# Patient Record
Sex: MALE | Race: ASIAN | NOT HISPANIC OR LATINO
[De-identification: names, ages, dates, MRNs, and addresses within clinical notes are randomized per-mention and may not be internally consistent; named-entity substitution may affect disease eponyms.]

---

## 2017-01-11 ENCOUNTER — NON-APPOINTMENT (OUTPATIENT)
Age: 57
End: 2017-01-11

## 2017-01-11 ENCOUNTER — APPOINTMENT (OUTPATIENT)
Dept: CARDIOLOGY | Facility: CLINIC | Age: 57
End: 2017-01-11

## 2017-01-11 VITALS
HEIGHT: 65 IN | OXYGEN SATURATION: 95 % | BODY MASS INDEX: 30.99 KG/M2 | WEIGHT: 186 LBS | SYSTOLIC BLOOD PRESSURE: 116 MMHG | DIASTOLIC BLOOD PRESSURE: 74 MMHG | HEART RATE: 83 BPM

## 2017-01-22 ENCOUNTER — RESULT REVIEW (OUTPATIENT)
Age: 57
End: 2017-01-22

## 2017-01-23 ENCOUNTER — OUTPATIENT (OUTPATIENT)
Dept: OUTPATIENT SERVICES | Facility: HOSPITAL | Age: 57
LOS: 1 days | Discharge: HOME | End: 2017-01-23
Payer: MEDICAID

## 2017-01-23 DIAGNOSIS — K92.1 MELENA: ICD-10-CM

## 2017-01-23 DIAGNOSIS — I77.0 ARTERIOVENOUS FISTULA, ACQUIRED: Chronic | ICD-10-CM

## 2017-01-23 LAB
GAS PNL BLDV: 140 MMOL/L — SIGNIFICANT CHANGE UP (ref 136–146)
GLUCOSE BLDV-MCNC: 89 — SIGNIFICANT CHANGE UP (ref 70–99)
HCT VFR BLDV CALC: 43.3 % — SIGNIFICANT CHANGE UP (ref 39–51)
POTASSIUM BLDV-SCNC: 5.7 MMOL/L — HIGH (ref 3.4–4.5)

## 2017-01-23 PROCEDURE — 88305 TISSUE EXAM BY PATHOLOGIST: CPT | Mod: 26

## 2017-01-24 LAB — SURGICAL PATHOLOGY STUDY: SIGNIFICANT CHANGE UP

## 2017-01-30 ENCOUNTER — OUTPATIENT (OUTPATIENT)
Dept: OUTPATIENT SERVICES | Facility: HOSPITAL | Age: 57
LOS: 1 days | End: 2017-01-30
Payer: MEDICAID

## 2017-01-30 VITALS
SYSTOLIC BLOOD PRESSURE: 134 MMHG | OXYGEN SATURATION: 97 % | TEMPERATURE: 98 F | DIASTOLIC BLOOD PRESSURE: 68 MMHG | HEART RATE: 74 BPM | RESPIRATION RATE: 16 BRPM | WEIGHT: 192.02 LBS | HEIGHT: 62 IN

## 2017-01-30 DIAGNOSIS — G47.33 OBSTRUCTIVE SLEEP APNEA (ADULT) (PEDIATRIC): ICD-10-CM

## 2017-01-30 DIAGNOSIS — N18.6 END STAGE RENAL DISEASE: ICD-10-CM

## 2017-01-30 DIAGNOSIS — K64.9 UNSPECIFIED HEMORRHOIDS: ICD-10-CM

## 2017-01-30 DIAGNOSIS — I10 ESSENTIAL (PRIMARY) HYPERTENSION: ICD-10-CM

## 2017-01-30 DIAGNOSIS — K64.8 OTHER HEMORRHOIDS: ICD-10-CM

## 2017-01-30 DIAGNOSIS — I25.10 ATHEROSCLEROTIC HEART DISEASE OF NATIVE CORONARY ARTERY WITHOUT ANGINA PECTORIS: ICD-10-CM

## 2017-01-30 DIAGNOSIS — I77.0 ARTERIOVENOUS FISTULA, ACQUIRED: Chronic | ICD-10-CM

## 2017-01-30 LAB
BUN SERPL-MCNC: 42 MG/DL — HIGH (ref 7–23)
CALCIUM SERPL-MCNC: 9.7 MG/DL — SIGNIFICANT CHANGE UP (ref 8.4–10.5)
CHLORIDE SERPL-SCNC: 101 MMOL/L — SIGNIFICANT CHANGE UP (ref 98–107)
CO2 SERPL-SCNC: 20 MMOL/L — LOW (ref 22–31)
CREAT SERPL-MCNC: 12.31 MG/DL — HIGH (ref 0.5–1.3)
GLUCOSE SERPL-MCNC: 112 MG/DL — HIGH (ref 70–99)
HCT VFR BLD CALC: 42.7 % — SIGNIFICANT CHANGE UP (ref 39–50)
HGB BLD-MCNC: 13.2 G/DL — SIGNIFICANT CHANGE UP (ref 13–17)
MCHC RBC-ENTMCNC: 30.3 PG — SIGNIFICANT CHANGE UP (ref 27–34)
MCHC RBC-ENTMCNC: 30.9 % — LOW (ref 32–36)
MCV RBC AUTO: 98.2 FL — SIGNIFICANT CHANGE UP (ref 80–100)
PLATELET # BLD AUTO: 141 K/UL — LOW (ref 150–400)
PMV BLD: 10.6 FL — SIGNIFICANT CHANGE UP (ref 7–13)
POTASSIUM SERPL-MCNC: 5.3 MMOL/L — SIGNIFICANT CHANGE UP (ref 3.5–5.3)
POTASSIUM SERPL-SCNC: 5.3 MMOL/L — SIGNIFICANT CHANGE UP (ref 3.5–5.3)
RBC # BLD: 4.35 M/UL — SIGNIFICANT CHANGE UP (ref 4.2–5.8)
RBC # FLD: 15.2 % — HIGH (ref 10.3–14.5)
SODIUM SERPL-SCNC: 142 MMOL/L — SIGNIFICANT CHANGE UP (ref 135–145)
WBC # BLD: 6.14 K/UL — SIGNIFICANT CHANGE UP (ref 3.8–10.5)
WBC # FLD AUTO: 6.14 K/UL — SIGNIFICANT CHANGE UP (ref 3.8–10.5)

## 2017-01-30 PROCEDURE — 93010 ELECTROCARDIOGRAM REPORT: CPT

## 2017-01-30 RX ORDER — SODIUM CHLORIDE 9 MG/ML
1000 INJECTION INTRAMUSCULAR; INTRAVENOUS; SUBCUTANEOUS
Qty: 0 | Refills: 0 | Status: DISCONTINUED | OUTPATIENT
Start: 2017-02-03 | End: 2017-02-18

## 2017-01-30 NOTE — H&P PST ADULT - NEGATIVE ALLERGY TYPES
no outdoor environmental allergies/no reactions to animals/no reactions to insect bites/no reactions to food/no indoor environmental allergies

## 2017-01-30 NOTE — H&P PST ADULT - PSH
AV fistula  right lower extremity 2 yrs  Left upper extrmity with graft 7 years ago  CABG (Coronary Artery Bypass Graft)    S/P CABG  x3 5 yrs ago  Stented coronary artery  x2   1990's   2016

## 2017-01-30 NOTE — H&P PST ADULT - FAMILY HISTORY
Sibling  Still living? Yes, Estimated age: Age Unknown  Family history of adult polycystic kidney disease, Age at diagnosis: Age Unknown

## 2017-01-30 NOTE — H&P PST ADULT - NEUROLOGICAL DETAILS
normal strength/alert and oriented x 3/cranial nerves intact responds to verbal commands/normal strength/cranial nerves intact/alert and oriented x 3/sensation intact/responds to pain

## 2017-01-30 NOTE — H&P PST ADULT - NSANTHOSAYNRD_GEN_A_CORE
No. ELIJAH screening performed.  STOP BANG Legend: 0-2 = LOW Risk; 3-4 = INTERMEDIATE Risk; 5-8 = HIGH Risk

## 2017-01-30 NOTE — H&P PST ADULT - GASTROINTESTINAL DETAILS
no distention/no masses palpable/soft/nontender/hyperactive bowel sounds no masses palpable/nontender/bowel sounds normal/no distention/soft

## 2017-01-30 NOTE — H&P PST ADULT - HISTORY OF PRESENT ILLNESS
56 year old man with hx of CAD s/p CABG/PCI (6/16), ESRD on HD (T TH S) presents with 2 weeks of loose BM. 2 weeks ago patient patient started having cough, myalgias and diarrhea. He presented to [facility] and was ruled out for PNA, found to have parainfluenza although he did recieve 2 days of broad spectrum abx. Since d/c his diarrhea has worsened up to 10 loose watery nonbloody BM per day whenever he ingests food. No specific food type. They are voluminous, occuring even at night. Endorses measured fevers to "106". Associated w crampy abdominal pain worst in LLQ. Denies n/v but endorses poor appetite and weakness. weight has been stable. No recent travel or eating of unusual food. Also having R sided neck pain, L shoulder and wrist pain. His cough however has resolved. He had a colonoscopy 2 years ago which was "normal". No new medications. No sick contacts. Denies trauma to shoulder.    ED VS Tmax 100.7 HR 84 /64    Recieved 1 dose of cefepime in the ED 56 year old man with hx of CAD s/p CABG/PCI (6/16), ESRD on HD (T TH S) presents with preop dx of hemorrhoids presents to have PST eval for Examination under anesthesia of rectal polypectomy and hemorrhoidectomy scheduled on 2/3/2017.

## 2017-01-30 NOTE — H&P PST ADULT - PMH
Asthma    CAD (Coronary Artery Disease)    ESRD on Dialysis    Gastroesophageal reflux disease without esophagitis    High cholesterol    HTN - Hypertension    PCK (Polycystic Kidney Disease) Asthma    CAD (Coronary Artery Disease)    ESRD on Dialysis  Tue, Thur & Sat  Gastroesophageal reflux disease without esophagitis    High cholesterol    HTN - Hypertension    PCK (Polycystic Kidney Disease)

## 2017-01-30 NOTE — H&P PST ADULT - MUSCULOSKELETAL
details… detailed exam no joint swelling/no joint warmth/no calf tenderness/ROM intact/no joint erythema

## 2017-01-30 NOTE — H&P PST ADULT - PROBLEM SELECTOR PLAN 2
Instructed patient to hold Plavix starting today and to continue Aspirin. Dr Souza's office notified.  Cardiac clearance requested. Also requested last echo and stress test.

## 2017-01-30 NOTE — H&P PST ADULT - MUSCULOSKELETAL COMMENTS
L shoulder severe TTP and pain w ROM over deltoid bursa. Also w TTP over thenar eminance. Mild pain w wrist ROM

## 2017-01-30 NOTE — H&P PST ADULT - RS GEN PE MLT RESP DETAILS PC
respirations non-labored no rales/breath sounds equal/good air movement/airway patent/respirations non-labored/no wheezes/no rhonchi/clear to auscultation bilaterally

## 2017-02-02 ENCOUNTER — RESULT REVIEW (OUTPATIENT)
Age: 57
End: 2017-02-02

## 2017-02-02 RX ORDER — SODIUM CHLORIDE 9 MG/ML
3 INJECTION INTRAMUSCULAR; INTRAVENOUS; SUBCUTANEOUS ONCE
Qty: 0 | Refills: 0 | Status: DISCONTINUED | OUTPATIENT
Start: 2017-02-03 | End: 2017-02-18

## 2017-02-02 RX ORDER — SODIUM CHLORIDE 9 MG/ML
1000 INJECTION INTRAMUSCULAR; INTRAVENOUS; SUBCUTANEOUS
Qty: 0 | Refills: 0 | Status: DISCONTINUED | OUTPATIENT
Start: 2017-02-03 | End: 2017-02-18

## 2017-02-02 NOTE — ASU PATIENT PROFILE, ADULT - PMH
Asthma    CAD (Coronary Artery Disease)    ESRD on Dialysis  Tue, Thur & Sat  Gastroesophageal reflux disease without esophagitis    High cholesterol    HTN - Hypertension    PCK (Polycystic Kidney Disease)

## 2017-02-03 ENCOUNTER — OUTPATIENT (OUTPATIENT)
Dept: OUTPATIENT SERVICES | Facility: HOSPITAL | Age: 57
LOS: 1 days | Discharge: HOME | End: 2017-02-03
Payer: MEDICAID

## 2017-02-03 ENCOUNTER — TRANSCRIPTION ENCOUNTER (OUTPATIENT)
Age: 57
End: 2017-02-03

## 2017-02-03 VITALS
SYSTOLIC BLOOD PRESSURE: 110 MMHG | TEMPERATURE: 98 F | HEART RATE: 64 BPM | OXYGEN SATURATION: 100 % | DIASTOLIC BLOOD PRESSURE: 65 MMHG | RESPIRATION RATE: 16 BRPM

## 2017-02-03 VITALS
DIASTOLIC BLOOD PRESSURE: 49 MMHG | RESPIRATION RATE: 18 BRPM | TEMPERATURE: 98 F | HEART RATE: 66 BPM | HEIGHT: 62 IN | WEIGHT: 192.02 LBS | OXYGEN SATURATION: 96 % | SYSTOLIC BLOOD PRESSURE: 130 MMHG

## 2017-02-03 DIAGNOSIS — K64.8 OTHER HEMORRHOIDS: ICD-10-CM

## 2017-02-03 DIAGNOSIS — I77.0 ARTERIOVENOUS FISTULA, ACQUIRED: Chronic | ICD-10-CM

## 2017-02-03 LAB
BUN SERPL-MCNC: 34 MG/DL — HIGH (ref 7–23)
CALCIUM SERPL-MCNC: 9.4 MG/DL — SIGNIFICANT CHANGE UP (ref 8.4–10.5)
CHLORIDE SERPL-SCNC: 97 MMOL/L — LOW (ref 98–107)
CK SERPL-CCNC: 52 U/L — SIGNIFICANT CHANGE UP (ref 30–200)
CO2 SERPL-SCNC: 21 MMOL/L — LOW (ref 22–31)
CREAT SERPL-MCNC: 10.53 MG/DL — HIGH (ref 0.5–1.3)
GLUCOSE SERPL-MCNC: 104 MG/DL — HIGH (ref 70–99)
HCT VFR BLD CALC: 42.7 % — SIGNIFICANT CHANGE UP (ref 39–50)
HGB BLD-MCNC: 13.4 G/DL — SIGNIFICANT CHANGE UP (ref 13–17)
MCHC RBC-ENTMCNC: 30.3 PG — SIGNIFICANT CHANGE UP (ref 27–34)
MCHC RBC-ENTMCNC: 31.4 % — LOW (ref 32–36)
MCV RBC AUTO: 96.6 FL — SIGNIFICANT CHANGE UP (ref 80–100)
PLATELET # BLD AUTO: 156 K/UL — SIGNIFICANT CHANGE UP (ref 150–400)
PMV BLD: 10.6 FL — SIGNIFICANT CHANGE UP (ref 7–13)
POTASSIUM SERPL-MCNC: 4.8 MMOL/L — SIGNIFICANT CHANGE UP (ref 3.5–5.3)
POTASSIUM SERPL-SCNC: 4.8 MMOL/L — SIGNIFICANT CHANGE UP (ref 3.5–5.3)
RBC # BLD: 4.42 M/UL — SIGNIFICANT CHANGE UP (ref 4.2–5.8)
RBC # FLD: 15 % — HIGH (ref 10.3–14.5)
SODIUM SERPL-SCNC: 137 MMOL/L — SIGNIFICANT CHANGE UP (ref 135–145)
TROPONIN T SERPL-MCNC: < 0.06 NG/ML — SIGNIFICANT CHANGE UP (ref 0–0.06)
WBC # BLD: 8.92 K/UL — SIGNIFICANT CHANGE UP (ref 3.8–10.5)
WBC # FLD AUTO: 8.92 K/UL — SIGNIFICANT CHANGE UP (ref 3.8–10.5)

## 2017-02-03 PROCEDURE — 88304 TISSUE EXAM BY PATHOLOGIST: CPT | Mod: 26

## 2017-02-03 PROCEDURE — 88305 TISSUE EXAM BY PATHOLOGIST: CPT | Mod: 26

## 2017-02-03 NOTE — ASU DISCHARGE PLAN (ADULT/PEDIATRIC). - NOTIFY
Pain not relieved by Medications/Unable to Urinate/Bleeding that does not stop/Persistent Nausea and Vomiting

## 2017-02-03 NOTE — ASU DISCHARGE PLAN (ADULT/PEDIATRIC). - COMMENTS
Surgical Unit will call you on the next business day to follow up. [facility] is open Monday - Friday.

## 2017-02-03 NOTE — PACU DISCHARGE NOTE - COMMENTS
seen by both cardiology and surgery.All diagnostic tests came back to baseline. Stable for discharge

## 2017-02-03 NOTE — ASU DISCHARGE PLAN (ADULT/PEDIATRIC). - MEDICATION SUMMARY - MEDICATIONS TO TAKE
I will START or STAY ON the medications listed below when I get home from the hospital:    aspirin 325 mg oral delayed release tablet  -- 1 tab(s) by mouth once a day  -- Indication: For Home Med    Percocet 5/325 oral tablet  -- 1 tab(s) by mouth every 4 hours, As Needed -for moderate pain MDD:8  -- Caution federal law prohibits the transfer of this drug to any person other  than the person for whom it was prescribed.  May cause drowsiness.  Alcohol may intensify this effect.  Use care when operating dangerous machinery.  This prescription cannot be refilled.  This product contains acetaminophen.  Do not use  with any other product containing acetaminophen to prevent possible liver damage.  Using more of this medication than prescribed may cause serious breathing problems.    -- Indication: For As needed for pain    Lipitor 10 mg oral tablet  -- 1 tab(s) by mouth once a day  -- Indication: For Home Med    Plavix 75 mg oral tablet  -- 1 tab(s) by mouth once a day  -- Indication: For Home Med    metoprolol tartrate 25 mg oral tablet  -- 1 tab(s) by mouth once a day  -- Indication: For Home Med    Renvela carbonate 800 mg oral tablet  -- 1 tab(s) by mouth 3 times a day  -- Indication: For Home Med

## 2017-02-03 NOTE — ASU DISCHARGE PLAN (ADULT/PEDIATRIC). - SPECIAL INSTRUCTIONS
Please follow up with Dr. Souza within 1 week after discharge from the hospital. You may call [phone] to schedule an appointment.     Do not lift anything over ten pounds. Do not strain. Do not drive as your movements will be restricted by pain. You will be discharged with pain medications, please do not drive while taking these. Do not drink alcohol while taking narcotic pain medications.     Please remove the gauze and rectal packing in 24 hours or before your next bowel movement.

## 2017-02-06 LAB — SURGICAL PATHOLOGY STUDY: SIGNIFICANT CHANGE UP

## 2017-02-08 ENCOUNTER — APPOINTMENT (OUTPATIENT)
Dept: CV DIAGNOSITCS | Facility: HOSPITAL | Age: 57
End: 2017-02-08

## 2017-02-08 ENCOUNTER — OUTPATIENT (OUTPATIENT)
Dept: OUTPATIENT SERVICES | Facility: HOSPITAL | Age: 57
LOS: 1 days | End: 2017-02-08

## 2017-02-08 DIAGNOSIS — I25.10 ATHEROSCLEROTIC HEART DISEASE OF NATIVE CORONARY ARTERY WITHOUT ANGINA PECTORIS: ICD-10-CM

## 2017-02-08 DIAGNOSIS — I25.5 ISCHEMIC CARDIOMYOPATHY: ICD-10-CM

## 2017-02-08 DIAGNOSIS — I77.0 ARTERIOVENOUS FISTULA, ACQUIRED: Chronic | ICD-10-CM

## 2017-05-17 ENCOUNTER — OUTPATIENT (OUTPATIENT)
Dept: OUTPATIENT SERVICES | Facility: HOSPITAL | Age: 57
LOS: 1 days | End: 2017-05-17
Payer: MEDICAID

## 2017-05-17 VITALS
DIASTOLIC BLOOD PRESSURE: 80 MMHG | RESPIRATION RATE: 16 BRPM | SYSTOLIC BLOOD PRESSURE: 130 MMHG | HEART RATE: 68 BPM | TEMPERATURE: 98 F | OXYGEN SATURATION: 96 % | WEIGHT: 192.9 LBS | HEIGHT: 65 IN

## 2017-05-17 DIAGNOSIS — Z01.818 ENCOUNTER FOR OTHER PREPROCEDURAL EXAMINATION: ICD-10-CM

## 2017-05-17 DIAGNOSIS — I77.0 ARTERIOVENOUS FISTULA, ACQUIRED: Chronic | ICD-10-CM

## 2017-05-17 DIAGNOSIS — N18.6 END STAGE RENAL DISEASE: ICD-10-CM

## 2017-05-17 DIAGNOSIS — Z98.890 OTHER SPECIFIED POSTPROCEDURAL STATES: Chronic | ICD-10-CM

## 2017-05-17 PROCEDURE — G0463: CPT

## 2017-05-17 NOTE — H&P PST ADULT - NEGATIVE GENERAL GENITOURINARY SYMPTOMS
no renal colic/no hematuria/no flank pain L/pt still producing urine, ESRD/no dysuria/no nocturia/no flank pain R

## 2017-05-17 NOTE — H&P PST ADULT - HISTORY OF PRESENT ILLNESS
57 year old man with hx of CAD s/p CABG/PCI (6/16),  on HD (T TH S) presents with preop dx of ESRD. Patient is scheduled for revision of right forearm fistula with vein patch on 5/23/17.

## 2017-05-17 NOTE — H&P PST ADULT - RS GEN PE MLT RESP DETAILS PC
breath sounds equal/clear to auscultation bilaterally/no rales/good air movement/no wheezes/no rhonchi/airway patent/respirations non-labored

## 2017-05-17 NOTE — H&P PST ADULT - NEGATIVE ALLERGY TYPES
no outdoor environmental allergies/no reactions to animals/no indoor environmental allergies/no reactions to food/no reactions to insect bites

## 2017-05-17 NOTE — H&P PST ADULT - NEGATIVE OPHTHALMOLOGIC SYMPTOMS
no blurred vision L/no pain R/no blurred vision R/no lacrimation R/no pain L/no diplopia/no photophobia/no lacrimation L

## 2017-05-17 NOTE — H&P PST ADULT - NSANTHOSAYNRD_GEN_A_CORE
patient was sleep test done in 2016, no ELIJAH diagnosed/No. ELIJAH screening performed.  STOP BANG Legend: 0-2 = LOW Risk; 3-4 = INTERMEDIATE Risk; 5-8 = HIGH Risk

## 2017-05-17 NOTE — H&P PST ADULT - EXTREMITIES COMMENTS
right lower arm fistula + thrill, Left upper arm fistula + thrill ( pt uses left arm AV fistula for HD) right lower arm fistula + thrill, Left upper arm fistula + thrill ( pt uses left arm AV fistula for HD)- NO USE OF UPPER ARMS FOR IV, BP CHECKS OR BLOOD DRAW- check BP on legs

## 2017-05-17 NOTE — H&P PST ADULT - PSH
AV fistula  right lower extremity 2 yrs  Left upper extrmity with graft 7 years ago  CABG (Coronary Artery Bypass Graft)  2007  S/P hemorrhoidectomy  and rectal polypectomy -2/3/2017.  Stented coronary artery  x2 cardiac stents in 2016, one cardiac stent in 2015

## 2017-05-17 NOTE — H&P PST ADULT - NEUROLOGICAL DETAILS
responds to verbal commands/normal strength/cranial nerves intact/alert and oriented x 3/sensation intact/responds to pain

## 2017-05-17 NOTE — H&P PST ADULT - MUSCULOSKELETAL
details… detailed exam no joint erythema/no joint swelling/no calf tenderness/no joint warmth/ROM intact

## 2017-05-17 NOTE — H&P PST ADULT - PMH
Asthma  last attack 2007, never hospitalized or intubated  CAD (Coronary Artery Disease)    ESRD on Dialysis  Tue, Thur & Sat  Gastroesophageal reflux disease without esophagitis    Hemorrhoids    High cholesterol    HTN - Hypertension    Obesity    PCK (Polycystic Kidney Disease)

## 2017-05-17 NOTE — H&P PST ADULT - PROBLEM SELECTOR PLAN 1
Patient is scheduled for revision of right forearm fistula with vein patch on 5/23/17.Patient is at moderate risk for this surgery. Pt to stop Plavix one day before sx as per Cardiology.

## 2017-05-23 ENCOUNTER — OUTPATIENT (OUTPATIENT)
Dept: OUTPATIENT SERVICES | Facility: HOSPITAL | Age: 57
LOS: 1 days | Discharge: HOME | End: 2017-05-23
Payer: MEDICAID

## 2017-05-23 ENCOUNTER — TRANSCRIPTION ENCOUNTER (OUTPATIENT)
Age: 57
End: 2017-05-23

## 2017-05-23 VITALS
OXYGEN SATURATION: 98 % | TEMPERATURE: 97 F | DIASTOLIC BLOOD PRESSURE: 60 MMHG | RESPIRATION RATE: 14 BRPM | SYSTOLIC BLOOD PRESSURE: 110 MMHG | HEART RATE: 68 BPM

## 2017-05-23 VITALS
DIASTOLIC BLOOD PRESSURE: 39 MMHG | WEIGHT: 192.9 LBS | HEIGHT: 65 IN | TEMPERATURE: 98 F | SYSTOLIC BLOOD PRESSURE: 103 MMHG | OXYGEN SATURATION: 100 % | HEART RATE: 75 BPM | RESPIRATION RATE: 18 BRPM

## 2017-05-23 DIAGNOSIS — N18.6 END STAGE RENAL DISEASE: ICD-10-CM

## 2017-05-23 DIAGNOSIS — I77.0 ARTERIOVENOUS FISTULA, ACQUIRED: Chronic | ICD-10-CM

## 2017-05-23 DIAGNOSIS — Z98.890 OTHER SPECIFIED POSTPROCEDURAL STATES: Chronic | ICD-10-CM

## 2017-05-23 LAB
ANION GAP SERPL CALC-SCNC: 13 MMOL/L — SIGNIFICANT CHANGE UP (ref 5–17)
BUN SERPL-MCNC: 48 MG/DL — HIGH (ref 7–18)
CALCIUM SERPL-MCNC: 9.1 MG/DL — SIGNIFICANT CHANGE UP (ref 8.4–10.5)
CHLORIDE SERPL-SCNC: 97 MMOL/L — SIGNIFICANT CHANGE UP (ref 96–108)
CO2 SERPL-SCNC: 24 MMOL/L — SIGNIFICANT CHANGE UP (ref 22–31)
CREAT SERPL-MCNC: 11 MG/DL — HIGH (ref 0.5–1.3)
GLUCOSE SERPL-MCNC: 99 MG/DL — SIGNIFICANT CHANGE UP (ref 70–99)
POTASSIUM SERPL-MCNC: 4.7 MMOL/L — SIGNIFICANT CHANGE UP (ref 3.5–5.3)
POTASSIUM SERPL-SCNC: 4.7 MMOL/L — SIGNIFICANT CHANGE UP (ref 3.5–5.3)
SODIUM SERPL-SCNC: 134 MMOL/L — LOW (ref 135–145)

## 2017-05-23 PROCEDURE — 37607 LIG/BANDING ANGIOACS AV FSTL: CPT | Mod: RT

## 2017-05-23 PROCEDURE — 36832 AV FISTULA REVISION OPEN: CPT | Mod: RT

## 2017-05-23 PROCEDURE — 80048 BASIC METABOLIC PNL TOTAL CA: CPT

## 2017-05-23 RX ORDER — FENTANYL CITRATE 50 UG/ML
25 INJECTION INTRAVENOUS
Qty: 0 | Refills: 0 | Status: DISCONTINUED | OUTPATIENT
Start: 2017-05-23 | End: 2017-05-23

## 2017-05-23 RX ORDER — SODIUM CHLORIDE 9 MG/ML
3 INJECTION INTRAMUSCULAR; INTRAVENOUS; SUBCUTANEOUS EVERY 8 HOURS
Qty: 0 | Refills: 0 | Status: DISCONTINUED | OUTPATIENT
Start: 2017-05-23 | End: 2017-05-23

## 2017-05-23 RX ORDER — SODIUM CHLORIDE 9 MG/ML
1000 INJECTION INTRAMUSCULAR; INTRAVENOUS; SUBCUTANEOUS
Qty: 0 | Refills: 0 | Status: DISCONTINUED | OUTPATIENT
Start: 2017-05-23 | End: 2017-05-23

## 2017-05-23 NOTE — ASU DISCHARGE PLAN (ADULT/PEDIATRIC). - MEDICATION SUMMARY - MEDICATIONS TO TAKE
I will START or STAY ON the medications listed below when I get home from the hospital:    aspirin 325 mg oral delayed release tablet  -- 1 tab(s) by mouth once a day  -- Indication: For .    Lipitor 10 mg oral tablet  -- 1 tab(s) by mouth once a day  -- Indication: For .    Plavix 75 mg oral tablet  -- 1 tab(s) by mouth once a day  -- Indication: For .    metoprolol tartrate 25 mg oral tablet  -- 1 tab(s) by mouth once a day  -- Indication: For .    Renvela carbonate 800 mg oral tablet  -- 1 tab(s) by mouth 3 times a day  -- Indication: For .

## 2017-05-30 DIAGNOSIS — N18.6 END STAGE RENAL DISEASE: ICD-10-CM

## 2017-05-30 DIAGNOSIS — Z79.02 LONG TERM (CURRENT) USE OF ANTITHROMBOTICS/ANTIPLATELETS: ICD-10-CM

## 2017-05-30 DIAGNOSIS — Z95.5 PRESENCE OF CORONARY ANGIOPLASTY IMPLANT AND GRAFT: ICD-10-CM

## 2017-05-30 DIAGNOSIS — I25.810 ATHEROSCLEROSIS OF CORONARY ARTERY BYPASS GRAFT(S) WITHOUT ANGINA PECTORIS: ICD-10-CM

## 2017-05-30 DIAGNOSIS — N28.1 CYST OF KIDNEY, ACQUIRED: ICD-10-CM
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
## 2017-05-30 DIAGNOSIS — E66.9 OBESITY, UNSPECIFIED: ICD-10-CM

## 2017-05-30 DIAGNOSIS — K21.9 GASTRO-ESOPHAGEAL REFLUX DISEASE WITHOUT ESOPHAGITIS: ICD-10-CM

## 2017-05-30 DIAGNOSIS — E78.00 PURE HYPERCHOLESTEROLEMIA, UNSPECIFIED: ICD-10-CM

## 2017-05-30 DIAGNOSIS — T82.898A OTHER SPECIFIED COMPLICATION OF VASCULAR PROSTHETIC DEVICES, IMPLANTS AND GRAFTS, INITIAL ENCOUNTER: ICD-10-CM

## 2017-05-30 DIAGNOSIS — I12.0 HYPERTENSIVE CHRONIC KIDNEY DISEASE WITH STAGE 5 CHRONIC KIDNEY DISEASE OR END STAGE RENAL DISEASE: ICD-10-CM

## 2017-05-30 DIAGNOSIS — J45.909 UNSPECIFIED ASTHMA, UNCOMPLICATED: ICD-10-CM

## 2017-06-18 NOTE — ASU PREOP CHECKLIST - TAMPON REMOVED
Problem: Cardiac Output, Decreased (Adult)  Goal: Adequate Cardiac Output/Effective Tissue Perfusion  Outcome: Ongoing (interventions implemented as appropriate)    06/17/17 3808   Cardiac Output, Decreased (Adult)   Adequate Cardiac Output/Effective Tissue Perfusion making progress toward outcome           
Problem: Patient Care Overview (Adult)  Goal: Plan of Care Review  Outcome: Ongoing (interventions implemented as appropriate)    06/17/17 0219   Coping/Psychosocial Response Interventions   Plan Of Care Reviewed With patient   Patient Care Overview   Progress progress towards functional goals is fair           
Problem: Patient Care Overview (Adult)  Goal: Plan of Care Review  Outcome: Ongoing (interventions implemented as appropriate)  Goal: Adult Individualization and Mutuality  Outcome: Ongoing (interventions implemented as appropriate)  Goal: Discharge Needs Assessment  Outcome: Ongoing (interventions implemented as appropriate)    Problem: Cardiac Output, Decreased (Adult)  Goal: Identify Related Risk Factors and Signs and Symptoms  Outcome: Ongoing (interventions implemented as appropriate)  Goal: Adequate Cardiac Output/Effective Tissue Perfusion  Outcome: Ongoing (interventions implemented as appropriate)      
n/a

## 2017-07-10 ENCOUNTER — APPOINTMENT (OUTPATIENT)
Dept: NEPHROLOGY | Facility: CLINIC | Age: 57
End: 2017-07-10

## 2017-07-10 ENCOUNTER — APPOINTMENT (OUTPATIENT)
Dept: TRANSPLANT | Facility: CLINIC | Age: 57
End: 2017-07-10

## 2017-07-11 ENCOUNTER — APPOINTMENT (OUTPATIENT)
Dept: TRANSPLANT | Facility: CLINIC | Age: 57
End: 2017-07-11

## 2017-07-12 ENCOUNTER — NON-APPOINTMENT (OUTPATIENT)
Age: 57
End: 2017-07-12

## 2017-07-12 ENCOUNTER — APPOINTMENT (OUTPATIENT)
Dept: CARDIOLOGY | Facility: CLINIC | Age: 57
End: 2017-07-12

## 2017-07-12 VITALS
HEIGHT: 65 IN | DIASTOLIC BLOOD PRESSURE: 72 MMHG | SYSTOLIC BLOOD PRESSURE: 117 MMHG | HEART RATE: 66 BPM | OXYGEN SATURATION: 96 % | WEIGHT: 188 LBS | BODY MASS INDEX: 31.32 KG/M2

## 2017-07-24 ENCOUNTER — OUTPATIENT (OUTPATIENT)
Dept: OUTPATIENT SERVICES | Facility: HOSPITAL | Age: 57
LOS: 1 days | End: 2017-07-24
Payer: MEDICAID

## 2017-07-24 ENCOUNTER — APPOINTMENT (OUTPATIENT)
Dept: CV DIAGNOSTICS | Facility: HOSPITAL | Age: 57
End: 2017-07-24

## 2017-07-24 DIAGNOSIS — I25.10 ATHEROSCLEROTIC HEART DISEASE OF NATIVE CORONARY ARTERY WITHOUT ANGINA PECTORIS: ICD-10-CM

## 2017-07-24 DIAGNOSIS — N18.6 END STAGE RENAL DISEASE: ICD-10-CM

## 2017-07-24 DIAGNOSIS — Z98.890 OTHER SPECIFIED POSTPROCEDURAL STATES: Chronic | ICD-10-CM

## 2017-07-24 DIAGNOSIS — I77.0 ARTERIOVENOUS FISTULA, ACQUIRED: Chronic | ICD-10-CM

## 2017-07-24 PROCEDURE — 93018 CV STRESS TEST I&R ONLY: CPT | Mod: GC

## 2017-07-24 PROCEDURE — 93016 CV STRESS TEST SUPVJ ONLY: CPT | Mod: GC

## 2017-07-24 PROCEDURE — 78452 HT MUSCLE IMAGE SPECT MULT: CPT | Mod: 26

## 2017-07-26 ENCOUNTER — APPOINTMENT (OUTPATIENT)
Dept: TRANSPLANT | Facility: CLINIC | Age: 57
End: 2017-07-26

## 2017-07-26 ENCOUNTER — APPOINTMENT (OUTPATIENT)
Dept: NEPHROLOGY | Facility: CLINIC | Age: 57
End: 2017-07-26

## 2017-07-26 VITALS
WEIGHT: 189 LBS | HEIGHT: 65 IN | DIASTOLIC BLOOD PRESSURE: 74 MMHG | SYSTOLIC BLOOD PRESSURE: 126 MMHG | BODY MASS INDEX: 31.49 KG/M2 | RESPIRATION RATE: 17 BRPM | TEMPERATURE: 98 F | HEART RATE: 72 BPM

## 2017-07-26 VITALS
DIASTOLIC BLOOD PRESSURE: 74 MMHG | HEART RATE: 72 BPM | TEMPERATURE: 98 F | OXYGEN SATURATION: 96 % | RESPIRATION RATE: 17 BRPM | WEIGHT: 189 LBS | BODY MASS INDEX: 31.45 KG/M2 | SYSTOLIC BLOOD PRESSURE: 126 MMHG

## 2017-07-28 ENCOUNTER — OTHER (OUTPATIENT)
Age: 57
End: 2017-07-28

## 2017-08-01 ENCOUNTER — OUTPATIENT (OUTPATIENT)
Dept: OUTPATIENT SERVICES | Facility: HOSPITAL | Age: 57
LOS: 1 days | End: 2017-08-01
Payer: MEDICAID

## 2017-08-01 DIAGNOSIS — I77.0 ARTERIOVENOUS FISTULA, ACQUIRED: Chronic | ICD-10-CM

## 2017-08-01 DIAGNOSIS — Z98.890 OTHER SPECIFIED POSTPROCEDURAL STATES: Chronic | ICD-10-CM

## 2017-08-13 ENCOUNTER — FORM ENCOUNTER (OUTPATIENT)
Age: 57
End: 2017-08-13

## 2017-08-14 ENCOUNTER — OUTPATIENT (OUTPATIENT)
Dept: OUTPATIENT SERVICES | Facility: HOSPITAL | Age: 57
LOS: 1 days | End: 2017-08-14
Payer: COMMERCIAL

## 2017-08-14 ENCOUNTER — APPOINTMENT (OUTPATIENT)
Dept: RADIOLOGY | Facility: HOSPITAL | Age: 57
End: 2017-08-14

## 2017-08-14 DIAGNOSIS — Z98.890 OTHER SPECIFIED POSTPROCEDURAL STATES: Chronic | ICD-10-CM

## 2017-08-14 DIAGNOSIS — Z76.82 AWAITING ORGAN TRANSPLANT STATUS: ICD-10-CM

## 2017-08-14 DIAGNOSIS — I77.0 ARTERIOVENOUS FISTULA, ACQUIRED: Chronic | ICD-10-CM

## 2017-08-14 PROCEDURE — 71010: CPT | Mod: 26

## 2017-08-21 ENCOUNTER — OUTPATIENT (OUTPATIENT)
Dept: OUTPATIENT SERVICES | Facility: HOSPITAL | Age: 57
LOS: 1 days | End: 2017-08-21

## 2017-08-21 ENCOUNTER — APPOINTMENT (OUTPATIENT)
Dept: CV DIAGNOSITCS | Facility: HOSPITAL | Age: 57
End: 2017-08-21

## 2017-08-21 DIAGNOSIS — I77.0 ARTERIOVENOUS FISTULA, ACQUIRED: Chronic | ICD-10-CM

## 2017-08-21 DIAGNOSIS — R07.9 CHEST PAIN, UNSPECIFIED: ICD-10-CM

## 2017-08-21 DIAGNOSIS — Z98.890 OTHER SPECIFIED POSTPROCEDURAL STATES: Chronic | ICD-10-CM

## 2017-08-23 ENCOUNTER — APPOINTMENT (OUTPATIENT)
Dept: RADIOLOGY | Facility: IMAGING CENTER | Age: 57
End: 2017-08-23

## 2017-08-23 ENCOUNTER — APPOINTMENT (OUTPATIENT)
Dept: ULTRASOUND IMAGING | Facility: IMAGING CENTER | Age: 57
End: 2017-08-23

## 2017-08-28 ENCOUNTER — INPATIENT (INPATIENT)
Facility: HOSPITAL | Age: 57
LOS: 2 days | Discharge: HOME | End: 2017-08-31
Attending: INTERNAL MEDICINE | Admitting: INTERNAL MEDICINE
Payer: MEDICAID

## 2017-08-28 VITALS
SYSTOLIC BLOOD PRESSURE: 154 MMHG | OXYGEN SATURATION: 100 % | TEMPERATURE: 98 F | DIASTOLIC BLOOD PRESSURE: 84 MMHG | RESPIRATION RATE: 24 BRPM | HEART RATE: 71 BPM

## 2017-08-28 DIAGNOSIS — I77.0 ARTERIOVENOUS FISTULA, ACQUIRED: Chronic | ICD-10-CM

## 2017-08-28 DIAGNOSIS — Z98.890 OTHER SPECIFIED POSTPROCEDURAL STATES: Chronic | ICD-10-CM

## 2017-08-28 LAB
ALBUMIN SERPL ELPH-MCNC: 4.1 G/DL — SIGNIFICANT CHANGE UP (ref 3.3–5)
ALBUMIN SERPL ELPH-MCNC: 4.2 G/DL — SIGNIFICANT CHANGE UP (ref 3.3–5)
ALP SERPL-CCNC: 18 U/L — LOW (ref 40–120)
ALP SERPL-CCNC: 19 U/L — LOW (ref 40–120)
ALT FLD-CCNC: 14 U/L — SIGNIFICANT CHANGE UP (ref 4–41)
ALT FLD-CCNC: 17 U/L — SIGNIFICANT CHANGE UP (ref 4–41)
AST SERPL-CCNC: 17 U/L — SIGNIFICANT CHANGE UP (ref 4–40)
AST SERPL-CCNC: 19 U/L — SIGNIFICANT CHANGE UP (ref 4–40)
BASE EXCESS BLDV CALC-SCNC: -4.6 MMOL/L — SIGNIFICANT CHANGE UP
BASE EXCESS BLDV CALC-SCNC: -5 MMOL/L — SIGNIFICANT CHANGE UP
BASOPHILS # BLD AUTO: 0.02 K/UL — SIGNIFICANT CHANGE UP (ref 0–0.2)
BASOPHILS NFR BLD AUTO: 0.4 % — SIGNIFICANT CHANGE UP (ref 0–2)
BILIRUB SERPL-MCNC: 0.3 MG/DL — SIGNIFICANT CHANGE UP (ref 0.2–1.2)
BILIRUB SERPL-MCNC: 0.3 MG/DL — SIGNIFICANT CHANGE UP (ref 0.2–1.2)
BLOOD GAS VENOUS - CREATININE: 13.5 MG/DL — HIGH (ref 0.5–1.3)
BLOOD GAS VENOUS - CREATININE: 13.7 MG/DL — HIGH (ref 0.5–1.3)
BUN SERPL-MCNC: 62 MG/DL — HIGH (ref 7–23)
BUN SERPL-MCNC: 63 MG/DL — HIGH (ref 7–23)
CALCIUM SERPL-MCNC: 9.4 MG/DL — SIGNIFICANT CHANGE UP (ref 8.4–10.5)
CALCIUM SERPL-MCNC: 9.5 MG/DL — SIGNIFICANT CHANGE UP (ref 8.4–10.5)
CHLORIDE BLDV-SCNC: 107 MMOL/L — SIGNIFICANT CHANGE UP (ref 96–108)
CHLORIDE BLDV-SCNC: 107 MMOL/L — SIGNIFICANT CHANGE UP (ref 96–108)
CHLORIDE SERPL-SCNC: 101 MMOL/L — SIGNIFICANT CHANGE UP (ref 98–107)
CHLORIDE SERPL-SCNC: 101 MMOL/L — SIGNIFICANT CHANGE UP (ref 98–107)
CK MB BLD-MCNC: 2 — SIGNIFICANT CHANGE UP (ref 0–2.5)
CK MB BLD-MCNC: 3.1 NG/ML — SIGNIFICANT CHANGE UP (ref 1–6.6)
CK SERPL-CCNC: 154 U/L — SIGNIFICANT CHANGE UP (ref 30–200)
CO2 SERPL-SCNC: 16 MMOL/L — LOW (ref 22–31)
CO2 SERPL-SCNC: 16 MMOL/L — LOW (ref 22–31)
CREAT SERPL-MCNC: 12.33 MG/DL — HIGH (ref 0.5–1.3)
CREAT SERPL-MCNC: 12.35 MG/DL — HIGH (ref 0.5–1.3)
EOSINOPHIL # BLD AUTO: 0.2 K/UL — SIGNIFICANT CHANGE UP (ref 0–0.5)
EOSINOPHIL NFR BLD AUTO: 3.8 % — SIGNIFICANT CHANGE UP (ref 0–6)
GAS PNL BLDV: 132 MMOL/L — LOW (ref 136–146)
GAS PNL BLDV: 132 MMOL/L — LOW (ref 136–146)
GLUCOSE BLDV-MCNC: 113 — HIGH (ref 70–99)
GLUCOSE BLDV-MCNC: 125 — HIGH (ref 70–99)
GLUCOSE SERPL-MCNC: 108 MG/DL — HIGH (ref 70–99)
GLUCOSE SERPL-MCNC: 119 MG/DL — HIGH (ref 70–99)
HCO3 BLDV-SCNC: 20 MMOL/L — SIGNIFICANT CHANGE UP (ref 20–27)
HCO3 BLDV-SCNC: 21 MMOL/L — SIGNIFICANT CHANGE UP (ref 20–27)
HCT VFR BLD CALC: 37.7 % — LOW (ref 39–50)
HCT VFR BLDV CALC: 34.9 % — LOW (ref 39–51)
HCT VFR BLDV CALC: 36.6 % — LOW (ref 39–51)
HGB BLD-MCNC: 11.5 G/DL — LOW (ref 13–17)
HGB BLDV-MCNC: 11.3 G/DL — LOW (ref 13–17)
HGB BLDV-MCNC: 11.9 G/DL — LOW (ref 13–17)
IMM GRANULOCYTES # BLD AUTO: 0 # — SIGNIFICANT CHANGE UP
IMM GRANULOCYTES NFR BLD AUTO: 0 % — SIGNIFICANT CHANGE UP (ref 0–1.5)
LACTATE BLDV-MCNC: 0.8 MMOL/L — SIGNIFICANT CHANGE UP (ref 0.5–2)
LACTATE BLDV-MCNC: 1.1 MMOL/L — SIGNIFICANT CHANGE UP (ref 0.5–2)
LIDOCAIN IGE QN: 119.9 U/L — HIGH (ref 7–60)
LYMPHOCYTES # BLD AUTO: 0.95 K/UL — LOW (ref 1–3.3)
LYMPHOCYTES # BLD AUTO: 17.8 % — SIGNIFICANT CHANGE UP (ref 13–44)
MAGNESIUM SERPL-MCNC: 1.9 MG/DL — SIGNIFICANT CHANGE UP (ref 1.6–2.6)
MCHC RBC-ENTMCNC: 29.1 PG — SIGNIFICANT CHANGE UP (ref 27–34)
MCHC RBC-ENTMCNC: 30.5 % — LOW (ref 32–36)
MCV RBC AUTO: 95.4 FL — SIGNIFICANT CHANGE UP (ref 80–100)
MONOCYTES # BLD AUTO: 0.41 K/UL — SIGNIFICANT CHANGE UP (ref 0–0.9)
MONOCYTES NFR BLD AUTO: 7.7 % — SIGNIFICANT CHANGE UP (ref 2–14)
NEUTROPHILS # BLD AUTO: 3.75 K/UL — SIGNIFICANT CHANGE UP (ref 1.8–7.4)
NEUTROPHILS NFR BLD AUTO: 70.3 % — SIGNIFICANT CHANGE UP (ref 43–77)
NRBC # FLD: 0 — SIGNIFICANT CHANGE UP
NT-PROBNP SERPL-SCNC: SIGNIFICANT CHANGE UP PG/ML
PCO2 BLDV: 38 MMHG — LOW (ref 41–51)
PCO2 BLDV: 40 MMHG — LOW (ref 41–51)
PH BLDV: 7.32 PH — SIGNIFICANT CHANGE UP (ref 7.32–7.43)
PH BLDV: 7.34 PH — SIGNIFICANT CHANGE UP (ref 7.32–7.43)
PHOSPHATE SERPL-MCNC: 3.4 MG/DL — SIGNIFICANT CHANGE UP (ref 2.5–4.5)
PLATELET # BLD AUTO: 113 K/UL — LOW (ref 150–400)
PMV BLD: 11.1 FL — SIGNIFICANT CHANGE UP (ref 7–13)
PO2 BLDV: 56 MMHG — HIGH (ref 35–40)
PO2 BLDV: 64 MMHG — HIGH (ref 35–40)
POTASSIUM BLDV-SCNC: 6.1 MMOL/L — HIGH (ref 3.4–4.5)
POTASSIUM BLDV-SCNC: 6.3 MMOL/L — CRITICAL HIGH (ref 3.4–4.5)
POTASSIUM SERPL-MCNC: 6.1 MMOL/L — HIGH (ref 3.5–5.3)
POTASSIUM SERPL-MCNC: 6.2 MMOL/L — CRITICAL HIGH (ref 3.5–5.3)
POTASSIUM SERPL-SCNC: 6.1 MMOL/L — HIGH (ref 3.5–5.3)
POTASSIUM SERPL-SCNC: 6.2 MMOL/L — CRITICAL HIGH (ref 3.5–5.3)
PROT SERPL-MCNC: 7.1 G/DL — SIGNIFICANT CHANGE UP (ref 6–8.3)
PROT SERPL-MCNC: 7.4 G/DL — SIGNIFICANT CHANGE UP (ref 6–8.3)
RBC # BLD: 3.95 M/UL — LOW (ref 4.2–5.8)
RBC # FLD: 14.2 % — SIGNIFICANT CHANGE UP (ref 10.3–14.5)
SAO2 % BLDV: 83 % — SIGNIFICANT CHANGE UP (ref 60–85)
SAO2 % BLDV: 90.1 % — HIGH (ref 60–85)
SODIUM SERPL-SCNC: 137 MMOL/L — SIGNIFICANT CHANGE UP (ref 135–145)
SODIUM SERPL-SCNC: 138 MMOL/L — SIGNIFICANT CHANGE UP (ref 135–145)
TROPONIN T SERPL-MCNC: < 0.06 NG/ML — SIGNIFICANT CHANGE UP (ref 0–0.06)
WBC # BLD: 5.33 K/UL — SIGNIFICANT CHANGE UP (ref 3.8–10.5)
WBC # FLD AUTO: 5.33 K/UL — SIGNIFICANT CHANGE UP (ref 3.8–10.5)

## 2017-08-28 PROCEDURE — 74176 CT ABD & PELVIS W/O CONTRAST: CPT | Mod: 26

## 2017-08-28 PROCEDURE — 71010: CPT | Mod: 26

## 2017-08-28 PROCEDURE — 70450 CT HEAD/BRAIN W/O DYE: CPT | Mod: 26

## 2017-08-28 RX ORDER — MORPHINE SULFATE 50 MG/1
4 CAPSULE, EXTENDED RELEASE ORAL ONCE
Qty: 0 | Refills: 0 | Status: DISCONTINUED | OUTPATIENT
Start: 2017-08-28 | End: 2017-08-28

## 2017-08-28 RX ORDER — ACETAMINOPHEN 500 MG
1000 TABLET ORAL ONCE
Qty: 0 | Refills: 0 | Status: COMPLETED | OUTPATIENT
Start: 2017-08-28 | End: 2017-08-29

## 2017-08-28 RX ORDER — INSULIN HUMAN 100 [IU]/ML
5 INJECTION, SOLUTION SUBCUTANEOUS ONCE
Qty: 0 | Refills: 0 | Status: COMPLETED | OUTPATIENT
Start: 2017-08-28 | End: 2017-08-28

## 2017-08-28 RX ORDER — DEXTROSE 50 % IN WATER 50 %
50 SYRINGE (ML) INTRAVENOUS ONCE
Qty: 0 | Refills: 0 | Status: COMPLETED | OUTPATIENT
Start: 2017-08-28 | End: 2017-08-28

## 2017-08-28 RX ORDER — ALBUTEROL 90 UG/1
2.5 AEROSOL, METERED ORAL ONCE
Qty: 0 | Refills: 0 | Status: COMPLETED | OUTPATIENT
Start: 2017-08-28 | End: 2017-08-28

## 2017-08-28 RX ORDER — SODIUM BICARBONATE 1 MEQ/ML
50 SYRINGE (ML) INTRAVENOUS ONCE
Qty: 0 | Refills: 0 | Status: COMPLETED | OUTPATIENT
Start: 2017-08-28 | End: 2017-08-28

## 2017-08-28 RX ORDER — ALBUTEROL 90 UG/1
2.5 AEROSOL, METERED ORAL
Qty: 0 | Refills: 0 | Status: COMPLETED | OUTPATIENT
Start: 2017-08-28 | End: 2017-08-28

## 2017-08-28 RX ORDER — CALCIUM GLUCONATE 100 MG/ML
1 VIAL (ML) INTRAVENOUS ONCE
Qty: 1 | Refills: 0 | Status: COMPLETED | OUTPATIENT
Start: 2017-08-28 | End: 2017-08-28

## 2017-08-28 RX ADMIN — ALBUTEROL 2.5 MILLIGRAM(S): 90 AEROSOL, METERED ORAL at 23:30

## 2017-08-28 RX ADMIN — ALBUTEROL 2.5 MILLIGRAM(S): 90 AEROSOL, METERED ORAL at 23:20

## 2017-08-28 RX ADMIN — Medication 200 GRAM(S): at 23:38

## 2017-08-28 RX ADMIN — ALBUTEROL 2.5 MILLIGRAM(S): 90 AEROSOL, METERED ORAL at 23:50

## 2017-08-28 RX ADMIN — ALBUTEROL 2.5 MILLIGRAM(S): 90 AEROSOL, METERED ORAL at 23:40

## 2017-08-28 RX ADMIN — Medication 50 MILLIEQUIVALENT(S): at 23:39

## 2017-08-28 RX ADMIN — Medication 50 MILLILITER(S): at 23:39

## 2017-08-28 RX ADMIN — INSULIN HUMAN 5 UNIT(S): 100 INJECTION, SOLUTION SUBCUTANEOUS at 23:38

## 2017-08-28 NOTE — ED ADULT NURSE NOTE - OBJECTIVE STATEMENT
pt brought to rm 16, A&Ox3, skin w/d/i, amb @ baseline, AV Fistula b/l arms, able to use lower L arm as per pt, PMHx CAD s/p CABG and stents, ESRD (T/Th/Sat), presents c/o chest pain, SOB,  abdominal pain, headache x2 weeks, gradually worsening, uses 3 pillows to sleep at night, CP described as intermittent, severe, SOB, diarrhea x3 days, up to 15  episodes per day. Recent abx 2-3 wks ago. appears in NAD @ this time, VS as noted, Sl placed, labs sent, awaiting MD fr[place], awaiting results and further orders, will continue to monitor.

## 2017-08-28 NOTE — ED PROVIDER NOTE - ATTENDING CONTRIBUTION TO CARE
57 male history of CAD s/p CABG and stents, ESRD on dialysis T, Th, Sa 2/2 PCKD, presents to the ED with complaints of left sided intermitten chest pain for last one day and diffuse abdominal pain.  Pt also has been having episodes of diarrhea but has not diarrhea right now.  Pain in chest and abdomen is severe 10/10.  Pt is uncomfortable and is writhing in pain.  Has last dialysis Saturday.  PE lungs cta b/l abd + diffuse tenderness Ext c/c/e neuro nonfocal  Plan Ct abd/pelvis( which was negative).  Admit for dialysis and evaluation of chest pain.

## 2017-08-28 NOTE — ED PROVIDER NOTE - OBJECTIVE STATEMENT
57 male history of CAD s/p CABG and stents, ESRD on dialysis T, Th, Sa 2/2 PCKD, here for chest pain, shortness of breath,  abdominal pain, headache. Patient has had these complaints x2 weeks, states the shortness of breath is gradually worsening. Requires 3 pillows to sleep at night, has PND, orthopnea. chest pain is intermittent, points at left flank, severe, associated with shortness of breath. Also having diarrhea x3 days, up to 15  episodes per day. Recent abx 2-3 wks ago.   Primary: JAUN Arguello  Nephrology: Dr. Caleb Mckeon

## 2017-08-28 NOTE — ED ADULT TRIAGE NOTE - CHIEF COMPLAINT QUOTE
arrives from home for eval of flu-like symptoms. c/o chills and cough and body aches x 2 weeks.    h/o CRF on HD (tues-thurs-sat), htn, cabg '09

## 2017-08-28 NOTE — ED PROVIDER NOTE - MEDICAL DECISION MAKING DETAILS
Patient with multiple complaints, including chest pain and headache, shortness of breath, AP, diarrhea. will do cardiac workup, CT head, CT AP, Cdiff, contact renal, admit.

## 2017-08-28 NOTE — ED PROVIDER NOTE - CARE PLAN
Principal Discharge DX:	Chest pain, unspecified type  Secondary Diagnosis:	Shortness of breath  Secondary Diagnosis:	Hyperkalemia

## 2017-08-29 ENCOUNTER — TRANSCRIPTION ENCOUNTER (OUTPATIENT)
Age: 57
End: 2017-08-29

## 2017-08-29 DIAGNOSIS — I10 ESSENTIAL (PRIMARY) HYPERTENSION: ICD-10-CM

## 2017-08-29 DIAGNOSIS — E78.00 PURE HYPERCHOLESTEROLEMIA, UNSPECIFIED: ICD-10-CM

## 2017-08-29 DIAGNOSIS — E87.5 HYPERKALEMIA: ICD-10-CM

## 2017-08-29 DIAGNOSIS — N18.6 END STAGE RENAL DISEASE: ICD-10-CM

## 2017-08-29 DIAGNOSIS — R19.7 DIARRHEA, UNSPECIFIED: ICD-10-CM

## 2017-08-29 DIAGNOSIS — Z29.9 ENCOUNTER FOR PROPHYLACTIC MEASURES, UNSPECIFIED: ICD-10-CM

## 2017-08-29 DIAGNOSIS — R07.9 CHEST PAIN, UNSPECIFIED: ICD-10-CM

## 2017-08-29 DIAGNOSIS — R10.84 GENERALIZED ABDOMINAL PAIN: ICD-10-CM

## 2017-08-29 LAB
AMYLASE P1 CFR SERPL: 93 U/L — SIGNIFICANT CHANGE UP (ref 25–125)
BUN SERPL-MCNC: 64 MG/DL — HIGH (ref 7–23)
BUN SERPL-MCNC: 66 MG/DL — HIGH (ref 7–23)
C DIFF TOX GENS STL QL NAA+PROBE: SIGNIFICANT CHANGE UP
CALCIUM SERPL-MCNC: 9.3 MG/DL — SIGNIFICANT CHANGE UP (ref 8.4–10.5)
CALCIUM SERPL-MCNC: 9.6 MG/DL — SIGNIFICANT CHANGE UP (ref 8.4–10.5)
CHLORIDE SERPL-SCNC: 98 MMOL/L — SIGNIFICANT CHANGE UP (ref 98–107)
CHLORIDE SERPL-SCNC: 99 MMOL/L — SIGNIFICANT CHANGE UP (ref 98–107)
CK MB BLD-MCNC: 3.01 NG/ML — SIGNIFICANT CHANGE UP (ref 1–6.6)
CK MB BLD-MCNC: SIGNIFICANT CHANGE UP (ref 0–2.5)
CK SERPL-CCNC: 135 U/L — SIGNIFICANT CHANGE UP (ref 30–200)
CO2 SERPL-SCNC: 20 MMOL/L — LOW (ref 22–31)
CO2 SERPL-SCNC: 20 MMOL/L — LOW (ref 22–31)
CREAT SERPL-MCNC: 12.67 MG/DL — HIGH (ref 0.5–1.3)
CREAT SERPL-MCNC: 13.07 MG/DL — HIGH (ref 0.5–1.3)
GLUCOSE SERPL-MCNC: 79 MG/DL — SIGNIFICANT CHANGE UP (ref 70–99)
GLUCOSE SERPL-MCNC: 86 MG/DL — SIGNIFICANT CHANGE UP (ref 70–99)
HCT VFR BLD CALC: 35.8 % — LOW (ref 39–50)
HGB BLD-MCNC: 10.8 G/DL — LOW (ref 13–17)
LIDOCAIN IGE QN: 100.5 U/L — HIGH (ref 7–60)
MAGNESIUM SERPL-MCNC: 2 MG/DL — SIGNIFICANT CHANGE UP (ref 1.6–2.6)
MCHC RBC-ENTMCNC: 29.1 PG — SIGNIFICANT CHANGE UP (ref 27–34)
MCHC RBC-ENTMCNC: 30.2 % — LOW (ref 32–36)
MCV RBC AUTO: 96.5 FL — SIGNIFICANT CHANGE UP (ref 80–100)
NRBC # FLD: 0 — SIGNIFICANT CHANGE UP
PLATELET # BLD AUTO: 111 K/UL — LOW (ref 150–400)
PMV BLD: 11.1 FL — SIGNIFICANT CHANGE UP (ref 7–13)
POTASSIUM SERPL-MCNC: 4.8 MMOL/L — SIGNIFICANT CHANGE UP (ref 3.5–5.3)
POTASSIUM SERPL-MCNC: 5.2 MMOL/L — SIGNIFICANT CHANGE UP (ref 3.5–5.3)
POTASSIUM SERPL-SCNC: 4.8 MMOL/L — SIGNIFICANT CHANGE UP (ref 3.5–5.3)
POTASSIUM SERPL-SCNC: 5.2 MMOL/L — SIGNIFICANT CHANGE UP (ref 3.5–5.3)
RBC # BLD: 3.71 M/UL — LOW (ref 4.2–5.8)
RBC # FLD: 14.3 % — SIGNIFICANT CHANGE UP (ref 10.3–14.5)
SODIUM SERPL-SCNC: 138 MMOL/L — SIGNIFICANT CHANGE UP (ref 135–145)
SODIUM SERPL-SCNC: 139 MMOL/L — SIGNIFICANT CHANGE UP (ref 135–145)
TROPONIN T SERPL-MCNC: < 0.06 NG/ML — SIGNIFICANT CHANGE UP (ref 0–0.06)
WBC # BLD: 4.28 K/UL — SIGNIFICANT CHANGE UP (ref 3.8–10.5)
WBC # FLD AUTO: 4.28 K/UL — SIGNIFICANT CHANGE UP (ref 3.8–10.5)

## 2017-08-29 PROCEDURE — 76937 US GUIDE VASCULAR ACCESS: CPT | Mod: 26

## 2017-08-29 RX ORDER — CLOPIDOGREL BISULFATE 75 MG/1
75 TABLET, FILM COATED ORAL DAILY
Qty: 0 | Refills: 0 | Status: DISCONTINUED | OUTPATIENT
Start: 2017-08-29 | End: 2017-08-31

## 2017-08-29 RX ORDER — LACTOBACILLUS ACIDOPHILUS 100MM CELL
1 CAPSULE ORAL
Qty: 0 | Refills: 0 | Status: DISCONTINUED | OUTPATIENT
Start: 2017-08-29 | End: 2017-08-31

## 2017-08-29 RX ORDER — ATORVASTATIN CALCIUM 80 MG/1
10 TABLET, FILM COATED ORAL AT BEDTIME
Qty: 0 | Refills: 0 | Status: DISCONTINUED | OUTPATIENT
Start: 2017-08-29 | End: 2017-08-31

## 2017-08-29 RX ORDER — SEVELAMER CARBONATE 2400 MG/1
800 POWDER, FOR SUSPENSION ORAL
Qty: 0 | Refills: 0 | Status: DISCONTINUED | OUTPATIENT
Start: 2017-08-29 | End: 2017-08-31

## 2017-08-29 RX ORDER — METOPROLOL TARTRATE 50 MG
25 TABLET ORAL DAILY
Qty: 0 | Refills: 0 | Status: DISCONTINUED | OUTPATIENT
Start: 2017-08-29 | End: 2017-08-31

## 2017-08-29 RX ORDER — ASPIRIN/CALCIUM CARB/MAGNESIUM 324 MG
162 TABLET ORAL DAILY
Qty: 0 | Refills: 0 | Status: DISCONTINUED | OUTPATIENT
Start: 2017-08-29 | End: 2017-08-29

## 2017-08-29 RX ORDER — LACTOBACILLUS ACIDOPHILUS 100MM CELL
1 CAPSULE ORAL
Qty: 0 | Refills: 0 | Status: DISCONTINUED | OUTPATIENT
Start: 2017-08-29 | End: 2017-08-29

## 2017-08-29 RX ORDER — PANTOPRAZOLE SODIUM 20 MG/1
40 TABLET, DELAYED RELEASE ORAL
Qty: 0 | Refills: 0 | Status: DISCONTINUED | OUTPATIENT
Start: 2017-08-29 | End: 2017-08-31

## 2017-08-29 RX ORDER — ASPIRIN/CALCIUM CARB/MAGNESIUM 324 MG
325 TABLET ORAL DAILY
Qty: 0 | Refills: 0 | Status: DISCONTINUED | OUTPATIENT
Start: 2017-08-29 | End: 2017-08-31

## 2017-08-29 RX ORDER — PANTOPRAZOLE SODIUM 20 MG/1
40 TABLET, DELAYED RELEASE ORAL
Qty: 0 | Refills: 0 | Status: DISCONTINUED | OUTPATIENT
Start: 2017-08-29 | End: 2017-08-29

## 2017-08-29 RX ORDER — IPRATROPIUM/ALBUTEROL SULFATE 18-103MCG
3 AEROSOL WITH ADAPTER (GRAM) INHALATION EVERY 6 HOURS
Qty: 0 | Refills: 0 | Status: DISCONTINUED | OUTPATIENT
Start: 2017-08-29 | End: 2017-08-31

## 2017-08-29 RX ORDER — SUCRALFATE 1 G
1 TABLET ORAL THREE TIMES A DAY
Qty: 0 | Refills: 0 | Status: DISCONTINUED | OUTPATIENT
Start: 2017-08-29 | End: 2017-08-31

## 2017-08-29 RX ADMIN — Medication 1 GRAM(S): at 18:17

## 2017-08-29 RX ADMIN — Medication 1 TABLET(S): at 18:17

## 2017-08-29 RX ADMIN — Medication 325 MILLIGRAM(S): at 11:52

## 2017-08-29 RX ADMIN — Medication 1000 MILLIGRAM(S): at 00:34

## 2017-08-29 RX ADMIN — Medication 25 MILLIGRAM(S): at 06:43

## 2017-08-29 RX ADMIN — MORPHINE SULFATE 4 MILLIGRAM(S): 50 CAPSULE, EXTENDED RELEASE ORAL at 00:07

## 2017-08-29 RX ADMIN — SEVELAMER CARBONATE 800 MILLIGRAM(S): 2400 POWDER, FOR SUSPENSION ORAL at 09:57

## 2017-08-29 RX ADMIN — Medication 1 GRAM(S): at 21:09

## 2017-08-29 RX ADMIN — MORPHINE SULFATE 4 MILLIGRAM(S): 50 CAPSULE, EXTENDED RELEASE ORAL at 00:34

## 2017-08-29 RX ADMIN — Medication 20 MILLIGRAM(S): at 18:17

## 2017-08-29 RX ADMIN — CLOPIDOGREL BISULFATE 75 MILLIGRAM(S): 75 TABLET, FILM COATED ORAL at 21:09

## 2017-08-29 RX ADMIN — PANTOPRAZOLE SODIUM 40 MILLIGRAM(S): 20 TABLET, DELAYED RELEASE ORAL at 10:02

## 2017-08-29 RX ADMIN — SEVELAMER CARBONATE 800 MILLIGRAM(S): 2400 POWDER, FOR SUSPENSION ORAL at 18:17

## 2017-08-29 RX ADMIN — SEVELAMER CARBONATE 800 MILLIGRAM(S): 2400 POWDER, FOR SUSPENSION ORAL at 12:52

## 2017-08-29 RX ADMIN — ATORVASTATIN CALCIUM 10 MILLIGRAM(S): 80 TABLET, FILM COATED ORAL at 21:09

## 2017-08-29 RX ADMIN — Medication 400 MILLIGRAM(S): at 00:07

## 2017-08-29 RX ADMIN — Medication 3 MILLILITER(S): at 19:20

## 2017-08-29 RX ADMIN — Medication 162 MILLIGRAM(S): at 00:56

## 2017-08-29 RX ADMIN — ALBUTEROL 2.5 MILLIGRAM(S): 90 AEROSOL, METERED ORAL at 00:00

## 2017-08-29 NOTE — H&P ADULT - RS GEN PE MLT RESP DETAILS PC
no rhonchi/rales/no intercostal retractions/respirations non-labored/normal/airway patent/no chest wall tenderness/no wheezes

## 2017-08-29 NOTE — CONSULT NOTE ADULT - PROBLEM SELECTOR RECOMMENDATION 2
- CT Abd with no radiographic findings for the etiology of the abdominal pain  - improving since admission  - ?related to kidney disease and low flow state  - bentyl tid  - protonix qd   - maalox prn   - pain control prn - CT Abd with no radiographic findings for the etiology of the abdominal pain  - improving since admission  - ?related to kidney disease and low flow state with volume overload   - bentyl tid  - protonix qd   - maalox prn   - pain control prn

## 2017-08-29 NOTE — DISCHARGE NOTE ADULT - CARE PLAN
Principal Discharge DX:	Chest pain  Goal:	Resolved  Instructions for follow-up, activity and diet:	Follow up with your cardiologist in 1-2 weeks ( with Dr. Chauhan)  Secondary Diagnosis:	Abdominal pain  Goal:	Resolved  Instructions for follow-up, activity and diet:	Dr. Leiva on 9/11/2017 @10:30amlactose free, low fiber renal diet          Dr. Leiva on 9/11/2017 @10:30amlactose free, low fiber renal diet   outpt cardiology f/u with Dr. Chauhan  Secondary Diagnosis:	Asthma  Instructions for follow-up, activity and diet:	Follow up with  Your PCP in 1 week  Secondary Diagnosis:	CAD (coronary artery disease)  Secondary Diagnosis:	Diarrhea  Goal:	Resolved  Instructions for follow-up, activity and diet:	Dr. Leiva on 9/11/2017 @10:30amlactose free, low fiber renal diet   outpt cardiology f/u with Dr. Chauhan  Secondary Diagnosis:	ESRD on Dialysis  Secondary Diagnosis:	HTN (hypertension)

## 2017-08-29 NOTE — DISCHARGE NOTE ADULT - CARE PROVIDERS DIRECT ADDRESSES
[email or web address]

## 2017-08-29 NOTE — H&P ADULT - PROBLEM SELECTOR PLAN 2
K on admission noted to be 6/1(slightly hemolyzed) and patient received IV Insulin with D50, Albuterol, calcium gluconate and IV Sodium bicarbonate with repeat K of 4.8  Renal diet ordered   Will need to call Dr. Mckeon's group in am for starting up dialysis while inpatient(patient weekly schedule and patient received PO Contrast)

## 2017-08-29 NOTE — H&P ADULT - NEGATIVE OPHTHALMOLOGIC SYMPTOMS
no lacrimation L/no diplopia/no blurred vision L/no discharge L/no photophobia/no discharge R/no lacrimation R/no blurred vision R

## 2017-08-29 NOTE — CONSULT NOTE ADULT - ASSESSMENT
58 y/o M with h/o CAD(s/p 2 stents on Plavix, and 5V CABG), Polycystic kidney disease(on transplant list and currently getting HD on Tues/Thurs/Sat), HLD, GERD, Colonic Polypectomy 1/2017 on colonoscopy, hemorrhoidectomy 2017, Gastritis 6/2011 on EGD and Asthma presented with the complaint of left sided chest tightness, diffuse abdominal pain and diarrhea. CT Abd did not reveal the etiology of patients abdominal discomfort or diarrhea

## 2017-08-29 NOTE — ED ADULT NURSE REASSESSMENT NOTE - NS ED NURSE REASSESS COMMENT FT1
IV attempt by Anneliese Echavarria at bedside unsuccessful, okay for patient to go to HD without IV access per Anneliese Echavarria and off monitor per orders. VS documented per flow. Pt for transport to HD via stretcher escorted by transport services at 1202, all belongings with patient. Safety maintained.

## 2017-08-29 NOTE — DISCHARGE NOTE ADULT - HOSPITAL COURSE
56 y/o M with PMH of CAD (s/p 2 stents, and 5V CABG), Polycycstic kidney disease (on transplant list and currently getting HD on Tues/Thurs/Sat), HLD, GERD, and Asthma presented with the complaint of left sided chest tightness and diarrhea. Patient was admitted to telemetry to r/o ACS and treat hyperkalemia with a presenting K of 6.1. Hyperkalemia was treated in the ED with IV Insulin with D50, Albuterol, Calcium gluconate, and IV sodium bicarbonate resulting in a K of 4.8. EKG on admission revealed a NSR @ 66bpm with RBBB and TWI in lead I, aVL, and V5-V6 with Qtc of 446. CT abdomen and pelvis was ordered in the ED with impression of "Etiology of patient's abdominal pain is not elucidated on the current   exam. Polycystic kidneys." Serial cardiac enzymes were negative  Evaluation done by nephrology recommended that the patient undergo hemodialysis with a 2K dialysate bath today using the LUE AVF and follow a renal diet. Nephrology cleared the patient for upcoming dental procedure, recommending that he be dialyzed the day prior to the procedure. Cardiology consult reviewed results of previous nuclear stress tests and found them to be unchanged from 5/18/16 to 7/24/17. They recommend that he continue ASA 325mg, Plavix 75mg, Atorvastatin 10mg, and Metoprolol 25mg. GI was consulted following elevated lipase level and recommended to keep the patient lactose free with a low fiber renal diet and follow-up of stool culture, ova, and parasite results once sample is obtained. 56 y/o M with PMH of CAD (s/p 2 stents, and 5V CABG), Polycycstic kidney disease (on transplant list and currently getting HD on Tues/Thurs/Sat), HLD, GERD, and Asthma presented with the complaint of left sided chest tightness and diarrhea. Patient was admitted to telemetry to r/o ACS and treat hyperkalemia with a presenting K of 6.1. Hyperkalemia was treated in the ED with IV Insulin with D50, Albuterol, Calcium gluconate, and IV sodium bicarbonate resulting in a K of 4.8. EKG on admission revealed a NSR @ 66bpm with RBBB and TWI in lead I, aVL, and V5-V6 with Qtc of 446. CT abdomen and pelvis was ordered in the ED with impression of "Etiology of patient's abdominal pain is not elucidated on the current   exam. Polycystic kidneys." Serial cardiac enzymes were negative  Evaluation done by nephrology recommended that the patient undergo hemodialysis with a 2K dialysate bath today using the LUE AVF and follow a renal diet. Nephrology cleared the patient for upcoming dental procedure, recommending that he be dialyzed the day prior to the procedure. Cardiology consult reviewed results of previous nuclear stress tests and found them to be unchanged from 5/18/16 to 7/24/17. They recommend that he continue ASA 325mg, Plavix 75mg, Atorvastatin 10mg, and Metoprolol 25mg. GI was consulted following elevated lipase level and recommended to keep the patient lactose free with a low fiber renal diet and follow-up of stool culture, ova, and parasite results once sample is obtained.  Pt had an ultrasound of LUE due to swelling which revealed no DVT  Case was discussed with attending, Pt is stable for Discharge Home

## 2017-08-29 NOTE — H&P ADULT - PROBLEM SELECTOR PLAN 3
Patient with recent antibiotic use and was febrile at home(as per patient)   Will check Ova and parasite and C-diff   If diarrhea does not resolve consider GI consult in am

## 2017-08-29 NOTE — DISCHARGE NOTE ADULT - CARE PROVIDER_API CALL
Wily Chauhan), Cardiovascular Disease; Internal Medicine; Interventional Cardiology  [address] [address] [place] [address]  [place] [address]  Phone: [phone]  Fax: [phone]    Isaías Leiva), Gastroenterology  [address] [place] [address]  Phone: [phone]  Fax: [phone]

## 2017-08-29 NOTE — H&P ADULT - PROBLEM SELECTOR PLAN 4
Patient gets dialysis on Tues/Thurs/Sat and last dialysis was on Saturday(completed full session)  Will need to call Dr. Mckeon's group in am for starting up dialysis while inpatient(patient weekly schedule and patient received PO Contrast)  Avoid nephrotoxic medications   Renal diet ordered

## 2017-08-29 NOTE — H&P ADULT - NEGATIVE NEUROLOGICAL SYMPTOMS
no loss of sensation/no focal seizures/no transient paralysis/no vertigo/no paresthesias/no loss of consciousness/no hemiparesis/no difficulty walking/no weakness/no confusion/no tremors/no syncope/no generalized seizures

## 2017-08-29 NOTE — CONSULT NOTE ADULT - PROBLEM SELECTOR RECOMMENDATION 9
- recent colonoscopy on 1/2017 with polypectomy and hemorrhoidectomy 2/2017  - no CT radiographic findings for the etiology of the diarrhea  - check C.diff given patient recently on antibiotic therapy  - check stool cultures  - check stool ova and parasites  - bacid tid  - monitor diarrhea  - lactose free; low fiber renal diet

## 2017-08-29 NOTE — DISCHARGE NOTE ADULT - PLAN OF CARE
Resolved Follow up with your cardiologist in 1-2 weeks ( with Dr. Chauhan) Dr. Leiva on 9/11/2017 @10:30amlactose free, low fiber renal diet          Dr. Leiva on 9/11/2017 @10:30amlactose free, low fiber renal diet   outpt cardiology f/u with Dr. Chauhan Dr. Leiva on 9/11/2017 @10:30amlactose free, low fiber renal diet   outpt cardiology f/u with Dr. Chauhan Follow up with  Your PCP in 1 week

## 2017-08-29 NOTE — H&P ADULT - NEGATIVE MUSCULOSKELETAL SYMPTOMS
no arthritis/no joint swelling/no stiffness/no muscle weakness/no neck pain/no muscle cramps/no arthralgia/no myalgia

## 2017-08-29 NOTE — DISCHARGE NOTE ADULT - COMMUNITY RESOURCES
[facility] [phone] [facility] [phone], [address] [employer] [phone]

## 2017-08-29 NOTE — PATIENT PROFILE ADULT. - TEACHING/LEARNING LEARNING PREFERENCES
skill demonstration/individual instruction/written material/audio/video/computer/internet/verbal instruction/pictorial/group instruction

## 2017-08-29 NOTE — H&P ADULT - HISTORY OF PRESENT ILLNESS
58 y/o M with PMH of CAD(s/p 2 stents, and 5V CABG), Polycystic kidney disease(on transplant list and currently getting HD on Tues/Thurs/Sat), HLD, GERD, Asthma presented with the complaint of left sided chest tightness and diarrhea. As per the patient he had a stress test done in July and since then has been getting intermittent diffuse abdominal pain with NB diarrhea. Patient stated that he had 15 episodes of diarrhea few days ago with associated abdominal cramps. Patient also endorsed left sided chest pressure which came on yesterday, intermittent, lasting few minutes in duration, without any radiation, with a severity of 8/10. Patient also endorsed episodes of nausea but denied any vomiting. Patient stated that he was given azithromycin when he went to his PMD for the diarrhea but the diarrhea still persisted. Patient also endorsed SOB, orthopnea and requires about 3 pillow to sleep at night. Patient also endorsed fever of 99 degree F and chills. Patient also stated that anything he puts in his mouth(both solid and liquid) comes out a diarrhea. Patient denied any dysuria, melena, hematochezia, recent travel, sick contact, pleuritic or positional chest pain.     On ED admission EKG revealed NSR at a rate of 66 with RBBB with TWI in lead I, AVL, V, V5-V6 with Qtc of 446, CE x 1: Negative, H&H: 11.5/37.7, Plt: 113, K: 6.1->slightly hemolyzed->4.8, BNP: 76421, BUN/Cr: 64/12.67, Lipase: 119. CT head: No change from prior study on 6/19/2012. No mass effect, hemorrhage or evidence of acute intracranial pathology. CT abd/pelvis: Etiology of patient's abdominal pain is not elucidated on the current exam. Polycystic kidneys. Prelim CXR: no emergent findings. When examined patient is resting in the stretcher and endorsed mild chest pressure, denied any current abdominal pain, but did state that he had 3 episodes of NB loose bowel movement.

## 2017-08-29 NOTE — H&P ADULT - NEGATIVE ENMT SYMPTOMS
no hearing difficulty/no nasal discharge/no tinnitus/no vertigo/no sinus symptoms/no ear pain/no nasal congestion

## 2017-08-29 NOTE — H&P ADULT - ASSESSMENT
56 y/o M with PMH of CAD(s/p 2 stents, and 5V CABG), Polycystic kidney disease(on transplant list and currently getting HD on Tues/Thurs/Sat), HLD, GERD, Asthma presented with the complaint of left sided chest tightness and diarrhea.     +Chest tightness-R/o ACS  +Hyperkalemia to 6.1-s/p tx in the ED with repeat K of 4.8

## 2017-08-29 NOTE — H&P ADULT - PROBLEM SELECTOR PLAN 1
Will continue to monitor on telemetry, serial EKG and CE prn for any more episodes of chest pain  HgbA1C, TSH, lipid profile, CBC, CMP in am   Will need to call house cardiology consult in am(Dr. Chauhan's patient)  Continue with Aspirin 325mg and Plavix 75mg daily

## 2017-08-29 NOTE — CONSULT NOTE ADULT - SUBJECTIVE AND OBJECTIVE BOX
Chief Complaint:  Patient is a 57y old  Male who presents with a chief complaint of Left sided chest pressure, abdominal pain and diarrhea (29 Aug 2017 03:36)    Obesity  Hemorrhoids  Gastroesophageal reflux disease without esophagitis  High cholesterol  ESRD on Dialysis  CAD (Coronary Artery Disease)  HTN (Hypertension)  CAD (Coronary Atherosclerotic Disease)  PCK (Polycystic Kidney Disease)  HTN - Hypertension  Asthma  S/P hemorrhoidectomy  AV fistula  Stented coronary artery  CABG (Coronary Artery Bypass Graft)  S/P CABG     HPI:  58 y/o M with PMH of CAD(s/p 2 stents, and 5V CABG), Polycystic kidney disease(on transplant list and currently getting HD on Tues/Thurs/Sat), HLD, GERD, Asthma presented with the complaint of left sided chest tightness and diarrhea. As per the patient he had a stress test done in July and since then has been getting intermittent diffuse abdominal pain with NB diarrhea. Patient stated that he had 15 episodes of diarrhea few days ago with associated abdominal cramps that are relieved after having bowel movements. Additionally he states that anything he eats, solids or liquids, will come out as diarrhea. His last colonoscopy was in January 2017 with polypectomy (tubular adenoma and in February had a hemorrhoidectomy). Denying any episodes of melena, hemotchezia, hematemesis, weight loss, dysphagia, odynophagia. Admits to some abdominal 'pressure' prior to coming to the ER with some nausea but no vomiting. At present abdominal pain has resolved. While in the ER the patient reports he had 3 episodes of loose brown stools last night     On ER admission. CT abd/pelvis: Etiology of patient's abdominal pain is not elucidated on the current exam. Polycystic kidneys.      No Known Allergies      aspirin enteric coated 325 milliGRAM(s) Oral daily  atorvastatin 10 milliGRAM(s) Oral at bedtime  clopidogrel Tablet 75 milliGRAM(s) Oral daily  sevelamer hydrochloride 800 milliGRAM(s) Oral three times a day with meals  metoprolol 25 milliGRAM(s) Oral daily  sucralfate 1 Gram(s) Oral three times a day  lactobacillus acidophilus 1 Tablet(s) Oral three times a day with meals  dicyclomine 20 milliGRAM(s) Oral three times a day before meals  pantoprazole    Tablet 40 milliGRAM(s) Oral before breakfast        FAMILY HISTORY:  Family history of adult polycystic kidney disease (Sibling)        Review of Systems:    General:  No wt loss, fevers, chills, night sweats, fatigue  Eyes:  Good vision, no reported pain  ENT:  No sore throat, pain, runny nose, dysphagia  CV:  No pain, palpitations, no lightheadedness  Resp:  No dyspnea, cough, tachypnea, wheezing  GI: nonbloody diarrhea, intermittent abd pain and nausea  :  No pain, bleeding, incontinence, nocturia  Muscle:  No pain, weakness  Neuro:  No weakness, tingling, memory problems  Psych:  No fatigue, insomnia, mood problems, depression  Endocrine:  No polyuria, polydypsia, cold/heat intolerance  Heme:  No petechiae, ecchymosis, easy bruisability  Skin:  No rash, tattoos, scars, edema    Relevant Family History:   n/c    Relevant Social History: n/c      Physical Exam:    Vital Signs:  Vital Signs Last 24 Hrs  T(C): 36.4 (29 Aug 2017 08:54), Max: 36.7 (28 Aug 2017 19:30)  T(F): 97.6 (29 Aug 2017 08:54), Max: 98.1 (28 Aug 2017 19:30)  HR: 60 (29 Aug 2017 08:54) (60 - 72)  BP: 158/69 (29 Aug 2017 08:54) (124/78 - 158/69)  BP(mean): 96 (29 Aug 2017 08:54) (96 - 96)  RR: 16 (29 Aug 2017 08:54) (16 - 24)  SpO2: 100% (29 Aug 2017 08:54) (99% - 100%)  Daily     Daily     General:  Appears stated age, well-groomed, nad  HEENT:  NC/AT,  conjunctivae clear and pink, no thyromegaly, nodules, adenopathy, no JVD  Chest:  Full & symmetric excursion, no increased effort, breath sounds clear  Cardiovascular:  Regular rhythm, S1, S2, no murmur/rub/S3/S4, no abdominal bruit, no edema  Abdomen:  Soft, non-tender, non-distended, normoactive bowel sounds,  no masses ,no hepatosplenomeagaly, no signs of chronic liver disease  Extremities:  no cyanosis,clubbing or edema  Skin:  No rash/erythema/ecchymoses/petechiae/wounds/abscess/warm/dry  Neuro/Psych:  A&Ox3  , no asterixis, no tremor, no encephalopathy    Laboratory:                            10.8   4.28  )-----------( 111      ( 29 Aug 2017 06:25 )             35.8     08-29    139  |  99  |  66<H>  ----------------------------<  86  5.2   |  20<L>  |  13.07<H>    Ca    9.3      29 Aug 2017 06:25  Phos  3.4     08-28  Mg     2.0     08-29    TPro  7.1  /  Alb  4.1  /  TBili  0.3  /  DBili  x   /  AST  17  /  ALT  14  /  AlkPhos  19<L>  08-28    LIVER FUNCTIONS - ( 28 Aug 2017 21:20 )  Alb: 4.1 g/dL / Pro: 7.1 g/dL / ALK PHOS: 19 u/L / ALT: 14 u/L / AST: 17 u/L / GGT: x               Amylase Serum93      Lipase [id number]       Ammonia--  Amylase Serum--      Lipase [id number]       Ammonia--    Imaging:    < from: CT Abdomen and Pelvis w/ Oral Cont (08.28.17 @ 23:38) >    EXAM:  CT ABDOMEN AND PELVIS OC        PROCEDURE DATE:  Aug 28 2017         INTERPRETATION:  CLINICAL INFORMATION: Abdominal pain and diarrhea    COMPARISON: CT abdomen pelvis on 7/31/2016    PROCEDURE:   CT of the Abdomen and Pelvis was performed without intravenous contrast.   Intravenous contrast: None.  Oral contrast: positive contrast was administered.  Sagittal and coronal reformats were performed.    FINDINGS:    LOWER CHEST: Small right pleural effusion with adjacent atelectasis.    LIVER: Within normal limits.  BILE DUCTS: Normal caliber.  GALLBLADDER: Contains biliary sludge.  SPLEEN: Within normal limits.  PANCREAS: Within normal limits.  ADRENALS: Within normal limits.  KIDNEYS/URETERS: Innumerable bilateral renal cysts. There is a 3.4 cm   right midpole hyperdense renal lesion with peripheral calcifications that   is stable and indeterminate.    BLADDER: Collapsed precluding evaluation.  REPRODUCTIVE ORGANS: The prostate is within normal limits.    BOWEL: No small bowel obstruction. Colonic diverticulosis without   diverticulitis. Appendix is normal.  PERITONEUM: No ascites.  VESSELS:  Atheromatous disease of the aorta and its branches.  RETROPERITONEUM: No lymphadenopathy.    ABDOMINAL WALL: Small fat-containing local hernia.  BONES: Within normal limits.    IMPRESSION:  Etiology of patient's abdominal pain is not elucidated on the current   exam. Polycystic kidneys.      _______________________        < from: Colonoscopy (01.23.17 @ 10:12) >  Findings:       The perianal exam findings include non-thrombosed external hemorrhoids        and non-thrombosed internal hemorrhoids.       A few small-mouthed diverticula were found in the ascending colon and in        the cecum.       A 7 mm polyp was found in the proximal ascending colon. The polyp was        semi-pedunculated. The polyp was removed with a hot snare. Resection and        retrieval were complete. To prevent bleeding post-intervention, one        hemostatic clip was successfully placed. There was no bleeding at the        end of the procedure.       A 5 mm polyp was found in the rectum. The polyp was sessile. Polypectomy        was not attempted due to proximity to anal canal and being on        anti-platelet agents.       The mucosa vascular pattern inthe proximal rectum was locally increased.                                                                                   Impression:          - Non-thrombosed external hemorrhoids and thrombosed                        internal hemorrhoids found on perianal exam.                       - Diverticulosis in the ascending colon and in the cecum.                       - One 7 mm polyp in the proximal ascending colon,                        removed with a hot snare. Resected and retrieved. Clip                     was placed.                       - One 5 mm polyp in the rectum. Resection not attempted.                       - Increased mucosa vascular pattern and area of possible                        hemorrahge over 10 cms area in the proximal rectum and                        diatal sigmoid. Unclear if this was realted to scope                        trauma or abnormal vascular pattern. No active bleeding                        was noted.  _______________________________________    < from: Upper Endoscopy (06.10.11 @ 08:38) >  Findings:       No gross lesions were noted in the esophagus. Estimated blood loss: none. Scattered moderate        inflammation characterized by congestion (edema) was found in the gastric body. Biopsies were        taken with a cold forceps for Helicobacter pylori testing. The examined duodenum was normal.        Biopsies were taken with a cold forceps for evaluation of celiac disease.                                                                                                        Impression:          - No gross lesions in esophagus.                       - Acute gastritis. Thiswas biopsied.                       - Normal examined duodenum. Biopsy was performed. Chief Complaint:  Patient is a 57y old  Male who presents with a chief complaint of Left sided chest pressure, abdominal pain and diarrhea (29 Aug 2017 03:36)    Obesity  Hemorrhoids  Gastroesophageal reflux disease without esophagitis  High cholesterol  ESRD on Dialysis  CAD (Coronary Artery Disease)  HTN (Hypertension)  CAD (Coronary Atherosclerotic Disease)  PCK (Polycystic Kidney Disease)  HTN - Hypertension  Asthma  S/P hemorrhoidectomy  AV fistula  Stented coronary artery  CABG (Coronary Artery Bypass Graft)  S/P CABG     HPI:  58 y/o M with PMH of CAD(s/p 2 stents, and 5V CABG), Polycystic kidney disease(on transplant list and currently getting HD on Tues/Thurs/Sat), HLD, GERD, Asthma presented with the complaint of left sided chest tightness and diarrhea. As per the patient he had a stress test done in July and since then has been getting intermittent diffuse abdominal pain with NB diarrhea. Patient stated that he had 15 episodes of diarrhea few days ago with associated abdominal cramps that are relieved after having bowel movements. Additionally he states that anything he eats, solids or liquids, will come out as diarrhea. His last colonoscopy was in January 2017 with polypectomy (tubular adenoma and in February had a hemorrhoidectomy). Denying any episodes of melena, hemotchezia, hematemesis, weight loss, dysphagia, odynophagia. Admits to some abdominal 'pressure' prior to coming to the ER with some nausea but no vomiting. At present abdominal pain has resolved. While in the ER the patient reports he had 3 episodes of loose brown stools last night     On ER admission. CT abd/pelvis: Etiology of patient's abdominal pain is not elucidated on the current exam. Polycystic kidneys.      No Known Allergies      aspirin enteric coated 325 milliGRAM(s) Oral daily  atorvastatin 10 milliGRAM(s) Oral at bedtime  clopidogrel Tablet 75 milliGRAM(s) Oral daily  sevelamer hydrochloride 800 milliGRAM(s) Oral three times a day with meals  metoprolol 25 milliGRAM(s) Oral daily  sucralfate 1 Gram(s) Oral three times a day  lactobacillus acidophilus 1 Tablet(s) Oral three times a day with meals  dicyclomine 20 milliGRAM(s) Oral three times a day before meals  pantoprazole    Tablet 40 milliGRAM(s) Oral before breakfast        FAMILY HISTORY:  Family history of adult polycystic kidney disease (Sibling)        Review of Systems:    General:  No wt loss, fevers, chills, night sweats, fatigue  Eyes:  Good vision, no reported pain  ENT:  No sore throat, pain, runny nose, dysphagia  CV:  No pain, palpitations, no lightheadedness  Resp:  No dyspnea, cough, tachypnea, wheezing  GI: nonbloody diarrhea, intermittent abd pain and nausea  :  No pain, bleeding, incontinence, nocturia  Muscle:  No pain, weakness  Neuro:  No weakness, tingling, memory problems  Psych:  No fatigue, insomnia, mood problems, depression  Endocrine:  No polyuria, polydypsia, cold/heat intolerance  Heme:  No petechiae, ecchymosis, easy bruisability  Skin:  No rash, tattoos, scars, edema    Relevant Family History:   n/c    Relevant Social History: n/c      Physical Exam:    Vital Signs:  Vital Signs Last 24 Hrs  T(C): 36.4 (29 Aug 2017 08:54), Max: 36.7 (28 Aug 2017 19:30)  T(F): 97.6 (29 Aug 2017 08:54), Max: 98.1 (28 Aug 2017 19:30)  HR: 60 (29 Aug 2017 08:54) (60 - 72)  BP: 158/69 (29 Aug 2017 08:54) (124/78 - 158/69)  BP(mean): 96 (29 Aug 2017 08:54) (96 - 96)  RR: 16 (29 Aug 2017 08:54) (16 - 24)  SpO2: 100% (29 Aug 2017 08:54) (99% - 100%)  Daily     Daily     General:  Appears stated age, well-groomed, nad  HEENT:  NC/AT,  conjunctivae clear and pink, no thyromegaly, nodules, adenopathy, no JVD  Chest:  Full & symmetric excursion, no increased effort, breath sounds clear  Cardiovascular:  Regular rhythm, S1, S2, no murmur/rub/S3/S4, no abdominal bruit, no edema  Abdomen:  Soft, non-tender, +distended, normoactive bowel sounds,  no masses ,no hepatosplenomeagaly, no signs of chronic liver disease  Extremities:  no cyanosis,clubbing or edema  Skin:  No rash/erythema/ecchymoses/petechiae/wounds/abscess/warm/dry  Neuro/Psych:  A&Ox3  , no asterixis, no tremor, no encephalopathy    Laboratory:                            10.8   4.28  )-----------( 111      ( 29 Aug 2017 06:25 )             35.8     08-29    139  |  99  |  66<H>  ----------------------------<  86  5.2   |  20<L>  |  13.07<H>    Ca    9.3      29 Aug 2017 06:25  Phos  3.4     08-28  Mg     2.0     08-29    TPro  7.1  /  Alb  4.1  /  TBili  0.3  /  DBili  x   /  AST  17  /  ALT  14  /  AlkPhos  19<L>  08-28    LIVER FUNCTIONS - ( 28 Aug 2017 21:20 )  Alb: 4.1 g/dL / Pro: 7.1 g/dL / ALK PHOS: 19 u/L / ALT: 14 u/L / AST: 17 u/L / GGT: x               Amylase Serum93      Lipase [id number]       Ammonia--  Amylase Serum--      Lipase [id number]       Ammonia--    Imaging:    < from: CT Abdomen and Pelvis w/ Oral Cont (08.28.17 @ 23:38) >    EXAM:  CT ABDOMEN AND PELVIS OC        PROCEDURE DATE:  Aug 28 2017         INTERPRETATION:  CLINICAL INFORMATION: Abdominal pain and diarrhea    COMPARISON: CT abdomen pelvis on 7/31/2016    PROCEDURE:   CT of the Abdomen and Pelvis was performed without intravenous contrast.   Intravenous contrast: None.  Oral contrast: positive contrast was administered.  Sagittal and coronal reformats were performed.    FINDINGS:    LOWER CHEST: Small right pleural effusion with adjacent atelectasis.    LIVER: Within normal limits.  BILE DUCTS: Normal caliber.  GALLBLADDER: Contains biliary sludge.  SPLEEN: Within normal limits.  PANCREAS: Within normal limits.  ADRENALS: Within normal limits.  KIDNEYS/URETERS: Innumerable bilateral renal cysts. There is a 3.4 cm   right midpole hyperdense renal lesion with peripheral calcifications that   is stable and indeterminate.    BLADDER: Collapsed precluding evaluation.  REPRODUCTIVE ORGANS: The prostate is within normal limits.    BOWEL: No small bowel obstruction. Colonic diverticulosis without   diverticulitis. Appendix is normal.  PERITONEUM: No ascites.  VESSELS:  Atheromatous disease of the aorta and its branches.  RETROPERITONEUM: No lymphadenopathy.    ABDOMINAL WALL: Small fat-containing local hernia.  BONES: Within normal limits.    IMPRESSION:  Etiology of patient's abdominal pain is not elucidated on the current   exam. Polycystic kidneys.      _______________________        < from: Colonoscopy (01.23.17 @ 10:12) >  Findings:       The perianal exam findings include non-thrombosed external hemorrhoids        and non-thrombosed internal hemorrhoids.       A few small-mouthed diverticula were found in the ascending colon and in        the cecum.       A 7 mm polyp was found in the proximal ascending colon. The polyp was        semi-pedunculated. The polyp was removed with a hot snare. Resection and        retrieval were complete. To prevent bleeding post-intervention, one        hemostatic clip was successfully placed. There was no bleeding at the        end of the procedure.       A 5 mm polyp was found in the rectum. The polyp was sessile. Polypectomy        was not attempted due to proximity to anal canal and being on        anti-platelet agents.       The mucosa vascular pattern inthe proximal rectum was locally increased.                                                                                   Impression:          - Non-thrombosed external hemorrhoids and thrombosed                        internal hemorrhoids found on perianal exam.                       - Diverticulosis in the ascending colon and in the cecum.                       - One 7 mm polyp in the proximal ascending colon,                        removed with a hot snare. Resected and retrieved. Clip                     was placed.                       - One 5 mm polyp in the rectum. Resection not attempted.                       - Increased mucosa vascular pattern and area of possible                        hemorrahge over 10 cms area in the proximal rectum and                        diatal sigmoid. Unclear if this was realted to scope                        trauma or abnormal vascular pattern. No active bleeding                        was noted.  _______________________________________    < from: Upper Endoscopy (06.10.11 @ 08:38) >  Findings:       No gross lesions were noted in the esophagus. Estimated blood loss: none. Scattered moderate        inflammation characterized by congestion (edema) was found in the gastric body. Biopsies were        taken with a cold forceps for Helicobacter pylori testing. The examined duodenum was normal.        Biopsies were taken with a cold forceps for evaluation of celiac disease.                                                                                                        Impression:          - No gross lesions in esophagus.                       - Acute gastritis. Thiswas biopsied.                       - Normal examined duodenum. Biopsy was performed.

## 2017-08-29 NOTE — DISCHARGE NOTE ADULT - PATIENT PORTAL LINK FT
“You can access the [employer] Patient Portal, offered by [facility], by registering with the following website: [email or web address]”

## 2017-08-29 NOTE — CONSULT NOTE ADULT - ATTENDING COMMENTS
Chest pain is concerning, though not definitively anginal. Will observe while other medical issues are improving, and if CP does not resolve, consider cath.

## 2017-08-29 NOTE — CONSULT NOTE ADULT - SUBJECTIVE AND OBJECTIVE BOX
HPI:  Mr. Smith is a 57 year-old man with history of multiple medical problems including asthma, coronary artery disease s/p CABG, and polycystic kidney disease, on hemodialysis TTS. He presented to the [facility] ER last night with left-sided chest tightness for 1 day, as well as diffuse abdominal pain and diarrhea for the past few days. The chest pain was 8/10 in intensity, and associated with nausea and shortness of breath.    PAST MEDICAL & SURGICAL HISTORY:  Obesity  Hemorrhoids  Gastroesophageal reflux disease without esophagitis  High cholesterol  ESRD on Dialysis: [facility] &amp; Sat  CAD (Coronary Artery Disease)  PCK (Polycystic Kidney Disease)  HTN - Hypertension  Asthma: last attack 2007, never hospitalized or intubated  S/P hemorrhoidectomy: and rectal polypectomy -2/3/2017.  AV fistula: right lower extremity 2 yrs  Left upper extrmity with graft 7 years ago  Stented coronary artery: x2 cardiac stents in 2016, one cardiac stent in 2015  CABG (Coronary Artery Bypass Graft): 2007      MEDICATIONS  (STANDING):  aspirin enteric coated 325 milliGRAM(s) Oral daily  atorvastatin 10 milliGRAM(s) Oral at bedtime  clopidogrel Tablet 75 milliGRAM(s) Oral daily  sevelamer hydrochloride 800 milliGRAM(s) Oral three times a day with meals  metoprolol 25 milliGRAM(s) Oral daily  lactobacillus acidophilus 1 Tablet(s) Oral two times a day with meals    Allergies  No Known Allergies    SOCIAL HISTORY:  Denies ETOh,Smoking,     FAMILY HISTORY:  Family history of adult polycystic kidney disease (Sibling)    REVIEW OF SYSTEMS:  CONSTITUTIONAL: No weakness, (+)fevers  EYES/ENT: No visual changes;  No vertigo or throat pain   NECK: No pain or stiffness  RESPIRATORY: No cough, wheezing, hemoptysis; (+)shortness of breath  CARDIOVASCULAR:(+)chest pain; no palpitations  GASTROINTESTINAL: (+) abdominal pain. (+)nausea, no vomiting, or hematemesis; (+)diarrhea   GENITOURINARY: No dysuria, frequency or hematuria  NEUROLOGICAL: No numbness or weakness  SKIN: No itching, burning, rashes, or lesions   All other review of systems is negative unless indicated above.    VITAL:  T(C): , Max: 36.7 (08-28-17 @ 19:30)  T(F): , Max: 98.1 (08-28-17 @ 19:30)  HR: 62 (08-29-17 @ 06:37)  BP: 154/68 (08-29-17 @ 06:37)  BP(mean): --  RR: 18 (08-29-17 @ 06:37)  SpO2: 100% (08-29-17 @ 06:37)    PHYSICAL EXAM:  Constitutional: NAD, Alert  HEENT: NCAT, MMM  Neck: Supple, No JVD  Respiratory: CTA-b/l  Cardiovascular: RRR s1s2, no m/r/g  Gastrointestinal: BS+, soft, NT/ND  Extremities: No peripheral edema b/l  Neurological: no focal deficits; strength grossly intact  Psychiatric: Normal mood, normal affect  Back: no CVAT b/l  Skin: No rashes, no nevi    LABS:                        10.8   4.28  )-----------( 111      ( 29 Aug 2017 06:25 )             35.8     Na(139)/K(5.2)/Cl(99)/HCO3(20)/BUN(66)/Cr(13.07)Glu(86)/Ca(9.3)/Mg(2.0)/PO4(--)    08-29 @ 06:25  Na(138)/K(4.8)/Cl(98)/HCO3(20)/BUN(64)/Cr(12.67)Glu(79)/Ca(9.6)/Mg(--)/PO4(--)    08-29 @ 01:00  Na(137)/K(6.1)/Cl(101)/HCO3(16)/BUN(63)/Cr(12.33)Glu(108)/Ca(9.5)/Mg(--)/PO4(--)    08-28 @ 21:20  Na(138)/K(6.2)/Cl(101)/HCO3(16)/BUN(62)/Cr(12.35)Glu(119)/Ca(9.4)/Mg(1.9)/PO4(3.4)    08-28 @ 20:10        IMPRESSION:  (1)Renal - ESRD- HD TTS - due for HD today    (2)Hyperkalemia - will improve with HD    (3)Chest pain - Don negative x 2.    (4)SOB - should improve with aggressive UF    RECOMMEND:  (1)HD today - 2k bath; aggressive UF  (2)Meds for GFR <10/HD  (3)Renal diet      Thank you for involving [facility] in this patient's care.    With warm regards,    Irvin Morrow MD  [facility]  [phone] HPI:  Mr. Smith is a 57 year-old man with history of multiple medical problems including asthma, coronary artery disease s/p CABG, and polycystic kidney disease, on hemodialysis TTS. He presented to the [facility] ER last night with left-sided chest tightness for 1 day, as well as diffuse abdominal pain and diarrhea for the past few days. The chest pain was 8/10 in intensity, and associated with nausea and shortness of breath.    I noted today that patient has functioning AVFs of both arms - he states that his LUE AVF has been used for 7-8 years and the skin has hardened such that they are running out of acceptable spots for cannulation...that is why the new AVF was created.    Also of note, he informs me that he soon plans to go for tooth extraction. He asks for medical/renal clearance.    PAST MEDICAL & SURGICAL HISTORY:  Obesity  Hemorrhoids  Gastroesophageal reflux disease without esophagitis  High cholesterol  ESRD on Dialysis: Tue, Thur &amp; Sat  CAD (Coronary Artery Disease)  PCK (Polycystic Kidney Disease)  HTN - Hypertension  Asthma: last attack 2007, never hospitalized or intubated  S/P hemorrhoidectomy: and rectal polypectomy -2/3/2017.  AV fistula: right lower extremity 2 yrs  Left upper extrmity with graft 7 years ago  Stented coronary artery: x2 cardiac stents in 2016, one cardiac stent in 2015  CABG (Coronary Artery Bypass Graft): 2007      MEDICATIONS  (STANDING):  aspirin enteric coated 325 milliGRAM(s) Oral daily  atorvastatin 10 milliGRAM(s) Oral at bedtime  clopidogrel Tablet 75 milliGRAM(s) Oral daily  sevelamer hydrochloride 800 milliGRAM(s) Oral three times a day with meals  metoprolol 25 milliGRAM(s) Oral daily  lactobacillus acidophilus 1 Tablet(s) Oral two times a day with meals    Allergies  No Known Allergies    SOCIAL HISTORY:  Denies ETOh,Smoking,     FAMILY HISTORY:  Family history of adult polycystic kidney disease (Sibling)    REVIEW OF SYSTEMS:  CONSTITUTIONAL: No weakness, (+)fevers  EYES/ENT: No visual changes;  No vertigo or throat pain   NECK: No pain or stiffness  RESPIRATORY: No cough, wheezing, hemoptysis; (+)shortness of breath  CARDIOVASCULAR:(+)chest pain; no palpitations  GASTROINTESTINAL: (+) abdominal pain. (+)nausea, no vomiting, or hematemesis; (+)diarrhea   GENITOURINARY: No dysuria, frequency or hematuria  NEUROLOGICAL: No numbness or weakness  SKIN: No itching, burning, rashes, or lesions   All other review of systems is negative unless indicated above.    VITAL:  T(C): , Max: 36.7 (08-28-17 @ 19:30)  T(F): , Max: 98.1 (08-28-17 @ 19:30)  HR: 62 (08-29-17 @ 06:37)  BP: 154/68 (08-29-17 @ 06:37)  BP(mean): --  RR: 18 (08-29-17 @ 06:37)  SpO2: 100% (08-29-17 @ 06:37)    PHYSICAL EXAM:  Constitutional: NAD, Alert, overweight  HEENT: NCAT, DMM  Neck: Supple, No JVD  Respiratory: CTA-b/l  Cardiovascular: RRR s1s2, no m/r/g  Gastrointestinal: BS+, soft, NT/ND  Extremities: 1+ b/l LE edema; (+)bilateral UE AVFs with thrills  Neurological: no focal deficits; strength grossly intact  Psychiatric: Normal mood, normal affect  Back: no CVAT b/l  Skin: No rashes, no nevi    LABS:                        10.8   4.28  )-----------( 111      ( 29 Aug 2017 06:25 )             35.8     Na(139)/K(5.2)/Cl(99)/HCO3(20)/BUN(66)/Cr(13.07)Glu(86)/Ca(9.3)/Mg(2.0)/PO4(--)    08-29 @ 06:25  Na(138)/K(4.8)/Cl(98)/HCO3(20)/BUN(64)/Cr(12.67)Glu(79)/Ca(9.6)/Mg(--)/PO4(--)    08-29 @ 01:00  Na(137)/K(6.1)/Cl(101)/HCO3(16)/BUN(63)/Cr(12.33)Glu(108)/Ca(9.5)/Mg(--)/PO4(--)    08-28 @ 21:20  Na(138)/K(6.2)/Cl(101)/HCO3(16)/BUN(62)/Cr(12.35)Glu(119)/Ca(9.4)/Mg(1.9)/PO4(3.4)    08-28 @ 20:10        IMPRESSION:  (1)Renal - ESRD- HD TTS - due for HD today    (2)Hyperkalemia - will improve with HD    (3)Chest pain - Don negative x 2.    (4)SOB - should improve with aggressive UF    (5)Vasc - LUE AVF being used for now; RUE AVF maturing and to be used in near future.     RECOMMEND:  (1)HD today - 2k bath; aggressive UF  (2)Meds for GFR <10/HD  (3)Renal diet  (4)No needlesticks nor BP checks from EITHER ARM (use legs instead)  (5)No objection to proceeding with eventual dental procedure from medical/renal standpoint - would look to have him dialyzed on the day prior to the procedure.    Thank you for involving [facility] in this patient's care.    With warm regards,    Irvin Morrow MD  [facility]  [phone] HPI:  Mr. Smith is a 57 year-old man with history of multiple medical problems including asthma, coronary artery disease s/p CABG, and polycystic kidney disease, on hemodialysis TTS. He presented to the [facility] ER last night with left-sided chest tightness for 1 day, as well as diffuse abdominal pain and diarrhea for the past few days. The chest pain was 8/10 in intensity, and associated with nausea and shortness of breath.    I noted today that patient has functioning AVFs of both arms - he states that his LUE AVF has been used for 7-8 years and the skin has hardened such that they are running out of acceptable spots for cannulation...that is why the new AVF was created.    Also of note, he informs me that he soon plans to go for tooth extraction. He asks for medical/renal clearance.    PAST MEDICAL & SURGICAL HISTORY:  Obesity  Hemorrhoids  Gastroesophageal reflux disease without esophagitis  High cholesterol  ESRD on Dialysis: Tue, Thur &amp; Sat  CAD (Coronary Artery Disease)  PCK (Polycystic Kidney Disease)  HTN - Hypertension  Asthma: last attack 2007, never hospitalized or intubated  S/P hemorrhoidectomy: and rectal polypectomy -2/3/2017.  AV fistula: right lower extremity 2 yrs  Left upper extrmity with graft 7 years ago  Stented coronary artery: x2 cardiac stents in 2016, one cardiac stent in 2015  CABG (Coronary Artery Bypass Graft): 2007      MEDICATIONS  (STANDING):  aspirin enteric coated 325 milliGRAM(s) Oral daily  atorvastatin 10 milliGRAM(s) Oral at bedtime  clopidogrel Tablet 75 milliGRAM(s) Oral daily  sevelamer hydrochloride 800 milliGRAM(s) Oral three times a day with meals  metoprolol 25 milliGRAM(s) Oral daily  lactobacillus acidophilus 1 Tablet(s) Oral two times a day with meals    Allergies  No Known Allergies    SOCIAL HISTORY:  Denies ETOh,Smoking,     FAMILY HISTORY:  Family history of adult polycystic kidney disease (Sibling)    REVIEW OF SYSTEMS:  CONSTITUTIONAL: No weakness, (+)fevers  EYES/ENT: No visual changes;  No vertigo or throat pain   NECK: No pain or stiffness  RESPIRATORY: No cough, wheezing, hemoptysis; (+)shortness of breath  CARDIOVASCULAR:(+)chest pain; no palpitations  GASTROINTESTINAL: (+) abdominal pain. (+)nausea, no vomiting, or hematemesis; (+)diarrhea   GENITOURINARY: No dysuria, frequency or hematuria  NEUROLOGICAL: No numbness or weakness  SKIN: No itching, burning, rashes, or lesions   All other review of systems is negative unless indicated above.    VITAL:  T(C): , Max: 36.7 (08-28-17 @ 19:30)  T(F): , Max: 98.1 (08-28-17 @ 19:30)  HR: 62 (08-29-17 @ 06:37)  BP: 154/68 (08-29-17 @ 06:37)  BP(mean): --  RR: 18 (08-29-17 @ 06:37)  SpO2: 100% (08-29-17 @ 06:37)    PHYSICAL EXAM:  Constitutional: NAD, Alert, overweight  HEENT: NCAT, DMM  Neck: Supple, No JVD  Respiratory: CTA-b/l  Cardiovascular: RRR s1s2, no m/r/g  Gastrointestinal: BS+, soft, NT/ND  Extremities: 1+ b/l LE edema; (+)bilateral UE AVFs with thrills  Neurological: no focal deficits; strength grossly intact  Psychiatric: Normal mood, normal affect  Back: no CVAT b/l  Skin: No rashes, no nevi    LABS:                        10.8   4.28  )-----------( 111      ( 29 Aug 2017 06:25 )             35.8     Na(139)/K(5.2)/Cl(99)/HCO3(20)/BUN(66)/Cr(13.07)Glu(86)/Ca(9.3)/Mg(2.0)/PO4(--)    08-29 @ 06:25  Na(138)/K(4.8)/Cl(98)/HCO3(20)/BUN(64)/Cr(12.67)Glu(79)/Ca(9.6)/Mg(--)/PO4(--)    08-29 @ 01:00  Na(137)/K(6.1)/Cl(101)/HCO3(16)/BUN(63)/Cr(12.33)Glu(108)/Ca(9.5)/Mg(--)/PO4(--)    08-28 @ 21:20  Na(138)/K(6.2)/Cl(101)/HCO3(16)/BUN(62)/Cr(12.35)Glu(119)/Ca(9.4)/Mg(1.9)/PO4(3.4)    08-28 @ 20:10        IMPRESSION:  (1)Renal - ESRD- HD TTS - due for HD today    (2)Hyperkalemia - will improve with HD    (3)Chest pain - Don negative x 2.    (4)SOB - should improve with aggressive UF    (5)Vasc - LUE AVF being used for now; RUE AVF maturing and to be used in near future.     RECOMMEND:  (1)HD today - 2k bath; aggressive UF - will use LUE AVF as RUE is not yet mature.  (2)Meds for GFR <10/HD  (3)Renal diet  (4)No needlesticks nor BP checks from EITHER ARM (use legs instead)  (5)No objection to proceeding with eventual dental procedure from medical/renal standpoint - would look to have him dialyzed on the day prior to the procedure.    Thank you for involving [facility] in this patient's care.    With warm regards,    Irvin Morrow MD  [facility]  [phone]

## 2017-08-29 NOTE — DISCHARGE NOTE ADULT - MEDICATION SUMMARY - MEDICATIONS TO TAKE
I will START or STAY ON the medications listed below when I get home from the hospital:    aspirin 325 mg oral delayed release tablet  -- 1 tab(s) by mouth once a day  -- Indication: For CAD (coronary artery disease)    Lipitor 10 mg oral tablet  -- 1 tab(s) by mouth once a day  -- Indication: For Cholol    Plavix 75 mg oral tablet  -- 1 tab(s) by mouth once a day  -- Indication: For CAD (coronary artery disease)    metoprolol tartrate 25 mg oral tablet  -- 1 tab(s) by mouth once a day  -- Indication: For CAD (coronary artery disease)    Proventil HFA 90 mcg/inh inhalation aerosol  -- 2 puff(s) inhaled 4 times a day, As Needed for shortness of breath  -- Indication: For Asthma    dicyclomine 20 mg oral tablet  -- 1 tab(s) by mouth 3 times a day (before meals)  -- Indication: For Diarhea    sucralfate 1 g oral tablet  -- 1 tab(s) by mouth 3 times a day  -- Indication: For GI     Renvela carbonate 800 mg oral tablet  -- 1 tab(s) by mouth 3 times a day  -- Indication: For supplement    lactobacillus acidophilus oral capsule  -- 1  by mouth 3 times a day  -- Indication: For GI    pantoprazole 40 mg oral delayed release tablet  -- 1 tab(s) by mouth once a day (before a meal)  -- Indication: For GERD

## 2017-08-29 NOTE — CONSULT NOTE ADULT - SUBJECTIVE AND OBJECTIVE BOX
Patient is a 57y old  Male who presents with a chief complaint of Left sided chest pressure, abdominal pain and diarrhea (29 Aug 2017 03:36)      HPI:  56 y/o M with PMH of CAD(s/p 2 stents, and 5V CABG), ischemic cardiomyopathy, polycystic kidney disease(on transplant list and currently getting HD on Tues/Thurs/Sat), HTN, HLD, GERD, asthma, and anemia who presented with the complaint of left sided chest tightness and diarrhea. Patient most recently saw cardiologist, Dr. Wily Chauhan, on July 12, 2017. At that time, patient was deemed stable from a cardiovascular standpoint. He had a stress test done July 24, 2017 showing no significant changes compared to 5/18/16 and notes that since then he has been getting intermittent epigastric and L sided lower abdominal pain with NB diarrhea. Patient stated that he had 15 episodes of diarrhea few days ago with associated abdominal cramps. Patient also endorsed associated left sided chest pressure intermittent, lasting few minutes in duration, with radiation to L and R shoulders and neck, with a severity as high as 8/10 and presently 6/10. Patient also endorsed episodes of nausea but denied any vomiting. Patient stated that he was given azithromycin when he called his PMD's office for the diarrhea but the diarrhea still persisted. Patient also endorsed ALEXANDRA w/ SOB coming on after walking 4 blocks, orthopnea requiring 2 pillows to sleep at night, and PND. In additiona, patient reported low-grade fever of 99 degree F and chills. Patient noted that anything he puts in his mouth(both solid and liquid) comes out a diarrhea. Patient denied any dysuria, melena, hematochezia, recent travel, sick contact, pleuritic or positional chest pain.     On ED admission EKG revealed NSR at a rate of 66 with RBBB with TWI in lead I, AVL, V, V5-V6 with Qtc of 446, CE x 2: Negative, H&H: 11.5/37.7, Plt: 113, K: 6.1->slightly hemolyzed->4.8, BNP: 58768, BUN/Cr: 64/12.67, Lipase: 119. CT head: No change from prior study on 6/19/2012. No mass effect, hemorrhage or evidence of acute intracranial pathology. CT abd/pelvis: Etiology of patient's abdominal pain is not elucidated on the current exam. Polycystic kidneys. CXR: clear lungs. When examined patient was resting in the stretcher and endorsed mild chest pressure, denied any current abdominal pain, but did state that he had 3 episodes of NB loose bowel movement. (29 Aug 2017 03:36)    INTERVAL HISTORY: Patient currently denies f/c, HA, SOB, abdominal pain, NVD, bloody BMs, dysuria, hematuria, numbness, tingling, and muscle weakness. Endorses wheezing and cough w/ white and yellow sputum production. No hemoptysis.    PAST MEDICAL & SURGICAL HISTORY:  Obesity  Hemorrhoids  Gastroesophageal reflux disease without esophagitis  High cholesterol  ESRD on Dialysis: [facility], Thur &amp; Sat  CAD (Coronary Artery Disease)  Ischemic cardiomyopathy  PCK (Polycystic Kidney Disease)  HTN - Hypertension  Asthma: last attack 2007, never hospitalized or intubated  S/P hemorrhoidectomy: and rectal polypectomy -2/3/2017.  Anemia  AV fistula: right lower extremity 2 yrs  Left upper extrmity with graft 7 years ago  Stented coronary artery: x2 cardiac stents in 2016, one cardiac stent in 2015  CABG (Coronary Artery Bypass Graft): 2007            ECHO  FINDINGS:   2/8/17: Mitral annular calcification, normal LA, grossly preserved overall LV systolic function  5/18/16: Mitral annual calcification, mild mitral regurgitation, grossly low normal LV systolic function, LVEF 54%    PHARMACOLOGIC STRESS TEST:  < from: Nuclear Stress Test-Pharmacologic (07.24.17 @ 12:49) >  IMPRESSIONS:Abnormal Study  * Consistent with infarction with moderate mckay-infarct  ischemia.  * Review of raw data shows: The study is of good technical  quality.  * The left ventricle was enlarged. There are large,  moderate to severe defects in inferior, inferolateral and  lateral walls that are fixed with moderatereversible  mckay-infarct ischemia, consistent with infarction with  moderate mckay-infarct ischemia.  * Post-stress gated wall motion analysis was performed  (LVEF = 46 %;LVEDV = 152 ml.)  *** Compared with Nuclear/Stress test of 5/18/2016, no  significant changes noted. Prior LVEF was 30% with   mL    < end of copied text >        HOME MEDICATIONS:  · 	Renvela carbonate 800 mg oral tablet: 1 tab(s) orally 3 times a day  · 	aspirin 325 mg oral delayed release tablet: 1 tab(s) orally once a day  · 	Plavix 75 mg oral tablet: 1 tab(s) orally once a day  · 	Lipitor 10 mg oral tablet: 1 tab(s) orally once a day  · 	metoprolol tartrate 25 mg oral tablet: 1 tab(s) orally once a day    MEDICATIONS  (STANDING):  aspirin enteric coated 325 milliGRAM(s) Oral daily  atorvastatin 10 milliGRAM(s) Oral at bedtime  clopidogrel Tablet 75 milliGRAM(s) Oral daily  sevelamer hydrochloride 800 milliGRAM(s) Oral three times a day with meals  metoprolol 25 milliGRAM(s) Oral daily  sucralfate 1 Gram(s) Oral three times a day  lactobacillus acidophilus 1 Tablet(s) Oral three times a day with meals  dicyclomine 20 milliGRAM(s) Oral three times a day before meals  pantoprazole    Tablet 40 milliGRAM(s) Oral before breakfast    MEDICATIONS  (PRN):      FAMILY HISTORY:  Family history of adult polycystic kidney disease (Sister, father)          REVIEW OF SYSTEMS      General:	Denies fever, chills, headache, and dizziness    Skin: No rash  	  Ophthalmologic: No changes in vision  	  ENMT:	No sore throat or nasal congestion    Respiratory and Thorax: Endorses wheezing and cough w/ white and yellow sputum production, denies SOB   	  Cardiovascular:	Endorses 6/10 L sided chest tightness, no palpitations    Gastrointestinal:	 Denies abdominal pain, NVD, and bloody BMs    Genitourinary:	Denies dysuria and hematuria    Musculoskeletal:	Denies joint swelling and muscle weakness    Neurological:	Denies numbness, tingling, and muscle weakness    Endocrine:	Denies diabetes mellitus    Allergic/Immunologic:	Denies medication allergies    SOCIAL HISTORY: , lives with wife and kids, works as . Denies cigarettes, alcohol, and other illicit substance use.    CIGARETTES: Denies    ALCOHOL: Denies     Vital Signs Last 24 Hrs  T(C): 36.4 (29 Aug 2017 08:54), Max: 36.7 (28 Aug 2017 19:30)  T(F): 97.6 (29 Aug 2017 08:54), Max: 98.1 (28 Aug 2017 19:30)  HR: 60 (29 Aug 2017 08:54) (60 - 72)  BP: 158/69 (29 Aug 2017 08:54) (124/78 - 158/69)  BP(mean): 96 (29 Aug 2017 08:54) (96 - 96)  RR: 16 (29 Aug 2017 08:54) (16 - 24)  SpO2: 100% (29 Aug 2017 08:54) (99% - 100%)    PHYSICAL EXAM:      Constitutional: NAD, obese    Eyes: PERRLA, EOMI, no conjunctival pallor or scleral icterus    ENMT: MMM, oropharynx clear and nonerythematous    Neck: Supple, no JVD    Respiratory: Breath sounds CTA bilaterally, no wheeze, rales, or rhonchi    Cardiovascular: RRR, S1 and S2 clear to ausculation, no MRG, 1+ pitting edema in LEs bilaterally    Gastrointestinal: Abd soft, nontender, nondistended, bowel sounds present    Extremities: No clubbing or cyanosis; AVFs in UEs bilaterally w/ palpable thrills    Vascular: Radial pulses 2+ bilaterally, PT and DP pulses 1+ bilaterally    Neurological: CNs grossly intact, no focal deficits    Skin: Warm and dry, no rashes    Lymph Nodes: No appreciable cervical LAD    Musculoskeletal: No joint swelling, moving extremities spontaneously    Psychiatric: Normal mood, normal affect          ECG: NSR at a rate of 66 with RBBB with TWI in lead I, AVL, V, V5-V6 with Qtc of 446    I&O's Detail      LABS:                        10.8   4.28  )-----------( 111      ( 29 Aug 2017 06:25 )             35.8     08-29    139  |  99  |  66<H>  ----------------------------<  86  5.2   |  20<L>  |  13.07<H>    Ca    9.3      29 Aug 2017 06:25  Phos  3.4     08-28  Mg     2.0     08-29    TPro  7.1  /  Alb  4.1  /  TBili  0.3  /  DBili  x   /  AST  17  /  ALT  14  /  AlkPhos  19<L>  08-28    CARDIAC MARKERS ( 29 Aug 2017 01:00 )  x     / < 0.06 ng/mL / 135 u/L / 3.01 ng/mL / x      CARDIAC MARKERS ( 28 Aug 2017 20:10 )  x     / < 0.06 ng/mL / 154 u/L / 3.10 ng/mL / x        I&O's Summary    BNPSerum Pro-Brain Natriuretic Peptide: [id number] pg/mL (08-28 @ 20:10)    RADIOLOGY & ADDITIONAL STUDIES:    CXR 8/28/17: Clear lungs    CTAP 8/28/17: Etiology of patient's abdominal pain is not elucidated on the current   exam.    Polycystic kidneys.    CTH 8/28/17: No change from prior study on 6/19/2012. No mass effect, hemorrhage or   evidence of acute intracranial pathology. Patient is a 57y old  Male who presents with a chief complaint of Left sided chest pressure, abdominal pain and diarrhea (29 Aug 2017 03:36)      HPI:  56 y/o M with PMH of CAD(s/p 2 stents, and 5V CABG), ischemic cardiomyopathy, polycystic kidney disease(on transplant list and currently getting HD on Tues/Thurs/Sat), HTN, HLD, GERD, asthma, and anemia who presented with the complaint of left sided chest tightness and diarrhea. Patient most recently saw cardiologist, Dr. Wily Chauhan, on July 12, 2017. At that time, patient was deemed stable from a cardiovascular standpoint. He had a stress test done July 24, 2017 showing no significant changes compared to 5/18/16 and notes that since then he has been getting intermittent epigastric and L sided lower abdominal pain with NB diarrhea. Patient stated that he had 15 episodes of diarrhea few days ago with associated abdominal cramps. Patient also endorsed associated left sided chest pressure intermittent, lasting few minutes in duration, with radiation to L and R shoulders and neck, with a severity as high as 8/10 and presently 6/10. Patient also endorsed episodes of nausea but denied any vomiting. Patient stated that he was given azithromycin when he called his PMD's office for the diarrhea but the diarrhea still persisted. Patient also endorsed ALEXANDRA w/ SOB coming on after walking 4 blocks, orthopnea requiring 2 pillows to sleep at night, and PND. In additiona, patient reported low-grade fever of 99 degree F and chills. Patient noted that anything he puts in his mouth(both solid and liquid) comes out a diarrhea. Patient denied any dysuria, melena, hematochezia, recent travel, sick contact, pleuritic or positional chest pain.     On ED admission EKG revealed NSR at a rate of 66 with RBBB with TWI in lead I, AVL, V, V5-V6 with Qtc of 446, CE x 2: Negative, H&H: 11.5/37.7, Plt: 113, K: 6.1->slightly hemolyzed->4.8, BNP: 33545, BUN/Cr: 64/12.67, Lipase: 119. CT head: No change from prior study on 6/19/2012. No mass effect, hemorrhage or evidence of acute intracranial pathology. CT abd/pelvis: Etiology of patient's abdominal pain is not elucidated on the current exam. Polycystic kidneys. CXR: clear lungs. When examined patient was resting in the stretcher and endorsed mild chest pressure, denied any current abdominal pain, but did state that he had 3 episodes of NB loose bowel movement. (29 Aug 2017 03:36)    INTERVAL HISTORY: Patient currently denies f/c, HA, SOB, abdominal pain, NVD, bloody BMs, dysuria, hematuria, numbness, tingling, and muscle weakness. Endorses wheezing and cough w/ white and yellow sputum production. No hemoptysis.    PAST MEDICAL & SURGICAL HISTORY:  Obesity  Hemorrhoids  Gastroesophageal reflux disease without esophagitis  High cholesterol  ESRD on Dialysis: [facility], Thur &amp; Sat  CAD (Coronary Artery Disease)  Ischemic cardiomyopathy  PCK (Polycystic Kidney Disease)  HTN - Hypertension  Asthma: last attack 2007, never hospitalized or intubated  S/P hemorrhoidectomy: and rectal polypectomy -2/3/2017.  Anemia  AV fistula: right lower extremity 2 yrs  Left upper extrmity with graft 7 years ago  Stented coronary artery: x2 cardiac stents in 2016, one cardiac stent in 2015  CABG (Coronary Artery Bypass Graft): 2007            ECHO  FINDINGS:   2/8/17: Mitral annular calcification, normal LA, grossly preserved overall LV systolic function  5/18/16: Mitral annual calcification, mild mitral regurgitation, grossly low normal LV systolic function, LVEF 54%    PHARMACOLOGIC STRESS TEST:  < from: Nuclear Stress Test-Pharmacologic (07.24.17 @ 12:49) >  IMPRESSIONS:Abnormal Study  * Consistent with infarction with moderate mckay-infarct  ischemia.  * Review of raw data shows: The study is of good technical  quality.  * The left ventricle was enlarged. There are large,  moderate to severe defects in inferior, inferolateral and  lateral walls that are fixed with moderatereversible  mckay-infarct ischemia, consistent with infarction with  moderate mckay-infarct ischemia.  * Post-stress gated wall motion analysis was performed  (LVEF = 46 %;LVEDV = 152 ml.)  *** Compared with Nuclear/Stress test of 5/18/2016, no  significant changes noted. Prior LVEF was 30% with   mL    < end of copied text >        HOME MEDICATIONS:  · 	Renvela carbonate 800 mg oral tablet: 1 tab(s) orally 3 times a day  · 	aspirin 325 mg oral delayed release tablet: 1 tab(s) orally once a day  · 	Plavix 75 mg oral tablet: 1 tab(s) orally once a day  · 	Lipitor 10 mg oral tablet: 1 tab(s) orally once a day  · 	metoprolol tartrate 25 mg oral tablet: 1 tab(s) orally once a day    MEDICATIONS  (STANDING):  aspirin enteric coated 325 milliGRAM(s) Oral daily  atorvastatin 10 milliGRAM(s) Oral at bedtime  clopidogrel Tablet 75 milliGRAM(s) Oral daily  sevelamer hydrochloride 800 milliGRAM(s) Oral three times a day with meals  metoprolol 25 milliGRAM(s) Oral daily  sucralfate 1 Gram(s) Oral three times a day  lactobacillus acidophilus 1 Tablet(s) Oral three times a day with meals  dicyclomine 20 milliGRAM(s) Oral three times a day before meals  pantoprazole    Tablet 40 milliGRAM(s) Oral before breakfast    MEDICATIONS  (PRN):      FAMILY HISTORY:  Family history of adult polycystic kidney disease (Sister, father)          REVIEW OF SYSTEMS      General:	Denies fever, chills, headache, and dizziness    Skin: No rash  	  Ophthalmologic: No changes in vision  	  ENMT:	No sore throat or nasal congestion    Respiratory and Thorax: Endorses wheezing and cough w/ white and yellow sputum production, denies SOB   	  Cardiovascular:	Endorses 6/10 L sided chest tightness, no palpitations    Gastrointestinal:	 Denies abdominal pain, NVD, and bloody BMs    Genitourinary:	Denies dysuria and hematuria    Musculoskeletal:	Denies joint swelling and muscle weakness    Neurological:	Denies numbness, tingling, and muscle weakness    Endocrine:	Denies diabetes mellitus    Allergic/Immunologic:	Denies medication allergies    SOCIAL HISTORY: , lives with wife and kids, works as . Denies cigarettes, alcohol, and other illicit substance use.    CIGARETTES: Denies    ALCOHOL: Denies     Vital Signs Last 24 Hrs  T(C): 36.4 (29 Aug 2017 08:54), Max: 36.7 (28 Aug 2017 19:30)  T(F): 97.6 (29 Aug 2017 08:54), Max: 98.1 (28 Aug 2017 19:30)  HR: 60 (29 Aug 2017 08:54) (60 - 72)  BP: 158/69 (29 Aug 2017 08:54) (124/78 - 158/69)  BP(mean): 96 (29 Aug 2017 08:54) (96 - 96)  RR: 16 (29 Aug 2017 08:54) (16 - 24)  SpO2: 100% (29 Aug 2017 08:54) (99% - 100%)    PHYSICAL EXAM:      Constitutional: NAD, obese    Eyes: PERRLA, EOMI, no conjunctival pallor or scleral icterus    ENMT: MMM, oropharynx clear and nonerythematous    Neck: Supple, no JVD    Respiratory: Breath sounds CTA bilaterally, no wheeze, rales, or rhonchi    Cardiovascular: RRR, S1 and S2 clear to ausculation, no MRG, 1+ pitting edema in LEs bilaterally    Gastrointestinal: Abd soft, nontender, nondistended, bowel sounds present    Extremities: No clubbing or cyanosis; AVFs in UEs bilaterally w/ palpable thrills    Vascular: Radial pulses 2+ bilaterally, PT and DP pulses 1+ bilaterally    Neurological: CNs grossly intact, no focal deficits    Skin: Warm and dry, no rashes    Lymph Nodes: No appreciable cervical LAD    Musculoskeletal: No joint swelling, moving extremities spontaneously    Psychiatric: Normal mood, normal affect          ECG: NSR at a rate of 66 with RBBB with TWI in lead I, AVL, V, V5-V6 with Qtc of 446    I&O's Detail      LABS:                        10.8   4.28  )-----------( 111      ( 29 Aug 2017 06:25 )             35.8     08-29    139  |  99  |  66<H>  ----------------------------<  86  5.2   |  20<L>  |  13.07<H>    Ca    9.3      29 Aug 2017 06:25  Phos  3.4     08-28  Mg     2.0     08-29    TPro  7.1  /  Alb  4.1  /  TBili  0.3  /  DBili  x   /  AST  17  /  ALT  14  /  AlkPhos  19<L>  08-28    CARDIAC MARKERS ( 29 Aug 2017 01:00 )  x     / < 0.06 ng/mL / 135 u/L / 3.01 ng/mL / x      CARDIAC MARKERS ( 28 Aug 2017 20:10 )  x     / < 0.06 ng/mL / 154 u/L / 3.10 ng/mL / x        I&O's Summary    BNPSerum Pro-Brain Natriuretic Peptide: 20616 pg/mL (08-28 @ 20:10)    RADIOLOGY & ADDITIONAL STUDIES:    CXR 8/28/17: Clear lungs    CTAP 8/28/17: Etiology of patient's abdominal pain is not elucidated on the current   exam.    Polycystic kidneys.    CTH 8/28/17: No change from prior study on 6/19/2012. No mass effect, hemorrhage or   evidence of acute intracranial pathology.     ASSESSMENT AND PLAN:    56 y/o M with PMH of CAD(s/p 2 stents, and 5V CABG), Polycystic kidney disease(on transplant list and currently getting HD on Tues/Thurs/Sat), HLD, GERD, Asthma presented with the complaint of left sided chest tightness and diarrhea.    Chest Pain:  Continue to monitor on tele  Serial EKG and CE for further episodes of CP  C/w aspirin 325 and plavix 75    HLD:  C/w atorvastatin 10  F/u lipid profile  Low cholesterol diet    HTN:  C/w metoprolol 25  Low Na diet Patient is a 57y old  Male who presents with a chief complaint of Left sided chest pressure, abdominal pain and diarrhea (29 Aug 2017 03:36)      HPI:  56 y/o M with PMH of CAD(s/p 2 stents, and 5V CABG), ischemic cardiomyopathy, polycystic kidney disease(on transplant list and currently getting HD on Tues/Thurs/Sat), HTN, HLD, GERD, asthma, and anemia who presented with the complaint of left sided chest tightness and diarrhea. Patient most recently saw cardiologist, Dr. Wily Chauhan, on July 12, 2017. At that time, patient was deemed stable from a cardiovascular standpoint. He had a stress test done July 24, 2017 showing no significant changes compared to 5/18/16 and notes that since then he has been getting intermittent epigastric and L sided lower abdominal pain with NB diarrhea. Patient stated that he had 15 episodes of diarrhea few days ago with associated abdominal cramps. Patient also endorsed associated left sided chest pressure intermittent, lasting few minutes in duration, with radiation to L and R shoulders and neck, with a severity as high as 8/10 and presently 6/10. Patient also endorsed episodes of nausea but denied any vomiting. Patient stated that he was given azithromycin when he called his PMD's office for the diarrhea but the diarrhea still persisted. Patient also endorsed ALEXANDRA w/ SOB coming on after walking 4 blocks, orthopnea requiring 2 pillows to sleep at night, and PND. In additiona, patient reported low-grade fever of 99 degree F and chills. Patient noted that anything he puts in his mouth(both solid and liquid) comes out a diarrhea. Patient denied any dysuria, melena, hematochezia, recent travel, sick contact, pleuritic or positional chest pain.     On ED admission EKG revealed NSR at a rate of 66 with RBBB with TWI in lead I, AVL, V, V5-V6 with Qtc of 446, CE x 2: Negative, H&H: 11.5/37.7, Plt: 113, K: 6.1->slightly hemolyzed->4.8, BNP: 16697, BUN/Cr: 64/12.67, Lipase: 119. CT head: No change from prior study on 6/19/2012. No mass effect, hemorrhage or evidence of acute intracranial pathology. CT abd/pelvis: Etiology of patient's abdominal pain is not elucidated on the current exam. Polycystic kidneys. CXR: clear lungs. When examined patient was resting in the stretcher and endorsed mild chest pressure, denied any current abdominal pain, but did state that he had 3 episodes of NB loose bowel movement. (29 Aug 2017 03:36)    INTERVAL HISTORY: Patient currently denies f/c, HA, SOB, abdominal pain, NVD, bloody BMs, dysuria, hematuria, numbness, tingling, and muscle weakness. Endorses wheezing and cough w/ white and yellow sputum production. No hemoptysis.    PAST MEDICAL & SURGICAL HISTORY:  Obesity  Hemorrhoids  Gastroesophageal reflux disease without esophagitis  High cholesterol  ESRD on Dialysis: [facility], Thur &amp; Sat  CAD (Coronary Artery Disease)  Ischemic cardiomyopathy  PCK (Polycystic Kidney Disease)  HTN - Hypertension  Asthma: last attack 2007, never hospitalized or intubated  S/P hemorrhoidectomy: and rectal polypectomy -2/3/2017.  Anemia  AV fistula: right lower extremity 2 yrs  Left upper extrmity with graft 7 years ago  Stented coronary artery: x2 cardiac stents in 2016, one cardiac stent in 2015  CABG (Coronary Artery Bypass Graft): 2007            ECHO  FINDINGS:   2/8/17: Mitral annular calcification, normal LA, grossly preserved overall LV systolic function  5/18/16: Mitral annual calcification, mild mitral regurgitation, grossly low normal LV systolic function, LVEF 54%    PHARMACOLOGIC STRESS TEST:  < from: Nuclear Stress Test-Pharmacologic (07.24.17 @ 12:49) >  IMPRESSIONS:Abnormal Study  * Consistent with infarction with moderate mckay-infarct  ischemia.  * Review of raw data shows: The study is of good technical  quality.  * The left ventricle was enlarged. There are large,  moderate to severe defects in inferior, inferolateral and  lateral walls that are fixed with moderatereversible  mckay-infarct ischemia, consistent with infarction with  moderate mckay-infarct ischemia.  * Post-stress gated wall motion analysis was performed  (LVEF = 46 %;LVEDV = 152 ml.)  *** Compared with Nuclear/Stress test of 5/18/2016, no  significant changes noted. Prior LVEF was 30% with   mL    < end of copied text >        HOME MEDICATIONS:  · 	Renvela carbonate 800 mg oral tablet: 1 tab(s) orally 3 times a day  · 	aspirin 325 mg oral delayed release tablet: 1 tab(s) orally once a day  · 	Plavix 75 mg oral tablet: 1 tab(s) orally once a day  · 	Lipitor 10 mg oral tablet: 1 tab(s) orally once a day  · 	metoprolol tartrate 25 mg oral tablet: 1 tab(s) orally once a day    MEDICATIONS  (STANDING):  aspirin enteric coated 325 milliGRAM(s) Oral daily  atorvastatin 10 milliGRAM(s) Oral at bedtime  clopidogrel Tablet 75 milliGRAM(s) Oral daily  sevelamer hydrochloride 800 milliGRAM(s) Oral three times a day with meals  metoprolol 25 milliGRAM(s) Oral daily  sucralfate 1 Gram(s) Oral three times a day  lactobacillus acidophilus 1 Tablet(s) Oral three times a day with meals  dicyclomine 20 milliGRAM(s) Oral three times a day before meals  pantoprazole    Tablet 40 milliGRAM(s) Oral before breakfast    MEDICATIONS  (PRN):      FAMILY HISTORY:  Family history of adult polycystic kidney disease (Sister, father)          REVIEW OF SYSTEMS      General:	Denies fever, chills, headache, and dizziness    Skin: No rash  	  Ophthalmologic: No changes in vision  	  ENMT:	No sore throat or nasal congestion    Respiratory and Thorax: Endorses wheezing and cough w/ white and yellow sputum production, denies SOB   	  Cardiovascular:	Endorses 6/10 L sided chest tightness, no palpitations    Gastrointestinal:	 Denies abdominal pain, NVD, and bloody BMs    Genitourinary:	Denies dysuria and hematuria    Musculoskeletal:	Denies joint swelling and muscle weakness    Neurological:	Denies numbness, tingling, and muscle weakness    Endocrine:	Denies diabetes mellitus    Allergic/Immunologic:	Denies medication allergies    SOCIAL HISTORY: , lives with wife and kids, works as . Denies cigarettes, alcohol, and other illicit substance use.    CIGARETTES: Denies    ALCOHOL: Denies     Vital Signs Last 24 Hrs  T(C): 36.4 (29 Aug 2017 08:54), Max: 36.7 (28 Aug 2017 19:30)  T(F): 97.6 (29 Aug 2017 08:54), Max: 98.1 (28 Aug 2017 19:30)  HR: 60 (29 Aug 2017 08:54) (60 - 72)  BP: 158/69 (29 Aug 2017 08:54) (124/78 - 158/69)  BP(mean): 96 (29 Aug 2017 08:54) (96 - 96)  RR: 16 (29 Aug 2017 08:54) (16 - 24)  SpO2: 100% (29 Aug 2017 08:54) (99% - 100%)    PHYSICAL EXAM:    Constitutional: NAD, obese    Eyes: PERRLA, EOMI, no conjunctival pallor or scleral icterus    ENMT: MMM, oropharynx clear and nonerythematous    Neck: Supple, no JVD    Respiratory: Breath sounds CTA bilaterally, no wheeze, rales, or rhonchi    Cardiovascular: RRR, S1 and S2 clear to ausculation, no MRG, 1+ pitting edema in LEs bilaterally    Gastrointestinal: Abd soft, nontender, nondistended, bowel sounds present    Extremities: No clubbing or cyanosis; AVFs in UEs bilaterally w/ palpable thrills    Vascular: Radial pulses 2+ bilaterally, PT and DP pulses 1+ bilaterally    Neurological: CNs grossly intact, no focal deficits    Skin: Warm and dry, no rashes    Lymph Nodes: No appreciable cervical LAD    Musculoskeletal: No joint swelling, moving extremities spontaneously    Psychiatric: Normal mood, normal affect          ECG: NSR at a rate of 66 with RBBB with TWI in lead I, AVL, V, V5-V6 with Qtc of 446    I&O's Detail      LABS:                        10.8   4.28  )-----------( 111      ( 29 Aug 2017 06:25 )             35.8     08-29    139  |  99  |  66<H>  ----------------------------<  86  5.2   |  20<L>  |  13.07<H>    Ca    9.3      29 Aug 2017 06:25  Phos  3.4     08-28  Mg     2.0     08-29    TPro  7.1  /  Alb  4.1  /  TBili  0.3  /  DBili  x   /  AST  17  /  ALT  14  /  AlkPhos  19<L>  08-28    CARDIAC MARKERS ( 29 Aug 2017 01:00 )  x     / < 0.06 ng/mL / 135 u/L / 3.01 ng/mL / x      CARDIAC MARKERS ( 28 Aug 2017 20:10 )  x     / < 0.06 ng/mL / 154 u/L / 3.10 ng/mL / x        Lipase, Serum (08.29.17 @ 06:25)    Lipase, Serum: 100.5 U/L    Amylase, Serum Total (08.29.17 @ 06:25)    Amylase, Serum Total: 93 u/L    I&O's Summary    BNPSerum Pro-Brain Natriuretic Peptide: 54583 pg/mL (08-28 @ 20:10)    RADIOLOGY & ADDITIONAL STUDIES:    CXR 8/28/17: Clear lungs    CTAP 8/28/17: Etiology of patient's abdominal pain is not elucidated on the current   exam.    Polycystic kidneys.    CTH 8/28/17: No change from prior study on 6/19/2012. No mass effect, hemorrhage or   evidence of acute intracranial pathology.     ASSESSMENT AND PLAN:    56 y/o M with PMH of CAD(s/p 2 stents, and 5V CABG), ischemic cardiomyopathy, polycystic kidney disease(on transplant list and currently getting HD on Tues/Thurs/Sat), HTN, HLD, GERD, asthma, and anemia here with the complaint of left sided chest tightness and diarrhea.    Chest Pain:  Pt w/ hx of CAD s/p 5V CABG and stents x 2  EKG notable for RBBB with TWI in lead I, AVL, V, V5-V6 and Qtc of 446; Don negative x 2  Most recent nuclear stress test (7/24/17) unchanged compared to prior test (5/18/16)  Continue to monitor on tele  Serial EKGs and Don as needed for further episodes of CP  C/w aspirin 325 and plavix 75    HLD:  C/w atorvastatin 10  Check lipid profile  Low cholesterol diet    HTN:  C/w metoprolol 25  Monitor VS  Low Na diet Patient is a 57y old  Male who presents with a chief complaint of Left sided chest pressure, abdominal pain and diarrhea (29 Aug 2017 03:36)      HPI:  56 y/o M with PMH of CAD(s/p 2 stents, and 5V CABG), ischemic cardiomyopathy, polycystic kidney disease(on transplant list and currently getting HD on Tues/Thurs/Sat), HTN, HLD, GERD, asthma, and anemia who presented with the complaint of left sided chest tightness and diarrhea. Patient most recently saw cardiologist, Dr. Wily Chauhan, on July 12, 2017. At that time, patient was deemed stable from a cardiovascular standpoint. He had a stress test done July 24, 2017 showing no significant changes compared to 5/18/16 and notes that since then he has been getting intermittent epigastric and L sided lower abdominal pain with NB diarrhea. Patient stated that he had 15 episodes of diarrhea few days ago with associated abdominal cramps. Patient also endorsed associated left sided chest pressure intermittent, lasting few minutes in duration, with radiation to L and R shoulders and neck, with a severity as high as 8/10 and presently 6/10. Patient also endorsed episodes of nausea but denied any vomiting. Patient stated that he was given azithromycin when he called his PMD's office for the diarrhea but the diarrhea still persisted. Patient also endorsed ALEXANDRA w/ SOB coming on after walking 4 blocks, orthopnea requiring 2 pillows to sleep at night, and PND. In additiona, patient reported low-grade fever of 99 degree F and chills. Patient noted that anything he puts in his mouth(both solid and liquid) comes out a diarrhea. Patient denied any dysuria, melena, hematochezia, recent travel, sick contact, pleuritic or positional chest pain.     On ED admission EKG revealed NSR at a rate of 66 with RBBB with TWI in lead I, AVL, V, V5-V6 with Qtc of 446, CE x 2: Negative, H&H: 11.5/37.7, Plt: 113, K: 6.1->slightly hemolyzed->4.8, BNP: 24991, BUN/Cr: 64/12.67, Lipase: 119. CT head: No change from prior study on 6/19/2012. No mass effect, hemorrhage or evidence of acute intracranial pathology. CT abd/pelvis: Etiology of patient's abdominal pain is not elucidated on the current exam. Polycystic kidneys. CXR: clear lungs. When examined patient was resting in the stretcher and endorsed mild chest pressure, denied any current abdominal pain, but did state that he had 3 episodes of NB loose bowel movement. (29 Aug 2017 03:36)    INTERVAL HISTORY: Got aspirin 162 x 1 and morphine 4 x 1 in ED. Patient currently denies f/c, HA, SOB, abdominal pain, NVD, bloody BMs, dysuria, hematuria, numbness, tingling, and muscle weakness. Endorses wheezing and cough w/ white and yellow sputum production. No hemoptysis.    PAST MEDICAL & SURGICAL HISTORY:  Obesity  Hemorrhoids  Gastroesophageal reflux disease without esophagitis  High cholesterol  ESRD on Dialysis: [facility] &amp; Sat  CAD (Coronary Artery Disease)  Ischemic cardiomyopathy  PCK (Polycystic Kidney Disease)  HTN - Hypertension  Asthma: last attack 2007, never hospitalized or intubated  S/P hemorrhoidectomy: and rectal polypectomy -2/3/2017.  Anemia  AV fistula: right lower extremity 2 yrs  Left upper extrmity with graft 7 years ago  Stented coronary artery: x2 cardiac stents in 2016, one cardiac stent in 2015  CABG (Coronary Artery Bypass Graft): 2007            ECHO  FINDINGS:   2/8/17: Mitral annular calcification, normal LA, grossly preserved overall LV systolic function  5/18/16: Mitral annual calcification, mild mitral regurgitation, grossly low normal LV systolic function, LVEF 54%    PHARMACOLOGIC STRESS TEST:  < from: Nuclear Stress Test-Pharmacologic (07.24.17 @ 12:49) >  IMPRESSIONS:Abnormal Study  * Consistent with infarction with moderate mckay-infarct  ischemia.  * Review of raw data shows: The study is of good technical  quality.  * The left ventricle was enlarged. There are large,  moderate to severe defects in inferior, inferolateral and  lateral walls that are fixed with moderatereversible  mckay-infarct ischemia, consistent with infarction with  moderate mckay-infarct ischemia.  * Post-stress gated wall motion analysis was performed  (LVEF = 46 %;LVEDV = 152 ml.)  *** Compared with Nuclear/Stress test of 5/18/2016, no  significant changes noted. Prior LVEF was 30% with   mL    < end of copied text >        HOME MEDICATIONS:  · 	Renvela carbonate 800 mg oral tablet: 1 tab(s) orally 3 times a day  · 	aspirin 325 mg oral delayed release tablet: 1 tab(s) orally once a day  · 	Plavix 75 mg oral tablet: 1 tab(s) orally once a day  · 	Lipitor 10 mg oral tablet: 1 tab(s) orally once a day  · 	metoprolol tartrate 25 mg oral tablet: 1 tab(s) orally once a day    MEDICATIONS  (STANDING):  aspirin enteric coated 325 milliGRAM(s) Oral daily  atorvastatin 10 milliGRAM(s) Oral at bedtime  clopidogrel Tablet 75 milliGRAM(s) Oral daily  sevelamer hydrochloride 800 milliGRAM(s) Oral three times a day with meals  metoprolol 25 milliGRAM(s) Oral daily  sucralfate 1 Gram(s) Oral three times a day  lactobacillus acidophilus 1 Tablet(s) Oral three times a day with meals  dicyclomine 20 milliGRAM(s) Oral three times a day before meals  pantoprazole    Tablet 40 milliGRAM(s) Oral before breakfast    MEDICATIONS  (PRN):      FAMILY HISTORY:  Family history of adult polycystic kidney disease (Sister, father)          REVIEW OF SYSTEMS      General:	Denies fever, chills, headache, and dizziness    Skin: No rash  	  Ophthalmologic: No changes in vision  	  ENMT:	No sore throat or nasal congestion    Respiratory and Thorax: Endorses wheezing and cough w/ white and yellow sputum production, denies SOB   	  Cardiovascular:	Endorses 6/10 L sided chest tightness, no palpitations    Gastrointestinal:	 Denies abdominal pain, NVD, and bloody BMs    Genitourinary:	Denies dysuria and hematuria    Musculoskeletal:	Denies joint swelling and muscle weakness    Neurological:	Denies numbness, tingling, and muscle weakness    Endocrine:	Denies diabetes mellitus    Allergic/Immunologic:	Denies medication allergies    SOCIAL HISTORY: , lives with wife and kids, works as . Denies cigarettes, alcohol, and other illicit substance use.    CIGARETTES: Denies    ALCOHOL: Denies     Vital Signs Last 24 Hrs  T(C): 36.4 (29 Aug 2017 08:54), Max: 36.7 (28 Aug 2017 19:30)  T(F): 97.6 (29 Aug 2017 08:54), Max: 98.1 (28 Aug 2017 19:30)  HR: 60 (29 Aug 2017 08:54) (60 - 72)  BP: 158/69 (29 Aug 2017 08:54) (124/78 - 158/69)  BP(mean): 96 (29 Aug 2017 08:54) (96 - 96)  RR: 16 (29 Aug 2017 08:54) (16 - 24)  SpO2: 100% (29 Aug 2017 08:54) (99% - 100%)    PHYSICAL EXAM:    Constitutional: NAD, obese    Eyes: PERRLA, EOMI, no conjunctival pallor or scleral icterus    ENMT: MMM, oropharynx clear and nonerythematous    Neck: Supple, no JVD    Respiratory: Breath sounds CTA bilaterally, no wheeze, rales, or rhonchi    Cardiovascular: RRR, S1 and S2 clear to ausculation, no MRG, 1+ pitting edema in LEs bilaterally    Gastrointestinal: Abd soft, nontender, nondistended, bowel sounds present    Extremities: No clubbing or cyanosis; AVFs in UEs bilaterally w/ palpable thrills    Vascular: Radial pulses 2+ bilaterally, PT and DP pulses 1+ bilaterally    Neurological: CNs grossly intact, no focal deficits    Skin: Warm and dry, no rashes    Lymph Nodes: No appreciable cervical LAD    Musculoskeletal: No joint swelling, moving extremities spontaneously    Psychiatric: Normal mood, normal affect          ECG: NSR at a rate of 66 with RBBB with TWI in lead I, AVL, V, V5-V6 with Qtc of 446    I&O's Detail      LABS:                        10.8   4.28  )-----------( 111      ( 29 Aug 2017 06:25 )             35.8     08-29    139  |  99  |  66<H>  ----------------------------<  86  5.2   |  20<L>  |  13.07<H>    Ca    9.3      29 Aug 2017 06:25  Phos  3.4     08-28  Mg     2.0     08-29    TPro  7.1  /  Alb  4.1  /  TBili  0.3  /  DBili  x   /  AST  17  /  ALT  14  /  AlkPhos  19<L>  08-28    CARDIAC MARKERS ( 29 Aug 2017 01:00 )  x     / < 0.06 ng/mL / 135 u/L / 3.01 ng/mL / x      CARDIAC MARKERS ( 28 Aug 2017 20:10 )  x     / < 0.06 ng/mL / 154 u/L / 3.10 ng/mL / x        Lipase, Serum (08.29.17 @ 06:25)    Lipase, Serum: 100.5 U/L    Amylase, Serum Total (08.29.17 @ 06:25)    Amylase, Serum Total: 93 u/L    I&O's Summary    BNPSerum Pro-Brain Natriuretic Peptide: 80930 pg/mL (08-28 @ 20:10)    RADIOLOGY & ADDITIONAL STUDIES:    CXR 8/28/17: Clear lungs    CTAP 8/28/17: Etiology of patient's abdominal pain is not elucidated on the current   exam.    Polycystic kidneys.    CTH 8/28/17: No change from prior study on 6/19/2012. No mass effect, hemorrhage or   evidence of acute intracranial pathology.     ASSESSMENT AND PLAN:    56 y/o M with PMH of CAD(s/p 2 stents, and 5V CABG), ischemic cardiomyopathy, polycystic kidney disease(on transplant list and currently getting HD on Tues/Thurs/Sat), HTN, HLD, GERD, asthma, and anemia here with the complaint of left sided chest tightness and diarrhea.    Chest Pain:  Pt w/ hx of CAD s/p 5V CABG and stents x 2  EKG notable for RBBB with TWI in lead I, AVL, V, V5-V6 and Qtc of 446; Don negative x 2  Most recent nuclear stress test (7/24/17) unchanged compared to prior test (5/18/16)  Continue to monitor on tele  Serial EKGs and Don as needed for further episodes of CP  C/w aspirin 325 and plavix 75    HLD:  C/w atorvastatin 10  Check lipid profile  Low cholesterol diet    HTN:  C/w metoprolol 25  Monitor VS  Low Na diet Patient is a 57y old  Male who presents with a chief complaint of Left sided chest pressure, abdominal pain and diarrhea (29 Aug 2017 03:36)      HPI:  58 y/o M with PMH of CAD(s/p 2 stents, and 5V CABG), ischemic cardiomyopathy, polycystic kidney disease(on transplant list and currently getting HD on Tues/Thurs/Sat), HTN, HLD, GERD, asthma, and anemia who presented with the complaint of left sided chest tightness and diarrhea. Patient most recently saw cardiologist, Dr. Wily Chauhan, on July 12, 2017. At that time, patient was deemed stable from a cardiovascular standpoint. He had a stress test done July 24, 2017 showing no significant changes compared to 5/18/16 and notes that since then he has been getting intermittent epigastric and L sided lower abdominal pain with NB diarrhea. Patient stated that he had 15 episodes of diarrhea few days ago with associated abdominal cramps. Patient also endorsed associated left sided chest pressure intermittent, lasting few minutes in duration, with radiation to L and R shoulders and neck, with a severity as high as 8/10 and presently 6/10. Patient also endorsed episodes of nausea but denied any vomiting. Patient stated that he was given azithromycin when he called his PMD's office for the diarrhea but the diarrhea still persisted. Patient also endorsed ALEXANDRA w/ SOB coming on after walking 4 blocks, orthopnea requiring 2 pillows to sleep at night, and PND. In additiona, patient reported low-grade fever of 99 degree F and chills. Patient noted that anything he puts in his mouth(both solid and liquid) comes out a diarrhea. Patient denied any dysuria, melena, hematochezia, recent travel, sick contact, pleuritic or positional chest pain.     On ED admission EKG revealed NSR at a rate of 66 with RBBB with TWI in lead I, AVL, V, V5-V6 with Qtc of 446, CE x 2: Negative, H&H: 11.5/37.7, Plt: 113, K: 6.1->slightly hemolyzed->4.8, BNP: 64179, BUN/Cr: 64/12.67, Lipase: 119. CT head: No change from prior study on 6/19/2012. No mass effect, hemorrhage or evidence of acute intracranial pathology. CT abd/pelvis: Etiology of patient's abdominal pain is not elucidated on the current exam. Polycystic kidneys. CXR: clear lungs. When examined patient was resting in the stretcher and endorsed mild chest pressure, denied any current abdominal pain, but did state that he had 3 episodes of NB loose bowel movement. (29 Aug 2017 03:36)    INTERVAL HISTORY: Got aspirin 162 x 1 and morphine 4 x 1 in ED. Patient currently denies f/c, HA, SOB, abdominal pain, NVD, bloody BMs, dysuria, hematuria, numbness, tingling, and muscle weakness. Endorses wheezing and cough w/ white and yellow sputum production. No hemoptysis.    PAST MEDICAL & SURGICAL HISTORY:  Obesity  Hemorrhoids  Gastroesophageal reflux disease without esophagitis  High cholesterol  ESRD on Dialysis: [facility]; [facility]  CAD (Coronary Artery Disease)  Ischemic cardiomyopathy  PCK (Polycystic Kidney Disease)  HTN - Hypertension  Asthma: last attack 2007, never hospitalized or intubated  S/P hemorrhoidectomy: and rectal polypectomy -2/3/2017.  Anemia  AV fistula: right lower extremity 2 yrs  Left upper extrmity with graft 7 years ago  Stented coronary artery: x2 cardiac stents in 2016, one cardiac stent in 2015  CABG (Coronary Artery Bypass Graft): 2007            ECHO  FINDINGS:   2/8/17: Mitral annular calcification, normal LA, grossly preserved overall LV systolic function  5/18/16: Mitral annual calcification, mild mitral regurgitation, grossly low normal LV systolic function, LVEF 54%    PHARMACOLOGIC STRESS TEST:  < from: Nuclear Stress Test-Pharmacologic (07.24.17 @ 12:49) >  IMPRESSIONS:Abnormal Study  * Consistent with infarction with moderate mckay-infarct  ischemia.  * Review of raw data shows: The study is of good technical  quality.  * The left ventricle was enlarged. There are large,  moderate to severe defects in inferior, inferolateral and  lateral walls that are fixed with moderatereversible  mckay-infarct ischemia, consistent with infarction with  moderate mckay-infarct ischemia.  * Post-stress gated wall motion analysis was performed  (LVEF = 46 %;LVEDV = 152 ml.)  *** Compared with Nuclear/Stress test of 5/18/2016, no  significant changes noted. Prior LVEF was 30% with   mL    < end of copied text >        HOME MEDICATIONS:  · 	Renvela carbonate 800 mg oral tablet: 1 tab(s) orally 3 times a day  · 	aspirin 325 mg oral delayed release tablet: 1 tab(s) orally once a day  · 	Plavix 75 mg oral tablet: 1 tab(s) orally once a day  · 	Lipitor 10 mg oral tablet: 1 tab(s) orally once a day  · 	metoprolol tartrate 25 mg oral tablet: 1 tab(s) orally once a day    MEDICATIONS  (STANDING):  aspirin enteric coated 325 milliGRAM(s) Oral daily  atorvastatin 10 milliGRAM(s) Oral at bedtime  clopidogrel Tablet 75 milliGRAM(s) Oral daily  sevelamer hydrochloride 800 milliGRAM(s) Oral three times a day with meals  metoprolol 25 milliGRAM(s) Oral daily  sucralfate 1 Gram(s) Oral three times a day  lactobacillus acidophilus 1 Tablet(s) Oral three times a day with meals  dicyclomine 20 milliGRAM(s) Oral three times a day before meals  pantoprazole    Tablet 40 milliGRAM(s) Oral before breakfast    MEDICATIONS  (PRN):      FAMILY HISTORY:  Family history of adult polycystic kidney disease (Sister, father)          REVIEW OF SYSTEMS      General:	Denies fever, chills, headache, and dizziness    Skin: No rash  	  Ophthalmologic: No changes in vision  	  ENMT:	No sore throat or nasal congestion    Respiratory and Thorax: Endorses wheezing and cough w/ white and yellow sputum production, denies SOB   	  Cardiovascular:	Endorses 6/10 L sided chest tightness, no palpitations    Gastrointestinal:	 Denies abdominal pain, NVD, and bloody BMs    Genitourinary:	Denies dysuria and hematuria    Musculoskeletal:	Denies joint swelling and muscle weakness    Neurological:	Denies numbness, tingling, and muscle weakness    Endocrine:	Denies diabetes mellitus    Allergic/Immunologic:	Denies medication allergies    SOCIAL HISTORY: , lives with wife and kids, works as . Denies cigarettes, alcohol, and other illicit substance use.    CIGARETTES: Denies    ALCOHOL: Denies     Vital Signs Last 24 Hrs  T(C): 36.4 (29 Aug 2017 08:54), Max: 36.7 (28 Aug 2017 19:30)  T(F): 97.6 (29 Aug 2017 08:54), Max: 98.1 (28 Aug 2017 19:30)  HR: 60 (29 Aug 2017 08:54) (60 - 72)  BP: 158/69 (29 Aug 2017 08:54) (124/78 - 158/69)  BP(mean): 96 (29 Aug 2017 08:54) (96 - 96)  RR: 16 (29 Aug 2017 08:54) (16 - 24)  SpO2: 100% (29 Aug 2017 08:54) (99% - 100%)    PHYSICAL EXAM:    Constitutional: NAD, obese    Eyes: PERRLA, EOMI, no conjunctival pallor or scleral icterus    ENMT: MMM, oropharynx clear and nonerythematous    Neck: Supple, no JVD    Respiratory: Breath sounds CTA bilaterally, no wheeze, rales, or rhonchi    Cardiovascular: RRR, S1 and S2 clear to ausculation, no MRG, 1+ pitting edema in LEs bilaterally    Gastrointestinal: Abd soft, nontender, nondistended, bowel sounds present    Extremities: No clubbing or cyanosis; AVFs in UEs bilaterally w/ palpable thrills    Vascular: Radial pulses 2+ bilaterally, PT and DP pulses 1+ bilaterally    Neurological: CNs grossly intact, no focal deficits    Skin: Warm and dry, no rashes    Lymph Nodes: No appreciable cervical LAD    Musculoskeletal: No joint swelling, moving extremities spontaneously    Psychiatric: Normal mood, normal affect          ECG: NSR at a rate of 66 with RBBB with TWI in lead I, AVL, V, V5-V6 with Qtc of 446    I&O's Detail      LABS:                        10.8   4.28  )-----------( 111      ( 29 Aug 2017 06:25 )             35.8     08-29    139  |  99  |  66<H>  ----------------------------<  86  5.2   |  20<L>  |  13.07<H>    Ca    9.3      29 Aug 2017 06:25  Phos  3.4     08-28  Mg     2.0     08-29    TPro  7.1  /  Alb  4.1  /  TBili  0.3  /  DBili  x   /  AST  17  /  ALT  14  /  AlkPhos  19<L>  08-28    CARDIAC MARKERS ( 29 Aug 2017 01:00 )  x     / < 0.06 ng/mL / 135 u/L / 3.01 ng/mL / x      CARDIAC MARKERS ( 28 Aug 2017 20:10 )  x     / < 0.06 ng/mL / 154 u/L / 3.10 ng/mL / x        Lipase, Serum (08.29.17 @ 06:25)    Lipase, Serum: 100.5 U/L    Amylase, Serum Total (08.29.17 @ 06:25)    Amylase, Serum Total: 93 u/L    I&O's Summary    BNPSerum Pro-Brain Natriuretic Peptide: 13549 pg/mL (08-28 @ 20:10)    RADIOLOGY & ADDITIONAL STUDIES:    CXR 8/28/17: Clear lungs    CTAP 8/28/17: Etiology of patient's abdominal pain is not elucidated on the current   exam.    Polycystic kidneys.    CTH 8/28/17: No change from prior study on 6/19/2012. No mass effect, hemorrhage or   evidence of acute intracranial pathology.     ASSESSMENT AND PLAN:    58 y/o M with PMH of CAD(s/p 2 stents, and 5V CABG), ischemic cardiomyopathy, polycystic kidney disease(on transplant list and currently getting HD on Tues/Thurs/Sat), HTN, HLD, GERD, asthma, and anemia here with the complaint of left sided chest tightness and diarrhea.    Chest Pain:  Pt w/ hx of CAD s/p 5V CABG and stents x 2  EKG notable for RBBB with TWI in lead I, AVL, V, V5-V6 and Qtc of 446; Don negative x 2  Most recent nuclear stress test (7/24/17) unchanged compared to prior test (5/18/16)  Continue to monitor on tele  Serial EKGs and Don as needed for further episodes of CP  C/w aspirin 325 and plavix 75  Will watch and wait until diarrhea resolves and then reassess for necessity of cardiac intervention    HLD:  C/w atorvastatin 10  Check lipid profile  Low cholesterol diet    HTN:  C/w metoprolol 25  Monitor VS  Low Na diet

## 2017-08-30 LAB
BASOPHILS # BLD AUTO: 0.02 K/UL — SIGNIFICANT CHANGE UP (ref 0–0.2)
BASOPHILS NFR BLD AUTO: 0.5 % — SIGNIFICANT CHANGE UP (ref 0–2)
BUN SERPL-MCNC: 40 MG/DL — HIGH (ref 7–23)
CA-I BLD-SCNC: 1.07 MMOL/L — SIGNIFICANT CHANGE UP (ref 1.03–1.23)
CALCIUM SERPL-MCNC: 8.7 MG/DL — SIGNIFICANT CHANGE UP (ref 8.4–10.5)
CHLORIDE SERPL-SCNC: 97 MMOL/L — LOW (ref 98–107)
CO2 SERPL-SCNC: 24 MMOL/L — SIGNIFICANT CHANGE UP (ref 22–31)
CREAT SERPL-MCNC: 8.67 MG/DL — HIGH (ref 0.5–1.3)
EOSINOPHIL # BLD AUTO: 0.15 K/UL — SIGNIFICANT CHANGE UP (ref 0–0.5)
EOSINOPHIL NFR BLD AUTO: 3.8 % — SIGNIFICANT CHANGE UP (ref 0–6)
GLUCOSE SERPL-MCNC: 87 MG/DL — SIGNIFICANT CHANGE UP (ref 70–99)
HCT VFR BLD CALC: 33.9 % — LOW (ref 39–50)
HGB BLD-MCNC: 10.5 G/DL — LOW (ref 13–17)
IMM GRANULOCYTES # BLD AUTO: 0.02 # — SIGNIFICANT CHANGE UP
IMM GRANULOCYTES NFR BLD AUTO: 0.5 % — SIGNIFICANT CHANGE UP (ref 0–1.5)
LYMPHOCYTES # BLD AUTO: 0.66 K/UL — LOW (ref 1–3.3)
LYMPHOCYTES # BLD AUTO: 16.6 % — SIGNIFICANT CHANGE UP (ref 13–44)
MAGNESIUM SERPL-MCNC: 1.8 MG/DL — SIGNIFICANT CHANGE UP (ref 1.6–2.6)
MCHC RBC-ENTMCNC: 29.2 PG — SIGNIFICANT CHANGE UP (ref 27–34)
MCHC RBC-ENTMCNC: 31 % — LOW (ref 32–36)
MCV RBC AUTO: 94.2 FL — SIGNIFICANT CHANGE UP (ref 80–100)
MONOCYTES # BLD AUTO: 0.45 K/UL — SIGNIFICANT CHANGE UP (ref 0–0.9)
MONOCYTES NFR BLD AUTO: 11.3 % — SIGNIFICANT CHANGE UP (ref 2–14)
NEUTROPHILS # BLD AUTO: 2.68 K/UL — SIGNIFICANT CHANGE UP (ref 1.8–7.4)
NEUTROPHILS NFR BLD AUTO: 67.3 % — SIGNIFICANT CHANGE UP (ref 43–77)
NRBC # FLD: 0 — SIGNIFICANT CHANGE UP
PHOSPHATE SERPL-MCNC: 3 MG/DL — SIGNIFICANT CHANGE UP (ref 2.5–4.5)
PLATELET # BLD AUTO: 113 K/UL — LOW (ref 150–400)
PMV BLD: 11.4 FL — SIGNIFICANT CHANGE UP (ref 7–13)
POTASSIUM SERPL-MCNC: 4.8 MMOL/L — SIGNIFICANT CHANGE UP (ref 3.5–5.3)
POTASSIUM SERPL-SCNC: 4.8 MMOL/L — SIGNIFICANT CHANGE UP (ref 3.5–5.3)
RBC # BLD: 3.6 M/UL — LOW (ref 4.2–5.8)
RBC # FLD: 14.3 % — SIGNIFICANT CHANGE UP (ref 10.3–14.5)
SODIUM SERPL-SCNC: 137 MMOL/L — SIGNIFICANT CHANGE UP (ref 135–145)
SPECIMEN SOURCE: SIGNIFICANT CHANGE UP
WBC # BLD: 3.98 K/UL — SIGNIFICANT CHANGE UP (ref 3.8–10.5)
WBC # FLD AUTO: 3.98 K/UL — SIGNIFICANT CHANGE UP (ref 3.8–10.5)

## 2017-08-30 PROCEDURE — 93971 EXTREMITY STUDY: CPT | Mod: 26

## 2017-08-30 RX ADMIN — Medication 20 MILLIGRAM(S): at 11:35

## 2017-08-30 RX ADMIN — Medication 20 MILLIGRAM(S): at 17:17

## 2017-08-30 RX ADMIN — SEVELAMER CARBONATE 800 MILLIGRAM(S): 2400 POWDER, FOR SUSPENSION ORAL at 17:17

## 2017-08-30 RX ADMIN — Medication 1 GRAM(S): at 13:17

## 2017-08-30 RX ADMIN — CLOPIDOGREL BISULFATE 75 MILLIGRAM(S): 75 TABLET, FILM COATED ORAL at 11:35

## 2017-08-30 RX ADMIN — Medication 325 MILLIGRAM(S): at 11:35

## 2017-08-30 RX ADMIN — SEVELAMER CARBONATE 800 MILLIGRAM(S): 2400 POWDER, FOR SUSPENSION ORAL at 11:35

## 2017-08-30 RX ADMIN — Medication 1 GRAM(S): at 21:46

## 2017-08-30 RX ADMIN — Medication 1 TABLET(S): at 17:17

## 2017-08-30 RX ADMIN — Medication 1 TABLET(S): at 11:35

## 2017-08-30 RX ADMIN — ATORVASTATIN CALCIUM 10 MILLIGRAM(S): 80 TABLET, FILM COATED ORAL at 21:46

## 2017-08-30 NOTE — CHART NOTE - NSCHARTNOTEFT_GEN_A_CORE
Contacted by PA supervisor for difficult PIV access in patient with B/L AV fisutlas in UEs. IV access required  at this time for medical management and possible cath procedure in AM . Under US guidance identified Rt LE saphenous vein and successfully placed 20g x 1.88 inch angiocath into vessel. Placement confirmed s/p with ultrasound and catheter determined to be in patent lumen of vein. Pt tolerated well w/o complication.

## 2017-08-30 NOTE — PROGRESS NOTE ADULT - SUBJECTIVE AND OBJECTIVE BOX
INTERVAL HPI/OVERNIGHT EVENTS:    pt reports resolution in abdominal pain and nausea  pt reports no episodes of diarrhea overnight or this morning  "im very hungry" "im ready to go home"    MEDICATIONS  (STANDING):  aspirin enteric coated 325 milliGRAM(s) Oral daily  atorvastatin 10 milliGRAM(s) Oral at bedtime  clopidogrel Tablet 75 milliGRAM(s) Oral daily  sevelamer hydrochloride 800 milliGRAM(s) Oral three times a day with meals  metoprolol 25 milliGRAM(s) Oral daily  sucralfate 1 Gram(s) Oral three times a day  lactobacillus acidophilus 1 Tablet(s) Oral three times a day with meals  dicyclomine 20 milliGRAM(s) Oral three times a day before meals  pantoprazole    Tablet 40 milliGRAM(s) Oral before breakfast    MEDICATIONS  (PRN):  ALBUTerol/ipratropium for Nebulization 3 milliLiter(s) Nebulizer every 6 hours PRN Shortness of Breath and/or Wheezing      Allergies    No Known Allergies    Intolerances        Review of Systems:    General:  No wt loss, fevers, chills, night sweats, fatigue   Eyes:  Good vision, no reported pain  ENT:  No sore throat, pain, runny nose, dysphagia  CV:  No pain, palpitations, hypo/hypertension  Resp:  No dyspnea, cough, tachypnea, wheezing  GI:  No pain, No nausea, No vomiting, No diarrhea, No constipation, No weight loss, No fever, No pruritis, No rectal bleeding, No melena, No dysphagia  :  No pain, bleeding, incontinence, nocturia  Muscle:  No pain, weakness  Neuro:  No weakness, tingling, memory problems  Psych:  No fatigue, insomnia, mood problems, depression  Endocrine:  No polyuria, polydypsia, cold/heat intolerance  Heme:  No petechiae, ecchymosis, easy bruisability  Skin:  No rash, tattoos, scars, edema      Vital Signs Last 24 Hrs  T(C): 36.8 (30 Aug 2017 04:59), Max: 37.2 (29 Aug 2017 20:08)  T(F): 98.2 (30 Aug 2017 04:59), Max: 99 (29 Aug 2017 20:08)  HR: 82 (30 Aug 2017 04:59) (67 - 82)  BP: 139/66 (30 Aug 2017 04:59) (117/49 - 151/74)  BP(mean): --  RR: 16 (30 Aug 2017 04:59) (16 - 22)  SpO2: 96% (30 Aug 2017 04:59) (96% - 100%)    PHYSICAL EXAM:    Constitutional: NAD  HEENT: EOMI, throat clear  Neck: No LAD, supple  Respiratory: CTA and P  Cardiovascular: S1 and S2, RRR, no M  Gastrointestinal: BS+, soft, NT/ND, neg HSM,  Extremities: No peripheral edema, neg clubbing, cyanosis  Vascular: 2+ peripheral pulses  Neurological: A/O x 3, no focal deficits  Psychiatric: Normal mood, normal affect  Skin: No rashes      LABS:                        10.5   3.98  )-----------( 113      ( 30 Aug 2017 00:30 )             33.9     08-30    137  |  97<L>  |  40<H>  ----------------------------<  87  4.8   |  24  |  8.67<H>    Ca    8.7      30 Aug 2017 00:30  Phos  3.0     08-30  Mg     1.8     08-30    TPro  7.1  /  Alb  4.1  /  TBili  0.3  /  DBili  x   /  AST  17  /  ALT  14  /  AlkPhos  19<L>  08-28          RADIOLOGY & ADDITIONAL TESTS:

## 2017-08-30 NOTE — PROGRESS NOTE ADULT - ASSESSMENT
·  Problem: Chest pain, unspecified type.  Plan: Will continue to monitor on telemetry, serial EKG and CE prn for any more episodes of chest pain  house cards fu  Continue with Aspirin 325mg and Plavix 75mg daily.       ·  Problem: Hyperkalemia.  Plan: K on admission noted to be 6/1(slightly hemolyzed) and patient received IV Insulin with D50, Albuterol, calcium gluconate and IV Sodium bicarbonate with repeat K of 4.8    renal fu      ·  Problem: Diarrhea.  Plan: Patient with recent antibiotic use and was febrile at home(as per patient)   Will check Ova and parasite and C-diff   If diarrhea does not resolve consider GI consult in am.       ·  Problem: ESRD on Dialysis.  Plan: Patient gets dialysis on Tues/Thurs/Sat and last dialysis was on Saturday(completed full session)  Will need to call Dr. Mckeon's group in am for starting up dialysis while inpatient(patient weekly schedule and patient received PO Contrast)  Avoid nephrotoxic medications   Renal diet ordered.    ·  Problem: High cholesterol.  Plan: Continue with Lipitor daily   Lipid profile in am, low cholesterol diet recommended.     :  Problem: HTN (hypertension). Plan: Continue with antihypertensive medications   DASH diet recommended.

## 2017-08-30 NOTE — PROGRESS NOTE ADULT - SUBJECTIVE AND OBJECTIVE BOX
No pain, no shortness of breath      VITAL:  T(C): , Max: 37.2 (08-29-17 @ 20:08)  T(F): , Max: 99 (08-29-17 @ 20:08)  HR: 82 (08-30-17 @ 04:59)  BP: 139/66 (08-30-17 @ 04:59)  BP(mean): 96 (08-29-17 @ 08:54)  RR: 16 (08-30-17 @ 04:59)  SpO2: 96% (08-30-17 @ 04:59)      PHYSICAL EXAM:  Constitutional: NAD, Alert, overweight  HEENT: NCAT, DMM  Neck: Supple, No JVD  Respiratory: CTA-b/l  Cardiovascular: RRR s1s2, no m/r/g  Gastrointestinal: BS+, soft, NT/ND  Extremities: 1+ b/l LE edema; (+)bilateral UE AVFs with thrills      LABS:                        10.5   3.98  )-----------( 113      ( 30 Aug 2017 00:30 )             33.9     Na(137)/K(4.8)/Cl(97)/HCO3(24)/BUN(40)/Cr(8.67)Glu(87)/Ca(8.7)/Mg(1.8)/PO4(3.0)    08-30 @ 00:30  Na(139)/K(5.2)/Cl(99)/HCO3(20)/BUN(66)/Cr(13.07)Glu(86)/Ca(9.3)/Mg(2.0)/PO4(--)    08-29 @ 06:25  Na(138)/K(4.8)/Cl(98)/HCO3(20)/BUN(64)/Cr(12.67)Glu(79)/Ca(9.6)/Mg(--)/PO4(--)    08-29 @ 01:00  Na(137)/K(6.1)/Cl(101)/HCO3(16)/BUN(63)/Cr(12.33)Glu(108)/Ca(9.5)/Mg(--)/PO4(--)    08-28 @ 21:20  Na(138)/K(6.2)/Cl(101)/HCO3(16)/BUN(62)/Cr(12.35)Glu(119)/Ca(9.4)/Mg(1.9)/PO4(3.4)    08-28 @ 20:10      IMPRESSION: 57M w/ asthma, CAD-CABG, and QTFY-[id number]-VB TTS, 8/28/17 a/w chest pain and diarrhea    (1)Renal - ESRD- HD TTS - last dialyzed yesterday, and due for next HD tomorrow    (2)Hyperkalemia - improved s/p HD yesterday    (3)Chest pain - Don negative x 2.    (4)Vasc - LUE AVF being used for now; RUE AVF maturing and to be used in near future.     RECOMMEND:  (1)Next HD tomorrow  (2)No needlesticks nor BP checks from EITHER ARM (use legs instead)              Irvin Morrow MD  [facility]  [phone] Chest pain improved. SOB improved. Diarrhea improved.      VITAL:  T(C): , Max: 37.2 (08-29-17 @ 20:08)  T(F): , Max: 99 (08-29-17 @ 20:08)  HR: 82 (08-30-17 @ 04:59)  BP: 139/66 (08-30-17 @ 04:59)  BP(mean): 96 (08-29-17 @ 08:54)  RR: 16 (08-30-17 @ 04:59)  SpO2: 96% (08-30-17 @ 04:59)      PHYSICAL EXAM:  Constitutional: NAD, Alert, overweight  HEENT: NCAT, DMM  Neck: Supple, No JVD  Respiratory: CTA-b/l  Cardiovascular: RRR s1s2, no m/r/g  Gastrointestinal: BS+, soft, NT/ND  Extremities: 1+ b/l LE edema; (+)bilateral UE AVFs with thrills      LABS:                        10.5   3.98  )-----------( 113      ( 30 Aug 2017 00:30 )             33.9     Na(137)/K(4.8)/Cl(97)/HCO3(24)/BUN(40)/Cr(8.67)Glu(87)/Ca(8.7)/Mg(1.8)/PO4(3.0)    08-30 @ 00:30  Na(139)/K(5.2)/Cl(99)/HCO3(20)/BUN(66)/Cr(13.07)Glu(86)/Ca(9.3)/Mg(2.0)/PO4(--)    08-29 @ 06:25  Na(138)/K(4.8)/Cl(98)/HCO3(20)/BUN(64)/Cr(12.67)Glu(79)/Ca(9.6)/Mg(--)/PO4(--)    08-29 @ 01:00  Na(137)/K(6.1)/Cl(101)/HCO3(16)/BUN(63)/Cr(12.33)Glu(108)/Ca(9.5)/Mg(--)/PO4(--)    08-28 @ 21:20  Na(138)/K(6.2)/Cl(101)/HCO3(16)/BUN(62)/Cr(12.35)Glu(119)/Ca(9.4)/Mg(1.9)/PO4(3.4)    08-28 @ 20:10      IMPRESSION: 57M w/ asthma, CAD-CABG, and [id number] TTS, 8/28/17 a/w chest pain and diarrhea    (1)Renal - ESRD- HD TTS - last dialyzed yesterday, and due for next HD tomorrow    (2)Hyperkalemia - improved s/p HD yesterday    (3)Chest pain - unclear etiology. Improved.    (4)Vasc - LUE AVF being used for now; RUE AVF maturing and to be used in near future.     RECOMMEND:  (1)Next HD tomorrow - inpatient versus outpatient  (2)No needlesticks nor BP checks from EITHER ARM (use legs instead)              Irvin Morrow MD  [facility]  [phone]

## 2017-08-30 NOTE — PROGRESS NOTE ADULT - SUBJECTIVE AND OBJECTIVE BOX
Patient is a 57y old  Male who presents with a chief complaint of Left sided chest pressure, abdominal pain, and diarrhea (29 Aug 2017 13:50)      INTERVAL HPI/OVERNIGHT EVENTS:  T(C): 36.8 (08-30-17 @ 04:59), Max: 37.2 (08-29-17 @ 20:08)  HR: 82 (08-30-17 @ 04:59) (67 - 82)  BP: 139/66 (08-30-17 @ 04:59) (117/49 - 151/74)  RR: 16 (08-30-17 @ 04:59) (16 - 22)  SpO2: 96% (08-30-17 @ 04:59) (96% - 100%)  Wt(kg): --  I&O's Summary    29 Aug 2017 07:01  -  30 Aug 2017 07:00  --------------------------------------------------------  IN: 500 mL / OUT: 2500 mL / NET: -2000 mL        LABS:                        10.5   3.98  )-----------( 113      ( 30 Aug 2017 00:30 )             33.9     08-30    137  |  97<L>  |  40<H>  ----------------------------<  87  4.8   |  24  |  8.67<H>    Ca    8.7      30 Aug 2017 00:30  Phos  3.0     08-30  Mg     1.8     08-30    TPro  7.1  /  Alb  4.1  /  TBili  0.3  /  DBili  x   /  AST  17  /  ALT  14  /  AlkPhos  19<L>  08-28        CAPILLARY BLOOD GLUCOSE                MEDICATIONS  (STANDING):  aspirin enteric coated 325 milliGRAM(s) Oral daily  atorvastatin 10 milliGRAM(s) Oral at bedtime  clopidogrel Tablet 75 milliGRAM(s) Oral daily  sevelamer hydrochloride 800 milliGRAM(s) Oral three times a day with meals  metoprolol 25 milliGRAM(s) Oral daily  sucralfate 1 Gram(s) Oral three times a day  lactobacillus acidophilus 1 Tablet(s) Oral three times a day with meals  dicyclomine 20 milliGRAM(s) Oral three times a day before meals  pantoprazole    Tablet 40 milliGRAM(s) Oral before breakfast    MEDICATIONS  (PRN):  ALBUTerol/ipratropium for Nebulization 3 milliLiter(s) Nebulizer every 6 hours PRN Shortness of Breath and/or Wheezing          PHYSICAL EXAM:  GENERAL: NAD, well-groomed, well-developed  HEAD:  Atraumatic, Normocephalic  CHEST/LUNG: Clear to percussion bilaterally; No rales, rhonchi, wheezing, or rubs  HEART: Regular rate and rhythm; No murmurs, rubs, or gallops  ABDOMEN: Soft, Nontender, Nondistended; Bowel sounds present  EXTREMITIES:  2+ Peripheral Pulses, No clubbing, cyanosis, or edema  LYMPH: No lymphadenopathy noted  SKIN: No rashes or lesions    Care Discussed with Consultants/Other Providers [ ] YES  [ ] NO

## 2017-08-30 NOTE — PROGRESS NOTE ADULT - PROBLEM SELECTOR PLAN 2
- resolved since admission  - ?related to kidney disease and low flow state with volume overload   - bentyl tid  - protonix qd   - maalox prn  - no gi objection to d/c planning - resolved since admission  - ?related to kidney disease and low flow state with volume overload   - bentyl tid  - protonix qd   - maalox prn  - no gi objection to d/c planning  - outpt fu in our office with Dr. Leiva on 9/11/2017 @10:30am

## 2017-08-30 NOTE — PROGRESS NOTE ADULT - SUBJECTIVE AND OBJECTIVE BOX
Date of Admission: 8/29/17     24H hour events: Reports significant improvement. Denies any CP, SOB, palpitations, nausea/vomiting, abdominal pain. No recurrent diarrhea. This am he reports dental pain. On ambulation around the unit, he denied any exertional symptoms of CP/ALEXANDRA.     MEDICATIONS:  aspirin enteric coated 325 milliGRAM(s) Oral daily  clopidogrel Tablet 75 milliGRAM(s) Oral daily  metoprolol 25 milliGRAM(s) Oral daily      ALBUTerol/ipratropium for Nebulization 3 milliLiter(s) Nebulizer every 6 hours PRN      sucralfate 1 Gram(s) Oral three times a day  dicyclomine 20 milliGRAM(s) Oral three times a day before meals  pantoprazole    Tablet 40 milliGRAM(s) Oral before breakfast    atorvastatin 10 milliGRAM(s) Oral at bedtime        REVIEW OF SYSTEMS:  Complete 10point ROS negative.    PHYSICAL EXAM:  T(C): 36.8 (08-30-17 @ 04:59), Max: 37.2 (08-29-17 @ 20:08)  HR: 82 (08-30-17 @ 04:59) (67 - 82)  BP: 139/66 (08-30-17 @ 04:59) (117/49 - 151/74)  RR: 16 (08-30-17 @ 04:59) (16 - 22)  SpO2: 96% (08-30-17 @ 04:59) (96% - 100%)  Wt(kg): --  I&O's Summary    29 Aug 2017 07:01  -  30 Aug 2017 07:00  --------------------------------------------------------  IN: 500 mL / OUT: 2500 mL / NET: -2000 mL        Appearance: Well appearing, seen ambulating without any difficulty  HEENT:   Normal oral mucosa, PERRL, EOMI	  Lymphatic: No lymphadenopathy  Cardiovascular: Normal S1 S2, No JVD  Respiratory: Lungs clear to auscultation	  Psychiatry: A & O x 3, Mood & affect appropriate  Gastrointestinal:  Soft, Non-tender, + BS	  Skin: No rashes, No ecchymoses, No cyanosis	  Neurologic: Non-focal  Extremities: AVFs in UEs bilaterally w/ palpable thrills  Vascular: Peripheral pulses palpable 2+ bilaterally        LABS:	 	    CBC Full  -  ( 30 Aug 2017 00:30 )  WBC Count : 3.98 K/uL  Hemoglobin : 10.5 g/dL  Hematocrit : 33.9 %  Platelet Count - Automated : 113 K/uL  Mean Cell Volume : 94.2 fL  Mean Cell Hemoglobin : 29.2 pg  Mean Cell Hemoglobin Concentration : 31.0 %  Auto Neutrophil # : 2.68 K/uL  Auto Lymphocyte # : 0.66 K/uL  Auto Monocyte # : 0.45 K/uL  Auto Eosinophil # : 0.15 K/uL  Auto Basophil # : 0.02 K/uL  Auto Neutrophil % : 67.3 %  Auto Lymphocyte % : 16.6 %  Auto Monocyte % : 11.3 %  Auto Eosinophil % : 3.8 %  Auto Basophil % : 0.5 %    08-30    137  |  97<L>  |  40<H>  ----------------------------<  87  4.8   |  24  |  8.67<H>  08-29    139  |  99  |  66<H>  ----------------------------<  86  5.2   |  20<L>  |  13.07<H>    Ca    8.7      30 Aug 2017 00:30  Ca    9.3      29 Aug 2017 06:25  Phos  3.0     08-30  Phos  3.4     08-28  Mg     1.8     08-30  Mg     2.0     08-29    TPro  7.1  /  Alb  4.1  /  TBili  0.3  /  DBili  x   /  AST  17  /  ALT  14  /  AlkPhos  19<L>  08-28  TPro  7.4  /  Alb  4.2  /  TBili  0.3  /  DBili  x   /  AST  19  /  ALT  17  /  AlkPhos  18<L>  08-28      proBNP: Serum Pro-Brain Natriuretic Peptide: 40426 pg/mL (08-28 @ 20:10)    TELEMETRY: 	  SR 70s     PREVIOUS DIAGNOSTIC TESTING:    [ ] Echocardiogram: < from: Transthoracic Echocardiogram (02.08.17 @ 10:01) >  Dimensions:     Normal Values:  LA:     4.1 cm   2.0 - 4.0 cm  Ao:     2.7 cm    2.0 - 3.8 cm  SEPTUM: 0.8 cm    0.6 - 1.2 cm  PWT:    0.9 cm    0.6 - 1.1 cm  LVIDd:  5.2 cm    3.0 - 5.6 cm  LVIDs:  3.4 cm    1.8 - 4.0 cm  Derived Variables:  LVMI: 81 g/m2  RWT: 0.34    CONCLUSIONS:  Technically difficult study.  No IV access for contrast  administration.  1. Mitral annular calcification, otherwise normal mitral  valve. Minimal mitral regurgitation.  2. Normal left ventricular internal dimensions and wall  thicknesses.  3. Limited views from the parasternal window suggest  grossly preserved overall LV systolic function.  Unable to  exclude segmental wall motion abnormalities.  4. The right ventricle is not well visualized; grossly  normal right ventricular systolic function.    [ ]  Catheterization; 06.01.16  SUMMARY:  1ST LESION INTERVENTIONS: A successful balloon angioplasty with stent was  performed on the 80 % lesion in the 2nd obtuse marginal. Following  intervention there was a 1 % residual stenosis.  2ND LESION INTERVENTIONS: A successful stent was performed on the 70 %  lesion in the proximal circumflex. Following intervention there was a 1 %  residual stenosis.  DIAGNOSTIC RECOMMENDATIONS: PCI of proximal Cx and mid OM2 for treatment of  CAD.  INTERVENTIONAL RECOMMENDATIONS: Continue clopidogrel (Plavix), 75 mg, PO,  daily. Continue aspirin, 325 mg, PO, daily. Patient management should  include aggressive medical therapy.    VENTRICLES: Analysis of regional contractile function demonstrated  diaphragmatic akinesis. Global left ventricular function was mildly  depressed. EF estimated was 45 %.  CORONARY VESSELS: The coronary circulation is right dominant.  LM:   --  Distal left main: There was a discrete 20 % stenosis.  LAD:   --  Proximal LAD: There was a 100 % stenosis. There was DESTINEE grade 0  flow through the vessel (no flow).  CX:   --  Proximal circumflex: There was a discrete 70 % stenosis. There  was DESTINEE grade 3 flow through the vessel (brisk flow).  --  OM1: There was a 100 % stenosis at the ostium of the vessel segment.  There was DESTINEE grade 0 flow through the vessel (no flow).  --  OM2: There was a 0 % stenosis at the site of a prior stent, in the  proximal third of the vessel segment. In a second lesion, there was a  discrete 80 % stenosis in the middle third of the vessel segment. There  was DESTINEE grade 3 flow through the vessel (brisk flow).  RCA:   --  Proximal RCA: The distal vessel was supplied by collaterals from  the circumflex. There was a 100 % stenosis. There was DESTINEE grade 0 flow  through the vessel (no flow).  GRAFTS:   --  Graft to the mid LAD: The graft was a LIMA. It was normal.  --  Graft to the 1st obtuse marginal: The graft was a saphenous vein graft  from the aorta. It was normal.    	  ASSESSMENT/PLAN: 	    57M w/CAD (PCI x2/ 5V CABG), ischemic cardiomyopathy, polycystic kidney disease(on transplant list and currently getting HD on Tues/Thurs/Sat), HTN, HLD, GERD, asthma, and anemia p/w left sided chest tightness and diarrhea, both resolved at this time.     #L. sided chest pain concerning for angina w/hx of CAD (PCI and CABG) Trops negative x2; Recent nuclear stress test (7/24/17) unchanged compared to prior test (5/18/16)  - C/w aspirin 325 and plavix 75  - c/w statin   - CP resolved with improvement in GI symptoms, will d/w Outpatient Cards Dr. Chauhan and f/u with primary team     [id number]

## 2017-08-30 NOTE — PROGRESS NOTE ADULT - PROBLEM SELECTOR PLAN 1
-improved during admission  -c.diff negative  -stool cultures, ova/parasites testing  -bacid tid  -imodium prn  -lactose free, low fiber renal diet  - no gi objection to d/c planning - improved during admission  - c.diff negative  - stool cultures, ova/parasites testing  - bacid tid  - imodium prn  - lactose free, low fiber renal diet  - no gi objection to d/c planning  - outpt fu in our office with Dr. Leiva on 9/11/2017 @10:30am

## 2017-08-31 VITALS
RESPIRATION RATE: 18 BRPM | SYSTOLIC BLOOD PRESSURE: 145 MMHG | TEMPERATURE: 98 F | HEART RATE: 75 BPM | DIASTOLIC BLOOD PRESSURE: 73 MMHG | WEIGHT: 186.29 LBS

## 2017-08-31 DIAGNOSIS — R69 ILLNESS, UNSPECIFIED: ICD-10-CM

## 2017-08-31 LAB
BACTERIA STL CULT: SIGNIFICANT CHANGE UP
BASOPHILS # BLD AUTO: 0.03 K/UL — SIGNIFICANT CHANGE UP (ref 0–0.2)
BASOPHILS NFR BLD AUTO: 0.7 % — SIGNIFICANT CHANGE UP (ref 0–2)
BUN SERPL-MCNC: 70 MG/DL — HIGH (ref 7–23)
CALCIUM SERPL-MCNC: 7.9 MG/DL — LOW (ref 8.4–10.5)
CHLORIDE SERPL-SCNC: 98 MMOL/L — SIGNIFICANT CHANGE UP (ref 98–107)
CO2 SERPL-SCNC: 18 MMOL/L — LOW (ref 22–31)
CREAT SERPL-MCNC: 11.56 MG/DL — HIGH (ref 0.5–1.3)
EOSINOPHIL # BLD AUTO: 0.27 K/UL — SIGNIFICANT CHANGE UP (ref 0–0.5)
EOSINOPHIL NFR BLD AUTO: 6.6 % — HIGH (ref 0–6)
GLUCOSE SERPL-MCNC: 114 MG/DL — HIGH (ref 70–99)
HBV SURFACE AB SER-ACNC: 18 MLU/ML — SIGNIFICANT CHANGE UP
HBV SURFACE AG SER-ACNC: NEGATIVE — SIGNIFICANT CHANGE UP
HCT VFR BLD CALC: 33.9 % — LOW (ref 39–50)
HGB BLD-MCNC: 10.6 G/DL — LOW (ref 13–17)
IMM GRANULOCYTES # BLD AUTO: 0.02 # — SIGNIFICANT CHANGE UP
IMM GRANULOCYTES NFR BLD AUTO: 0.5 % — SIGNIFICANT CHANGE UP (ref 0–1.5)
LYMPHOCYTES # BLD AUTO: 0.99 K/UL — LOW (ref 1–3.3)
LYMPHOCYTES # BLD AUTO: 24.3 % — SIGNIFICANT CHANGE UP (ref 13–44)
MAGNESIUM SERPL-MCNC: 1.9 MG/DL — SIGNIFICANT CHANGE UP (ref 1.6–2.6)
MCHC RBC-ENTMCNC: 29.7 PG — SIGNIFICANT CHANGE UP (ref 27–34)
MCHC RBC-ENTMCNC: 31.3 % — LOW (ref 32–36)
MCV RBC AUTO: 95 FL — SIGNIFICANT CHANGE UP (ref 80–100)
MONOCYTES # BLD AUTO: 0.45 K/UL — SIGNIFICANT CHANGE UP (ref 0–0.9)
MONOCYTES NFR BLD AUTO: 11 % — SIGNIFICANT CHANGE UP (ref 2–14)
NEUTROPHILS # BLD AUTO: 2.32 K/UL — SIGNIFICANT CHANGE UP (ref 1.8–7.4)
NEUTROPHILS NFR BLD AUTO: 56.9 % — SIGNIFICANT CHANGE UP (ref 43–77)
NRBC # FLD: 0 — SIGNIFICANT CHANGE UP
O+P SPEC CONC: SIGNIFICANT CHANGE UP
PLATELET # BLD AUTO: 115 K/UL — LOW (ref 150–400)
PMV BLD: 11.3 FL — SIGNIFICANT CHANGE UP (ref 7–13)
POTASSIUM SERPL-MCNC: 5.2 MMOL/L — SIGNIFICANT CHANGE UP (ref 3.5–5.3)
POTASSIUM SERPL-SCNC: 5.2 MMOL/L — SIGNIFICANT CHANGE UP (ref 3.5–5.3)
RBC # BLD: 3.57 M/UL — LOW (ref 4.2–5.8)
RBC # FLD: 14.3 % — SIGNIFICANT CHANGE UP (ref 10.3–14.5)
SODIUM SERPL-SCNC: 138 MMOL/L — SIGNIFICANT CHANGE UP (ref 135–145)
SPECIMEN SOURCE: SIGNIFICANT CHANGE UP
TRI STN SPEC: SIGNIFICANT CHANGE UP
WBC # BLD: 4.08 K/UL — SIGNIFICANT CHANGE UP (ref 3.8–10.5)
WBC # FLD AUTO: 4.08 K/UL — SIGNIFICANT CHANGE UP (ref 3.8–10.5)

## 2017-08-31 RX ORDER — ACETAMINOPHEN 500 MG
650 TABLET ORAL EVERY 6 HOURS
Qty: 0 | Refills: 0 | Status: DISCONTINUED | OUTPATIENT
Start: 2017-08-31 | End: 2017-08-31

## 2017-08-31 RX ORDER — PANTOPRAZOLE SODIUM 20 MG/1
1 TABLET, DELAYED RELEASE ORAL
Qty: 30 | Refills: 0 | OUTPATIENT
Start: 2017-08-31 | End: 2017-09-30

## 2017-08-31 RX ORDER — SUCRALFATE 1 G
1 TABLET ORAL
Qty: 21 | Refills: 0 | OUTPATIENT
Start: 2017-08-31 | End: 2017-09-07

## 2017-08-31 RX ORDER — ALBUTEROL 90 UG/1
2 AEROSOL, METERED ORAL
Qty: 1 | Refills: 0 | OUTPATIENT
Start: 2017-08-31 | End: 2017-09-30

## 2017-08-31 RX ORDER — ALBUTEROL 90 UG/1
2 AEROSOL, METERED ORAL
Qty: 0 | Refills: 0 | COMMUNITY

## 2017-08-31 RX ORDER — LACTOBACILLUS ACIDOPHILUS 100MM CELL
1 CAPSULE ORAL
Qty: 0 | Refills: 0 | COMMUNITY
Start: 2017-08-31

## 2017-08-31 RX ADMIN — SEVELAMER CARBONATE 800 MILLIGRAM(S): 2400 POWDER, FOR SUSPENSION ORAL at 11:33

## 2017-08-31 RX ADMIN — Medication 325 MILLIGRAM(S): at 11:32

## 2017-08-31 RX ADMIN — Medication 25 MILLIGRAM(S): at 11:33

## 2017-08-31 RX ADMIN — CLOPIDOGREL BISULFATE 75 MILLIGRAM(S): 75 TABLET, FILM COATED ORAL at 11:32

## 2017-08-31 RX ADMIN — PANTOPRAZOLE SODIUM 40 MILLIGRAM(S): 20 TABLET, DELAYED RELEASE ORAL at 05:23

## 2017-08-31 RX ADMIN — Medication 650 MILLIGRAM(S): at 05:23

## 2017-08-31 RX ADMIN — Medication 20 MILLIGRAM(S): at 11:32

## 2017-08-31 RX ADMIN — Medication 1 GRAM(S): at 13:19

## 2017-08-31 RX ADMIN — Medication 650 MILLIGRAM(S): at 06:00

## 2017-08-31 RX ADMIN — Medication 1 TABLET(S): at 11:33

## 2017-08-31 RX ADMIN — Medication 1 GRAM(S): at 05:23

## 2017-08-31 RX ADMIN — Medication 20 MILLIGRAM(S): at 05:23

## 2017-08-31 NOTE — PROGRESS NOTE ADULT - PROBLEM SELECTOR PLAN 1
- improved during admission  - c.diff negative  - stool cultures negative  - bacid tid  - imodium prn  - lactose free, low fiber renal diet  - no gi objection to d/c planning  - outpt fu in our office with Dr. Leiva on 9/11/2017 @10:30am

## 2017-08-31 NOTE — PROGRESS NOTE ADULT - PROBLEM SELECTOR PLAN 2
- resolved since admission  - ?related to kidney disease and low flow state with volume overload   - bentyl tid  - protonix qd   - maalox prn  - no gi objection to d/c planning  - outpt fu in our office with Dr. Leiva on 9/11/2017 @10:30am

## 2017-08-31 NOTE — PROGRESS NOTE ADULT - ASSESSMENT
56 y/o M with h/o CAD(s/p 2 stents on Plavix, and 5V CABG), Polycystic kidney disease(on transplant list and currently getting HD on Tues/Thurs/Sat), HLD, GERD, Colonic Polypectomy 1/2017 on colonoscopy, hemorrhoidectomy 2017, Gastritis 6/2011 on EGD and Asthma presented with the complaint of left sided chest tightness, diffuse abdominal pain and diarrhea. CT Abd did not reveal the etiology of patients abdominal discomfort or diarrhea

## 2017-08-31 NOTE — PROGRESS NOTE ADULT - SUBJECTIVE AND OBJECTIVE BOX
INTERVAL HPI/OVERNIGHT EVENTS:    "i feel much better"  no diarrhea and no abdominal pain   tolerating po intake    MEDICATIONS  (STANDING):  aspirin enteric coated 325 milliGRAM(s) Oral daily  atorvastatin 10 milliGRAM(s) Oral at bedtime  clopidogrel Tablet 75 milliGRAM(s) Oral daily  sevelamer hydrochloride 800 milliGRAM(s) Oral three times a day with meals  metoprolol 25 milliGRAM(s) Oral daily  sucralfate 1 Gram(s) Oral three times a day  lactobacillus acidophilus 1 Tablet(s) Oral three times a day with meals  dicyclomine 20 milliGRAM(s) Oral three times a day before meals  pantoprazole    Tablet 40 milliGRAM(s) Oral before breakfast    MEDICATIONS  (PRN):  ALBUTerol/ipratropium for Nebulization 3 milliLiter(s) Nebulizer every 6 hours PRN Shortness of Breath and/or Wheezing  acetaminophen   Tablet. 650 milliGRAM(s) Oral every 6 hours PRN Mild Pain (1 - 3)      Allergies    No Known Allergies    Intolerances        Review of Systems:    General:  No wt loss, fevers, chills, night sweats, fatigue   Eyes:  Good vision, no reported pain  ENT:  No sore throat, pain, runny nose, dysphagia  CV:  No pain, palpitations, hypo/hypertension  Resp:  No dyspnea, cough, tachypnea, wheezing  GI:  No pain, No nausea, No vomiting, No diarrhea, No constipation, No weight loss, No fever, No pruritis, No rectal bleeding, No melena, No dysphagia  :  No pain, bleeding, incontinence, nocturia  Muscle:  No pain, weakness  Neuro:  No weakness, tingling, memory problems  Psych:  No fatigue, insomnia, mood problems, depression  Endocrine:  No polyuria, polydypsia, cold/heat intolerance  Heme:  No petechiae, ecchymosis, easy bruisability  Skin:  No rash, tattoos, scars, edema      Vital Signs Last 24 Hrs  T(C): 36.4 (31 Aug 2017 11:03), Max: 36.7 (31 Aug 2017 05:22)  T(F): 97.5 (31 Aug 2017 11:03), Max: 98 (31 Aug 2017 05:22)  HR: 75 (31 Aug 2017 11:03) (71 - 75)  BP: 145/73 (31 Aug 2017 11:03) (130/64 - 153/79)  BP(mean): --  RR: 18 (31 Aug 2017 11:03) (17 - 18)  SpO2: 98% (31 Aug 2017 05:22) (96% - 98%)    PHYSICAL EXAM:    Constitutional: NAD  HEENT: EOMI, throat clear  Neck: No LAD, supple  Respiratory: CTA and P  Cardiovascular: S1 and S2, RRR, no M  Gastrointestinal: BS+, soft, NT/ND, neg HSM,  Extremities: No peripheral edema, neg clubbing, cyanosis  Vascular: 2+ peripheral pulses  Neurological: A/O x 3, no focal deficits  Psychiatric: Normal mood, normal affect  Skin: No rashes      LABS:                        10.6   4.08  )-----------( 115      ( 31 Aug 2017 07:00 )             33.9     08-31    138  |  98  |  70<H>  ----------------------------<  114<H>  5.2   |  18<L>  |  11.56<H>    Ca    7.9<L>      31 Aug 2017 06:30  Phos  3.0     08-30  Mg     1.9     08-31            RADIOLOGY & ADDITIONAL TESTS:

## 2017-08-31 NOTE — PROGRESS NOTE ADULT - SUBJECTIVE AND OBJECTIVE BOX
No pain, no shortness of breath      VITAL:  T(C): , Max: 36.7 (08-31-17 @ 05:22)  T(F): , Max: 98 (08-31-17 @ 05:22)  HR: 73 (08-31-17 @ 05:22)  BP: 153/79 (08-31-17 @ 05:22)  BP(mean): --  RR: 18 (08-31-17 @ 05:22)  SpO2: 98% (08-31-17 @ 05:22)      PHYSICAL EXAM:  Constitutional: NAD, Alert, overweight  HEENT: NCAT, DMM  Neck: Supple, No JVD  Respiratory: CTA-b/l  Cardiovascular: RRR s1s2, no m/r/g  Gastrointestinal: BS+, soft, NT/ND  Extremities: 1+ b/l LE edema; (+)bilateral UE AVFs with thrills      LABS:                        10.6   4.08  )-----------( 115      ( 31 Aug 2017 07:00 )             33.9     Na(137)/K(4.8)/Cl(97)/HCO3(24)/BUN(40)/Cr(8.67)Glu(87)/Ca(8.7)/Mg(1.8)/PO4(3.0)    08-30 @ 00:30  Na(139)/K(5.2)/Cl(99)/HCO3(20)/BUN(66)/Cr(13.07)Glu(86)/Ca(9.3)/Mg(2.0)/PO4(--)    08-29 @ 06:25      IMPRESSION: 57M w/ asthma, CAD-CABG, and DRBJ-TALY-IN TTS, 8/28/17 a/w chest pain and diarrhea    (1)Renal - ESRD- HD TTS - on HD now    (2)Chest pain - unclear etiology. Improved.    (3)Vasc - LUE AVF being used for now; RUE AVF maturing and to be used in near future.     RECOMMEND:  (1)HD now as ordered  (2)No needlesticks nor BP checks from EITHER ARM (use legs instead)  (3)Discharge planning per primary team            Irvin Morrow MD  [facility]  [phone] No pain, no shortness of breath, no diarrhea      VITAL:  T(C): , Max: 36.7 (08-31-17 @ 05:22)  T(F): , Max: 98 (08-31-17 @ 05:22)  HR: 73 (08-31-17 @ 05:22)  BP: 153/79 (08-31-17 @ 05:22)  BP(mean): --  RR: 18 (08-31-17 @ 05:22)  SpO2: 98% (08-31-17 @ 05:22)      PHYSICAL EXAM:  Constitutional: NAD, Alert, overweight  HEENT: NCAT, DMM  Neck: Supple, No JVD  Respiratory: CTA-b/l  Cardiovascular: RRR s1s2, no m/r/g  Gastrointestinal: BS+, soft, NT/ND  Extremities: 1+ b/l LE edema; (+)bilateral UE AVFs with thrills      LABS:                        10.6   4.08  )-----------( 115      ( 31 Aug 2017 07:00 )             33.9     Na(137)/K(4.8)/Cl(97)/HCO3(24)/BUN(40)/Cr(8.67)Glu(87)/Ca(8.7)/Mg(1.8)/PO4(3.0)    08-30 @ 00:30  Na(139)/K(5.2)/Cl(99)/HCO3(20)/BUN(66)/Cr(13.07)Glu(86)/Ca(9.3)/Mg(2.0)/PO4(--)    08-29 @ 06:25      IMPRESSION: 57M w/ asthma, CAD-CABG, and WDJE-MVNL-OH TTS, 8/28/17 a/w chest pain and diarrhea    (1)Renal - ESRD- HD TTS - on HD now    (2)Chest pain - unclear etiology. Improved.    (3)Vasc - LUE AVF being used for now; RUE AVF maturing and to be used in near future.     RECOMMEND:  (1)HD now as ordered  (2)No needlesticks nor BP checks from EITHER ARM (use legs instead)  (3)Discharge planning per primary team            Irvin Morrow MD  [facility]  [phone]

## 2017-09-01 PROCEDURE — G9001: CPT

## 2017-10-10 NOTE — ED PROVIDER NOTE - CROS ED CARDIOVAS ALL NEG
Patient: Darcie Owusu MRN: [id number]  SSN: [id number]    YOB: 1962  Age: 62 y.o. Sex: female      Other Providers:  Madi Vitale MD    CHIEF COMPLAINT: Back pain    DIAGNOSIS: Metastatic colon cancer with painful bone metastases    SITE TREATED AND DOSE DELIVERED: L4 - S1 and involved left ilium have received 30 Gy of 30 Gy in10/10 fractions. TREATMENT DATES: 02/23/17 through 03/08/17     HISTORY OF PRESENT ILLNESS:  Roxy Brewer a 62year old female initially seen by Dr. Nickolas West at the request of Dr. Estefani Ng regarding the use of radiation therapy treatment of her newly diagnosed brain metastasis.  She had been under the care of Dr. Anastacia Coppola for some time. Francy Benoit has received 8 cycles of FOLFOX/Avastin chemotherapy.  At an outpatient visit with Dr. Anastacia Coppola on 02/11/17 she complained of headaches.  On 02/17/17 she began to have generalized weakness and a fever of 100.7.  She was found not to be neutropenic. Francy Benoit was placed on Levaquin.  She then presented to the ED on 02/21/17 complaining of persistent fever, headaches and low back pain.  She complained of increasing weakness, particularly in the bilateral lower extremities.  She stated that she was in the shower that morning and collapsed secondary to leg weakness.  MRI of the brain on 02/21/17 showed no evidence of brain metastases. MRI of the lumbar spine on 02/21/17 showed abnormal marrow signal within the L5 vertebral body.  Abnormal signal was also seen within the left ilium.  These were suspicious for osseous metastatic disease.       Ms. Tosin Torres received external beam radiation delivering 30 Gy in 10 fractions to the area including L4 - S1 and the involved left ilium. Treatment was completed on 03/08/17. She had excellent pain relief following treatment. She then continued FOLFOX/Avastin under the direction of Dr. Anastacia Coppola. However, her CEA began to rise and she was changed to a regimen of FOLFIRI/bevacizumab.  In 06/17 she began to have recurrent pain in the area of her prior radiation. However, her CEA was falling with the new chemo regimen. She was interested in a potential immunotherapy trial at [facility].     On 07/16/17 she was admitted to [facility]. The week prior she had been seen by her primary care physician and diagnosed with a UTI and started on Bactrim. However, she developed worsening abdominal pain and went to the ER for evaluation. 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 On 07/31/17 she underwent cystoscopy under the direction of Dr. Karyna Ibrahim. 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 A large fistula was seen at the left dome of the bladder. She was seen by Dr. Zaida Choi for consideration of surgical repair. On 08/23/17 she underwent sigmoid colectomy with colostomy, abdominal wall resection, partial cystectomy and fistula resection with primary repair, and partial hysterectomy and left salpingectomy.      From a postsurgical standpoint, she progressed well. She was tolerating a soft diet and her ostomy was functioning well. Her Moran catheter was removed. However, her family noted that she was unable to walk and became very weak. An MRI of the brain was scheduled, but the patient decided to be discharged home prior to obtaining the scan. MRI of the brain was scheduled as an outpatient. Prior to obtaining the scan, she re-presented at the ED on 09/01/17 with worsening cognitive function and headaches. CT scan of the head  Revealed right parieto-occipital lobe vasogenic edema suggestive of underlying mass/tumor. MRI of the brain on 09/02/17 showed a 2.6 cm lobulated enhancing mass in the posterior medial right occipital lobe adjacent to the interhemispheric fissure. Significant surrounding vasogenic edema was noted. She was placed on dexamethasone with significant improvement.  I have been asked to see the patient  For discussion of the potential role of radiation therapy in treatment of this newly diagnosed brain metastasis.     INTERVAL HISTORY: Ms. Annabel White returns today 3 weeks following SRS for brain metastasis. She continues to have significant low back pain with radiation to the left leg that she rates 8/10. She has had significant improvement in cognition. Steroid taper leaves her at 2mg daily at this time. She continues to have weakness with lower extremities, left > right. She denies headaches, nausea or seizures. She remains on keppra twice daily. She is eating small, frequent meals. Overall she reports she is about the same, with no new complaints. She was given a referral for Hospice. According to Mrs Ary Sellers, her  is not dealing well with her disease/prognosis and is asking her to not participate with Hospice. Her [id number] and other family members are supporting her decision if she decides to proceed with Hospice.  At this time, she is not with Hospice.      PAST MEDICAL HISTORY:     Medical History         Past Medical History:   Diagnosis Date    Anemia      Brain cancer (CMS/HCC)      Colon cancer (CMS/HCC)      Endometriosis      Panic attack                 PAST SURGICAL HISTORY:    Surgical History          Past Surgical History:   Procedure Laterality Date    ENDOMETRIAL CRYOABLATION        HYSTERECTOMY        OOPHORECTOMY        TONSILLECTOMY                MEDICATIONS:      Current Outpatient Prescriptions:     HYDROmorphone (use for DILAUDID) 4 mg tablet, Take 4-8 mg by mouth., Disp: , Rfl:     ALPRAZolam (use for XANAX) 2 mg tablet, Take 2 mg by mouth., Disp: , Rfl:     cyclobenzaprine (use for FLEXERIL) 10 mg tablet, Take 10 mg by mouth., Disp: , Rfl:     dexamethasone (use for DECADRON) 2 mg tablet, Take 4 mg by mouth., Disp: , Rfl:     docusate sodium (use for COLACE) 100 mg capsule, Take 100 mg by mouth., Disp: , Rfl:     levETIRAcetam (use for KEPPRA) 1,000 mg tablet, Take 1,000 mg by mouth., Disp: , Rfl:     lidocaine-prilocaine (use for EMLA) cream, Apply topically. , Disp: , Rfl:     LORazepam (use for ATIVAN) 1 mg tablet, Take 1 mg by mouth., Disp: , Rfl:     magnesium hydroxide (use for MILK of MAGNESIA) 30 ml = 2400 mg suspension, Take 30 mL by mouth., Disp: , Rfl:     metFORMIN (use for GLUCOPHAGE) 500 mg tablet, Take 500 mg by mouth., Disp: , Rfl:     metoprolol tartrate (use for LOPRESSOR) 50 mg tablet, Take 50 mg by mouth., Disp: , Rfl:     ondansetron (use for ZOFRAN) 8 mg tablet, Take 8 mg by mouth., Disp: , Rfl:     oxyCODONE (use for oxyCONTIN) 20 mg 12 hr tablet, Take 20 mg by mouth., Disp: , Rfl:     oxyCODONE-acetaminophen (use for PERCOCET  mg)  mg per tablet, Take 1 tablet by mouth., Disp: , Rfl:     pantoprazole (use for PROTONIX) 40 mg EC tablet, Take 40 mg by mouth., Disp: , Rfl:     PARoxetine (use for PAXIL) 40 mg tablet, Take 40 mg by mouth., Disp: , Rfl:     potassium chloride (use for KLOR-CON) 20 mEq CR tablet, Take 40 mEq by mouth., Disp: , Rfl:     prochlorperazine (use for COMPAZINE) 10 mg tablet, Take 10 mg by mouth., Disp: , Rfl:      ALLERGIES:         Allergies   Allergen Reactions    Penicillin G         Other reaction(s): Unknown (comments)  Daughter states patient has Pcn allergy     Tolerates Evelyne GarciaD         SOCIAL HISTORY:    Social History    Social History      Social History    Marital status:        Spouse name: N/A    Number of children: N/A    Years of education: N/A          Occupational History    Not on file.            Social History Main Topics    Smoking status: Former Smoker       Packs/day: 0.50       Years: 10.00       Types: Cigarettes    Smokeless tobacco: Never Used    Alcohol use No    Drug use: No    Sexual activity: Defer           Other Topics Concern    Not on file          Social History Narrative    No narrative on file            FAMILY HISTORY:         Family History   Problem Relation Age of Onset    Hypertension Mother      Cancer Father           REVIEW OF SYSTEMS: Please see the completed review of systems sheet in the chart that I have reviewed today.       PHYSICAL EXAMINATION:   ECOG Performance status 2  VITAL SIGNS:       Vitals:     09/11/17 0851   BP: (!) 133/82   Pulse: 106   Temp: 98.1 °F (36.7 °C)         GENERAL: The patient is well-developed, ambulatory, alert and in no acute distress. HEENT: Head is normocephalic, atraumatic. Pupils are equal, round and reactive to light and accommodation. Extraocular movement intact. Hearing is intact bilaterally to finger rub. NECK: Neck is supple with no masses. CARDIOVASCULAR: Heart has regular rate and rhythm. RESPIRATORY: Lungs are clear to auscultation. There is normal respiratory effort. MUSCULOSKELETAL: Extremities reveal no cyanosis, clubbing or edema.  is 5+/5. NEURO:  Cranial nerves II-XII grossly intact. Muscular strength of lower extremities is weak, L>R - no change.         PATHOLOGY:    09/19/16: \"LEFT COLON MASS\": POORLY DIFFERENTIATED MUCINOUS ADENOCARCINOMA.     LABORATORY: No results found for: NA, K, CL, CO2, GLU, BUN, CREATININE, CALCIUM, MG, PHOS, ALBUMIN, BILITOT, GLOB, AST, ALT  No results found for: WBC, HGB, HCT, PLT     RADIOLOGY:  I personally reviewed the imaging and agree with the reports summarized below.       09/02/17: MRI of the brain      INDICATION:  Brain mass, history of metastatic colon cancer     Standard MRI sequences were obtained through the brain in multiple planes.   Images were obtained before and after intravenous infusion of  13 mL MultiHance.     FINDINGS:  As noted on the recent head CT, there is extensive vasogenic edema in the  posterior right parietal lobe and occipital lobe.  Postcontrast images show a  lobulated 2.6 cm enhancing mass in the posterior medial right occipital lobe  adjacent to the interhemispheric fissure.  No other intracranial masses are  seen. Polly Nowata is no evidence of associated hemorrhage.  The edema is producing  mass effect on the right occipital horn.  There is no significant midline  shift. .       IMPRESSION: Enhancing right occipital mass with significant surrounding  vasogenic edema.  Most likely a metastatic lesion given history.     09/01/17: Noncontrast CT of the brain.      COMPARISON: none     INDICATION: Left-sided weakness. History of recent colon resection. History of  sigmoid cancer.     FINDINGS:     There is moderate vasogenic edema in the right parieto-occipital lobe. There is  slight mass effect upon the right lateral ventricle. No substantial midline  shift. No hydrocephalus. No extra-axial fluid collection. Cerebellum and  brainstem are grossly unremarkable.     There is no evidence of acute intracranial hemorrhage.     The visualized orbits and the globes are intact. Visualized paranasal sinuses  and the mastoid air cells are aerated. Surrounding bones are intact.     IMPRESSION:     1. Moderate vasogenic edema in the right parieto-occipital lobe, suggestive of  underlying mass/tumor. Contrast-enhanced brain MRI is recommended to better  evaluate/characterize.     2. No acute intracranial hemorrhage or substantial midline shift.         IMPRESSION/PLAN:  Mike Sung is a 62 y.o. female with Metastatic colon cancer with new brain metastasis S/P SRS, 09/19/17. She remains on decadron 2mg daily and  Keppra twice daily. She was given a referral for Hospice. According to Mrs Madeline Rodriguez, her  is not dealing well with her disease/prognosis and is asking her to not participate with Hospice. Her [id number] and other family members are supporting her decision if she decides to proceed with Hospice. At this time, she is not with Hospice. She is closely followed by medical oncology, Dr. Hollie Somers. Mrs Milagros Nolasco is declining chemotherapy at this time.  We will go ahead and see her again in 3 months with brain MRI prior to her visit with the understanding that she can cancel at any time.    I spent 30 minutes in the care of Mrs Cm Schulte today, over 50% of which was in direct counseling and ongoing management of her metastatic colon cancer. All questions were answered to the best of my ability.     I appreciate the opportunity to participate in Ms. Maurer's care.       Saundra Gonzalez NP               Portions of this note were copied from prior encounters and reviewed for accuracy, currency, and represent documentation and tasks completed during this encounter. I verify and attest these portions to be unchanged from prior visits. - - -

## 2017-11-15 ENCOUNTER — NON-APPOINTMENT (OUTPATIENT)
Age: 57
End: 2017-11-15

## 2017-11-15 ENCOUNTER — APPOINTMENT (OUTPATIENT)
Dept: CARDIOLOGY | Facility: CLINIC | Age: 57
End: 2017-11-15
Payer: MEDICAID

## 2017-11-15 VITALS
HEIGHT: 65 IN | WEIGHT: 183 LBS | BODY MASS INDEX: 30.49 KG/M2 | HEART RATE: 59 BPM | OXYGEN SATURATION: 94 % | RESPIRATION RATE: 16 BRPM

## 2017-11-15 PROCEDURE — 93000 ELECTROCARDIOGRAM COMPLETE: CPT

## 2017-11-15 PROCEDURE — 99215 OFFICE O/P EST HI 40 MIN: CPT

## 2017-11-15 RX ORDER — PANTOPRAZOLE 40 MG/1
40 TABLET, DELAYED RELEASE ORAL
Refills: 0 | Status: DISCONTINUED | COMMUNITY
Start: 2017-07-26 | End: 2017-11-15

## 2017-11-21 ENCOUNTER — INPATIENT (INPATIENT)
Facility: HOSPITAL | Age: 57
LOS: 5 days | Discharge: HOME | End: 2017-11-27
Attending: INTERNAL MEDICINE | Admitting: INTERNAL MEDICINE
Payer: MEDICAID

## 2017-11-21 VITALS
OXYGEN SATURATION: 100 % | RESPIRATION RATE: 18 BRPM | SYSTOLIC BLOOD PRESSURE: 186 MMHG | HEART RATE: 66 BPM | DIASTOLIC BLOOD PRESSURE: 86 MMHG | TEMPERATURE: 98 F

## 2017-11-21 DIAGNOSIS — I77.0 ARTERIOVENOUS FISTULA, ACQUIRED: Chronic | ICD-10-CM

## 2017-11-21 DIAGNOSIS — I20.9 ANGINA PECTORIS, UNSPECIFIED: ICD-10-CM

## 2017-11-21 DIAGNOSIS — Z98.890 OTHER SPECIFIED POSTPROCEDURAL STATES: Chronic | ICD-10-CM

## 2017-11-21 LAB
ALBUMIN SERPL ELPH-MCNC: 4.3 G/DL — SIGNIFICANT CHANGE UP (ref 3.3–5)
ALP SERPL-CCNC: 21 U/L — LOW (ref 40–120)
ALT FLD-CCNC: 13 U/L — SIGNIFICANT CHANGE UP (ref 4–41)
AST SERPL-CCNC: 17 U/L — SIGNIFICANT CHANGE UP (ref 4–40)
BASOPHILS # BLD AUTO: 0.03 K/UL — SIGNIFICANT CHANGE UP (ref 0–0.2)
BASOPHILS NFR BLD AUTO: 0.4 % — SIGNIFICANT CHANGE UP (ref 0–2)
BILIRUB SERPL-MCNC: 0.3 MG/DL — SIGNIFICANT CHANGE UP (ref 0.2–1.2)
BUN SERPL-MCNC: 49 MG/DL — HIGH (ref 7–23)
CALCIUM SERPL-MCNC: 9.1 MG/DL — SIGNIFICANT CHANGE UP (ref 8.4–10.5)
CHLORIDE SERPL-SCNC: 97 MMOL/L — LOW (ref 98–107)
CO2 SERPL-SCNC: 22 MMOL/L — SIGNIFICANT CHANGE UP (ref 22–31)
CREAT SERPL-MCNC: 8.87 MG/DL — HIGH (ref 0.5–1.3)
EOSINOPHIL # BLD AUTO: 0.22 K/UL — SIGNIFICANT CHANGE UP (ref 0–0.5)
EOSINOPHIL NFR BLD AUTO: 3.3 % — SIGNIFICANT CHANGE UP (ref 0–6)
GLUCOSE SERPL-MCNC: 98 MG/DL — SIGNIFICANT CHANGE UP (ref 70–99)
HCT VFR BLD CALC: 35.7 % — LOW (ref 39–50)
HGB BLD-MCNC: 11.4 G/DL — LOW (ref 13–17)
IMM GRANULOCYTES # BLD AUTO: 0.02 # — SIGNIFICANT CHANGE UP
IMM GRANULOCYTES NFR BLD AUTO: 0.3 % — SIGNIFICANT CHANGE UP (ref 0–1.5)
INR BLD: 1.18 — HIGH (ref 0.88–1.17)
LYMPHOCYTES # BLD AUTO: 1.45 K/UL — SIGNIFICANT CHANGE UP (ref 1–3.3)
LYMPHOCYTES # BLD AUTO: 21.7 % — SIGNIFICANT CHANGE UP (ref 13–44)
MCHC RBC-ENTMCNC: 30.2 PG — SIGNIFICANT CHANGE UP (ref 27–34)
MCHC RBC-ENTMCNC: 31.9 % — LOW (ref 32–36)
MCV RBC AUTO: 94.7 FL — SIGNIFICANT CHANGE UP (ref 80–100)
MONOCYTES # BLD AUTO: 0.59 K/UL — SIGNIFICANT CHANGE UP (ref 0–0.9)
MONOCYTES NFR BLD AUTO: 8.8 % — SIGNIFICANT CHANGE UP (ref 2–14)
NEUTROPHILS # BLD AUTO: 4.38 K/UL — SIGNIFICANT CHANGE UP (ref 1.8–7.4)
NEUTROPHILS NFR BLD AUTO: 65.5 % — SIGNIFICANT CHANGE UP (ref 43–77)
NRBC # FLD: 0 — SIGNIFICANT CHANGE UP
PLATELET # BLD AUTO: 119 K/UL — LOW (ref 150–400)
PMV BLD: 11.4 FL — SIGNIFICANT CHANGE UP (ref 7–13)
POTASSIUM SERPL-MCNC: 4.5 MMOL/L — SIGNIFICANT CHANGE UP (ref 3.5–5.3)
POTASSIUM SERPL-SCNC: 4.5 MMOL/L — SIGNIFICANT CHANGE UP (ref 3.5–5.3)
PROT SERPL-MCNC: 7.5 G/DL — SIGNIFICANT CHANGE UP (ref 6–8.3)
PROTHROM AB SERPL-ACNC: 13.3 SEC — HIGH (ref 9.8–13.1)
RBC # BLD: 3.77 M/UL — LOW (ref 4.2–5.8)
RBC # FLD: 13.8 % — SIGNIFICANT CHANGE UP (ref 10.3–14.5)
SODIUM SERPL-SCNC: 135 MMOL/L — SIGNIFICANT CHANGE UP (ref 135–145)
TROPONIN T SERPL-MCNC: < 0.06 NG/ML — SIGNIFICANT CHANGE UP (ref 0–0.06)
WBC # BLD: 6.69 K/UL — SIGNIFICANT CHANGE UP (ref 3.8–10.5)
WBC # FLD AUTO: 6.69 K/UL — SIGNIFICANT CHANGE UP (ref 3.8–10.5)

## 2017-11-21 PROCEDURE — 71020: CPT | Mod: 26

## 2017-11-21 RX ORDER — NITROGLYCERIN 6.5 MG
0.4 CAPSULE, EXTENDED RELEASE ORAL ONCE
Qty: 0 | Refills: 0 | Status: COMPLETED | OUTPATIENT
Start: 2017-11-21 | End: 2017-11-21

## 2017-11-21 RX ADMIN — Medication 0.4 MILLIGRAM(S): at 21:09

## 2017-11-21 NOTE — ED PROVIDER NOTE - MUSCULOSKELETAL, MLM
Spine appears normal, range of motion is not limited, no muscle or joint tenderness Spine appears normal, range of motion is not limited, no muscle or joint tenderness. B/l AVF in upper extremities, both with palpable thrill.

## 2017-11-21 NOTE — CONSULT NOTE ADULT - SUBJECTIVE AND OBJECTIVE BOX
Dr. Zeferino Arguello-PCP  Dr. Wily Chauhan-Cardiologist   Mike Vegas-Nephrologist    HISTORY OF PRESENT ILLNESS:  Patient is a 57y old  Male who presents with a chief complaint of   HPI: 56 y/o M with PMH of CAD(s/p 2 stents, and 5V CABG), Polycystic kidney disease(on transplant list and currently getting HD on Tues/Thurs/Sat), HLD, GERD, Asthma presented with the complaint of left sided chest tightness  s per the patient he had a stress test done in July and since then has been getting intermittent      Allergies    No Known Allergies    Intolerances    	    MEDICATIONS      aspirin 325 mg oral delayed release tablet: 1 tab(s) orally once a day  · 	Plavix 75 mg oral tablet: 1 tab(s) orally once a day  · 	Lipitor 10 mg oral tablet: 1 tab(s) orally once a day  · 	metoprolol tartrate 25 mg oral tablet: 1 tab(s) orally once a day                    PAST MEDICAL & SURGICAL HISTORY:  HTN (hypertension)  Coronary artery disease involving coronary bypass graft  Asthma  last attack 2007, never hospitalized or intubated  ESRD on Dialysis  Tue, Thur & Sat  Gastroesophageal reflux disease without esophagitis    Hemorrhoids    High cholesterol    HTN - Hypertension    Obesity    PCK (Polycystic Kidney Disease).    AV fistula  right lower extremity 2 yrs  Left upper extrmity with graft 7 years ago  CABG (Coronary Artery Bypass Graft)  2007  S/P hemorrhoidectomy  and rectal polypectomy -2/3/2017.  Stented coronary artery  x2 cardiac stents in 2016, one cardiac stent in 2015.    FAMILY HISTORY:  Sibling  Still living? Yes, Estimated age: Age Unknown  Family history of adult polycystic kidney disease, Age at diagnosis: Age Unknown.    SOCIAL HISTORY    Marital Status:   Occupation: ,   Lives with: wife and children    SUBSTANCE USE  Tobacco Usage:  ( x) never smoked   ( ) former smoker  ( ) current smoker; Packs per day:   Alcohol Usage: ( x) none  ( ) occasional ( ) 2-3 times a week ( ) daily; Last drink:   Recreational drugs ( x) None    REVIEW OF SYSTEMS:    CONSTITUTIONAL: No fevers, No chills, No fatigue, No weight gain  EYES: No vision changes   ENT: No congestion, No ear pain, No sore throat.  NECK: No pain, No stiffness  RESPIRATORY: No shortness of breath, No cough, No wheezing, No hemoptysis  CARDIOVASCULAR: + chest pain. No palpitations, No ALEXANDRA, No orthopnea, No paroxysmal nocturnal dyspnea, No pleuritic pain  GASTROINTESTINAL: No abdominal pain, No nausea, No vomiting, No hematemesis, No diarrhea No constipation. No melena  GENITOURINARY: No dysuria, No frequency, No incontinence, No hematuria  NEUROLOGICAL: No dizziness, No lightheadedness, No syncope, No LOC, No headache, No numbness or weakness  MUSCULOSKELETAL: No joint pain, No joint swelling.  PSYCHIATRIC: No anxiety, No depression  SKIN: No diaphoresis. No itching, No rashes, No pressure ulcers    All other review of systems is negative unless indicated above.    VITAL SIGNS  T(C): 36.8 (11-21-17 @ 23:10), Max: 36.8 (11-21-17 @ 23:10)  HR: 64 (11-21-17 @ 23:10) (64 - 66)  BP: 153/69 (11-21-17 @ 23:10) (153/69 - 186/86)  RR: 18 (11-21-17 @ 23:10) (18 - 18)  SpO2: 100% (11-21-17 @ 23:10) (100% - 100%)  Wt(kg): --    PHYSICAL EXAM:    Appearance: NAD, no distress  HEENT:   Normal oral mucosa, PERRL, EOMI  Cardiovascular: Regular rate and rhythm, Normal S1 S2, No JVD, No murmurs, No edema  Respiratory: Lungs clear to auscultation. No rales, No rhonchi, No wheezing. No tenderness to palpation  Gastrointestinal:  Soft, Non-tender, + BS  Neurologic: Non-focal, A&Ox3  Skin: Warm and dry, No rashes, No ecchymoses, No cyanosis  Extremities: No clubbing, cyanosis or edema  Vascular: Peripheral pulses palpable 2+ bilaterally  Psychiatry: Mood & affect appropriate    LABORATORY VALUES	 	                        11.4   6.69  )-----------( 119      ( 21 Nov 2017 21:06 )             35.7       11-21    135  |  97<L>  |  49<H>  ----------------------------<  98  4.5   |  22  |  8.87<H>    Ca    9.1      21 Nov 2017 21:06    TPro  7.5  /  Alb  4.3  /  TBili  0.3  /  DBili  x   /  AST  17  /  ALT  13  /  AlkPhos  21<L>  11-21    LIVER FUNCTIONS - ( 21 Nov 2017 21:06 )  Alb: 4.3 g/dL / Pro: 7.5 g/dL / ALK PHOS: 21 u/L / ALT: 13 u/L / AST: 17 u/L / GGT: x           INR: 1.18 (11-21 @ 21:06)    CARDIAC MARKERS:    Troponin T, Serum: < 0.06 ng/mL (11-21 @ 21:06)                      CAPILLARY BLOOD GLUCOSE              TELEMETRY: 	    ECG:  	  RADIOLOGY:  OTHER: 	    PREVIOUS DIAGNOSTIC TESTING:    [ ] Echocardiogram: < from: Transthoracic Echocardiogram (02.08.17 @ 10:01) >  1. Mitral annular calcification, otherwise normal mitral  valve. Minimal mitral regurgitation.  2. Normal left ventricular internal dimensions and wall  thicknesses.  3. Limited views from the parasternal window suggest  grossly preserved overall LV systolic function.  Unable to  exclude segmental wall motion abnormalities.  4. The right ventricle is not well visualized; grossly  normal right ventricular systolic function.    < end of copied text >  < from: Transthoracic Echocardiogram (02.08.17 @ 10:01) >  Mitral Valve: Mitral annular calcification, otherwise  normal mitral valve. Minimal mitral regurgitation.  Aortic Root: Normal aortic root.  Aortic Valve: Calcified trileaflet aortic valve with normal  opening.  Left Atrium: Normal left atrium.  LA volume index = 19  cc/m2.  Left Ventricle: Limited views from the parasternal window  suggest grossly preserved overall LV systolic function.  Unable to exclude segmental wall motion abnormalities.  Normal left ventricular internal dimensions and wall  thicknesses.  Right Heart: Normal right atrium. The right ventricle is  not well visualized; grossly normal right ventricular  systolic function. Normal tricuspid valve. Normal pulmonic  valve.  Pericardium/PleuraNormal pericardium with no pericardial  effusion.    < end of copied text >      [ ] Catheterization: < from: Cardiac Cath Lab - Adult (06.01.16 @ 09:07) >  VENTRICLES: Analysis of regional contractile function demonstrated  diaphragmatic akinesis. Global left ventricular function was mildly  depressed. EF estimated was 45 %.  CORONARY VESSELS: The coronary circulation is right dominant.  LM:   --  Distal left main: There was a discrete 20 % stenosis.  LAD:   --  Proximal LAD: There was a 100 % stenosis. There was DESTINEE grade 0  flow through the vessel (no flow).  CX:   --  Proximal circumflex: There was a discrete 70 % stenosis. There  was DESTINEE grade 3 flow through the vessel (brisk flow).  --  OM1: There was a 100 % stenosis at the ostium of the vessel segment.  There was DESTINEE grade 0 flow through the vessel (no flow).  --  OM2: There was a 0 % stenosis at the site of a prior stent, in the  proximal third of the vessel segment. In a second lesion, there was a  discrete 80 % stenosis in the middle third of the vessel segment. There  was DESTINEE grade 3 flow through the vessel (brisk flow).  RCA:   --  Proximal RCA: The distal vessel was supplied by collaterals from  the circumflex. There was a 100 % stenosis. There was DESTINEE grade 0 flow  through the vessel (no flow).  GRAFTS:   --  Graft to the mid LAD: The graft was a LIMA. It was normal.  --  Graft to the 1st obtuse marginal: The graft was a saphenous vein graft  from the aorta. It was normal.  COMPLICATIONS: There were no complications.  SUMMARY:  1ST LESION INTERVENTIONS: A successful balloon angioplasty with stent was  performed on the 80 % lesion in the 2nd obtuse marginal. Following  intervention there was a 1 % residual stenosis.  2ND LESION INTERVENTIONS: A successful stent was performed on the 70 %  lesion in the proximal circumflex. Following intervention there was a 1 %  residual stenosis.  DIAGNOSTIC RECOMMENDATIONS: PCI of proximal Cx and mid OM2 for treatment of  CAD.  INTERVENTIONAL RECOMMENDATIONS: Continue clopidogrel (Plavix), 75 mg, PO,  daily. Continue aspirin, 325 mg, PO, daily. Patient management should  include aggressive medical therapy.    < end of copied text >    [ ] Stress Test:  < from: Nuclear Stress Test-Pharmacologic (07.24.17 @ 12:49) >  Consistent with infarction with moderate mckay-infarct  ischemia.  * Review of raw data shows: The study is of good technical  quality.  * The left ventricle was enlarged. There are large,  moderate to severe defects in inferior, inferolateral and  lateral walls that are fixed with moderatereversible  mckay-infarct ischemia, consistent with infarction with  moderate mckay-infarct ischemia.  * Post-stress gated wall motion analysis was performed  (LVEF = 46 %;LVEDV = 152 ml.)  *** Compared with Nuclear/Stress test of 5/18/2016, no  significant changes noted. Prior LVEF was 30% with   mL    < end of copied text >    ASSESSMENT AND PLAN      PLAN  	      # [id number] Dr. Zeferino Arguello-PCP  Dr. Wily Chauhan-Cardiologist   Mike Vegas-Nephrologist    HISTORY OF PRESENT ILLNESS:  Patient is a 57y old  Male who presents with a chief complaint of chest pain  HPI: 56 y/o M with PMH of CAD(s/p 2 stents, and 5V CABG), Polycystic kidney disease(on transplant list and currently getting HD on Tues/Thurs/Sat), HLD, GERD, Asthma presented with the complaint of left sided chest tightness  As per the patient he had a stress test done in July and since then has been getting intermittent      Allergies  No Known Allergies    MEDICATIONS  Renvela carbonate 800 mg tid  aspirin 325 mg oral delayed release tablet: 1 tab(s) orally once a day  Plavix 75 mg oral tablet: 1 tab(s) orally once a day  Lipitor 10 mg oral tablet: 1 tab(s) orally once a day  metoprolol tartrate 25 mg oral tablet: 1 tab(s) orally once a day  Symbicort prn  Fenofibrate 20mg daily  Omeprazole 20 mg daily    PAST MEDICAL & SURGICAL HISTORY:  HTN (hypertension)  Coronary artery disease involving coronary bypass graft  Asthma  last attack 2007, never hospitalized or intubated  ESRD on Dialysis  Tue, Thur & Sat  Gastroesophageal reflux disease without esophagitis    Hemorrhoids    High cholesterol    HTN - Hypertension    Obesity    PCK (Polycystic Kidney Disease).  AV fistula  right lower extremity 2 yrs  Left upper extrmity with graft 7 years ago  CABG (Coronary Artery Bypass Graft)  2007  S/P hemorrhoidectomy  and rectal polypectomy -2/3/2017.  Stented coronary artery  x2 cardiac stents in 2016, one cardiac stent in 2015.    FAMILY HISTORY:  Sibling  Still living? Yes, Estimated age: Age Unknown  Family history of adult polycystic kidney disease, Age at diagnosis: Age Unknown.    SOCIAL HISTORY  Marital Status:   Occupation: ,   Lives with: wife and children    SUBSTANCE USE  Tobacco Usage:  ( x) never smoked   ( ) former smoker  ( ) current smoker; Packs per day:   Alcohol Usage: ( x) none  ( ) occasional ( ) 2-3 times a week ( ) daily; Last drink:   Recreational drugs ( x) None    REVIEW OF SYSTEMS:  CONSTITUTIONAL: No fevers, No chills, No fatigue, No weight gain  EYES: No vision changes   ENT: No congestion, No ear pain, No sore throat.  NECK: No pain, No stiffness  RESPIRATORY: No shortness of breath, No cough, No wheezing, No hemoptysis  CARDIOVASCULAR: + chest pain. No palpitations, + ALEXANDRA, No orthopnea, No paroxysmal nocturnal dyspnea, No pleuritic pain  GASTROINTESTINAL: No abdominal pain, No nausea, No vomiting, No hematemesis, No diarrhea No constipation. No melena  GENITOURINARY: No dysuria, No frequency, No incontinence, No hematuria  NEUROLOGICAL: No dizziness, No lightheadedness, No syncope, No LOC, No headache, No numbness or weakness  MUSCULOSKELETAL: No joint pain, No joint swelling.  PSYCHIATRIC: No anxiety, No depression  SKIN: No diaphoresis. No itching, No rashes, No pressure ulcers    All other review of systems is negative unless indicated above.    VITAL SIGNS  T(C): 36.8 (11-21-17 @ 23:10), Max: 36.8 (11-21-17 @ 23:10)  HR: 64 (11-21-17 @ 23:10) (64 - 66)  BP: 153/69 (11-21-17 @ 23:10) (153/69 - 186/86)  RR: 18 (11-21-17 @ 23:10) (18 - 18)  SpO2: 100% (11-21-17 @ 23:10) (100% - 100%)  Wt(kg): --    PHYSICAL EXAM:  Appearance: NAD, no distress  HEENT:   Normal oral mucosa, PERRL, EOMI  Cardiovascular: Regular rate and rhythm, Normal S1 S2, No JVD, No murmurs, No edema  Respiratory: Lungs clear to auscultation. No rales, No rhonchi, No wheezing. No tenderness to palpation  Gastrointestinal:  Soft, Non-tender, + BS  Neurologic: Non-focal, A&Ox3  Skin: Warm and dry, No rashes, No ecchymoses, No cyanosis  Extremities: No clubbing, cyanosis or edema  Vascular: Peripheral pulses palpable 2+ bilaterally  Psychiatry: Mood & affect appropriate    LABORATORY VALUES	 	                        11.4   6.69  )-----------( 119      ( 21 Nov 2017 21:06 )             35.7     11-21    135  |  97<L>  |  49<H>  ----------------------------<  98  4.5   |  22  |  8.87<H>    Ca    9.1      21 Nov 2017 21:06    TPro  7.5  /  Alb  4.3  /  TBili  0.3  /  DBili  x   /  AST  17  /  ALT  13  /  AlkPhos  21<L>  11-21    LIVER FUNCTIONS - ( 21 Nov 2017 21:06 )  Alb: 4.3 g/dL / Pro: 7.5 g/dL / ALK PHOS: 21 u/L / ALT: 13 u/L / AST: 17 u/L / GGT: x         INR: 1.18 (11-21 @ 21:06    CARDIAC MARKERS:  Troponin T, Serum: < 0.06 ng/mL (11-21 @ 21:06)    TELEMETRY: NSR 60s    ECG: NSR. No acute ischemic changes 	  RADIOLOGY: CXR clear  OTHER: 	    PREVIOUS DIAGNOSTIC TESTING:    [ ] Echocardiogram: < from: Transthoracic Echocardiogram (02.08.17 @ 10:01) > Mitral annular calcification, otherwise normal mitral valve. Minimal mitral regurgitation. Normal left ventricular internal dimensions and wall thicknesses. Limited views from the parasternal window suggest grossly preserved overall LV systolic function.  Unable to exclude segmental wall motion abnormalities. The right ventricle is not well visualized; grossly normal right ventricular systolic function.    [ ] Catheterization: < from: Cardiac Cath Lab - Adult (06.01.16 @ 09:07) > VENTRICLES: Analysis of regional contractile function demonstrated diaphragmatic akinesis. Global left ventricular function was mildly depressed. EF estimated was 45 %. CORONARY VESSELS: The coronary circulation is right dominant. LM:   --  Distal left main: There was a discrete 20 % stenosis. LAD:   --  Proximal LAD: There was a 100 % stenosis. There was DESTINEE grade 0 flow through the vessel (no flow). CX:   --  Proximal circumflex: There was a discrete 70 % stenosis. There was DESTINEE grade 3 flow through the vessel (brisk flow). --  OM1: There was a 100 % stenosis at the ostium of the vessel segment. There was DESTINEE grade 0 flow through the vessel (no flow). --  OM2: There was a 0 % stenosis at the site of a prior stent, in the proximal third of the vessel segment. In a second lesion, there was a discrete 80 % stenosis in the middle third of the vessel segment. There was DESTINEE grade 3 flow through the vessel (brisk flow). RCA:   --  Proximal RCA: The distal vessel was supplied by collaterals from the circumflex. There was a 100 % stenosis. There was DESTINEE grade 0 flow through the vessel (no flow). GRAFTS:   --  Graft to the mid LAD: The graft was a LIMA. It was normal. -  Graft to the 1st obtuse marginal: The graft was a saphenous vein graft from the aorta. It was normal. COMPLICATIONS: There were no complications. SUMMARY: 1ST LESION INTERVENTIONS: A successful balloon angioplasty with stent was performed on the 80 % lesion in the 2nd obtuse marginal. Following intervention there was a 1 % residual stenosis. 2ND LESION INTERVENTIONS: A successful stent was performed on the 70 % lesion in the proximal circumflex. Following intervention there was a 1 % residual stenosis. DIAGNOSTIC RECOMMENDATIONS: PCI of proximal Cx and mid OM2 for treatment of CAD. INTERVENTIONAL RECOMMENDATIONS: Continue clopidogrel (Plavix), 75 mg, PO, daily. Continue aspirin, 325 mg, PO, daily. Patient management should include aggressive medical therapy.    [ ] Stress Test:  < from: Nuclear Stress Test-Pharmacologic (07.24.17 @ 12:49) > Consistent with infarction with moderate mckay-infarct  ischemia. * Review of raw data shows: The study is of good technical quality. * The left ventricle was enlarged. There are large, moderate to severe defects in inferior, inferolateral and lateral walls that are fixed with moderate reversible mckay-infarct ischemia, consistent with infarction with moderate mckay-infarct ischemia. * Post-stress gated wall motion analysis was performed (LVEF = 46 %;LVEDV = 152 ml.) *** Compared with Nuclear/Stress test of 5/18/2016, no significant changes noted. Prior LVEF was 30% with  mL    ASSESSMENT AND PLAN    56 y/o M with PMH of CAD(s/p 2 stents, and 5V CABG), Polycystic kidney disease(on transplant list and currently getting HD on Tues/Thurs/Sat), HLD, GERD, Asthma presented with the complaint of left sided chest tightness. Admit to medicine telemetry for unstable angina.     PLAN  Patient with chest pain, resolved at present. CE negative. Will initiate medical therapy for angina.   Admit to Medicine service on telemetry to r/o ACS. House cardiology will follow.   Continuous tele monitoring.   Patient on asa 324 and Plavix 75 mg already. Continue home medications   May increase metoprolol to 50 mg PO BID if BP tolerates  Start Isordil 10 mg PO TID  Nephrology consult for HD  Assess for resolution of chest pain and recurrence in chest pain.   EKG PRN chest pain to assess for ST changes  Labs: Trend CE q 6-8hrs  Echo to evaluate LV function and wall motion abnormality	  Will discuss with Dr Chauhan in am regarding coronary angiogram.         # [id number] Patient has 2 MR number: [id number] for history     Dr. Zeferino Arguello-PCP  Dr. Wily Chauhan-Cardiologist   Mike Vegas-Nephrologist    HISTORY OF PRESENT ILLNESS:  Patient is a 57y old  Male who presents with a chief complaint of chest pain  HPI: 58 y/o M with PMH of CAD(s/p 2 stents, and 5V CABG), Polycystic kidney disease(on transplant list and currently getting HD on Tues/Thurs/Sat), HLD, GERD, Asthma presented with the complaint of left sided chest tightness  As per the patient he had a stress test done in July and since then has been getting intermittent      Allergies  No Known Allergies    MEDICATIONS  Renvela carbonate 800 mg tid  aspirin 325 mg oral delayed release tablet: 1 tab(s) orally once a day  Plavix 75 mg oral tablet: 1 tab(s) orally once a day  Lipitor 10 mg oral tablet: 1 tab(s) orally once a day  metoprolol tartrate 25 mg oral tablet: 1 tab(s) orally once a day  Symbicort prn  Fenofibrate 20mg daily  Omeprazole 20 mg daily    PAST MEDICAL & SURGICAL HISTORY:  HTN (hypertension)  Coronary artery disease involving coronary bypass graft  Asthma  last attack 2007, never hospitalized or intubated  ESRD on Dialysis  Tue, Thur & Sat  Gastroesophageal reflux disease without esophagitis    Hemorrhoids    High cholesterol    HTN - Hypertension    Obesity    PCK (Polycystic Kidney Disease).  AV fistula  right lower extremity 2 yrs  Left upper extrmity with graft 7 years ago  CABG (Coronary Artery Bypass Graft)  2007  S/P hemorrhoidectomy  and rectal polypectomy -2/3/2017.  Stented coronary artery  x2 cardiac stents in 2016, one cardiac stent in 2015.    FAMILY HISTORY:  Sibling  Still living? Yes, Estimated age: Age Unknown  Family history of adult polycystic kidney disease, Age at diagnosis: Age Unknown.    SOCIAL HISTORY  Marital Status:   Occupation: , [place]  Lives with: wife and children    SUBSTANCE USE  Tobacco Usage:  ( x) never smoked   ( ) former smoker  ( ) current smoker; Packs per day:   Alcohol Usage: ( x) none  ( ) occasional ( ) 2-3 times a week ( ) daily; Last drink:   Recreational drugs ( x) None    REVIEW OF SYSTEMS:  CONSTITUTIONAL: No fevers, No chills, No fatigue, No weight gain  EYES: No vision changes   ENT: No congestion, No ear pain, No sore throat.  NECK: No pain, No stiffness  RESPIRATORY: No shortness of breath, No cough, No wheezing, No hemoptysis  CARDIOVASCULAR: + chest pain. No palpitations, + ALEXANDRA, No orthopnea, No paroxysmal nocturnal dyspnea, No pleuritic pain  GASTROINTESTINAL: No abdominal pain, No nausea, No vomiting, No hematemesis, No diarrhea No constipation. No melena  GENITOURINARY: No dysuria, No frequency, No incontinence, No hematuria  NEUROLOGICAL: No dizziness, No lightheadedness, No syncope, No LOC, No headache, No numbness or weakness  MUSCULOSKELETAL: No joint pain, No joint swelling.  PSYCHIATRIC: No anxiety, No depression  SKIN: No diaphoresis. No itching, No rashes, No pressure ulcers    All other review of systems is negative unless indicated above.    VITAL SIGNS  T(C): 36.8 (11-21-17 @ 23:10), Max: 36.8 (11-21-17 @ 23:10)  HR: 64 (11-21-17 @ 23:10) (64 - 66)  BP: 153/69 (11-21-17 @ 23:10) (153/69 - 186/86)  RR: 18 (11-21-17 @ 23:10) (18 - 18)  SpO2: 100% (11-21-17 @ 23:10) (100% - 100%)  Wt(kg): --    PHYSICAL EXAM:  Appearance: NAD, no distress  HEENT:   Normal oral mucosa, PERRL, EOMI  Cardiovascular: Regular rate and rhythm, Normal S1 S2, No JVD, No murmurs, No edema  Respiratory: Lungs clear to auscultation. No rales, No rhonchi, No wheezing. No tenderness to palpation  Gastrointestinal:  Soft, Non-tender, + BS  Neurologic: Non-focal, A&Ox3  Skin: Warm and dry, No rashes, No ecchymoses, No cyanosis  Extremities: No clubbing, cyanosis or edema  Vascular: Peripheral pulses palpable 2+ bilaterally  Psychiatry: Mood & affect appropriate    LABORATORY VALUES	 	                        11.4   6.69  )-----------( 119      ( 21 Nov 2017 21:06 )             35.7     11-21    135  |  97<L>  |  49<H>  ----------------------------<  98  4.5   |  22  |  8.87<H>    Ca    9.1      21 Nov 2017 21:06    TPro  7.5  /  Alb  4.3  /  TBili  0.3  /  DBili  x   /  AST  17  /  ALT  13  /  AlkPhos  21<L>  11-21    LIVER FUNCTIONS - ( 21 Nov 2017 21:06 )  Alb: 4.3 g/dL / Pro: 7.5 g/dL / ALK PHOS: 21 u/L / ALT: 13 u/L / AST: 17 u/L / GGT: x         INR: 1.18 (11-21 @ 21:06    CARDIAC MARKERS:  Troponin T, Serum: < 0.06 ng/mL (11-21 @ 21:06)    TELEMETRY: NSR 60s    ECG: NSR. No acute ischemic changes 	  RADIOLOGY: CXR clear  OTHER: 	    PREVIOUS DIAGNOSTIC TESTING:    [ ] Echocardiogram: < from: Transthoracic Echocardiogram (02.08.17 @ 10:01) > Mitral annular calcification, otherwise normal mitral valve. Minimal mitral regurgitation. Normal left ventricular internal dimensions and wall thicknesses. Limited views from the parasternal window suggest grossly preserved overall LV systolic function.  Unable to exclude segmental wall motion abnormalities. The right ventricle is not well visualized; grossly normal right ventricular systolic function.    [ ] Catheterization: < from: Cardiac Cath Lab - Adult (06.01.16 @ 09:07) > VENTRICLES: Analysis of regional contractile function demonstrated diaphragmatic akinesis. Global left ventricular function was mildly depressed. EF estimated was 45 %. CORONARY VESSELS: The coronary circulation is right dominant. LM:   --  Distal left main: There was a discrete 20 % stenosis. LAD:   --  Proximal LAD: There was a 100 % stenosis. There was DESTINEE grade 0 flow through the vessel (no flow). CX:   --  Proximal circumflex: There was a discrete 70 % stenosis. There was DESTINEE grade 3 flow through the vessel (brisk flow). --  OM1: There was a 100 % stenosis at the ostium of the vessel segment. There was DESTINEE grade 0 flow through the vessel (no flow). --  OM2: There was a 0 % stenosis at the site of a prior stent, in the proximal third of the vessel segment. In a second lesion, there was a discrete 80 % stenosis in the middle third of the vessel segment. There was DESTINEE grade 3 flow through the vessel (brisk flow). RCA:   --  Proximal RCA: The distal vessel was supplied by collaterals from the circumflex. There was a 100 % stenosis. There was DESTINEE grade 0 flow through the vessel (no flow). GRAFTS:   --  Graft to the mid LAD: The graft was a LIMA. It was normal. -  Graft to the 1st obtuse marginal: The graft was a saphenous vein graft from the aorta. It was normal. COMPLICATIONS: There were no complications. SUMMARY: 1ST LESION INTERVENTIONS: A successful balloon angioplasty with stent was performed on the 80 % lesion in the 2nd obtuse marginal. Following intervention there was a 1 % residual stenosis. 2ND LESION INTERVENTIONS: A successful stent was performed on the 70 % lesion in the proximal circumflex. Following intervention there was a 1 % residual stenosis. DIAGNOSTIC RECOMMENDATIONS: PCI of proximal Cx and mid OM2 for treatment of CAD. INTERVENTIONAL RECOMMENDATIONS: Continue clopidogrel (Plavix), 75 mg, PO, daily. Continue aspirin, 325 mg, PO, daily. Patient management should include aggressive medical therapy.    [ ] Stress Test:  < from: Nuclear Stress Test-Pharmacologic (07.24.17 @ 12:49) > Consistent with infarction with moderate mckay-infarct  ischemia. * Review of raw data shows: The study is of good technical quality. * The left ventricle was enlarged. There are large, moderate to severe defects in inferior, inferolateral and lateral walls that are fixed with moderate reversible mckay-infarct ischemia, consistent with infarction with moderate mckay-infarct ischemia. * Post-stress gated wall motion analysis was performed (LVEF = 46 %;LVEDV = 152 ml.) *** Compared with Nuclear/Stress test of 5/18/2016, no significant changes noted. Prior LVEF was 30% with  mL    ASSESSMENT AND PLAN    58 y/o M with PMH of CAD(s/p 2 stents, and 5V CABG), Polycystic kidney disease(on transplant list and currently getting HD on Tues/Thurs/Sat), HLD, GERD, Asthma presented with the complaint of left sided chest tightness. Admit to medicine telemetry for unstable angina.     PLAN  Patient with chest pain, resolved at present. CE negative. Will initiate medical therapy for angina.   Admit to Medicine service on telemetry to r/o ACS. House cardiology will follow.   Continuous tele monitoring.   Patient on asa 324 and Plavix 75 mg already. Continue home medications   May increase metoprolol to 50 mg PO BID if BP tolerates  Start Isordil 10 mg PO TID  Nephrology consult for HD  Assess for resolution of chest pain and recurrence in chest pain.   EKG PRN chest pain to assess for ST changes  Labs: Trend CE q 6-8hrs  Echo to evaluate LV function and wall motion abnormality	  Will discuss with Dr Chauhan in am regarding coronary angiogram.         # [id number] Patient has 2 MR number: [id number] for history     Dr. Zeferino Arguello-PCP  Dr. Wily Chauhan-Cardiologist   Mike Vegas-Nephrologist    HISTORY OF PRESENT ILLNESS:  Patient is a 57y old  Male who presents with a chief complaint of chest pain  HPI: 56 y/o M with PMH of CAD(s/p 3 stents, and 5V CABG), Polycystic kidney disease(on transplant list and currently getting HD on Tues/Thurs/Sat) via Rt AV fistula, HLD, GERD, Asthma presented with the complaint of left sided chest tightness. Patient stated discomfort has been going on for 2 weeks with worsening in past 3-4 days. Patient describes pain to be squeezing in nature on Lt chest that is worse with exertion such as walking, climbing stairs and bending. Patient also experience ALEXANDRA. Pain is almost constant. Walked 1.5 miles today and pain worse after walking. Patient on HD for last 7 years and last HD yesterday.  As per the patient he had a stress test done in July and since then has been getting intermittent chest pressure. Patient received nitro SL in ER with mild effect.  Cardiology consulted for chest pain.     Denies fever, chills, SOB, palpitation, orthopnea, PND, dizziness, lightheadedness, weakness, nausea, vomiting, diarrhea, constipation, abdominal pain, bladder and bowel problems, leg swelling, sick contact, recent travel.    Allergies  No Known Allergies    MEDICATIONS  Renvela carbonate 800 mg tid  aspirin 325 mg oral delayed release tablet: 1 tab(s) orally once a day  Plavix 75 mg oral tablet: 1 tab(s) orally once a day  Lipitor 10 mg oral tablet: 1 tab(s) orally once a day  metoprolol tartrate 25 mg oral tablet: 1 tab(s) orally once a day  Symbicort prn  Fenofibrate 54 mg daily  Omeprazole 20 mg daily    PAST MEDICAL & SURGICAL HISTORY:  HTN (hypertension)  Coronary artery disease involving coronary bypass graft  Asthma  last attack 2007, never hospitalized or intubated  ESRD on Dialysis  Tue, Thur & Sat  Gastroesophageal reflux disease without esophagitis    Hemorrhoids    High cholesterol    HTN - Hypertension    Obesity    PCK (Polycystic Kidney Disease).  AV fistula  right lower extremity 2 yrs  Left upper extrmity with graft 7 years ago  CABG (Coronary Artery Bypass Graft)  2007 in [place]  S/P hemorrhoidectomy  and rectal polypectomy -2/3/2017.  Stented coronary artery  x2 cardiac stents in 2016, one cardiac stent in 2015.    FAMILY HISTORY:  Sibling  Still living? Yes, Estimated age: Age Unknown  Family history of adult polycystic kidney disease, Age at diagnosis: Age Unknown.    SOCIAL HISTORY  Marital Status:   Occupation: , [employer]  Lives with: wife and children    SUBSTANCE USE  Tobacco Usage:  ( x) never smoked   ( ) former smoker  ( ) current smoker; Packs per day:   Alcohol Usage: ( x) none  ( ) occasional ( ) 2-3 times a week ( ) daily; Last drink:   Recreational drugs ( x) None    REVIEW OF SYSTEMS:  CONSTITUTIONAL: No fevers, No chills, No fatigue, No weight gain  EYES: No vision changes   ENT: No congestion, No ear pain, No sore throat.  NECK: No pain, No stiffness  RESPIRATORY: No shortness of breath, No cough, No wheezing, No hemoptysis  CARDIOVASCULAR: + chest pain. No palpitations, + ALEXANDRA, No orthopnea, No paroxysmal nocturnal dyspnea, No pleuritic pain  GASTROINTESTINAL: No abdominal pain, No nausea, No vomiting, No hematemesis, No diarrhea No constipation. No melena  GENITOURINARY: No dysuria, No frequency, No incontinence, No hematuria  NEUROLOGICAL: No dizziness, No lightheadedness, No syncope, No LOC, No headache, No numbness or weakness  MUSCULOSKELETAL: No joint pain, No joint swelling.  PSYCHIATRIC: No anxiety, No depression  SKIN: No diaphoresis. No itching, No rashes, No pressure ulcers    All other review of systems is negative unless indicated above.    VITAL SIGNS  T(C): 36.8 (11-21-17 @ 23:10), Max: 36.8 (11-21-17 @ 23:10)  HR: 64 (11-21-17 @ 23:10) (64 - 66)  BP: 153/69 (11-21-17 @ 23:10) (153/69 - 186/86)  RR: 18 (11-21-17 @ 23:10) (18 - 18)  SpO2: 100% (11-21-17 @ 23:10) (100% - 100%)  Wt(kg): --    PHYSICAL EXAM:  Appearance: NAD, no distress  HEENT:   Normal oral mucosa, PERRL, EOMI  Cardiovascular: Regular rate and rhythm, Normal S1 S2, No JVD, No murmurs, No edema  Respiratory: Lungs clear to auscultation. No rales, No rhonchi, No wheezing. No tenderness to palpation  Gastrointestinal:  Soft, Non-tender, + BS  Neurologic: Non-focal, A&Ox3  Skin: Warm and dry, No rashes, No ecchymoses, No cyanosis  Extremities: No clubbing, cyanosis or edema  Vascular: Peripheral pulses palpable 2+ bilaterally  Psychiatry: Mood & affect appropriate    LABORATORY VALUES	 	                        11.4   6.69  )-----------( 119      ( 21 Nov 2017 21:06 )             35.7     11-21    135  |  97<L>  |  49<H>  ----------------------------<  98  4.5   |  22  |  8.87<H>    Ca    9.1      21 Nov 2017 21:06    TPro  7.5  /  Alb  4.3  /  TBili  0.3  /  DBili  x   /  AST  17  /  ALT  13  /  AlkPhos  21<L>  11-21    LIVER FUNCTIONS - ( 21 Nov 2017 21:06 )  Alb: 4.3 g/dL / Pro: 7.5 g/dL / ALK PHOS: 21 u/L / ALT: 13 u/L / AST: 17 u/L / GGT: x         INR: 1.18 (11-21 @ 21:06    CARDIAC MARKERS:  Troponin T, Serum: < 0.06 ng/mL (11-21 @ 21:06)    TELEMETRY: NSR 60s    ECG: NSR. RBBB No acute ischemic changes 	  RADIOLOGY: CXR clear  OTHER: 	    PREVIOUS DIAGNOSTIC TESTING:    [ ] Echocardiogram: < from: Transthoracic Echocardiogram (02.08.17 @ 10:01) > Mitral annular calcification, otherwise normal mitral valve. Minimal mitral regurgitation. Normal left ventricular internal dimensions and wall thicknesses. Limited views from the parasternal window suggest grossly preserved overall LV systolic function.  Unable to exclude segmental wall motion abnormalities. The right ventricle is not well visualized; grossly normal right ventricular systolic function.    [ ] Catheterization: < from: Cardiac Cath Lab - Adult (06.01.16 @ 09:07) > VENTRICLES: Analysis of regional contractile function demonstrated diaphragmatic akinesis. Global left ventricular function was mildly depressed. EF estimated was 45 %. CORONARY VESSELS: The coronary circulation is right dominant. LM:   --  Distal left main: There was a discrete 20 % stenosis. LAD:   --  Proximal LAD: There was a 100 % stenosis. There was DESTINEE grade 0 flow through the vessel (no flow). CX:   --  Proximal circumflex: There was a discrete 70 % stenosis. There was DESTINEE grade 3 flow through the vessel (brisk flow). --  OM1: There was a 100 % stenosis at the ostium of the vessel segment. There was DESTINEE grade 0 flow through the vessel (no flow). --  OM2: There was a 0 % stenosis at the site of a prior stent, in the proximal third of the vessel segment. In a second lesion, there was a discrete 80 % stenosis in the middle third of the vessel segment. There was DESTINEE grade 3 flow through the vessel (brisk flow). RCA:   --  Proximal RCA: The distal vessel was supplied by collaterals from the circumflex. There was a 100 % stenosis. There was DESTINEE grade 0 flow through the vessel (no flow). GRAFTS:   --  Graft to the mid LAD: The graft was a LIMA. It was normal. -  Graft to the 1st obtuse marginal: The graft was a saphenous vein graft from the aorta. It was normal. COMPLICATIONS: There were no complications. SUMMARY: 1ST LESION INTERVENTIONS: A successful balloon angioplasty with stent was performed on the 80 % lesion in the 2nd obtuse marginal. Following intervention there was a 1 % residual stenosis. 2ND LESION INTERVENTIONS: A successful stent was performed on the 70 % lesion in the proximal circumflex. Following intervention there was a 1 % residual stenosis. DIAGNOSTIC RECOMMENDATIONS: PCI of proximal Cx and mid OM2 for treatment of CAD. INTERVENTIONAL RECOMMENDATIONS: Continue clopidogrel (Plavix), 75 mg, PO, daily. Continue aspirin, 325 mg, PO, daily. Patient management should include aggressive medical therapy.    [ ] Stress Test:  < from: Nuclear Stress Test-Pharmacologic (07.24.17 @ 12:49) > Consistent with infarction with moderate mckay-infarct  ischemia. * Review of raw data shows: The study is of good technical quality. * The left ventricle was enlarged. There are large, moderate to severe defects in inferior, inferolateral and lateral walls that are fixed with moderate reversible mckay-infarct ischemia, consistent with infarction with moderate mckay-infarct ischemia. * Post-stress gated wall motion analysis was performed (LVEF = 46 %;LVEDV = 152 ml.) *** Compared with Nuclear/Stress test of 5/18/2016, no significant changes noted. Prior LVEF was 30% with  mL    ASSESSMENT AND PLAN    56 y/o M with PMH of CAD(s/p 2 stents, and 5V CABG), Polycystic kidney disease(on transplant list and currently getting HD on Tues/Thurs/Sat), HLD, GERD, Asthma presented with the complaint of left sided chest tightness. Admit to medicine telemetry for unstable angina.     PLAN  Patient with chest pain, resolved at present. CE negative. Will initiate medical therapy for angina.   Admit to Medicine service on telemetry to r/o ACS. House cardiology will follow.   Continuous tele monitoring.   Patient on asa 324 and Plavix 75 mg already. Continue home medications   May increase metoprolol to 50 mg PO BID if BP tolerates  Start Isordil 10 mg PO TID  Nephrology consult for HD  Assess for resolution of chest pain and recurrence in chest pain.   EKG PRN chest pain to assess for ST changes  Labs: Trend CE q 6-8hrs  Echo to evaluate LV function and wall motion abnormality	  Will discuss with Dr Chauhan in am regarding coronary angiogram.         # [id number]

## 2017-11-21 NOTE — ED PROVIDER NOTE - ATTENDING CONTRIBUTION TO CARE
I performed a face-to-face evaluation of the patient and performed a history and physical examination. I agree with the history and physical examination.    Henry: Angina, known CAD. Took  mg today. No e/o cardiogenic shock or CHF. Check EKG, trop. Admit.

## 2017-11-21 NOTE — ED ADULT TRIAGE NOTE - CHIEF COMPLAINT QUOTE
Pt c/o L sides  chest pain x 2 weeks and sob on exertion after nuclear stress test.  Denies dizziness, cough.  Pt took asp 162mg at home

## 2017-11-21 NOTE — ED ADULT NURSE NOTE - OBJECTIVE STATEMENT
Received pt in spot 3. AA0X3. C/o chest pain x 2 weeks worsening today. +ALEXANDRA, denies dizziness. NSR on cardiac monitor. Pt is on dialysis (T,Th,Sat), but states this week he is going M,W,Fri 2/2 Holiday. Pt has B/L fistulas in Arms. Vs as noted. Unable to obtain IV access 2/2 fistulas, Dr. Figueroa made aware. Rpt given to MELANY Philip. Will monitor.

## 2017-11-21 NOTE — ED PROVIDER NOTE - MEDICAL DECISION MAKING DETAILS
Henry: Angina. Admit to Cards. Henry: Angina. Admit to Cards.  Gollogly: Presentation not consistent with PE, PNA, or dissection. Plan: ekg, cxr, labs, nitroglycerin, admit. Pt already took ASA at home.

## 2017-11-21 NOTE — ED PROVIDER NOTE - OBJECTIVE STATEMENT
Henry: Stress test by Cards Jeffrey Chauhan, told has a blockage. Can't climb stairs b/c squeezing in chest. Henry: Stress test by Cards Jeffrey Chauhan, told has a blockage. Can't climb stairs b/c squeezing in chest.  Pt currently with retrosternal nonradiating CP that occurs with exertion, associated with ALEXANDRA. Currently mild chest pressure. No SOB at rest, no N/V or diaphoresis. No leg pain/swelling or hx DvT/PE. No fever/cough.

## 2017-11-21 NOTE — ED PROVIDER NOTE - PMH
I have reviewed discharge instructions with the patient. The patient verbalized understanding. Discharge medications reviewed with patient and appropriate educational materials and side effects teaching were provided. Pt ambulated to waiting room w/o incident.
Coronary artery disease involving coronary bypass graft    ESRD (end stage renal disease) on dialysis    HTN (hypertension)

## 2017-11-22 ENCOUNTER — TRANSCRIPTION ENCOUNTER (OUTPATIENT)
Age: 57
End: 2017-11-22

## 2017-11-22 DIAGNOSIS — Z29.9 ENCOUNTER FOR PROPHYLACTIC MEASURES, UNSPECIFIED: ICD-10-CM

## 2017-11-22 DIAGNOSIS — I25.810 ATHEROSCLEROSIS OF CORONARY ARTERY BYPASS GRAFT(S) WITHOUT ANGINA PECTORIS: ICD-10-CM

## 2017-11-22 DIAGNOSIS — I20.0 UNSTABLE ANGINA: ICD-10-CM

## 2017-11-22 DIAGNOSIS — Z95.1 PRESENCE OF AORTOCORONARY BYPASS GRAFT: Chronic | ICD-10-CM

## 2017-11-22 DIAGNOSIS — I10 ESSENTIAL (PRIMARY) HYPERTENSION: ICD-10-CM

## 2017-11-22 DIAGNOSIS — Z95.5 PRESENCE OF CORONARY ANGIOPLASTY IMPLANT AND GRAFT: Chronic | ICD-10-CM

## 2017-11-22 DIAGNOSIS — N18.6 END STAGE RENAL DISEASE: ICD-10-CM

## 2017-11-22 DIAGNOSIS — I77.0 ARTERIOVENOUS FISTULA, ACQUIRED: Chronic | ICD-10-CM

## 2017-11-22 LAB
BASOPHILS # BLD AUTO: 0.04 K/UL — SIGNIFICANT CHANGE UP (ref 0–0.2)
BASOPHILS NFR BLD AUTO: 0.7 % — SIGNIFICANT CHANGE UP (ref 0–2)
BUN SERPL-MCNC: 54 MG/DL — HIGH (ref 7–23)
CALCIUM SERPL-MCNC: 8.5 MG/DL — SIGNIFICANT CHANGE UP (ref 8.4–10.5)
CHLORIDE SERPL-SCNC: 97 MMOL/L — LOW (ref 98–107)
CHOLEST SERPL-MCNC: 85 MG/DL — LOW (ref 120–199)
CK MB BLD-MCNC: 3.37 NG/ML — SIGNIFICANT CHANGE UP (ref 1–6.6)
CK MB BLD-MCNC: 3.76 NG/ML — SIGNIFICANT CHANGE UP (ref 1–6.6)
CK MB BLD-MCNC: SIGNIFICANT CHANGE UP (ref 0–2.5)
CK MB BLD-MCNC: SIGNIFICANT CHANGE UP (ref 0–2.5)
CK SERPL-CCNC: 77 U/L — SIGNIFICANT CHANGE UP (ref 30–200)
CK SERPL-CCNC: 88 U/L — SIGNIFICANT CHANGE UP (ref 30–200)
CO2 SERPL-SCNC: 21 MMOL/L — LOW (ref 22–31)
CREAT SERPL-MCNC: 9.84 MG/DL — HIGH (ref 0.5–1.3)
EOSINOPHIL # BLD AUTO: 0.21 K/UL — SIGNIFICANT CHANGE UP (ref 0–0.5)
EOSINOPHIL NFR BLD AUTO: 3.5 % — SIGNIFICANT CHANGE UP (ref 0–6)
GLUCOSE SERPL-MCNC: 104 MG/DL — HIGH (ref 70–99)
HBA1C BLD-MCNC: 5.6 % — SIGNIFICANT CHANGE UP (ref 4–5.6)
HBV SURFACE AG SER-ACNC: NEGATIVE — SIGNIFICANT CHANGE UP
HCT VFR BLD CALC: 32.5 % — LOW (ref 39–50)
HDLC SERPL-MCNC: 28 MG/DL — LOW (ref 35–55)
HGB BLD-MCNC: 10.5 G/DL — LOW (ref 13–17)
IMM GRANULOCYTES # BLD AUTO: 0.02 # — SIGNIFICANT CHANGE UP
IMM GRANULOCYTES NFR BLD AUTO: 0.3 % — SIGNIFICANT CHANGE UP (ref 0–1.5)
LIPID PNL WITH DIRECT LDL SERPL: 38 MG/DL — SIGNIFICANT CHANGE UP
LYMPHOCYTES # BLD AUTO: 1.51 K/UL — SIGNIFICANT CHANGE UP (ref 1–3.3)
LYMPHOCYTES # BLD AUTO: 25.2 % — SIGNIFICANT CHANGE UP (ref 13–44)
MAGNESIUM SERPL-MCNC: 2 MG/DL — SIGNIFICANT CHANGE UP (ref 1.6–2.6)
MCHC RBC-ENTMCNC: 30.3 PG — SIGNIFICANT CHANGE UP (ref 27–34)
MCHC RBC-ENTMCNC: 32.3 % — SIGNIFICANT CHANGE UP (ref 32–36)
MCV RBC AUTO: 93.9 FL — SIGNIFICANT CHANGE UP (ref 80–100)
MONOCYTES # BLD AUTO: 0.48 K/UL — SIGNIFICANT CHANGE UP (ref 0–0.9)
MONOCYTES NFR BLD AUTO: 8 % — SIGNIFICANT CHANGE UP (ref 2–14)
NEUTROPHILS # BLD AUTO: 3.73 K/UL — SIGNIFICANT CHANGE UP (ref 1.8–7.4)
NEUTROPHILS NFR BLD AUTO: 62.3 % — SIGNIFICANT CHANGE UP (ref 43–77)
NRBC # FLD: 0 — SIGNIFICANT CHANGE UP
PHOSPHATE SERPL-MCNC: 5.2 MG/DL — HIGH (ref 2.5–4.5)
PLATELET # BLD AUTO: 110 K/UL — LOW (ref 150–400)
PMV BLD: 11.4 FL — SIGNIFICANT CHANGE UP (ref 7–13)
POTASSIUM SERPL-MCNC: 4.4 MMOL/L — SIGNIFICANT CHANGE UP (ref 3.5–5.3)
POTASSIUM SERPL-SCNC: 4.4 MMOL/L — SIGNIFICANT CHANGE UP (ref 3.5–5.3)
RBC # BLD: 3.46 M/UL — LOW (ref 4.2–5.8)
RBC # FLD: 13.5 % — SIGNIFICANT CHANGE UP (ref 10.3–14.5)
SODIUM SERPL-SCNC: 135 MMOL/L — SIGNIFICANT CHANGE UP (ref 135–145)
TRIGL SERPL-MCNC: 126 MG/DL — SIGNIFICANT CHANGE UP (ref 10–149)
TROPONIN T SERPL-MCNC: < 0.06 NG/ML — SIGNIFICANT CHANGE UP (ref 0–0.06)
TSH SERPL-MCNC: 1.35 UIU/ML — SIGNIFICANT CHANGE UP (ref 0.27–4.2)
WBC # BLD: 5.99 K/UL — SIGNIFICANT CHANGE UP (ref 3.8–10.5)
WBC # FLD AUTO: 5.99 K/UL — SIGNIFICANT CHANGE UP (ref 3.8–10.5)

## 2017-11-22 PROCEDURE — 99222 1ST HOSP IP/OBS MODERATE 55: CPT

## 2017-11-22 PROCEDURE — 99223 1ST HOSP IP/OBS HIGH 75: CPT | Mod: GC

## 2017-11-22 PROCEDURE — 93459 L HRT ART/GRFT ANGIO: CPT | Mod: 26,GC,59

## 2017-11-22 PROCEDURE — 93010 ELECTROCARDIOGRAM REPORT: CPT | Mod: 59

## 2017-11-22 RX ORDER — SEVELAMER CARBONATE 2400 MG/1
800 POWDER, FOR SUSPENSION ORAL
Qty: 0 | Refills: 0 | Status: DISCONTINUED | OUTPATIENT
Start: 2017-11-22 | End: 2017-11-27

## 2017-11-22 RX ORDER — ALBUTEROL 90 UG/1
1 AEROSOL, METERED ORAL
Qty: 0 | Refills: 0 | Status: DISCONTINUED | OUTPATIENT
Start: 2017-11-22 | End: 2017-11-27

## 2017-11-22 RX ORDER — ISOSORBIDE DINITRATE 5 MG/1
10 TABLET ORAL THREE TIMES A DAY
Qty: 0 | Refills: 0 | Status: DISCONTINUED | OUTPATIENT
Start: 2017-11-22 | End: 2017-11-27

## 2017-11-22 RX ORDER — FENOFIBRATE,MICRONIZED 130 MG
48 CAPSULE ORAL DAILY
Qty: 0 | Refills: 0 | Status: DISCONTINUED | OUTPATIENT
Start: 2017-11-22 | End: 2017-11-27

## 2017-11-22 RX ORDER — CLOPIDOGREL BISULFATE 75 MG/1
75 TABLET, FILM COATED ORAL DAILY
Qty: 0 | Refills: 0 | Status: DISCONTINUED | OUTPATIENT
Start: 2017-11-22 | End: 2017-11-27

## 2017-11-22 RX ORDER — SODIUM CHLORIDE 9 MG/ML
3 INJECTION INTRAMUSCULAR; INTRAVENOUS; SUBCUTANEOUS EVERY 8 HOURS
Qty: 0 | Refills: 0 | Status: DISCONTINUED | OUTPATIENT
Start: 2017-11-22 | End: 2017-11-27

## 2017-11-22 RX ORDER — HEPARIN SODIUM 5000 [USP'U]/ML
5000 INJECTION INTRAVENOUS; SUBCUTANEOUS EVERY 8 HOURS
Qty: 0 | Refills: 0 | Status: DISCONTINUED | OUTPATIENT
Start: 2017-11-22 | End: 2017-11-27

## 2017-11-22 RX ORDER — ATORVASTATIN CALCIUM 80 MG/1
10 TABLET, FILM COATED ORAL AT BEDTIME
Qty: 0 | Refills: 0 | Status: DISCONTINUED | OUTPATIENT
Start: 2017-11-22 | End: 2017-11-23

## 2017-11-22 RX ORDER — ASPIRIN/CALCIUM CARB/MAGNESIUM 324 MG
325 TABLET ORAL DAILY
Qty: 0 | Refills: 0 | Status: DISCONTINUED | OUTPATIENT
Start: 2017-11-22 | End: 2017-11-27

## 2017-11-22 RX ORDER — PANTOPRAZOLE SODIUM 20 MG/1
40 TABLET, DELAYED RELEASE ORAL
Qty: 0 | Refills: 0 | Status: DISCONTINUED | OUTPATIENT
Start: 2017-11-22 | End: 2017-11-27

## 2017-11-22 RX ORDER — METOPROLOL TARTRATE 50 MG
50 TABLET ORAL
Qty: 0 | Refills: 0 | Status: DISCONTINUED | OUTPATIENT
Start: 2017-11-22 | End: 2017-11-25

## 2017-11-22 RX ADMIN — SEVELAMER CARBONATE 800 MILLIGRAM(S): 2400 POWDER, FOR SUSPENSION ORAL at 18:04

## 2017-11-22 RX ADMIN — PANTOPRAZOLE SODIUM 40 MILLIGRAM(S): 20 TABLET, DELAYED RELEASE ORAL at 06:11

## 2017-11-22 RX ADMIN — Medication 50 MILLIGRAM(S): at 06:11

## 2017-11-22 RX ADMIN — HEPARIN SODIUM 5000 UNIT(S): 5000 INJECTION INTRAVENOUS; SUBCUTANEOUS at 06:11

## 2017-11-22 RX ADMIN — SODIUM CHLORIDE 3 MILLILITER(S): 9 INJECTION INTRAMUSCULAR; INTRAVENOUS; SUBCUTANEOUS at 05:43

## 2017-11-22 RX ADMIN — Medication 50 MILLIGRAM(S): at 18:05

## 2017-11-22 RX ADMIN — ISOSORBIDE DINITRATE 10 MILLIGRAM(S): 5 TABLET ORAL at 03:10

## 2017-11-22 RX ADMIN — Medication 325 MILLIGRAM(S): at 11:34

## 2017-11-22 RX ADMIN — CLOPIDOGREL BISULFATE 75 MILLIGRAM(S): 75 TABLET, FILM COATED ORAL at 11:39

## 2017-11-22 RX ADMIN — ISOSORBIDE DINITRATE 10 MILLIGRAM(S): 5 TABLET ORAL at 15:32

## 2017-11-22 NOTE — H&P ADULT - PMH
Coronary artery disease involving coronary bypass graft    ESRD (end stage renal disease) on dialysis    HTN (hypertension)

## 2017-11-22 NOTE — H&P ADULT - HISTORY OF PRESENT ILLNESS
58 y/o M with PMH of CAD(s/p 3 stents, and 5V CABG), Polycystic kidney disease(on transplant list and currently getting HD on Tues/Thurs/Sat) via Rt AV fistula, HLD, GERD, Asthma presented with the complaint of left sided chest tightness x 1 week. He has been having intermittent chest tightness for few months, which became more worse 58 y/o M with PMH of CAD(s/p 3 stents, and 5V CABG), Polycystic kidney disease(on transplant list and currently getting HD on Tues/Thurs/Sat) via Rt AV fistula, HLD, GERD, Asthma presented with the complaint of left sided chest tightness x 1 week. He has been having intermittent chest tightness for few months, which became more worse over the last 1 week. Chest pain is described as squeezing, and constant. He cannot recall what makes the pain better. Exertion such as walking and climbing stairs makes it worse. Also had some  underlying ALEXANDRA. Denies fever, chills, cough, falls, LOC, abdominal pain, melena, hematochezia, LE edema, calf tenderness, dysuria, diarrhea, or constipation.     +On admission: patient complains of mild chest discomfort. He states this is the same discomfort he came to the hospital with and chest discomfort still has not resolved. Nitro SL resolves it slightly. Patient refuses repeat EKG. Will send repeat cardiac enzymes and isosordil given.

## 2017-11-22 NOTE — H&P ADULT - NSHPLABSRESULTS_GEN_ALL_CORE
EKG: NSR 69 BPM, RBB, TWI in V6  CARDIAC MARKERS ( 21 Nov 2017 21:06 )  x     / < 0.06 ng/mL / 88 u/L / 3.76 ng/mL / x                              11.4   6.69  )-----------( 119      ( 21 Nov 2017 21:06 )             35.7   11-21    135  |  97<L>  |  49<H>  ----------------------------<  98  4.5   |  22  |  8.87<H>    Ca    9.1      21 Nov 2017 21:06    TPro  7.5  /  Alb  4.3  /  TBili  0.3  /  DBili  x   /  AST  17  /  ALT  13  /  AlkPhos  21<L>  11-21 EKG: NSR 69 BPM, RBB, TWI in V6  CARDIAC MARKERS ( 21 Nov 2017 21:06 )  x     / < 0.06 ng/mL / 88 u/L / 3.76 ng/mL / x                              11.4   6.69  )-----------( 119      ( 21 Nov 2017 21:06 )             35.7   11-21    135  |  97<L>  |  49<H>  ----------------------------<  98  4.5   |  22  |  8.87<H>    Ca    9.1      21 Nov 2017 21:06    TPro  7.5  /  Alb  4.3  /  TBili  0.3  /  DBili  x   /  AST  17  /  ALT  13  /  AlkPhos  21<L>  11-21    [ ] Catheterization: < from: Cardiac Cath Lab - Adult (06.01.16 @ 09:07) > VENTRICLES: Analysis of regional contractile function demonstrated diaphragmatic akinesis. Global left ventricular function was mildly depressed. EF estimated was 45 %. CORONARY VESSELS: The coronary circulation is right dominant. LM:   --  Distal left main: There was a discrete 20 % stenosis. LAD:   --  Proximal LAD: There was a 100 % stenosis. There was DESTINEE grade 0 flow through the vessel (no flow). CX:   --  Proximal circumflex: There was a discrete 70 % stenosis. There was DESTINEE grade 3 flow through the vessel (brisk flow). --  OM1: There was a 100 % stenosis at the ostium of the vessel segment. There was DESTINEE grade 0 flow through the vessel (no flow). --  OM2: There was a 0 % stenosis at the site of a prior stent, in the proximal third of the vessel segment. In a second lesion, there was a discrete 80 % stenosis in the middle third of the vessel segment. There was DESTINEE grade 3 flow through the vessel (brisk flow). RCA:   --  Proximal RCA: The distal vessel was supplied by collaterals from the circumflex. There was a 100 % stenosis. There was DESTINEE grade 0 flow through the vessel (no flow). GRAFTS:   --  Graft to the mid LAD: The graft was a LIMA. It was normal. -  Graft to the 1st obtuse marginal: The graft was a saphenous vein graft from the aorta. It was normal. COMPLICATIONS: There were no complications. SUMMARY: 1ST LESION INTERVENTIONS: A successful balloon angioplasty with stent was performed on the 80 % lesion in the 2nd obtuse marginal. Following intervention there was a 1 % residual stenosis. 2ND LESION INTERVENTIONS: A successful stent was performed on the 70 % lesion in the proximal circumflex. Following intervention there was a 1 % residual stenosis. DIAGNOSTIC RECOMMENDATIONS: PCI of proximal Cx and mid OM2 for treatment of CAD. INTERVENTIONAL RECOMMENDATIONS: Continue clopidogrel (Plavix), 75 mg, PO, daily. Continue aspirin, 325 mg, PO, daily. Patient management should include aggressive medical therapy.    [ ] Stress Test:  < from: Nuclear Stress Test-Pharmacologic (07.24.17 @ 12:49) > Consistent with infarction with moderate mckay-infarct  ischemia. * Review of raw data shows: The study is of good technical quality. * The left ventricle was enlarged. There are large, moderate to severe defects in inferior, inferolateral and lateral walls that are fixed with moderate reversible mckay-infarct ischemia, consistent with infarction with moderate mckay-infarct ischemia. * Post-stress gated wall motion analysis was performed (LVEF = 46 %;LVEDV = 152 ml.) *** Compared with Nuclear/Stress test of 5/18/2016, no significant changes noted. Prior LVEF was 30% with  mL

## 2017-11-22 NOTE — CHART NOTE - NSCHARTNOTEFT_GEN_A_CORE
Patient admitted with chest pain, post cardiac cath today with no significant CAD;   Patient ESRD on HD;  Will dialyze today then continue with his schedule on Friday - discussed with patient and dialysis staff  Full consult to follow

## 2017-11-22 NOTE — PATIENT PROFILE ADULT. - NS PRO OT REFERRAL QUES 1 YN
FYI:  Ricky called stating that he is going to be unable to come to his appointment on 8/1/17 due to insurance issues; but he is able to start next week and is wondering if the appointment for 8/7/17 is supposed to be on Monday 8/8/17.  Please call Ricky back at [phone] to discuss.   no

## 2017-11-22 NOTE — CHART NOTE - NSCHARTNOTEFT_GEN_A_CORE
S/p cardiac Cath, left femoral site stable, dressing clean dry, intact with small amount blood marked by nurse. No swelling or hematoma palpated. PT and DP pulses 2+. Pt denies pain at site and numbness, tingling in LE. Will continue to monitor.

## 2017-11-22 NOTE — CONSULT NOTE ADULT - SUBJECTIVE AND OBJECTIVE BOX
REASON FOR ADMISSION: Patient is a 57y old  Male who presents with a chief complaint of Chest pain (22 Nov 2017 02:55)      HISTORY OF PRESENT ILLNESS: HPI:  56 y/o M with PMH of CAD(s/p 3 stents, and 5V CABG), ESRD due to Polycystic kidney disease(on transplant list) now on HD oon a TTS schedule R AVF, HLD, GERD, Asthma who presented with the complaint of left sided chest tightness x 1 week post cardiac cath done on 11./22/2017with no complications. Nephrology to follow on ESRD management,  Patient seen this afternoon, after cardiac cath. Looks comfortable and not in distress. Denies chest pain or sob, nausea or other symptoms.     REVIEW OF SYSTEMS: 12 review of systems negative except what is stated in HPI    PAST MEDICAL & SURGICAL HISTORY:  HTN (hypertension)  Coronary artery disease involving coronary bypass graft  ESRD (end stage renal disease) on dialysis  AV fistula: b/l MILI GREEN  S/P coronary artery stent placement  S/P CABG x 5      SOCIAL HISTORY: no smoking, drinking or drugs    FAMILY HISTORY: FAMILY HISTORY:  Family history of polycystic kidney (Sibling)      ALLERGIES: Allergies  No Known Allergies  Intolerances        HOME MEDICATIONA: reviewed and as in records    aspirin 325 mg oral tablet: 1 tab(s) orally once a day (22 Nov 2017 02:44)  fenofibrate 54 mg oral tablet: 1 tab(s) orally once a day (22 Nov 2017 02:44)  Lipitor 10 mg oral tablet: 1 tab(s) orally once a day (22 Nov 2017 02:44)  metoprolol tartrate 25 mg oral tablet: 1 tab(s) orally once a day (22 Nov 2017 02:44)  omeprazole 20 mg oral delayed release capsule: 1 cap(s) orally once a day (22 Nov 2017 02:44)  Plavix 75 mg oral tablet: 1 tab(s) orally once a day (22 Nov 2017 02:44)  Proventil HFA 90 mcg/inh inhalation aerosol: 2 puff(s) inhaled 4 times a day, As Needed (22 Nov 2017 02:44)  Renvela 800 mg oral tablet: 1 tab(s) orally 3 times a day (with meals) (22 Nov 2017 02:44)      MEDICATIONS  (STANDING):  aspirin enteric coated 325 milliGRAM(s) Oral daily  atorvastatin 10 milliGRAM(s) Oral at bedtime  clopidogrel Tablet 75 milliGRAM(s) Oral daily  fenofibrate Tablet 48 milliGRAM(s) Oral daily  heparin  Injectable 5000 Unit(s) SubCutaneous every 8 hours  isosorbide   dinitrate Tablet (ISORDIL) 10 milliGRAM(s) Oral three times a day  metoprolol     tartrate 50 milliGRAM(s) Oral two times a day  pantoprazole    Tablet 40 milliGRAM(s) Oral before breakfast  sevelamer hydrochloride 800 milliGRAM(s) Oral three times a day with meals  sodium chloride 0.9% lock flush 3 milliLiter(s) IV Push every 8 hours    MEDICATIONS  (PRN):  ALBUTerol    90 MICROgram(s) HFA Inhaler 1 Puff(s) Inhalation four times a day PRN Shortness of Breath and/or Wheezing      PHYSICAL EXAMINATION  VITAL SIGNS:  Vital Signs Last 24 Hrs  T(C): 36.4 (22 Nov 2017 15:28), Max: 36.8 (21 Nov 2017 23:10)  T(F): 97.6 (22 Nov 2017 15:28), Max: 98.3 (21 Nov 2017 23:10)  HR: 63 (22 Nov 2017 15:28) (63 - 66)  BP: 155/79 (22 Nov 2017 15:28) (122/65 - 186/86)  BP(mean): --  RR: 20 (22 Nov 2017 15:28) (18 - 20)  SpO2: 98% (22 Nov 2017 15:28) (97% - 100%)  I&O's Summary      GA: awake and alert, no distress  EYES: EOMI, clear conj, anicteric sclera  ENT: MMM, clear OP, neck supple  LUNGS: CTABL, no wrc, normal effort  HEART; RRR, no mrg, no peripheral edema  ABD; Soft, NT/ND/BS+, no peritoneal signs  EXT; well perfused, no edema or cyanosis  ; no medina  NEURO: AAO x 3, sensation and motor intact  SKIN: warm and dry, no rash on visible skin    LABORATORY DATA:                        10.5   5.99  )-----------( 110      ( 22 Nov 2017 03:20 )             32.5     11-22    135  |  97<L>  |  54<H>  ----------------------------<  104<H>  4.4   |  21<L>  |  9.84<H>    Ca    8.5      22 Nov 2017 03:20  Phos  5.2     11-22  Mg     2.0     11-22    TPro  7.5  /  Alb  4.3  /  TBili  0.3  /  DBili  x   /  AST  17  /  ALT  13  /  AlkPhos  21<L>  11-21    LIVER FUNCTIONS - ( 21 Nov 2017 21:06 )  Alb: 4.3 g/dL / Pro: 7.5 g/dL / ALK PHOS: 21 u/L / ALT: 13 u/L / AST: 17 u/L / GGT: x                 IMAGING: Reviewed and as per records: pertinent positives:     ASSESSMENT: This is a  56 y/o M with PMH of CAD(s/p 3 stents, and 5V CABG), ESRD due to Polycystic kidney disease(on transplant list) now on HD oon a TTS schedule R AVF, HLD, GERD, Asthma who presented with the complaint of left sided chest tightness x 1 week post cardiac cath done on 11./22/2017with no complications. Nephrology to follow on ESRD management,    1. ESRD on HD TTS   2. Mild metabolic acidosis; bicarbs of 21  3. Anemia likely due to ESRD; H&H acceptable  4. Chest pain post cardiac cath; cardiology on board  5. Hyperphosphatemia    RECOMMENDATIONS:  - will dialyze today then continue with holiday schedule next Tx on Friday  - continue phos binders  - HD should help with acidosis  - No need for epo;   - dose meds per GFR less than 15 ml/min  Rest per primary team    Thank you for allowing me to participate on this patient's care. Please do not hesitate to call with questions.    I will follow with you.    Pierre Fisher MD   [facility]  [phone]

## 2017-11-22 NOTE — H&P ADULT - PROBLEM SELECTOR PLAN 1
Admit to tele  check cbc, bmp, tsh, a1c, trend CE  Cardiology consult appreciated   Cont asa, plavix  Start isosordil 30 TID  NPO p MN for possible cath in AM  f/u MD note Admit to tele  check cbc, bmp, tsh, a1c, trend CE  Cardiology consult appreciated   Cont asa, plavix  Start isosordil 30 TID  NPO p MN for possible cath in AM  f/u MD note  Discussed with Dr. Calixto Admit to tele  check cbc, bmp, tsh, a1c, trend CE  Cardiology consult appreciated   Cont asa, plavix  Start isosordil 30 TID  NPO p MN for possible cath in AM

## 2017-11-22 NOTE — H&P ADULT - ASSESSMENT
56 y/o M with PMH of CAD(s/p 3 stents, and 5V CABG), Polycystic kidney disease(on transplant list and currently getting HD on Tues/Thurs/Sat) via Rt AV fistula, HLD, GERD, Asthma presented with the complaint of left sided chest tightness x 1 week. admitted for unstable angina

## 2017-11-23 LAB
BASOPHILS # BLD AUTO: 0.04 K/UL — SIGNIFICANT CHANGE UP (ref 0–0.2)
BASOPHILS NFR BLD AUTO: 0.7 % — SIGNIFICANT CHANGE UP (ref 0–2)
BUN SERPL-MCNC: 43 MG/DL — HIGH (ref 7–23)
CALCIUM SERPL-MCNC: 8.3 MG/DL — LOW (ref 8.4–10.5)
CHLORIDE SERPL-SCNC: 99 MMOL/L — SIGNIFICANT CHANGE UP (ref 98–107)
CO2 SERPL-SCNC: 22 MMOL/L — SIGNIFICANT CHANGE UP (ref 22–31)
CREAT SERPL-MCNC: 7.51 MG/DL — HIGH (ref 0.5–1.3)
EOSINOPHIL # BLD AUTO: 0.14 K/UL — SIGNIFICANT CHANGE UP (ref 0–0.5)
EOSINOPHIL NFR BLD AUTO: 2.4 % — SIGNIFICANT CHANGE UP (ref 0–6)
GLUCOSE SERPL-MCNC: 78 MG/DL — SIGNIFICANT CHANGE UP (ref 70–99)
HBV CORE AB SER-ACNC: NONREACTIVE — SIGNIFICANT CHANGE UP
HBV SURFACE AB SER-ACNC: 19.4 MLU/ML — SIGNIFICANT CHANGE UP
HCT VFR BLD CALC: 34.7 % — LOW (ref 39–50)
HCV AB S/CO SERPL IA: 0.12 S/CO — SIGNIFICANT CHANGE UP
HCV AB SERPL-IMP: SIGNIFICANT CHANGE UP
HGB BLD-MCNC: 11.1 G/DL — LOW (ref 13–17)
IMM GRANULOCYTES # BLD AUTO: 0.02 # — SIGNIFICANT CHANGE UP
IMM GRANULOCYTES NFR BLD AUTO: 0.3 % — SIGNIFICANT CHANGE UP (ref 0–1.5)
LYMPHOCYTES # BLD AUTO: 1.21 K/UL — SIGNIFICANT CHANGE UP (ref 1–3.3)
LYMPHOCYTES # BLD AUTO: 20.9 % — SIGNIFICANT CHANGE UP (ref 13–44)
MAGNESIUM SERPL-MCNC: 2.1 MG/DL — SIGNIFICANT CHANGE UP (ref 1.6–2.6)
MCHC RBC-ENTMCNC: 30.4 PG — SIGNIFICANT CHANGE UP (ref 27–34)
MCHC RBC-ENTMCNC: 32 % — SIGNIFICANT CHANGE UP (ref 32–36)
MCV RBC AUTO: 95.1 FL — SIGNIFICANT CHANGE UP (ref 80–100)
MONOCYTES # BLD AUTO: 0.4 K/UL — SIGNIFICANT CHANGE UP (ref 0–0.9)
MONOCYTES NFR BLD AUTO: 6.9 % — SIGNIFICANT CHANGE UP (ref 2–14)
NEUTROPHILS # BLD AUTO: 3.99 K/UL — SIGNIFICANT CHANGE UP (ref 1.8–7.4)
NEUTROPHILS NFR BLD AUTO: 68.8 % — SIGNIFICANT CHANGE UP (ref 43–77)
NRBC # FLD: 0 — SIGNIFICANT CHANGE UP
PLATELET # BLD AUTO: 120 K/UL — LOW (ref 150–400)
PMV BLD: 11.6 FL — SIGNIFICANT CHANGE UP (ref 7–13)
POTASSIUM SERPL-MCNC: 5.1 MMOL/L — SIGNIFICANT CHANGE UP (ref 3.5–5.3)
POTASSIUM SERPL-SCNC: 5.1 MMOL/L — SIGNIFICANT CHANGE UP (ref 3.5–5.3)
RBC # BLD: 3.65 M/UL — LOW (ref 4.2–5.8)
RBC # FLD: 14 % — SIGNIFICANT CHANGE UP (ref 10.3–14.5)
SODIUM SERPL-SCNC: 138 MMOL/L — SIGNIFICANT CHANGE UP (ref 135–145)
WBC # BLD: 5.8 K/UL — SIGNIFICANT CHANGE UP (ref 3.8–10.5)
WBC # FLD AUTO: 5.8 K/UL — SIGNIFICANT CHANGE UP (ref 3.8–10.5)

## 2017-11-23 PROCEDURE — 99232 SBSQ HOSP IP/OBS MODERATE 35: CPT

## 2017-11-23 RX ORDER — ATORVASTATIN CALCIUM 80 MG/1
40 TABLET, FILM COATED ORAL AT BEDTIME
Qty: 0 | Refills: 0 | Status: DISCONTINUED | OUTPATIENT
Start: 2017-11-23 | End: 2017-11-27

## 2017-11-23 RX ADMIN — Medication 48 MILLIGRAM(S): at 12:55

## 2017-11-23 RX ADMIN — SODIUM CHLORIDE 3 MILLILITER(S): 9 INJECTION INTRAMUSCULAR; INTRAVENOUS; SUBCUTANEOUS at 05:55

## 2017-11-23 RX ADMIN — ISOSORBIDE DINITRATE 10 MILLIGRAM(S): 5 TABLET ORAL at 05:57

## 2017-11-23 RX ADMIN — HEPARIN SODIUM 5000 UNIT(S): 5000 INJECTION INTRAVENOUS; SUBCUTANEOUS at 22:15

## 2017-11-23 RX ADMIN — ISOSORBIDE DINITRATE 10 MILLIGRAM(S): 5 TABLET ORAL at 22:15

## 2017-11-23 RX ADMIN — ALBUTEROL 1 PUFF(S): 90 AEROSOL, METERED ORAL at 23:22

## 2017-11-23 RX ADMIN — HEPARIN SODIUM 5000 UNIT(S): 5000 INJECTION INTRAVENOUS; SUBCUTANEOUS at 00:04

## 2017-11-23 RX ADMIN — SEVELAMER CARBONATE 800 MILLIGRAM(S): 2400 POWDER, FOR SUSPENSION ORAL at 17:42

## 2017-11-23 RX ADMIN — PANTOPRAZOLE SODIUM 40 MILLIGRAM(S): 20 TABLET, DELAYED RELEASE ORAL at 05:57

## 2017-11-23 RX ADMIN — Medication 50 MILLIGRAM(S): at 17:42

## 2017-11-23 RX ADMIN — HEPARIN SODIUM 5000 UNIT(S): 5000 INJECTION INTRAVENOUS; SUBCUTANEOUS at 05:56

## 2017-11-23 RX ADMIN — ISOSORBIDE DINITRATE 10 MILLIGRAM(S): 5 TABLET ORAL at 14:00

## 2017-11-23 RX ADMIN — ATORVASTATIN CALCIUM 10 MILLIGRAM(S): 80 TABLET, FILM COATED ORAL at 00:04

## 2017-11-23 RX ADMIN — SODIUM CHLORIDE 3 MILLILITER(S): 9 INJECTION INTRAMUSCULAR; INTRAVENOUS; SUBCUTANEOUS at 14:15

## 2017-11-23 RX ADMIN — Medication 325 MILLIGRAM(S): at 12:55

## 2017-11-23 RX ADMIN — HEPARIN SODIUM 5000 UNIT(S): 5000 INJECTION INTRAVENOUS; SUBCUTANEOUS at 14:00

## 2017-11-23 RX ADMIN — SODIUM CHLORIDE 3 MILLILITER(S): 9 INJECTION INTRAMUSCULAR; INTRAVENOUS; SUBCUTANEOUS at 00:06

## 2017-11-23 RX ADMIN — SEVELAMER CARBONATE 800 MILLIGRAM(S): 2400 POWDER, FOR SUSPENSION ORAL at 09:15

## 2017-11-23 RX ADMIN — CLOPIDOGREL BISULFATE 75 MILLIGRAM(S): 75 TABLET, FILM COATED ORAL at 12:55

## 2017-11-23 RX ADMIN — ATORVASTATIN CALCIUM 40 MILLIGRAM(S): 80 TABLET, FILM COATED ORAL at 22:15

## 2017-11-23 RX ADMIN — SEVELAMER CARBONATE 800 MILLIGRAM(S): 2400 POWDER, FOR SUSPENSION ORAL at 12:55

## 2017-11-23 RX ADMIN — SODIUM CHLORIDE 3 MILLILITER(S): 9 INJECTION INTRAMUSCULAR; INTRAVENOUS; SUBCUTANEOUS at 22:18

## 2017-11-23 NOTE — PROGRESS NOTE ADULT - SUBJECTIVE AND OBJECTIVE BOX
Patient is a 57y old  Male who presents with a chief complaint of Chest pain (23 Nov 2017 03:24)      INTERVAL HPI/OVERNIGHT EVENTS:  T(C): 36.5 (11-23-17 @ 05:53), Max: 36.7 (11-22-17 @ 19:41)  HR: 57 (11-23-17 @ 05:53) (57 - 72)  BP: 124/64 (11-23-17 @ 05:53) (124/64 - 160/69)  RR: 18 (11-23-17 @ 05:53) (18 - 20)  SpO2: 97% (11-23-17 @ 05:53) (97% - 100%)  Wt(kg): --  I&O's Summary    22 Nov 2017 07:01  -  23 Nov 2017 07:00  --------------------------------------------------------  IN: 400 mL / OUT: 3000 mL / NET: -2600 mL        LABS:                        11.1   5.80  )-----------( 120      ( 23 Nov 2017 06:30 )             34.7     11-23    138  |  99  |  43<H>  ----------------------------<  78  5.1   |  22  |  7.51<H>    Ca    8.3<L>      23 Nov 2017 06:30  Phos  5.2     11-22  Mg     2.1     11-23    TPro  7.5  /  Alb  4.3  /  TBili  0.3  /  DBili  x   /  AST  17  /  ALT  13  /  AlkPhos  21<L>  11-21    PT/INR - ( 21 Nov 2017 21:06 )   PT: 13.3 SEC;   INR: 1.18              CAPILLARY BLOOD GLUCOSE                MEDICATIONS  (STANDING):  aspirin enteric coated 325 milliGRAM(s) Oral daily  atorvastatin 10 milliGRAM(s) Oral at bedtime  clopidogrel Tablet 75 milliGRAM(s) Oral daily  fenofibrate Tablet 48 milliGRAM(s) Oral daily  heparin  Injectable 5000 Unit(s) SubCutaneous every 8 hours  isosorbide   dinitrate Tablet (ISORDIL) 10 milliGRAM(s) Oral three times a day  metoprolol     tartrate 50 milliGRAM(s) Oral two times a day  pantoprazole    Tablet 40 milliGRAM(s) Oral before breakfast  sevelamer hydrochloride 800 milliGRAM(s) Oral three times a day with meals  sodium chloride 0.9% lock flush 3 milliLiter(s) IV Push every 8 hours    MEDICATIONS  (PRN):  ALBUTerol    90 MICROgram(s) HFA Inhaler 1 Puff(s) Inhalation four times a day PRN Shortness of Breath and/or Wheezing          PHYSICAL EXAM:  GENERAL: NAD, well-groomed, well-developed  HEAD:  Atraumatic, Normocephalic  CHEST/LUNG: Clear to percussion bilaterally; No rales, rhonchi, wheezing, or rubs  HEART: Regular rate and rhythm; No murmurs, rubs, or gallops  ABDOMEN: Soft, Nontender, Nondistended; Bowel sounds present  EXTREMITIES:  2+ Peripheral Pulses, No clubbing, cyanosis, or edema  LYMPH: No lymphadenopathy noted  SKIN: No rashes or lesions    Care Discussed with Consultants/Other Providers [+ ] YES  [ ] NO

## 2017-11-23 NOTE — PROGRESS NOTE ADULT - SUBJECTIVE AND OBJECTIVE BOX
24H hour events: No acute events.    MEDICATIONS:  aspirin enteric coated 325 milliGRAM(s) Oral daily  clopidogrel Tablet 75 milliGRAM(s) Oral daily  heparin  Injectable 5000 Unit(s) SubCutaneous every 8 hours  isosorbide   dinitrate Tablet (ISORDIL) 10 milliGRAM(s) Oral three times a day  metoprolol     tartrate 50 milliGRAM(s) Oral two times a day    ALBUTerol    90 MICROgram(s) HFA Inhaler 1 Puff(s) Inhalation four times a day PRN    pantoprazole    Tablet 40 milliGRAM(s) Oral before breakfast    atorvastatin 10 milliGRAM(s) Oral at bedtime  fenofibrate Tablet 48 milliGRAM(s) Oral daily        REVIEW OF SYSTEMS:  Denies CP, SOB, palpitations, orthopnea, pnd, abd pain, n/v, extremity pain, lh/loc.   Complete 10point ROS negative.    PHYSICAL EXAM:  T(C): 36.5 (11-23-17 @ 05:53), Max: 36.7 (11-22-17 @ 19:41)  HR: 57 (11-23-17 @ 05:53) (57 - 72)  BP: 124/64 (11-23-17 @ 05:53) (124/64 - 160/69)  RR: 18 (11-23-17 @ 05:53) (18 - 20)  SpO2: 97% (11-23-17 @ 05:53) (97% - 100%)  Wt(kg): --  I&O's Summary    22 Nov 2017 07:01  -  23 Nov 2017 07:00  --------------------------------------------------------  IN: 400 mL / OUT: 3000 mL / NET: -2600 mL        Appearance: Normal	  HEENT:   Normal oral mucosa, PERRL, EOMI	  Lymphatic: No lymphadenopathy  Cardiovascular: Normal S1 S2, No JVD, No murmurs, No edema  RFA puncture site c/d/i, no obvious hematoma, above site skin excoriation with slow ooze, distal pulse palpable  Respiratory: Lungs clear to auscultation	  Psychiatry: A & O x 3, Mood & affect appropriate  Gastrointestinal:  Soft, Non-tender, + BS	  Skin: No rashes, No ecchymoses, No cyanosis	  Neurologic: Non-focal  Extremities: Normal range of motion, No clubbing, cyanosis or edema  Vascular: Peripheral pulses palpable 2+ bilaterally    LABS:	 	    CBC Full  -  ( 23 Nov 2017 06:30 )  WBC Count : 5.80 K/uL  Hemoglobin : 11.1 g/dL  Hematocrit : 34.7 %  Platelet Count - Automated : 120 K/uL  Mean Cell Volume : 95.1 fL  Mean Cell Hemoglobin : 30.4 pg  Mean Cell Hemoglobin Concentration : 32.0 %  Auto Neutrophil # : 3.99 K/uL  Auto Lymphocyte # : 1.21 K/uL  Auto Monocyte # : 0.40 K/uL  Auto Eosinophil # : 0.14 K/uL  Auto Basophil # : 0.04 K/uL  Auto Neutrophil % : 68.8 %  Auto Lymphocyte % : 20.9 %  Auto Monocyte % : 6.9 %  Auto Eosinophil % : 2.4 %  Auto Basophil % : 0.7 %    11-22    135  |  97<L>  |  54<H>  ----------------------------<  104<H>  4.4   |  21<L>  |  9.84<H>  11-21    135  |  97<L>  |  49<H>  ----------------------------<  98  4.5   |  22  |  8.87<H>    Ca    8.5      22 Nov 2017 03:20  Ca    9.1      21 Nov 2017 21:06  Phos  5.2     11-22  Mg     2.0     11-22    TPro  7.5  /  Alb  4.3  /  TBili  0.3  /  DBili  x   /  AST  17  /  ALT  13  /  AlkPhos  21<L>  11-21    TELEMETRY: sinus 60-70s    < from: Cardiac Cath Lab - Adult (11.22.17 @ 12:24) >  CORONARY VESSELS: The coronary circulation is right dominant.  LM:   --  Distal left main: There was a discrete 30 % stenosis.  LAD:   --  Proximal LAD: There was a 100 % stenosis. There was DESTINEE grade 0  flow through the vessel (no flow).  --  Distal LAD: Angiography showed minor luminal irregularities with no  flow limiting lesions.  CX:   --  Proximal circumflex: There was a tubular 10 % stenosis at the  site of a prior stent.  --  Mid circumflex: There was a tubular 20 % stenosis at the site of a  prior stent.  RCA:   --  Proximal RCA: The distal vessel was supplied by collaterals from  septal branches of LAD and the first obtuse marginal. There was a 100 %  stenosis. There was DESTINEE grade 0 flow through the vessel (no flow).  GRAFTS:   --  Graft to the mid LAD: The graft was a LIMA. It was normal.  --  Graft to the 1st obtuse marginal: The graft was a saphenous vein graft  from the aorta. Graft angiography showed minor luminal irregularities.  COMPLICATIONS: There were no complications.  DIAGNOSTIC RECOMMENDATIONS: Medical management is recommended. Elevated  LVEDP. Patient with RCA .    < end of copied text >      	  ASSESSMENT/PLAN: 	    57 year old man with PMH of CAD(s/p 2 stents, and 5V CABG), Polycystic kidney disease(on transplant list and currently getting HD on Tues/Thurs/Sat), HLD, GERD, Asthma presented with the complaint of left sided chest tightness.     UA  -s/p LHC  -Patient on asa 324 and Plavix 75 mg already. Continue home medications   -May increase metoprolol to 50 mg PO BID if BP tolerates  -Isordil 10 mg PO TID  -add ACEi  -increase atorvastatin to 80mg  -Echo to evaluate LV function and wall motion abnormality	    ESRD - HD today    # [id number] 24H hour events: No acute events.    MEDICATIONS:  aspirin enteric coated 325 milliGRAM(s) Oral daily  clopidogrel Tablet 75 milliGRAM(s) Oral daily  heparin  Injectable 5000 Unit(s) SubCutaneous every 8 hours  isosorbide   dinitrate Tablet (ISORDIL) 10 milliGRAM(s) Oral three times a day  metoprolol     tartrate 50 milliGRAM(s) Oral two times a day    ALBUTerol    90 MICROgram(s) HFA Inhaler 1 Puff(s) Inhalation four times a day PRN    pantoprazole    Tablet 40 milliGRAM(s) Oral before breakfast    atorvastatin 10 milliGRAM(s) Oral at bedtime  fenofibrate Tablet 48 milliGRAM(s) Oral daily        REVIEW OF SYSTEMS:  Denies CP, SOB, palpitations, orthopnea, pnd, abd pain, n/v, extremity pain, lh/loc.   Complete 10point ROS negative.    PHYSICAL EXAM:  T(C): 36.5 (11-23-17 @ 05:53), Max: 36.7 (11-22-17 @ 19:41)  HR: 57 (11-23-17 @ 05:53) (57 - 72)  BP: 124/64 (11-23-17 @ 05:53) (124/64 - 160/69)  RR: 18 (11-23-17 @ 05:53) (18 - 20)  SpO2: 97% (11-23-17 @ 05:53) (97% - 100%)  Wt(kg): --  I&O's Summary    22 Nov 2017 07:01  -  23 Nov 2017 07:00  --------------------------------------------------------  IN: 400 mL / OUT: 3000 mL / NET: -2600 mL        Appearance: Normal	  HEENT:   Normal oral mucosa, PERRL, EOMI	  Lymphatic: No lymphadenopathy  Cardiovascular: Normal S1 S2, No JVD, No murmurs, No edema  RFA puncture site c/d/i, no obvious hematoma, above site skin excoriation with slow ooze, distal pulse palpable  Respiratory: Lungs clear to auscultation	  Psychiatry: A & O x 3, Mood & affect appropriate  Gastrointestinal:  Soft, Non-tender, + BS	  Skin: No rashes, No ecchymoses, No cyanosis	  Neurologic: Non-focal  Extremities: Normal range of motion, No clubbing, cyanosis or edema  Vascular: Peripheral pulses palpable 2+ bilaterally    LABS:	 	    CBC Full  -  ( 23 Nov 2017 06:30 )  WBC Count : 5.80 K/uL  Hemoglobin : 11.1 g/dL  Hematocrit : 34.7 %  Platelet Count - Automated : 120 K/uL  Mean Cell Volume : 95.1 fL  Mean Cell Hemoglobin : 30.4 pg  Mean Cell Hemoglobin Concentration : 32.0 %  Auto Neutrophil # : 3.99 K/uL  Auto Lymphocyte # : 1.21 K/uL  Auto Monocyte # : 0.40 K/uL  Auto Eosinophil # : 0.14 K/uL  Auto Basophil # : 0.04 K/uL  Auto Neutrophil % : 68.8 %  Auto Lymphocyte % : 20.9 %  Auto Monocyte % : 6.9 %  Auto Eosinophil % : 2.4 %  Auto Basophil % : 0.7 %    11-22    135  |  97<L>  |  54<H>  ----------------------------<  104<H>  4.4   |  21<L>  |  9.84<H>  11-21    135  |  97<L>  |  49<H>  ----------------------------<  98  4.5   |  22  |  8.87<H>    Ca    8.5      22 Nov 2017 03:20  Ca    9.1      21 Nov 2017 21:06  Phos  5.2     11-22  Mg     2.0     11-22    TPro  7.5  /  Alb  4.3  /  TBili  0.3  /  DBili  x   /  AST  17  /  ALT  13  /  AlkPhos  21<L>  11-21    TELEMETRY: sinus 60-70s    < from: Cardiac Cath Lab - Adult (11.22.17 @ 12:24) >  CORONARY VESSELS: The coronary circulation is right dominant.  LM:   --  Distal left main: There was a discrete 30 % stenosis.  LAD:   --  Proximal LAD: There was a 100 % stenosis. There was DESTINEE grade 0  flow through the vessel (no flow).  --  Distal LAD: Angiography showed minor luminal irregularities with no  flow limiting lesions.  CX:   --  Proximal circumflex: There was a tubular 10 % stenosis at the  site of a prior stent.  --  Mid circumflex: There was a tubular 20 % stenosis at the site of a  prior stent.  RCA:   --  Proximal RCA: The distal vessel was supplied by collaterals from  septal branches of LAD and the first obtuse marginal. There was a 100 %  stenosis. There was DESTINEE grade 0 flow through the vessel (no flow).  GRAFTS:   --  Graft to the mid LAD: The graft was a LIMA. It was normal.  --  Graft to the 1st obtuse marginal: The graft was a saphenous vein graft  from the aorta. Graft angiography showed minor luminal irregularities.  COMPLICATIONS: There were no complications.  DIAGNOSTIC RECOMMENDATIONS: Medical management is recommended. Elevated  LVEDP. Patient with RCA .    < end of copied text >      	  ASSESSMENT/PLAN: 	    57 year old man with PMH of CAD(s/p 2 stents, and 5V CABG), Polycystic kidney disease(on transplant list and currently getting HD on Tues/Thurs/Sat), HLD, GERD, Asthma presented with the complaint of left sided chest tightness.     UA  -s/p LHC  -Patient on asa 324 and Plavix 75 mg already. Continue home medications   -May increase metoprolol to 50 mg PO BID if BP tolerates  -Isordil 10 mg PO TID  -add ACEi if ok w/ nephro  -outpatient atorvastatin was 40mg  -Echo to evaluate LV function and wall motion abnormality	    ESRD - HD today    # [id number] 24H hour events: No acute events.    MEDICATIONS:  aspirin enteric coated 325 milliGRAM(s) Oral daily  clopidogrel Tablet 75 milliGRAM(s) Oral daily  heparin  Injectable 5000 Unit(s) SubCutaneous every 8 hours  isosorbide   dinitrate Tablet (ISORDIL) 10 milliGRAM(s) Oral three times a day  metoprolol     tartrate 50 milliGRAM(s) Oral two times a day    ALBUTerol    90 MICROgram(s) HFA Inhaler 1 Puff(s) Inhalation four times a day PRN    pantoprazole    Tablet 40 milliGRAM(s) Oral before breakfast    atorvastatin 10 milliGRAM(s) Oral at bedtime  fenofibrate Tablet 48 milliGRAM(s) Oral daily        REVIEW OF SYSTEMS:  Denies CP, SOB, palpitations, orthopnea, pnd, abd pain, n/v, extremity pain, lh/loc.   Complete 10point ROS negative.    PHYSICAL EXAM:  T(C): 36.5 (11-23-17 @ 05:53), Max: 36.7 (11-22-17 @ 19:41)  HR: 57 (11-23-17 @ 05:53) (57 - 72)  BP: 124/64 (11-23-17 @ 05:53) (124/64 - 160/69)  RR: 18 (11-23-17 @ 05:53) (18 - 20)  SpO2: 97% (11-23-17 @ 05:53) (97% - 100%)  Wt(kg): --  I&O's Summary    22 Nov 2017 07:01  -  23 Nov 2017 07:00  --------------------------------------------------------  IN: 400 mL / OUT: 3000 mL / NET: -2600 mL        Appearance: Normal	  HEENT:   Normal oral mucosa, PERRL, EOMI	  Lymphatic: No lymphadenopathy  Cardiovascular: Normal S1 S2, No JVD, No murmurs, No edema  RFA puncture site c/d/i, no obvious hematoma, above site skin excoriation with slow ooze, distal pulse palpable  Respiratory: Lungs clear to auscultation	  Psychiatry: A & O x 3, Mood & affect appropriate  Gastrointestinal:  Soft, Non-tender, + BS	  Skin: No rashes, No ecchymoses, No cyanosis	  Neurologic: Non-focal  Extremities: Normal range of motion, No clubbing, cyanosis or edema  Vascular: Peripheral pulses palpable 2+ bilaterally    LABS:	 	    CBC Full  -  ( 23 Nov 2017 06:30 )  WBC Count : 5.80 K/uL  Hemoglobin : 11.1 g/dL  Hematocrit : 34.7 %  Platelet Count - Automated : 120 K/uL  Mean Cell Volume : 95.1 fL  Mean Cell Hemoglobin : 30.4 pg  Mean Cell Hemoglobin Concentration : 32.0 %  Auto Neutrophil # : 3.99 K/uL  Auto Lymphocyte # : 1.21 K/uL  Auto Monocyte # : 0.40 K/uL  Auto Eosinophil # : 0.14 K/uL  Auto Basophil # : 0.04 K/uL  Auto Neutrophil % : 68.8 %  Auto Lymphocyte % : 20.9 %  Auto Monocyte % : 6.9 %  Auto Eosinophil % : 2.4 %  Auto Basophil % : 0.7 %    11-22    135  |  97<L>  |  54<H>  ----------------------------<  104<H>  4.4   |  21<L>  |  9.84<H>  11-21    135  |  97<L>  |  49<H>  ----------------------------<  98  4.5   |  22  |  8.87<H>    Ca    8.5      22 Nov 2017 03:20  Ca    9.1      21 Nov 2017 21:06  Phos  5.2     11-22  Mg     2.0     11-22    TPro  7.5  /  Alb  4.3  /  TBili  0.3  /  DBili  x   /  AST  17  /  ALT  13  /  AlkPhos  21<L>  11-21    TELEMETRY: sinus 60-70s    < from: Cardiac Cath Lab - Adult (11.22.17 @ 12:24) >  CORONARY VESSELS: The coronary circulation is right dominant.  LM:   --  Distal left main: There was a discrete 30 % stenosis.  LAD:   --  Proximal LAD: There was a 100 % stenosis. There was DESTINEE grade 0  flow through the vessel (no flow).  --  Distal LAD: Angiography showed minor luminal irregularities with no  flow limiting lesions.  CX:   --  Proximal circumflex: There was a tubular 10 % stenosis at the  site of a prior stent.  --  Mid circumflex: There was a tubular 20 % stenosis at the site of a  prior stent.  RCA:   --  Proximal RCA: The distal vessel was supplied by collaterals from  septal branches of LAD and the first obtuse marginal. There was a 100 %  stenosis. There was DESTINEE grade 0 flow through the vessel (no flow).  GRAFTS:   --  Graft to the mid LAD: The graft was a LIMA. It was normal.  --  Graft to the 1st obtuse marginal: The graft was a saphenous vein graft  from the aorta. Graft angiography showed minor luminal irregularities.  COMPLICATIONS: There were no complications.  DIAGNOSTIC RECOMMENDATIONS: Medical management is recommended. Elevated  LVEDP. Patient with RCA .    < end of copied text >      	  ASSESSMENT/PLAN: 	    57 year old man with PMH of CAD(s/p 2 stents, and 5V CABG), Polycystic kidney disease(on transplant list and currently getting HD on Tues/Thurs/Sat), HLD, GERD, Asthma presented with the complaint of left sided chest tightness.     UA  -s/p LHC  -Patient on asa 324 and Plavix 75 mg already. Continue home medications   -May increase metoprolol to 50 mg PO BID if BP tolerates  -Isordil 10 mg PO TID  -add ACEi if ok w/ nephro - LVEDP 41; if not then add hydralazine  -outpatient atorvastatin was 40mg  -Echo to evaluate LV function and wall motion abnormality	    ESRD - HD today    # [id number]

## 2017-11-23 NOTE — DISCHARGE NOTE ADULT - OTHER SIGNIFICANT FINDINGS
Admit Diagnosis) Angina pectoris  (PMH) ESRD (end stage renal disease) on dialysis  (PMH) HTN (hypertension)  (PMH) Coronary artery disease involving coronary bypass graft  (Principal DC/DX) Angina pectoris  (Problem/DX) Need for prophylactic measure  (Problem/DX) Coronary artery disease involving coronary bypass graft  (Problem/DX) HTN (hypertension)  (Problem/DX) ESRD (end stage renal disease) on dialysis  (Problem/DX) Unstable angina  (PSH) S/P CABG x 5  (PSH) AV fistula  (PSH) S/P coronary artery stent placement  (Secondary DC/DX) Hypertension, unspecified type  (Secondary DC/DX) ESRD (end stage renal disease) on dialysis

## 2017-11-23 NOTE — DISCHARGE NOTE ADULT - CARE PLAN
Principal Discharge DX:	Angina pectoris  Goal:	Prevent worsening of disease  Instructions for follow-up, activity and diet:	Continue aspirin and Plavix, do not stop unless instructed by physician. Follow up with cardiologist in 1 week. Continue low cholesterol diet. Do not smoke, or drink alcohol.  Secondary Diagnosis:	ESRD (end stage renal disease) on dialysis  Instructions for follow-up, activity and diet:	Please follow up with your nephrologist to monitor your kidney function, continue with low protein diet, and continue routine hemodialysis.  Secondary Diagnosis:	Hypertension, unspecified type  Instructions for follow-up, activity and diet:	Continue medications as prescribed. Monitor Blood pressure. Eat a low salt diet. Exercise to maintain a healthy weight. Limit alcohol and do not smoke. Follow up with primary care physician in 1 week. If any worsening symptoms notify provider. Principal Discharge DX:	Angina pectoris  Goal:	Prevent worsening of disease  Instructions for follow-up, activity and diet:	Continue aspirin and Plavix, do not stop unless instructed by physician. Follow up with cardiologist in 1 week. Continue low cholesterol diet. Do not smoke, or drink alcohol.  Secondary Diagnosis:	ESRD (end stage renal disease) on dialysis  Goal:	Continue Hemodialysis as per schedule, avoid fluid overload state, medication compliance  Instructions for follow-up, activity and diet:	Please follow up with your nephrologist to monitor your kidney function, continue with low protein diet, and continue routine hemodialysis.  Secondary Diagnosis:	Hypertension, unspecified type  Goal:	Monitor BP, medication compliance, prevent end organ damage  Instructions for follow-up, activity and diet:	Continue medications as prescribed. Monitor Blood pressure. Eat a low salt diet. Exercise to maintain a healthy weight. Limit alcohol and do not smoke. Follow up with primary care physician in 1 week. If any worsening symptoms notify provider.

## 2017-11-23 NOTE — DISCHARGE NOTE ADULT - HOSPITAL COURSE
56 y/o M with PMH of CAD(s/p 3 stents, and 5V CABG), Polycystic kidney disease(on transplant list and currently getting HD on Tues/Thurs/Sat) via Rt AV fistula, HLD, GERD, Asthma presented with the complaint of left sided chest tightness. Cardiac enzymes negative x 2. EKG showed NSR 69 BPM, RBB, TWI in V6. Chest xray showed clear lungs.LHC performed on 11/22/17 showed LM normal, pLAD 100%, LCx patent stents w/ 20% ISR, oOM 100%, pRCA 100%, SVG to OM patent, LIMA to LAD patent, LVEDP 41, RFA accessed, site stable. Patient stable and cleared for discharge with outpatient cardiology follow up. 56 y/o M with PMH of CAD(s/p 3 stents, and 5V CABG), Polycystic kidney disease(on transplant list and currently getting HD on Tues/Thurs/Sat) via Rt AV fistula, HLD, GERD, Asthma presented with the complaint of left sided chest tightness. Cardiac enzymes negative x 2. EKG showed NSR 69 BPM, RBB, TWI in V6. Chest xray showed clear lungs.LHC performed on 11/22/17 showed LM normal, pLAD 100%, LCx patent stents w/ 20% ISR, oOM 100%, pRCA 100%, SVG to OM patent, LIMA to LAD patent, LVEDP 41, RFA accessed, site stable. Patient stable and cleared for discharge with outpatient cardiology follow up.   11/24 TTE - 1. Normal mitral valve. Mild mitral regurgitation.  2. Normal trileaflet aortic valve.  3. Mildly dilated left atrium.  LA volume index = 35 cc/m2.  4. Endocardium not well visualized; unable to evaluate left ventricular systolic function. Consider repeat limited  study with echocontrast (Definity) for further LV systolic  function evaluation.  5. The right ventricle is not well visualized.  6. Normal tricuspid valve.  Minimal tricuspid regurgitation. 58 y/o M with PMH of CAD(s/p 3 stents, and 5V CABG), Polycystic kidney disease(on transplant list and currently getting HD on Tues/Thurs/Sat) via Rt AV fistula, HLD, GERD, Asthma presented with the complaint of left sided chest tightness. Cardiac enzymes negative x 2. EKG showed NSR 69 BPM, RBB, TWI in V6. Chest xray showed clear lungs.LHC performed on 11/22/17 showed LM normal, pLAD 100%, LCx patent stents w/ 20% ISR, oOM 100%, pRCA 100%, SVG to OM patent, LIMA to LAD patent, LVEDP 41, RFA accessed, site stable. Patient stable and cleared for discharge with outpatient cardiology follow up.   11/24 TTE - 1. Normal mitral valve. Mild mitral regurgitation.  2. Normal trileaflet aortic valve.  3. Mildly dilated left atrium.  LA volume index = 35 cc/m2.  4. Endocardium not well visualized; unable to evaluate left ventricular systolic function. Consider repeat limited  study with echocontrast (Definity) for further LV systolic  function evaluation.  5. The right ventricle is not well visualized.  6. Normal tricuspid valve.  Minimal tricuspid regurgitation.    Unable to eval LVF on repeat echo. Stable for discharge home with HD tomorrow as outpt

## 2017-11-23 NOTE — DISCHARGE NOTE ADULT - PLAN OF CARE
Prevent worsening of disease Continue aspirin and Plavix, do not stop unless instructed by physician. Follow up with cardiologist in 1 week. Continue low cholesterol diet. Do not smoke, or drink alcohol. Please follow up with your nephrologist to monitor your kidney function, continue with low protein diet, and continue routine hemodialysis. Continue medications as prescribed. Monitor Blood pressure. Eat a low salt diet. Exercise to maintain a healthy weight. Limit alcohol and do not smoke. Follow up with primary care physician in 1 week. If any worsening symptoms notify provider. Continue Hemodialysis as per schedule, avoid fluid overload state, medication compliance Monitor BP, medication compliance, prevent end organ damage

## 2017-11-23 NOTE — DISCHARGE NOTE ADULT - PATIENT PORTAL LINK FT
“You can access the Follow[employer] Patient Portal, offered by [facility], by registering with the following website: [email or web address]”

## 2017-11-23 NOTE — DISCHARGE NOTE ADULT - MEDICATION SUMMARY - MEDICATIONS TO TAKE
I will START or STAY ON the medications listed below when I get home from the hospital:    aspirin 325 mg oral delayed release tablet  -- 1 tab(s) by mouth once a day  -- Indication: For Coronary artery disease involving coronary bypass graft    losartan 25 mg oral tablet  -- 1 tab(s) by mouth once a day  -- Indication: For HTN (hypertension)    isosorbide dinitrate 10 mg oral tablet  -- 1 tab(s) by mouth 3 times a day  -- Indication: For Coronary artery disease involving coronary bypass graft    atorvastatin 40 mg oral tablet  -- 1 tab(s) by mouth once a day (at bedtime)  -- Indication: For Hyperlipidemia    fenofibrate 48 mg oral tablet  -- 1 tab(s) by mouth once a day  -- Indication: For Hypoerlipidemia    clopidogrel 75 mg oral tablet  -- 1 tab(s) by mouth once a day  -- Indication: For Coronary artery disease involving coronary bypass graft    metoprolol tartrate 75 mg oral tablet  -- 1 tab(s) by mouth 2 times a day  -- Indication: For Coronary artery disease involving coronary bypass graft    albuterol 90 mcg/inh inhalation aerosol  -- 1 puff(s) inhaled 4 times a day, As needed, Shortness of Breath and/or Wheezing  -- Indication: For Shortness of breath     sevelamer hydrochloride 800 mg oral tablet  -- 1 tab(s) by mouth 3 times a day (with meals)  -- Indication: For ESRD (end stage renal disease) on dialysis    omeprazole 20 mg oral delayed release capsule  -- 1 cap(s) by mouth once a day  -- Indication: For Stomach

## 2017-11-23 NOTE — DISCHARGE NOTE ADULT - CARE PROVIDERS DIRECT ADDRESSES
[email or web address]_Unknown [email or web address],DirectAddress_Unknown,DirectAddress_Unknown

## 2017-11-23 NOTE — DISCHARGE NOTE ADULT - CARE PROVIDER_API CALL
Nash Sandoval), Cardiovascular Disease; Nuclear Cardiology  [address] [place] [address]  Phone: [phone]  Fax: [phone]    Foreign Spence), [facility] Medicine; Internal Medicine  [address] [place] [address]  Phone: [phone]  Fax: [phone] Nash Sandoval), Cardiovascular Disease; Nuclear Cardiology  [address] [place] [address]  Phone: [phone]  Fax: [phone]    Zeferino Arguello), Internal Medicine  [address] [place] [address]  Phone: [phone]  Fax: [phone] Nash Sandoval), Cardiovascular Disease; Nuclear Cardiology  [address] [place] [address]  Phone: [phone]  Fax: [phone]    Zeferino Arguello), Internal Medicine  [address] [place] [address]  Phone: [phone]  Fax: [phone]    Sung Mckeon), Internal Medicine; Nephrology  [address] [address]  Phone: [phone]  Fax: [phone]

## 2017-11-24 LAB
BASOPHILS # BLD AUTO: 0.03 K/UL — SIGNIFICANT CHANGE UP (ref 0–0.2)
BASOPHILS NFR BLD AUTO: 0.5 % — SIGNIFICANT CHANGE UP (ref 0–2)
BUN SERPL-MCNC: 69 MG/DL — HIGH (ref 7–23)
CALCIUM SERPL-MCNC: 8.2 MG/DL — LOW (ref 8.4–10.5)
CHLORIDE SERPL-SCNC: 101 MMOL/L — SIGNIFICANT CHANGE UP (ref 98–107)
CO2 SERPL-SCNC: 21 MMOL/L — LOW (ref 22–31)
CREAT SERPL-MCNC: 10.77 MG/DL — HIGH (ref 0.5–1.3)
EOSINOPHIL # BLD AUTO: 0.22 K/UL — SIGNIFICANT CHANGE UP (ref 0–0.5)
EOSINOPHIL NFR BLD AUTO: 3.6 % — SIGNIFICANT CHANGE UP (ref 0–6)
GLUCOSE SERPL-MCNC: 91 MG/DL — SIGNIFICANT CHANGE UP (ref 70–99)
HCT VFR BLD CALC: 35 % — LOW (ref 39–50)
HGB BLD-MCNC: 11 G/DL — LOW (ref 13–17)
IMM GRANULOCYTES # BLD AUTO: 0.02 # — SIGNIFICANT CHANGE UP
IMM GRANULOCYTES NFR BLD AUTO: 0.3 % — SIGNIFICANT CHANGE UP (ref 0–1.5)
LYMPHOCYTES # BLD AUTO: 1.61 K/UL — SIGNIFICANT CHANGE UP (ref 1–3.3)
LYMPHOCYTES # BLD AUTO: 26.5 % — SIGNIFICANT CHANGE UP (ref 13–44)
MAGNESIUM SERPL-MCNC: 2.1 MG/DL — SIGNIFICANT CHANGE UP (ref 1.6–2.6)
MCHC RBC-ENTMCNC: 29.5 PG — SIGNIFICANT CHANGE UP (ref 27–34)
MCHC RBC-ENTMCNC: 31.4 % — LOW (ref 32–36)
MCV RBC AUTO: 93.8 FL — SIGNIFICANT CHANGE UP (ref 80–100)
MONOCYTES # BLD AUTO: 0.51 K/UL — SIGNIFICANT CHANGE UP (ref 0–0.9)
MONOCYTES NFR BLD AUTO: 8.4 % — SIGNIFICANT CHANGE UP (ref 2–14)
NEUTROPHILS # BLD AUTO: 3.69 K/UL — SIGNIFICANT CHANGE UP (ref 1.8–7.4)
NEUTROPHILS NFR BLD AUTO: 60.7 % — SIGNIFICANT CHANGE UP (ref 43–77)
NRBC # FLD: 0 — SIGNIFICANT CHANGE UP
PLATELET # BLD AUTO: 115 K/UL — LOW (ref 150–400)
PMV BLD: 11.6 FL — SIGNIFICANT CHANGE UP (ref 7–13)
POTASSIUM SERPL-MCNC: 5.3 MMOL/L — SIGNIFICANT CHANGE UP (ref 3.5–5.3)
POTASSIUM SERPL-SCNC: 5.3 MMOL/L — SIGNIFICANT CHANGE UP (ref 3.5–5.3)
RBC # BLD: 3.73 M/UL — LOW (ref 4.2–5.8)
RBC # FLD: 14.2 % — SIGNIFICANT CHANGE UP (ref 10.3–14.5)
SODIUM SERPL-SCNC: 140 MMOL/L — SIGNIFICANT CHANGE UP (ref 135–145)
WBC # BLD: 6.08 K/UL — SIGNIFICANT CHANGE UP (ref 3.8–10.5)
WBC # FLD AUTO: 6.08 K/UL — SIGNIFICANT CHANGE UP (ref 3.8–10.5)

## 2017-11-24 PROCEDURE — 99232 SBSQ HOSP IP/OBS MODERATE 35: CPT | Mod: GC

## 2017-11-24 PROCEDURE — 93306 TTE W/DOPPLER COMPLETE: CPT | Mod: 26

## 2017-11-24 RX ADMIN — SODIUM CHLORIDE 3 MILLILITER(S): 9 INJECTION INTRAMUSCULAR; INTRAVENOUS; SUBCUTANEOUS at 21:11

## 2017-11-24 RX ADMIN — SEVELAMER CARBONATE 800 MILLIGRAM(S): 2400 POWDER, FOR SUSPENSION ORAL at 07:32

## 2017-11-24 RX ADMIN — ATORVASTATIN CALCIUM 40 MILLIGRAM(S): 80 TABLET, FILM COATED ORAL at 21:42

## 2017-11-24 RX ADMIN — HEPARIN SODIUM 5000 UNIT(S): 5000 INJECTION INTRAVENOUS; SUBCUTANEOUS at 05:33

## 2017-11-24 RX ADMIN — ISOSORBIDE DINITRATE 10 MILLIGRAM(S): 5 TABLET ORAL at 13:02

## 2017-11-24 RX ADMIN — SODIUM CHLORIDE 3 MILLILITER(S): 9 INJECTION INTRAMUSCULAR; INTRAVENOUS; SUBCUTANEOUS at 13:03

## 2017-11-24 RX ADMIN — HEPARIN SODIUM 5000 UNIT(S): 5000 INJECTION INTRAVENOUS; SUBCUTANEOUS at 21:42

## 2017-11-24 RX ADMIN — SEVELAMER CARBONATE 800 MILLIGRAM(S): 2400 POWDER, FOR SUSPENSION ORAL at 17:21

## 2017-11-24 RX ADMIN — Medication 325 MILLIGRAM(S): at 13:02

## 2017-11-24 RX ADMIN — HEPARIN SODIUM 5000 UNIT(S): 5000 INJECTION INTRAVENOUS; SUBCUTANEOUS at 13:02

## 2017-11-24 RX ADMIN — PANTOPRAZOLE SODIUM 40 MILLIGRAM(S): 20 TABLET, DELAYED RELEASE ORAL at 05:33

## 2017-11-24 RX ADMIN — SEVELAMER CARBONATE 800 MILLIGRAM(S): 2400 POWDER, FOR SUSPENSION ORAL at 13:02

## 2017-11-24 RX ADMIN — ALBUTEROL 1 PUFF(S): 90 AEROSOL, METERED ORAL at 21:42

## 2017-11-24 RX ADMIN — CLOPIDOGREL BISULFATE 75 MILLIGRAM(S): 75 TABLET, FILM COATED ORAL at 13:02

## 2017-11-24 RX ADMIN — Medication 48 MILLIGRAM(S): at 13:02

## 2017-11-24 RX ADMIN — SODIUM CHLORIDE 3 MILLILITER(S): 9 INJECTION INTRAMUSCULAR; INTRAVENOUS; SUBCUTANEOUS at 05:29

## 2017-11-24 RX ADMIN — Medication 50 MILLIGRAM(S): at 17:21

## 2017-11-24 RX ADMIN — ISOSORBIDE DINITRATE 10 MILLIGRAM(S): 5 TABLET ORAL at 21:42

## 2017-11-24 NOTE — PROGRESS NOTE ADULT - SUBJECTIVE AND OBJECTIVE BOX
Patient is a 57y old  Male who presents with a chief complaint of Chest pain (23 Nov 2017 03:24)      INTERVAL HPI/OVERNIGHT EVENTS:  T(C): 36.1 (11-24-17 @ 11:30), Max: 36.8 (11-24-17 @ 07:14)  HR: 65 (11-24-17 @ 11:30) (55 - 70)  BP: 149/72 (11-24-17 @ 11:30) (137/77 - 166/71)  RR: 18 (11-24-17 @ 11:30) (18 - 18)  SpO2: 100% (11-24-17 @ 05:27) (99% - 100%)  Wt(kg): --  I&O's Summary    23 Nov 2017 07:01  -  24 Nov 2017 07:00  --------------------------------------------------------  IN: 700 mL / OUT: 0 mL / NET: 700 mL    24 Nov 2017 07:01  -  24 Nov 2017 12:35  --------------------------------------------------------  IN: 800 mL / OUT: 3200 mL / NET: -2400 mL        LABS:                        11.0   6.08  )-----------( 115      ( 24 Nov 2017 07:15 )             35.0     11-24    140  |  101  |  69<H>  ----------------------------<  91  5.3   |  21<L>  |  10.77<H>    Ca    8.2<L>      24 Nov 2017 07:15  Mg     2.1     11-24          CAPILLARY BLOOD GLUCOSE                MEDICATIONS  (STANDING):  aspirin enteric coated 325 milliGRAM(s) Oral daily  atorvastatin 40 milliGRAM(s) Oral at bedtime  clopidogrel Tablet 75 milliGRAM(s) Oral daily  fenofibrate Tablet 48 milliGRAM(s) Oral daily  heparin  Injectable 5000 Unit(s) SubCutaneous every 8 hours  isosorbide   dinitrate Tablet (ISORDIL) 10 milliGRAM(s) Oral three times a day  metoprolol     tartrate 50 milliGRAM(s) Oral two times a day  pantoprazole    Tablet 40 milliGRAM(s) Oral before breakfast  sevelamer hydrochloride 800 milliGRAM(s) Oral three times a day with meals  sodium chloride 0.9% lock flush 3 milliLiter(s) IV Push every 8 hours    MEDICATIONS  (PRN):  ALBUTerol    90 MICROgram(s) HFA Inhaler 1 Puff(s) Inhalation four times a day PRN Shortness of Breath and/or Wheezing          PHYSICAL EXAM:  GENERAL: NAD, well-groomed, well-developed  HEAD:  Atraumatic, Normocephalic  CHEST/LUNG: Clear to percussion bilaterally; No rales, rhonchi, wheezing, or rubs  HEART: Regular rate and rhythm; No murmurs, rubs, or gallops  ABDOMEN: Soft, Nontender, Nondistended; Bowel sounds present  EXTREMITIES:  2+ Peripheral Pulses, No clubbing, cyanosis, or edema  LYMPH: No lymphadenopathy noted  SKIN: No rashes or lesions    Care Discussed with Consultants/Other Providers [ ] YES  [ ] NO

## 2017-11-24 NOTE — PROGRESS NOTE ADULT - SUBJECTIVE AND OBJECTIVE BOX
Patient seen and examined at bedside.    Overnight Events: No CP overnight. No other complaints.    Review Of Systems: No chest pain, shortness of breath, or palpitations            Medications:  ALBUTerol    90 MICROgram(s) HFA Inhaler 1 Puff(s) Inhalation four times a day PRN  aspirin enteric coated 325 milliGRAM(s) Oral daily  atorvastatin 40 milliGRAM(s) Oral at bedtime  clopidogrel Tablet 75 milliGRAM(s) Oral daily  fenofibrate Tablet 48 milliGRAM(s) Oral daily  heparin  Injectable 5000 Unit(s) SubCutaneous every 8 hours  isosorbide   dinitrate Tablet (ISORDIL) 10 milliGRAM(s) Oral three times a day  metoprolol     tartrate 50 milliGRAM(s) Oral two times a day  pantoprazole    Tablet 40 milliGRAM(s) Oral before breakfast  sevelamer hydrochloride 800 milliGRAM(s) Oral three times a day with meals  sodium chloride 0.9% lock flush 3 milliLiter(s) IV Push every 8 hours      PAST MEDICAL & SURGICAL HISTORY:  HTN (hypertension)  Coronary artery disease involving coronary bypass graft  ESRD (end stage renal disease) on dialysis  AV fistula: b/l MILI GREEN  S/P coronary artery stent placement  S/P CABG x 5      Vitals:  T(F): 97 (11-24), Max: 98.2 (11-24)  HR: 65 (11-24) (55 - 70)  BP: 149/72 (11-24) (137/77 - 166/71)  RR: 18 (11-24)  SpO2: 100% (11-24)  I&O's Summary    23 Nov 2017 07:01 - 24 Nov 2017 07:00  --------------------------------------------------------  IN: 700 mL / OUT: 0 mL / NET: 700 mL    24 Nov 2017 07:01  -  24 Nov 2017 11:59  --------------------------------------------------------  IN: 800 mL / OUT: 3200 mL / NET: -2400 mL        Physical Exam:  Appearance: No acute distress; well appearing  Eyes: PERRL, EOMI, pink conjunctiva  HENT: Normal oral muscosa  Cardiovascular: RRR, S1, S2, no murmurs, rubs, or gallops; no edema; no JVD  Respiratory: Clear to auscultation bilaterally  Gastrointestinal: soft, non-tender, non-distended with normal bowel sounds  Musculoskeletal: No clubbing; no joint deformity   Neurologic: Non-focal  Lymphatic: No lymphadenopathy  Psychiatry: AAOx3, mood & affect appropriate  Skin: No rashes, ecchymoses, or cyanosis                          11.0   6.08  )-----------( 115      ( 24 Nov 2017 07:15 )             35.0     11-24    140  |  101  |  69<H>  ----------------------------<  91  5.3   |  21<L>  |  10.77<H>    Ca    8.2<L>      24 Nov 2017 07:15  Mg     2.1     11-24      A/P:  57 year old man with PMH of CAD(s/p 2 stents, and 5V CABG), Polycystic kidney disease(on transplant list and currently getting HD on Tues/Thurs/Sat), HLD, GERD, Asthma presented with the complaint of left sided chest tightness. S/p LHC that showed  of RCA, medical mgmt.    UA. S/p LHC that showed  of RCA, medical mgmt.  -Patient on asa 324 and Plavix 75 mg already. Continue home medications   -cont metoprolol and Isordil 10 mg PO TID  -add ACEi if ok w/ nephro - LVEDP 41; if not then add hydralazine  -outpatient atorvastatin was 40mg  -Echo to evaluate LV function and wall motion abnormality	    Ashvin Flores  Cardiology fellow  [id number]

## 2017-11-24 NOTE — PROGRESS NOTE ADULT - SUBJECTIVE AND OBJECTIVE BOX
Nephrology Progress Note    No acute events overnight  Patient seen on dialysis  Looks comfortable and not in distress  Had a bout of diarrhea, x 9 episodes after lunch yesterday  Denies chest pain, sob or other symptoms at present      PHYSICAL EXAM    GA: awake and alert, no distress  EYES: EOMI, clear conj, anicteric sclera  ENT: MMM, clear OP, neck supple  LUNGS: CTABL, no wrc, normal effort  HEART; RRR, no mrg, no peripheral edema  ABD; Soft, NT/ND/BS+, no peritoneal signs  EXT; well perfused, no edema or cyanosis  NEURO: AAO x 3, sensation and motor intact  SKIN: warm and dry, no rash on visible skin  ACCESS: Left AVF      IMAGING: Reviewed and as per records: pertinent positives:     ASSESSMENT: This is a  58 y/o M with PMH of CAD(s/p 3 stents, and 5V CABG), ESRD due to Polycystic kidney disease(on transplant list) now on HD on a TTS schedule R AVF, HLD, GERD, Asthma who presented with the complaint of left sided chest tightness x 1 week post cardiac cath done on 11./22/2017 with no complications. Nephrology to follow on ESRD management,    1. ESRD on HD TTS   2. Mild metabolic acidosis; bicarbs of 21  3. Anemia likely due to ESRD; H&H acceptable  4. Chest pain post cardiac cath; cardiology on board  5. Hyperphosphatemia    RECOMMENDATIONS:  - presently dialyzing; will switch to his TTS schedule from tomorrow  - continue phos binders  - HD should help with acidosis  - No need for epo;   - dose meds per GFR less than 15 ml/min    No objection to discharge from renal perspective;   Patient will be dialyzed at [facility] tomorrow if discharged  Will dialyze here if still inpatient    Pierre Fisher MD  [facility]  [phone] Nephrology Progress Note    No acute events overnight  Patient seen on dialysis  Looks comfortable and not in distress  Had a bout of diarrhea, x 9 episodes after lunch yesterday  Denies chest pain, sob or other symptoms at present      PHYSICAL EXAM    GA: awake and alert, no distress  EYES: EOMI, clear conj, anicteric sclera  ENT: MMM, clear OP, neck supple  LUNGS: CTABL, no wrc, normal effort  HEART; RRR, no mrg, no peripheral edema  ABD; Soft, NT/ND/BS+, no peritoneal signs  EXT; well perfused, no edema or cyanosis  NEURO: AAO x 3, sensation and motor intact  SKIN: warm and dry, no rash on visible skin  ACCESS: Left AVF      IMAGING: Reviewed and as per records: pertinent positives:     ASSESSMENT: This is a  58 y/o M with PMH of CAD(s/p 3 stents, and 5V CABG), ESRD due to Polycystic kidney disease(on transplant list) now on HD on a TTS schedule R AVF, HLD, GERD, Asthma who presented with the complaint of left sided chest tightness x 1 week post cardiac cath done on 11./22/2017 with no complications. Nephrology to follow on ESRD management,    1. ESRD on HD TTS   2. Mild metabolic acidosis; bicarbs of 21  3. Anemia likely due to ESRD; H&H acceptable  4. Chest pain post cardiac cath; cardiology on board  5. Hyperphosphatemia    RECOMMENDATIONS:  - presently dialyzing; will switch to his TTS schedule from tomorrow  - continue phos binders  - HD should help with acidosis  - No need for epo;   - dose meds per GFR less than 15 ml/min    No objection to discharge from renal perspective;   Patient will be dialyzed at [facility] tomorrow if discharged  Will dialyze here if still inpatient    Pierre Fisher MD  [facility]  [phone]        ADDENDUM    Would start losartan 25 mg daily for now and adjust as tolerated

## 2017-11-25 PROCEDURE — 99233 SBSQ HOSP IP/OBS HIGH 50: CPT

## 2017-11-25 RX ORDER — METOPROLOL TARTRATE 50 MG
75 TABLET ORAL
Qty: 0 | Refills: 0 | Status: DISCONTINUED | OUTPATIENT
Start: 2017-11-25 | End: 2017-11-27

## 2017-11-25 RX ADMIN — SEVELAMER CARBONATE 800 MILLIGRAM(S): 2400 POWDER, FOR SUSPENSION ORAL at 08:29

## 2017-11-25 RX ADMIN — SODIUM CHLORIDE 3 MILLILITER(S): 9 INJECTION INTRAMUSCULAR; INTRAVENOUS; SUBCUTANEOUS at 21:47

## 2017-11-25 RX ADMIN — Medication 50 MILLIGRAM(S): at 17:00

## 2017-11-25 RX ADMIN — SODIUM CHLORIDE 3 MILLILITER(S): 9 INJECTION INTRAMUSCULAR; INTRAVENOUS; SUBCUTANEOUS at 05:17

## 2017-11-25 RX ADMIN — ISOSORBIDE DINITRATE 10 MILLIGRAM(S): 5 TABLET ORAL at 05:19

## 2017-11-25 RX ADMIN — ISOSORBIDE DINITRATE 10 MILLIGRAM(S): 5 TABLET ORAL at 14:32

## 2017-11-25 RX ADMIN — HEPARIN SODIUM 5000 UNIT(S): 5000 INJECTION INTRAVENOUS; SUBCUTANEOUS at 05:19

## 2017-11-25 RX ADMIN — Medication 48 MILLIGRAM(S): at 14:31

## 2017-11-25 RX ADMIN — SEVELAMER CARBONATE 800 MILLIGRAM(S): 2400 POWDER, FOR SUSPENSION ORAL at 14:31

## 2017-11-25 RX ADMIN — CLOPIDOGREL BISULFATE 75 MILLIGRAM(S): 75 TABLET, FILM COATED ORAL at 14:31

## 2017-11-25 RX ADMIN — ALBUTEROL 1 PUFF(S): 90 AEROSOL, METERED ORAL at 21:51

## 2017-11-25 RX ADMIN — PANTOPRAZOLE SODIUM 40 MILLIGRAM(S): 20 TABLET, DELAYED RELEASE ORAL at 05:19

## 2017-11-25 RX ADMIN — Medication 325 MILLIGRAM(S): at 14:31

## 2017-11-25 RX ADMIN — HEPARIN SODIUM 5000 UNIT(S): 5000 INJECTION INTRAVENOUS; SUBCUTANEOUS at 21:46

## 2017-11-25 RX ADMIN — ISOSORBIDE DINITRATE 10 MILLIGRAM(S): 5 TABLET ORAL at 21:46

## 2017-11-25 RX ADMIN — HEPARIN SODIUM 5000 UNIT(S): 5000 INJECTION INTRAVENOUS; SUBCUTANEOUS at 14:32

## 2017-11-25 RX ADMIN — ATORVASTATIN CALCIUM 40 MILLIGRAM(S): 80 TABLET, FILM COATED ORAL at 21:46

## 2017-11-25 RX ADMIN — SODIUM CHLORIDE 3 MILLILITER(S): 9 INJECTION INTRAMUSCULAR; INTRAVENOUS; SUBCUTANEOUS at 14:33

## 2017-11-25 RX ADMIN — SEVELAMER CARBONATE 800 MILLIGRAM(S): 2400 POWDER, FOR SUSPENSION ORAL at 17:00

## 2017-11-25 NOTE — PROGRESS NOTE ADULT - SUBJECTIVE AND OBJECTIVE BOX
HPI:  58 y/o M with PMH of CAD(s/p 3 stents, and 5V CABG), Polycystic kidney disease(on transplant list and currently getting HD on Tues/Thurs/Sat) via Rt AV fistula, HLD, GERD, Asthma presented with the complaint of left sided chest tightness x 1 week. He has been having intermittent chest tightness for few months, which became more worse over the last 1 week. Chest pain is described as squeezing, and constant. He cannot recall what makes the pain better. Exertion such as walking and climbing stairs makes it worse. Also had some  underlying ALEXANDRA. Denies fever, chills, cough, falls, LOC, abdominal pain, melena, hematochezia, LE edema, calf tenderness, dysuria, diarrhea, or constipation.     +On admission: patient complains of mild chest discomfort. He states this is the same discomfort he came to the hospital with and chest discomfort still has not resolved. Nitro SL resolves it slightly. Patient refuses repeat EKG. Will send repeat cardiac enzymes and isosordil given. (2017 02:55)      Feeling well overnight, no complaints of chest pain, SOB or palpitations    Review Of Systems:  Constitutional: [ ] Fever [ ] Chills [ ] Fatigue [ ] Weight change   HEENT: [ ] Blurred vision [ ] Eye Pain [ ] Headache [ ] Runny nose [ ] Sore Throat   Respiratory: [ ] Cough [ ] Wheezing [ ] Shortness of breath  Cardiovascular: [ ] Chest Pain [ ] Palpitations [ ] ALEXANDRA [ ] PND [ ] Orthopnea  Gastrointestinal: [ ] Abdominal Pain [ ] Diarrhea [ ] Constipation [ ] Hemorrhoids [ ] Nausea [ ] Vomiting  Genitourinary: [ ] Nocturia [ ] Dysuria [ ] Incontinence  Extremities: [ ] Swelling [ ] Joint Pain  Neurologic: [ ] Focal deficit [ ] Paresthesias [ ] Syncope  Lymphatic: [ ] Swelling [ ] Lymphadenopathy   Skin: [ ] Rash [ ] Ecchymoses [ ] Wounds [ ] Lesions  Psychiatry: [ ] Depression [ ] Suicidal/Homicidal Ideation [ ] Anxiety [ ] Sleep Disturbances  [x ] 10 point review of systems is otherwise negative except as mentioned above            [ ]Unable to obtain    Medications:  ALBUTerol    90 MICROgram(s) HFA Inhaler 1 Puff(s) Inhalation four times a day PRN  aspirin enteric coated 325 milliGRAM(s) Oral daily  atorvastatin 40 milliGRAM(s) Oral at bedtime  clopidogrel Tablet 75 milliGRAM(s) Oral daily  fenofibrate Tablet 48 milliGRAM(s) Oral daily  heparin  Injectable 5000 Unit(s) SubCutaneous every 8 hours  isosorbide   dinitrate Tablet (ISORDIL) 10 milliGRAM(s) Oral three times a day  metoprolol     tartrate 50 milliGRAM(s) Oral two times a day  pantoprazole    Tablet 40 milliGRAM(s) Oral before breakfast  sevelamer hydrochloride 800 milliGRAM(s) Oral three times a day with meals  sodium chloride 0.9% lock flush 3 milliLiter(s) IV Push every 8 hours    PMH/PSH/FH/SH: [x ] Unchanged    Vitals:  T(C): 36.6 (17 @ 05:18), Max: 36.8 (17 @ 21:40)  HR: 59 (17 @ 05:18) (59 - 70)  BP: 123/62 (17 @ 05:18) (123/62 - 154/75)  RR: 18 (17 @ 05:18) (18 - 18)  SpO2: 100% (17 @ 05:18) (99% - 100%)  Daily Weight in k (2017 11:30)  I&O's Summary    2017 07:01  -  2017 07:00  --------------------------------------------------------  IN: 800 mL / OUT: 3200 mL / NET: -2400 mL    Physical Exam:  Appearance:  Normal, NAD  Eyes: PERRL, EOMI  HENT: Normal oral muscosa NC/AT  Cardiovascular: S1, S2, RRR, No m/r/g appreciated, No edema, no elevation in JVP  Procedural Access Site: No hematoma, Non-tender to palpation, 2+ pulse , No bruit, No Ecchymosis  Respiratory: Clear to auscultation bilaterally  Gastrointestinal: Soft, Non-tender, Non-distended, BS+  Psychiatry: AAOx3, Mood & affect appropriate  Skin: No rashes, No ecchymoses, No cyanosis    Labs:                        11.0   6.08  )-----------( 115      ( 2017 07:15 )             35.0     -    140  |  101  |  69<H>  ----------------------------<  91  5.3   |  21<L>  |  10.77<H>    Ca    8.2<L>      2017 07:15  Mg     2.1         Interpretation of Telemetry:  SR  10-12 beats WCT

## 2017-11-25 NOTE — PROGRESS NOTE ADULT - ATTENDING COMMENTS
Agree with above assessment and plan. 10 beats NSVT overnight on metoprolol  -would increase metoprolol to 75 mg BID as outlined above, continue with other meds  -ARB per renal  -Recheck TTE to eval EF
Patient seen and examined. Agree with above assessment and plan. Patient sp LHC yesterday that showed  of RCA. Medical management for anginal symptoms.   Will continue with present meds  Agree with addition of isordil  Followup TTE

## 2017-11-25 NOTE — PROGRESS NOTE ADULT - SUBJECTIVE AND OBJECTIVE BOX
Patient is a 57y old  Male who presents with a chief complaint of Chest pain (23 Nov 2017 03:24)      INTERVAL HPI/OVERNIGHT EVENTS:  T(C): 36.7 (11-25-17 @ 11:00), Max: 36.8 (11-24-17 @ 21:40)  HR: 64 (11-25-17 @ 11:00) (59 - 70)  BP: 146/80 (11-25-17 @ 11:00) (123/62 - 154/75)  RR: 16 (11-25-17 @ 11:00) (16 - 18)  SpO2: 100% (11-25-17 @ 05:18) (99% - 100%)  Wt(kg): --  I&O's Summary    24 Nov 2017 07:01  -  25 Nov 2017 07:00  --------------------------------------------------------  IN: 800 mL / OUT: 3200 mL / NET: -2400 mL        LABS:                        11.0   6.08  )-----------( 115      ( 24 Nov 2017 07:15 )             35.0     11-24    140  |  101  |  69<H>  ----------------------------<  91  5.3   |  21<L>  |  10.77<H>    Ca    8.2<L>      24 Nov 2017 07:15  Mg     2.1     11-24          CAPILLARY BLOOD GLUCOSE                MEDICATIONS  (STANDING):  aspirin enteric coated 325 milliGRAM(s) Oral daily  atorvastatin 40 milliGRAM(s) Oral at bedtime  clopidogrel Tablet 75 milliGRAM(s) Oral daily  fenofibrate Tablet 48 milliGRAM(s) Oral daily  heparin  Injectable 5000 Unit(s) SubCutaneous every 8 hours  isosorbide   dinitrate Tablet (ISORDIL) 10 milliGRAM(s) Oral three times a day  metoprolol     tartrate 50 milliGRAM(s) Oral two times a day  pantoprazole    Tablet 40 milliGRAM(s) Oral before breakfast  sevelamer hydrochloride 800 milliGRAM(s) Oral three times a day with meals  sodium chloride 0.9% lock flush 3 milliLiter(s) IV Push every 8 hours    MEDICATIONS  (PRN):  ALBUTerol    90 MICROgram(s) HFA Inhaler 1 Puff(s) Inhalation four times a day PRN Shortness of Breath and/or Wheezing          PHYSICAL EXAM:  GENERAL: NAD, well-groomed, well-developed  HEAD:  Atraumatic, Normocephalic  CHEST/LUNG: Clear to percussion bilaterally; No rales, rhonchi, wheezing, or rubs  HEART: Regular rate and rhythm; No murmurs, rubs, or gallops  ABDOMEN: Soft, Nontender, Nondistended; Bowel sounds present  EXTREMITIES:  2+ Peripheral Pulses, No clubbing, cyanosis, or edema  LYMPH: No lymphadenopathy noted  SKIN: No rashes or lesions    Care Discussed with Consultants/Other Providers [ ] YES  [ ] NO

## 2017-11-25 NOTE — PROGRESS NOTE ADULT - SUBJECTIVE AND OBJECTIVE BOX
Nephrology Progress Note    No acute events overnight  Patient doing better, has no complaints      PHYSICAL EXAM    Vital Signs Last 24 Hrs  T(C): 36.7 (25 Nov 2017 14:30), Max: 36.8 (24 Nov 2017 21:40)  T(F): 98 (25 Nov 2017 14:30), Max: 98.2 (24 Nov 2017 21:40)  HR: 73 (25 Nov 2017 16:58) (59 - 73)  BP: 161/87 (25 Nov 2017 16:58) (123/62 - 161/87)  BP(mean): --  RR: 17 (25 Nov 2017 14:30) (16 - 18)  SpO2: 99% (25 Nov 2017 14:30) (99% - 100%)  I&O's Summary    24 Nov 2017 07:01  -  25 Nov 2017 07:00  --------------------------------------------------------  IN: 800 mL / OUT: 3200 mL / NET: -2400 mL    25 Nov 2017 07:01  -  25 Nov 2017 18:54  --------------------------------------------------------  IN: 400 mL / OUT: 1700 mL / NET: -1300 mL      GA: awake and alert, no distress  EYES: EOMI, clear conj, anicteric sclera  ENT: MMM, clear OP, neck supple  LUNGS: CTABL, no wrc, normal effort  HEART; RRR, no mrg, no peripheral edema  ABD; Soft, NT/ND/BS+, no peritoneal signs  EXT; well perfused, no edema or cyanosis  NEURO: AAO x 3, sensation and motor intact  SKIN: warm and dry, no rash on visible skin  ACCESS: Left AVF                          11.0   6.08  )-----------( 115      ( 24 Nov 2017 07:15 )             35.0     11-24    140  |  101  |  69<H>  ----------------------------<  91  5.3   |  21<L>  |  10.77<H>    Ca    8.2<L>      24 Nov 2017 07:15  Mg     2.1     11-24          IMAGING: Reviewed and as per records: pertinent positives:     ASSESSMENT: This is a  56 y/o M with PMH of CAD(s/p 3 stents, and 5V CABG), ESRD due to Polycystic kidney disease(on transplant list) now on HD on a TTS schedule R AVF, HLD, GERD, Asthma who presented with the complaint of left sided chest tightness x 1 week post cardiac cath done on 11./22/2017 with no complications. Nephrology to follow on ESRD management,    1. ESRD on HD TTS   2. Mild metabolic acidosis; bicarbs of 21  3. Anemia likely due to ESRD; H&H acceptable  4. Chest pain post cardiac cath; cardiology on board  5. Hyperphosphatemia    RECOMMENDATIONS:  - dialyze today for 3 hrs on 2 K bath; remove 3 liter of fluid if able  - continue phos binders  - HD should help with acidosis  - No need for epo;   - dose meds per GFR less than 15 ml/min    No objection to discharge from renal perspective;       Pierre Fisher MD  [facility]  [phone]        ADDENDUM    Would start losartan 25 mg daily for now and adjust as tolerated

## 2017-11-26 RX ADMIN — SEVELAMER CARBONATE 800 MILLIGRAM(S): 2400 POWDER, FOR SUSPENSION ORAL at 08:52

## 2017-11-26 RX ADMIN — HEPARIN SODIUM 5000 UNIT(S): 5000 INJECTION INTRAVENOUS; SUBCUTANEOUS at 05:13

## 2017-11-26 RX ADMIN — ALBUTEROL 1 PUFF(S): 90 AEROSOL, METERED ORAL at 14:13

## 2017-11-26 RX ADMIN — ISOSORBIDE DINITRATE 10 MILLIGRAM(S): 5 TABLET ORAL at 21:17

## 2017-11-26 RX ADMIN — HEPARIN SODIUM 5000 UNIT(S): 5000 INJECTION INTRAVENOUS; SUBCUTANEOUS at 21:18

## 2017-11-26 RX ADMIN — SEVELAMER CARBONATE 800 MILLIGRAM(S): 2400 POWDER, FOR SUSPENSION ORAL at 17:14

## 2017-11-26 RX ADMIN — ISOSORBIDE DINITRATE 10 MILLIGRAM(S): 5 TABLET ORAL at 05:13

## 2017-11-26 RX ADMIN — PANTOPRAZOLE SODIUM 40 MILLIGRAM(S): 20 TABLET, DELAYED RELEASE ORAL at 05:13

## 2017-11-26 RX ADMIN — Medication 48 MILLIGRAM(S): at 14:10

## 2017-11-26 RX ADMIN — Medication 75 MILLIGRAM(S): at 17:14

## 2017-11-26 RX ADMIN — Medication 75 MILLIGRAM(S): at 05:13

## 2017-11-26 RX ADMIN — SEVELAMER CARBONATE 800 MILLIGRAM(S): 2400 POWDER, FOR SUSPENSION ORAL at 14:10

## 2017-11-26 RX ADMIN — ALBUTEROL 1 PUFF(S): 90 AEROSOL, METERED ORAL at 21:21

## 2017-11-26 RX ADMIN — ATORVASTATIN CALCIUM 40 MILLIGRAM(S): 80 TABLET, FILM COATED ORAL at 21:18

## 2017-11-26 RX ADMIN — ISOSORBIDE DINITRATE 10 MILLIGRAM(S): 5 TABLET ORAL at 14:10

## 2017-11-26 RX ADMIN — CLOPIDOGREL BISULFATE 75 MILLIGRAM(S): 75 TABLET, FILM COATED ORAL at 14:10

## 2017-11-26 RX ADMIN — SODIUM CHLORIDE 3 MILLILITER(S): 9 INJECTION INTRAMUSCULAR; INTRAVENOUS; SUBCUTANEOUS at 14:06

## 2017-11-26 RX ADMIN — SODIUM CHLORIDE 3 MILLILITER(S): 9 INJECTION INTRAMUSCULAR; INTRAVENOUS; SUBCUTANEOUS at 05:16

## 2017-11-26 RX ADMIN — Medication 325 MILLIGRAM(S): at 14:10

## 2017-11-26 RX ADMIN — HEPARIN SODIUM 5000 UNIT(S): 5000 INJECTION INTRAVENOUS; SUBCUTANEOUS at 14:10

## 2017-11-26 NOTE — PROGRESS NOTE ADULT - ASSESSMENT
Problem/Plan - 1:  ·  Problem: Unstable angina.  Plan: Admit to tele  Cardiology consult appreciated   Cont asa, plavix  Start isosordil 30 TID    check cbc, bmp, tsh, a1c, trend CE  Cardiology consult appreciated   Cont asa, plavix  Start isosordil 30 TID  NPO p MN for possible cath in AM  f/u MD note  Discussed with Dr. Calixto sp cath  check TTE    Problem/Plan - 2:  ·  Problem: ESRD (end stage renal disease) on dialysis.  Plan: renal consult for HD  cont renvela.     Problem/Plan - 3:  ·  Problem: HTN (hypertension).  Plan: Increase metoprolol to 50mg BID. monitor bp.     Problem/Plan - 4:  ·  Problem: Coronary artery disease involving coronary bypass graft.  Plan: cont asa, plavix, statin.
57 year old man with PMH of CAD(s/p 2 stents, and 5V CABG), Polycystic kidney disease(on transplant list and currently getting HD on Tues/Thurs/Sat), HLD, GERD, Asthma presented with the complaint of left sided chest tightness. S/p LHC that showed  of RCA, medical mgmt.    UA. S/p LHC that showed  of RCA, medical mgmt.  -Patient on asa 324 and Plavix 75 mg already. Continue home medications   -cont metoprolol and Isordil 10 mg PO TID  -add ARB as per nephrology  -outpatient atorvastatin was 40mg  -rpt echo with definity to define endocardial borders and ejection fraction.  Patient had runs of NSVT overnight.  Would increase BB to lopressor 75 mg PO Q12H.  If EF ok and patient asymptomatic would consider D/C planning.  If EF decreased would consult EP for consideration of Lifevest/AICD
Problem/Plan - 1:  ·  Problem: Unstable angina.  Plan: Admit to tele  Cardiology consult appreciated   Cont asa, plavix  Start isosordil 30 TID    check cbc, bmp, tsh, a1c, trend CE  Cardiology consult appreciated   Cont asa, plavix  Start isosordil 30 TID  NPO p MN for possible cath in AM  f/u MD note  Discussed with Dr. Calixto sp cath  check OSIRIS    Problem/Plan - 2:  ·  Problem: ESRD (end stage renal disease) on dialysis.  Plan: renal consult for HD  cont renvela.     Problem/Plan - 3:  ·  Problem: HTN (hypertension).  Plan: Increase metoprolol to 50mg BID. monitor bp.     Problem/Plan - 4:  ·  Problem: Coronary artery disease involving coronary bypass graft.  Plan: cont asa, plavix, statin.
Problem/Plan - 1:  ·  Problem: Unstable angina.  Plan: Admit to tele  Cardiology consult appreciated   Cont asa, plavix  Start isosordil 30 TID    check cbc, bmp, tsh, a1c, trend CE  Cardiology consult appreciated   Cont asa, plavix  Start isosordil 30 TID  NPO p MN for possible cath in AM  f/u MD note  Discussed with Dr. Calixto sp cath  check TTE    Problem/Plan - 2:  ·  Problem: ESRD (end stage renal disease) on dialysis.  Plan: renal consult for HD  cont renvela.     Problem/Plan - 3:  ·  Problem: HTN (hypertension).  Plan: Increase metoprolol to 50mg BID. monitor bp.     Problem/Plan - 4:  ·  Problem: Coronary artery disease involving coronary bypass graft.  Plan: cont asa, plavix, statin.
Problem/Plan - 1:  ·  Problem: Unstable angina.  Plan: Admit to tele  Cardiology consult appreciated   Cont asa, plavix  Start isosordil 30 TID  s  check cbc, bmp, tsh, a1c, trend CE  Cardiology consult appreciated   Cont asa, plavix  Start isosordil 30 TID  NPO p MN for possible cath in AM  f/u MD note  Discussed with Dr. Marily snider cath    Problem/Plan - 2:  ·  Problem: ESRD (end stage renal disease) on dialysis.  Plan: renal consult for HD  cont renvela.     Problem/Plan - 3:  ·  Problem: HTN (hypertension).  Plan: Increase metoprolol to 50mg BID. monitor bp.     Problem/Plan - 4:  ·  Problem: Coronary artery disease involving coronary bypass graft.  Plan: cont asa, plavix, statin.

## 2017-11-26 NOTE — PROGRESS NOTE ADULT - SUBJECTIVE AND OBJECTIVE BOX
Patient is a 57y old  Male who presents with a chief complaint of Chest pain (23 Nov 2017 03:24)      INTERVAL HPI/OVERNIGHT EVENTS:  T(C): 36.8 (11-26-17 @ 13:53), Max: 36.8 (11-25-17 @ 20:51)  HR: 64 (11-26-17 @ 13:53) (62 - 74)  BP: 154/88 (11-26-17 @ 13:53) (114/49 - 161/87)  RR: 16 (11-26-17 @ 13:53) (16 - 18)  SpO2: 98% (11-26-17 @ 13:53) (96% - 99%)  Wt(kg): --  I&O's Summary    25 Nov 2017 07:01  -  26 Nov 2017 07:00  --------------------------------------------------------  IN: 400 mL / OUT: 1700 mL / NET: -1300 mL        LABS:              CAPILLARY BLOOD GLUCOSE                MEDICATIONS  (STANDING):  aspirin enteric coated 325 milliGRAM(s) Oral daily  atorvastatin 40 milliGRAM(s) Oral at bedtime  clopidogrel Tablet 75 milliGRAM(s) Oral daily  fenofibrate Tablet 48 milliGRAM(s) Oral daily  heparin  Injectable 5000 Unit(s) SubCutaneous every 8 hours  isosorbide   dinitrate Tablet (ISORDIL) 10 milliGRAM(s) Oral three times a day  metoprolol     tartrate 75 milliGRAM(s) Oral two times a day  pantoprazole    Tablet 40 milliGRAM(s) Oral before breakfast  sevelamer hydrochloride 800 milliGRAM(s) Oral three times a day with meals  sodium chloride 0.9% lock flush 3 milliLiter(s) IV Push every 8 hours    MEDICATIONS  (PRN):  ALBUTerol    90 MICROgram(s) HFA Inhaler 1 Puff(s) Inhalation four times a day PRN Shortness of Breath and/or Wheezing          PHYSICAL EXAM:  GENERAL: NAD, well-groomed, well-developed  HEAD:  Atraumatic, Normocephalic  CHEST/LUNG: Clear to percussion bilaterally; No rales, rhonchi, wheezing, or rubs  HEART: Regular rate and rhythm; No murmurs, rubs, or gallops  ABDOMEN: Soft, Nontender, Nondistended; Bowel sounds present  EXTREMITIES:  2+ Peripheral Pulses, No clubbing, cyanosis, or edema  LYMPH: No lymphadenopathy noted  SKIN: No rashes or lesions    Care Discussed with Consultants/Other Providers [ ] YES  [ ] NO

## 2017-11-27 VITALS — DIASTOLIC BLOOD PRESSURE: 83 MMHG | HEART RATE: 66 BPM | SYSTOLIC BLOOD PRESSURE: 155 MMHG

## 2017-11-27 PROCEDURE — 93325 DOPPLER ECHO COLOR FLOW MAPG: CPT | Mod: 26,GC

## 2017-11-27 PROCEDURE — 93321 DOPPLER ECHO F-UP/LMTD STD: CPT | Mod: 26

## 2017-11-27 PROCEDURE — 93308 TTE F-UP OR LMTD: CPT | Mod: 26,GC

## 2017-11-27 PROCEDURE — 99232 SBSQ HOSP IP/OBS MODERATE 35: CPT

## 2017-11-27 RX ORDER — SEVELAMER CARBONATE 2400 MG/1
1 POWDER, FOR SUSPENSION ORAL
Qty: 0 | Refills: 0 | COMMUNITY
Start: 2017-11-27

## 2017-11-27 RX ORDER — METOPROLOL TARTRATE 50 MG
1 TABLET ORAL
Qty: 0 | Refills: 0 | COMMUNITY
Start: 2017-11-27

## 2017-11-27 RX ORDER — LOSARTAN POTASSIUM 100 MG/1
1 TABLET, FILM COATED ORAL
Qty: 0 | Refills: 0 | COMMUNITY
Start: 2017-11-27

## 2017-11-27 RX ORDER — FENOFIBRATE,MICRONIZED 130 MG
1 CAPSULE ORAL
Qty: 0 | Refills: 0 | COMMUNITY
Start: 2017-11-27

## 2017-11-27 RX ORDER — ASPIRIN/CALCIUM CARB/MAGNESIUM 324 MG
1 TABLET ORAL
Qty: 0 | Refills: 0 | COMMUNITY
Start: 2017-11-27

## 2017-11-27 RX ORDER — ISOSORBIDE DINITRATE 5 MG/1
1 TABLET ORAL
Qty: 0 | Refills: 0 | COMMUNITY
Start: 2017-11-27

## 2017-11-27 RX ORDER — ATORVASTATIN CALCIUM 80 MG/1
1 TABLET, FILM COATED ORAL
Qty: 0 | Refills: 0 | COMMUNITY
Start: 2017-11-27

## 2017-11-27 RX ORDER — ALBUTEROL 90 UG/1
1 AEROSOL, METERED ORAL
Qty: 0 | Refills: 0 | COMMUNITY
Start: 2017-11-27

## 2017-11-27 RX ORDER — CLOPIDOGREL BISULFATE 75 MG/1
1 TABLET, FILM COATED ORAL
Qty: 0 | Refills: 0 | COMMUNITY
Start: 2017-11-27

## 2017-11-27 RX ORDER — LOSARTAN POTASSIUM 100 MG/1
25 TABLET, FILM COATED ORAL DAILY
Qty: 0 | Refills: 0 | Status: DISCONTINUED | OUTPATIENT
Start: 2017-11-27 | End: 2017-11-27

## 2017-11-27 RX ADMIN — LOSARTAN POTASSIUM 25 MILLIGRAM(S): 100 TABLET, FILM COATED ORAL at 08:19

## 2017-11-27 RX ADMIN — HEPARIN SODIUM 5000 UNIT(S): 5000 INJECTION INTRAVENOUS; SUBCUTANEOUS at 13:59

## 2017-11-27 RX ADMIN — PANTOPRAZOLE SODIUM 40 MILLIGRAM(S): 20 TABLET, DELAYED RELEASE ORAL at 05:41

## 2017-11-27 RX ADMIN — Medication 48 MILLIGRAM(S): at 13:58

## 2017-11-27 RX ADMIN — SEVELAMER CARBONATE 800 MILLIGRAM(S): 2400 POWDER, FOR SUSPENSION ORAL at 18:47

## 2017-11-27 RX ADMIN — Medication 75 MILLIGRAM(S): at 18:47

## 2017-11-27 RX ADMIN — Medication 325 MILLIGRAM(S): at 13:59

## 2017-11-27 RX ADMIN — SEVELAMER CARBONATE 800 MILLIGRAM(S): 2400 POWDER, FOR SUSPENSION ORAL at 13:58

## 2017-11-27 RX ADMIN — CLOPIDOGREL BISULFATE 75 MILLIGRAM(S): 75 TABLET, FILM COATED ORAL at 13:58

## 2017-11-27 RX ADMIN — SODIUM CHLORIDE 3 MILLILITER(S): 9 INJECTION INTRAMUSCULAR; INTRAVENOUS; SUBCUTANEOUS at 14:00

## 2017-11-27 RX ADMIN — SEVELAMER CARBONATE 800 MILLIGRAM(S): 2400 POWDER, FOR SUSPENSION ORAL at 08:19

## 2017-11-27 RX ADMIN — HEPARIN SODIUM 5000 UNIT(S): 5000 INJECTION INTRAVENOUS; SUBCUTANEOUS at 05:42

## 2017-11-27 RX ADMIN — ISOSORBIDE DINITRATE 10 MILLIGRAM(S): 5 TABLET ORAL at 13:58

## 2017-11-27 NOTE — PROGRESS NOTE ADULT - PROVIDER SPECIALTY LIST ADULT
Cardiology
Hospitalist
Nephrology
Hospitalist

## 2017-11-27 NOTE — CHART NOTE - NSCHARTNOTEFT_GEN_A_CORE
Case dw cardiology / dr tomlinson . patient stable for discharge home today after HD   To resume regular HD schedule in am 11/28  as outpt

## 2017-11-27 NOTE — PROGRESS NOTE ADULT - SUBJECTIVE AND OBJECTIVE BOX
Patient seen and examined at bedside.    Overnight Events: No CP overnight.    Review Of Systems: No chest pain, shortness of breath, or palpitations            Medications:  ALBUTerol    90 MICROgram(s) HFA Inhaler 1 Puff(s) Inhalation four times a day PRN  aspirin enteric coated 325 milliGRAM(s) Oral daily  atorvastatin 40 milliGRAM(s) Oral at bedtime  clopidogrel Tablet 75 milliGRAM(s) Oral daily  fenofibrate Tablet 48 milliGRAM(s) Oral daily  heparin  Injectable 5000 Unit(s) SubCutaneous every 8 hours  isosorbide   dinitrate Tablet (ISORDIL) 10 milliGRAM(s) Oral three times a day  losartan 25 milliGRAM(s) Oral daily  metoprolol     tartrate 75 milliGRAM(s) Oral two times a day  pantoprazole    Tablet 40 milliGRAM(s) Oral before breakfast  sevelamer hydrochloride 800 milliGRAM(s) Oral three times a day with meals  sodium chloride 0.9% lock flush 3 milliLiter(s) IV Push every 8 hours      PAST MEDICAL & SURGICAL HISTORY:  HTN (hypertension)  Coronary artery disease involving coronary bypass graft  ESRD (end stage renal disease) on dialysis  AV fistula: b/l MILI GREEN  S/P coronary artery stent placement  S/P CABG x 5      Vitals:  T(F): 97.7 (11-27), Max: 98.2 (11-27)  HR: 62 (11-27) (60 - 63)  BP: 152/80 (11-27) (125/65 - 161/75)  RR: 18 (11-27)  SpO2: 96% (11-27)  I&O's Summary      Physical Exam:  Appearance:  Normal, NAD  Eyes: PERRL, EOMI  HENT: Normal oral [place] NC/AT  Cardiovascular: S1, S2, RRR, No m/r/g appreciated, No edema, no elevation in JVP  Procedural Access Site: No hematoma, Non-tender to palpation, 2+ pulse , No bruit, No Ecchymosis  Respiratory: Clear to auscultation bilaterally  Gastrointestinal: Soft, Non-tender, Non-distended, BS+  Psychiatry: AAOx3, Mood & affect appropriate  Skin: No rashes, No ecchymoses, No cyanosis      A/P:  57 year old man with PMH of CAD(s/p 2 stents, and 5V CABG), Polycystic kidney disease(on transplant list and currently getting HD on Tues/Thurs/Sat), HLD, GERD, Asthma presented with the complaint of left sided chest tightness. S/p LHC that showed  of RCA, medical mgmt.    UA. S/p LHC that showed  of RCA, medical mgmt.  -Patient on asa 324 and Plavix 75 mg already. Continue home medications   -cont metoprolol, losartan, and Isordil   -cont atorvastatin was 40mg  -rpt echo with definity to define endocardial borders and ejection fraction not successful 2/2 no access  -ok to discharge with cardiology follow up  -will sign off, please call with questions

## 2017-11-27 NOTE — PROGRESS NOTE ADULT - SUBJECTIVE AND OBJECTIVE BOX
Nephrology Progress Note    No acute events overnight  Patient has no chest pain or sob  No other complaints except some fluid overload  Noted pitting edema on LE    Will dialyze today for 2 hrs and remove 3 liters if tolerated; patient agrees    PHYSICAL EXAM  Vital Signs Last 24 Hrs  T(C): 36.8 (27 Nov 2017 05:39), Max: 36.8 (26 Nov 2017 13:53)  T(F): 98.2 (27 Nov 2017 05:39), Max: 98.3 (26 Nov 2017 13:53)  HR: 60 (27 Nov 2017 05:39) (60 - 64)  BP: 154/80 (27 Nov 2017 08:18) (125/65 - 154/88)  BP(mean): --  RR: 18 (27 Nov 2017 05:39) (16 - 18)  SpO2: 97% (27 Nov 2017 05:39) (94% - 98%)  I&O's Summary      GA: awake and alert, no distress  EYES: EOMI, clear conj, anicteric sclera  ENT: MMM, clear OP, neck supple  LUNGS: CTABL, no wrc, normal effort  HEART; RRR, no mrg, no peripheral edema  ABD; Soft, NT/ND/BS+, no peritoneal signs  EXT; well perfused, no edema or cyanosis  NEURO: AAO x 3, sensation and motor intact  SKIN: warm and dry, no rash on visible skin  ACCESS: Left AVF    LABS    IMAGING: Reviewed and as per records: pertinent positives:     ASSESSMENT: This is a  58 y/o M with PMH of CAD(s/p 3 stents, and 5V CABG), ESRD due to Polycystic kidney disease(on transplant list) now on HD on a TTS schedule R AVF, HLD, GERD, Asthma who presented with the complaint of left sided chest tightness x 1 week post cardiac cath done on 11./22/2017 with no complications. Nephrology to follow on ESRD management,    1. ESRD on HD TTS   2. Mild metabolic acidosis; bicarbs of 21  3. Anemia likely due to ESRD; H&H acceptable  4. Chest pain post cardiac cath; cardiology on board  5. Hyperphosphatemia    RECOMMENDATIONS:  - dialyze today for 2 hrs on 2 K bath; remove 3 liter of fluid if able  - continue phos binders  - HD should help with acidosis  - No need for epo;   - dose meds per GFR less than 15 ml/min    No objection to discharge after dialysis today from renal perspective       Pierre Fisher MD  [facility]  [phone]        ADDENDUM    Would start losartan 25 mg daily for now and adjust as tolerated

## 2017-11-29 RX ORDER — CLOPIDOGREL BISULFATE 75 MG/1
1 TABLET, FILM COATED ORAL
Qty: 0 | Refills: 0 | COMMUNITY

## 2017-11-29 RX ORDER — ALBUTEROL 90 UG/1
2 AEROSOL, METERED ORAL
Qty: 0 | Refills: 0 | COMMUNITY

## 2017-11-29 RX ORDER — ATORVASTATIN CALCIUM 80 MG/1
1 TABLET, FILM COATED ORAL
Qty: 0 | Refills: 0 | COMMUNITY

## 2017-11-29 RX ORDER — SEVELAMER CARBONATE 2400 MG/1
1 POWDER, FOR SUSPENSION ORAL
Qty: 0 | Refills: 0 | COMMUNITY

## 2017-11-29 RX ORDER — ASPIRIN/CALCIUM CARB/MAGNESIUM 324 MG
1 TABLET ORAL
Qty: 0 | Refills: 0 | COMMUNITY

## 2017-11-29 RX ORDER — FENOFIBRATE,MICRONIZED 130 MG
1 CAPSULE ORAL
Qty: 0 | Refills: 0 | COMMUNITY

## 2017-11-29 RX ORDER — METOPROLOL TARTRATE 50 MG
1 TABLET ORAL
Qty: 0 | Refills: 0 | COMMUNITY

## 2017-11-29 RX ORDER — SEVELAMER CARBONATE 2400 MG/1
3 POWDER, FOR SUSPENSION ORAL
Qty: 0 | Refills: 0 | COMMUNITY

## 2018-02-22 NOTE — H&P ADULT - EYES
Addended by: PILI SAGE on: 2/22/2018 07:50 AM     Modules accepted: Orders     detailed exam conjunctiva clear

## 2018-03-12 NOTE — PATIENT PROFILE ADULT. - MEDICATION ADMINISTRATION INFO, PROFILE
Problem: Patient Care Overview  Goal: Plan of Care Review  Outcome: Ongoing (interventions implemented as appropriate)   03/12/18 1152   Coping/Psychosocial   Plan of Care Reviewed With patient   OTHER   Outcome Summary pt trans to EOB max of 2, pt sat EOB 15 minutes, min-mod assist to maintain sitting balance, performed LLE exercises while sitting EOB, pt would benefit from SNF   Plan of Care Review   Progress no change          no concerns

## 2018-03-14 ENCOUNTER — APPOINTMENT (OUTPATIENT)
Dept: CARDIOLOGY | Facility: CLINIC | Age: 58
End: 2018-03-14
Payer: MEDICAID

## 2018-03-14 ENCOUNTER — NON-APPOINTMENT (OUTPATIENT)
Age: 58
End: 2018-03-14

## 2018-03-14 VITALS
RESPIRATION RATE: 16 BRPM | HEART RATE: 64 BPM | SYSTOLIC BLOOD PRESSURE: 135 MMHG | OXYGEN SATURATION: 95 % | BODY MASS INDEX: 30.49 KG/M2 | HEIGHT: 65 IN | WEIGHT: 183 LBS | DIASTOLIC BLOOD PRESSURE: 75 MMHG

## 2018-03-14 PROBLEM — I10 ESSENTIAL (PRIMARY) HYPERTENSION: Chronic | Status: ACTIVE | Noted: 2017-11-21

## 2018-03-14 PROCEDURE — 93000 ELECTROCARDIOGRAM COMPLETE: CPT

## 2018-03-14 PROCEDURE — 99214 OFFICE O/P EST MOD 30 MIN: CPT

## 2018-04-11 ENCOUNTER — APPOINTMENT (OUTPATIENT)
Dept: TRANSPLANT | Facility: CLINIC | Age: 58
End: 2018-04-11

## 2018-05-23 ENCOUNTER — INPATIENT (INPATIENT)
Facility: HOSPITAL | Age: 58
LOS: 9 days | Discharge: HOME | DRG: 652 | End: 2018-06-02
Attending: TRANSPLANT SURGERY | Admitting: TRANSPLANT SURGERY
Payer: MEDICAID

## 2018-05-23 ENCOUNTER — TRANSCRIPTION ENCOUNTER (OUTPATIENT)
Age: 58
End: 2018-05-23

## 2018-05-23 ENCOUNTER — MOBILE ON CALL (OUTPATIENT)
Age: 58
End: 2018-05-23

## 2018-05-23 VITALS
WEIGHT: 181.88 LBS | OXYGEN SATURATION: 96 % | SYSTOLIC BLOOD PRESSURE: 144 MMHG | RESPIRATION RATE: 18 BRPM | TEMPERATURE: 98 F | DIASTOLIC BLOOD PRESSURE: 77 MMHG | HEIGHT: 65 IN | HEART RATE: 71 BPM

## 2018-05-23 DIAGNOSIS — Q61.3 POLYCYSTIC KIDNEY, UNSPECIFIED: ICD-10-CM

## 2018-05-23 DIAGNOSIS — Z76.82 AWAITING ORGAN TRANSPLANT STATUS: ICD-10-CM

## 2018-05-23 DIAGNOSIS — Z98.890 OTHER SPECIFIED POSTPROCEDURAL STATES: Chronic | ICD-10-CM

## 2018-05-23 DIAGNOSIS — I10 ESSENTIAL (PRIMARY) HYPERTENSION: ICD-10-CM

## 2018-05-23 DIAGNOSIS — I77.0 ARTERIOVENOUS FISTULA, ACQUIRED: Chronic | ICD-10-CM

## 2018-05-23 DIAGNOSIS — Z94.0 KIDNEY TRANSPLANT STATUS: ICD-10-CM

## 2018-05-23 DIAGNOSIS — Z95.5 PRESENCE OF CORONARY ANGIOPLASTY IMPLANT AND GRAFT: Chronic | ICD-10-CM

## 2018-05-23 DIAGNOSIS — Z95.1 PRESENCE OF AORTOCORONARY BYPASS GRAFT: Chronic | ICD-10-CM

## 2018-05-23 LAB
ALBUMIN SERPL ELPH-MCNC: 4.4 G/DL — SIGNIFICANT CHANGE UP (ref 3.3–5)
ALP SERPL-CCNC: 40 U/L — SIGNIFICANT CHANGE UP (ref 40–120)
ALT FLD-CCNC: 17 U/L — SIGNIFICANT CHANGE UP (ref 10–45)
ANION GAP SERPL CALC-SCNC: 18 MMOL/L — HIGH (ref 5–17)
APTT BLD: 31.8 SEC — SIGNIFICANT CHANGE UP (ref 27.5–37.4)
AST SERPL-CCNC: 20 U/L — SIGNIFICANT CHANGE UP (ref 10–40)
BILIRUB SERPL-MCNC: 0.4 MG/DL — SIGNIFICANT CHANGE UP (ref 0.2–1.2)
BLD GP AB SCN SERPL QL: NEGATIVE — SIGNIFICANT CHANGE UP
BUN SERPL-MCNC: 52 MG/DL — HIGH (ref 7–23)
CALCIUM SERPL-MCNC: 9.2 MG/DL — SIGNIFICANT CHANGE UP (ref 8.4–10.5)
CHLORIDE SERPL-SCNC: 96 MMOL/L — SIGNIFICANT CHANGE UP (ref 96–108)
CO2 SERPL-SCNC: 29 MMOL/L — SIGNIFICANT CHANGE UP (ref 22–31)
CREAT SERPL-MCNC: 11.94 MG/DL — HIGH (ref 0.5–1.3)
GLUCOSE SERPL-MCNC: 80 MG/DL — SIGNIFICANT CHANGE UP (ref 70–99)
HCT VFR BLD CALC: 39.7 % — SIGNIFICANT CHANGE UP (ref 39–50)
HGB BLD-MCNC: 13 G/DL — SIGNIFICANT CHANGE UP (ref 13–17)
INR BLD: 1.13 RATIO — SIGNIFICANT CHANGE UP (ref 0.88–1.16)
MAGNESIUM SERPL-MCNC: 1.7 MG/DL — SIGNIFICANT CHANGE UP (ref 1.6–2.6)
MCHC RBC-ENTMCNC: 31.2 PG — SIGNIFICANT CHANGE UP (ref 27–34)
MCHC RBC-ENTMCNC: 32.8 GM/DL — SIGNIFICANT CHANGE UP (ref 32–36)
MCV RBC AUTO: 95.2 FL — SIGNIFICANT CHANGE UP (ref 80–100)
PHOSPHATE SERPL-MCNC: 3.3 MG/DL — SIGNIFICANT CHANGE UP (ref 2.5–4.5)
PLATELET # BLD AUTO: 143 K/UL — LOW (ref 150–400)
POTASSIUM SERPL-MCNC: 5.2 MMOL/L — SIGNIFICANT CHANGE UP (ref 3.5–5.3)
POTASSIUM SERPL-SCNC: 5.2 MMOL/L — SIGNIFICANT CHANGE UP (ref 3.5–5.3)
PROT SERPL-MCNC: 7.4 G/DL — SIGNIFICANT CHANGE UP (ref 6–8.3)
PROTHROM AB SERPL-ACNC: 12.2 SEC — SIGNIFICANT CHANGE UP (ref 9.8–12.7)
RBC # BLD: 4.17 M/UL — LOW (ref 4.2–5.8)
RBC # FLD: 13.9 % — SIGNIFICANT CHANGE UP (ref 10.3–14.5)
RH IG SCN BLD-IMP: NEGATIVE — SIGNIFICANT CHANGE UP
SODIUM SERPL-SCNC: 143 MMOL/L — SIGNIFICANT CHANGE UP (ref 135–145)
WBC # BLD: 5 K/UL — SIGNIFICANT CHANGE UP (ref 3.8–10.5)
WBC # FLD AUTO: 5 K/UL — SIGNIFICANT CHANGE UP (ref 3.8–10.5)

## 2018-05-23 PROCEDURE — 93010 ELECTROCARDIOGRAM REPORT: CPT

## 2018-05-23 PROCEDURE — 90935 HEMODIALYSIS ONE EVALUATION: CPT | Mod: GC

## 2018-05-23 PROCEDURE — 99222 1ST HOSP IP/OBS MODERATE 55: CPT | Mod: 25,GC

## 2018-05-23 RX ORDER — OMEPRAZOLE 10 MG/1
1 CAPSULE, DELAYED RELEASE ORAL
Qty: 0 | Refills: 0 | COMMUNITY

## 2018-05-23 RX ORDER — BASILIXIMAB 20 MG/5ML
20 INJECTION, POWDER, FOR SOLUTION INTRAVENOUS ONCE
Qty: 0 | Refills: 0 | Status: DISCONTINUED | OUTPATIENT
Start: 2018-05-23 | End: 2018-05-24

## 2018-05-23 RX ORDER — CEFAZOLIN SODIUM 1 G
2000 VIAL (EA) INJECTION ONCE
Qty: 0 | Refills: 0 | Status: DISCONTINUED | OUTPATIENT
Start: 2018-05-23 | End: 2018-05-24

## 2018-05-23 NOTE — H&P ADULT - HISTORY OF PRESENT ILLNESS
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 Denies CP, SOB, N/V, diarrhea, fever, chills, recent travel outside of country.

## 2018-05-23 NOTE — CHART NOTE - NSCHARTNOTEFT_GEN_A_CORE
I have seen the patient and reviewed his hemodialysis prescription . Dialysis access is in his right forearm and is functioning well. Patient has no acute symptoms or distress. Dialysis prescription has been adjusted for optimized control of volume status, uremia and electrolytes. Management of additional metabolic abnormalities will continue to be addressed on follow up.  no heparin, no fluid removal and to keep SBP >120 mm Hg. Std dialysate bath.  I discussed the plan for hemodialysis with transplant surgeon and patient's primary nephrologist.  Discussed dialysis plan with hemodialysis team.  Omar Cortes MD  [phone]

## 2018-05-23 NOTE — CONSULT NOTE ADULT - SUBJECTIVE AND OBJECTIVE BOX
[facility] DIVISION OF KIDNEY DISEASES AND HYPERTENSION -- INITIAL CONSULT NOTE  --------------------------------------------------------------------------------    HPI: 58 year old male with PMH of HTN, CAD s/p CABG, ESRD secondary to PKD ON HD (3/2011) TTS from [facility] presents for DDRT from Hep C positive donor. Pt follows with outpatient nephrologist Dr. Caleb Mckeon at [facility] dialysis center. Pt last outpatient dialysis was Tuesday with 3.5L removed. Pt seen and examined. Pt denies fevers, chills, CP, SOB, abdominal pain or LE edema.     PAST HISTORY  --------------------------------------------------------------------------------  PAST MEDICAL & SURGICAL HISTORY:  HTN (hypertension)  Coronary artery disease involving coronary bypass graft  ESRD (end stage renal disease) on dialysis  Obesity  Hemorrhoids  Gastroesophageal reflux disease without esophagitis  High cholesterol  ESRD on Dialysis: Tue, Thur &amp; Sat  CAD (Coronary Artery Disease)  PCK (Polycystic Kidney Disease)  HTN - Hypertension  Asthma: last attack 2007, never hospitalized or intubated  AV fistula: b/l MILI GREEN  S/P coronary artery stent placement  S/P CABG x 5  S/P hemorrhoidectomy: and rectal polypectomy -2/3/2017.  AV fistula: right lower extremity 2 yrs  Left upper extrmity with graft 7 years ago  Stented coronary artery: x2 cardiac stents in 2016, one cardiac stent in 2015  CABG (Coronary Artery Bypass Graft): 2007    FAMILY HISTORY:  Family history of polycystic kidney (Sibling)  Family history of adult polycystic kidney disease (Sibling)    PAST SOCIAL HISTORY:    ALLERGIES & MEDICATIONS  --------------------------------------------------------------------------------  Allergies    No Known Allergies    Intolerances      Standing Inpatient Medications    PRN Inpatient Medications      REVIEW OF SYSTEMS  --------------------------------------------------------------------------------  Gen: No weight changes, fatigue, fevers/chills, weakness  Skin: No rashes  Head/Eyes/Ears/Mouth: No headache  Respiratory: No dyspnea, cough  CV: No chest pain, PND, orthopnea  GI: No abdominal pain, diarrhea, constipation, nausea, vomiting  : No increased frequency, dysuria, hematuria, nocturia  MSK: No edema  Neuro: No dizziness/lightheadedness  Heme: No easy bruising or bleeding  Endo: No heat/cold intolerance  Psych: No significant nervousness, anxiety, stress, depression    All other systems were reviewed and are negative, except as noted.    VITALS/PHYSICAL EXAM  --------------------------------------------------------------------------------  T(C): 36.6 (05-23-18 @ 17:35), Max: 36.6 (05-23-18 @ 17:35)  HR: 71 (05-23-18 @ 17:35) (71 - 71)  BP: 144/77 (05-23-18 @ 17:35) (144/77 - 144/77)  RR: 18 (05-23-18 @ 17:35) (18 - 18)  SpO2: 96% (05-23-18 @ 17:35) (96% - 96%)  Wt(kg): --  Height (cm): 165.1 (05-23-18 @ 17:35)  Weight (kg): 82.5 (05-23-18 @ 17:35)  BMI (kg/m2): 30.3 (05-23-18 @ 17:35)  BSA (m2): 1.9 (05-23-18 @ 17:35)      Physical Exam:  	Gen: NAD, well-appearing  	HEENT: supple neck  	Pulm: CTA B/L  	CV: RRR, S1S2; no rub  	Abd: +BS, soft, nontender/nondistended, obese  	: No suprapubic tenderness  	UE: Warm, no asterixis  	LE: Warm, no edema + pedal pulses b/l   	Psych: Normal affect and mood  	Skin: Warm, without rashes  	Vascular access: + RUE AVF + thrill, + bruit; + LUE AVF aneurysmal appearance, + small eschar, + thrill + bruit     LABS/STUDIES  --------------------------------------------------------------------------------      Pending       Creatinine Trend:    Urinalysis - [08-27-14 @ 18:58]      Color Pale Yellow / Appearance Clear / SG 1.007 / pH 7.0      Gluc Normal / Ketone Negative  / Bili Negative / Urobili Normal       Blood Moderate / Protein 25 / Leuk Est Moderate / Nitrite Negative      RBC 3-5 / WBC 6-10 / Hyaline  / Gran  / Sq Epi  / Non Sq Epi Few / Bacteria Occasional    HbA1c 5.6      [11-22-17 @ 03:20]  TSH 1.35      [11-22-17 @ 03:20]  Lipid: chol 85, , HDL 28, LDL 38      [11-22-17 @ 03:20]    HBsAb 19.4      [11-22-17 @ 20:00]  HBsAb Reactive      [05-09-16 @ 19:26]  HBsAg NEGATIVE      [11-22-17 @ 20:00]  HBcAb Nonreactive      [11-22-17 @ 20:00]  HCV 0.12, Nonreactive Hepatitis C AB  S/CO Ratio                        Interpretation  < 1.0                                     Non-Reactive  1.0 - 4.9                           Weakly-Reactive  > 5.0                                 Reactive  Non-Reactive: Aperson with a non-reactive HCV antibody  result is considered uninfected.  No further action is  needed unless recent infection is suspected.  In these  cases, consider repeat testing later to detect  seroconversion..  Weakly-Reactive: HCV antibody test is abnormal, HCV RNA  Qualitative test will follow.  Reactive: HCV antibody test is abnormal, HCV RNA  Qualitative test will follow.  Note: HCV antibody testing is performed on the [employer]   system.      [11-22-17 @ 20:00]  HIV Nonreact      [05-09-16 @ 19:26]

## 2018-05-23 NOTE — H&P ADULT - FAMILY HISTORY
Sibling  Still living? Yes, Estimated age: Age Unknown  Family history of adult polycystic kidney disease, Age at diagnosis: Age Unknown     Sibling  Still living? Unknown  Family history of polycystic kidney, Age at diagnosis: Age Unknown

## 2018-05-23 NOTE — CONSULT NOTE ADULT - PROBLEM SELECTOR RECOMMENDATION 9
Pt with hx of ESRD on HD planned for DDRT from Hep C positive donor. Follow up BMP. Last HD was 5/22/18. Consent obtained if patient needs HD prior to OR. Monitor BMP

## 2018-05-23 NOTE — H&P ADULT - PROBLEM SELECTOR PLAN 1
Await DDRT in AM. Simulect, methylpredisolone and Ancef on call to OR.   NPO after MN  Nephrology aware   CBC, CMP, Mg, Phos, T&S, EKG pending  I&O

## 2018-05-23 NOTE — H&P ADULT - PSH
AV fistula  b/l RUE, LUE  AV fistula  right lower extremity 2 yrs  Left upper extrmity with graft 7 years ago  CABG (Coronary Artery Bypass Graft)  2007  S/P CABG x 5    S/P coronary artery stent placement    S/P hemorrhoidectomy  and rectal polypectomy -2/3/2017.  Stented coronary artery  x2 cardiac stents in 2016, one cardiac stent in 2015

## 2018-05-23 NOTE — H&P ADULT - PMH
Asthma  last attack 2007, never hospitalized or intubated  CAD (Coronary Artery Disease)    Coronary artery disease involving coronary bypass graft    ESRD (end stage renal disease) on dialysis    ESRD on Dialysis  Tue, Thur & Sat  Gastroesophageal reflux disease without esophagitis    Hemorrhoids    High cholesterol    HTN (hypertension)    HTN - Hypertension    Obesity    PCK (Polycystic Kidney Disease)

## 2018-05-23 NOTE — H&P ADULT - NSHPPHYSICALEXAM_GEN_ALL_CORE
Constitutional: Well developed / well nourished  Eyes: Anicteric, PERRLA  ENMT: nc/at  Neck: supple  Respiratory: CTA B/L  Cardiovascular: RRR  Gastrointestinal: Soft abdomen, +BS  Genitourinary:  voids small amount urine  Extremities: no edema. BUE AVF  Vascular: Palpable dp pulses bilaterally  Neurological: A&O x3  Skin: clean dry intact  Musculoskeletal: Moving all extremities  Psychiatric: Responsive

## 2018-05-23 NOTE — H&P ADULT - NSHPREVIEWOFSYSTEMS_GEN_ALL_CORE
Gen: No weight changes, fatigue, fevers/chills, weakness  Skin: No rashes  Head/Eyes/Ears/Mouth: No headache; Normal hearing; Normal vision w/o blurriness; No sinus pain/discomfort, sore throat  Respiratory: No dyspnea, cough, wheezing, hemoptysis  CV: No chest pain, PND, orthopnea  GI: No abdominal pain, diarrhea, constipation, nausea, vomiting, melena, hematochezia  : No increased frequency, dysuria, hematuria, nocturia  MSK: No joint pain/swelling; no back pain; no edema  Neuro: No dizziness/lightheadedness, weakness, seizures, numbness, tingling  Heme: No easy bruising or bleeding  Endo: No heat/cold intolerance  Psych: No significant nervousness, anxiety, stress, depression  All other systems were reviewed and are negative, except as noted.

## 2018-05-23 NOTE — H&P ADULT - ASSESSMENT
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 Denies CP, SOB, N/V, diarrhea, fever, chills, recent travel outside of country. Last took Plavix and ASA on 5/22/18. Tentative OR in AM

## 2018-05-23 NOTE — H&P ADULT - ATTENDING COMMENTS
Donor ID: [id number]  38 yo male  KDPI 69%  PHS increased risk  HCV Ab+  HCV ANITA +  Terminal creatinine 1.2  Cross-clamp time: 05- 12:43 PM  CMV+ / EBV+ / HBcAb+  Left kidney, 3 arteries, 1 vein, 1 ureter  HLA 6/6 mismatch (2,2,2)  CPRA 0%  Both donor and recipient blood type B  Simulect induction    Recipient:  PCKD  CMV+ / EBV+ / HBsAb+ Donor ID: [id number]  40 yo male  KDPI 69%  PHS increased risk  HCV Ab+  HCV ANITA +  Terminal creatinine 1.2  Cross-clamp time: 05- 12:43 PM  CMV+ / EBV+ / HBcAb+  Left kidney, 3 arteries, 1 vein, 1 ureter  HLA 6/6 mismatch (2,2,2)  CPRA 0%  Both donor and recipient blood type B  Simulect induction  Recipient:  [facility]KD  CMV+ / EBV+ / HBsAb+  I personally discussed case with Dr. Sung Mckeon (referring nephrologist) I personally saw and examined patient together with pt's nurse Katelyn.  Palpable dorsalis pedis pulses bilaterally  I personally obtained the consents after discussing all findings at length with patient, and marked both the surgical site and the presence and location of dorsalis pedis pulses bilaterally.  Patient is aware that:  - this is a high risk CDC (increased risk PHS) donor, and as such there is a greater potential risk of transmission of hepatitis B, HIV/AIDS, and other infections  - ANITA is negative for HBV and HIV  - this is a hepatitis C positive donor and as such he WILL ACQUIRE hepatitis C (a viral infection) at the time of transplantation, that we will treat after the transplant with a 98%-99% reported success rate  - there is a 50% chance of delayed graft function, requiring dialysis after transplantation  - the donor is a male in his early 40s.  - this is a hepatitis B core antibody positive (ANITA negative), which is associated with a potentially increased risk of transmission of hepatitis B.  Patient is hepatitis B surface antibody positive.  - we cannot provide any guarantees.  Patient understands and agrees to proceed.    Donor ID: [id number]  40 yo male  KDPI 69%  PHS increased risk  HCV Ab+  HCV ANITA +  Terminal creatinine 1.2  Cross-clamp time: 05- 12:43 PM  CMV+ / EBV+ / HBcAb+  Left kidney, 3 arteries, 1 vein, 1 ureter  HLA 6/6 mismatch (2,2,2)  CPRA 0%  Virtual crossmatch negative.  Both donor and recipient blood type B  Simulect induction  Recipient:  PCKD  CMV+ / EBV+ / HBsAb+  I personally discussed case with Dr. Sung Mckeon (referring nephrologist) and Dr. Jeffrey Chauhan (cardiologist). I personally saw and examined patient together with pt's nurse Katelyn.  Palpable dorsalis pedis pulses bilaterally  I personally obtained the consents after discussing all findings at length with patient, and marked both the surgical site and the presence and location of dorsalis pedis pulses bilaterally.  Patient is aware that:  - this is a high risk CDC (increased risk PHS) donor, and as such there is a greater potential risk of transmission of hepatitis B, HIV/AIDS, and other infections  - ANITA is negative for HBV and HIV  - this is a hepatitis C positive donor and as such he WILL ACQUIRE hepatitis C (a viral infection) at the time of transplantation, that we will treat after the transplant with a 98%-99% reported success rate  - there is a 50% chance of delayed graft function, requiring dialysis after transplantation  - the donor is a male in his early 40s.  - this is a hepatitis B core antibody positive (ANITA negative), which is associated with a potentially increased risk of transmission of hepatitis B.  Patient is hepatitis B surface antibody positive.  - we cannot provide any guarantees.  I answered all questions from patient.  He understands and agrees to proceed.    Donor ID: [id number]  38 yo male  KDPI 69%  PHS increased risk  HCV Ab+  HCV ANITA +  Terminal creatinine 1.2  Cross-clamp time: 05- 12:43 PM  CMV+ / EBV+ / HBcAb+  Left kidney, 3 arteries, 1 vein, 1 ureter  HLA 6/6 mismatch (2,2,2)  CPRA 0%  Virtual crossmatch negative.  Both donor and recipient blood type B  Simulect induction  Recipient:  PCKD  CMV+ / EBV+ / HBsAb+  I personally discussed case with Dr. Sung Mckeon (referring nephrologist) and Dr. Jeffrey Chauhan (cardiologist). I personally saw and examined patient together with pt's nurse Katelyn.  Palpable dorsalis pedis pulses bilaterally  I personally obtained the consents after discussing all findings at length with patient, and marked both the surgical site and the presence and location of dorsalis pedis pulses bilaterally.  Patient is aware that:  - this is a high risk CDC (increased risk PHS) donor, and as such there is a greater potential risk of transmission of hepatitis B, HIV/AIDS, and other infections  - ANITA is negative for HBV and HIV  - this is a hepatitis C positive donor and as such he WILL ACQUIRE hepatitis C (a viral infection) at the time of transplantation, that we will treat after the transplant with a 98%-99% reported success rate  - there is a 50% chance of delayed graft function, requiring dialysis after transplantation  - the kidney has 3 arteries, that carries an increased risk of vascular complications such as bleeding/thrombosis.  - the donor is a male in his early 40s.  - this is a hepatitis B core antibody positive (ANITA negative), which is associated with a potentially increased risk of transmission of hepatitis B.  Patient is hepatitis B surface antibody positive.  - we cannot provide any guarantees.  I answered all questions from patient.  He understands and agrees to proceed.    Donor ID: [id number]  40 yo male  KDPI 69%  PHS increased risk  HCV Ab+  HCV ANITA +  Terminal creatinine 1.2  Cross-clamp time: 05- 12:43 PM  CMV+ / EBV+ / HBcAb+  Left kidney, 3 arteries, 1 vein, 1 ureter  HLA 6/6 mismatch (2,2,2)  CPRA 0%  Virtual crossmatch negative.  Both donor and recipient blood type B  Simulect induction  Recipient:  PCKD  CMV+ / EBV+ / HBsAb+  I personally discussed case with Dr. Sung Mckeon (referring nephrologist) and Dr. Jeffrey Chauhan (cardiologist).

## 2018-05-24 ENCOUNTER — APPOINTMENT (OUTPATIENT)
Dept: TRANSPLANT | Facility: CLINIC | Age: 58
End: 2018-05-24

## 2018-05-24 DIAGNOSIS — I25.810 ATHEROSCLEROSIS OF CORONARY ARTERY BYPASS GRAFT(S) WITHOUT ANGINA PECTORIS: ICD-10-CM

## 2018-05-24 DIAGNOSIS — K21.9 GASTRO-ESOPHAGEAL REFLUX DISEASE WITHOUT ESOPHAGITIS: ICD-10-CM

## 2018-05-24 LAB
ALBUMIN SERPL ELPH-MCNC: 4.1 G/DL — SIGNIFICANT CHANGE UP (ref 3.3–5)
ALP SERPL-CCNC: 33 U/L — LOW (ref 40–120)
ALT FLD-CCNC: 16 U/L — SIGNIFICANT CHANGE UP (ref 10–45)
ANION GAP SERPL CALC-SCNC: 12 MMOL/L — SIGNIFICANT CHANGE UP (ref 5–17)
ANION GAP SERPL CALC-SCNC: 15 MMOL/L — SIGNIFICANT CHANGE UP (ref 5–17)
APTT BLD: 29.7 SEC — SIGNIFICANT CHANGE UP (ref 27.5–37.4)
AST SERPL-CCNC: 24 U/L — SIGNIFICANT CHANGE UP (ref 10–40)
BILIRUB SERPL-MCNC: 0.4 MG/DL — SIGNIFICANT CHANGE UP (ref 0.2–1.2)
BUN SERPL-MCNC: 26 MG/DL — HIGH (ref 7–23)
BUN SERPL-MCNC: 32 MG/DL — HIGH (ref 7–23)
CALCIUM SERPL-MCNC: 7.2 MG/DL — LOW (ref 8.4–10.5)
CALCIUM SERPL-MCNC: 8.6 MG/DL — SIGNIFICANT CHANGE UP (ref 8.4–10.5)
CHLORIDE SERPL-SCNC: 100 MMOL/L — SIGNIFICANT CHANGE UP (ref 96–108)
CHLORIDE SERPL-SCNC: 99 MMOL/L — SIGNIFICANT CHANGE UP (ref 96–108)
CK MB BLD-MCNC: 1.9 % — SIGNIFICANT CHANGE UP (ref 0–3.5)
CK MB CFR SERPL CALC: 3 NG/ML — SIGNIFICANT CHANGE UP (ref 0–6.7)
CK SERPL-CCNC: 160 U/L — SIGNIFICANT CHANGE UP (ref 30–200)
CO2 SERPL-SCNC: 22 MMOL/L — SIGNIFICANT CHANGE UP (ref 22–31)
CO2 SERPL-SCNC: 30 MMOL/L — SIGNIFICANT CHANGE UP (ref 22–31)
CREAT SERPL-MCNC: 6.7 MG/DL — HIGH (ref 0.5–1.3)
CREAT SERPL-MCNC: 7.35 MG/DL — HIGH (ref 0.5–1.3)
GAS PNL BLDA: SIGNIFICANT CHANGE UP
GLUCOSE SERPL-MCNC: 105 MG/DL — HIGH (ref 70–99)
GLUCOSE SERPL-MCNC: 179 MG/DL — HIGH (ref 70–99)
HCT VFR BLD CALC: 38.8 % — LOW (ref 39–50)
HCT VFR BLD CALC: 39.4 % — SIGNIFICANT CHANGE UP (ref 39–50)
HCT VFR BLD CALC: 39.7 % — SIGNIFICANT CHANGE UP (ref 39–50)
HGB BLD-MCNC: 12 G/DL — LOW (ref 13–17)
HGB BLD-MCNC: 12.5 G/DL — LOW (ref 13–17)
HGB BLD-MCNC: 12.8 G/DL — LOW (ref 13–17)
INR BLD: 1.07 RATIO — SIGNIFICANT CHANGE UP (ref 0.88–1.16)
MAGNESIUM SERPL-MCNC: 1.8 MG/DL — SIGNIFICANT CHANGE UP (ref 1.6–2.6)
MAGNESIUM SERPL-MCNC: 1.8 MG/DL — SIGNIFICANT CHANGE UP (ref 1.6–2.6)
MAGNESIUM SERPL-MCNC: 1.9 MG/DL — SIGNIFICANT CHANGE UP (ref 1.6–2.6)
MCHC RBC-ENTMCNC: 30 PG — SIGNIFICANT CHANGE UP (ref 27–34)
MCHC RBC-ENTMCNC: 30.7 PG — SIGNIFICANT CHANGE UP (ref 27–34)
MCHC RBC-ENTMCNC: 30.8 PG — SIGNIFICANT CHANGE UP (ref 27–34)
MCHC RBC-ENTMCNC: 30.9 GM/DL — LOW (ref 32–36)
MCHC RBC-ENTMCNC: 31.8 GM/DL — LOW (ref 32–36)
MCHC RBC-ENTMCNC: 32.3 GM/DL — SIGNIFICANT CHANGE UP (ref 32–36)
MCV RBC AUTO: 95.5 FL — SIGNIFICANT CHANGE UP (ref 80–100)
MCV RBC AUTO: 96.5 FL — SIGNIFICANT CHANGE UP (ref 80–100)
MCV RBC AUTO: 97.2 FL — SIGNIFICANT CHANGE UP (ref 80–100)
PHOSPHATE SERPL-MCNC: 3.1 MG/DL — SIGNIFICANT CHANGE UP (ref 2.5–4.5)
PHOSPHATE SERPL-MCNC: 3.3 MG/DL — SIGNIFICANT CHANGE UP (ref 2.5–4.5)
PHOSPHATE SERPL-MCNC: 4.9 MG/DL — HIGH (ref 2.5–4.5)
PLATELET # BLD AUTO: 131 K/UL — LOW (ref 150–400)
PLATELET # BLD AUTO: 163 K/UL — SIGNIFICANT CHANGE UP (ref 150–400)
PLATELET # BLD AUTO: 260 K/UL — SIGNIFICANT CHANGE UP (ref 150–400)
POTASSIUM SERPL-MCNC: 4 MMOL/L — SIGNIFICANT CHANGE UP (ref 3.5–5.3)
POTASSIUM SERPL-MCNC: 5.5 MMOL/L — HIGH (ref 3.5–5.3)
POTASSIUM SERPL-SCNC: 4 MMOL/L — SIGNIFICANT CHANGE UP (ref 3.5–5.3)
POTASSIUM SERPL-SCNC: 5.5 MMOL/L — HIGH (ref 3.5–5.3)
PROT SERPL-MCNC: 7.1 G/DL — SIGNIFICANT CHANGE UP (ref 6–8.3)
PROTHROM AB SERPL-ACNC: 11.7 SEC — SIGNIFICANT CHANGE UP (ref 9.8–12.7)
RBC # BLD: 4 M/UL — LOW (ref 4.2–5.8)
RBC # BLD: 4.08 M/UL — LOW (ref 4.2–5.8)
RBC # BLD: 4.16 M/UL — LOW (ref 4.2–5.8)
RBC # FLD: 13.6 % — SIGNIFICANT CHANGE UP (ref 10.3–14.5)
RBC # FLD: 13.7 % — SIGNIFICANT CHANGE UP (ref 10.3–14.5)
RBC # FLD: 13.9 % — SIGNIFICANT CHANGE UP (ref 10.3–14.5)
SODIUM SERPL-SCNC: 137 MMOL/L — SIGNIFICANT CHANGE UP (ref 135–145)
SODIUM SERPL-SCNC: 141 MMOL/L — SIGNIFICANT CHANGE UP (ref 135–145)
TROPONIN T SERPL-MCNC: <0.01 NG/ML — SIGNIFICANT CHANGE UP (ref 0–0.06)
WBC # BLD: 10.8 K/UL — HIGH (ref 3.8–10.5)
WBC # BLD: 3.8 K/UL — SIGNIFICANT CHANGE UP (ref 3.8–10.5)
WBC # BLD: 8 K/UL — SIGNIFICANT CHANGE UP (ref 3.8–10.5)
WBC # FLD AUTO: 10.8 K/UL — HIGH (ref 3.8–10.5)
WBC # FLD AUTO: 3.8 K/UL — SIGNIFICANT CHANGE UP (ref 3.8–10.5)
WBC # FLD AUTO: 8 K/UL — SIGNIFICANT CHANGE UP (ref 3.8–10.5)

## 2018-05-24 PROCEDURE — 50360 RNL ALTRNSPLJ W/O RCP NFRCT: CPT | Mod: GC

## 2018-05-24 PROCEDURE — 99232 SBSQ HOSP IP/OBS MODERATE 35: CPT | Mod: GC

## 2018-05-24 PROCEDURE — 71045 X-RAY EXAM CHEST 1 VIEW: CPT | Mod: 26

## 2018-05-24 PROCEDURE — 71045 X-RAY EXAM CHEST 1 VIEW: CPT | Mod: 26,77

## 2018-05-24 PROCEDURE — 99291 CRITICAL CARE FIRST HOUR: CPT

## 2018-05-24 PROCEDURE — 93010 ELECTROCARDIOGRAM REPORT: CPT

## 2018-05-24 RX ORDER — HYDROMORPHONE HYDROCHLORIDE 2 MG/ML
0.5 INJECTION INTRAMUSCULAR; INTRAVENOUS; SUBCUTANEOUS
Qty: 0 | Refills: 0 | Status: DISCONTINUED | OUTPATIENT
Start: 2018-05-24 | End: 2018-05-24

## 2018-05-24 RX ORDER — MYCOPHENOLATE MOFETIL 250 MG/1
1000 CAPSULE ORAL
Qty: 0 | Refills: 0 | Status: DISCONTINUED | OUTPATIENT
Start: 2018-05-24 | End: 2018-06-02

## 2018-05-24 RX ORDER — NALOXONE HYDROCHLORIDE 4 MG/.1ML
0.1 SPRAY NASAL
Qty: 0 | Refills: 0 | Status: DISCONTINUED | OUTPATIENT
Start: 2018-05-24 | End: 2018-05-25

## 2018-05-24 RX ORDER — NOREPINEPHRINE BITARTRATE/D5W 8 MG/250ML
0.05 PLASTIC BAG, INJECTION (ML) INTRAVENOUS
Qty: 8 | Refills: 0 | Status: DISCONTINUED | OUTPATIENT
Start: 2018-05-24 | End: 2018-05-25

## 2018-05-24 RX ORDER — VALGANCICLOVIR 450 MG/1
450 TABLET, FILM COATED ORAL DAILY
Qty: 0 | Refills: 0 | Status: DISCONTINUED | OUTPATIENT
Start: 2018-05-25 | End: 2018-06-02

## 2018-05-24 RX ORDER — INSULIN LISPRO 100/ML
VIAL (ML) SUBCUTANEOUS EVERY 6 HOURS
Qty: 0 | Refills: 0 | Status: DISCONTINUED | OUTPATIENT
Start: 2018-05-24 | End: 2018-05-25

## 2018-05-24 RX ORDER — DOPAMINE HYDROCHLORIDE 40 MG/ML
5 INJECTION, SOLUTION, CONCENTRATE INTRAVENOUS
Qty: 400 | Refills: 0 | Status: DISCONTINUED | OUTPATIENT
Start: 2018-05-24 | End: 2018-05-24

## 2018-05-24 RX ORDER — MAGNESIUM SULFATE 500 MG/ML
1 VIAL (ML) INJECTION ONCE
Qty: 0 | Refills: 0 | Status: COMPLETED | OUTPATIENT
Start: 2018-05-24 | End: 2018-05-24

## 2018-05-24 RX ORDER — HYDROMORPHONE HYDROCHLORIDE 2 MG/ML
0.5 INJECTION INTRAMUSCULAR; INTRAVENOUS; SUBCUTANEOUS
Qty: 0 | Refills: 0 | Status: DISCONTINUED | OUTPATIENT
Start: 2018-05-24 | End: 2018-05-25

## 2018-05-24 RX ORDER — NYSTATIN 500MM UNIT
500000 POWDER (EA) MISCELLANEOUS
Qty: 0 | Refills: 0 | Status: DISCONTINUED | OUTPATIENT
Start: 2018-05-25 | End: 2018-06-02

## 2018-05-24 RX ORDER — SODIUM CHLORIDE 9 MG/ML
1000 INJECTION INTRAMUSCULAR; INTRAVENOUS; SUBCUTANEOUS
Qty: 0 | Refills: 0 | Status: DISCONTINUED | OUTPATIENT
Start: 2018-05-24 | End: 2018-05-27

## 2018-05-24 RX ORDER — ASPIRIN/CALCIUM CARB/MAGNESIUM 324 MG
81 TABLET ORAL DAILY
Qty: 0 | Refills: 0 | Status: DISCONTINUED | OUTPATIENT
Start: 2018-05-25 | End: 2018-05-30

## 2018-05-24 RX ORDER — NOREPINEPHRINE BITARTRATE/D5W 8 MG/250ML
0.05 PLASTIC BAG, INJECTION (ML) INTRAVENOUS
Qty: 8 | Refills: 0 | Status: DISCONTINUED | OUTPATIENT
Start: 2018-05-24 | End: 2018-05-24

## 2018-05-24 RX ORDER — TACROLIMUS 5 MG/1
4 CAPSULE ORAL
Qty: 0 | Refills: 0 | Status: DISCONTINUED | OUTPATIENT
Start: 2018-05-24 | End: 2018-05-25

## 2018-05-24 RX ORDER — ONDANSETRON 8 MG/1
4 TABLET, FILM COATED ORAL EVERY 6 HOURS
Qty: 0 | Refills: 0 | Status: DISCONTINUED | OUTPATIENT
Start: 2018-05-24 | End: 2018-05-25

## 2018-05-24 RX ORDER — HYDROMORPHONE HYDROCHLORIDE 2 MG/ML
30 INJECTION INTRAMUSCULAR; INTRAVENOUS; SUBCUTANEOUS
Qty: 0 | Refills: 0 | Status: DISCONTINUED | OUTPATIENT
Start: 2018-05-24 | End: 2018-05-25

## 2018-05-24 RX ORDER — SODIUM CHLORIDE 9 MG/ML
1000 INJECTION INTRAMUSCULAR; INTRAVENOUS; SUBCUTANEOUS
Qty: 0 | Refills: 0 | Status: DISCONTINUED | OUTPATIENT
Start: 2018-05-24 | End: 2018-05-24

## 2018-05-24 RX ORDER — FAMOTIDINE 10 MG/ML
20 INJECTION INTRAVENOUS DAILY
Qty: 0 | Refills: 0 | Status: DISCONTINUED | OUTPATIENT
Start: 2018-05-25 | End: 2018-06-02

## 2018-05-24 RX ORDER — SODIUM CHLORIDE 9 MG/ML
1000 INJECTION INTRAMUSCULAR; INTRAVENOUS; SUBCUTANEOUS ONCE
Qty: 0 | Refills: 0 | Status: COMPLETED | OUTPATIENT
Start: 2018-05-24 | End: 2018-05-24

## 2018-05-24 RX ADMIN — SODIUM CHLORIDE 2000 MILLILITER(S): 9 INJECTION INTRAMUSCULAR; INTRAVENOUS; SUBCUTANEOUS at 17:07

## 2018-05-24 RX ADMIN — TACROLIMUS 4 MILLIGRAM(S): 5 CAPSULE ORAL at 18:17

## 2018-05-24 RX ADMIN — MYCOPHENOLATE MOFETIL 1000 MILLIGRAM(S): 250 CAPSULE ORAL at 18:17

## 2018-05-24 RX ADMIN — Medication 100 GRAM(S): at 08:04

## 2018-05-24 RX ADMIN — DOPAMINE HYDROCHLORIDE 15.69 MICROGRAM(S)/KG/MIN: 40 INJECTION, SOLUTION, CONCENTRATE INTRAVENOUS at 16:09

## 2018-05-24 RX ADMIN — HYDROMORPHONE HYDROCHLORIDE 30 MILLILITER(S): 2 INJECTION INTRAMUSCULAR; INTRAVENOUS; SUBCUTANEOUS at 19:22

## 2018-05-24 RX ADMIN — SODIUM CHLORIDE 100 MILLILITER(S): 9 INJECTION INTRAMUSCULAR; INTRAVENOUS; SUBCUTANEOUS at 16:09

## 2018-05-24 RX ADMIN — HYDROMORPHONE HYDROCHLORIDE 30 MILLILITER(S): 2 INJECTION INTRAMUSCULAR; INTRAVENOUS; SUBCUTANEOUS at 17:06

## 2018-05-24 RX ADMIN — HYDROMORPHONE HYDROCHLORIDE 30 MILLILITER(S): 2 INJECTION INTRAMUSCULAR; INTRAVENOUS; SUBCUTANEOUS at 15:30

## 2018-05-24 RX ADMIN — HYDROMORPHONE HYDROCHLORIDE 0.5 MILLIGRAM(S): 2 INJECTION INTRAMUSCULAR; INTRAVENOUS; SUBCUTANEOUS at 21:49

## 2018-05-24 RX ADMIN — SODIUM CHLORIDE 70 MILLILITER(S): 9 INJECTION INTRAMUSCULAR; INTRAVENOUS; SUBCUTANEOUS at 16:08

## 2018-05-24 RX ADMIN — HYDROMORPHONE HYDROCHLORIDE 30 MILLILITER(S): 2 INJECTION INTRAMUSCULAR; INTRAVENOUS; SUBCUTANEOUS at 18:27

## 2018-05-24 NOTE — CONSULT NOTE ADULT - SUBJECTIVE AND OBJECTIVE BOX
HISTORY OF PRESENT ILLNESS:  FLOYD NEVES is a 58y Male     PAST MEDICAL HISTORY: HTN (hypertension)  Coronary artery disease involving coronary bypass graft  ESRD (end stage renal disease) on dialysis  Obesity  Hemorrhoids  Gastroesophageal reflux disease without esophagitis  High cholesterol  ESRD on Dialysis  CAD (Coronary Artery Disease)  HTN (Hypertension)  CAD (Coronary Atherosclerotic Disease)  PCK (Polycystic Kidney Disease)  HTN - Hypertension  Asthma      PAST SURGICAL HISTORY: AV fistula  S/P coronary artery stent placement  S/P CABG x 5  S/P CABG x 7  S/P hemorrhoidectomy  AV fistula  Stented coronary artery  CABG (Coronary Artery Bypass Graft)  S/P CABG      FAMILY HISTORY: Family history of polycystic kidney (Sibling)  Family history of adult polycystic kidney disease (Sibling)  No pertinent family history in first degree relatives      SOCIAL HISTORY:    CODE STATUS:     HOME MEDICATIONS:    ALLERGIES: No Known Allergies      VITAL SIGNS:  ICU Vital Signs Last 24 Hrs  T(C): 36.8 (24 May 2018 17:00), Max: 36.9 (23 May 2018 21:14)  T(F): 98.2 (24 May 2018 17:00), Max: 98.4 (23 May 2018 21:14)  HR: 113 (24 May 2018 17:00) (72 - 122)  BP: 92/51 (24 May 2018 17:00) (92/51 - 153/73)  BP(mean): 65 (24 May 2018 17:00) (65 - 94)  ABP: 102/59 (24 May 2018 17:00) (92/57 - 109/57)  ABP(mean): 73 (24 May 2018 17:00) (68 - 78)  RR: 16 (24 May 2018 17:00) (15 - 18)  SpO2: 97% (24 May 2018 17:00) (92% - 97%)      NEURO  Exam:  Meds:HYDROmorphone  Injectable 0.5 milliGRAM(s) IV Push every 10 minutes PRN Moderate Pain  HYDROmorphone PCA (1 mG/mL) 30 milliLiter(s) PCA Continuous PCA Continuous  HYDROmorphone PCA (1 mG/mL) Rescue Clinician Bolus 0.5 milliGRAM(s) IV Push every 15 minutes PRN for Pain Scale GREATER THAN 6  ondansetron Injectable 4 milliGRAM(s) IV Push every 6 hours PRN Nausea      RESPIRATORY  Mechanical Ventilation:     Exam:  Meds:    CARDIOVASCULAR    Exam:  Cardiac Rhythm:  Meds:DOPamine Infusion 5 MICROgram(s)/kG/Min IV Continuous <Continuous>      GI/NUTRITION  Exam:  Diet:  Meds:    GENITOURINARY/RENAL  Meds:sodium chloride 0.9%. 1000 milliLiter(s) IV Continuous <Continuous>  sodium chloride 0.9%. 1000 milliLiter(s) IV Continuous <Continuous>      05-23 @ 07:01  -  05-24 @ 07:00  --------------------------------------------------------  IN:    Oral Fluid: 240 mL    Other: 800 mL  Total IN: 1040 mL    OUT:    Other: 800 mL  Total OUT: 800 mL    Total NET: 240 mL      05-24 @ 07:01  -  05-24 @ 17:39  --------------------------------------------------------  IN:    DOPamine Infusion: 56.5 mL    sodium chloride 0.9%.: 210 mL    Solution: 100 mL  Total IN: 366.5 mL    OUT:    Indwelling Catheter - Urethral: 55 mL  Total OUT: 55 mL    Total NET: 311.5 mL        Weight (kg): 83.7 (05-24 @ 04:38)  05-24    131<L>  |  108  |  28<H>  ----------------------------<  202<H>  3.9   |  21<L>  |  6.77<H>    Ca    7.6<L>      24 May 2018 15:32  Phos  3.3     05-24  Mg     1.9     05-24    TPro  6.8  /  Alb  4.0  /  TBili  0.4  /  DBili  x   /  AST  19  /  ALT  16  /  AlkPhos  38<L>  05-24    [ ] Jean catheter, indication: urine output monitoring in critically ill patient    HEMATOLOGIC  [ ] VTE Prophylaxis:                          12.5   8.0   )-----------( 163      ( 24 May 2018 15:26 )             39.4     PT/INR - ( 23 May 2018 18:46 )   PT: 12.2 sec;   INR: 1.13 ratio         PTT - ( 23 May 2018 18:46 )  PTT:31.8 sec  Transfusion: [ ] PRBC	[ ] Platelets	[ ] FFP	[ ] Cryoprecipitate      INFECTIOUS DISEASES  Meds:mycophenolate mofetil 1000 milliGRAM(s) Oral two times a day  tacrolimus 4 milliGRAM(s) Oral two times a day    RECENT CULTURES:      ENDOCRINE  Meds:  CAPILLARY BLOOD GLUCOSE          PATIENT CARE ACCESS DEVICES:  [ ] Peripheral IV  [ ] Central Venous Line	[ ] R	[ ] L	[ ] IJ	[ ] Fem	[ ] SC	Placed:   [ ] Arterial Line		[ ] R	[ ] L	[ ] Fem	[ ] Rad	[ ] Ax	Placed:   [ ] PICC:					[ ] Mediport  [ ] Urinary Catheter, Date Placed:   [x] Necessity of urinary, arterial, and venous catheters discussed    OTHER MEDICATIONS: naloxone Injectable 0.1 milliGRAM(s) IV Push every 3 minutes PRN      IMAGING STUDIES: HISTORY OF PRESENT ILLNESS:  FLOYD NEVES is a 58 year old gentleman with h/o HTN, CAD s/p CABG in 2007, 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 Denies CP, SOB, N/V, diarrhea, fever, chills, recent travel outside of country. He underwent DDRT on 5/24 with a donor left nephrectomy to R external iliac vessels. He tolerated the procedure well. He was making urine in the PACU. He had a PCA but still complained of some pain. SICU was consulted because he required pressors to reach goal SBP >120.    PAST MEDICAL HISTORY: HTN (hypertension)  Coronary artery disease involving coronary bypass graft  ESRD (end stage renal disease) on dialysis  Obesity  Hemorrhoids  Gastroesophageal reflux disease without esophagitis  High cholesterol  ESRD on Dialysis  CAD (Coronary Artery Disease)  HTN (Hypertension)  CAD (Coronary Atherosclerotic Disease)  PCK (Polycystic Kidney Disease)  HTN - Hypertension  Asthma      PAST SURGICAL HISTORY: AV fistula  S/P coronary artery stent placement  S/P CABG x 5  S/P CABG x 7  S/P hemorrhoidectomy  AV fistula  Stented coronary artery  CABG (Coronary Artery Bypass Graft)  S/P CABG      FAMILY HISTORY: Family history of polycystic kidney (Sibling)  Family history of adult polycystic kidney disease (Sibling)  No pertinent family history in first degree relatives      SOCIAL HISTORY: , lives with wife. Nonsmoker    CODE STATUS: Full    HOME MEDICATIONS:  · 	albuterol 90 mcg/inh inhalation aerosol: 1 puff(s) inhaled 4 times a day, As needed, Shortness of Breath and/or Wheezing  · 	sevelamer hydrochloride 800 mg oral tablet: 1 tab(s) orally 3 times a day (with meals)  · 	metoprolol tartrate 75 mg oral tablet: 1 tab(s) orally 2 times a day  · 	clopidogrel 75 mg oral tablet: 1 tab(s) orally once a day  · 	aspirin 325 mg oral delayed release tablet: 1 tab(s) orally once a day  · 	fenofibrate 48 mg oral tablet: 1 tab(s) orally once a day  · 	atorvastatin 40 mg oral tablet: 1 tab(s) orally once a day (at bedtime)  · 	isosorbide dinitrate 10 mg oral tablet: 1 tab(s) orally 3 times a day  · 	losartan 25 mg oral tablet: 1 tab(s) orally once a day  · 	Proventil HFA 90 mcg/inh inhalation aerosol: 2 puff(s) inhaled 4 times a day, As Needed for shortness of breath  · 	lactobacillus acidophilus oral capsule: 1  orally 3 times a day  · 	dicyclomine 20 mg oral tablet: 1 tab(s) orally 3 times a day (before meals)  · 	sucralfate 1 g oral tablet: 1 tab(s) orally 3 times a day  · 	pantoprazole 40 mg oral delayed release tablet: 1 tab(s) orally once a day (before a meal)  · 	Renvela carbonate 800 mg oral tablet: 1 tab(s) orally 3 times a day  · 	aspirin 325 mg oral delayed release tablet: 1 tab(s) orally once a day  · 	Plavix 75 mg oral tablet: 1 tab(s) orally once a day  · 	Lipitor 10 mg oral tablet: 1 tab(s) orally once a day  · 	metoprolol tartrate 25 mg oral tablet: 1 tab(s) orally once a day  · 	omeprazole 20 mg oral delayed release capsule: 1 cap(s) orally once a day  ALLERGIES: No Known Allergies      VITAL SIGNS:  ICU Vital Signs Last 24 Hrs  T(C): 36.8 (24 May 2018 17:00), Max: 36.9 (23 May 2018 21:14)  T(F): 98.2 (24 May 2018 17:00), Max: 98.4 (23 May 2018 21:14)  HR: 113 (24 May 2018 17:00) (72 - 122)  BP: 92/51 (24 May 2018 17:00) (92/51 - 153/73)  BP(mean): 65 (24 May 2018 17:00) (65 - 94)  ABP: 102/59 (24 May 2018 17:00) (92/57 - 109/57)  ABP(mean): 73 (24 May 2018 17:00) (68 - 78)  RR: 16 (24 May 2018 17:00) (15 - 18)  SpO2: 97% (24 May 2018 17:00) (92% - 97%)      NEURO  Exam: awake, alert, following commands  Meds:HYDROmorphone  Injectable 0.5 milliGRAM(s) IV Push every 10 minutes PRN Moderate Pain  HYDROmorphone PCA (1 mG/mL) 30 milliLiter(s) PCA Continuous PCA Continuous  HYDROmorphone PCA (1 mG/mL) Rescue Clinician Bolus 0.5 milliGRAM(s) IV Push every 15 minutes PRN for Pain Scale GREATER THAN 6  ondansetron Injectable 4 milliGRAM(s) IV Push every 6 hours PRN Nausea      RESPIRATORY  Exam: CTA b/l  Meds: n/a    CARDIOVASCULAR  Exam: no murmurs  Cardiac Rhythm: normal sinus  Meds:DOPamine Infusion 5 MICROgram(s)/kG/Min IV Continuous <Continuous>      GI/NUTRITION  Exam: soft, appropriately tender in b/l lower quadrants  RLQ incision dry  Diet: sips/chips  Meds:    GENITOURINARY/RENAL  Meds:sodium chloride 0.9%. 1000 milliLiter(s) IV Continuous <Continuous>  sodium chloride 0.9%. 1000 milliLiter(s) IV Continuous <Continuous>      05-23 @ 07:01  -  05-24 @ 07:00  --------------------------------------------------------  IN:    Oral Fluid: 240 mL    Other: 800 mL  Total IN: 1040 mL    OUT:    Other: 800 mL  Total OUT: 800 mL    Total NET: 240 mL      05-24 @ 07:01  -  05-24 @ 17:39  --------------------------------------------------------  IN:    DOPamine Infusion: 56.5 mL    sodium chloride 0.9%.: 210 mL    Solution: 100 mL  Total IN: 366.5 mL    OUT:    Indwelling Catheter - Urethral: 55 mL  Total OUT: 55 mL    Total NET: 311.5 mL        Weight (kg): 83.7 (05-24 @ 04:38)  05-24    131<L>  |  108  |  28<H>  ----------------------------<  202<H>  3.9   |  21<L>  |  6.77<H>    Ca    7.6<L>      24 May 2018 15:32  Phos  3.3     05-24  Mg     1.9     05-24    TPro  6.8  /  Alb  4.0  /  TBili  0.4  /  DBili  x   /  AST  19  /  ALT  16  /  AlkPhos  38<L>  05-24    [x ] Jean catheter, indication: urine output monitoring in critically ill patient  Jean with red tinge but not sanguinous    HEMATOLOGIC  [ ] VTE Prophylaxis: ASA only for now                          12.5   8.0   )-----------( 163      ( 24 May 2018 15:26 )             39.4     PT/INR - ( 23 May 2018 18:46 )   PT: 12.2 sec;   INR: 1.13 ratio         PTT - ( 23 May 2018 18:46 )  PTT:31.8 sec  Transfusion: n/a    INFECTIOUS DISEASES  Meds:mycophenolate mofetil 1000 milliGRAM(s) Oral two times a day  tacrolimus 4 milliGRAM(s) Oral two times a day    RECENT CULTURES:      ENDOCRINE  Meds:  CAPILLARY BLOOD GLUCOSE          PATIENT CARE ACCESS DEVICES:  [x ] Peripheral IV  [x ] Central Venous Line	[x ] R	[ ] L	[x ] IJ	[ ] Fem	[ ] SC	Placed: 5/24  [ x] Arterial Line		[ ] R	[x ] L	[ ] Fem	[x ] Rad	[ ] Ax	Placed: 5/24  [ ] PICC:					[ ] Mediport  [x ] Urinary Catheter, Date Placed: 5/24  [x] Necessity of urinary, arterial, and venous catheters discussed    OTHER MEDICATIONS: naloxone Injectable 0.1 milliGRAM(s) IV Push every 3 minutes PRN      IMAGING STUDIES:

## 2018-05-24 NOTE — BRIEF OPERATIVE NOTE - OPERATION/FINDINGS
Donor ID: [id number]  38 yo male  KDPI 69%  PHS increased risk  HCV Ab+  HCV ANITA +  Terminal creatinine 1.2  Cross-clamp time: 05- 12:43 PM  CMV+ / EBV+ / HBcAb+  Left kidney, 3 arteries, 1 vein, 1 ureter  HLA 6/6 mismatch (2,2,2)  CPRA 0%  Virtual crossmatch negative.  Both donor and recipient blood type B  Simulect induction  Recipient:  PCKD  CMV+ / EBV+ / HBsAb+  Cold ischemia time:  Warm ischemia time:  Weight of the kidney: Donor ID: [id number]  38 yo male  KDPI 69%  PHS increased risk  HCV Ab+  HCV ANITA +  Terminal creatinine 1.2  Cross-clamp time: 05- 12:43 PM  CMV+ / EBV+ / HBcAb+  Left kidney, 3 arteries, 1 vein, 1 ureter  HLA 6/6 mismatch (2,2,2)  CPRA 0%  Virtual crossmatch negative.  Both donor and recipient blood type B  Simulect induction  Recipient:  PCKD  CMV+ / EBV+ / HBsAb+  Cold ischemia time: 23 hs  43 mins  (reperfused at 12:26 PM on 05-)  Warm ischemia time: 44 minutes  Weight of the kidney: 250 grams

## 2018-05-24 NOTE — BRIEF OPERATIVE NOTE - PROCEDURE
<<-----Click on this checkbox to enter Procedure Kidney transplant  2018  1-  donor left kidney transplanted to right external iliac vessels  2- Preparation of kidney in back table  Active  EMOLMENT

## 2018-05-24 NOTE — PROGRESS NOTE ADULT - SUBJECTIVE AND OBJECTIVE BOX
[facility] DIVISION OF KIDNEY DISEASES AND HYPERTENSION -- FOLLOW UP NOTE  --------------------------------------------------------------------------------    HPI: 58 year old male with PMH of HTN, CAD s/p CABG, ESRD secondary to PKD ON HD (3/2011) [facility] presents for DDRT from Hep C positive donor. Pt follows with outpatient nephrologist Dr. Caleb Mckeon at [facility] dialysis center. Pt last outpatient dialysis was Tuesday with 3.5L removed. Pt had one session of HD on 5/23/18 prior to surgery for optimization with 800cc UF. Pt seen and examined prior to OR today. Pt denies fevers, chills, CP, SOB, abdominal pain or LE edema.     PAST HISTORY  --------------------------------------------------------------------------------  No significant changes to PMH, PSH, FHx, SHx, unless otherwise noted    ALLERGIES & MEDICATIONS  --------------------------------------------------------------------------------  Allergies    No Known Allergies    Intolerances      Standing Inpatient Medications  basiliximab  IVPB 20 milliGRAM(s) IV Intermittent once  ceFAZolin   IVPB 2000 milliGRAM(s) IV Intermittent once  methylPREDNISolone sodium succinate IVPB 500 milliGRAM(s) IV Intermittent once    PRN Inpatient Medications      REVIEW OF SYSTEMS  --------------------------------------------------------------------------------  Gen: No weight changes, fatigue, fevers/chills, weakness  Skin: No rashes  Head/Eyes/Ears/Mouth: No headache; Normal hearing; Normal vision w/o blurriness; No sinus pain/discomfort, sore throat  Respiratory: No dyspnea, cough, wheezing, hemoptysis  CV: No chest pain, PND, orthopnea  GI: No abdominal pain, diarrhea, constipation, nausea, vomiting, melena, hematochezia  : No increased frequency, dysuria, hematuria, nocturia  MSK: No joint pain/swelling; no back pain; no edema  Neuro: No dizziness/lightheadedness, weakness, seizures, numbness, tingling  Heme: No easy bruising or bleeding  Endo: No heat/cold intolerance  Psych: No significant nervousness, anxiety, stress, depression    All other systems were reviewed and are negative, except as noted.    VITALS/PHYSICAL EXAM  --------------------------------------------------------------------------------  T(C): 36.6 (05-24-18 @ 04:38), Max: 36.9 (05-23-18 @ 21:14)  HR: 78 (05-24-18 @ 04:38) (71 - 78)  BP: 107/61 (05-24-18 @ 04:38) (107/61 - 153/73)  RR: 18 (05-24-18 @ 04:38) (18 - 18)  SpO2: 97% (05-24-18 @ 04:38) (95% - 97%)  Wt(kg): --  Height (cm): 165.1 (05-24-18 @ 04:38)  Weight (kg): 83.7 (05-24-18 @ 04:38)  BMI (kg/m2): 30.7 (05-24-18 @ 04:38)  BSA (m2): 1.91 (05-24-18 @ 04:38)      05-23-18 @ 07:01  -  05-24-18 @ 07:00  --------------------------------------------------------  IN: 1040 mL / OUT: 800 mL / NET: 240 mL    05-24-18 @ 07:01  -  05-24-18 @ 10:31  --------------------------------------------------------  IN: 100 mL / OUT: 0 mL / NET: 100 mL    Physical Exam:  	Gen: NAD, well-appearing  	HEENT: supple neck  	Pulm: CTA B/L  	CV: RRR, S1S2; no rub  	Abd: +BS, soft, nontender/nondistended, obese  	: No suprapubic tenderness  	UE: Warm, no asterixis  	LE: Warm, no edema + pedal pulses b/l   	Psych: Normal affect and mood  	Skin: Warm, without rashes  	Vascular access: + RUE AVF + thrill, + bruit; + LUE AVF aneurysmal appearance, + small eschar, + thrill + bruit     LABS/STUDIES  --------------------------------------------------------------------------------              12.8   3.8   >-----------<  131      [05-24-18 @ 04:49]              39.7     141  |  99  |  26  ----------------------------<  105      [05-24-18 @ 04:49]  4.0   |  30  |  6.70        Ca     8.6     [05-24-18 @ 04:49]      Mg     1.8     [05-24-18 @ 04:49]      Phos  3.1     [05-24-18 @ 04:49]    TPro  7.4  /  Alb  4.4  /  TBili  0.4  /  DBili  x   /  AST  20  /  ALT  17  /  AlkPhos  40  [05-23-18 @ 18:46]    PT/INR: PT 12.2 , INR 1.13       [05-23-18 @ 18:46]  PTT: 31.8       [05-23-18 @ 18:46]      Creatinine Trend:  SCr 6.70 [05-24 @ 04:49]  SCr 11.94 [05-23 @ 18:46]        HbA1c 5.6      [11-22-17 @ 03:20]  TSH 1.35      [11-22-17 @ 03:20]  Lipid: chol 85, , HDL 28, LDL 38      [11-22-17 @ 03:20]

## 2018-05-24 NOTE — CHART NOTE - NSCHARTNOTEFT_GEN_A_CORE
Patient examined in PACU.   Expresses having pain and mouth feels dry  On 7mcg/kg of dopamine. Arterial line with variation.     Vital Signs Last 24 Hrs  T(C): 36.8 (24 May 2018 16:00), Max: 36.9 (23 May 2018 21:14)  T(F): 98.2 (24 May 2018 16:00), Max: 98.4 (23 May 2018 21:14)  HR: 117 (24 May 2018 16:30) (71 - 122)  BP: 103/50 (24 May 2018 16:30) (103/50 - 153/73)  BP(mean): 69 (24 May 2018 16:30) (69 - 94)  RR: 15 (24 May 2018 16:30) (15 - 18)  SpO2: 92% (24 May 2018 16:30) (92% - 97%)    General: awake but sleepy  Respiratory: Unlabored breathing, on nasal cannula  CVS: Tachycardic  Abdomen: Soft, non-distended, appropriately tender. RLQ incision intact with dressing in place.  Extremities: Warm bilaterally with palpable pulses      CBC Full  -  ( 24 May 2018 15:26 )  WBC Count : 8.0 K/uL  Hemoglobin : 12.5 g/dL  Hematocrit : 39.4 %  Platelet Count - Automated : 163 K/uL  Mean Cell Volume : 96.5 fl  Mean Cell Hemoglobin : 30.7 pg  Mean Cell Hemoglobin Concentration : 31.8 gm/dL  Auto Neutrophil # : x  Auto Lymphocyte # : x  Auto Monocyte # : x  Auto Eosinophil # : x  Auto Basophil # : x  Auto Neutrophil % : x  Auto Lymphocyte % : x  Auto Monocyte % : x  Auto Eosinophil % : x  Auto Basophil % : x    05-24    131<L>  |  108  |  28<H>  ----------------------------<  202<H>  3.9   |  21<L>  |  6.77<H>    Ca    7.6<L>      24 May 2018 15:32  Phos  3.3     05-24  Mg     1.9     05-24    TPro  6.8  /  Alb  4.0  /  TBili  0.4  /  DBili  x   /  AST  19  /  ALT  16  /  AlkPhos  38<L>  05-24    LIVER FUNCTIONS - ( 24 May 2018 15:32 )  Alb: 4.0 g/dL / Pro: 6.8 g/dL / ALK PHOS: 38 U/L / ALT: 16 U/L / AST: 19 U/L / GGT: x           PT/INR - ( 23 May 2018 18:46 )   PT: 12.2 sec;   INR: 1.13 ratio         PTT - ( 23 May 2018 18:46 )  PTT:31.8 sec      Assessment/Plan: 58y Male s/p Right DDRT, Hep C donor.  - 1L bolus ordered. Wean pressors  - Monitor UOP  - NPO, IVF @ 70 with urine output repletions  - Immunosuppression meds ordered     Pager [phone]

## 2018-05-24 NOTE — CONSULT NOTE ADULT - NSHPATTENDINGPLANDISCUSS_GEN_ALL_CORE
Dr Murguia, wife daughters
Transplant surgeon Dr Murguia, Dr. Marin, House staff, pr nephrologist Dr. Mckeon

## 2018-05-24 NOTE — PROGRESS NOTE ADULT - SUBJECTIVE AND OBJECTIVE BOX
58y M admitted for a  donor kidney transplant on 18. Patient received a hepatitis C positive kidney.     Outpatient medication reconciliation reviewed and will be re-started appropriately.  Plan discussed with multidisciplinary team.     Continue current immunosuppressive regimen.  Induction:  Simulect 20 mg on post-operative day 0 (intra-operative) and POD4  Methylprednisolone taper (500 mg, 125 mg twice daily, 60 mg twice daily, 30 mg twice daily) then to PO prednisone taper    Maintenance:  Tacrolimus adjusted to trough (8-10 ng/mL)  Mycophenolate mofetil 1000 mg q12h  Prednisone taper starting POD4 (20 mg twice daily, 10 mg twice daily, 5 mg twice daily, 5 mg daily)    Anti-infective prophylaxis:  Bactrim SS daily  Valcyte (adjusted to renal function)  Nystatin swish and swallow four times daily     GI/DVT ppx:  Famotidine 20 mg daily

## 2018-05-24 NOTE — PROGRESS NOTE ADULT - ASSESSMENT
58 year old gentleman with h/o HTN, CAD s/p CABG in 2007, ESRD secondary to PKD on HD since 3/2011, last dialyzed last night 5/24/18 who presents for DDRT from Hep C positive donor. Went to OR this am.

## 2018-05-24 NOTE — PROGRESS NOTE ADULT - ATTENDING COMMENTS
s/p  donor kidney transplant.  Due to history of prominent CAD and need of vasopressors to maintain systolic blood pressure 120 mm Hg or higher, transferred to the SICU.  Will monitor closely.  I personally discussed case with family as well as with Dr. Jeffrey Chauhan (cardiology)  IMMUNOSUPPRESSION:  Prograf, cellcept, steroids, simulect.

## 2018-05-24 NOTE — CONSULT NOTE ADULT - ASSESSMENT
58 year old male with PMH of HTN, CAD s/p CABG, ESRD secondary to PKD ON HD (3/2011) TTS from [facility] presents for DDRT from Hep C positive donor.
58M s/p DDRT    Neuro: awake and alert  PCA for pain    Resp: requiring NC to maintain O2 sat, wean as tolerated    CV: hx of CABG, EF~40%  Switch to levophed to maintain SBP>120, wean as tolerated  Continuous hemodynamic monitoring    GI: sips and chips for now, advance later as tolerated      : s/p DDRT  closely monitor UOP  basal rate 70cc/hr NS, plus 1:1 replacement for UOP >70  labs q6  tacrolimus, cellcept, solumedrol for immunosupression    ID: Bactrim and Valcyte for ppx in setting of immunosupression    Heme: ASA for graft patency, hold VTE ppx for now  trend CBC    Endo: ISS in setting of steroids  monitor FS    Dispo: admit to SICU    MERLE Toney [id number]

## 2018-05-24 NOTE — CONSULT NOTE ADULT - ATTENDING COMMENTS
Pt evaluated in SICU with Dr Toney  Hypotensive post transplant requiring vasopressor support with Dopamine, will change to Levophed  CXR CVP Flow track to assess for volume status, lactate wnl  Cardiac enzymes  Gentle hydration  Transplant medicine  Start ISS for uncontrolled BS, likely from high dose steroid
Patient is actively listed for kidney, currently admitted. reviewed preop labs and work up.  PKD, ESRD. Last dialyzed on 5/22. reports having lemon juice. Noted K 5.2.  Plan:  1. hemodialysis. Has functioning vascular access right forearm  2. Reviewed immunosuppression  Will follow.

## 2018-05-24 NOTE — PROGRESS NOTE ADULT - SUBJECTIVE AND OBJECTIVE BOX
INTERVAL HPI/OVERNIGHT EVENTS:  Patient seen with multidisciplinary team (Nephrologist, pharmacist, nurse, nurse manager, NP, MD)  in am rounds and examined with Dr. Murguia. Pt admitted for DDRT, no aute event overnight, s/p HD with 800ml removal eliza well.     MEDICATIONS  (STANDING):    MEDICATIONS  (PRN):      Allergies    No Known Allergies    Intolerances        Vital Signs Last 24 Hrs  T(C): 36.6 (24 May 2018 04:38), Max: 36.9 (23 May 2018 21:14)  T(F): 97.9 (24 May 2018 04:38), Max: 98.4 (23 May 2018 21:14)  HR: 78 (24 May 2018 04:38) (71 - 78)  BP: 107/61 (24 May 2018 04:38) (107/61 - 153/73)  BP(mean): --  RR: 18 (24 May 2018 04:38) (18 - 18)  SpO2: 97% (24 May 2018 04:38) (95% - 97%)    LABS:                        12.8   3.8   )-----------( 131      ( 24 May 2018 04:49 )             39.7     05-24    141  |  99  |  26<H>  ----------------------------<  105<H>  4.0   |  30  |  6.70<H>    Ca    8.6      24 May 2018 04:49  Phos  3.1     05-24  Mg     1.8     05-24    TPro  7.4  /  Alb  4.4  /  TBili  0.4  /  DBili  x   /  AST  20  /  ALT  17  /  AlkPhos  40  05-23    PT/INR - ( 23 May 2018 18:46 )   PT: 12.2 sec;   INR: 1.13 ratio         PTT - ( 23 May 2018 18:46 )  PTT:31.8 sec      RADIOLOGY & ADDITIONAL TESTS:    Review of systems  Gen: No weight changes, fatigue, fevers/chills, weakness  Skin: No rashes  Head/Eyes/Ears/Mouth: No headache; Normal hearing; Normal vision w/o blurriness; No sinus pain/discomfort, sore throat  Respiratory: No dyspnea, cough, wheezing, hemoptysis  CV: No chest pain, PND, orthopnea  GI: No abdominal pain, diarrhea, constipation, nausea, vomiting, melena, hematochezia  : No increased frequency, dysuria, hematuria, nocturia  MSK: No joint pain/swelling; no back pain; no edema  Neuro: No dizziness/lightheadedness, weakness, seizures, numbness, tingling  Heme: No easy bruising or bleeding  Endo: No heat/cold intolerance  Psych: No significant nervousness, anxiety, stress, depression  All other systems were reviewed and are negative, except as noted.    PHYSICAL EXAM:  Constitutional: Well developed / well nourished  Eyes: Anicteric, PERRLA  ENMT: nc/at  Neck: supple  Respiratory: CTA B/L  Cardiovascular: RRR  Gastrointestinal: Soft abdomen, NT, ND  Genitourinary: On HD  Extremities: SCD's in place and working bilaterally  Vascular: Palpable dp pulses bilaterally  Neurological: A&O x3  Skin: C/D/I  Musculoskeletal: Moving all extremities  Psychiatric: Responsive

## 2018-05-24 NOTE — PROGRESS NOTE ADULT - SUBJECTIVE AND OBJECTIVE BOX
INTERVAL HPI/OVERNIGHT EVENTS:    POST-OP CHECK    I personally saw and examined patient with transplant and SICU teams.      MEDICATIONS  (STANDING):  HYDROmorphone PCA (1 mG/mL) 30 milliLiter(s) PCA Continuous PCA Continuous  mycophenolate mofetil 1000 milliGRAM(s) Oral two times a day  norepinephrine Infusion 0.05 MICROgram(s)/kG/Min (7.847 mL/Hr) IV Continuous <Continuous>  sodium chloride 0.9%. 1000 milliLiter(s) (70 mL/Hr) IV Continuous <Continuous>  tacrolimus 4 milliGRAM(s) Oral two times a day    MEDICATIONS  (PRN):  HYDROmorphone PCA (1 mG/mL) Rescue Clinician Bolus 0.5 milliGRAM(s) IV Push every 15 minutes PRN for Pain Scale GREATER THAN 6  naloxone Injectable 0.1 milliGRAM(s) IV Push every 3 minutes PRN For ANY of the following changes in patient status:  A. RR LESS THAN 10 breaths per minute, B. Oxygen saturation LESS THAN 90%, C. Sedation score of 6  ondansetron Injectable 4 milliGRAM(s) IV Push every 6 hours PRN Nausea      Allergies    No Known Allergies    Intolerances    Vital Signs Last 24 Hrs  T(C): 36.3 (24 May 2018 18:00), Max: 36.9 (23 May 2018 21:14)  T(F): 97.4 (24 May 2018 18:00), Max: 98.4 (23 May 2018 21:14)  HR: 82 (24 May 2018 18:30) (72 - 122)  BP: 104/55 (24 May 2018 18:00) (92/51 - 153/73)  BP(mean): 73 (24 May 2018 18:00) (65 - 94)  RR: 18 (24 May 2018 18:30) (15 - 23)  SpO2: 95% (24 May 2018 18:30) (91% - 97%)    PHYSICAL EXAM:      Constitutional: wd/wn    Eyes: anicteric    ENMT: nc/at    Neck: central line in place    Respiratory: cta    Cardiovascular: rrr    Gastrointestinal: soft, no guarding or rebound    Genitourinary: urinary catheter in place    Extremities: scd in place on right leg and working    Vascular: palpable dp pulses bilaterally    Neurological: alert but somnolent    Skin: dressing in place    Musculoskeletal: moving all extremities    Psychiatric: responsive        LABS:                        12.5   8.0   )-----------( 163      ( 24 May 2018 15:26 )             39.4     05-24    131<L>  |  108  |  28<H>  ----------------------------<  202<H>  3.9   |  21<L>  |  6.77<H>    Ca    7.6<L>      24 May 2018 15:32  Phos  3.3     05-24  Mg     1.9     05-24    TPro  6.8  /  Alb  4.0  /  TBili  0.4  /  DBili  x   /  AST  19  /  ALT  16  /  AlkPhos  38<L>  05-24    PT/INR - ( 23 May 2018 18:46 )   PT: 12.2 sec;   INR: 1.13 ratio         PTT - ( 23 May 2018 18:46 )  PTT:31.8 sec      RADIOLOGY & ADDITIONAL TESTS:

## 2018-05-24 NOTE — PROGRESS NOTE ADULT - ASSESSMENT
58 year old male with PMH of HTN, CAD s/p CABG, ESRD secondary to PKD ON HD (3/2011) TTS from [facility] presents for DDRT from Hep C positive donor.

## 2018-05-25 DIAGNOSIS — D89.9 DISORDER INVOLVING THE IMMUNE MECHANISM, UNSPECIFIED: ICD-10-CM

## 2018-05-25 DIAGNOSIS — Z94.0 KIDNEY TRANSPLANT STATUS: ICD-10-CM

## 2018-05-25 LAB
ALBUMIN SERPL ELPH-MCNC: 4.1 G/DL — SIGNIFICANT CHANGE UP (ref 3.3–5)
ALP SERPL-CCNC: 34 U/L — LOW (ref 40–120)
ALT FLD-CCNC: 16 U/L — SIGNIFICANT CHANGE UP (ref 10–45)
ANION GAP SERPL CALC-SCNC: 12 MMOL/L — SIGNIFICANT CHANGE UP (ref 5–17)
ANION GAP SERPL CALC-SCNC: 17 MMOL/L — SIGNIFICANT CHANGE UP (ref 5–17)
ANION GAP SERPL CALC-SCNC: 17 MMOL/L — SIGNIFICANT CHANGE UP (ref 5–17)
ANION GAP SERPL CALC-SCNC: 18 MMOL/L — HIGH (ref 5–17)
APTT BLD: 28.2 SEC — SIGNIFICANT CHANGE UP (ref 27.5–37.4)
AST SERPL-CCNC: 21 U/L — SIGNIFICANT CHANGE UP (ref 10–40)
BASOPHILS # BLD AUTO: 0 K/UL — SIGNIFICANT CHANGE UP (ref 0–0.2)
BASOPHILS NFR BLD AUTO: 0 % — SIGNIFICANT CHANGE UP (ref 0–2)
BILIRUB SERPL-MCNC: 0.4 MG/DL — SIGNIFICANT CHANGE UP (ref 0.2–1.2)
BUN SERPL-MCNC: 22 MG/DL — SIGNIFICANT CHANGE UP (ref 7–23)
BUN SERPL-MCNC: 34 MG/DL — HIGH (ref 7–23)
BUN SERPL-MCNC: 35 MG/DL — HIGH (ref 7–23)
BUN SERPL-MCNC: 41 MG/DL — HIGH (ref 7–23)
CALCIUM SERPL-MCNC: 6.8 MG/DL — LOW (ref 8.4–10.5)
CALCIUM SERPL-MCNC: 7.2 MG/DL — LOW (ref 8.4–10.5)
CALCIUM SERPL-MCNC: 7.5 MG/DL — LOW (ref 8.4–10.5)
CALCIUM SERPL-MCNC: 7.6 MG/DL — LOW (ref 8.4–10.5)
CHLORIDE SERPL-SCNC: 100 MMOL/L — SIGNIFICANT CHANGE UP (ref 96–108)
CHLORIDE SERPL-SCNC: 100 MMOL/L — SIGNIFICANT CHANGE UP (ref 96–108)
CHLORIDE SERPL-SCNC: 96 MMOL/L — SIGNIFICANT CHANGE UP (ref 96–108)
CHLORIDE SERPL-SCNC: 98 MMOL/L — SIGNIFICANT CHANGE UP (ref 96–108)
CO2 SERPL-SCNC: 19 MMOL/L — LOW (ref 22–31)
CO2 SERPL-SCNC: 20 MMOL/L — LOW (ref 22–31)
CO2 SERPL-SCNC: 21 MMOL/L — LOW (ref 22–31)
CO2 SERPL-SCNC: 26 MMOL/L — SIGNIFICANT CHANGE UP (ref 22–31)
CREAT SERPL-MCNC: 3.76 MG/DL — HIGH (ref 0.5–1.3)
CREAT SERPL-MCNC: 7.37 MG/DL — HIGH (ref 0.5–1.3)
CREAT SERPL-MCNC: 7.46 MG/DL — HIGH (ref 0.5–1.3)
CREAT SERPL-MCNC: 7.47 MG/DL — HIGH (ref 0.5–1.3)
EOSINOPHIL # BLD AUTO: 0 K/UL — SIGNIFICANT CHANGE UP (ref 0–0.5)
EOSINOPHIL NFR BLD AUTO: 0.1 % — SIGNIFICANT CHANGE UP (ref 0–6)
GLUCOSE SERPL-MCNC: 149 MG/DL — HIGH (ref 70–99)
GLUCOSE SERPL-MCNC: 173 MG/DL — HIGH (ref 70–99)
GLUCOSE SERPL-MCNC: 218 MG/DL — HIGH (ref 70–99)
GLUCOSE SERPL-MCNC: 223 MG/DL — HIGH (ref 70–99)
HCT VFR BLD CALC: 37.2 % — LOW (ref 39–50)
HGB BLD-MCNC: 11.7 G/DL — LOW (ref 13–17)
INR BLD: 1.09 RATIO — SIGNIFICANT CHANGE UP (ref 0.88–1.16)
LDH SERPL L TO P-CCNC: 213 U/L — SIGNIFICANT CHANGE UP (ref 50–242)
LYMPHOCYTES # BLD AUTO: 0.8 K/UL — LOW (ref 1–3.3)
LYMPHOCYTES # BLD AUTO: 7.7 % — LOW (ref 13–44)
MAGNESIUM SERPL-MCNC: 1.7 MG/DL — SIGNIFICANT CHANGE UP (ref 1.6–2.6)
MAGNESIUM SERPL-MCNC: 1.8 MG/DL — SIGNIFICANT CHANGE UP (ref 1.6–2.6)
MCHC RBC-ENTMCNC: 30.7 PG — SIGNIFICANT CHANGE UP (ref 27–34)
MCHC RBC-ENTMCNC: 31.6 GM/DL — LOW (ref 32–36)
MCV RBC AUTO: 97.4 FL — SIGNIFICANT CHANGE UP (ref 80–100)
MONOCYTES # BLD AUTO: 0.7 K/UL — SIGNIFICANT CHANGE UP (ref 0–0.9)
MONOCYTES NFR BLD AUTO: 6.1 % — SIGNIFICANT CHANGE UP (ref 2–14)
NEUTROPHILS # BLD AUTO: 9.4 K/UL — HIGH (ref 1.8–7.4)
NEUTROPHILS NFR BLD AUTO: 86.1 % — HIGH (ref 43–77)
PHOSPHATE SERPL-MCNC: 2.2 MG/DL — LOW (ref 2.5–4.5)
PHOSPHATE SERPL-MCNC: 4.4 MG/DL — SIGNIFICANT CHANGE UP (ref 2.5–4.5)
PHOSPHATE SERPL-MCNC: 4.6 MG/DL — HIGH (ref 2.5–4.5)
PHOSPHATE SERPL-MCNC: 5.5 MG/DL — HIGH (ref 2.5–4.5)
PLATELET # BLD AUTO: 256 K/UL — SIGNIFICANT CHANGE UP (ref 150–400)
POTASSIUM SERPL-MCNC: 3.8 MMOL/L — SIGNIFICANT CHANGE UP (ref 3.5–5.3)
POTASSIUM SERPL-MCNC: 5.3 MMOL/L — SIGNIFICANT CHANGE UP (ref 3.5–5.3)
POTASSIUM SERPL-MCNC: 5.4 MMOL/L — HIGH (ref 3.5–5.3)
POTASSIUM SERPL-MCNC: 5.6 MMOL/L — HIGH (ref 3.5–5.3)
POTASSIUM SERPL-SCNC: 3.8 MMOL/L — SIGNIFICANT CHANGE UP (ref 3.5–5.3)
POTASSIUM SERPL-SCNC: 5.3 MMOL/L — SIGNIFICANT CHANGE UP (ref 3.5–5.3)
POTASSIUM SERPL-SCNC: 5.4 MMOL/L — HIGH (ref 3.5–5.3)
POTASSIUM SERPL-SCNC: 5.6 MMOL/L — HIGH (ref 3.5–5.3)
PROT SERPL-MCNC: 7 G/DL — SIGNIFICANT CHANGE UP (ref 6–8.3)
PROTHROM AB SERPL-ACNC: 11.9 SEC — SIGNIFICANT CHANGE UP (ref 9.8–12.7)
RBC # BLD: 3.82 M/UL — LOW (ref 4.2–5.8)
RBC # FLD: 13.8 % — SIGNIFICANT CHANGE UP (ref 10.3–14.5)
SODIUM SERPL-SCNC: 134 MMOL/L — LOW (ref 135–145)
SODIUM SERPL-SCNC: 135 MMOL/L — SIGNIFICANT CHANGE UP (ref 135–145)
SODIUM SERPL-SCNC: 137 MMOL/L — SIGNIFICANT CHANGE UP (ref 135–145)
SODIUM SERPL-SCNC: 138 MMOL/L — SIGNIFICANT CHANGE UP (ref 135–145)
TACROLIMUS SERPL-MCNC: 7.5 NG/ML — SIGNIFICANT CHANGE UP
WBC # BLD: 10.9 K/UL — HIGH (ref 3.8–10.5)
WBC # FLD AUTO: 10.9 K/UL — HIGH (ref 3.8–10.5)

## 2018-05-25 PROCEDURE — 90935 HEMODIALYSIS ONE EVALUATION: CPT | Mod: GC

## 2018-05-25 PROCEDURE — 76776 US EXAM K TRANSPL W/DOPPLER: CPT | Mod: 26,RT

## 2018-05-25 PROCEDURE — 99232 SBSQ HOSP IP/OBS MODERATE 35: CPT | Mod: 25,GC

## 2018-05-25 PROCEDURE — 99232 SBSQ HOSP IP/OBS MODERATE 35: CPT | Mod: 24,GC

## 2018-05-25 PROCEDURE — 99233 SBSQ HOSP IP/OBS HIGH 50: CPT

## 2018-05-25 RX ORDER — ACETAMINOPHEN 500 MG
975 TABLET ORAL EVERY 6 HOURS
Qty: 0 | Refills: 0 | Status: DISCONTINUED | OUTPATIENT
Start: 2018-05-25 | End: 2018-06-02

## 2018-05-25 RX ORDER — INSULIN LISPRO 100/ML
VIAL (ML) SUBCUTANEOUS EVERY 4 HOURS
Qty: 0 | Refills: 0 | Status: DISCONTINUED | OUTPATIENT
Start: 2018-05-25 | End: 2018-05-26

## 2018-05-25 RX ORDER — DOCUSATE SODIUM 100 MG
100 CAPSULE ORAL THREE TIMES A DAY
Qty: 0 | Refills: 0 | Status: DISCONTINUED | OUTPATIENT
Start: 2018-05-25 | End: 2018-05-30

## 2018-05-25 RX ORDER — NOREPINEPHRINE BITARTRATE/D5W 8 MG/250ML
0.05 PLASTIC BAG, INJECTION (ML) INTRAVENOUS
Qty: 16 | Refills: 0 | Status: DISCONTINUED | OUTPATIENT
Start: 2018-05-25 | End: 2018-05-26

## 2018-05-25 RX ORDER — TACROLIMUS 5 MG/1
3 CAPSULE ORAL EVERY 12 HOURS
Qty: 0 | Refills: 0 | Status: DISCONTINUED | OUTPATIENT
Start: 2018-05-25 | End: 2018-05-26

## 2018-05-25 RX ORDER — SENNA PLUS 8.6 MG/1
2 TABLET ORAL AT BEDTIME
Qty: 0 | Refills: 0 | Status: DISCONTINUED | OUTPATIENT
Start: 2018-05-25 | End: 2018-05-30

## 2018-05-25 RX ORDER — INSULIN LISPRO 100/ML
VIAL (ML) SUBCUTANEOUS EVERY 6 HOURS
Qty: 0 | Refills: 0 | Status: DISCONTINUED | OUTPATIENT
Start: 2018-05-25 | End: 2018-05-25

## 2018-05-25 RX ORDER — INSULIN HUMAN 100 [IU]/ML
1 INJECTION, SOLUTION SUBCUTANEOUS
Qty: 100 | Refills: 0 | Status: DISCONTINUED | OUTPATIENT
Start: 2018-05-25 | End: 2018-05-25

## 2018-05-25 RX ORDER — DIPHENHYDRAMINE HCL 50 MG
50 CAPSULE ORAL ONCE
Qty: 0 | Refills: 0 | Status: COMPLETED | OUTPATIENT
Start: 2018-05-25 | End: 2018-05-25

## 2018-05-25 RX ORDER — MAGNESIUM SULFATE 500 MG/ML
2 VIAL (ML) INJECTION ONCE
Qty: 0 | Refills: 0 | Status: COMPLETED | OUTPATIENT
Start: 2018-05-25 | End: 2018-05-25

## 2018-05-25 RX ORDER — OXYCODONE HYDROCHLORIDE 5 MG/1
5 TABLET ORAL EVERY 4 HOURS
Qty: 0 | Refills: 0 | Status: DISCONTINUED | OUTPATIENT
Start: 2018-05-25 | End: 2018-06-01

## 2018-05-25 RX ORDER — BASILIXIMAB 20 MG/5ML
20 INJECTION, POWDER, FOR SOLUTION INTRAVENOUS ONCE
Qty: 0 | Refills: 0 | Status: COMPLETED | OUTPATIENT
Start: 2018-05-28 | End: 2018-05-27

## 2018-05-25 RX ORDER — INSULIN HUMAN 100 [IU]/ML
10 INJECTION, SOLUTION SUBCUTANEOUS ONCE
Qty: 0 | Refills: 0 | Status: COMPLETED | OUTPATIENT
Start: 2018-05-25 | End: 2018-05-25

## 2018-05-25 RX ORDER — DEXTROSE 50 % IN WATER 50 %
50 SYRINGE (ML) INTRAVENOUS ONCE
Qty: 0 | Refills: 0 | Status: COMPLETED | OUTPATIENT
Start: 2018-05-25 | End: 2018-05-25

## 2018-05-25 RX ORDER — NOREPINEPHRINE BITARTRATE/D5W 8 MG/250ML
0.05 PLASTIC BAG, INJECTION (ML) INTRAVENOUS
Qty: 8 | Refills: 0 | Status: DISCONTINUED | OUTPATIENT
Start: 2018-05-25 | End: 2018-05-25

## 2018-05-25 RX ADMIN — OXYCODONE HYDROCHLORIDE 5 MILLIGRAM(S): 5 TABLET ORAL at 23:25

## 2018-05-25 RX ADMIN — VALGANCICLOVIR 450 MILLIGRAM(S): 450 TABLET, FILM COATED ORAL at 11:40

## 2018-05-25 RX ADMIN — MYCOPHENOLATE MOFETIL 1000 MILLIGRAM(S): 250 CAPSULE ORAL at 17:04

## 2018-05-25 RX ADMIN — TACROLIMUS 3 MILLIGRAM(S): 5 CAPSULE ORAL at 17:03

## 2018-05-25 RX ADMIN — Medication 975 MILLIGRAM(S): at 17:08

## 2018-05-25 RX ADMIN — Medication 50 GRAM(S): at 21:28

## 2018-05-25 RX ADMIN — Medication 50 MILLIGRAM(S): at 00:29

## 2018-05-25 RX ADMIN — Medication 125 MILLIGRAM(S): at 05:54

## 2018-05-25 RX ADMIN — Medication 125 MILLIGRAM(S): at 17:03

## 2018-05-25 RX ADMIN — Medication 100 MILLIGRAM(S): at 21:28

## 2018-05-25 RX ADMIN — Medication 500000 UNIT(S): at 05:54

## 2018-05-25 RX ADMIN — SENNA PLUS 2 TABLET(S): 8.6 TABLET ORAL at 21:28

## 2018-05-25 RX ADMIN — Medication 81 MILLIGRAM(S): at 11:39

## 2018-05-25 RX ADMIN — Medication 1 TABLET(S): at 11:40

## 2018-05-25 RX ADMIN — OXYCODONE HYDROCHLORIDE 5 MILLIGRAM(S): 5 TABLET ORAL at 23:56

## 2018-05-25 RX ADMIN — HYDROMORPHONE HYDROCHLORIDE 30 MILLILITER(S): 2 INJECTION INTRAMUSCULAR; INTRAVENOUS; SUBCUTANEOUS at 07:11

## 2018-05-25 RX ADMIN — Medication 2: at 00:14

## 2018-05-25 RX ADMIN — TACROLIMUS 4 MILLIGRAM(S): 5 CAPSULE ORAL at 05:54

## 2018-05-25 RX ADMIN — Medication 2: at 09:43

## 2018-05-25 RX ADMIN — Medication 100 MILLIGRAM(S): at 13:11

## 2018-05-25 RX ADMIN — Medication 4: at 13:20

## 2018-05-25 RX ADMIN — MYCOPHENOLATE MOFETIL 1000 MILLIGRAM(S): 250 CAPSULE ORAL at 05:54

## 2018-05-25 RX ADMIN — Medication 500000 UNIT(S): at 00:14

## 2018-05-25 RX ADMIN — Medication 500000 UNIT(S): at 17:03

## 2018-05-25 RX ADMIN — Medication 975 MILLIGRAM(S): at 16:38

## 2018-05-25 RX ADMIN — Medication 500000 UNIT(S): at 11:40

## 2018-05-25 RX ADMIN — Medication 4: at 21:47

## 2018-05-25 RX ADMIN — Medication 50 MILLILITER(S): at 03:24

## 2018-05-25 RX ADMIN — Medication 500000 UNIT(S): at 23:10

## 2018-05-25 RX ADMIN — INSULIN HUMAN 10 UNIT(S): 100 INJECTION, SOLUTION SUBCUTANEOUS at 03:33

## 2018-05-25 RX ADMIN — OXYCODONE HYDROCHLORIDE 5 MILLIGRAM(S): 5 TABLET ORAL at 14:33

## 2018-05-25 RX ADMIN — Medication 2: at 17:48

## 2018-05-25 RX ADMIN — FAMOTIDINE 20 MILLIGRAM(S): 10 INJECTION INTRAVENOUS at 11:39

## 2018-05-25 RX ADMIN — OXYCODONE HYDROCHLORIDE 5 MILLIGRAM(S): 5 TABLET ORAL at 15:03

## 2018-05-25 NOTE — DIETITIAN INITIAL EVALUATION ADULT. - NS AS NUTRI INTERV MEALS SNACK
Continue Consistent Carbohydrate, Renal diet with snack; encourage intake of high protein, nutrient dense foods/Carbohydrate - modified diet

## 2018-05-25 NOTE — DIETITIAN INITIAL EVALUATION ADULT. - PHYSICAL APPEARANCE
well nourished/obese/BMI 30.7Kg/m2. Pt appears well developed with no signs of muscle or fat depletion.

## 2018-05-25 NOTE — PROGRESS NOTE ADULT - SUBJECTIVE AND OBJECTIVE BOX
Day 1 of Anesthesia Pain Management Service    SUBJECTIVE: Patient is doing well with IV PCA    Pain Scale Score:	[X] Refer to charted pain scores    THERAPY:    [ ] IV PCA Morphine		[ ] 5 mg/mL	[ ] 1 mg/mL  [X] IV PCA Hydromorphone	[ ] 5 mg/mL	[X] 1 mg/mL  [ ] IV PCA Fentanyl		[ ] 50 micrograms/mL    Demand dose: 0.2 mg     Lockout: 6 minutes   Continuous Rate: 0 mg/hr  4 Hour Limit: 4 mg    MEDICATIONS  (STANDING):  aspirin  chewable 81 milliGRAM(s) Oral daily  famotidine    Tablet 20 milliGRAM(s) Oral daily  HYDROmorphone PCA (1 mG/mL) 30 milliLiter(s) PCA Continuous PCA Continuous  insulin lispro (HumaLOG) corrective regimen sliding scale   SubCutaneous every 4 hours  methylPREDNISolone sodium succinate Injectable 125 milliGRAM(s) IV Push every 12 hours  mycophenolate mofetil 1000 milliGRAM(s) Oral two times a day  norepinephrine Infusion 0.05 MICROgram(s)/kG/Min (7.847 mL/Hr) IV Continuous <Continuous>  nystatin    Suspension 794948 Unit(s) Swish and Swallow four times a day  sodium chloride 0.9%. 1000 milliLiter(s) (1 mL/Hr) IV Continuous <Continuous>  sodium chloride 0.9%. 1000 milliLiter(s) (70 mL/Hr) IV Continuous <Continuous>  tacrolimus 4 milliGRAM(s) Oral two times a day  trimethoprim   80 mG/sulfamethoxazole 400 mG 1 Tablet(s) Oral daily  valGANciclovir 450 milliGRAM(s) Oral daily    MEDICATIONS  (PRN):  HYDROmorphone PCA (1 mG/mL) Rescue Clinician Bolus 0.5 milliGRAM(s) IV Push every 15 minutes PRN for Pain Scale GREATER THAN 6  naloxone Injectable 0.1 milliGRAM(s) IV Push every 3 minutes PRN For ANY of the following changes in patient status:  A. RR LESS THAN 10 breaths per minute, B. Oxygen saturation LESS THAN 90%, C. Sedation score of 6  ondansetron Injectable 4 milliGRAM(s) IV Push every 6 hours PRN Nausea      OBJECTIVE:    Sedation Score:	[ X] Alert	[ ] Drowsy 	[ ] Arousable	[ ] Asleep	[ ] Unresponsive    Side Effects:	[X ] None	[ ] Nausea	[ ] Vomiting	[ ] Pruritus  		[ ] Other:    Vital Signs Last 24 Hrs  T(C): 36.7 (25 May 2018 07:00), Max: 37.2 (25 May 2018 03:00)  T(F): 98.1 (25 May 2018 07:00), Max: 99 (25 May 2018 03:00)  HR: 102 (25 May 2018 09:00) (82 - 122)  BP: 150/68 (25 May 2018 03:45) (92/51 - 167/125)  BP(mean): 101 (25 May 2018 03:45) (65 - 131)  RR: 14 (25 May 2018 09:00) (8 - 42)  SpO2: 94% (25 May 2018 09:00) (90% - 98%)    ASSESSMENT/ PLAN    Therapy to  be:               [] Continued   [x] Discontinued   [x] Changed to PRN Analgesics    Documentation and Verification of current medications:   [X] Done	[ ] Not done, not eligible    Comments: D/C PCA per primary team

## 2018-05-25 NOTE — DIETITIAN INITIAL EVALUATION ADULT. - ENERGY NEEDS
ht: 5 feet 4 inches, wt: 185 pounds, BMI: 30.7 Kg/m2, IBW: 136pounds (+/- 10%),136% IBW. Edema: 1+ generalized; Skin: no pressure injuries.

## 2018-05-25 NOTE — PROGRESS NOTE ADULT - ASSESSMENT
58M POD#1 s/p DDRT    Neuro: awake and alert  PCA for pain    Resp: requiring NC to maintain O2 sat, wean as tolerated    CV: hx of CABG, EF~40%  levophed to maintain SBP>120, wean as tolerated  Continuous hemodynamic monitoring    GI: sips and chips for now, advance later as tolerated    : s/p DDRT, oliguric  - will dialyze this AM  closely monitor UOP  basal rate 70cc/hr NS, plus 1:1 replacement for UOP >70  labs q6  tacrolimus, cellcept, solumedrol for immunosupression    ID: Bactrim and Valcyte for ppx in setting of immunosupression    Heme: ASA for graft patency, hold VTE ppx for now  trend CBC    Endo: ISS in setting of steroids  monitor FS    Dispo: JESENIA Toney [id number]

## 2018-05-25 NOTE — PROGRESS NOTE ADULT - SUBJECTIVE AND OBJECTIVE BOX
HISTORY  FLOYD NEVES is a 58 year old gentleman with h/o HTN, CAD s/p CABG in 2007, 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 Denies CP, SOB, N/V, diarrhea, fever, chills, recent travel outside of country. He underwent DDRT on 5/24 with a donor left nephrectomy to R external iliac vessels. He tolerated the procedure well. He was making urine in the PACU. He had a PCA but still complained of some pain. SICU was consulted because he required pressors to reach goal SBP >120.      24 HOUR EVENTS: Changed from dopamine to levophed. Became oliguric overnight, potassium climbed. Given insulin and dextrose. Plan for HD.    SUBJECTIVE/ROS:  [ ] A ten-point review of systems was otherwise negative except as noted.  [ ] Due to altered mental status/intubation, subjective information were not able to be obtained from the patient. History was obtained, to the extent possible, from review of the chart and collateral sources of information.      NEURO  RASS:     GCS:     CAM ICU: Negative  Exam:   Meds: HYDROmorphone PCA (1 mG/mL) 30 milliLiter(s) PCA Continuous PCA Continuous  HYDROmorphone PCA (1 mG/mL) Rescue Clinician Bolus 0.5 milliGRAM(s) IV Push every 15 minutes PRN for Pain Scale GREATER THAN 6  ondansetron Injectable 4 milliGRAM(s) IV Push every 6 hours PRN Nausea    [x] Adequacy of sedation and pain control has been assessed and adjusted      RESPIRATORY  RR: 14 (05-25-18 @ 06:30) (8 - 42)  SpO2: 95% (05-25-18 @ 06:30) (90% - 98%)  Wt(kg): --  Exam: unlabored, clear to auscultation bilaterally  Mechanical Ventilation:   ABG - ( 24 May 2018 18:41 )  pH: 7.26  /  pCO2: 48    /  pO2: 90    / HCO3: 21    / Base Excess: -5.7  /  SaO2: 95        CARDIOVASCULAR  HR: 94 (05-25-18 @ 06:30) (82 - 122)  BP: 150/68 (05-25-18 @ 03:45) (92/51 - 167/125)  BP(mean): 101 (05-25-18 @ 03:45) (65 - 131)  ABP: 121/70 (05-25-18 @ 06:30) (92/56 - 136/71)  ABP(mean): 89 (05-25-18 @ 06:30) (68 - 100)    Exam: no murmurs  Cardiac Rhythm: normal sinus  Perfusion     [ x]Adequate   [ ]Inadequate  Mentation   [x ]Normal       [ ]Reduced  Extremities  [x ]Warm         [ ]Cool  Volume Status [ x]Hypervolemic [ ]Euvolemic [ ]Hypovolemic  Meds: norepinephrine Infusion 0.05 MICROgram(s)/kG/Min IV Continuous <Continuous>        GI/NUTRITION  Exam: soft, appropriately tender  RLQ incision c/d/i  Diet: NPO  Meds: famotidine    Tablet 20 milliGRAM(s) Oral daily      GENITOURINARY  I&O's Detail    05-23 @ 07:01  -  05-24 @ 07:00  --------------------------------------------------------  IN:    Oral Fluid: 240 mL    Other: 800 mL  Total IN: 1040 mL    OUT:    Other: 800 mL  Total OUT: 800 mL    Total NET: 240 mL      05-24 @ 07:01  -  05-25 @ 06:56  --------------------------------------------------------  IN:    DOPamine Infusion: 56.5 mL    norepinephrine Infusion: 214.6 mL    norepinephrine Infusion: 12.5 mL    sodium chloride 0.9%.: 1120 mL    Solution: 100 mL  Total IN: 1503.6 mL    OUT:    Indwelling Catheter - Urethral: 215 mL  Total OUT: 215 mL    Total NET: 1288.6 mL          05-25    137  |  100  |  35<H>  ----------------------------<  218<H>  5.3   |  20<L>  |  7.37<H>    Ca    7.2<L>      25 May 2018 04:39  Phos  4.6     05-25  Mg     1.7     05-25    TPro  7.0  /  Alb  4.1  /  TBili  0.4  /  DBili  x   /  AST  21  /  ALT  16  /  AlkPhos  34<L>  05-25    [x ] Jean catheter, indication: strict UOP monitoring  Meds: sodium chloride 0.9%. 1000 milliLiter(s) IV Continuous <Continuous>  sodium chloride 0.9%. 1000 milliLiter(s) IV Continuous <Continuous>        HEMATOLOGIC  Meds: aspirin  chewable 81 milliGRAM(s) Oral daily    [x] VTE Prophylaxis                        11.7   10.9  )-----------( 256      ( 25 May 2018 03:18 )             37.2     PT/INR - ( 25 May 2018 03:18 )   PT: 11.9 sec;   INR: 1.09 ratio         PTT - ( 25 May 2018 03:18 )  PTT:28.2 sec  Transfusion     [ ] PRBC   [ ] Platelets   [ ] FFP   [ ] Cryoprecipitate      INFECTIOUS DISEASES  T(C): 37.2 (05-25-18 @ 03:00), Max: 37.2 (05-25-18 @ 03:00)  Wt(kg): --  WBC Count: 10.9 K/uL (05-25 @ 03:18)  WBC Count: 10.8 K/uL (05-24 @ 23:06)  WBC Count: 8.0 K/uL (05-24 @ 15:26)    Recent Cultures:    Meds: mycophenolate mofetil 1000 milliGRAM(s) Oral two times a day  nystatin    Suspension [id number] Unit(s) Swish and Swallow four times a day  tacrolimus 4 milliGRAM(s) Oral two times a day  trimethoprim   80 mG/sulfamethoxazole 400 mG 1 Tablet(s) Oral daily  valGANciclovir 450 milliGRAM(s) Oral daily        ENDOCRINE  Capillary Blood Glucose    Meds: insulin lispro (HumaLOG) corrective regimen sliding scale   SubCutaneous every 4 hours  methylPREDNISolone sodium succinate Injectable 125 milliGRAM(s) IV Push every 12 hours        ACCESS DEVICES:  [ x] Peripheral IV  [x ] Central Venous Line	[ ] R	[ ] L	[ ] IJ	[ ] Fem	[ ] SC	Placed:   [x ] Arterial Line		[ ] R	[ ] L	[ ] Fem	[ ] Rad	[ ] Ax	Placed:   [ ] PICC:					[ ] Mediport  [ ] Urinary Catheter, Date Placed:   [x ] Necessity of urinary, arterial, and venous catheters discussed    OTHER MEDICATIONS:  naloxone Injectable 0.1 milliGRAM(s) IV Push every 3 minutes PRN      CODE STATUS: Full    IMAGING:

## 2018-05-25 NOTE — DIETITIAN INITIAL EVALUATION ADULT. - OTHER INFO
Nutrition Assessment warranted for status post kidney transplant. Pt with h/o HTN, CAD S/P CABG in 2007, ESRD secondary to PKD on HD since 3/2011, last dialyzed on 5/22; now S/P DDRT from Hep C positive donor on 5/24. Pt noted with elevated potassium overnight, addressed with insulin and dextrose. Team is monitoring potassium labs; last HD 5/24, monitoring need for ongoing HD. Low urine output noted, 235ml in 24-hours, current rate 0-20ml/hr.

## 2018-05-25 NOTE — PROGRESS NOTE ADULT - SUBJECTIVE AND OBJECTIVE BOX
INTERVAL HPI/OVERNIGHT EVENTS:   59 y/o gentleman with PMH of HTN, CAD s/p CABG, ESRD secondary to PKD ON HD (3/2011) TTS from RUE AVF, last HD session 5/22 now POD#1 DDRT from Hep C positive donor on 5/24/18.  Required Dopamine intra-op for hypotension which was transitioned to Levophed due to tachycardia. He is currently in SICU on Levophed 0.15mcg/kg/min, Oliguric with Creatinine 7.37, K 5.3. He denies SOB or CP. Pain well controlled on IV PCA.     MEDICATIONS  (STANDING):  aspirin  chewable 81 milliGRAM(s) Oral daily  docusate sodium 100 milliGRAM(s) Oral three times a day  famotidine    Tablet 20 milliGRAM(s) Oral daily  insulin lispro (HumaLOG) corrective regimen sliding scale   SubCutaneous every 4 hours  methylPREDNISolone sodium succinate Injectable 125 milliGRAM(s) IV Push every 12 hours  mycophenolate mofetil 1000 milliGRAM(s) Oral two times a day  norepinephrine Infusion 0.05 MICROgram(s)/kG/Min (7.847 mL/Hr) IV Continuous <Continuous>  nystatin    Suspension 375200 Unit(s) Swish and Swallow four times a day  senna 2 Tablet(s) Oral at bedtime  sodium chloride 0.9%. 1000 milliLiter(s) (1 mL/Hr) IV Continuous <Continuous>  sodium chloride 0.9%. 1000 milliLiter(s) (70 mL/Hr) IV Continuous <Continuous>  tacrolimus 4 milliGRAM(s) Oral two times a day  trimethoprim   80 mG/sulfamethoxazole 400 mG 1 Tablet(s) Oral daily  valGANciclovir 450 milliGRAM(s) Oral daily    MEDICATIONS  (PRN):  acetaminophen   Tablet. 975 milliGRAM(s) Oral every 6 hours PRN Mild Pain (1 - 3)  oxyCODONE    IR 5 milliGRAM(s) Oral every 4 hours PRN Moderate Pain (4 - 6)      Allergies    No Known Allergies    Intolerances        Vital Signs Last 24 Hrs  T(C): 36.7 (25 May 2018 07:00), Max: 37.2 (25 May 2018 03:00)  T(F): 98.1 (25 May 2018 07:00), Max: 99 (25 May 2018 03:00)  HR: 99 (25 May 2018 10:30) (82 - 122)  BP: 150/68 (25 May 2018 03:45) (92/51 - 167/125)  BP(mean): 101 (25 May 2018 03:45) (65 - 131)  RR: 23 (25 May 2018 10:30) (8 - 42)  SpO2: 94% (25 May 2018 10:30) (87% - 98%)    LABS:                        11.7   10.9  )-----------( 256      ( 25 May 2018 03:18 )             37.2     05-25    137  |  100  |  35<H>  ----------------------------<  218<H>  5.3   |  20<L>  |  7.37<H>    Ca    7.2<L>      25 May 2018 04:39  Phos  4.6     05-25  Mg     1.7     05-25    TPro  7.0  /  Alb  4.1  /  TBili  0.4  /  DBili  x   /  AST  21  /  ALT  16  /  AlkPhos  34<L>  05-25    PT/INR - ( 25 May 2018 03:18 )   PT: 11.9 sec;   INR: 1.09 ratio         PTT - ( 25 May 2018 03:18 )  PTT:28.2 sec      RADIOLOGY & ADDITIONAL TESTS:    Review of systems  Gen: No weight changes, fatigue, fevers/chills, weakness  Skin: No rashes  Head/Eyes/Ears/Mouth: No headache; Normal hearing; Normal vision w/o blurriness; No sinus pain/discomfort, sore throat  Respiratory: No dyspnea, cough, wheezing, hemoptysis  CV: No chest pain, PND, orthopnea  GI:  Reports mild abdominal pain. Denies diarrhea, constipation, nausea, vomiting, melena, hematochezia  : reports medina  MSK: No joint pain/swelling; no back pain; no edema  Neuro: No dizziness/lightheadedness, weakness, seizures, numbness, tingling  Heme: No easy bruising or bleeding  Endo: No heat/cold intolerance  Psych: No significant nervousness, anxiety, stress, depression  All other systems were reviewed and are negative, except as noted.    PHYSICAL EXAM:  Constitutional: Well developed / well nourished  Eyes: Anicteric, PERRLA  ENMT: nc/at  Neck: central line patent  Respiratory: CTA B/L  Cardiovascular: RRR  Gastrointestinal: Soft abdomen, mild tender to touch at surgical site, ND. +BS  Genitourinary: Urinary catheter in place  Extremities: SCD's in place and working bilaterally  Vascular: Palpable dp pulses bilaterally  Neurological: A&O x3  Skin: Mild serosanguinous hina on wound dressing  Musculoskeletal: Moving all extremities  Psychiatric: Responsive

## 2018-05-25 NOTE — DIETITIAN INITIAL EVALUATION ADULT. - FACTORS AFF FOOD INTAKE
Pt observed eating 100% of breakfast (2 eggs, bread, cereal, milk) with no GI distress, no difficulty chewing/swallowing. last BM 5/24./none

## 2018-05-25 NOTE — PROGRESS NOTE ADULT - SUBJECTIVE AND OBJECTIVE BOX
[facility] DIVISION OF KIDNEY DISEASES AND HYPERTENSION -- FOLLOW UP NOTE  --------------------------------------------------------------------------------    HPI: 58 year old male with PMH of HTN, CAD s/p CABG, ESRD secondary to PKD ON HD (3/2011) TTS from RUE AVF admitted s/p DDRT from Hep C positive donor on 5/24/18. Pt follows with outpatient nephrologist Dr. Caleb Mckeon at [facility] dialysis center. Pt last outpatient dialysis was Tuesday with 3.5L removed. Pt had one session of HD on 5/23/18 prior to OR. Pt denies fevers, chills, CP, SOB, abdominal pain or LE edema. Pt oliguric overnight. Pt requiring pressors for blood pressure since post-op and remains in SICU. Pt seen and examined. Denies CP or SOB.     PAST HISTORY  --------------------------------------------------------------------------------  No significant changes to PMH, PSH, FHx, SHx, unless otherwise noted    ALLERGIES & MEDICATIONS  --------------------------------------------------------------------------------  Allergies    No Known Allergies    Intolerances      Standing Inpatient Medications  aspirin  chewable 81 milliGRAM(s) Oral daily  docusate sodium 100 milliGRAM(s) Oral three times a day  famotidine    Tablet 20 milliGRAM(s) Oral daily  insulin lispro (HumaLOG) corrective regimen sliding scale   SubCutaneous every 4 hours  methylPREDNISolone sodium succinate Injectable 125 milliGRAM(s) IV Push every 12 hours  mycophenolate mofetil 1000 milliGRAM(s) Oral two times a day  norepinephrine Infusion 0.05 MICROgram(s)/kG/Min IV Continuous <Continuous>  nystatin    Suspension 613995 Unit(s) Swish and Swallow four times a day  senna 2 Tablet(s) Oral at bedtime  sodium chloride 0.9%. 1000 milliLiter(s) IV Continuous <Continuous>  sodium chloride 0.9%. 1000 milliLiter(s) IV Continuous <Continuous>  tacrolimus 4 milliGRAM(s) Oral two times a day  trimethoprim   80 mG/sulfamethoxazole 400 mG 1 Tablet(s) Oral daily  valGANciclovir 450 milliGRAM(s) Oral daily    PRN Inpatient Medications  acetaminophen   Tablet. 975 milliGRAM(s) Oral every 6 hours PRN  oxyCODONE    IR 5 milliGRAM(s) Oral every 4 hours PRN      REVIEW OF SYSTEMS  --------------------------------------------------------------------------------  Gen: No weight changes, fatigue, fevers/chills, weakness  Skin: No rashes  Head/Eyes/Ears/Mouth: No headache  Respiratory: No dyspnea, cough  CV: No chest pain, PND, orthopnea  GI: No abdominal pain, diarrhea, constipation  : No increased frequency, dysuria, hematuria, nocturia  MSK: No edema  Neuro: No dizziness/lightheadedness  Heme: No easy bruising or bleeding    All other systems were reviewed and are negative, except as noted.    VITALS/PHYSICAL EXAM  --------------------------------------------------------------------------------  T(C): 36.7 (05-25-18 @ 07:00), Max: 37.2 (05-25-18 @ 03:00)  HR: 109 (05-25-18 @ 10:00) (82 - 122)  BP: 150/68 (05-25-18 @ 03:45) (92/51 - 167/125)  RR: 22 (05-25-18 @ 10:00) (8 - 42)  SpO2: 87% (05-25-18 @ 10:00) (87% - 98%)  Wt(kg): --  Height (cm): 165.1 (05-24-18 @ 04:38)  Weight (kg): 83.7 (05-24-18 @ 04:38)  BMI (kg/m2): 30.7 (05-24-18 @ 04:38)  BSA (m2): 1.91 (05-24-18 @ 04:38)      05-24-18 @ 07:01  -  05-25-18 @ 07:00  --------------------------------------------------------  IN: 1596.9 mL / OUT: 235 mL / NET: 1361.9 mL    05-25-18 @ 07:01  -  05-25-18 @ 10:15  --------------------------------------------------------  IN: 252.4 mL / OUT: 40 mL / NET: 212.4 mL    Physical Exam:  	Gen: NAD, well-appearing  	HEENT: supple neck  	Pulm: CTA B/L  	CV: RRR, S1S2; no rub  	Abd: +BS, soft, nontender/nondistended, obese  	: No suprapubic tenderness  	UE: Warm, no asterixis  	LE: Warm, no edema + pedal pulses b/l   	Psych: Normal affect and mood  	Skin: Warm, without rashes  	Vascular access: + RUE AVF + thrill, + bruit; + LUE AVF aneurysmal appearance, + small    LABS/STUDIES  --------------------------------------------------------------------------------              11.7   10.9  >-----------<  256      [05-25-18 @ 03:18]              37.2     137  |  100  |  35  ----------------------------<  218      [05-25-18 @ 04:39]  5.3   |  20  |  7.37        Ca     7.2     [05-25-18 @ 04:39]      Mg     1.7     [05-25-18 @ 04:39]      Phos  4.6     [05-25-18 @ 04:39]    TPro  7.0  /  Alb  4.1  /  TBili  0.4  /  DBili  x   /  AST  21  /  ALT  16  /  AlkPhos  34  [05-25-18 @ 03:18]    PT/INR: PT 11.9 , INR 1.09       [05-25-18 @ 03:18]  PTT: 28.2       [05-25-18 @ 03:18]    Troponin <0.01      [05-24-18 @ 18:47]        [05-24-18 @ 18:47]        [05-25-18 @ 03:18]    Creatinine Trend:  SCr 7.37 [05-25 @ 04:39]  SCr 7.46 [05-25 @ 03:18]  SCr 7.35 [05-24 @ 23:06]  SCr 6.77 [05-24 @ 15:32]  SCr 6.70 [05-24 @ 04:49]        HbA1c 5.6      [11-22-17 @ 03:20]  TSH 1.35      [11-22-17 @ 03:20]  Lipid: chol 85, , HDL 28, LDL 38      [11-22-17 @ 03:20]

## 2018-05-25 NOTE — PROGRESS NOTE ADULT - SUBJECTIVE AND OBJECTIVE BOX
[facility] [phone]        Patient seen and examined in bed.  He felt well and offered no complaints.  He is still on Levo gtt        MEDICATIONS  (STANDING):  aspirin  chewable 81 milliGRAM(s) Oral daily  docusate sodium 100 milliGRAM(s) Oral three times a day  famotidine    Tablet 20 milliGRAM(s) Oral daily  insulin lispro (HumaLOG) corrective regimen sliding scale   SubCutaneous every 4 hours  methylPREDNISolone sodium succinate Injectable 125 milliGRAM(s) IV Push every 12 hours  mycophenolate mofetil 1000 milliGRAM(s) Oral two times a day  norepinephrine Infusion 0.05 MICROgram(s)/kG/Min (7.847 mL/Hr) IV Continuous <Continuous>  nystatin    Suspension 226176 Unit(s) Swish and Swallow four times a day  senna 2 Tablet(s) Oral at bedtime  sodium chloride 0.9%. 1000 milliLiter(s) (1 mL/Hr) IV Continuous <Continuous>  sodium chloride 0.9%. 1000 milliLiter(s) (70 mL/Hr) IV Continuous <Continuous>  tacrolimus 4 milliGRAM(s) Oral two times a day  trimethoprim   80 mG/sulfamethoxazole 400 mG 1 Tablet(s) Oral daily  valGANciclovir 450 milliGRAM(s) Oral daily      VITAL:  T(C): , Max: 37.2 (05-25-18 @ 03:00)  T(F): , Max: 99 (05-25-18 @ 03:00)  HR: 94 (05-25-18 @ 12:00)  BP: 150/68 (05-25-18 @ 03:45)  BP(mean): 101 (05-25-18 @ 03:45)  RR: 24 (05-25-18 @ 12:00)  SpO2: 93% (05-25-18 @ 12:00)  Wt(kg): --    I and O's:    05-24 @ 07:01  -  05-25 @ 07:00  --------------------------------------------------------  IN: 1596.9 mL / OUT: 235 mL / NET: 1361.9 mL    05-25 @ 07:01  -  05-25 @ 12:18  --------------------------------------------------------  IN: 400.3 mL / OUT: 55 mL / NET: 345.3 mL          PHYSICAL EXAM:    Constitutional: NAD; obese  HEENT: PERRLA    Neck:  No JVD  Respiratory: CTAB/L  Cardiovascular: S1 and S2  Gastrointestinal: BS+, soft, NT/ND  Extremities: No peripheral edema  Neurological: A/O x 3, no focal deficits  Psychiatric: Normal mood, normal affect  : No Jean  Skin: No rashes  Access: avf    LABS:                        11.7   10.9  )-----------( 256      ( 25 May 2018 03:18 )             37.2     05-25    137  |  100  |  35<H>  ----------------------------<  218<H>  5.3   |  20<L>  |  7.37<H>    Ca    7.2<L>      25 May 2018 04:39  Phos  4.6     05-25  Mg     1.7     05-25    TPro  7.0  /  Alb  4.1  /  TBili  0.4  /  DBili  x   /  AST  21  /  ALT  16  /  AlkPhos  34<L>  05-25          Urine Studies:          RADIOLOGY & ADDITIONAL STUDIES:      < from: Xray Chest 1 View- PORTABLE-Urgent (05.24.18 @ 18:03) >    EXAM:  XR CHEST PORTABLE URGENT 1V                            PROCEDURE DATE:  05/24/2018            INTERPRETATION:  A single chest x-ray was obtained on May 24, 2018.    Indication: Status post kidney transplant.    Impression:    Poor inspiratory effort. The heart is normal in size. The lungs are   clear. Status post post sternotomy. A central line is seen on the right   and the  tip is in the superior vena cava. No pneumothorax. Status post   sternotomy.                    CARMEN RAINES M.D., ATTENDING RADIOLOGIST  This document has been electronically signed. May 25 2018 10:18AM                < end of copied text >

## 2018-05-25 NOTE — PROGRESS NOTE ADULT - ATTENDING COMMENTS
Tacro level 7.5 on 4mg BID. Decrease to 3mg BID I personally saw and examined patient with transplant team both in the morning and the afternoon.  IMMUNOSUPPRESSION:  Tacro level 7.5 on 4mg BID (represents only 1 dose [id number]).  Decrease to 3mg BID I personally saw and examined patient with transplant team both in the morning and the afternoon.  I personally reviewed US imaging study with Dr. Benavides (radiology)  Adequate perfusion to the entire kidney.  IMMUNOSUPPRESSION:  Tacro level 7.5 on 4mg BID (represents only 1 dose [id number]).  Decrease to 3mg BID

## 2018-05-25 NOTE — DIETITIAN INITIAL EVALUATION ADULT. - ORAL INTAKE PTA
good/Pt reports he eats very well at home, consuming exclusively homemade foods. Pt takes Lactobacillus acidophilus supplements; reports NKFA.

## 2018-05-25 NOTE — PROGRESS NOTE ADULT - ATTENDING COMMENTS
I saw and evaluated patient.  I agree with residents note.  The patient has post transplant ATN leading to acute renal failure and will likely need hemodialyses  the potassium has gone up to 5.4.  The urine output is about 10 cc per hour  was on Levophed to optimize blood pressure but was able to wean off.

## 2018-05-25 NOTE — CHART NOTE - NSCHARTNOTEFT_GEN_A_CORE
I have seen the patient and reviewed dialysis prescription and flow sheet. Dialysis access is functioning well. Patient is tolerating dialysis well with no acute symptoms or distress. Dialysis prescription has been adjusted for optimized control of volume status, uremia and electrolytes. Management of additional metabolic abnormalities/anemia will continue to be addressed on follow up.  I reviewed the prescription with dialysis nurse and house staff as well as SICU team  Omar Cortes MD  [phone]

## 2018-05-25 NOTE — DIETITIAN INITIAL EVALUATION ADULT. - NS AS NUTRI INTERV ED CONTENT
Reviewed food safety guidelines for transplant recipients. Reviewed recommendations to avoid grapefruit and pomegranate while taking immunosuppressant medication. Reviewed recommendations for moderate intake of sodium and carbohydrates with transplant medications. Pt was receptive and expressed understanding. Provided nutrition handout: USDA Food Safety for Transplant Recipients booklet./Other (specify)

## 2018-05-25 NOTE — DIETITIAN INITIAL EVALUATION ADULT. - ADHERENCE
Pt has been on dialysis since 2011, reports he follows a "Regular" diet, unable to identify foods to avoid on a renal diet. Home medications inlcude Sevelamer, sucralfate, and Renvela./fair

## 2018-05-25 NOTE — PROGRESS NOTE ADULT - ATTENDING COMMENTS
Patient with renal allograft, post op day 1, oliguric, has mild hyperkalemia, hypotension was on pressors.  Immunosuppression reviewed  Available clinical and lab data reviewed  Plan:  Hemodialysis in view of oliguria with hyperkalemia  Will maintain SBP >100  Will follow  Omar Cortes MD  [phone]

## 2018-05-25 NOTE — DIETITIAN INITIAL EVALUATION ADULT. - PERTINENT MEDS FT
nystatin, bactrim, valcyte, IV NaCl, colace, senna, pepcid, Humalog sliding scale, Solu-medrol, levophed, oxycodone, Cellcept, Prograf

## 2018-05-25 NOTE — DIETITIAN INITIAL EVALUATION ADULT. - PERTINENT LABORATORY DATA
Fingersticks x 24 hours: 91-349mg/dL, BUN 35, Creatinine 7.37, GFR 7, phosphorus 4.6 <H>, potassium 5.6 --> 5.3,

## 2018-05-25 NOTE — CHART NOTE - NSCHARTNOTEFT_GEN_A_CORE
Patient noted to be hyperkalemic to 5.5 mmol/L on BMP that resulted at 23:06 on 5/24/2018. Dr. Otto Murguia called and informed of the results, who recommended that I call Dr. Omar Cortes of transplant nephrology. I subsequently contacted him for recommendations on how to treat the patient's hyperkalemia. Patient has continued to require vasopressor support with norepinephrine at 0.1-0.15 mcg/kg/min for a goal SBP of 120-140 mmHg with CVP ranging from 12-20 mmHg. EKG with no evidence of peaked T waves. Given his vasopressor requirements, Lasix would likely cause the patient to become more hypotensive. Unable to give calcium as patient is s/p DDRT. Unable to give sodium bicarbonate as patient's bicarbonate is 22 mmol/L. Dr. Cortes will schedule patient for hemodialysis at 8 AM this morning. Will give insulin + D50 for temporary management of hyperkalemia until the AM. Discussed with SICU attending Dr. Gio Mendoza. Patient noted to be hyperkalemic to 5.5 mmol/L on BMP that resulted at 23:06 on 5/24/2018. Dr. Otto Murguia called and informed of the results, who recommended that I call Dr. Omar Cortes of transplant nephrology. I subsequently contacted him for recommendations on how to treat the patient's hyperkalemia. Patient has continued to require vasopressor support with norepinephrine at 0.1-0.15 mcg/kg/min for a goal SBP of 120-140 mmHg with CVP ranging from 12-20 mmHg. EKG with no evidence of peaked T waves. UOP was 20-30 cc/hr but is beginning to taper to 5-10 cc/hr. Given his vasopressor requirements, Lasix would likely cause the patient to become more hypotensive. Unable to give calcium as patient is s/p DDRT. Unable to give sodium bicarbonate as patient's bicarbonate is 22 mmol/L. Unable to give fluids as patient appears well resuscitated and would be at risk for fluid overload. Dr. Cortes will schedule patient for hemodialysis at 8 AM this morning. Will give insulin + D50 for temporary management of hyperkalemia until patient can undergo hemodialysis. Discussed with SICU attending Dr. Gio Mendoza.    Craina Valente PA-C    [id number]

## 2018-05-25 NOTE — PROGRESS NOTE ADULT - ASSESSMENT
59 y/o gentleman with PMH of HTN, CAD s/p CABG, ESRD secondary to PKD ON HD (3/2011) now POD#1 DDRT from Hep C positive donor on 5/24/18.  Still required vasopressor for hypotension and requiring close monitoring of fluid balance, UOP and electrolytes. No indication for HD now.

## 2018-05-26 DIAGNOSIS — Z79.899 OTHER LONG TERM (CURRENT) DRUG THERAPY: ICD-10-CM

## 2018-05-26 LAB
ALBUMIN SERPL ELPH-MCNC: 3.6 G/DL — SIGNIFICANT CHANGE UP (ref 3.3–5)
ALP SERPL-CCNC: 32 U/L — LOW (ref 40–120)
ALT FLD-CCNC: 15 U/L — SIGNIFICANT CHANGE UP (ref 10–45)
ANION GAP SERPL CALC-SCNC: 13 MMOL/L — SIGNIFICANT CHANGE UP (ref 5–17)
ANION GAP SERPL CALC-SCNC: 14 MMOL/L — SIGNIFICANT CHANGE UP (ref 5–17)
ANION GAP SERPL CALC-SCNC: 15 MMOL/L — SIGNIFICANT CHANGE UP (ref 5–17)
AST SERPL-CCNC: 30 U/L — SIGNIFICANT CHANGE UP (ref 10–40)
BASOPHILS # BLD AUTO: 0.1 K/UL — SIGNIFICANT CHANGE UP (ref 0–0.2)
BASOPHILS NFR BLD AUTO: 1.6 % — SIGNIFICANT CHANGE UP (ref 0–2)
BILIRUB SERPL-MCNC: 0.2 MG/DL — SIGNIFICANT CHANGE UP (ref 0.2–1.2)
BUN SERPL-MCNC: 28 MG/DL — HIGH (ref 7–23)
BUN SERPL-MCNC: 28 MG/DL — HIGH (ref 7–23)
BUN SERPL-MCNC: 32 MG/DL — HIGH (ref 7–23)
BUN SERPL-MCNC: 35 MG/DL — HIGH (ref 7–23)
BUN SERPL-MCNC: 47 MG/DL — HIGH (ref 7–23)
CALCIUM SERPL-MCNC: 7 MG/DL — LOW (ref 8.4–10.5)
CALCIUM SERPL-MCNC: 7.1 MG/DL — LOW (ref 8.4–10.5)
CALCIUM SERPL-MCNC: 7.2 MG/DL — LOW (ref 8.4–10.5)
CALCIUM SERPL-MCNC: 7.3 MG/DL — LOW (ref 8.4–10.5)
CALCIUM SERPL-MCNC: 7.4 MG/DL — LOW (ref 8.4–10.5)
CHLORIDE SERPL-SCNC: 96 MMOL/L — SIGNIFICANT CHANGE UP (ref 96–108)
CHLORIDE SERPL-SCNC: 97 MMOL/L — SIGNIFICANT CHANGE UP (ref 96–108)
CHLORIDE SERPL-SCNC: 98 MMOL/L — SIGNIFICANT CHANGE UP (ref 96–108)
CO2 SERPL-SCNC: 21 MMOL/L — LOW (ref 22–31)
CO2 SERPL-SCNC: 24 MMOL/L — SIGNIFICANT CHANGE UP (ref 22–31)
CO2 SERPL-SCNC: 25 MMOL/L — SIGNIFICANT CHANGE UP (ref 22–31)
CREAT SERPL-MCNC: 4.5 MG/DL — HIGH (ref 0.5–1.3)
CREAT SERPL-MCNC: 4.57 MG/DL — HIGH (ref 0.5–1.3)
CREAT SERPL-MCNC: 4.81 MG/DL — HIGH (ref 0.5–1.3)
CREAT SERPL-MCNC: 4.9 MG/DL — HIGH (ref 0.5–1.3)
CREAT SERPL-MCNC: 5.54 MG/DL — HIGH (ref 0.5–1.3)
EOSINOPHIL # BLD AUTO: 0 K/UL — SIGNIFICANT CHANGE UP (ref 0–0.5)
EOSINOPHIL NFR BLD AUTO: 0.1 % — SIGNIFICANT CHANGE UP (ref 0–6)
GAS PNL BLDA: SIGNIFICANT CHANGE UP
GLUCOSE SERPL-MCNC: 152 MG/DL — HIGH (ref 70–99)
GLUCOSE SERPL-MCNC: 162 MG/DL — HIGH (ref 70–99)
GLUCOSE SERPL-MCNC: 247 MG/DL — HIGH (ref 70–99)
GLUCOSE SERPL-MCNC: 253 MG/DL — HIGH (ref 70–99)
GLUCOSE SERPL-MCNC: 282 MG/DL — HIGH (ref 70–99)
HCT VFR BLD CALC: 26.6 % — LOW (ref 39–50)
HCT VFR BLD CALC: 27.5 % — LOW (ref 39–50)
HCT VFR BLD CALC: 27.5 % — LOW (ref 39–50)
HCT VFR BLD CALC: 28 % — LOW (ref 39–50)
HGB BLD-MCNC: 8.7 G/DL — LOW (ref 13–17)
HGB BLD-MCNC: 8.8 G/DL — LOW (ref 13–17)
HGB BLD-MCNC: 9.1 G/DL — LOW (ref 13–17)
HGB BLD-MCNC: 9.2 G/DL — LOW (ref 13–17)
LDH SERPL L TO P-CCNC: 302 U/L — HIGH (ref 50–242)
LYMPHOCYTES # BLD AUTO: 0.5 K/UL — LOW (ref 1–3.3)
LYMPHOCYTES # BLD AUTO: 7.1 % — LOW (ref 13–44)
MAGNESIUM SERPL-MCNC: 2 MG/DL — SIGNIFICANT CHANGE UP (ref 1.6–2.6)
MAGNESIUM SERPL-MCNC: 2 MG/DL — SIGNIFICANT CHANGE UP (ref 1.6–2.6)
MAGNESIUM SERPL-MCNC: 2.1 MG/DL — SIGNIFICANT CHANGE UP (ref 1.6–2.6)
MCHC RBC-ENTMCNC: 30.6 PG — SIGNIFICANT CHANGE UP (ref 27–34)
MCHC RBC-ENTMCNC: 31.4 PG — SIGNIFICANT CHANGE UP (ref 27–34)
MCHC RBC-ENTMCNC: 31.6 PG — SIGNIFICANT CHANGE UP (ref 27–34)
MCHC RBC-ENTMCNC: 31.7 PG — SIGNIFICANT CHANGE UP (ref 27–34)
MCHC RBC-ENTMCNC: 32 GM/DL — SIGNIFICANT CHANGE UP (ref 32–36)
MCHC RBC-ENTMCNC: 32.8 GM/DL — SIGNIFICANT CHANGE UP (ref 32–36)
MCHC RBC-ENTMCNC: 32.8 GM/DL — SIGNIFICANT CHANGE UP (ref 32–36)
MCHC RBC-ENTMCNC: 33.1 GM/DL — SIGNIFICANT CHANGE UP (ref 32–36)
MCV RBC AUTO: 95.4 FL — SIGNIFICANT CHANGE UP (ref 80–100)
MCV RBC AUTO: 95.8 FL — SIGNIFICANT CHANGE UP (ref 80–100)
MCV RBC AUTO: 95.8 FL — SIGNIFICANT CHANGE UP (ref 80–100)
MCV RBC AUTO: 96.4 FL — SIGNIFICANT CHANGE UP (ref 80–100)
MONOCYTES # BLD AUTO: 0.5 K/UL — SIGNIFICANT CHANGE UP (ref 0–0.9)
MONOCYTES NFR BLD AUTO: 6.2 % — SIGNIFICANT CHANGE UP (ref 2–14)
NEUTROPHILS # BLD AUTO: 6.3 K/UL — SIGNIFICANT CHANGE UP (ref 1.8–7.4)
NEUTROPHILS NFR BLD AUTO: 85 % — HIGH (ref 43–77)
PHOSPHATE SERPL-MCNC: 2.4 MG/DL — LOW (ref 2.5–4.5)
PHOSPHATE SERPL-MCNC: 2.5 MG/DL — SIGNIFICANT CHANGE UP (ref 2.5–4.5)
PHOSPHATE SERPL-MCNC: 2.6 MG/DL — SIGNIFICANT CHANGE UP (ref 2.5–4.5)
PHOSPHATE SERPL-MCNC: 3 MG/DL — SIGNIFICANT CHANGE UP (ref 2.5–4.5)
PHOSPHATE SERPL-MCNC: 3.1 MG/DL — SIGNIFICANT CHANGE UP (ref 2.5–4.5)
PLATELET # BLD AUTO: 103 K/UL — LOW (ref 150–400)
PLATELET # BLD AUTO: 111 K/UL — LOW (ref 150–400)
PLATELET # BLD AUTO: 118 K/UL — LOW (ref 150–400)
PLATELET # BLD AUTO: 99 K/UL — LOW (ref 150–400)
POTASSIUM SERPL-MCNC: 3.8 MMOL/L — SIGNIFICANT CHANGE UP (ref 3.5–5.3)
POTASSIUM SERPL-MCNC: 3.9 MMOL/L — SIGNIFICANT CHANGE UP (ref 3.5–5.3)
POTASSIUM SERPL-MCNC: 3.9 MMOL/L — SIGNIFICANT CHANGE UP (ref 3.5–5.3)
POTASSIUM SERPL-MCNC: 4 MMOL/L — SIGNIFICANT CHANGE UP (ref 3.5–5.3)
POTASSIUM SERPL-MCNC: 4.1 MMOL/L — SIGNIFICANT CHANGE UP (ref 3.5–5.3)
POTASSIUM SERPL-SCNC: 3.8 MMOL/L — SIGNIFICANT CHANGE UP (ref 3.5–5.3)
POTASSIUM SERPL-SCNC: 3.9 MMOL/L — SIGNIFICANT CHANGE UP (ref 3.5–5.3)
POTASSIUM SERPL-SCNC: 3.9 MMOL/L — SIGNIFICANT CHANGE UP (ref 3.5–5.3)
POTASSIUM SERPL-SCNC: 4 MMOL/L — SIGNIFICANT CHANGE UP (ref 3.5–5.3)
POTASSIUM SERPL-SCNC: 4.1 MMOL/L — SIGNIFICANT CHANGE UP (ref 3.5–5.3)
PROT SERPL-MCNC: 6.1 G/DL — SIGNIFICANT CHANGE UP (ref 6–8.3)
RBC # BLD: 2.77 M/UL — LOW (ref 4.2–5.8)
RBC # BLD: 2.87 M/UL — LOW (ref 4.2–5.8)
RBC # BLD: 2.88 M/UL — LOW (ref 4.2–5.8)
RBC # BLD: 2.91 M/UL — LOW (ref 4.2–5.8)
RBC # FLD: 13.1 % — SIGNIFICANT CHANGE UP (ref 10.3–14.5)
RBC # FLD: 13.3 % — SIGNIFICANT CHANGE UP (ref 10.3–14.5)
RBC # FLD: 13.3 % — SIGNIFICANT CHANGE UP (ref 10.3–14.5)
RBC # FLD: 13.4 % — SIGNIFICANT CHANGE UP (ref 10.3–14.5)
SODIUM SERPL-SCNC: 133 MMOL/L — LOW (ref 135–145)
SODIUM SERPL-SCNC: 134 MMOL/L — LOW (ref 135–145)
SODIUM SERPL-SCNC: 134 MMOL/L — LOW (ref 135–145)
SODIUM SERPL-SCNC: 136 MMOL/L — SIGNIFICANT CHANGE UP (ref 135–145)
SODIUM SERPL-SCNC: 136 MMOL/L — SIGNIFICANT CHANGE UP (ref 135–145)
TACROLIMUS SERPL-MCNC: 6.9 NG/ML — SIGNIFICANT CHANGE UP
WBC # BLD: 6.4 K/UL — SIGNIFICANT CHANGE UP (ref 3.8–10.5)
WBC # BLD: 6.6 K/UL — SIGNIFICANT CHANGE UP (ref 3.8–10.5)
WBC # BLD: 7.4 K/UL — SIGNIFICANT CHANGE UP (ref 3.8–10.5)
WBC # BLD: 8 K/UL — SIGNIFICANT CHANGE UP (ref 3.8–10.5)
WBC # FLD AUTO: 6.4 K/UL — SIGNIFICANT CHANGE UP (ref 3.8–10.5)
WBC # FLD AUTO: 6.6 K/UL — SIGNIFICANT CHANGE UP (ref 3.8–10.5)
WBC # FLD AUTO: 7.4 K/UL — SIGNIFICANT CHANGE UP (ref 3.8–10.5)
WBC # FLD AUTO: 8 K/UL — SIGNIFICANT CHANGE UP (ref 3.8–10.5)

## 2018-05-26 PROCEDURE — 99232 SBSQ HOSP IP/OBS MODERATE 35: CPT | Mod: 24,GC

## 2018-05-26 PROCEDURE — 99233 SBSQ HOSP IP/OBS HIGH 50: CPT

## 2018-05-26 PROCEDURE — 99233 SBSQ HOSP IP/OBS HIGH 50: CPT | Mod: GC

## 2018-05-26 PROCEDURE — 71045 X-RAY EXAM CHEST 1 VIEW: CPT | Mod: 26

## 2018-05-26 RX ORDER — INSULIN LISPRO 100/ML
VIAL (ML) SUBCUTANEOUS
Qty: 0 | Refills: 0 | Status: DISCONTINUED | OUTPATIENT
Start: 2018-05-26 | End: 2018-06-02

## 2018-05-26 RX ORDER — INSULIN LISPRO 100/ML
VIAL (ML) SUBCUTANEOUS AT BEDTIME
Qty: 0 | Refills: 0 | Status: DISCONTINUED | OUTPATIENT
Start: 2018-05-26 | End: 2018-06-02

## 2018-05-26 RX ORDER — LACTULOSE 10 G/15ML
10 SOLUTION ORAL ONCE
Qty: 0 | Refills: 0 | Status: DISCONTINUED | OUTPATIENT
Start: 2018-05-26 | End: 2018-05-26

## 2018-05-26 RX ORDER — TACROLIMUS 5 MG/1
3 CAPSULE ORAL
Qty: 0 | Refills: 0 | Status: DISCONTINUED | OUTPATIENT
Start: 2018-05-26 | End: 2018-05-27

## 2018-05-26 RX ORDER — NOREPINEPHRINE BITARTRATE/D5W 8 MG/250ML
0.05 PLASTIC BAG, INJECTION (ML) INTRAVENOUS
Qty: 16 | Refills: 0 | Status: DISCONTINUED | OUTPATIENT
Start: 2018-05-26 | End: 2018-05-27

## 2018-05-26 RX ADMIN — Medication 60 MILLIGRAM(S): at 05:34

## 2018-05-26 RX ADMIN — Medication 81 MILLIGRAM(S): at 12:05

## 2018-05-26 RX ADMIN — Medication 500000 UNIT(S): at 23:06

## 2018-05-26 RX ADMIN — OXYCODONE HYDROCHLORIDE 5 MILLIGRAM(S): 5 TABLET ORAL at 22:02

## 2018-05-26 RX ADMIN — TACROLIMUS 3 MILLIGRAM(S): 5 CAPSULE ORAL at 17:58

## 2018-05-26 RX ADMIN — OXYCODONE HYDROCHLORIDE 5 MILLIGRAM(S): 5 TABLET ORAL at 17:54

## 2018-05-26 RX ADMIN — SODIUM CHLORIDE 70 MILLILITER(S): 9 INJECTION INTRAMUSCULAR; INTRAVENOUS; SUBCUTANEOUS at 08:25

## 2018-05-26 RX ADMIN — Medication 500000 UNIT(S): at 05:35

## 2018-05-26 RX ADMIN — Medication 3.92 MICROGRAM(S)/KG/MIN: at 09:07

## 2018-05-26 RX ADMIN — TACROLIMUS 3 MILLIGRAM(S): 5 CAPSULE ORAL at 05:35

## 2018-05-26 RX ADMIN — FAMOTIDINE 20 MILLIGRAM(S): 10 INJECTION INTRAVENOUS at 12:05

## 2018-05-26 RX ADMIN — Medication 3.92 MICROGRAM(S)/KG/MIN: at 12:12

## 2018-05-26 RX ADMIN — OXYCODONE HYDROCHLORIDE 5 MILLIGRAM(S): 5 TABLET ORAL at 18:00

## 2018-05-26 RX ADMIN — Medication 60 MILLIGRAM(S): at 17:55

## 2018-05-26 RX ADMIN — Medication 2: at 05:26

## 2018-05-26 RX ADMIN — SENNA PLUS 2 TABLET(S): 8.6 TABLET ORAL at 21:11

## 2018-05-26 RX ADMIN — Medication 2: at 21:41

## 2018-05-26 RX ADMIN — Medication 100 MILLIGRAM(S): at 14:25

## 2018-05-26 RX ADMIN — OXYCODONE HYDROCHLORIDE 5 MILLIGRAM(S): 5 TABLET ORAL at 10:45

## 2018-05-26 RX ADMIN — MYCOPHENOLATE MOFETIL 1000 MILLIGRAM(S): 250 CAPSULE ORAL at 17:55

## 2018-05-26 RX ADMIN — Medication 500000 UNIT(S): at 17:55

## 2018-05-26 RX ADMIN — Medication 100 MILLIGRAM(S): at 21:11

## 2018-05-26 RX ADMIN — VALGANCICLOVIR 450 MILLIGRAM(S): 450 TABLET, FILM COATED ORAL at 12:05

## 2018-05-26 RX ADMIN — OXYCODONE HYDROCHLORIDE 5 MILLIGRAM(S): 5 TABLET ORAL at 21:32

## 2018-05-26 RX ADMIN — Medication 500000 UNIT(S): at 12:08

## 2018-05-26 RX ADMIN — Medication 6: at 02:35

## 2018-05-26 RX ADMIN — Medication 2: at 15:48

## 2018-05-26 RX ADMIN — Medication 100 MILLIGRAM(S): at 05:35

## 2018-05-26 RX ADMIN — OXYCODONE HYDROCHLORIDE 5 MILLIGRAM(S): 5 TABLET ORAL at 10:06

## 2018-05-26 RX ADMIN — MYCOPHENOLATE MOFETIL 1000 MILLIGRAM(S): 250 CAPSULE ORAL at 05:35

## 2018-05-26 RX ADMIN — Medication 1 TABLET(S): at 12:05

## 2018-05-26 NOTE — PROGRESS NOTE ADULT - ASSESSMENT
s/p  donor kidney transplant  HCV+ donor into HCV- recipient.  Minimal urine output.  On prograf, cellcept, steroids.

## 2018-05-26 NOTE — PROGRESS NOTE ADULT - ASSESSMENT
Renal: ESRD HD, last HD yesterday without incident, use RUE AVF for HD, remains oliguric   - dose meds for a GFR 10  - avoid NSAIDs and nephrotoxins as able   Transplant:  DDRT +HCV to -HCV recipient, POD#2  Immunosuppression: simulect induction, prograf, cellcept and prednisone per transplant surgery  : maintain medina catheter for strict I&Os  Lytes: hypocalcemia. hyponatremia likely due to impaired free water exchange, mild  CV: hypotensive, restarted on vasopressors, wean as able     Radha Morris NP  [facility]  [phone]

## 2018-05-26 NOTE — PROGRESS NOTE ADULT - SUBJECTIVE AND OBJECTIVE BOX
[facility] DIVISION OF KIDNEY DISEASES AND HYPERTENSION -- FOLLOW UP NOTE  --------------------------------------------------------------------------------  Chief Complaint:  renal transplant    24 hour events/subjective:  Pain at transplant site much improved. Pt says he is just feeling like he has a lot of gas. Had a BM. S/p HD yesterday for hyperK. Tolerated HD well.       PAST HISTORY  --------------------------------------------------------------------------------  No significant changes to PMH, PSH, FHx, SHx, unless otherwise noted    ALLERGIES & MEDICATIONS  --------------------------------------------------------------------------------  Allergies    No Known Allergies    Intolerances      Standing Inpatient Medications  aspirin  chewable 81 milliGRAM(s) Oral daily  docusate sodium 100 milliGRAM(s) Oral three times a day  famotidine    Tablet 20 milliGRAM(s) Oral daily  insulin lispro (HumaLOG) corrective regimen sliding scale   SubCutaneous every 4 hours  methylPREDNISolone sodium succinate Injectable 60 milliGRAM(s) IV Push every 12 hours  mycophenolate mofetil 1000 milliGRAM(s) Oral two times a day  norepinephrine Infusion 0.05 MICROgram(s)/kG/Min IV Continuous <Continuous>  nystatin    Suspension 965924 Unit(s) Swish and Swallow four times a day  senna 2 Tablet(s) Oral at bedtime  sodium chloride 0.9%. 1000 milliLiter(s) IV Continuous <Continuous>  sodium chloride 0.9%. 1000 milliLiter(s) IV Continuous <Continuous>  tacrolimus 3 milliGRAM(s) Oral every 12 hours  trimethoprim   80 mG/sulfamethoxazole 400 mG 1 Tablet(s) Oral daily  valGANciclovir 450 milliGRAM(s) Oral daily    PRN Inpatient Medications  acetaminophen   Tablet. 975 milliGRAM(s) Oral every 6 hours PRN  oxyCODONE    IR 5 milliGRAM(s) Oral every 4 hours PRN      REVIEW OF SYSTEMS  --------------------------------------------------------------------------------  Gen: no fatigue  Skin: No rashes  Respiratory: No dyspnea  CV: No chest pain  GI: +bloated  : No dysuria  MSK: No edema    VITALS/PHYSICAL EXAM  --------------------------------------------------------------------------------  T(C): 36.7 (05-26-18 @ 08:00), Max: 36.7 (05-25-18 @ 11:00)  HR: 90 (05-26-18 @ 10:15) (74 - 102)  BP: 126/67 (05-25-18 @ 20:30) (126/67 - 126/67)  RR: 20 (05-26-18 @ 10:15) (13 - 36)  SpO2: 93% (05-26-18 @ 10:15) (80% - 100%)  Wt(kg): --        05-25-18 @ 07:01  -  05-26-18 @ 07:00  --------------------------------------------------------  IN: 1821.9 mL / OUT: 190 mL / NET: 1631.9 mL    05-26-18 @ 07:01  -  05-26-18 @ 10:24  --------------------------------------------------------  IN: 454 mL / OUT: 20 mL / NET: 434 mL      Physical Exam:  	Gen: NAD, well-appearing obese male, sitting in chair  	Pulm: CTA B/L  	CV: RRR, S1S2; no rub  	Abd: +obese abdomen  	: +Jean with some dark urine noted  	UE: Warm, no edema  	LE: Warm, b/l trace edema  	Neuro: No focal deficits  	Psych: Normal affect and mood  	Skin: Warm, without rashes  	Vascular access: RUE AVF +thrill +bruit +skin intact    LABS/STUDIES  --------------------------------------------------------------------------------              9.1    6.4   >-----------<  103      [05-26-18 @ 05:38]              27.5     134  |  97  |  32  ----------------------------<  162      [05-26-18 @ 06:51]  3.9   |  24  |  4.81        Ca     7.3     [05-26-18 @ 06:51]      Mg     2.1     [05-26-18 @ 06:51]      Phos  2.6     [05-26-18 @ 06:51]    TPro  6.1  /  Alb  3.6  /  TBili  0.2  /  DBili  x   /  AST  30  /  ALT  15  /  AlkPhos  32  [05-26-18 @ 03:06]    PT/INR: PT 11.9 , INR 1.09       [05-25-18 @ 03:18]  PTT: 28.2       [05-25-18 @ 03:18]    Troponin <0.01      [05-24-18 @ 18:47]        [05-24-18 @ 18:47]        [05-26-18 @ 03:06]    Creatinine Trend:  SCr 4.81 [05-26 @ 06:51]  SCr 4.57 [05-26 @ 03:06]  SCr 4.50 [05-26 @ 01:08]  SCr 3.76 [05-25 @ 18:54]  SCr 7.47 [05-25 @ 12:33]        HbA1c 5.6      [11-22-17 @ 03:20]  TSH 1.35      [11-22-17 @ 03:20]  Lipid: chol 85, , HDL 28, LDL 38      [11-22-17 @ 03:20]

## 2018-05-26 NOTE — PROGRESS NOTE ADULT - SUBJECTIVE AND OBJECTIVE BOX
INTERVAL HPI/OVERNIGHT EVENTS:    MEDICATIONS  (STANDING):  aspirin  chewable 81 milliGRAM(s) Oral daily  docusate sodium 100 milliGRAM(s) Oral three times a day  famotidine    Tablet 20 milliGRAM(s) Oral daily  methylPREDNISolone sodium succinate Injectable 60 milliGRAM(s) IV Push every 12 hours  mycophenolate mofetil 1000 milliGRAM(s) Oral two times a day  norepinephrine Infusion 0.05 MICROgram(s)/kG/Min (3.923 mL/Hr) IV Continuous <Continuous>  nystatin    Suspension [id number] Unit(s) Swish and Swallow four times a day  senna 2 Tablet(s) Oral at bedtime  sodium chloride 0.9%. 1000 milliLiter(s) (1 mL/Hr) IV Continuous <Continuous>  sodium chloride 0.9%. 1000 milliLiter(s) (70 mL/Hr) IV Continuous <Continuous>  tacrolimus 3 milliGRAM(s) Oral every 12 hours  trimethoprim   80 mG/sulfamethoxazole 400 mG 1 Tablet(s) Oral daily  valGANciclovir 450 milliGRAM(s) Oral daily    MEDICATIONS  (PRN):  acetaminophen   Tablet. 975 milliGRAM(s) Oral every 6 hours PRN Mild Pain (1 - 3)  oxyCODONE    IR 5 milliGRAM(s) Oral every 4 hours PRN Moderate Pain (4 - 6)      Allergies    No Known Allergies    Intolerances    Vital Signs Last 24 Hrs  T(C): 36.7 (26 May 2018 08:00), Max: 36.7 (25 May 2018 11:00)  T(F): 98.1 (26 May 2018 08:00), Max: 98.1 (25 May 2018 11:00)  HR: 90 (26 May 2018 10:15) (74 - 102)  BP: 126/67 (25 May 2018 20:30) (126/67 - 126/67)  BP(mean): 91 (25 May 2018 20:30) (91 - 91)  RR: 20 (26 May 2018 10:15) (13 - 36)  SpO2: 93% (26 May 2018 10:15) (80% - 100%)    PHYSICAL EXAM:  Constitutional: Well developed / well nourished  Eyes: Anicteric, PERRLA  ENMT: nc/at  Neck: central line patent  Respiratory: CTA B/L  Cardiovascular: RRR  Gastrointestinal: Soft abdomen, mild tender to touch at surgical site, ND. +BS  Genitourinary: Urinary catheter in place  Extremities: SCD's in place and working bilaterally  Vascular: Palpable dp pulses bilaterally  Neurological: A&O x3  Skin: Mild serosanguinous hina on wound dressing  Musculoskeletal: Moving all extremities  Psychiatric: Responsive      LABS:                        9.1    6.4   )-----------( 103      ( 26 May 2018 05:38 )             27.5     05-26    134<L>  |  97  |  32<H>  ----------------------------<  162<H>  3.9   |  24  |  4.81<H>    Ca    7.3<L>      26 May 2018 06:51  Phos  2.6     05-26  Mg     2.1     05-26    TPro  6.1  /  Alb  3.6  /  TBili  0.2  /  DBili  x   /  AST  30  /  ALT  15  /  AlkPhos  32<L>  05-26    PT/INR - ( 25 May 2018 03:18 )   PT: 11.9 sec;   INR: 1.09 ratio         PTT - ( 25 May 2018 03:18 )  PTT:28.2 sec      RADIOLOGY & ADDITIONAL TESTS: INTERVAL HPI/OVERNIGHT EVENTS:  No major complaints    MEDICATIONS  (STANDING):  aspirin  chewable 81 milliGRAM(s) Oral daily  docusate sodium 100 milliGRAM(s) Oral three times a day  famotidine    Tablet 20 milliGRAM(s) Oral daily  methylPREDNISolone sodium succinate Injectable 60 milliGRAM(s) IV Push every 12 hours  mycophenolate mofetil 1000 milliGRAM(s) Oral two times a day  norepinephrine Infusion 0.05 MICROgram(s)/kG/Min (3.923 mL/Hr) IV Continuous <Continuous>  nystatin    Suspension [id number] Unit(s) Swish and Swallow four times a day  senna 2 Tablet(s) Oral at bedtime  sodium chloride 0.9%. 1000 milliLiter(s) (1 mL/Hr) IV Continuous <Continuous>  sodium chloride 0.9%. 1000 milliLiter(s) (70 mL/Hr) IV Continuous <Continuous>  tacrolimus 3 milliGRAM(s) Oral every 12 hours  trimethoprim   80 mG/sulfamethoxazole 400 mG 1 Tablet(s) Oral daily  valGANciclovir 450 milliGRAM(s) Oral daily    MEDICATIONS  (PRN):  acetaminophen   Tablet. 975 milliGRAM(s) Oral every 6 hours PRN Mild Pain (1 - 3)  oxyCODONE    IR 5 milliGRAM(s) Oral every 4 hours PRN Moderate Pain (4 - 6)      Allergies    No Known Allergies    Intolerances    Vital Signs Last 24 Hrs  T(C): 36.7 (26 May 2018 08:00), Max: 36.7 (25 May 2018 11:00)  T(F): 98.1 (26 May 2018 08:00), Max: 98.1 (25 May 2018 11:00)  HR: 90 (26 May 2018 10:15) (74 - 102)  BP: 126/67 (25 May 2018 20:30) (126/67 - 126/67)  BP(mean): 91 (25 May 2018 20:30) (91 - 91)  RR: 20 (26 May 2018 10:15) (13 - 36)  SpO2: 93% (26 May 2018 10:15) (80% - 100%)    PHYSICAL EXAM:  Constitutional: Well developed / well nourished  Eyes: Anicteric, PERRLA  ENMT: nc/at  Neck: central line patent  Respiratory: CTA B/L  Cardiovascular: RRR  Gastrointestinal: Soft abdomen, mild tender to touch at surgical site, ND. +BS  Genitourinary: Urinary catheter in place  Extremities: SCD's in place and working bilaterally  Vascular: Palpable dp pulses bilaterally  Neurological: A&O x3  Skin: Mild serosanguinous hina on wound dressing  Musculoskeletal: Moving all extremities  Psychiatric: Responsive      LABS:                        9.1    6.4   )-----------( 103      ( 26 May 2018 05:38 )             27.5     05-26    134<L>  |  97  |  32<H>  ----------------------------<  162<H>  3.9   |  24  |  4.81<H>    Ca    7.3<L>      26 May 2018 06:51  Phos  2.6     05-26  Mg     2.1     05-26    TPro  6.1  /  Alb  3.6  /  TBili  0.2  /  DBili  x   /  AST  30  /  ALT  15  /  AlkPhos  32<L>  05-26    PT/INR - ( 25 May 2018 03:18 )   PT: 11.9 sec;   INR: 1.09 ratio         PTT - ( 25 May 2018 03:18 )  PTT:28.2 sec      RADIOLOGY & ADDITIONAL TESTS:

## 2018-05-26 NOTE — PROGRESS NOTE ADULT - ATTENDING COMMENTS
PARMINDER-Delayed graft function-oliguric-monitor UO; s/p hd yesterday; no urgent indictaion today  low hb, platelets-monitor trend  renal tx recipient hcv+donor; need for immunosuppression-cont meds  cont prophylaxis pcp/cmv

## 2018-05-26 NOTE — PROGRESS NOTE ADULT - ASSESSMENT
58M POD#2 s/p DDRT    Neuro: awake and alert  PCA for pain    Resp: requiring NC to maintain O2 sat, wean as tolerated    CV: hx of CABG, EF~40%  levophed to maintain SBP>120, wean as tolerated  Continuous hemodynamic monitoring    GI: Regular Diet    : s/p DDRT, oliguric  closely monitor UOP  basal rate 70cc/hr NS, plus 1:1 replacement for UOP >70  labs q6  tacrolimus, cellcept, solumedrol for immunosupression    ID: Bactrim and Valcyte for ppx in setting of immunosupression    Heme: ASA for graft patency, hold VTE ppx for now  trend CBC    Endo: ISS in setting of steroids  monitor FS    Dispo: SICU

## 2018-05-26 NOTE — PROGRESS NOTE ADULT - SUBJECTIVE AND OBJECTIVE BOX
NEPHROLOGY - NSN    Patient seen and examined.    MEDICATIONS  (STANDING):  aspirin  chewable 81 milliGRAM(s) Oral daily  docusate sodium 100 milliGRAM(s) Oral three times a day  famotidine    Tablet 20 milliGRAM(s) Oral daily  insulin lispro (HumaLOG) corrective regimen sliding scale   SubCutaneous every 4 hours  methylPREDNISolone sodium succinate Injectable 60 milliGRAM(s) IV Push every 12 hours  mycophenolate mofetil 1000 milliGRAM(s) Oral two times a day  norepinephrine Infusion 0.05 MICROgram(s)/kG/Min (3.923 mL/Hr) IV Continuous <Continuous>  nystatin    Suspension [id number] Unit(s) Swish and Swallow four times a day  senna 2 Tablet(s) Oral at bedtime  sodium chloride 0.9%. 1000 milliLiter(s) (1 mL/Hr) IV Continuous <Continuous>  sodium chloride 0.9%. 1000 milliLiter(s) (70 mL/Hr) IV Continuous <Continuous>  tacrolimus 3 milliGRAM(s) Oral every 12 hours  trimethoprim   80 mG/sulfamethoxazole 400 mG 1 Tablet(s) Oral daily  valGANciclovir 450 milliGRAM(s) Oral daily    VITALS:  T(C): , Max: 36.7 (05-25-18 @ 11:00)  T(F): , Max: 98.1 (05-25-18 @ 11:00)  HR: 80 (05-26-18 @ 08:45)  BP: 126/67 (05-25-18 @ 20:30)  BP(mean): 91 (05-25-18 @ 20:30)  RR: 26 (05-26-18 @ 08:45)  SpO2: 97% (05-26-18 @ 08:45)  Wt(kg): --  I and O's:    05-25 @ 07:01  -  05-26 @ 07:00  --------------------------------------------------------  IN: 1821.9 mL / OUT: 190 mL / NET: 1631.9 mL    05-26 @ 07:01  -  05-26 @ 09:31  --------------------------------------------------------  IN: 70 mL / OUT: 5 mL / NET: 65 mL          REVIEW OF SYSTEMS:  Denies any nausea, vomiting, diarrhea, fever or chills. Denies chest pain, SOB, focal weakness, hematuria or dysuria. Good oral intake and denies fatigue or weakness. All other pertinent systems are reviewed and are negative.     PHYSICAL EXAM:  Constitutional: NAD  Respiratory: CTA B/L  Cardiovascular: S1 and S2  Gastrointestinal: BS+, soft, NT/ND  Extremities: No peripheral edema  Neurological: AAO x 3  : No Jean  Access: Not applicable    LABS:                        9.1    6.4   )-----------( 103      ( 26 May 2018 05:38 )             27.5     05-26    134<L>  |  97  |  32<H>  ----------------------------<  162<H>  3.9   |  24  |  4.81<H>    Ca    7.3<L>      26 May 2018 06:51  Phos  2.6     05-26  Mg     2.1     05-26    TPro  6.1  /  Alb  3.6  /  TBili  0.2  /  DBili  x   /  AST  30  /  ALT  15  /  AlkPhos  32<L>  05-26      Urine Studies:        RADIOLOGY & ADDITIONAL STUDIES: NEPHROLOGY - NSN    Patient seen and examined in SICU. On low dose pressors. Sitting in chair. Denies pain at this time.     MEDICATIONS  (STANDING):  aspirin  chewable 81 milliGRAM(s) Oral daily  docusate sodium 100 milliGRAM(s) Oral three times a day  famotidine    Tablet 20 milliGRAM(s) Oral daily  insulin lispro (HumaLOG) corrective regimen sliding scale   SubCutaneous every 4 hours  methylPREDNISolone sodium succinate Injectable 60 milliGRAM(s) IV Push every 12 hours  mycophenolate mofetil 1000 milliGRAM(s) Oral two times a day  norepinephrine Infusion 0.05 MICROgram(s)/kG/Min (3.923 mL/Hr) IV Continuous <Continuous>  nystatin    Suspension [id number] Unit(s) Swish and Swallow four times a day  senna 2 Tablet(s) Oral at bedtime  sodium chloride 0.9%. 1000 milliLiter(s) (1 mL/Hr) IV Continuous <Continuous>  sodium chloride 0.9%. 1000 milliLiter(s) (70 mL/Hr) IV Continuous <Continuous>  tacrolimus 3 milliGRAM(s) Oral every 12 hours  trimethoprim   80 mG/sulfamethoxazole 400 mG 1 Tablet(s) Oral daily  valGANciclovir 450 milliGRAM(s) Oral daily    VITALS:  T(C): , Max: 36.7 (05-25-18 @ 11:00)  T(F): , Max: 98.1 (05-25-18 @ 11:00)  HR: 80 (05-26-18 @ 08:45)  BP: 126/67 (05-25-18 @ 20:30)  BP(mean): 91 (05-25-18 @ 20:30)  RR: 26 (05-26-18 @ 08:45)  SpO2: 97% (05-26-18 @ 08:45)  Wt(kg): --  I and O's:    05-25 @ 07:01  -  05-26 @ 07:00  --------------------------------------------------------  IN: 1821.9 mL / OUT: 190 mL / NET: 1631.9 mL    05-26 @ 07:01 - 05-26 @ 09:31  --------------------------------------------------------  IN: 70 mL / OUT: 5 mL / NET: 65 mL    REVIEW OF SYSTEMS:  Denies any nausea, vomiting, diarrhea, fever or chills. Denies chest pain, SOB, focal weakness. Denies fatigue or weakness. All other pertinent systems are reviewed and are negative.     PHYSICAL EXAM:  Constitutional: NAD  Respiratory: CTA B/L  Cardiovascular: S1 and S2, RRR  Gastrointestinal: BS+, soft, NT, mildly distended  Extremities: No peripheral edema  Neurological: AAO x 3  : + Jean  Access: RUE AVF (+ thrill), LUE AVF (+ thrill)    LABS:                        9.1    6.4   )-----------( 103      ( 26 May 2018 05:38 )             27.5     05-26    134<L>  |  97  |  32<H>  ----------------------------<  162<H>  3.9   |  24  |  4.81<H>    Ca    7.3<L>      26 May 2018 06:51  Phos  2.6     05-26  Mg     2.1     05-26    TPro  6.1  /  Alb  3.6  /  TBili  0.2  /  DBili  x   /  AST  30  /  ALT  15  /  AlkPhos  32<L>  05-26

## 2018-05-26 NOTE — PROGRESS NOTE ADULT - ATTENDING COMMENTS
I personally saw and examined patient with transplant team and discussed case with SICU team.  IMMUNOSUPPRESSION:  Will adjust prograf dose if necessary once level is available. I personally saw and examined patient with transplant team and discussed case with SICU team.  IMMUNOSUPPRESSION:  No change in prograf dose I personally saw and examined patient with transplant team and discussed case with SICU team.  will monitor hematocrit - no palpable hematoma on examination of transplant site.  IMMUNOSUPPRESSION:  No change in prograf dose

## 2018-05-26 NOTE — PROGRESS NOTE ADULT - ATTENDING COMMENTS
awake alert breathing comfortable  the patient has SBP in 90's.  Reviewed with transplant team and will use Levophed to maintain SBP greater than 100  ATN of transplanted kidney.  the urine output is 5cc per hour.  had hemodialyses yesterday  the Hematocrit is 27 so will continue to monitor post hemorrhagic

## 2018-05-27 LAB
ANION GAP SERPL CALC-SCNC: 15 MMOL/L — SIGNIFICANT CHANGE UP (ref 5–17)
APTT BLD: 24.1 SEC — LOW (ref 27.5–37.4)
BLD GP AB SCN SERPL QL: NEGATIVE — SIGNIFICANT CHANGE UP
BUN SERPL-MCNC: 50 MG/DL — HIGH (ref 7–23)
CALCIUM SERPL-MCNC: 6.9 MG/DL — LOW (ref 8.4–10.5)
CHLORIDE SERPL-SCNC: 96 MMOL/L — SIGNIFICANT CHANGE UP (ref 96–108)
CO2 SERPL-SCNC: 21 MMOL/L — LOW (ref 22–31)
CREAT SERPL-MCNC: 5.71 MG/DL — HIGH (ref 0.5–1.3)
GLUCOSE SERPL-MCNC: 169 MG/DL — HIGH (ref 70–99)
HBA1C BLD-MCNC: 5.4 % — SIGNIFICANT CHANGE UP (ref 4–5.6)
HCT VFR BLD CALC: 25.6 % — LOW (ref 39–50)
HGB BLD-MCNC: 8.4 G/DL — LOW (ref 13–17)
INR BLD: 1.13 RATIO — SIGNIFICANT CHANGE UP (ref 0.88–1.16)
LDH SERPL L TO P-CCNC: 308 U/L — HIGH (ref 50–242)
MAGNESIUM SERPL-MCNC: 2 MG/DL — SIGNIFICANT CHANGE UP (ref 1.6–2.6)
MCHC RBC-ENTMCNC: 31.2 PG — SIGNIFICANT CHANGE UP (ref 27–34)
MCHC RBC-ENTMCNC: 32.6 GM/DL — SIGNIFICANT CHANGE UP (ref 32–36)
MCV RBC AUTO: 95.8 FL — SIGNIFICANT CHANGE UP (ref 80–100)
PHOSPHATE SERPL-MCNC: 3.5 MG/DL — SIGNIFICANT CHANGE UP (ref 2.5–4.5)
PLATELET # BLD AUTO: 99 K/UL — LOW (ref 150–400)
POTASSIUM SERPL-MCNC: 4.1 MMOL/L — SIGNIFICANT CHANGE UP (ref 3.5–5.3)
POTASSIUM SERPL-SCNC: 4.1 MMOL/L — SIGNIFICANT CHANGE UP (ref 3.5–5.3)
PROTHROM AB SERPL-ACNC: 12.4 SEC — SIGNIFICANT CHANGE UP (ref 9.8–12.7)
RBC # BLD: 2.68 M/UL — LOW (ref 4.2–5.8)
RBC # FLD: 13.1 % — SIGNIFICANT CHANGE UP (ref 10.3–14.5)
RH IG SCN BLD-IMP: NEGATIVE — SIGNIFICANT CHANGE UP
SODIUM SERPL-SCNC: 132 MMOL/L — LOW (ref 135–145)
TACROLIMUS SERPL-MCNC: 4.9 NG/ML — SIGNIFICANT CHANGE UP
WBC # BLD: 7.8 K/UL — SIGNIFICANT CHANGE UP (ref 3.8–10.5)
WBC # FLD AUTO: 7.8 K/UL — SIGNIFICANT CHANGE UP (ref 3.8–10.5)

## 2018-05-27 PROCEDURE — 99233 SBSQ HOSP IP/OBS HIGH 50: CPT | Mod: GC

## 2018-05-27 PROCEDURE — 93306 TTE W/DOPPLER COMPLETE: CPT | Mod: 26

## 2018-05-27 PROCEDURE — 71045 X-RAY EXAM CHEST 1 VIEW: CPT | Mod: 26

## 2018-05-27 PROCEDURE — 99233 SBSQ HOSP IP/OBS HIGH 50: CPT

## 2018-05-27 PROCEDURE — 99232 SBSQ HOSP IP/OBS MODERATE 35: CPT | Mod: 24,GC

## 2018-05-27 RX ORDER — IPRATROPIUM/ALBUTEROL SULFATE 18-103MCG
3 AEROSOL WITH ADAPTER (GRAM) INHALATION EVERY 6 HOURS
Qty: 0 | Refills: 0 | Status: DISCONTINUED | OUTPATIENT
Start: 2018-05-27 | End: 2018-06-02

## 2018-05-27 RX ORDER — DIPHENOXYLATE HCL/ATROPINE 2.5-.025MG
1 TABLET ORAL
Qty: 0 | Refills: 0 | Status: DISCONTINUED | OUTPATIENT
Start: 2018-05-27 | End: 2018-05-27

## 2018-05-27 RX ORDER — LOPERAMIDE HCL 2 MG
2 TABLET ORAL
Qty: 0 | Refills: 0 | Status: DISCONTINUED | OUTPATIENT
Start: 2018-05-27 | End: 2018-06-01

## 2018-05-27 RX ORDER — TACROLIMUS 5 MG/1
4 CAPSULE ORAL
Qty: 0 | Refills: 0 | Status: DISCONTINUED | OUTPATIENT
Start: 2018-05-27 | End: 2018-05-28

## 2018-05-27 RX ADMIN — TACROLIMUS 4 MILLIGRAM(S): 5 CAPSULE ORAL at 18:00

## 2018-05-27 RX ADMIN — SODIUM CHLORIDE 70 MILLILITER(S): 9 INJECTION INTRAMUSCULAR; INTRAVENOUS; SUBCUTANEOUS at 05:26

## 2018-05-27 RX ADMIN — VALGANCICLOVIR 450 MILLIGRAM(S): 450 TABLET, FILM COATED ORAL at 12:19

## 2018-05-27 RX ADMIN — Medication 1 TABLET(S): at 12:18

## 2018-05-27 RX ADMIN — Medication 30 MILLIGRAM(S): at 05:25

## 2018-05-27 RX ADMIN — TACROLIMUS 3 MILLIGRAM(S): 5 CAPSULE ORAL at 06:14

## 2018-05-27 RX ADMIN — Medication 500000 UNIT(S): at 18:00

## 2018-05-27 RX ADMIN — MYCOPHENOLATE MOFETIL 1000 MILLIGRAM(S): 250 CAPSULE ORAL at 05:25

## 2018-05-27 RX ADMIN — Medication 30 MILLIGRAM(S): at 18:00

## 2018-05-27 RX ADMIN — BASILIXIMAB 100 MILLIGRAM(S): 20 INJECTION, POWDER, FOR SOLUTION INTRAVENOUS at 23:03

## 2018-05-27 RX ADMIN — MYCOPHENOLATE MOFETIL 1000 MILLIGRAM(S): 250 CAPSULE ORAL at 18:00

## 2018-05-27 RX ADMIN — Medication 500000 UNIT(S): at 05:25

## 2018-05-27 RX ADMIN — Medication 4: at 21:50

## 2018-05-27 RX ADMIN — Medication 3 MILLILITER(S): at 23:08

## 2018-05-27 RX ADMIN — Medication 100 MILLIGRAM(S): at 05:25

## 2018-05-27 RX ADMIN — FAMOTIDINE 20 MILLIGRAM(S): 10 INJECTION INTRAVENOUS at 12:18

## 2018-05-27 RX ADMIN — Medication 500000 UNIT(S): at 12:18

## 2018-05-27 RX ADMIN — Medication 2 MILLIGRAM(S): at 22:10

## 2018-05-27 RX ADMIN — Medication 81 MILLIGRAM(S): at 12:18

## 2018-05-27 RX ADMIN — Medication 2: at 14:02

## 2018-05-27 RX ADMIN — Medication 100 MILLIGRAM(S): at 14:14

## 2018-05-27 RX ADMIN — Medication 3.92 MICROGRAM(S)/KG/MIN: at 05:25

## 2018-05-27 NOTE — PROGRESS NOTE ADULT - ATTENDING COMMENTS
supplemental oxygen requirement for hypoxia  reviewed with renal agree would benefit from additional hemodialyses to remove fluid  reviewed with transplant.  transfused one unit of PRBC for post hemorrhagic anemai  continue transplant medications  will monitor response to transfusion

## 2018-05-27 NOTE — PROGRESS NOTE ADULT - ASSESSMENT
Mr. Smith is a 58-year-ols patient who is s/p  donor Kidney recipient, POD3. UOP is improving. BP is low but he is hemodynamically stable on low does of Levo.    - c/w immunosupression  -  try to wean him off pressors  - c/w ASA  - Diet: On regular diet, tolerating well, will d/c IVF  - Glycemic control  - ABx regimen: Bactrim and Valcyte  - Pain control: Oxycodone, Acetaminophen   - will monitor tacrolimus level closely  - Monitor GI function  - Replete electrolyte as necessary  - Activity: OOb as tolerated  - SICU care appreciated

## 2018-05-27 NOTE — PROGRESS NOTE ADULT - SUBJECTIVE AND OBJECTIVE BOX
[facility] DIVISION OF KIDNEY DISEASES AND HYPERTENSION -- FOLLOW UP NOTE  --------------------------------------------------------------------------------  Chief Complaint:  renal transplant    24 hour events/subjective:  Pt feeling SOB today.       PAST HISTORY  --------------------------------------------------------------------------------  No significant changes to PMH, PSH, FHx, SHx, unless otherwise noted    ALLERGIES & MEDICATIONS  --------------------------------------------------------------------------------  Allergies    No Known Allergies    Intolerances      Standing Inpatient Medications  aspirin  chewable 81 milliGRAM(s) Oral daily  docusate sodium 100 milliGRAM(s) Oral three times a day  famotidine    Tablet 20 milliGRAM(s) Oral daily  insulin lispro (HumaLOG) corrective regimen sliding scale   SubCutaneous three times a day before meals  insulin lispro (HumaLOG) corrective regimen sliding scale   SubCutaneous at bedtime  methylPREDNISolone sodium succinate Injectable 30 milliGRAM(s) IV Push every 12 hours  mycophenolate mofetil 1000 milliGRAM(s) Oral two times a day  nystatin    Suspension [id number] Unit(s) Swish and Swallow four times a day  senna 2 Tablet(s) Oral at bedtime  tacrolimus 3 milliGRAM(s) Oral <User Schedule>  trimethoprim   80 mG/sulfamethoxazole 400 mG 1 Tablet(s) Oral daily  valGANciclovir 450 milliGRAM(s) Oral daily    PRN Inpatient Medications  acetaminophen   Tablet. 975 milliGRAM(s) Oral every 6 hours PRN  oxyCODONE    IR 5 milliGRAM(s) Oral every 4 hours PRN      REVIEW OF SYSTEMS  --------------------------------------------------------------------------------  Gen: +fatigue  Respiratory: +dyspnea  CV: No chest pain  GI: No abdominal pain  MSK: +edema    VITALS/PHYSICAL EXAM  --------------------------------------------------------------------------------  T(C): 36.8 (05-27-18 @ 11:00), Max: 37.2 (05-27-18 @ 07:00)  HR: 90 (05-27-18 @ 11:00) (67 - 104)  BP: 139/78 (05-27-18 @ 11:00) (124/66 - 165/85)  RR: 26 (05-27-18 @ 11:00) (15 - 37)  SpO2: 100% (05-27-18 @ 11:00) (93% - 100%)  Wt(kg): --        05-26-18 @ 07:01  -  05-27-18 @ 07:00  --------------------------------------------------------  IN: 2680.8 mL / OUT: 350 mL / NET: 2330.8 mL    05-27-18 @ 07:01  -  05-27-18 @ 11:17  --------------------------------------------------------  IN: 623.2 mL / OUT: 110 mL / NET: 513.2 mL      Physical Exam:  	Gen: NAD, well-appearing obese male, sitting in chair  	Pulm: b/l crackles and expiratory wheezing  	CV: RRR, S1S2; no rub  	Abd: +obese abdomen  	: +Jean with some dark urine noted  	UE: Warm, no edema  	LE: Warm, b/l +1 edema  	Neuro: No focal deficits  	Psych: Normal affect and mood  	Skin: Warm, without rashes  	Vascular access: RUE AVF +thrill +bruit +skin intact      LABS/STUDIES  --------------------------------------------------------------------------------              8.4    7.8   >-----------<  99       [05-27-18 @ 02:51]              25.6     132  |  96  |  50  ----------------------------<  169      [05-27-18 @ 02:51]  4.1   |  21  |  5.71        Ca     6.9     [05-27-18 @ 02:51]      Mg     2.0     [05-27-18 @ 02:51]      Phos  3.5     [05-27-18 @ 02:51]    TPro  6.1  /  Alb  3.6  /  TBili  0.2  /  DBili  x   /  AST  30  /  ALT  15  /  AlkPhos  32  [05-26-18 @ 03:06]    PT/INR: PT 12.4 , INR 1.13       [05-27-18 @ 02:51]  PTT: 24.1       [05-27-18 @ 02:51]          [05-27-18 @ 02:51]    Creatinine Trend:  SCr 5.71 [05-27 @ 02:51]  SCr 5.54 [05-26 @ 18:05]  SCr 4.90 [05-26 @ 10:58]  SCr 4.81 [05-26 @ 06:51]  SCr 4.57 [05-26 @ 03:06]        HbA1c 5.6      [11-22-17 @ 03:20]  TSH 1.35      [11-22-17 @ 03:20]  Lipid: chol 85, , HDL 28, LDL 38      [11-22-17 @ 03:20]

## 2018-05-27 NOTE — PROGRESS NOTE ADULT - ATTENDING COMMENTS
PARMINDER-Delayed graft function-oliguric-monitor UO;    c/o sob, lung exam with crackles/wheezing; plan for hd/uf for mild volume reomval   low hb, platelets-monitor trend  renal tx recipient hcv+donor; need for immunosuppression-cont meds  cont prophylaxis pcp/cmv

## 2018-05-27 NOTE — PROGRESS NOTE ADULT - SUBJECTIVE AND OBJECTIVE BOX
Surgery Blue Team Progress Note    STATUS POST:   donor Kidney recipient    POST OPERATIVE DAY # 3    SUBJECTIVE:   Patient was seen and examined this morning. There was no acute event overnight. He is resting comfortably in bed. Pain is well controlled. yesterday he received Lactulose and had BM. He is still on low does of pressor to keep his SBP above 100. H/H has been stable. He does not report pain, fever, n/v, chest pain, or shortness of breath.     OBJECTIVE:   T(C): 37.2 (18 @ 07:00), Max: 37.2 (18 @ 07:00)  HR: 95 (18 @ 10:00) (67 - 104)  BP: 124/66 (18 @ 10:00) (124/66 - 165/85)  RR: 30 (18 @ 10:00) (15 - 37)  SpO2: 98% (18 @ 10:00) (93% - 100%)  Wt(kg): --  CAPILLARY BLOOD GLUCOSE      POCT Blood Glucose.: 142 mg/dL (27 May 2018 08:26)  POCT Blood Glucose.: 159 mg/dL (26 May 2018 21:40)  POCT Blood Glucose.: 188 mg/dL (26 May 2018 15:37)    I&O's Detail    26 May 2018 07:01  -  27 May 2018 07:00  --------------------------------------------------------  IN:    norepinephrine Infusion: 36.8 mL    norepinephrine Infusion: 4 mL    Oral Fluid: 960 mL    sodium chloride 0.9%: 1680 mL  Total IN: 2680.8 mL    OUT:    Indwelling Catheter - Urethral: 350 mL  Total OUT: 350 mL    Total NET: 2330.8 mL      27 May 2018 07:01  -  27 May 2018 10:42  --------------------------------------------------------  IN:    norepinephrine Infusion: 3.2 mL    Oral Fluid: 480 mL    sodium chloride 0.9%: 140 mL  Total IN: 623.2 mL    OUT:    Indwelling Catheter - Urethral: 80 mL  Total OUT: 80 mL    Total NET: 543.2 mL        PHYSICAL EXAM:  Gen: Well-nourished, well-developed, A&O x3, resting in bed in no acute distress  CV: RRR  Resp: Patent airways, unlabored   Abdomen: Soft, mils tenderness on RLQ at incision site, nondistended, incision: clean/ Dry/ Intact dressing was changed  Extremities: All 4 extremities warm and well perfused, no edema   -: medina in place

## 2018-05-27 NOTE — PROGRESS NOTE ADULT - SUBJECTIVE AND OBJECTIVE BOX
INTERVAL HPI/OVERNIGHT EVENTS:    I personally saw and examined patient with Dr. Marie and discussed case with SICU team.  No major complaints.    MEDICATIONS  (STANDING):  aspirin  chewable 81 milliGRAM(s) Oral daily  docusate sodium 100 milliGRAM(s) Oral three times a day  famotidine    Tablet 20 milliGRAM(s) Oral daily  insulin lispro (HumaLOG) corrective regimen sliding scale   SubCutaneous three times a day before meals  insulin lispro (HumaLOG) corrective regimen sliding scale   SubCutaneous at bedtime  methylPREDNISolone sodium succinate Injectable 30 milliGRAM(s) IV Push every 12 hours  mycophenolate mofetil 1000 milliGRAM(s) Oral two times a day  nystatin    Suspension [id number] Unit(s) Swish and Swallow four times a day  senna 2 Tablet(s) Oral at bedtime  tacrolimus 3 milliGRAM(s) Oral <User Schedule>  trimethoprim   80 mG/sulfamethoxazole 400 mG 1 Tablet(s) Oral daily  valGANciclovir 450 milliGRAM(s) Oral daily    MEDICATIONS  (PRN):  acetaminophen   Tablet. 975 milliGRAM(s) Oral every 6 hours PRN Mild Pain (1 - 3)  oxyCODONE    IR 5 milliGRAM(s) Oral every 4 hours PRN Moderate Pain (4 - 6)      Allergies    No Known Allergies    Intolerances    Vital Signs Last 24 Hrs  T(C): 37.2 (27 May 2018 07:00), Max: 37.2 (27 May 2018 07:00)  T(F): 98.9 (27 May 2018 07:00), Max: 98.9 (27 May 2018 07:00)  HR: 75 (27 May 2018 09:00) (67 - 104)  BP: 165/85 (27 May 2018 08:45) (165/85 - 165/85)  BP(mean): 117 (27 May 2018 08:45) (117 - 117)  RR: 20 (27 May 2018 09:00) (15 - 37)  SpO2: 100% (27 May 2018 09:00) (93% - 100%)    PHYSICAL EXAM:  Constitutional: Well developed / well nourished  Eyes: Anicteric, PERRLA  ENMT: nc/at  Neck: central line patent  Respiratory: CTA B/L  Cardiovascular: RRR  Gastrointestinal: Soft abdomen, mild tender to touch at surgical site, ND. +BS  Genitourinary: Urinary catheter in place  Extremities: SCD's in place and working bilaterally  Vascular: Palpable dp pulses bilaterally  Neurological: A&O x3  Skin: Mild serosanguinous hina on wound dressing  Musculoskeletal: Moving all extremities  Psychiatric: Responsive    LABS:                        8.4    7.8   )-----------( 99       ( 27 May 2018 02:51 )             25.6     05-27    132<L>  |  96  |  50<H>  ----------------------------<  169<H>  4.1   |  21<L>  |  5.71<H>    Ca    6.9<L>      27 May 2018 02:51  Phos  3.5     05-27  Mg     2.0     05-27    TPro  6.1  /  Alb  3.6  /  TBili  0.2  /  DBili  x   /  AST  30  /  ALT  15  /  AlkPhos  32<L>  05-26    PT/INR - ( 27 May 2018 02:51 )   PT: 12.4 sec;   INR: 1.13 ratio         PTT - ( 27 May 2018 02:51 )  PTT:24.1 sec      RADIOLOGY & ADDITIONAL TESTS:

## 2018-05-27 NOTE — PROGRESS NOTE ADULT - ASSESSMENT
58M POD#3 s/p DDRT    Neuro: awake and alert  -PO pain medication    Resp: requiring NC to maintain O2 sat, wean as tolerated    CV: hx of CABG, EF~40%  -levophed to maintain SBP>100, wean as tolerated  -Continuous hemodynamic monitoring    GI: Regular Diet    : s/p DDRT, oliguric but increasing steadily  -closely monitor UOP  -basal rate 70cc/hr NS, plus 1:1 replacement for UOP >70  -tacrolimus, cellcept, solumedrol for immunosupression    ID: Bactrim and Valcyte for ppx in setting of immunosupression    Heme:   -ASA for graft patency  -consider restarting VTE chemoppx today      Endo: ISS in setting of steroids  monitor FS    Dispo: SICU

## 2018-05-27 NOTE — PROGRESS NOTE ADULT - SUBJECTIVE AND OBJECTIVE BOX
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 Denies CP, SOB, N/V, diarrhea, fever, chills, recent travel outside of country. He underwent DDRT on 5/24 with a donor left nephrectomy to R external iliac vessels. He tolerated the procedure well. He was making urine in the PACU. He had a PCA but still complained of some pain. SICU was consulted because he required pressors to reach goal SBP >120.    24 hour events:  -gave 30cc lactulose and pt had BM  -h/h stable   -decreased levophed requirement but still on small amount to keep SBP > 100  -UOP gradually increasing      NEURO  Exam: A&O x 3  Meds: acetaminophen   Tablet. 975 milliGRAM(s) Oral every 6 hours PRN Mild Pain (1 - 3)  oxyCODONE    IR 5 milliGRAM(s) Oral every 4 hours PRN Moderate Pain (4 - 6)    [x] Adequacy of sedation and pain control has been assessed and adjusted      RESPIRATORY  RR: 25 (05-27-18 @ 04:15) (15 - 37)  SpO2: 98% (05-27-18 @ 04:15) (80% - 100%)  Wt(kg): --  Exam: unlabored, clear to auscultation bilaterally  Mechanical Ventilation:   ABG - ( 26 May 2018 09:30 )  pH: 7.39  /  pCO2: 39    /  pO2: 111   / HCO3: 23    / Base Excess: -1.1  /  SaO2: 99      Lactate: x          CARDIOVASCULAR  HR: 72 (05-27-18 @ 04:15) (67 - 104)  BP: --  BP(mean): --  ABP: 104/66 (05-27-18 @ 04:15) (92/68 - 125/88)  ABP(mean): 83 (05-27-18 @ 04:15) (67 - 113)  Wt(kg): --  CVP(cm H2O): --      Exam:  Cardiac Rhythm:   Perfusion     [x ]Adequate   [ ]Inadequate  Mentation   [ x]Normal       [ ]Reduced  Extremities  [ x]Warm         [ ]Cool  Volume Status [ x]Hypervolemic [ ]Euvolemic [ ]Hypovolemic  Meds: norepinephrine Infusion 0.05 MICROgram(s)/kG/Min IV Continuous <Continuous>        GI/NUTRITION  Exam:  soft, appropriately tender  Diet: reg  Meds: docusate sodium 100 milliGRAM(s) Oral three times a day  famotidine    Tablet 20 milliGRAM(s) Oral daily  senna 2 Tablet(s) Oral at bedtime      GENITOURINARY  I&O's Detail    05-25 @ 07:01 - 05-26 @ 07:00  --------------------------------------------------------  IN:    norepinephrine Infusion: 50.3 mL    norepinephrine Infusion: 41.6 mL    sodium chloride 0.9%.: 1680 mL    Solution: 50 mL  Total IN: 1821.9 mL    OUT:    Indwelling Catheter - Urethral: 190 mL  Total OUT: 190 mL    Total NET: 1631.9 mL      05-26 @ 07:01 - 05-27 @ 04:42  --------------------------------------------------------  IN:    norepinephrine Infusion: 4 mL    norepinephrine Infusion: 34.4 mL    Oral Fluid: 960 mL    sodium chloride 0.9%.: 1540 mL  Total IN: 2538.4 mL    OUT:    Indwelling Catheter - Urethral: 255 mL  Total OUT: 255 mL    Total NET: 2283.4 mL          05-27    132<L>  |  96  |  50<H>  ----------------------------<  169<H>  4.1   |  21<L>  |  5.71<H>    Ca    6.9<L>      27 May 2018 02:51  Phos  3.5     05-27  Mg     2.0     05-27    TPro  6.1  /  Alb  3.6  /  TBili  0.2  /  DBili  x   /  AST  30  /  ALT  15  /  AlkPhos  32<L>  05-26    [ ] Jean catheter, indication: N/A  Meds: sodium chloride 0.9%. 1000 milliLiter(s) IV Continuous <Continuous>  sodium chloride 0.9%. 1000 milliLiter(s) IV Continuous <Continuous>        HEMATOLOGIC  Meds: aspirin  chewable 81 milliGRAM(s) Oral daily    [x] VTE Prophylaxis                        8.4    7.8   )-----------( 99       ( 27 May 2018 02:51 )             25.6     PT/INR - ( 27 May 2018 02:51 )   PT: 12.4 sec;   INR: 1.13 ratio         PTT - ( 27 May 2018 02:51 )  PTT:24.1 sec  Transfusion     [ ] PRBC   [ ] Platelets   [ ] FFP   [ ] Cryoprecipitate      INFECTIOUS DISEASES  T(C): 36.5 (05-27-18 @ 03:00), Max: 36.9 (05-26-18 @ 11:00)  Wt(kg): --  WBC Count: 7.8 K/uL (05-27 @ 02:51)  WBC Count: 8.0 K/uL (05-26 @ 18:05)  WBC Count: 6.6 K/uL (05-26 @ 10:58)  WBC Count: 6.4 K/uL (05-26 @ 05:38)    Recent Cultures:    Meds: mycophenolate mofetil 1000 milliGRAM(s) Oral two times a day  nystatin    Suspension 072505 Unit(s) Swish and Swallow four times a day  tacrolimus 3 milliGRAM(s) Oral <User Schedule>  trimethoprim   80 mG/sulfamethoxazole 400 mG 1 Tablet(s) Oral daily  valGANciclovir 450 milliGRAM(s) Oral daily        ENDOCRINE  Capillary Blood Glucose    Meds: insulin lispro (HumaLOG) corrective regimen sliding scale   SubCutaneous three times a day before meals  insulin lispro (HumaLOG) corrective regimen sliding scale   SubCutaneous at bedtime  methylPREDNISolone sodium succinate Injectable 30 milliGRAM(s) IV Push every 12 hours        ACCESS DEVICES:  [ x] Peripheral IV  [ ] Central Venous Line	[ ] R	[ ] L	[ ] IJ	[ ] Fem	[ ] SC	Placed:   [x ] Arterial Line		[ ] R	[ ] L	[ ] Fem	[ ] Rad	[ ] Ax	Placed:   [ ] PICC:					[ ] Mediport  [ ] Urinary Catheter, Date Placed:   [ ] Necessity of urinary, arterial, and venous catheters discussed

## 2018-05-28 LAB
ANION GAP SERPL CALC-SCNC: 13 MMOL/L — SIGNIFICANT CHANGE UP (ref 5–17)
BUN SERPL-MCNC: 36 MG/DL — HIGH (ref 7–23)
CA-I BLD-SCNC: 0.95 MMOL/L — LOW (ref 1.12–1.3)
CALCIUM SERPL-MCNC: 7.4 MG/DL — LOW (ref 8.4–10.5)
CHLORIDE SERPL-SCNC: 96 MMOL/L — SIGNIFICANT CHANGE UP (ref 96–108)
CO2 SERPL-SCNC: 26 MMOL/L — SIGNIFICANT CHANGE UP (ref 22–31)
CREAT SERPL-MCNC: 3.58 MG/DL — HIGH (ref 0.5–1.3)
GLUCOSE SERPL-MCNC: 125 MG/DL — HIGH (ref 70–99)
HCT VFR BLD CALC: 27.8 % — LOW (ref 39–50)
HCV RNA FLD QL NAA+PROBE: SIGNIFICANT CHANGE UP
HCV RNA SPEC QL PROBE+SIG AMP: DETECTED
HGB BLD-MCNC: 9.1 G/DL — LOW (ref 13–17)
MAGNESIUM SERPL-MCNC: 2 MG/DL — SIGNIFICANT CHANGE UP (ref 1.6–2.6)
MCHC RBC-ENTMCNC: 30.9 PG — SIGNIFICANT CHANGE UP (ref 27–34)
MCHC RBC-ENTMCNC: 32.7 GM/DL — SIGNIFICANT CHANGE UP (ref 32–36)
MCV RBC AUTO: 94.5 FL — SIGNIFICANT CHANGE UP (ref 80–100)
PHOSPHATE SERPL-MCNC: 2.8 MG/DL — SIGNIFICANT CHANGE UP (ref 2.5–4.5)
PLATELET # BLD AUTO: 108 K/UL — LOW (ref 150–400)
POTASSIUM SERPL-MCNC: 3.4 MMOL/L — LOW (ref 3.5–5.3)
POTASSIUM SERPL-SCNC: 3.4 MMOL/L — LOW (ref 3.5–5.3)
RBC # BLD: 2.95 M/UL — LOW (ref 4.2–5.8)
RBC # FLD: 13.3 % — SIGNIFICANT CHANGE UP (ref 10.3–14.5)
SODIUM SERPL-SCNC: 135 MMOL/L — SIGNIFICANT CHANGE UP (ref 135–145)
TACROLIMUS SERPL-MCNC: 4.2 NG/ML — SIGNIFICANT CHANGE UP
WBC # BLD: 5.9 K/UL — SIGNIFICANT CHANGE UP (ref 3.8–10.5)
WBC # FLD AUTO: 5.9 K/UL — SIGNIFICANT CHANGE UP (ref 3.8–10.5)

## 2018-05-28 PROCEDURE — 71045 X-RAY EXAM CHEST 1 VIEW: CPT | Mod: 26

## 2018-05-28 PROCEDURE — 99232 SBSQ HOSP IP/OBS MODERATE 35: CPT

## 2018-05-28 PROCEDURE — 99232 SBSQ HOSP IP/OBS MODERATE 35: CPT | Mod: 24,GC

## 2018-05-28 PROCEDURE — 99233 SBSQ HOSP IP/OBS HIGH 50: CPT | Mod: GC

## 2018-05-28 RX ORDER — TACROLIMUS 5 MG/1
5 CAPSULE ORAL
Qty: 0 | Refills: 0 | Status: DISCONTINUED | OUTPATIENT
Start: 2018-05-28 | End: 2018-05-29

## 2018-05-28 RX ORDER — INSULIN GLARGINE 100 [IU]/ML
12 INJECTION, SOLUTION SUBCUTANEOUS AT BEDTIME
Qty: 0 | Refills: 0 | Status: DISCONTINUED | OUTPATIENT
Start: 2018-05-28 | End: 2018-06-01

## 2018-05-28 RX ADMIN — TACROLIMUS 4 MILLIGRAM(S): 5 CAPSULE ORAL at 05:49

## 2018-05-28 RX ADMIN — Medication 4: at 12:28

## 2018-05-28 RX ADMIN — MYCOPHENOLATE MOFETIL 1000 MILLIGRAM(S): 250 CAPSULE ORAL at 05:49

## 2018-05-28 RX ADMIN — Medication 1 TABLET(S): at 14:35

## 2018-05-28 RX ADMIN — MYCOPHENOLATE MOFETIL 1000 MILLIGRAM(S): 250 CAPSULE ORAL at 17:27

## 2018-05-28 RX ADMIN — Medication 100 MILLIGRAM(S): at 05:49

## 2018-05-28 RX ADMIN — Medication 81 MILLIGRAM(S): at 12:27

## 2018-05-28 RX ADMIN — SENNA PLUS 2 TABLET(S): 8.6 TABLET ORAL at 22:04

## 2018-05-28 RX ADMIN — Medication 100 MILLIGRAM(S): at 22:04

## 2018-05-28 RX ADMIN — Medication 3 MILLILITER(S): at 17:27

## 2018-05-28 RX ADMIN — Medication 3 MILLILITER(S): at 05:21

## 2018-05-28 RX ADMIN — Medication 20 MILLIGRAM(S): at 05:50

## 2018-05-28 RX ADMIN — Medication 500000 UNIT(S): at 05:49

## 2018-05-28 RX ADMIN — VALGANCICLOVIR 450 MILLIGRAM(S): 450 TABLET, FILM COATED ORAL at 12:27

## 2018-05-28 RX ADMIN — FAMOTIDINE 20 MILLIGRAM(S): 10 INJECTION INTRAVENOUS at 12:27

## 2018-05-28 RX ADMIN — Medication 500000 UNIT(S): at 00:02

## 2018-05-28 RX ADMIN — Medication 3 MILLILITER(S): at 12:27

## 2018-05-28 RX ADMIN — Medication 100 MILLIGRAM(S): at 14:36

## 2018-05-28 RX ADMIN — TACROLIMUS 5 MILLIGRAM(S): 5 CAPSULE ORAL at 17:27

## 2018-05-28 RX ADMIN — Medication 20 MILLIGRAM(S): at 17:27

## 2018-05-28 RX ADMIN — Medication 3 MILLILITER(S): at 23:05

## 2018-05-28 RX ADMIN — Medication 500000 UNIT(S): at 17:27

## 2018-05-28 RX ADMIN — INSULIN GLARGINE 12 UNIT(S): 100 INJECTION, SOLUTION SUBCUTANEOUS at 22:07

## 2018-05-28 RX ADMIN — Medication 500000 UNIT(S): at 12:27

## 2018-05-28 RX ADMIN — Medication 500000 UNIT(S): at 23:05

## 2018-05-28 NOTE — PROGRESS NOTE ADULT - SUBJECTIVE AND OBJECTIVE BOX
INTERVAL HPI/OVERNIGHT EVENTS:  Patient seen with multidisciplinary team (Nephrologist, pharmacist, nurse, nurse manager, NP, MD)  in am rounds and examined with Dr. Murguia. S/P DDRT POD 4, doing well, no acute issues overnight, being transferred to [address] [place] [address]. Jean in place making adequate urine.     MEDICATIONS  (STANDING):  ALBUTerol/ipratropium for Nebulization 3 milliLiter(s) Nebulizer every 6 hours  aspirin  chewable 81 milliGRAM(s) Oral daily  docusate sodium 100 milliGRAM(s) Oral three times a day  famotidine    Tablet 20 milliGRAM(s) Oral daily  insulin glargine Injectable (LANTUS) 12 Unit(s) SubCutaneous at bedtime  insulin lispro (HumaLOG) corrective regimen sliding scale   SubCutaneous three times a day before meals  insulin lispro (HumaLOG) corrective regimen sliding scale   SubCutaneous at bedtime  mycophenolate mofetil 1000 milliGRAM(s) Oral two times a day  nystatin    Suspension [id number] Unit(s) Swish and Swallow four times a day  predniSONE   Tablet 20 milliGRAM(s) Oral two times a day  senna 2 Tablet(s) Oral at bedtime  tacrolimus 4 milliGRAM(s) Oral <User Schedule>  trimethoprim   80 mG/sulfamethoxazole 400 mG 1 Tablet(s) Oral daily  valGANciclovir 450 milliGRAM(s) Oral daily    MEDICATIONS  (PRN):  acetaminophen   Tablet. 975 milliGRAM(s) Oral every 6 hours PRN Mild Pain (1 - 3)  loperamide 2 milliGRAM(s) Oral two times a day PRN Diarrhea  oxyCODONE    IR 5 milliGRAM(s) Oral every 4 hours PRN Moderate Pain (4 - 6)      Allergies    No Known Allergies    Intolerances        Vital Signs Last 24 Hrs  T(C): 36.3 (28 May 2018 07:00), Max: 36.8 (27 May 2018 11:00)  T(F): 97.4 (28 May 2018 07:00), Max: 98.3 (27 May 2018 23:00)  HR: 79 (28 May 2018 07:00) (72 - 96)  BP: 167/81 (28 May 2018 07:00) (117/64 - 167/81)  BP(mean): 115 (28 May 2018 07:00) (86 - 117)  RR: 17 (28 May 2018 07:00) (12 - 32)  SpO2: 98% (28 May 2018 07:00) (97% - 100%)    LABS:                        9.1    5.9   )-----------( 108      ( 28 May 2018 02:31 )             27.8     05-28    135  |  96  |  36<H>  ----------------------------<  125<H>  3.4<L>   |  26  |  3.58<H>    Ca    7.4<L>      28 May 2018 02:31  Phos  2.8     05-28  Mg     2.0     05-28      PT/INR - ( 27 May 2018 02:51 )   PT: 12.4 sec;   INR: 1.13 ratio         PTT - ( 27 May 2018 02:51 )  PTT:24.1 sec      RADIOLOGY & ADDITIONAL TESTS:    Review of systems  Gen: No weight changes, fatigue, fevers/chills, weakness  Skin: No rashes  Head/Eyes/Ears/Mouth: No headache; Normal hearing; Normal vision w/o blurriness; No sinus pain/discomfort, sore throat  Respiratory: No dyspnea, cough, wheezing, hemoptysis  CV: No chest pain, PND, orthopnea  GI: No abdominal pain, diarrhea, constipation, nausea, vomiting, melena, hematochezia  : No increased frequency, dysuria, hematuria, nocturia  MSK: No joint pain/swelling; no back pain; no edema  Neuro: No dizziness/lightheadedness, weakness, seizures, numbness, tingling  Heme: No easy bruising or bleeding  Endo: No heat/cold intolerance  Psych: No significant nervousness, anxiety, stress, depression  All other systems were reviewed and are negative, except as noted.    PHYSICAL EXAM:  Constitutional: Well developed / well nourished  Eyes: Anicteric, PERRLA  ENMT: nc/at  Neck: Central line in place intact, no sign of erythema of infection noted  Respiratory: CTA B/L  Cardiovascular: RRR  Gastrointestinal: Soft abdomen, mild tender to touch at surgical site, ND  Genitourinary: Urinary catheter in place  Extremities: SCD's in place and working bilaterally  Vascular: Palpable dp pulses bilaterally  Neurological: A&O x3  Skin: Mild serosanguinous hina on wound dressing no erythema and evidence of infection noted  Musculoskeletal: Moving all extremities  Psychiatric: Responsive INTERVAL HPI/OVERNIGHT EVENTS:  Patient seen in am rounds and examined with Dr. Murguia. S/P DDRT POD 4, doing well, no acute issues overnight, being transferred to [address] [place] [address]. Jean in place making adequate urine.     MEDICATIONS  (STANDING):  ALBUTerol/ipratropium for Nebulization 3 milliLiter(s) Nebulizer every 6 hours  aspirin  chewable 81 milliGRAM(s) Oral daily  docusate sodium 100 milliGRAM(s) Oral three times a day  famotidine    Tablet 20 milliGRAM(s) Oral daily  insulin glargine Injectable (LANTUS) 12 Unit(s) SubCutaneous at bedtime  insulin lispro (HumaLOG) corrective regimen sliding scale   SubCutaneous three times a day before meals  insulin lispro (HumaLOG) corrective regimen sliding scale   SubCutaneous at bedtime  mycophenolate mofetil 1000 milliGRAM(s) Oral two times a day  nystatin    Suspension [id number] Unit(s) Swish and Swallow four times a day  predniSONE   Tablet 20 milliGRAM(s) Oral two times a day  senna 2 Tablet(s) Oral at bedtime  tacrolimus 4 milliGRAM(s) Oral <User Schedule>  trimethoprim   80 mG/sulfamethoxazole 400 mG 1 Tablet(s) Oral daily  valGANciclovir 450 milliGRAM(s) Oral daily    MEDICATIONS  (PRN):  acetaminophen   Tablet. 975 milliGRAM(s) Oral every 6 hours PRN Mild Pain (1 - 3)  loperamide 2 milliGRAM(s) Oral two times a day PRN Diarrhea  oxyCODONE    IR 5 milliGRAM(s) Oral every 4 hours PRN Moderate Pain (4 - 6)      Allergies    No Known Allergies    Intolerances        Vital Signs Last 24 Hrs  T(C): 36.3 (28 May 2018 07:00), Max: 36.8 (27 May 2018 11:00)  T(F): 97.4 (28 May 2018 07:00), Max: 98.3 (27 May 2018 23:00)  HR: 79 (28 May 2018 07:00) (72 - 96)  BP: 167/81 (28 May 2018 07:00) (117/64 - 167/81)  BP(mean): 115 (28 May 2018 07:00) (86 - 117)  RR: 17 (28 May 2018 07:00) (12 - 32)  SpO2: 98% (28 May 2018 07:00) (97% - 100%)    LABS:                        9.1    5.9   )-----------( 108      ( 28 May 2018 02:31 )             27.8     05-28    135  |  96  |  36<H>  ----------------------------<  125<H>  3.4<L>   |  26  |  3.58<H>    Ca    7.4<L>      28 May 2018 02:31  Phos  2.8     05-28  Mg     2.0     05-28      PT/INR - ( 27 May 2018 02:51 )   PT: 12.4 sec;   INR: 1.13 ratio         PTT - ( 27 May 2018 02:51 )  PTT:24.1 sec      RADIOLOGY & ADDITIONAL TESTS:    Review of systems  Gen: No weight changes, fatigue, fevers/chills, weakness  Skin: No rashes  Head/Eyes/Ears/Mouth: No headache; Normal hearing; Normal vision w/o blurriness; No sinus pain/discomfort, sore throat  Respiratory: No dyspnea, cough, wheezing, hemoptysis  CV: No chest pain, PND, orthopnea  GI: No abdominal pain, diarrhea, constipation, nausea, vomiting, melena, hematochezia  : No increased frequency, dysuria, hematuria, nocturia  MSK: No joint pain/swelling; no back pain; no edema  Neuro: No dizziness/lightheadedness, weakness, seizures, numbness, tingling  Heme: No easy bruising or bleeding  Endo: No heat/cold intolerance  Psych: No significant nervousness, anxiety, stress, depression  All other systems were reviewed and are negative, except as noted.    PHYSICAL EXAM:  Constitutional: Well developed / well nourished  Eyes: Anicteric, PERRLA  ENMT: nc/at  Neck: Central line in place intact, no sign of erythema of infection noted  Respiratory: CTA B/L  Cardiovascular: RRR  Gastrointestinal: Soft abdomen, mild tender to touch at surgical site, ND  Genitourinary: Urinary catheter in place  Extremities: SCD's in place and working bilaterally  Vascular: Palpable dp pulses bilaterally  Neurological: A&O x3  Skin: Mild serosanguinous hina on wound dressing no erythema and evidence of infection noted  Musculoskeletal: Moving all extremities  Psychiatric: Responsive

## 2018-05-28 NOTE — PROGRESS NOTE ADULT - SUBJECTIVE AND OBJECTIVE BOX
[facility] DIVISION OF KIDNEY DISEASES AND HYPERTENSION -- FOLLOW UP NOTE  --------------------------------------------------------------------------------  Chief Complaint:  renal transplant    24 hour events/subjective:  Feeling much better today. No SOB. Passing gas.       PAST HISTORY  --------------------------------------------------------------------------------  No significant changes to PMH, PSH, FHx, SHx, unless otherwise noted    ALLERGIES & MEDICATIONS  --------------------------------------------------------------------------------  Allergies    No Known Allergies    Intolerances      Standing Inpatient Medications  ALBUTerol/ipratropium for Nebulization 3 milliLiter(s) Nebulizer every 6 hours  aspirin  chewable 81 milliGRAM(s) Oral daily  docusate sodium 100 milliGRAM(s) Oral three times a day  famotidine    Tablet 20 milliGRAM(s) Oral daily  insulin glargine Injectable (LANTUS) 12 Unit(s) SubCutaneous at bedtime  insulin lispro (HumaLOG) corrective regimen sliding scale   SubCutaneous three times a day before meals  insulin lispro (HumaLOG) corrective regimen sliding scale   SubCutaneous at bedtime  mycophenolate mofetil 1000 milliGRAM(s) Oral two times a day  nystatin    Suspension 139252 Unit(s) Swish and Swallow four times a day  predniSONE   Tablet 20 milliGRAM(s) Oral two times a day  senna 2 Tablet(s) Oral at bedtime  tacrolimus 5 milliGRAM(s) Oral two times a day  trimethoprim   80 mG/sulfamethoxazole 400 mG 1 Tablet(s) Oral daily  valGANciclovir 450 milliGRAM(s) Oral daily    PRN Inpatient Medications  acetaminophen   Tablet. 975 milliGRAM(s) Oral every 6 hours PRN  loperamide 2 milliGRAM(s) Oral two times a day PRN  oxyCODONE    IR 5 milliGRAM(s) Oral every 4 hours PRN      REVIEW OF SYSTEMS  --------------------------------------------------------------------------------  Gen: no fatigue  Respiratory: No dyspnea, cough  CV: No chest pain  GI: No abdominal pain  MSK: No edema    VITALS/PHYSICAL EXAM  --------------------------------------------------------------------------------  T(C): 36.7 (05-28-18 @ 08:50), Max: 36.8 (05-27-18 @ 23:00)  HR: 81 (05-28-18 @ 08:50) (72 - 96)  BP: 145/83 (05-28-18 @ 08:50) (117/64 - 167/81)  RR: 18 (05-28-18 @ 08:50) (12 - 30)  SpO2: 95% (05-28-18 @ 08:50) (95% - 100%)  Wt(kg): --        05-27-18 @ 07:01  -  05-28-18 @ 07:00  --------------------------------------------------------  IN: 1653.2 mL / OUT: 740 mL / NET: 913.2 mL    05-28-18 @ 07:01  -  05-28-18 @ 12:20  --------------------------------------------------------  IN: 240 mL / OUT: 245 mL / NET: -5 mL      Physical Exam:  	Gen: NAD, well-appearing obese male, sitting in chair  	Pulm: b/l CTA  	CV: RRR, S1S2; no rub  	Abd: +obese abdomen  	: +Jean with some dark urine noted  	UE: Warm, no edema  	LE: Warm, no edema  	Neuro: No focal deficits  	Psych: Normal affect and mood  	Skin: Warm, without rashes  	Vascular access: RUE AVF +thrill +bruit +skin intact    LABS/STUDIES  --------------------------------------------------------------------------------              9.1    5.9   >-----------<  108      [05-28-18 @ 02:31]              27.8     135  |  96  |  36  ----------------------------<  125      [05-28-18 @ 02:31]  3.4   |  26  |  3.58        Ca     7.4     [05-28-18 @ 02:31]      iCa    0.95     [05-28 @ 02:32]      Mg     2.0     [05-28-18 @ 02:31]      Phos  2.8     [05-28-18 @ 02:31]      PT/INR: PT 12.4 , INR 1.13       [05-27-18 @ 02:51]  PTT: 24.1       [05-27-18 @ 02:51]          [05-27-18 @ 02:51]    Creatinine Trend:  SCr 3.58 [05-28 @ 02:31]  SCr 5.71 [05-27 @ 02:51]  SCr 5.54 [05-26 @ 18:05]  SCr 4.90 [05-26 @ 10:58]  SCr 4.81 [05-26 @ 06:51]        HbA1c 5.4      [05-27-18 @ 09:50]  TSH 1.35      [11-22-17 @ 03:20]  Lipid: chol 85, , HDL 28, LDL 38      [11-22-17 @ 03:20]

## 2018-05-28 NOTE — PROGRESS NOTE ADULT - ATTENDING COMMENTS
PARMINDER-Delayed graft function-oliguric-monitor UO;    lung exam better  low hb, platelets-monitor trend  renal tx recipient hcv+donor; need for immunosuppression-cont meds; monitor cr trend   cont prophylaxis pcp/cmv

## 2018-05-28 NOTE — PROGRESS NOTE ADULT - ASSESSMENT
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 Donor: KDPI 69%, HCV Ab+  HCV ANITA +, Terminal creatinine 1.2, CMV+ / EBV+ / HBcAb+, Left kidney, 3 arteries, 1 vein, 1 ureter, HLA 6/6 mismatch (2,2,2), CPRA 0%, Virtual crossmatch negative. Cold ischemia time: 23 hs  43 mins, Warm ischemia time: 44 minutes  Weight of the kidney: 250 grams.

## 2018-05-28 NOTE — PROGRESS NOTE ADULT - ATTENDING COMMENTS
I personally saw and examined patient with Ms. Rajan.  IMMUNOSUPPRESSION:  Will adjust Prograf dose if necessary once level is available I personally saw and examined patient with Ms. Tolentino and discussed case with SICU team.  Transferred from SICU to [address] [place] [address]  IMMUNOSUPPRESSION:  Will adjust Prograf dose if necessary once level is available

## 2018-05-28 NOTE — PROGRESS NOTE ADULT - ATTENDING COMMENTS
Hemodynamically stable saturating well  Urine output improved   Continue transplant meds  Add Lantus for better glycemic control  Transfer to transplant floor

## 2018-05-28 NOTE — PHYSICAL THERAPY INITIAL EVALUATION ADULT - PERTINENT HX OF CURRENT PROBLEM, REHAB EVAL
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

## 2018-05-29 ENCOUNTER — TRANSCRIPTION ENCOUNTER (OUTPATIENT)
Age: 58
End: 2018-05-29

## 2018-05-29 LAB
ANION GAP SERPL CALC-SCNC: 14 MMOL/L — SIGNIFICANT CHANGE UP (ref 5–17)
BUN SERPL-MCNC: 55 MG/DL — HIGH (ref 7–23)
CALCIUM SERPL-MCNC: 6.9 MG/DL — LOW (ref 8.4–10.5)
CHLORIDE SERPL-SCNC: 97 MMOL/L — SIGNIFICANT CHANGE UP (ref 96–108)
CO2 SERPL-SCNC: 24 MMOL/L — SIGNIFICANT CHANGE UP (ref 22–31)
CREAT SERPL-MCNC: 3.45 MG/DL — HIGH (ref 0.5–1.3)
GLUCOSE SERPL-MCNC: 126 MG/DL — HIGH (ref 70–99)
HCT VFR BLD CALC: 26.9 % — LOW (ref 39–50)
HGB BLD-MCNC: 8.9 G/DL — LOW (ref 13–17)
MAGNESIUM SERPL-MCNC: 1.8 MG/DL — SIGNIFICANT CHANGE UP (ref 1.6–2.6)
MCHC RBC-ENTMCNC: 31.1 PG — SIGNIFICANT CHANGE UP (ref 27–34)
MCHC RBC-ENTMCNC: 33 GM/DL — SIGNIFICANT CHANGE UP (ref 32–36)
MCV RBC AUTO: 94.5 FL — SIGNIFICANT CHANGE UP (ref 80–100)
PHOSPHATE SERPL-MCNC: 2.1 MG/DL — LOW (ref 2.5–4.5)
PLATELET # BLD AUTO: 118 K/UL — LOW (ref 150–400)
POTASSIUM SERPL-MCNC: 3.6 MMOL/L — SIGNIFICANT CHANGE UP (ref 3.5–5.3)
POTASSIUM SERPL-SCNC: 3.6 MMOL/L — SIGNIFICANT CHANGE UP (ref 3.5–5.3)
RBC # BLD: 2.85 M/UL — LOW (ref 4.2–5.8)
RBC # FLD: 13.2 % — SIGNIFICANT CHANGE UP (ref 10.3–14.5)
SODIUM SERPL-SCNC: 135 MMOL/L — SIGNIFICANT CHANGE UP (ref 135–145)
TACROLIMUS SERPL-MCNC: 3.3 NG/ML — SIGNIFICANT CHANGE UP
WBC # BLD: 3.9 K/UL — SIGNIFICANT CHANGE UP (ref 3.8–10.5)
WBC # FLD AUTO: 3.9 K/UL — SIGNIFICANT CHANGE UP (ref 3.8–10.5)

## 2018-05-29 PROCEDURE — 99232 SBSQ HOSP IP/OBS MODERATE 35: CPT | Mod: 24,GC

## 2018-05-29 PROCEDURE — 99232 SBSQ HOSP IP/OBS MODERATE 35: CPT | Mod: GC

## 2018-05-29 RX ORDER — INSULIN GLARGINE 100 [IU]/ML
100 INJECTION, SOLUTION SUBCUTANEOUS AT BEDTIME
Qty: 5 | Refills: 11 | Status: DISCONTINUED | COMMUNITY
Start: 2018-05-29 | End: 2018-05-29

## 2018-05-29 RX ORDER — FENOFIBRATE 54 MG/1
54 TABLET ORAL
Qty: 30 | Refills: 0 | Status: DISCONTINUED | COMMUNITY
Start: 2017-03-15 | End: 2018-05-29

## 2018-05-29 RX ORDER — FUROSEMIDE 40 MG
80 TABLET ORAL ONCE
Qty: 0 | Refills: 0 | Status: COMPLETED | OUTPATIENT
Start: 2018-05-29 | End: 2018-05-29

## 2018-05-29 RX ORDER — TACROLIMUS 5 MG/1
2 CAPSULE ORAL ONCE
Qty: 0 | Refills: 0 | Status: COMPLETED | OUTPATIENT
Start: 2018-05-29 | End: 2018-05-29

## 2018-05-29 RX ORDER — MAGNESIUM SULFATE 500 MG/ML
2 VIAL (ML) INJECTION ONCE
Qty: 0 | Refills: 0 | Status: COMPLETED | OUTPATIENT
Start: 2018-05-29 | End: 2018-05-29

## 2018-05-29 RX ORDER — BLOOD-GLUCOSE METER
KIT MISCELLANEOUS
Qty: 1 | Refills: 0 | Status: ACTIVE | COMMUNITY
Start: 2018-05-29 | End: 1900-01-01

## 2018-05-29 RX ORDER — OMEPRAZOLE 20 MG/1
20 CAPSULE, DELAYED RELEASE ORAL
Qty: 30 | Refills: 0 | Status: DISCONTINUED | COMMUNITY
Start: 2016-08-30 | End: 2018-05-29

## 2018-05-29 RX ORDER — TACROLIMUS 5 MG/1
7 CAPSULE ORAL
Qty: 0 | Refills: 0 | Status: DISCONTINUED | OUTPATIENT
Start: 2018-05-29 | End: 2018-06-02

## 2018-05-29 RX ORDER — FOLIC ACID 1 MG/1
1 TABLET ORAL
Qty: 30 | Refills: 0 | Status: DISCONTINUED | COMMUNITY
Start: 2017-03-15 | End: 2018-05-29

## 2018-05-29 RX ORDER — PEN NEEDLE, DIABETIC 29 G X1/2"
32G X 4 MM NEEDLE, DISPOSABLE MISCELLANEOUS
Qty: 336 | Refills: 3 | Status: ACTIVE | COMMUNITY
Start: 2018-05-29 | End: 1900-01-01

## 2018-05-29 RX ADMIN — Medication 3 MILLILITER(S): at 23:30

## 2018-05-29 RX ADMIN — Medication 80 MILLIGRAM(S): at 10:02

## 2018-05-29 RX ADMIN — Medication 100 GRAM(S): at 11:39

## 2018-05-29 RX ADMIN — Medication 3 MILLILITER(S): at 05:56

## 2018-05-29 RX ADMIN — Medication 1 TABLET(S): at 11:39

## 2018-05-29 RX ADMIN — VALGANCICLOVIR 450 MILLIGRAM(S): 450 TABLET, FILM COATED ORAL at 11:39

## 2018-05-29 RX ADMIN — FAMOTIDINE 20 MILLIGRAM(S): 10 INJECTION INTRAVENOUS at 11:39

## 2018-05-29 RX ADMIN — Medication 3 MILLILITER(S): at 11:42

## 2018-05-29 RX ADMIN — Medication 2 MILLIGRAM(S): at 16:38

## 2018-05-29 RX ADMIN — MYCOPHENOLATE MOFETIL 1000 MILLIGRAM(S): 250 CAPSULE ORAL at 05:56

## 2018-05-29 RX ADMIN — Medication 81 MILLIGRAM(S): at 11:39

## 2018-05-29 RX ADMIN — Medication 500000 UNIT(S): at 05:56

## 2018-05-29 RX ADMIN — Medication 500000 UNIT(S): at 17:26

## 2018-05-29 RX ADMIN — TACROLIMUS 5 MILLIGRAM(S): 5 CAPSULE ORAL at 05:56

## 2018-05-29 RX ADMIN — TACROLIMUS 7 MILLIGRAM(S): 5 CAPSULE ORAL at 17:26

## 2018-05-29 RX ADMIN — Medication 500000 UNIT(S): at 23:30

## 2018-05-29 RX ADMIN — MYCOPHENOLATE MOFETIL 1000 MILLIGRAM(S): 250 CAPSULE ORAL at 17:27

## 2018-05-29 RX ADMIN — Medication 10 MILLIGRAM(S): at 17:26

## 2018-05-29 RX ADMIN — Medication 975 MILLIGRAM(S): at 04:30

## 2018-05-29 RX ADMIN — TACROLIMUS 2 MILLIGRAM(S): 5 CAPSULE ORAL at 10:02

## 2018-05-29 RX ADMIN — Medication 500000 UNIT(S): at 11:39

## 2018-05-29 RX ADMIN — Medication 10 MILLIGRAM(S): at 05:56

## 2018-05-29 RX ADMIN — Medication 3 MILLILITER(S): at 17:26

## 2018-05-29 RX ADMIN — Medication 975 MILLIGRAM(S): at 03:34

## 2018-05-29 RX ADMIN — Medication 2: at 21:48

## 2018-05-29 RX ADMIN — INSULIN GLARGINE 12 UNIT(S): 100 INJECTION, SOLUTION SUBCUTANEOUS at 21:48

## 2018-05-29 RX ADMIN — Medication 100 MILLIGRAM(S): at 05:56

## 2018-05-29 NOTE — PROGRESS NOTE ADULT - ATTENDING COMMENTS
Tacrolimus level 3.3 from 4.2. Increase Tacrolimus dose to 7mg BID. I personally saw and examined patient.  Increasing urinary volume  IMMUNOSUPPRESSION:  Tacrolimus level 3.3 from 4.2. Increase Tacrolimus dose to 7mg BID. I personally saw and examined patient with transplant team.  Increasing urinary volume  IMMUNOSUPPRESSION:  Tacrolimus level 3.3 from 4.2. Increase Tacrolimus dose to 7mg BID.

## 2018-05-29 NOTE — PROGRESS NOTE ADULT - SUBJECTIVE AND OBJECTIVE BOX
INTERVAL HPI/OVERNIGHT EVENTS:  Patient seen in am rounds and examined with Dr. Murguia.  S/P HCV + DDRT POD 5, doing well, no acute issues overnight. Jean in place making adequate urine.    MEDICATIONS  (STANDING):  ALBUTerol/ipratropium for Nebulization 3 milliLiter(s) Nebulizer every 6 hours  aspirin  chewable 81 milliGRAM(s) Oral daily  docusate sodium 100 milliGRAM(s) Oral three times a day  famotidine    Tablet 20 milliGRAM(s) Oral daily  insulin glargine Injectable (LANTUS) 12 Unit(s) SubCutaneous at bedtime  insulin lispro (HumaLOG) corrective regimen sliding scale   SubCutaneous three times a day before meals  insulin lispro (HumaLOG) corrective regimen sliding scale   SubCutaneous at bedtime  magnesium sulfate  IVPB 2 Gram(s) IV Intermittent once  mycophenolate mofetil 1000 milliGRAM(s) Oral two times a day  nystatin    Suspension [id number] Unit(s) Swish and Swallow four times a day  predniSONE   Tablet 10 milliGRAM(s) Oral two times a day  senna 2 Tablet(s) Oral at bedtime  tacrolimus 7 milliGRAM(s) Oral two times a day  trimethoprim   80 mG/sulfamethoxazole 400 mG 1 Tablet(s) Oral daily  valGANciclovir 450 milliGRAM(s) Oral daily    MEDICATIONS  (PRN):  acetaminophen   Tablet. 975 milliGRAM(s) Oral every 6 hours PRN Mild Pain (1 - 3)  loperamide 2 milliGRAM(s) Oral two times a day PRN Diarrhea  oxyCODONE    IR 5 milliGRAM(s) Oral every 4 hours PRN Moderate Pain (4 - 6)      Allergies    No Known Allergies    Intolerances        Vital Signs Last 24 Hrs  T(C): 37.1 (29 May 2018 10:00), Max: 37.2 (29 May 2018 00:00)  T(F): 98.7 (29 May 2018 10:00), Max: 98.9 (29 May 2018 00:00)  HR: 90 (29 May 2018 10:00) (87 - 104)  BP: 131/77 (29 May 2018 10:00) (110/62 - 174/77)  BP(mean): --  RR: 18 (29 May 2018 10:00) (18 - 18)  SpO2: 96% (29 May 2018 10:00) (94% - 99%)    LABS:                        8.9    3.9   )-----------( 118      ( 29 May 2018 05:43 )             26.9     05-29    135  |  97  |  55<H>  ----------------------------<  126<H>  3.6   |  24  |  3.45<H>    Ca    6.9<L>      29 May 2018 05:43  Phos  2.1     05-29  Mg     1.8     05-29            RADIOLOGY & ADDITIONAL TESTS:    Review of systems  Gen: No weight changes, fatigue, fevers/chills, weakness  Skin: No rashes  Head/Eyes/Ears/Mouth: No headache; Normal hearing; Normal vision w/o blurriness; No sinus pain/discomfort, sore throat  Respiratory: No dyspnea, cough, wheezing, hemoptysis  CV: No chest pain, PND, orthopnea  GI: No abdominal pain, diarrhea, constipation, nausea, vomiting, melena, hematochezia  : No increased frequency, dysuria, hematuria, nocturia  MSK: No joint pain/swelling; no back pain; no edema  Neuro: No dizziness/lightheadedness, weakness, seizures, numbness, tingling  Heme: No easy bruising or bleeding  Endo: No heat/cold intolerance  Psych: No significant nervousness, anxiety, stress, depression  All other systems were reviewed and are negative, except as noted.    PHYSICAL EXAM:  Constitutional: Well developed / well nourished  Eyes: Anicteric, PERRLA  ENMT: nc/at  Neck: Supple. Dressing intact from previous IJ central line  Respiratory: CTA B/L  Cardiovascular: RRR  Gastrointestinal: Soft abdomen, mild tender to touch at surgical site, ND. +BS  Genitourinary: Urinary catheter in place. clear yellow urine  Extremities: SCD's in place and working bilaterally  Vascular: Palpable dp pulses bilaterally  Neurological: A&O x3  Skin: Mild serosanguinous drainage noted on dressing. No erythema and evidence of infection noted  Musculoskeletal: Moving all extremities  Psychiatric: Responsive

## 2018-05-29 NOTE — PROGRESS NOTE ADULT - SUBJECTIVE AND OBJECTIVE BOX
Transplant Surgery - Multidisciplinary Rounds  --------------------------------------------------------------  DDRT POD#5    Present: Full multidisciplinary rounds melanie(nephrologist, transplant surgeon, surgical resident , nurse,  CSW, nutrition, pharmacist, nurse manager , coordinator and NP )  completed this morning to discuss patient progress and discharge plan.   Potential Discharge date: May 30th , 2018    Education: Patient educated on medication, the importance of compliance and instructions for clinic follow up including the importance of not taking Prograf prior to blood draw on the day of clinic visit  Plan of care:    Continue immunosuppression with Prograf and Cellcept, monitor tacrolimus and adjust dosage as needed, continue with steroid taper   Continue prophylaxes with Valcyte, Nystatin and Bactrim   I&O, d/c medina catheter today  OOB ambulating,   incentive spirometer while awake  CSW  to request home VNS       MEDICATIONS  (STANDING):  ALBUTerol/ipratropium for Nebulization 3 milliLiter(s) Nebulizer every 6 hours  aspirin  chewable 81 milliGRAM(s) Oral daily  docusate sodium 100 milliGRAM(s) Oral three times a day  famotidine    Tablet 20 milliGRAM(s) Oral daily  insulin glargine Injectable (LANTUS) 12 Unit(s) SubCutaneous at bedtime  insulin lispro (HumaLOG) corrective regimen sliding scale   SubCutaneous three times a day before meals  insulin lispro (HumaLOG) corrective regimen sliding scale   SubCutaneous at bedtime  magnesium sulfate  IVPB 2 Gram(s) IV Intermittent once  mycophenolate mofetil 1000 milliGRAM(s) Oral two times a day  nystatin    Suspension 707287 Unit(s) Swish and Swallow four times a day  predniSONE   Tablet 10 milliGRAM(s) Oral two times a day  senna 2 Tablet(s) Oral at bedtime  tacrolimus 7 milliGRAM(s) Oral two times a day  trimethoprim   80 mG/sulfamethoxazole 400 mG 1 Tablet(s) Oral daily  valGANciclovir 450 milliGRAM(s) Oral daily    MEDICATIONS  (PRN):  acetaminophen   Tablet. 975 milliGRAM(s) Oral every 6 hours PRN Mild Pain (1 - 3)  loperamide 2 milliGRAM(s) Oral two times a day PRN Diarrhea  oxyCODONE    IR 5 milliGRAM(s) Oral every 4 hours PRN Moderate Pain (4 - 6)      PAST MEDICAL & SURGICAL HISTORY:  HTN (hypertension)  Coronary artery disease involving coronary bypass graft  ESRD (end stage renal disease) on dialysis  Obesity  Hemorrhoids  Gastroesophageal reflux disease without esophagitis  High cholesterol  ESRD on Dialysis: Tue, Thur &amp; Sat  CAD (Coronary Artery Disease)  PCK (Polycystic Kidney Disease)  HTN - Hypertension  Asthma: last attack 2007, never hospitalized or intubated  AV fistula: b/l MILI GREEN  S/P coronary artery stent placement  S/P CABG x 5  S/P hemorrhoidectomy: and rectal polypectomy -2/3/2017.  AV fistula: right lower extremity 2 yrs  Left upper extrmity with graft 7 years ago  Stented coronary artery: x2 cardiac stents in 2016, one cardiac stent in 2015  CABG (Coronary Artery Bypass Graft): 2007      Vital Signs Last 24 Hrs  T(C): 37.1 (29 May 2018 10:00), Max: 37.2 (29 May 2018 00:00)  T(F): 98.7 (29 May 2018 10:00), Max: 98.9 (29 May 2018 00:00)  HR: 90 (29 May 2018 10:00) (87 - 104)  BP: 131/77 (29 May 2018 10:00) (110/62 - 174/77)  BP(mean): --  RR: 18 (29 May 2018 10:00) (18 - 18)  SpO2: 96% (29 May 2018 10:00) (94% - 99%)    I&O's Summary    28 May 2018 07:01  -  29 May 2018 07:00  --------------------------------------------------------  IN: 1800 mL / OUT: 1165 mL / NET: 635 mL    29 May 2018 07:01  -  29 May 2018 11:01  --------------------------------------------------------  IN: 360 mL / OUT: 675 mL / NET: -315 mL                              8.9    3.9   )-----------( 118      ( 29 May 2018 05:43 )             26.9     05-29    135  |  97  |  55<H>  ----------------------------<  126<H>  3.6   |  24  |  3.45<H>    Ca    6.9<L>      29 May 2018 05:43  Phos  2.1     05-29  Mg     1.8     05-29      Tacrolimus (), Serum: 3.3 ng/mL (05-29 @ 07:49)

## 2018-05-29 NOTE — DISCHARGE NOTE ADULT - SECONDARY DIAGNOSIS.
Immunosuppressive management encounter following kidney transplant Coronary artery disease involving coronary bypass graft

## 2018-05-29 NOTE — PROGRESS NOTE ADULT - ATTENDING COMMENTS
Pt feels well, no SOB  POD 5 s/p DDRT  Has edema - lasix 40mgs IV x 1  PARMINDER secondary to ischemia reperfusion injury but creatinine stable from yesterday.

## 2018-05-29 NOTE — PROGRESS NOTE ADULT - SUBJECTIVE AND OBJECTIVE BOX
[facility] DIVISION OF KIDNEY DISEASES AND HYPERTENSION -- FOLLOW UP NOTE  --------------------------------------------------------------------------------    HPI: 58 year old male with PMH of HTN, CAD s/p CABG, ESRD secondary to PKD ON HD (3/2011) TTS from RUE AVF admitted s/p DDRT from Hep C positive donor on 5/24/18. Pt follows with outpatient nephrologist Dr. Caleb Mckeon at [facility] dialysis center. Pt had one session of HD on 5/23/18 prior to OR. Course complicated by DGF. Pt required dialysis 5/25 and 5/27 after the operation. Pt now non-oliguirc with good urine output. Pt denies fevers, chills, CP, SOB, abdominal pain or LE edema.    PAST HISTORY  --------------------------------------------------------------------------------  No significant changes to PMH, PSH, FHx, SHx, unless otherwise noted    ALLERGIES & MEDICATIONS  --------------------------------------------------------------------------------  Allergies    No Known Allergies    Intolerances      Standing Inpatient Medications  ALBUTerol/ipratropium for Nebulization 3 milliLiter(s) Nebulizer every 6 hours  aspirin  chewable 81 milliGRAM(s) Oral daily  docusate sodium 100 milliGRAM(s) Oral three times a day  famotidine    Tablet 20 milliGRAM(s) Oral daily  insulin glargine Injectable (LANTUS) 12 Unit(s) SubCutaneous at bedtime  insulin lispro (HumaLOG) corrective regimen sliding scale   SubCutaneous three times a day before meals  insulin lispro (HumaLOG) corrective regimen sliding scale   SubCutaneous at bedtime  magnesium sulfate  IVPB 2 Gram(s) IV Intermittent once  mycophenolate mofetil 1000 milliGRAM(s) Oral two times a day  nystatin    Suspension 158472 Unit(s) Swish and Swallow four times a day  predniSONE   Tablet 10 milliGRAM(s) Oral two times a day  senna 2 Tablet(s) Oral at bedtime  tacrolimus 7 milliGRAM(s) Oral two times a day  trimethoprim   80 mG/sulfamethoxazole 400 mG 1 Tablet(s) Oral daily  valGANciclovir 450 milliGRAM(s) Oral daily    PRN Inpatient Medications  acetaminophen   Tablet. 975 milliGRAM(s) Oral every 6 hours PRN  loperamide 2 milliGRAM(s) Oral two times a day PRN  oxyCODONE    IR 5 milliGRAM(s) Oral every 4 hours PRN      REVIEW OF SYSTEMS  --------------------------------------------------------------------------------  Gen: No weight changes, fatigue, fevers/chills, weakness  Skin: No rashes  Head/Eyes/Ears/Mouth: No headache  Respiratory: No dyspnea, cough  CV: No chest pain, PND, orthopnea  GI: No abdominal pain, diarrhea, constipation  : No increased frequency, dysuria, hematuria, nocturia  MSK: No edema  Neuro: No dizziness/lightheadedness  Heme: No easy bruising or bleeding  All other systems were reviewed and are negative, except as noted.    VITALS/PHYSICAL EXAM  --------------------------------------------------------------------------------  T(C): 37.1 (05-29-18 @ 10:00), Max: 37.2 (05-29-18 @ 00:00)  HR: 90 (05-29-18 @ 10:00) (87 - 104)  BP: 131/77 (05-29-18 @ 10:00) (110/62 - 174/77)  RR: 18 (05-29-18 @ 10:00) (18 - 18)  SpO2: 96% (05-29-18 @ 10:00) (94% - 99%)  Wt(kg): --    05-28-18 @ 07:01  -  05-29-18 @ 07:00  --------------------------------------------------------  IN: 1800 mL / OUT: 1165 mL / NET: 635 mL    05-29-18 @ 07:01  -  05-29-18 @ 11:32  --------------------------------------------------------  IN: 360 mL / OUT: 675 mL / NET: -315 mL    Physical Exam:  	Gen: NAD, well-appearing  	HEENT: supple neck  	Pulm: CTA B/L  	CV: RRR, S1S2; no rub  	Abd: +BS, soft, nontender/nondistended, obese  	: No suprapubic tenderness  	UE: Warm, no asterixis  	LE: Warm, no edema + pedal pulses b/l   	Psych: Normal affect and mood  	Skin: Warm, without rashes  	Vascular access: + RUE AVF + thrill, + bruit; + LUE AVF aneurysmal appearance, + small    LABS/STUDIES  --------------------------------------------------------------------------------              8.9    3.9   >-----------<  118      [05-29-18 @ 05:43]              26.9     135  |  97  |  55  ----------------------------<  126      [05-29-18 @ 05:43]  3.6   |  24  |  3.45        Ca     6.9     [05-29-18 @ 05:43]      iCa    0.95     [05-28 @ 02:32]      Mg     1.8     [05-29-18 @ 05:43]      Phos  2.1     [05-29-18 @ 05:43]    Creatinine Trend:  SCr 3.45 [05-29 @ 05:43]  SCr 3.58 [05-28 @ 02:31]  SCr 5.71 [05-27 @ 02:51]  SCr 5.54 [05-26 @ 18:05]  SCr 4.90 [05-26 @ 10:58]    HbA1c 5.4      [05-27-18 @ 09:50]  TSH 1.35      [11-22-17 @ 03:20]  Lipid: chol 85, , HDL 28, LDL 38      [11-22-17 @ 03:20]

## 2018-05-29 NOTE — CHART NOTE - NSCHARTNOTEFT_GEN_A_CORE
Pt seen for nutrition follow up S/P kidney transplant, as per departmental protocol.     Source: Patient [X ]    Family [ ]     other [X ]; medical record; interdisciplinary Transplant Team rounds    Hospital Course: Pt S/P HCV + DDRT POD 5.    Pt noted [id number] ml urine output in past 24-hour. Magnesium, sodium, and potassium WNL; phosphorus down (2.1). Last HD 5/25; lasix Rx ordered today.    Pt noted with hyperglycemia post-transplant. Reviewed Consistent Carbohydrate diet, including carbohydrate content of foods and carbohydrate counting. Reviewed food safety guidelines for transplant recipients. Reviewed recommendations to avoid grapefruit and pomegranate while taking immunosuppressant medication. Reviewed recommendations for moderate intake of sodium with transplant medications. Pt was receptive and expressed understanding. Provided nutrition handout: Carbohydrate Counting for People with Diabetes.    Diet : Consistent Carbohydrate, Renal diet with snack    GI: Pt noted with diarrhea, +4BMs today; Imodium ordered.     PO intake:  < 50% [ ] 50-75% [ ]   % [X ]  other : Pt was eating 100% of meals until onset of diarrhea last night.     Source for PO intake [X ] Patient [ ] family [X ] chart [ ] staff [ ] other     Enteral /Parenteral Nutrition: n/a    Current Weight: Weight (kg): 92.9Kg (5/29), 83.7Kg (5/24)  Edema: 1+ generalized    Pertinent Medications: MEDICATIONS  (STANDING):  ALBUTerol/ipratropium for Nebulization 3 milliLiter(s) Nebulizer every 6 hours  aspirin  chewable 81 milliGRAM(s) Oral daily  docusate sodium 100 milliGRAM(s) Oral three times a day  famotidine    Tablet 20 milliGRAM(s) Oral daily  insulin glargine Injectable (LANTUS) 12 Unit(s) SubCutaneous at bedtime  insulin lispro (HumaLOG) corrective regimen sliding scale   SubCutaneous three times a day before meals  insulin lispro (HumaLOG) corrective regimen sliding scale   SubCutaneous at bedtime  mycophenolate mofetil 1000 milliGRAM(s) Oral two times a day  nystatin    Suspension 070201 Unit(s) Swish and Swallow four times a day  predniSONE   Tablet 10 milliGRAM(s) Oral two times a day  senna 2 Tablet(s) Oral at bedtime  tacrolimus 7 milliGRAM(s) Oral two times a day  trimethoprim   80 mG/sulfamethoxazole 400 mG 1 Tablet(s) Oral daily  valGANciclovir 450 milliGRAM(s) Oral daily    MEDICATIONS  (PRN):  acetaminophen   Tablet. 975 milliGRAM(s) Oral every 6 hours PRN Mild Pain (1 - 3)  loperamide 2 milliGRAM(s) Oral two times a day PRN Diarrhea  oxyCODONE    IR 5 milliGRAM(s) Oral every 4 hours PRN Moderate Pain (4 - 6)    Pertinent Labs:  05-29 Na135 mmol/L Glu 126 mg/dL<H> K+ 3.6 mmol/L Cr  3.45 mg/dL<H> BUN 55 mg/dL<H> 05-29 Phos 2.1 mg/dL<L> 05-26 Alb 3.6 g/dL 05-27 [id number] 5.4 %    CAPILLARY BLOOD GLUCOSE  POCT Blood Glucose.: 117 mg/dL (29 May 2018 11:57)  POCT Blood Glucose.: 108 mg/dL (29 May 2018 08:19)  POCT Blood Glucose.: 137 mg/dL (29 May 2018 03:28)  POCT Blood Glucose.: 149 mg/dL (28 May 2018 21:30)  POCT Blood Glucose.: 87 mg/dL (28 May 2018 16:33)      Skin: no pressure injuries    Estimated Needs:   [X ] no change since previous assessment  [ ] recalculated:     Previous Nutrition Diagnosis:   [X ] Increased Nutrient Needs    Nutrition Diagnosis is [X ] ongoing; addressed with po diet    New Nutrition Diagnosis: [X ] not applicable    Interventions:     Recommend:  1) Continue Consistent Carbohydrate Renal diet with snack; encourage intake of high protein, nutrient dense foods  2) Monitor weight, lab values, skin, po intake and GI tolerance  3) Reinforce therapeutic diet education as able    Monitoring and Evaluation:   Follow up per protocol  RD to remain available for further nutritional interventions as indicated.   Thalia Bocanegra, MS RD N [facility], #[phone].

## 2018-05-29 NOTE — DISCHARGE NOTE ADULT - HOME CARE AGENCY
[facility]    [phone]     Initial visit is scheduled for 6-3-2018. Nurse to call that day and provide approximate time of visit.

## 2018-05-29 NOTE — DISCHARGE NOTE ADULT - ADDITIONAL INSTRUCTIONS
FOLLOW-UP:  1. Please call to make a follow-up appointment with Dr. Marin on *******************************  2. Please follow up with your primary care physician in one week regarding your hospitalization. FOLLOW-UP:  1. Please call to make a follow-up appointment with Dr. Marin next Tuesday  2. Please follow up with your primary care physician in one week regarding your hospitalization.

## 2018-05-29 NOTE — DISCHARGE NOTE ADULT - MEDICATION SUMMARY - MEDICATIONS TO TAKE
I will START or STAY ON the medications listed below when I get home from the hospital:    predniSONE 5 mg oral tablet  -- 1 tab(s) by mouth once a day  -- Indication: For Immunosuppression    acetaminophen 325 mg oral tablet  -- 3 tab(s) by mouth every 6 hours, As needed, Mild Pain (1 - 3)  -- Indication: For Pain    HumaLOG KwikPen 100 units/mL injectable solution  -- 2 unit(s) injectable 4 times a day, As Needed. Check figer sticks pre-meals  150-200=2units  201-250=4 units  251-300=6units  301-350=8units  351-400=10units  >400=12units and call MD  -- Indication: For blood sugar control    insulin glargine  -- 8 unit(s) subcutaneous once a day (at bedtime)  -- Indication: For blood sugar control    nystatin 100,000 units/mL oral suspension  -- 5 milliliter(s) by mouth 4 times a day  -- Indication: For Prophylaxis    Lipitor 10 mg oral tablet  -- 1 tab(s) by mouth once a day  -- Indication: For HLD    valGANciclovir 450 mg oral tablet  -- 1 tab(s) by mouth once a day  -- Indication: For Prophylaxis    Lasix 20 mg oral tablet  -- 1 tab(s) by mouth once a day MDD:1 tab  -- Avoid prolonged or excessive exposure to direct and/or artificial sunlight while taking this medication.  It is very important that you take or use this exactly as directed.  Do not skip doses or discontinue unless directed by your doctor.  It may be advisable to drink a full glass orange juice or eat a banana daily while taking this medication.    -- Indication: For Kidney transplant recipient    tacrolimus 1 mg oral capsule  -- 6 cap(s) by mouth every 12 hours  -- Indication: For Immunosuppression    mycophenolate mofetil 250 mg oral capsule  -- 1000 milligram(s) by mouth 2 times a day  -- Indication: For Propylaxis    pantoprazole 40 mg oral delayed release tablet  -- 1 tab(s) by mouth once a day (before a meal)  -- Indication: For Gi    Cipro 500 mg oral tablet  -- 1 tab(s) by mouth 2 times a day   -- Avoid prolonged or excessive exposure to direct and/or artificial sunlight while taking this medication.  Check with your doctor before becoming pregnant.  Do not take dairy products, antacids, or iron preparations within one hour of this medication.  Finish all this medication unless otherwise directed by prescriber.  Medication should be taken with plenty of water.    -- Indication: For Kidney transplant recipient    ciprofloxacin 500 mg oral tablet  -- 1 tab(s) by mouth every 12 hours  -- Indication: For Kidney transplant recipient    sulfamethoxazole-trimethoprim 400 mg-80 mg oral tablet  -- 1 tab(s) by mouth once a day  -- Indication: For Prophylaxis

## 2018-05-29 NOTE — DISCHARGE NOTE ADULT - CARE PLAN
Principal Discharge DX:	Kidney transplant recipient  Goal:	To be free from infection and rejection  Assessment and plan of treatment:	No heavy lifting anything more than 10-15lbs or straining. Otherwise, you may return to your usual level of physical activity. If you are taking narcotic pain medication (such as Percocet), do NOT drive a car, operate machinery or make important decisions.  Call transplant clinic If you developed any of the following: fever, pain, redness, swelling at incision site, cough, nausea, vomiting, painful urination, difficulty urination, or not making any urine.  NOTIFY YOUR SURGEON IF: You have any bleeding that does not stop, any pus draining from your wound, any fever (over 100.4 F) or chills, persistent nausea/vomiting with inability to tolerate food or liquids, persistent diarrhea, or if your pain is not controlled on your discharge pain medications.  Secondary Diagnosis:	Immunosuppressive management encounter following kidney transplant  Assessment and plan of treatment:	Keep away from people who have cough, cold, and symptom of flu.  Only take medications that are on your discharge list  If you missed your medications call the transplant office, do not double up medication because you missed a dose.  If you have any question regarding your medication please call transplant office.  Secondary Diagnosis:	Coronary artery disease involving coronary bypass graft  Assessment and plan of treatment:	Continue to take only the medications listed on your discharge paperwork and follow-up with your cardiologist for long-term care.

## 2018-05-29 NOTE — DISCHARGE NOTE ADULT - PLAN OF CARE
To be free from infection and rejection No heavy lifting anything more than 10-15lbs or straining. Otherwise, you may return to your usual level of physical activity. If you are taking narcotic pain medication (such as Percocet), do NOT drive a car, operate machinery or make important decisions.  Call transplant clinic If you developed any of the following: fever, pain, redness, swelling at incision site, cough, nausea, vomiting, painful urination, difficulty urination, or not making any urine.  NOTIFY YOUR SURGEON IF: You have any bleeding that does not stop, any pus draining from your wound, any fever (over 100.4 F) or chills, persistent nausea/vomiting with inability to tolerate food or liquids, persistent diarrhea, or if your pain is not controlled on your discharge pain medications. Keep away from people who have cough, cold, and symptom of flu.  Only take medications that are on your discharge list  If you missed your medications call the transplant office, do not double up medication because you missed a dose.  If you have any question regarding your medication please call transplant office. Continue to take only the medications listed on your discharge paperwork and follow-up with your cardiologist for long-term care.

## 2018-05-29 NOTE — PROGRESS NOTE ADULT - ASSESSMENT
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 Donor: KDPI 69%, HCV Ab+  HCV ANITA +, Terminal creatinine 1.2, CMV+ / EBV+ / HBcAb+, Left kidney, 3 arteries, 1 vein, 1 ureter, HLA 6/6 mismatch (2,2,2), CPRA 0%, Virtual crossmatch negative.  Recipient: CMV+/EBV +. Cold ischemia time: 23 hs  43 mins, Warm ischemia time: 44 minutes. Weight of the kidney: 250 grams. Initially required SICU care for vasopressor support and oliguria requiring HD 5/25 for hyperkalemia and again 5/27 for volume overload and SOB. Now doing well with stable creatinine, making adequate urine. No overnight complaints. No indication  for repeat HD session at this time. Discharge planning in progress with home health assistance for insulin injection education/ reinforcement.

## 2018-05-29 NOTE — DISCHARGE NOTE ADULT - PATIENT PORTAL LINK FT
You can access the [facility] Patient Portal, offered by [facility], by registering with the following website: [email or web address]

## 2018-05-29 NOTE — DISCHARGE NOTE ADULT - HOSPITAL COURSE
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  Donor: KDPI 69%, HCV Ab+  HCV ANITA +, Terminal creatinine 1.2, CMV+ / EBV+ / HBcAb+, Left kidney, 3 arteries, 1 vein, 1 ureter, HLA 6/6 mismatch (2,2,2), CPRA 0%, Virtual crossmatch negative.  Recipient: CMV+/EBV +. Cold ischemia time: 23 hs  43 mins, Warm ischemia time: 44 minutes. Weight of the kidney: 250 grams. Initially required SICU care for vasopressor support and oliguria requiring HD 5/25 for hyperkalemia and again 5/27 for volume overload and SOB. He did well thereafter with stable creatinine and adequate UOP. No further HD requirements. His immunosuppression was optimized. He was given education for insulin requirements while on steroid taper.  Home health was set up for outpatient assistance with insulin injections. He was seen daily on multidisciplinary rounds and was deemed ready for discharge.   *********************************complete******************************** 58 year old gentleman with h/o HTN, CAD s/p CABG in 2007, ESRD secondary to PKD on HD since 3/2011, now s/p DDRT on 5/24/2018.  Donor: KDPI 69%, HCV Ab+  HCV ANITA +, Terminal creatinine 1.2, CMV+ / EBV+ / HBcAb+, Left kidney, 3 arteries, 1 vein, 1 ureter, HLA 6/6 mismatch (2,2,2), CPRA 0%, Virtual crossmatch negative.  Recipient: CMV+/EBV +. Cold ischemia time: 23 hs  43 mins, Warm ischemia time: 44 minutes. Weight of the kidney: 250 grams. Initially required SICU care for vasopressor support and oliguria requiring 2 sessions HD. He did well thereafter with downtrending creatinine and adequate UOP with intermittent lasix.  His immunosuppression was optimized. He was given education for insulin requirements while on steroid taper.  Home health was set up for outpatient assistance with insulin injections. He was seen daily on multidisciplinary rounds and was deemed ready for discharge.   *  ********************************to be completed******************************** 58 year old gentleman with h/o HTN, CAD s/p CABG in 2007, ESRD secondary to PKD on HD since 3/2011, now s/p DDRT on 5/24/2018.  Donor: KDPI 69%, HCV Ab+  HCV ANITA +, Terminal creatinine 1.2, CMV+ / EBV+ / HBcAb+, Left kidney, 3 arteries, 1 vein, 1 ureter, HLA 6/6 mismatch (2,2,2), CPRA 0%, Virtual crossmatch negative.  Recipient: CMV+/EBV +. Cold ischemia time: 23 hs  43 mins, Warm ischemia time: 44 minutes. Weight of the kidney: 250 grams. Initially required SICU care for vasopressor support and oliguria requiring 2 sessions HD. He did well thereafter with downtrending creatinine and adequate UOP with intermittent lasix.  His immunosuppression was optimized. He was given education for insulin requirements while on steroid taper.  Home health was set up for outpatient assistance with insulin injections. He was seen daily on multidisciplinary rounds and was deemed ready for discharge.

## 2018-05-29 NOTE — DISCHARGE NOTE ADULT - MEDICATION SUMMARY - MEDICATIONS TO STOP TAKING
I will STOP taking the medications listed below when I get home from the hospital:    Plavix 75 mg oral tablet  -- 1 tab(s) by mouth once a day    aspirin 325 mg oral delayed release tablet  -- 1 tab(s) by mouth once a day    Renvela carbonate 800 mg oral tablet  -- 1 tab(s) by mouth 3 times a day    metoprolol tartrate 25 mg oral tablet  -- 1 tab(s) by mouth once a day    sucralfate 1 g oral tablet  -- 1 tab(s) by mouth 3 times a day    lactobacillus acidophilus oral capsule  -- 1  by mouth 3 times a day    dicyclomine 20 mg oral tablet  -- 1 tab(s) by mouth 3 times a day (before meals)    Proventil HFA 90 mcg/inh inhalation aerosol  -- 2 puff(s) inhaled 4 times a day, As Needed for shortness of breath    omeprazole 20 mg oral delayed release capsule  -- 1 cap(s) by mouth once a day    losartan 25 mg oral tablet  -- 1 tab(s) by mouth once a day    isosorbide dinitrate 10 mg oral tablet  -- 1 tab(s) by mouth 3 times a day    atorvastatin 40 mg oral tablet  -- 1 tab(s) by mouth once a day (at bedtime)    fenofibrate 48 mg oral tablet  -- 1 tab(s) by mouth once a day    aspirin 325 mg oral delayed release tablet  -- 1 tab(s) by mouth once a day    clopidogrel 75 mg oral tablet  -- 1 tab(s) by mouth once a day    metoprolol tartrate 75 mg oral tablet  -- 1 tab(s) by mouth 2 times a day    sevelamer hydrochloride 800 mg oral tablet  -- 1 tab(s) by mouth 3 times a day (with meals)    albuterol 90 mcg/inh inhalation aerosol  -- 1 puff(s) inhaled 4 times a day, As needed, Shortness of Breath and/or Wheezing

## 2018-05-29 NOTE — DISCHARGE NOTE ADULT - CARE PROVIDER_API CALL
Hernandez Marin (MD), Surgery  [address] [place] [address]  Phone: [phone]  Fax: [phone]    Willem Mack (DO), Nephrology  [address] [place] [address]  Phone: [phone]  Fax: [phone]

## 2018-05-30 DIAGNOSIS — E13.9 OTHER SPECIFIED DIABETES MELLITUS WITHOUT COMPLICATIONS: ICD-10-CM

## 2018-05-30 LAB
ANION GAP SERPL CALC-SCNC: 11 MMOL/L — SIGNIFICANT CHANGE UP (ref 5–17)
BUN SERPL-MCNC: 60 MG/DL — HIGH (ref 7–23)
CALCIUM SERPL-MCNC: 7.1 MG/DL — LOW (ref 8.4–10.5)
CHLORIDE SERPL-SCNC: 95 MMOL/L — LOW (ref 96–108)
CO2 SERPL-SCNC: 24 MMOL/L — SIGNIFICANT CHANGE UP (ref 22–31)
CREAT SERPL-MCNC: 2.76 MG/DL — HIGH (ref 0.5–1.3)
GLUCOSE SERPL-MCNC: 123 MG/DL — HIGH (ref 70–99)
HCT VFR BLD CALC: 28.2 % — LOW (ref 39–50)
HGB BLD-MCNC: 9.4 G/DL — LOW (ref 13–17)
MAGNESIUM SERPL-MCNC: 2.1 MG/DL — SIGNIFICANT CHANGE UP (ref 1.6–2.6)
MCHC RBC-ENTMCNC: 31.5 PG — SIGNIFICANT CHANGE UP (ref 27–34)
MCHC RBC-ENTMCNC: 33.2 GM/DL — SIGNIFICANT CHANGE UP (ref 32–36)
MCV RBC AUTO: 94.8 FL — SIGNIFICANT CHANGE UP (ref 80–100)
PHOSPHATE SERPL-MCNC: 2.9 MG/DL — SIGNIFICANT CHANGE UP (ref 2.5–4.5)
PLATELET # BLD AUTO: 125 K/UL — LOW (ref 150–400)
POTASSIUM SERPL-MCNC: 3.3 MMOL/L — LOW (ref 3.5–5.3)
POTASSIUM SERPL-SCNC: 3.3 MMOL/L — LOW (ref 3.5–5.3)
RBC # BLD: 2.97 M/UL — LOW (ref 4.2–5.8)
RBC # FLD: 13.2 % — SIGNIFICANT CHANGE UP (ref 10.3–14.5)
SODIUM SERPL-SCNC: 130 MMOL/L — LOW (ref 135–145)
TACROLIMUS SERPL-MCNC: 5.1 NG/ML — SIGNIFICANT CHANGE UP
WBC # BLD: 3.9 K/UL — SIGNIFICANT CHANGE UP (ref 3.8–10.5)
WBC # FLD AUTO: 3.9 K/UL — SIGNIFICANT CHANGE UP (ref 3.8–10.5)

## 2018-05-30 PROCEDURE — 99232 SBSQ HOSP IP/OBS MODERATE 35: CPT | Mod: 24,GC

## 2018-05-30 PROCEDURE — 99232 SBSQ HOSP IP/OBS MODERATE 35: CPT | Mod: GC

## 2018-05-30 RX ORDER — FUROSEMIDE 40 MG
40 TABLET ORAL ONCE
Qty: 0 | Refills: 0 | Status: COMPLETED | OUTPATIENT
Start: 2018-05-30 | End: 2018-05-30

## 2018-05-30 RX ORDER — POTASSIUM CHLORIDE 20 MEQ
40 PACKET (EA) ORAL ONCE
Qty: 0 | Refills: 0 | Status: COMPLETED | OUTPATIENT
Start: 2018-05-30 | End: 2018-05-30

## 2018-05-30 RX ORDER — CIPROFLOXACIN LACTATE 400MG/40ML
500 VIAL (ML) INTRAVENOUS DAILY
Qty: 0 | Refills: 0 | Status: DISCONTINUED | OUTPATIENT
Start: 2018-05-30 | End: 2018-05-31

## 2018-05-30 RX ADMIN — TACROLIMUS 7 MILLIGRAM(S): 5 CAPSULE ORAL at 06:07

## 2018-05-30 RX ADMIN — OXYCODONE HYDROCHLORIDE 5 MILLIGRAM(S): 5 TABLET ORAL at 12:05

## 2018-05-30 RX ADMIN — Medication 500000 UNIT(S): at 06:07

## 2018-05-30 RX ADMIN — MYCOPHENOLATE MOFETIL 1000 MILLIGRAM(S): 250 CAPSULE ORAL at 17:38

## 2018-05-30 RX ADMIN — Medication 81 MILLIGRAM(S): at 16:59

## 2018-05-30 RX ADMIN — OXYCODONE HYDROCHLORIDE 5 MILLIGRAM(S): 5 TABLET ORAL at 22:08

## 2018-05-30 RX ADMIN — Medication 500000 UNIT(S): at 23:54

## 2018-05-30 RX ADMIN — Medication 500 MILLIGRAM(S): at 20:12

## 2018-05-30 RX ADMIN — INSULIN GLARGINE 12 UNIT(S): 100 INJECTION, SOLUTION SUBCUTANEOUS at 22:10

## 2018-05-30 RX ADMIN — MYCOPHENOLATE MOFETIL 1000 MILLIGRAM(S): 250 CAPSULE ORAL at 06:07

## 2018-05-30 RX ADMIN — Medication 5 MILLIGRAM(S): at 17:38

## 2018-05-30 RX ADMIN — Medication 500000 UNIT(S): at 12:18

## 2018-05-30 RX ADMIN — Medication 500000 UNIT(S): at 17:40

## 2018-05-30 RX ADMIN — Medication 40 MILLIEQUIVALENT(S): at 17:00

## 2018-05-30 RX ADMIN — Medication 40 MILLIGRAM(S): at 11:04

## 2018-05-30 RX ADMIN — Medication 3 MILLILITER(S): at 17:39

## 2018-05-30 RX ADMIN — OXYCODONE HYDROCHLORIDE 5 MILLIGRAM(S): 5 TABLET ORAL at 23:00

## 2018-05-30 RX ADMIN — OXYCODONE HYDROCHLORIDE 5 MILLIGRAM(S): 5 TABLET ORAL at 11:05

## 2018-05-30 RX ADMIN — VALGANCICLOVIR 450 MILLIGRAM(S): 450 TABLET, FILM COATED ORAL at 12:17

## 2018-05-30 RX ADMIN — FAMOTIDINE 20 MILLIGRAM(S): 10 INJECTION INTRAVENOUS at 12:17

## 2018-05-30 RX ADMIN — Medication 5 MILLIGRAM(S): at 06:07

## 2018-05-30 RX ADMIN — Medication 1 TABLET(S): at 12:17

## 2018-05-30 RX ADMIN — Medication 3 MILLILITER(S): at 12:19

## 2018-05-30 RX ADMIN — Medication 2: at 22:13

## 2018-05-30 RX ADMIN — Medication 3 MILLILITER(S): at 06:08

## 2018-05-30 RX ADMIN — TACROLIMUS 7 MILLIGRAM(S): 5 CAPSULE ORAL at 17:39

## 2018-05-30 RX ADMIN — OXYCODONE HYDROCHLORIDE 5 MILLIGRAM(S): 5 TABLET ORAL at 07:21

## 2018-05-30 RX ADMIN — OXYCODONE HYDROCHLORIDE 5 MILLIGRAM(S): 5 TABLET ORAL at 06:21

## 2018-05-30 NOTE — PROGRESS NOTE ADULT - SUBJECTIVE AND OBJECTIVE BOX
INTERVAL HPI/OVERNIGHT EVENTS:  Patient seen in am rounds and examined with Dr. Murguia.  S/P HCV + DDRT POD 6, doing well with downtrending creatinine. Jean removed yesterday, now with good spontaneous UOP. Received Lasix 80mg X1. Urine pink tinged this morning. Reports frequent loose BMs since yesterday, x1 today.  Colace/Senna held last night. No acute issues overnight.       MEDICATIONS  (STANDING):  ALBUTerol/ipratropium for Nebulization 3 milliLiter(s) Nebulizer every 6 hours  aspirin  chewable 81 milliGRAM(s) Oral daily  famotidine    Tablet 20 milliGRAM(s) Oral daily  furosemide   Injectable 40 milliGRAM(s) IV Push once  insulin glargine Injectable (LANTUS) 12 Unit(s) SubCutaneous at bedtime  insulin lispro (HumaLOG) corrective regimen sliding scale   SubCutaneous three times a day before meals  insulin lispro (HumaLOG) corrective regimen sliding scale   SubCutaneous at bedtime  mycophenolate mofetil 1000 milliGRAM(s) Oral two times a day  nystatin    Suspension 217691 Unit(s) Swish and Swallow four times a day  predniSONE   Tablet 5 milliGRAM(s) Oral two times a day  tacrolimus 7 milliGRAM(s) Oral two times a day  trimethoprim   80 mG/sulfamethoxazole 400 mG 1 Tablet(s) Oral daily  valGANciclovir 450 milliGRAM(s) Oral daily    MEDICATIONS  (PRN):  acetaminophen   Tablet. 975 milliGRAM(s) Oral every 6 hours PRN Mild Pain (1 - 3)  loperamide 2 milliGRAM(s) Oral two times a day PRN Diarrhea  oxyCODONE    IR 5 milliGRAM(s) Oral every 4 hours PRN Moderate Pain (4 - 6)      Allergies    No Known Allergies    Intolerances      Vital Signs Last 24 Hrs  T(C): 36.8 (30 May 2018 05:52), Max: 37.1 (29 May 2018 10:00)  T(F): 98.3 (30 May 2018 05:52), Max: 98.8 (30 May 2018 01:38)  HR: 74 (30 May 2018 05:52) (74 - 90)  BP: 126/73 (30 May 2018 05:52) (110/68 - 149/80)  BP(mean): --  RR: 18 (30 May 2018 05:52) (18 - 18)  SpO2: 95% (30 May 2018 05:52) (95% - 98%)    LABS:                        9.4    3.9   )-----------( 125      ( 30 May 2018 05:51 )             28.2     05-30    130<L>  |  95<L>  |  60<H>  ----------------------------<  123<H>  3.3<L>   |  24  |  2.76<H>    Ca    7.1<L>      30 May 2018 05:51  Phos  2.9     05-30  Mg     2.1     05-30          RADIOLOGY & ADDITIONAL TESTS:    Review of systems  Gen: No weight changes, fatigue, fevers/chills, weakness  Skin: No rashes  Head/Eyes/Ears/Mouth: No headache; Normal hearing; Normal vision w/o blurriness; No sinus pain/discomfort, sore throat  Respiratory: No dyspnea, cough, wheezing, hemoptysis  CV: No chest pain, PND, orthopnea  GI: No abdominal pain, diarrhea, constipation, nausea, vomiting, melena, hematochezia  : No increased frequency, dysuria, nocturia. reports pink tinged urine.  MSK: No joint pain/swelling; no back pain; no edema  Neuro: No dizziness/lightheadedness, weakness, seizures, numbness, tingling  Heme: No easy bruising or bleeding  Endo: No heat/cold intolerance  Psych: No significant nervousness, anxiety, stress, depression  All other systems were reviewed and are negative, except as noted.    PHYSICAL EXAM:  Constitutional: Well developed / well nourished  Eyes: Anicteric, PERRLA  ENMT: nc/at  Neck: Supple.   Respiratory: CTA B/L  Cardiovascular: RRR  Gastrointestinal: Soft abdomen, mild tender to touch at surgical site, ND. +BS  Genitourinary: pink tinged urine noted in urinal  Extremities: SCD's in place and working bilaterally. Mild BLE edema  Vascular: Palpable dp pulses bilaterally  Neurological: A&O x3  Skin: Serosanguinous drainage noted on dressing. No erythema and evidence of infection noted  Musculoskeletal: Moving all extremities  Psychiatric: Responsive

## 2018-05-30 NOTE — PROGRESS NOTE ADULT - ATTENDING COMMENTS
Pt feels well, no SOB  POD 6 s/p DDRT  Has edema - lasix 40mgs IV x 1  PARMINDER secondary to ischemia reperfusion injury but creatinine improving now.    Some hematuria - monitor for clots - if worsens will hold aspirin.

## 2018-05-30 NOTE — PROGRESS NOTE ADULT - SUBJECTIVE AND OBJECTIVE BOX
[facility] DIVISION OF KIDNEY DISEASES AND HYPERTENSION -- FOLLOW UP NOTE  --------------------------------------------------------------------------------    HPI: 58 year old male with PMH of HTN, CAD s/p CABG, ESRD secondary to PKD ON HD (3/2011) TTS from RUE AVF admitted s/p DDRT from Hep C positive donor on 5/24/18. Pt follows with outpatient nephrologist Dr. Caleb Mckeon at [facility] dialysis center. Pt had one session of HD on 5/23/18 prior to OR. Course complicated by DGF. Pt required dialysis 5/25 and 5/27 after the operation. Pt non-oliguirc with good urine output. Pt urine is red today. Pt with some leakage from wound. Pt with loose bowels yesterday x 6 while on stool softners Pt denies fevers, chills, CP, SOB, abdominal pain or LE edema.    PAST HISTORY  --------------------------------------------------------------------------------  No significant changes to PMH, PSH, FHx, SHx, unless otherwise noted    ALLERGIES & MEDICATIONS  --------------------------------------------------------------------------------  Allergies    No Known Allergies    Intolerances      Standing Inpatient Medications  ALBUTerol/ipratropium for Nebulization 3 milliLiter(s) Nebulizer every 6 hours  aspirin  chewable 81 milliGRAM(s) Oral daily  famotidine    Tablet 20 milliGRAM(s) Oral daily  insulin glargine Injectable (LANTUS) 12 Unit(s) SubCutaneous at bedtime  insulin lispro (HumaLOG) corrective regimen sliding scale   SubCutaneous three times a day before meals  insulin lispro (HumaLOG) corrective regimen sliding scale   SubCutaneous at bedtime  mycophenolate mofetil 1000 milliGRAM(s) Oral two times a day  nystatin    Suspension 381881 Unit(s) Swish and Swallow four times a day  predniSONE   Tablet 5 milliGRAM(s) Oral two times a day  tacrolimus 7 milliGRAM(s) Oral two times a day  trimethoprim   80 mG/sulfamethoxazole 400 mG 1 Tablet(s) Oral daily  valGANciclovir 450 milliGRAM(s) Oral daily    PRN Inpatient Medications  acetaminophen   Tablet. 975 milliGRAM(s) Oral every 6 hours PRN  loperamide 2 milliGRAM(s) Oral two times a day PRN  oxyCODONE    IR 5 milliGRAM(s) Oral every 4 hours PRN      REVIEW OF SYSTEMS  --------------------------------------------------------------------------------  Gen: No weight changes, fatigue, fevers/chills, weakness  Skin: No rashes  Head/Eyes/Ears/Mouth: No headache  Respiratory: No dyspnea, cough  CV: No chest pain, PND, orthopnea  GI: No abdominal pain, diarrhea  : No increased frequency, dysuria, hematuria, nocturia  MSK: No joint pain/swelling; no back pain; no edema  Neuro: No dizziness/lightheadedness  Heme: No easy bruising or bleeding    All other systems were reviewed and are negative, except as noted.    VITALS/PHYSICAL EXAM  --------------------------------------------------------------------------------  T(C): 36.6 (05-30-18 @ 13:36), Max: 37.1 (05-30-18 @ 01:38)  HR: 86 (05-30-18 @ 13:36) (74 - 90)  BP: 118/77 (05-30-18 @ 13:36) (110/68 - 149/80)  RR: 18 (05-30-18 @ 13:36) (18 - 18)  SpO2: 96% (05-30-18 @ 13:36) (95% - 98%)  Wt(kg): --        05-29-18 @ 07:01  -  05-30-18 @ 07:00  --------------------------------------------------------  IN: 3210 mL / OUT: 3925 mL / NET: -715 mL    05-30-18 @ 07:01  -  05-30-18 @ 14:22  --------------------------------------------------------  IN: 360 mL / OUT: 400 mL / NET: -40 mL    Physical Exam:  	Gen: NAD, well-appearing  	HEENT: supple neck  	Pulm: CTA B/L  	CV: RRR, S1S2; no rub  	Abd: +BS, soft, nontender/nondistended, obese  	: No suprapubic tenderness  	UE: Warm, no asterixis  	LE: Warm, no edema + pedal pulses b/l   	Psych: Normal affect and mood  	Skin: Warm, without rashes  	Vascular access: + RUE AVF + thrill, + bruit; + LUE AVF aneurysmal appearance, + small    LABS/STUDIES  --------------------------------------------------------------------------------              9.4    3.9   >-----------<  125      [05-30-18 @ 05:51]              28.2     130  |  95  |  60  ----------------------------<  123      [05-30-18 @ 05:51]  3.3   |  24  |  2.76        Ca     7.1     [05-30-18 @ 05:51]      Mg     2.1     [05-30-18 @ 05:51]      Phos  2.9     [05-30-18 @ 05:51]    Creatinine Trend:  SCr 2.76 [05-30 @ 05:51]  SCr 3.45 [05-29 @ 05:43]  SCr 3.58 [05-28 @ 02:31]  SCr 5.71 [05-27 @ 02:51]  SCr 5.54 [05-26 @ 18:05]    HbA1c 5.4      [05-27-18 @ 09:50]  TSH 1.35      [11-22-17 @ 03:20]  Lipid: chol 85, , HDL 28, LDL 38      [11-22-17 @ 03:20]

## 2018-05-30 NOTE — PROGRESS NOTE ADULT - ATTENDING COMMENTS
Tacro level 5.1 today from 3.3. Continue Tacro 7mg BID. I personally saw and examined patient with transplant team.  IMMUNOSUPPRESSION:  Tacro level 5.1 today from 3.3. Continue Tacro 7mg BID.

## 2018-05-30 NOTE — PROGRESS NOTE ADULT - ASSESSMENT
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 Donor: KDPI 69%, HCV Ab+  HCV ANITA +, Terminal creatinine 1.2, CMV+ / EBV+ / HBcAb+, Left kidney, 3 arteries, 1 vein, 1 ureter, HLA 6/6 mismatch (2,2,2), CPRA 0%, Virtual crossmatch negative.  Recipient: CMV+/EBV +. Cold ischemia time: 23 hs  43 mins, Warm ischemia time: 44 minutes. Weight of the kidney: 250 grams. Initially required SICU care for vasopressor support and oliguria requiring 2 sessions of HD. Now doing well with downtrending creatinine and good spontaneous urine. Continues to require Lantus at bedtime and SSI for hyperglycemia on steroid taper, currently well controlled. Discharge planning in progress with home health assistance for insulin injection education/ reinforcement.

## 2018-05-31 LAB
ANION GAP SERPL CALC-SCNC: 13 MMOL/L — SIGNIFICANT CHANGE UP (ref 5–17)
ANION GAP SERPL CALC-SCNC: 15 MMOL/L — SIGNIFICANT CHANGE UP (ref 5–17)
APPEARANCE UR: ABNORMAL
BILIRUB UR-MCNC: ABNORMAL
BLD GP AB SCN SERPL QL: NEGATIVE — SIGNIFICANT CHANGE UP
BUN SERPL-MCNC: 61 MG/DL — HIGH (ref 7–23)
BUN SERPL-MCNC: 61 MG/DL — HIGH (ref 7–23)
CALCIUM SERPL-MCNC: 7.4 MG/DL — LOW (ref 8.4–10.5)
CALCIUM SERPL-MCNC: 7.5 MG/DL — LOW (ref 8.4–10.5)
CHLORIDE SERPL-SCNC: 95 MMOL/L — LOW (ref 96–108)
CHLORIDE SERPL-SCNC: 97 MMOL/L — SIGNIFICANT CHANGE UP (ref 96–108)
CO2 SERPL-SCNC: 23 MMOL/L — SIGNIFICANT CHANGE UP (ref 22–31)
CO2 SERPL-SCNC: 24 MMOL/L — SIGNIFICANT CHANGE UP (ref 22–31)
COLOR SPEC: ABNORMAL
CREAT SERPL-MCNC: 2.52 MG/DL — HIGH (ref 0.5–1.3)
CREAT SERPL-MCNC: 2.55 MG/DL — HIGH (ref 0.5–1.3)
DIFF PNL FLD: ABNORMAL
GLUCOSE SERPL-MCNC: 101 MG/DL — HIGH (ref 70–99)
GLUCOSE SERPL-MCNC: 138 MG/DL — HIGH (ref 70–99)
GLUCOSE UR QL: NEGATIVE — SIGNIFICANT CHANGE UP
HCT VFR BLD CALC: 32.1 % — LOW (ref 39–50)
HCT VFR BLD CALC: 32.7 % — LOW (ref 39–50)
HCV RNA SERPL NAA DL=5-ACNC: HIGH IU/ML
HCV RNA SPEC NAA+PROBE-LOG IU: 6.21 LOGIU/ML — HIGH
HGB BLD-MCNC: 10.3 G/DL — LOW (ref 13–17)
HGB BLD-MCNC: 10.6 G/DL — LOW (ref 13–17)
KETONES UR-MCNC: NEGATIVE — SIGNIFICANT CHANGE UP
LEUKOCYTE ESTERASE UR-ACNC: ABNORMAL
MAGNESIUM SERPL-MCNC: 1.8 MG/DL — SIGNIFICANT CHANGE UP (ref 1.6–2.6)
MCHC RBC-ENTMCNC: 30.8 PG — SIGNIFICANT CHANGE UP (ref 27–34)
MCHC RBC-ENTMCNC: 30.9 PG — SIGNIFICANT CHANGE UP (ref 27–34)
MCHC RBC-ENTMCNC: 32 GM/DL — SIGNIFICANT CHANGE UP (ref 32–36)
MCHC RBC-ENTMCNC: 32.3 GM/DL — SIGNIFICANT CHANGE UP (ref 32–36)
MCV RBC AUTO: 95.6 FL — SIGNIFICANT CHANGE UP (ref 80–100)
MCV RBC AUTO: 96.2 FL — SIGNIFICANT CHANGE UP (ref 80–100)
NITRITE UR-MCNC: POSITIVE
PH UR: 5 — SIGNIFICANT CHANGE UP (ref 5–8)
PHOSPHATE SERPL-MCNC: 2.7 MG/DL — SIGNIFICANT CHANGE UP (ref 2.5–4.5)
PLATELET # BLD AUTO: 162 K/UL — SIGNIFICANT CHANGE UP (ref 150–400)
PLATELET # BLD AUTO: 179 K/UL — SIGNIFICANT CHANGE UP (ref 150–400)
POTASSIUM SERPL-MCNC: 3.7 MMOL/L — SIGNIFICANT CHANGE UP (ref 3.5–5.3)
POTASSIUM SERPL-MCNC: 3.8 MMOL/L — SIGNIFICANT CHANGE UP (ref 3.5–5.3)
POTASSIUM SERPL-SCNC: 3.7 MMOL/L — SIGNIFICANT CHANGE UP (ref 3.5–5.3)
POTASSIUM SERPL-SCNC: 3.8 MMOL/L — SIGNIFICANT CHANGE UP (ref 3.5–5.3)
PROT UR-MCNC: 100
RBC # BLD: 3.34 M/UL — LOW (ref 4.2–5.8)
RBC # BLD: 3.42 M/UL — LOW (ref 4.2–5.8)
RBC # FLD: 13.3 % — SIGNIFICANT CHANGE UP (ref 10.3–14.5)
RBC # FLD: 13.4 % — SIGNIFICANT CHANGE UP (ref 10.3–14.5)
RH IG SCN BLD-IMP: NEGATIVE — SIGNIFICANT CHANGE UP
SODIUM SERPL-SCNC: 133 MMOL/L — LOW (ref 135–145)
SODIUM SERPL-SCNC: 134 MMOL/L — LOW (ref 135–145)
SP GR SPEC: 1.02 — SIGNIFICANT CHANGE UP (ref 1.01–1.02)
TACROLIMUS SERPL-MCNC: 7.3 NG/ML — SIGNIFICANT CHANGE UP
UROBILINOGEN FLD QL: NEGATIVE — SIGNIFICANT CHANGE UP
WBC # BLD: 4.7 K/UL — SIGNIFICANT CHANGE UP (ref 3.8–10.5)
WBC # BLD: 5.6 K/UL — SIGNIFICANT CHANGE UP (ref 3.8–10.5)
WBC # FLD AUTO: 4.7 K/UL — SIGNIFICANT CHANGE UP (ref 3.8–10.5)
WBC # FLD AUTO: 5.6 K/UL — SIGNIFICANT CHANGE UP (ref 3.8–10.5)

## 2018-05-31 PROCEDURE — 93010 ELECTROCARDIOGRAM REPORT: CPT

## 2018-05-31 PROCEDURE — 99232 SBSQ HOSP IP/OBS MODERATE 35: CPT | Mod: 24,GC

## 2018-05-31 RX ORDER — MAGNESIUM SULFATE 500 MG/ML
2 VIAL (ML) INJECTION ONCE
Qty: 0 | Refills: 0 | Status: COMPLETED | OUTPATIENT
Start: 2018-05-31 | End: 2018-05-31

## 2018-05-31 RX ORDER — CIPROFLOXACIN LACTATE 400MG/40ML
500 VIAL (ML) INTRAVENOUS EVERY 12 HOURS
Qty: 0 | Refills: 0 | Status: DISCONTINUED | OUTPATIENT
Start: 2018-05-31 | End: 2018-06-02

## 2018-05-31 RX ORDER — FUROSEMIDE 40 MG
40 TABLET ORAL ONCE
Qty: 0 | Refills: 0 | Status: COMPLETED | OUTPATIENT
Start: 2018-05-31 | End: 2018-05-31

## 2018-05-31 RX ORDER — MAGNESIUM SULFATE 500 MG/ML
1 VIAL (ML) INJECTION ONCE
Qty: 0 | Refills: 0 | Status: DISCONTINUED | OUTPATIENT
Start: 2018-05-31 | End: 2018-05-31

## 2018-05-31 RX ADMIN — Medication 3 MILLILITER(S): at 21:42

## 2018-05-31 RX ADMIN — OXYCODONE HYDROCHLORIDE 5 MILLIGRAM(S): 5 TABLET ORAL at 15:40

## 2018-05-31 RX ADMIN — Medication 3 MILLILITER(S): at 01:13

## 2018-05-31 RX ADMIN — FAMOTIDINE 20 MILLIGRAM(S): 10 INJECTION INTRAVENOUS at 11:04

## 2018-05-31 RX ADMIN — MYCOPHENOLATE MOFETIL 1000 MILLIGRAM(S): 250 CAPSULE ORAL at 05:52

## 2018-05-31 RX ADMIN — Medication 5 MILLIGRAM(S): at 05:53

## 2018-05-31 RX ADMIN — Medication 500000 UNIT(S): at 21:41

## 2018-05-31 RX ADMIN — TACROLIMUS 7 MILLIGRAM(S): 5 CAPSULE ORAL at 18:10

## 2018-05-31 RX ADMIN — Medication 3 MILLILITER(S): at 18:10

## 2018-05-31 RX ADMIN — Medication 50 GRAM(S): at 11:03

## 2018-05-31 RX ADMIN — Medication 3 MILLILITER(S): at 05:53

## 2018-05-31 RX ADMIN — Medication 3 MILLILITER(S): at 12:32

## 2018-05-31 RX ADMIN — VALGANCICLOVIR 450 MILLIGRAM(S): 450 TABLET, FILM COATED ORAL at 11:03

## 2018-05-31 RX ADMIN — Medication 2: at 12:33

## 2018-05-31 RX ADMIN — Medication 500000 UNIT(S): at 12:32

## 2018-05-31 RX ADMIN — OXYCODONE HYDROCHLORIDE 5 MILLIGRAM(S): 5 TABLET ORAL at 14:40

## 2018-05-31 RX ADMIN — OXYCODONE HYDROCHLORIDE 5 MILLIGRAM(S): 5 TABLET ORAL at 22:39

## 2018-05-31 RX ADMIN — Medication 500000 UNIT(S): at 18:11

## 2018-05-31 RX ADMIN — Medication 40 MILLIGRAM(S): at 09:18

## 2018-05-31 RX ADMIN — Medication 975 MILLIGRAM(S): at 13:30

## 2018-05-31 RX ADMIN — MYCOPHENOLATE MOFETIL 1000 MILLIGRAM(S): 250 CAPSULE ORAL at 18:10

## 2018-05-31 RX ADMIN — OXYCODONE HYDROCHLORIDE 5 MILLIGRAM(S): 5 TABLET ORAL at 23:09

## 2018-05-31 RX ADMIN — Medication 1 TABLET(S): at 11:03

## 2018-05-31 RX ADMIN — TACROLIMUS 7 MILLIGRAM(S): 5 CAPSULE ORAL at 05:52

## 2018-05-31 RX ADMIN — Medication 500000 UNIT(S): at 05:53

## 2018-05-31 RX ADMIN — Medication 975 MILLIGRAM(S): at 12:32

## 2018-05-31 RX ADMIN — INSULIN GLARGINE 12 UNIT(S): 100 INJECTION, SOLUTION SUBCUTANEOUS at 21:41

## 2018-05-31 RX ADMIN — Medication 500 MILLIGRAM(S): at 18:11

## 2018-05-31 NOTE — PROGRESS NOTE ADULT - ASSESSMENT
Renal: ESRD HD, last HD yesterday without incident, use RUE AVF for HD, remains oliguric   - dose meds for a GFR 35  - avoid NSAIDs and nephrotoxins as able   Transplant:  DDRT +HCV to -HCV recipient  Immunosuppression: simulect induction, prograf, cellcept and prednisone per transplant surgery  : If the gross hematuria continues then he may need CBI/Urology consult  Lytes:   hyponatremia  mild  CV:  Not on pressire  ID-Cystitis on PO medication    Ambulate       [facility]  [phone]

## 2018-05-31 NOTE — PROVIDER CONTACT NOTE (OTHER) - BACKGROUND
s/p right kidney DDRT
pt s/p right cadaver kidney transplant
s/p DDRT,

## 2018-05-31 NOTE — PROVIDER CONTACT NOTE (OTHER) - REASON
Pt refusing insulin
pt complaining of itchiness on right forearm, scratching site
pt urine output for 0100 is 0 for the hour
upon ambulation pt became light headed
urine output

## 2018-05-31 NOTE — PROGRESS NOTE ADULT - PROBLEM SELECTOR PROBLEM 4
HTN (hypertension)
HTN (hypertension)
Coronary artery disease involving coronary bypass graft
Coronary artery disease involving coronary bypass graft
HTN (hypertension)

## 2018-05-31 NOTE — PROGRESS NOTE ADULT - SUBJECTIVE AND OBJECTIVE BOX
INTERVAL HPI/OVERNIGHT EVENTS:  Patient seen with multidisciplinary team (Nephrologist, pharmacist, nurse, nurse manager, NP, MD surgeons )  in am rounds and examined with Dr. Dr. Murguia.  S/P HCV + DDRT POD 5, doing well, Hematuria with clts presist, continue to hold aspirin.    MEDICATIONS  (STANDING):  ALBUTerol/ipratropium for Nebulization 3 milliLiter(s) Nebulizer every 6 hours  ciprofloxacin     Tablet 500 milliGRAM(s) Oral every 12 hours  famotidine    Tablet 20 milliGRAM(s) Oral daily  insulin glargine Injectable (LANTUS) 12 Unit(s) SubCutaneous at bedtime  insulin lispro (HumaLOG) corrective regimen sliding scale   SubCutaneous three times a day before meals  insulin lispro (HumaLOG) corrective regimen sliding scale   SubCutaneous at bedtime  mycophenolate mofetil 1000 milliGRAM(s) Oral two times a day  nystatin    Suspension 365384 Unit(s) Swish and Swallow four times a day  predniSONE   Tablet 5 milliGRAM(s) Oral daily  tacrolimus 7 milliGRAM(s) Oral two times a day  trimethoprim   80 mG/sulfamethoxazole 400 mG 1 Tablet(s) Oral daily  valGANciclovir 450 milliGRAM(s) Oral daily    MEDICATIONS  (PRN):  acetaminophen   Tablet. 975 milliGRAM(s) Oral every 6 hours PRN Mild Pain (1 - 3)  loperamide 2 milliGRAM(s) Oral two times a day PRN Diarrhea  oxyCODONE    IR 5 milliGRAM(s) Oral every 4 hours PRN Moderate Pain (4 - 6)      Allergies    No Known Allergies    Intolerances        Vital Signs Last 24 Hrs  T(C): 36.9 (31 May 2018 09:16), Max: 36.9 (30 May 2018 21:16)  T(F): 98.4 (31 May 2018 09:16), Max: 98.4 (30 May 2018 21:16)  HR: 94 (31 May 2018 09:16) (76 - 94)  BP: 137/95 (31 May 2018 09:16) (118/68 - 144/80)  BP(mean): --  RR: 18 (31 May 2018 09:16) (18 - 18)  SpO2: 95% (31 May 2018 09:16) (95% - 98%)    LABS:                        10.3   4.7   )-----------( 162      ( 31 May 2018 06:35 )             32.1     05-31    134<L>  |  97  |  61<H>  ----------------------------<  101<H>  3.7   |  24  |  2.52<H>    Ca    7.4<L>      31 May 2018 06:35  Phos  2.7     -  Mg     1.8             Urinalysis Basic - ( 31 May 2018 03:49 )    Color: Red / Appearance: Turbid / S.016 / pH: x  Gluc: x / Ketone: Negative  / Bili: Small / Urobili: Negative   Blood: x / Protein: 100 / Nitrite: Positive   Leuk Esterase: Moderate / RBC: >720 /HPF / WBC 12 /HPF   Sq Epi: x / Non Sq Epi: 0 /HPF / Bacteria: Negative        RADIOLOGY & ADDITIONAL TESTS:    Review of systems  Gen: No weight changes, fatigue, fevers/chills, weakness  Skin: No rashes  Head/Eyes/Ears/Mouth: No headache; Normal hearing; Normal vision w/o blurriness; No sinus pain/discomfort, sore throat  Respiratory: No dyspnea, cough, wheezing, hemoptysis  CV: No chest pain, PND, orthopnea  GI: No abdominal pain, diarrhea, constipation, nausea, vomiting, melena, hematochezia  : No increased frequency, dysuria, hematuria, nocturia  MSK: No joint pain/swelling; no back pain; no edema  Neuro: No dizziness/lightheadedness, weakness, seizures, numbness, tingling  Heme: No easy bruising or bleeding  Endo: No heat/cold intolerance  Psych: No significant nervousness, anxiety, stress, depression  All other systems were reviewed and are negative, except as noted.    PHYSICAL EXAM:  Constitutional: Well developed / well nourished  Eyes: Anicteric, PERRLA  ENMT: nc/at  Neck: Supple  Respiratory: CTA B/L  Cardiovascular: RRR  Gastrointestinal: Soft abdomen, mild tender to touch at surgical site, ND, obese  Genitourinary: Voiding spontaneously, hematuria with clots   Extremities: SCD's in place and working bilaterally  Vascular: Palpable dp pulses bilaterally  Neurological: A&O x3  Skin: Mild serosanguinous hina on wound dressing no erythema and evidence of infection noted  Musculoskeletal: Moving all extremities  Psychiatric: Responsive

## 2018-05-31 NOTE — PROGRESS NOTE ADULT - ATTENDING COMMENTS
Pt feels well, no SOB  POD 7 s/p DDRT  Has edema - lasix 40mgs IV x 1  PARMINDER secondary to ischemia reperfusion injury but creatinine improving now.    Has hematuria - holding aspirin and checking urine culture.  Will f/u.

## 2018-05-31 NOTE — PROVIDER CONTACT NOTE (OTHER) - SITUATION
urine output last 4 hours 5cc, 10cc, 10cc, 10cc
Pt refusing insulin
pt complaining of itchiness on right forearm, scratching site
pt urine output for 0100 is 0 for the hour
upon ambulation pt c/o lightheadedness, pt told by RN to sit in chair, pt not answering any questions, after episode pt stated he was lightheaded and felt like he was going to fall

## 2018-05-31 NOTE — PROVIDER CONTACT NOTE (OTHER) - RECOMMENDATIONS
increase fluid intake
further pt education from SICU team
pt lowered to chair, vs taken, blood tests sent

## 2018-05-31 NOTE — CHART NOTE - NSCHARTNOTEFT_GEN_A_CORE
Pt with c/o dizziness during ambulation  Pt seen and examined states that he was walking and felt lightjeaded  Denies CP/SOB/palpitation  Deneis N/V/D    PE:   Awake and alert  BS B/L CTA  S1S2 RRR  Abdomen soft NT/ND obese  Bladder ND, tender during voiding with hematuria   + pulses present    58 year old gentleman with h/o HTN, CAD s/p CABG in 2007, ESRD secondary to PKD on HD since 3/2011, s/p DDRT from Hep C positive donor. Donor: KDPI 69%, HCV Ab+  HCV ANITA +, Terminal creatinine 1.2, CMV+ / EBV+ / HBcAb+, Left kidney, 3 arteries, 1 vein, 1 ureter, HLA 6/6 mismatch (2,2,2), CPRA 0%, Virtual crossmatch negative.  Recipient: CMV+/EBV +. Cold ischemia time: 23 hs  43 mins, Warm ischemia time: 44 minutes. Weight of the kidney: 250 grams. Initially required SICU care for vasopressor support and oliguria requiring HD 5/25 for hyperkalemia and again 5/27 for volume overload and SOB.  Discharge planning in progress with home health assistance for insulin injection education.    Plan:  Place back in bed  Stat VSS, orthostatic BP negative  EKG no acute changes.    Stat labs send with no significant change H&H stable at 10.6/32.7. Bun/Cr stable 61/2.55  Monitor BP and HR  Monitor BG  Assist with ambulation  Event and plan discussed with Dr. Murguia who agrees. Pt with c/o dizziness during ambulation  Pt seen and examined states that he was walking and felt lightjeaded  Denies CP/SOB/palpitation  Deneis N/V/D    PE:   Awake and alert  BS B/L CTA  S1S2 RRR  Abdomen soft NT/ND obese  Bladder ND, tender during voiding with hematuria   + pulses present    58 year old gentleman with h/o HTN, CAD s/p CABG in 2007, ESRD secondary to PKD on HD since 3/2011, s/p DDRT from Hep C positive donor. Donor: KDPI 69%, HCV Ab+  HCV ANITA +, Terminal creatinine 1.2, CMV+ / EBV+ / HBcAb+, Left kidney, 3 arteries, 1 vein, 1 ureter, HLA 6/6 mismatch (2,2,2), CPRA 0%, Virtual crossmatch negative.  Recipient: CMV+/EBV +. Cold ischemia time: 23 hs  43 mins, Warm ischemia time: 44 minutes. Weight of the kidney: 250 grams. Initially required SICU care for vasopressor support and oliguria requiring HD 5/25 for hyperkalemia and again 5/27 for volume overload and SOB.  Discharge planning in progress with home health assistance for insulin injection education.    Plan:  Place back in bed  Stat VSS, orthostatic BP negative  EKG no acute changes.    Stat labs send with no significant change H&H stable at 10.6/32.7. Bun/Cr stable 61/2.55  Monitor BP and HR  Monitor BG  Assist with ambulation  Event and plan discussed with Dr. Murguia who agrees.      ADDENDUM: 2018-06-03 08:22 am  I had seen and examined patient together with Ms. Tolentino soon after the described event.  At the time we could not detect any major ongoing pathology.

## 2018-05-31 NOTE — PROGRESS NOTE ADULT - ATTENDING COMMENTS
Tacrolimus level 7.3, will continue 7 mg BID and monitor level daily I personally saw and examined patient with transplant team.  IMMUNOSUPPRESSION:  Tacrolimus level 7.3, will continue 7 mg BID and monitor level daily

## 2018-05-31 NOTE — PROVIDER CONTACT NOTE (OTHER) - ACTION/TREATMENT ORDERED:
monitor hourly urine output
continue to monitor, pt will be dialyzed in the AM
give pt IVP benadryl 50 mg
CBC, BMP, type and screen stat, EKG taken, vs taken, NP Martinez came to assess pt
awaiting MD for further bedside evaluation/education

## 2018-05-31 NOTE — PROGRESS NOTE ADULT - ASSESSMENT
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 Donor: KDPI 69%, HCV Ab+  HCV ANITA +, Terminal creatinine 1.2, CMV+ / EBV+ / HBcAb+, Left kidney, 3 arteries, 1 vein, 1 ureter, HLA 6/6 mismatch (2,2,2), CPRA 0%, Virtual crossmatch negative.  Recipient: CMV+/EBV +. Cold ischemia time: 23 hs  43 mins, Warm ischemia time: 44 minutes. Weight of the kidney: 250 grams. Initially required SICU care for vasopressor support and oliguria requiring HD 5/25 for hyperkalemia and again 5/27 for volume overload and SOB.  Discharge planning in progress with home health assistance for insulin injection education.      Kidney replaced by transplant.    Plan: Kidney replaced by transplant.  Plan: POD (  ) doing well.  Po diet eliza well.    Pt encouraged to ambulate  Dressing removed site intact. 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 Donor: KDPI 69%, HCV Ab+  HCV ANITA +, Terminal creatinine 1.2, CMV+ / EBV+ / HBcAb+, Left kidney, 3 arteries, 1 vein, 1 ureter, HLA 6/6 mismatch (2,2,2), CPRA 0%, Virtual crossmatch negative.  Recipient: CMV+/EBV +. Cold ischemia time: 23 hs  43 mins, Warm ischemia time: 44 minutes. Weight of the kidney: 250 grams. Initially required SICU care for vasopressor support and oliguria requiring HD 5/25 for hyperkalemia and again 5/27 for volume overload and SOB.  Discharge planning in progress with home health assistance for insulin injection education.

## 2018-05-31 NOTE — PROVIDER CONTACT NOTE (OTHER) - ASSESSMENT
HR 80 A-Line 121/60 MAP 84 on double concentrated levo gtt for SBP >100 RR 25 o2 98%
pt a+ox4, vss as per chart, pt c/o lightheadedness, pt urine bloody with occasional clots
pt hemodynamically stable, afebrile, complaining of pain in right groin, managed with PCA dilaudid
pt hemodynamically stable, afebrile, on PCA dilaudid pump for pain control
pt on sliding scale Q6H, 2100 , pt refusing insulin. pt education given, still hesitant on receiving insulin.

## 2018-05-31 NOTE — PROGRESS NOTE ADULT - SUBJECTIVE AND OBJECTIVE BOX
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 Pt follows with outpatient nephrologist Dr. Caleb Mckeon at [facility] dialysis center. Pt had one session of HD on 5/23/18 prior to OR. Course complicated by DGF. Pt required dialysis 5/25 and 5/27 after the operation. Pt nonoliguric with good urine output. Pt urine is red with clots today. Pt denies fevers, chills, CP, SOB, abdominal pain or LE edema.    PAST HISTORY  --------------------------------------------------------------------------------  No significant changes to PMH, PSH, FHx, SHx, unless otherwise noted    ALLERGIES & MEDICATIONS  --------------------------------------------------------------------------------  Allergies    No Known Allergies    Intolerances      Standing Inpatient Medications  ALBUTerol/ipratropium for Nebulization 3 milliLiter(s) Nebulizer every 6 hours  ciprofloxacin     Tablet 500 milliGRAM(s) Oral every 12 hours  famotidine    Tablet 20 milliGRAM(s) Oral daily  insulin glargine Injectable (LANTUS) 12 Unit(s) SubCutaneous at bedtime  insulin lispro (HumaLOG) corrective regimen sliding scale   SubCutaneous three times a day before meals  insulin lispro (HumaLOG) corrective regimen sliding scale   SubCutaneous at bedtime  mycophenolate mofetil 1000 milliGRAM(s) Oral two times a day  nystatin    Suspension 719598 Unit(s) Swish and Swallow four times a day  predniSONE   Tablet 5 milliGRAM(s) Oral daily  tacrolimus 7 milliGRAM(s) Oral two times a day  trimethoprim   80 mG/sulfamethoxazole 400 mG 1 Tablet(s) Oral daily  valGANciclovir 450 milliGRAM(s) Oral daily    PRN Inpatient Medications  acetaminophen   Tablet. 975 milliGRAM(s) Oral every 6 hours PRN  loperamide 2 milliGRAM(s) Oral two times a day PRN  oxyCODONE    IR 5 milliGRAM(s) Oral every 4 hours PRN      REVIEW OF SYSTEMS  --------------------------------------------------------------------------------  Gen: No weight changes, fatigue, fevers/chills, weakness  Skin: No rashes  Head/Eyes/Ears/Mouth: No headache  Respiratory: No dyspnea, cough  CV: No chest pain, PND, orthopnea  GI: No abdominal pain, diarrhea  : No increased frequency, dysuria, hematuria, nocturia  MSK: No joint pain/swelling; no back pain; no edema  Neuro: No dizziness/lightheadedness  Heme: No easy bruising or bleeding    All other systems were reviewed and are negative, except as noted.    VITALS/PHYSICAL EXAM  --------------------------------------------------------------------------------  T(C): 36.5 (05-31-18 @ 13:14), Max: 36.9 (05-30-18 @ 21:16)  HR: 89 (05-31-18 @ 13:14) (76 - 94)  BP: 149/90 (05-31-18 @ 13:14) (118/68 - 149/90)  RR: 18 (05-31-18 @ 13:14) (18 - 18)  SpO2: 98% (05-31-18 @ 13:14) (95% - 98%)  Wt(kg): --    05-30-18 @ 07:01  -  05-31-18 @ 07:00  --------------------------------------------------------  IN: 1080 mL / OUT: 2725 mL / NET: -1645 mL    05-31-18 @ 07:01  -  05-31-18 @ 14:02  --------------------------------------------------------  IN: 600 mL / OUT: 1395 mL / NET: -795 mL    Physical Exam:  	Gen: NAD, well-appearing  	HEENT: supple neck  	Pulm: CTA B/L  	CV: RRR, S1S2; no rub  	Abd: +BS, soft, nontender/nondistended, obese  	: No suprapubic tenderness  	UE: Warm, no asterixis  	LE: Warm, no edema + pedal pulses b/l   	Psych: Normal affect and mood  	Skin: Warm, without rashes  	Vascular access: + RUE AVF + thrill, + bruit; + LUE AVF aneurysmal appearance, + small    LABS/STUDIES  --------------------------------------------------------------------------------              10.3   4.7   >-----------<  162      [05-31-18 @ 06:35]              32.1     134  |  97  |  61  ----------------------------<  101      [05-31-18 @ 06:35]  3.7   |  24  |  2.52        Ca     7.4     [05-31-18 @ 06:35]      Mg     1.8     [05-31-18 @ 06:35]      Phos  2.7     [05-31-18 @ 06:35]    Creatinine Trend:  SCr 2.52 [05-31 @ 06:35]  SCr 2.76 [05-30 @ 05:51]  SCr 3.45 [05-29 @ 05:43]  SCr 3.58 [05-28 @ 02:31]  SCr 5.71 [05-27 @ 02:51]    Urinalysis - [05-31-18 @ 03:49]      Color Red / Appearance Turbid / SG 1.016 / pH 5.0      Gluc Negative / Ketone Negative  / Bili Small / Urobili Negative       Blood Moderate / Protein 100 / Leuk Est Moderate / Nitrite Positive      RBC >720 / WBC 12 / Hyaline 0 / Gran  / Sq Epi  / Non Sq Epi 0 / Bacteria Negative      HbA1c 5.4      [05-27-18 @ 09:50]  TSH 1.35      [11-22-17 @ 03:20]  Lipid: chol 85, , HDL 28, LDL 38      [11-22-17 @ 03:20]

## 2018-05-31 NOTE — PROGRESS NOTE ADULT - PROBLEM SELECTOR PLAN 4
BP stable, continue to monitor.
Continue ASA 81mg. On Plavix 75 and  at home.
Continue ASA 81mg. On Plavix 75 and  at home.

## 2018-05-31 NOTE — PROGRESS NOTE ADULT - SUBJECTIVE AND OBJECTIVE BOX
[facility] [phone]        Patient seen and examined in bed,  He was having hematuria  Diarrhea has subsided        MEDICATIONS  (STANDING):  ALBUTerol/ipratropium for Nebulization 3 milliLiter(s) Nebulizer every 6 hours  ciprofloxacin     Tablet 500 milliGRAM(s) Oral every 12 hours  famotidine    Tablet 20 milliGRAM(s) Oral daily  insulin glargine Injectable (LANTUS) 12 Unit(s) SubCutaneous at bedtime  insulin lispro (HumaLOG) corrective regimen sliding scale   SubCutaneous three times a day before meals  insulin lispro (HumaLOG) corrective regimen sliding scale   SubCutaneous at bedtime  mycophenolate mofetil 1000 milliGRAM(s) Oral two times a day  nystatin    Suspension 247013 Unit(s) Swish and Swallow four times a day  predniSONE   Tablet 5 milliGRAM(s) Oral daily  tacrolimus 7 milliGRAM(s) Oral two times a day  trimethoprim   80 mG/sulfamethoxazole 400 mG 1 Tablet(s) Oral daily  valGANciclovir 450 milliGRAM(s) Oral daily      VITAL:  T(C): , Max: 36.9 (18 @ 21:16)  T(F): , Max: 98.4 (18 @ 21:16)  HR: 94 (18 @ 09:16)  BP: 137/95 (18 @ 09:16)  BP(mean): --  RR: 18 (18 @ 09:16)  SpO2: 95% (18 @ 09:16)  Wt(kg): --    I and O's:     @ 07:  -   @ 07:00  --------------------------------------------------------  IN: 1080 mL / OUT: 2725 mL / NET: -1645 mL     @ 07:01  -   @ 11:28  --------------------------------------------------------  IN: 480 mL / OUT: 820 mL / NET: -340 mL          PHYSICAL EXAM:    Constitutional: NAD  HEENT: PERRLA    Neck:  No JVD  Respiratory: CTAB/L  Cardiovascular: S1 and S2  Gastrointestinal: BS+, soft, NT/ND  Extremities: No peripheral edema  Neurological: A/O x 3, no focal deficits  Psychiatric: Normal mood, normal affect  : No Jean  Skin: No rashes  Access: AVF on the right    LABS:                        10.3   4.7   )-----------( 162      ( 31 May 2018 06:35 )             32.1     05-31    134<L>  |  97  |  61<H>  ----------------------------<  101<H>  3.7   |  24  |  2.52<H>    Ca    7.4<L>      31 May 2018 06:35  Phos  2.7     -  Mg     1.8                 Urine Studies:  Urinalysis Basic - ( 31 May 2018 03:49 )    Color: Red / Appearance: Turbid / S.016 / pH: x  Gluc: x / Ketone: Negative  / Bili: Small / Urobili: Negative   Blood: x / Protein: 100 / Nitrite: Positive   Leuk Esterase: Moderate / RBC: >720 /HPF / WBC 12 /HPF   Sq Epi: x / Non Sq Epi: 0 /HPF / Bacteria: Negative            RADIOLOGY & ADDITIONAL STUDIES:

## 2018-06-01 ENCOUNTER — OUTPATIENT (OUTPATIENT)
Dept: OUTPATIENT SERVICES | Facility: HOSPITAL | Age: 58
LOS: 1 days | End: 2018-06-01
Payer: MEDICAID

## 2018-06-01 DIAGNOSIS — I77.0 ARTERIOVENOUS FISTULA, ACQUIRED: Chronic | ICD-10-CM

## 2018-06-01 DIAGNOSIS — Z95.5 PRESENCE OF CORONARY ANGIOPLASTY IMPLANT AND GRAFT: Chronic | ICD-10-CM

## 2018-06-01 DIAGNOSIS — Z95.1 PRESENCE OF AORTOCORONARY BYPASS GRAFT: Chronic | ICD-10-CM

## 2018-06-01 DIAGNOSIS — Z98.890 OTHER SPECIFIED POSTPROCEDURAL STATES: Chronic | ICD-10-CM

## 2018-06-01 LAB
ANION GAP SERPL CALC-SCNC: 12 MMOL/L — SIGNIFICANT CHANGE UP (ref 5–17)
BUN SERPL-MCNC: 53 MG/DL — HIGH (ref 7–23)
CALCIUM SERPL-MCNC: 7.3 MG/DL — LOW (ref 8.4–10.5)
CHLORIDE SERPL-SCNC: 99 MMOL/L — SIGNIFICANT CHANGE UP (ref 96–108)
CO2 SERPL-SCNC: 24 MMOL/L — SIGNIFICANT CHANGE UP (ref 22–31)
CREAT SERPL-MCNC: 2.04 MG/DL — HIGH (ref 0.5–1.3)
CULTURE RESULTS: NO GROWTH — SIGNIFICANT CHANGE UP
GLUCOSE SERPL-MCNC: 101 MG/DL — HIGH (ref 70–99)
GRAZOPREVIR RESISTANCE: SIGNIFICANT CHANGE UP
HCT VFR BLD CALC: 30.5 % — LOW (ref 39–50)
HCV GENTYP BLD NAA+PROBE: ABNORMAL
HCV NS3 MUT DET ISLT GENOTYP: SIGNIFICANT CHANGE UP
HGB BLD-MCNC: 9.9 G/DL — LOW (ref 13–17)
MAGNESIUM SERPL-MCNC: 1.8 MG/DL — SIGNIFICANT CHANGE UP (ref 1.6–2.6)
MCHC RBC-ENTMCNC: 30.9 PG — SIGNIFICANT CHANGE UP (ref 27–34)
MCHC RBC-ENTMCNC: 32.3 GM/DL — SIGNIFICANT CHANGE UP (ref 32–36)
MCV RBC AUTO: 95.5 FL — SIGNIFICANT CHANGE UP (ref 80–100)
PARITAPREVIR RESISTANCE: SIGNIFICANT CHANGE UP
PHOSPHATE SERPL-MCNC: 2.2 MG/DL — LOW (ref 2.5–4.5)
PLATELET # BLD AUTO: 169 K/UL — SIGNIFICANT CHANGE UP (ref 150–400)
POTASSIUM SERPL-MCNC: 3.5 MMOL/L — SIGNIFICANT CHANGE UP (ref 3.5–5.3)
POTASSIUM SERPL-SCNC: 3.5 MMOL/L — SIGNIFICANT CHANGE UP (ref 3.5–5.3)
RBC # BLD: 3.2 M/UL — LOW (ref 4.2–5.8)
RBC # FLD: 13.3 % — SIGNIFICANT CHANGE UP (ref 10.3–14.5)
SIMPREVIR RESISTANCE: SIGNIFICANT CHANGE UP
SODIUM SERPL-SCNC: 135 MMOL/L — SIGNIFICANT CHANGE UP (ref 135–145)
SPECIMEN SOURCE: SIGNIFICANT CHANGE UP
TACROLIMUS SERPL-MCNC: 8.6 NG/ML — SIGNIFICANT CHANGE UP
WBC # BLD: 4.4 K/UL — SIGNIFICANT CHANGE UP (ref 3.8–10.5)
WBC # FLD AUTO: 4.4 K/UL — SIGNIFICANT CHANGE UP (ref 3.8–10.5)

## 2018-06-01 PROCEDURE — G9001: CPT

## 2018-06-01 PROCEDURE — 99232 SBSQ HOSP IP/OBS MODERATE 35: CPT | Mod: 24,GC

## 2018-06-01 PROCEDURE — 99232 SBSQ HOSP IP/OBS MODERATE 35: CPT

## 2018-06-01 RX ORDER — METOPROLOL TARTRATE 50 MG
1 TABLET ORAL
Qty: 0 | Refills: 0 | COMMUNITY

## 2018-06-01 RX ORDER — VALGANCICLOVIR 450 MG/1
1 TABLET, FILM COATED ORAL
Qty: 0 | Refills: 0 | COMMUNITY
Start: 2018-06-01

## 2018-06-01 RX ORDER — MAGNESIUM SULFATE 500 MG/ML
2 VIAL (ML) INJECTION ONCE
Qty: 0 | Refills: 0 | Status: COMPLETED | OUTPATIENT
Start: 2018-06-01 | End: 2018-06-01

## 2018-06-01 RX ORDER — NYSTATIN 500MM UNIT
5 POWDER (EA) MISCELLANEOUS
Qty: 0 | Refills: 0 | COMMUNITY
Start: 2018-06-01

## 2018-06-01 RX ORDER — MYCOPHENOLATE MOFETIL 250 MG/1
1000 CAPSULE ORAL
Qty: 0 | Refills: 0 | COMMUNITY
Start: 2018-06-01

## 2018-06-01 RX ORDER — ACETAMINOPHEN 500 MG
3 TABLET ORAL
Qty: 0 | Refills: 0 | COMMUNITY
Start: 2018-06-01

## 2018-06-01 RX ORDER — FUROSEMIDE 40 MG
40 TABLET ORAL ONCE
Qty: 0 | Refills: 0 | Status: COMPLETED | OUTPATIENT
Start: 2018-06-01 | End: 2018-06-01

## 2018-06-01 RX ORDER — INSULIN LISPRO 100/ML
2 VIAL (ML) SUBCUTANEOUS
Qty: 0 | Refills: 0 | COMMUNITY

## 2018-06-01 RX ORDER — MOXIFLOXACIN HYDROCHLORIDE TABLETS, 400 MG 400 MG/1
1 TABLET, FILM COATED ORAL
Qty: 8 | Refills: 0 | OUTPATIENT
Start: 2018-06-01 | End: 2018-06-04

## 2018-06-01 RX ORDER — ACETAMINOPHEN 500 MG
2 TABLET ORAL
Qty: 0 | Refills: 0 | DISCHARGE
Start: 2018-06-01

## 2018-06-01 RX ORDER — FAMOTIDINE 10 MG/ML
1 INJECTION INTRAVENOUS
Qty: 0 | Refills: 0 | COMMUNITY
Start: 2018-06-01

## 2018-06-01 RX ORDER — INSULIN GLARGINE 100 [IU]/ML
8 INJECTION, SOLUTION SUBCUTANEOUS AT BEDTIME
Qty: 0 | Refills: 0 | Status: DISCONTINUED | OUTPATIENT
Start: 2018-06-01 | End: 2018-06-02

## 2018-06-01 RX ORDER — FUROSEMIDE 40 MG
1 TABLET ORAL
Qty: 30 | Refills: 0 | OUTPATIENT
Start: 2018-06-01 | End: 2018-06-30

## 2018-06-01 RX ADMIN — MYCOPHENOLATE MOFETIL 1000 MILLIGRAM(S): 250 CAPSULE ORAL at 05:58

## 2018-06-01 RX ADMIN — Medication 5 MILLIGRAM(S): at 05:59

## 2018-06-01 RX ADMIN — INSULIN GLARGINE 8 UNIT(S): 100 INJECTION, SOLUTION SUBCUTANEOUS at 21:51

## 2018-06-01 RX ADMIN — OXYCODONE HYDROCHLORIDE 5 MILLIGRAM(S): 5 TABLET ORAL at 13:59

## 2018-06-01 RX ADMIN — Medication 500000 UNIT(S): at 23:33

## 2018-06-01 RX ADMIN — Medication 3 MILLILITER(S): at 05:59

## 2018-06-01 RX ADMIN — Medication 40 MILLIGRAM(S): at 10:13

## 2018-06-01 RX ADMIN — Medication 500 MILLIGRAM(S): at 18:05

## 2018-06-01 RX ADMIN — FAMOTIDINE 20 MILLIGRAM(S): 10 INJECTION INTRAVENOUS at 12:12

## 2018-06-01 RX ADMIN — VALGANCICLOVIR 450 MILLIGRAM(S): 450 TABLET, FILM COATED ORAL at 12:12

## 2018-06-01 RX ADMIN — TACROLIMUS 7 MILLIGRAM(S): 5 CAPSULE ORAL at 18:04

## 2018-06-01 RX ADMIN — Medication 2: at 12:50

## 2018-06-01 RX ADMIN — Medication 975 MILLIGRAM(S): at 20:27

## 2018-06-01 RX ADMIN — Medication 50 GRAM(S): at 09:12

## 2018-06-01 RX ADMIN — Medication 500 MILLIGRAM(S): at 05:58

## 2018-06-01 RX ADMIN — Medication 975 MILLIGRAM(S): at 21:20

## 2018-06-01 RX ADMIN — Medication 500000 UNIT(S): at 12:13

## 2018-06-01 RX ADMIN — Medication 3 MILLILITER(S): at 18:05

## 2018-06-01 RX ADMIN — OXYCODONE HYDROCHLORIDE 5 MILLIGRAM(S): 5 TABLET ORAL at 14:59

## 2018-06-01 RX ADMIN — TACROLIMUS 7 MILLIGRAM(S): 5 CAPSULE ORAL at 05:59

## 2018-06-01 RX ADMIN — Medication 500000 UNIT(S): at 05:58

## 2018-06-01 RX ADMIN — Medication 1 TABLET(S): at 12:12

## 2018-06-01 RX ADMIN — MYCOPHENOLATE MOFETIL 1000 MILLIGRAM(S): 250 CAPSULE ORAL at 18:04

## 2018-06-01 RX ADMIN — Medication 500000 UNIT(S): at 18:05

## 2018-06-01 RX ADMIN — Medication 3 MILLILITER(S): at 12:14

## 2018-06-01 NOTE — PROGRESS NOTE ADULT - SUBJECTIVE AND OBJECTIVE BOX
[facility] [phone]        Patient seen and examined in bed.  He was in good spirits and offered no complaints.  No more hematuria noted!        MEDICATIONS  (STANDING):  ALBUTerol/ipratropium for Nebulization 3 milliLiter(s) Nebulizer every 6 hours  ciprofloxacin     Tablet 500 milliGRAM(s) Oral every 12 hours  famotidine    Tablet 20 milliGRAM(s) Oral daily  insulin glargine Injectable (LANTUS) 8 Unit(s) SubCutaneous at bedtime  insulin lispro (HumaLOG) corrective regimen sliding scale   SubCutaneous three times a day before meals  insulin lispro (HumaLOG) corrective regimen sliding scale   SubCutaneous at bedtime  mycophenolate mofetil 1000 milliGRAM(s) Oral two times a day  nystatin    Suspension [id number] Unit(s) Swish and Swallow four times a day  predniSONE   Tablet 5 milliGRAM(s) Oral daily  tacrolimus 7 milliGRAM(s) Oral two times a day  trimethoprim   80 mG/sulfamethoxazole 400 mG 1 Tablet(s) Oral daily  valGANciclovir 450 milliGRAM(s) Oral daily      VITAL:  T(C): , Max: 37.1 (18 @ 16:00)  T(F): , Max: 98.8 (18 @ 09:05)  HR: 99 (18 @ 13:21)  BP: 106/72 (18 @ 13:21)  BP(mean): --  RR: 18 (18 @ 13:21)  SpO2: 99% (18 @ 13:21)  Wt(kg): --    I and O's:     @ :  -   @ 07:00  --------------------------------------------------------  IN: 1080 mL / OUT: 3330 mL / NET: -2250 mL     @ 07:  -   @ 15:50  --------------------------------------------------------  IN: 370 mL / OUT: 1600 mL / NET: -1230 mL          PHYSICAL EXAM:    Constitutional: NAD  HEENT: PERRLA    Neck:  No JVD  Respiratory: CTAB/L  Cardiovascular: S1 and S2  Gastrointestinal: BS+, soft, NT/ND  Extremities: No peripheral edema  Neurological: A/O x 3, no focal deficits  Psychiatric: Normal mood, normal affect  : No Jean  Skin: No rashes  Access: avf    LABS:                        9.9    4.4   )-----------( 169      ( 2018 05:53 )             30.5     06-01    135  |  99  |  53<H>  ----------------------------<  101<H>  3.5   |  24  |  2.04<H>    Ca    7.3<L>      2018 05:53  Phos  2.2     06-01  Mg     1.8     06-01            Urine Studies:  Urinalysis Basic - ( 31 May 2018 03:49 )    Color: Red / Appearance: Turbid / S.016 / pH: x  Gluc: x / Ketone: Negative  / Bili: Small / Urobili: Negative   Blood: x / Protein: 100 / Nitrite: Positive   Leuk Esterase: Moderate / RBC: >720 /HPF / WBC 12 /HPF   Sq Epi: x / Non Sq Epi: 0 /HPF / Bacteria: Negative            RADIOLOGY & ADDITIONAL STUDIES:

## 2018-06-01 NOTE — PROGRESS NOTE ADULT - ASSESSMENT
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 Donor: KDPI 69%, HCV Ab+  HCV ANITA +, Terminal creatinine 1.2, CMV+ / EBV+ / HBcAb+, Left kidney, 3 arteries, 1 vein, 1 ureter, HLA 6/6 mismatch (2,2,2), CPRA 0%, Virtual crossmatch negative.  Recipient: CMV+/EBV +. Cold ischemia time: 23 hs  43 mins, Warm ischemia time: 44 minutes. Weight of the kidney: 250 grams. Initially required SICU care for vasopressor support and oliguria requiring HD 5/25 for hyperkalemia and again 5/27 for volume overload and SOB.  Discharge planning in progress with home health assistance for insulin injection education.

## 2018-06-01 NOTE — PROGRESS NOTE ADULT - ASSESSMENT
Renal: ESRD HD, last HD yesterday without incident, use RUE AVF for HD, remains oliguric   - dose meds for a GFR 35  - avoid NSAIDs and nephrotoxins as able   Transplant:  DDRT +HCV to -HCV recipient  Immunosuppression: simulect induction, prograf, cellcept and prednisone per transplant surgery  : Resolved  gross hematuria  Lytes:   hyponatremia  mild  CV:  Off BP meds  ID-Cystitis on PO medication    Ambulate  Possible dc in [facility]  [phone]

## 2018-06-01 NOTE — PROGRESS NOTE ADULT - SUBJECTIVE AND OBJECTIVE BOX
[facility] DIVISION OF KIDNEY DISEASES AND HYPERTENSION -- FOLLOW UP NOTE  --------------------------------------------------------------------------------  Chief Complaint:  kidney transplant	    24 hour events/subjective:  hematuria cleared up.  Feels better. slight dizziness.        PAST HISTORY  --------------------------------------------------------------------------------  No significant changes to PMH, PSH, FHx, SHx, unless otherwise noted    ALLERGIES & MEDICATIONS  --------------------------------------------------------------------------------  Allergies    No Known Allergies    Intolerances      Standing Inpatient Medications  ALBUTerol/ipratropium for Nebulization 3 milliLiter(s) Nebulizer every 6 hours  ciprofloxacin     Tablet 500 milliGRAM(s) Oral every 12 hours  famotidine    Tablet 20 milliGRAM(s) Oral daily  insulin glargine Injectable (LANTUS) 12 Unit(s) SubCutaneous at bedtime  insulin lispro (HumaLOG) corrective regimen sliding scale   SubCutaneous three times a day before meals  insulin lispro (HumaLOG) corrective regimen sliding scale   SubCutaneous at bedtime  mycophenolate mofetil 1000 milliGRAM(s) Oral two times a day  nystatin    Suspension [id number] Unit(s) Swish and Swallow four times a day  predniSONE   Tablet 5 milliGRAM(s) Oral daily  tacrolimus 7 milliGRAM(s) Oral two times a day  trimethoprim   80 mG/sulfamethoxazole 400 mG 1 Tablet(s) Oral daily  valGANciclovir 450 milliGRAM(s) Oral daily    PRN Inpatient Medications  acetaminophen   Tablet. 975 milliGRAM(s) Oral every 6 hours PRN  oxyCODONE    IR 5 milliGRAM(s) Oral every 4 hours PRN      REVIEW OF SYSTEMS  --------------------------------------------------------------------------------  Gen: No fatigue, fevers/chills, weakness  Skin: No rashes  Head/Eyes/Ears/Mouth: No headache;No sore throat  Respiratory: No dyspnea, cough,   CV: No chest pain, PND, orthopnea  GI: No abdominal pain, diarrhea, constipation, nausea, vomiting  Transplant: No pain  : No increased frequency, dysuria, hematuria, nocturia  MSK: No joint pain/swelling; no back pain; no edema  Neuro: No dizziness/lightheadedness, weakness, seizures, numbness, tingling  Psych: No significant nervousness, anxiety, stress, depression    All other systems were reviewed and are negative, except as noted.    VITALS/PHYSICAL EXAM  --------------------------------------------------------------------------------  T(C): 36.7 (06-01-18 @ 13:21), Max: 37.1 (05-31-18 @ 16:00)  HR: 99 (06-01-18 @ 13:21) (83 - 100)  BP: 106/72 (06-01-18 @ 13:21) (100/61 - 115/72)  RR: 18 (06-01-18 @ 13:21) (18 - 18)  SpO2: 99% (06-01-18 @ 13:21) (94% - 100%)  Wt(kg): --        05-31-18 @ 07:01  -  06-01-18 @ 07:00  --------------------------------------------------------  IN: 1080 mL / OUT: 3330 mL / NET: -2250 mL    06-01-18 @ 07:01  -  06-01-18 @ 13:29  --------------------------------------------------------  IN: 310 mL / OUT: 1350 mL / NET: -1040 mL      Physical Exam:  	Gen: NAD, well-appearing  	HEENT: PERRL, supple neck, clear oropharynx  	Pulm: CTA B/L  	CV: RRR, S1S2; no rub  	Back: No spinal or CVA tenderness; no sacral edema  	Abd: +BS, soft, nontender/nondistended                      Transplant: serous clear discharge on dressing.   	: No suprapubic tenderness  	UE: Warm, FROM, intact strength; no edema; no asterixis  	LE: +1 LE edema  	Neuro: No focal deficits, intact gait  	Psych: Normal affect and mood  	Skin: Warm, without rashes      LABS/STUDIES  --------------------------------------------------------------------------------              9.9    4.4   >-----------<  169      [06-01-18 @ 05:53]              30.5     135  |  99  |  53  ----------------------------<  101      [06-01-18 @ 05:53]  3.5   |  24  |  2.04        Ca     7.3     [06-01-18 @ 05:53]      Mg     1.8     [06-01-18 @ 05:53]      Phos  2.2     [06-01-18 @ 05:53]            Creatinine Trend:  SCr 2.04 [06-01 @ 05:53]  SCr 2.55 [05-31 @ 16:19]  SCr 2.52 [05-31 @ 06:35]  SCr 2.76 [05-30 @ 05:51]  SCr 3.45 [05-29 @ 05:43]    Tacrolimus (), Serum: 8.6 ng/mL (06-01 @ 08:23)  Tacrolimus (), Serum: 7.3 ng/mL (05-31 @ 07:56)  Tacrolimus (), Serum: 5.1 ng/mL (05-30 @ 06:59)  Tacrolimus (), Serum: 3.3 ng/mL (05-29 @ 07:49)            Urinalysis - [05-31-18 @ 03:49]      Color Red / Appearance Turbid / SG 1.016 / pH 5.0      Gluc Negative / Ketone Negative  / Bili Small / Urobili Negative       Blood Moderate / Protein 100 / Leuk Est Moderate / Nitrite Positive      RBC >720 / WBC 12 / Hyaline 0 / Gran  / Sq Epi  / Non Sq Epi 0 / Bacteria Negative      HbA1c 5.4      [05-27-18 @ 09:50]  TSH 1.35      [11-22-17 @ 03:20]  Lipid: chol 85, , HDL 28, LDL 38      [11-22-17 @ 03:20]

## 2018-06-01 NOTE — PROGRESS NOTE ADULT - SUBJECTIVE AND OBJECTIVE BOX
Learner: Patient and wife  Barriers: None    Patient received a renal transplant on 5/24/18    Method used: Verbal discussion and written material    Medication safety was discussed with the patient: allergies, herbals, adherence, interactions, medication precautions, miss dose instructions, purpose, side effects, signs and symptoms to report, and storage & handling    Patient was able to repeat and verbalize key points    Education Summary: Discharge immunosuppressant medications and prophylatic anti-infective agents reviewed with the patient. Outpatient medication schedule was discussed in detail including: medication name, indication, dose, administration times, treatment duration, side effects, drug interactions, and special instructions. Patient questions and concerns were answered and addressed. Patient demonstrated understanding.    Time spent discharge education: 60 minutes    Renal Transplant medications:  Tacrolimus adjusted to trough   Mycophenolate mofetil 1g BID  Prednisone taper to 5mg daily  Sulfamethoxazole/Trimethoprim 1SS daily  Nystatin swish and swallow four times a day  Valganciclovir 450 mg Mon and Thurs (until renal function improves)  Pepcid 20 mg daily  Docusate and Senna  Other meds: Lantus, Humalog, cipro,

## 2018-06-01 NOTE — PROGRESS NOTE ADULT - SUBJECTIVE AND OBJECTIVE BOX
INTERVAL HPI/OVERNIGHT EVENTS:  Patient seen with multidisciplinary team (Nephrologist, pharmacist, nurse, nurse manager, NP, MD surgeons )  in am rounds and examined with Dr. Dr. Murguia.  S/P HCV + DDRT 18, doing well, Hematuria resolved, making adequate urine, continue to hold aspirin.    MEDICATIONS  (STANDING):  ALBUTerol/ipratropium for Nebulization 3 milliLiter(s) Nebulizer every 6 hours  ciprofloxacin     Tablet 500 milliGRAM(s) Oral every 12 hours  famotidine    Tablet 20 milliGRAM(s) Oral daily  furosemide   Injectable 40 milliGRAM(s) IV Push once  insulin glargine Injectable (LANTUS) 12 Unit(s) SubCutaneous at bedtime  insulin lispro (HumaLOG) corrective regimen sliding scale   SubCutaneous three times a day before meals  insulin lispro (HumaLOG) corrective regimen sliding scale   SubCutaneous at bedtime  magnesium sulfate  IVPB 2 Gram(s) IV Intermittent once  mycophenolate mofetil 1000 milliGRAM(s) Oral two times a day  nystatin    Suspension [id number] Unit(s) Swish and Swallow four times a day  predniSONE   Tablet 5 milliGRAM(s) Oral daily  tacrolimus 7 milliGRAM(s) Oral two times a day  trimethoprim   80 mG/sulfamethoxazole 400 mG 1 Tablet(s) Oral daily  valGANciclovir 450 milliGRAM(s) Oral daily    MEDICATIONS  (PRN):  acetaminophen   Tablet. 975 milliGRAM(s) Oral every 6 hours PRN Mild Pain (1 - 3)  oxyCODONE    IR 5 milliGRAM(s) Oral every 4 hours PRN Moderate Pain (4 - 6)      Allergies    No Known Allergies    Intolerances        Vital Signs Last 24 Hrs  T(C): 36.7 (2018 05:34), Max: 37.1 (31 May 2018 16:00)  T(F): 98 (2018 05:34), Max: 98.7 (31 May 2018 16:00)  HR: 84 (2018 05:34) (83 - 94)  BP: 101/67 (2018 05:34) (100/61 - 149/90)  BP(mean): --  RR: 18 (2018 05:34) (18 - 18)  SpO2: 97% (2018 05:34) (95% - 100%)    LABS:                        9.9    4.4   )-----------( 169      ( 2018 05:53 )             30.5     06-    135  |  99  |  53<H>  ----------------------------<  101<H>  3.5   |  24  |  2.04<H>    Ca    7.3<L>      2018 05:53  Phos  2.2       Mg     1.8             Urinalysis Basic - ( 31 May 2018 03:49 )    Color: Red / Appearance: Turbid / S.016 / pH: x  Gluc: x / Ketone: Negative  / Bili: Small / Urobili: Negative   Blood: x / Protein: 100 / Nitrite: Positive   Leuk Esterase: Moderate / RBC: >720 /HPF / WBC 12 /HPF   Sq Epi: x / Non Sq Epi: 0 /HPF / Bacteria: Negative        RADIOLOGY & ADDITIONAL TESTS:    Review of systems  Gen: No weight changes, fatigue, fevers/chills, weakness  Skin: No rashes  Head/Eyes/Ears/Mouth: No headache; Normal hearing; Normal vision w/o blurriness; No sinus pain/discomfort, sore throat  Respiratory: No dyspnea, cough, wheezing, hemoptysis  CV: No chest pain, PND, orthopnea  GI: No abdominal pain, diarrhea, constipation, nausea, vomiting, melena, hematochezia  : No increased frequency, dysuria, hematuria, nocturia  MSK: No joint pain/swelling; no back pain; no edema  Neuro: No dizziness/lightheadedness, weakness, seizures, numbness, tingling  Heme: No easy bruising or bleeding  Endo: No heat/cold intolerance  Psych: No significant nervousness, anxiety, stress, depression  All other systems were reviewed and are negative, except as noted.    PHYSICAL EXAM:  Constitutional: Well developed / well nourished  Eyes: Anicteric, PERRLA  ENMT: nc/at  Neck: supple  Respiratory: CTA B/L  Cardiovascular: RRR  Gastrointestinal: Soft abdomen, mild tender to touch at surgical site, ND  Genitourinary: Voiding spontaneously  Extremities: SCD's in place and working bilaterally  Vascular: Palpable dp pulses bilaterally  Neurological: A&O x3  Skin: Mild serosanguinous hina on wound dressing no erythema and evidence of infection noted  Musculoskeletal: Moving all extremities  Psychiatric: Responsive

## 2018-06-01 NOTE — PROGRESS NOTE ADULT - ATTENDING COMMENTS
I personally saw and examined patient with transplant team.  IMMUNOSUPPRESSION:  No change in prograf dose

## 2018-06-02 VITALS
SYSTOLIC BLOOD PRESSURE: 118 MMHG | OXYGEN SATURATION: 100 % | DIASTOLIC BLOOD PRESSURE: 68 MMHG | HEART RATE: 80 BPM | RESPIRATION RATE: 18 BRPM | TEMPERATURE: 98 F

## 2018-06-02 LAB
ANION GAP SERPL CALC-SCNC: 12 MMOL/L — SIGNIFICANT CHANGE UP (ref 5–17)
BUN SERPL-MCNC: 47 MG/DL — HIGH (ref 7–23)
CALCIUM SERPL-MCNC: 7.4 MG/DL — LOW (ref 8.4–10.5)
CHLORIDE SERPL-SCNC: 102 MMOL/L — SIGNIFICANT CHANGE UP (ref 96–108)
CO2 SERPL-SCNC: 24 MMOL/L — SIGNIFICANT CHANGE UP (ref 22–31)
CREAT SERPL-MCNC: 2.07 MG/DL — HIGH (ref 0.5–1.3)
GLUCOSE SERPL-MCNC: 109 MG/DL — HIGH (ref 70–99)
HCT VFR BLD CALC: 29.7 % — LOW (ref 39–50)
HGB BLD-MCNC: 9.6 G/DL — LOW (ref 13–17)
MAGNESIUM SERPL-MCNC: 2 MG/DL — SIGNIFICANT CHANGE UP (ref 1.6–2.6)
MCHC RBC-ENTMCNC: 31.1 PG — SIGNIFICANT CHANGE UP (ref 27–34)
MCHC RBC-ENTMCNC: 32.2 GM/DL — SIGNIFICANT CHANGE UP (ref 32–36)
MCV RBC AUTO: 96.5 FL — SIGNIFICANT CHANGE UP (ref 80–100)
PHOSPHATE SERPL-MCNC: 2.3 MG/DL — LOW (ref 2.5–4.5)
PLATELET # BLD AUTO: 186 K/UL — SIGNIFICANT CHANGE UP (ref 150–400)
POTASSIUM SERPL-MCNC: 3.9 MMOL/L — SIGNIFICANT CHANGE UP (ref 3.5–5.3)
POTASSIUM SERPL-SCNC: 3.9 MMOL/L — SIGNIFICANT CHANGE UP (ref 3.5–5.3)
RBC # BLD: 3.08 M/UL — LOW (ref 4.2–5.8)
RBC # FLD: 13.6 % — SIGNIFICANT CHANGE UP (ref 10.3–14.5)
SODIUM SERPL-SCNC: 138 MMOL/L — SIGNIFICANT CHANGE UP (ref 135–145)
TACROLIMUS SERPL-MCNC: 10.9 NG/ML — SIGNIFICANT CHANGE UP
WBC # BLD: 4.3 K/UL — SIGNIFICANT CHANGE UP (ref 3.8–10.5)
WBC # FLD AUTO: 4.3 K/UL — SIGNIFICANT CHANGE UP (ref 3.8–10.5)

## 2018-06-02 PROCEDURE — 85027 COMPLETE CBC AUTOMATED: CPT

## 2018-06-02 PROCEDURE — 80048 BASIC METABOLIC PNL TOTAL CA: CPT

## 2018-06-02 PROCEDURE — 83036 HEMOGLOBIN GLYCOSYLATED A1C: CPT

## 2018-06-02 PROCEDURE — 86923 COMPATIBILITY TEST ELECTRIC: CPT

## 2018-06-02 PROCEDURE — 71045 X-RAY EXAM CHEST 1 VIEW: CPT

## 2018-06-02 PROCEDURE — 80197 ASSAY OF TACROLIMUS: CPT

## 2018-06-02 PROCEDURE — P9016: CPT

## 2018-06-02 PROCEDURE — 85014 HEMATOCRIT: CPT

## 2018-06-02 PROCEDURE — 83615 LACTATE (LD) (LDH) ENZYME: CPT

## 2018-06-02 PROCEDURE — 76776 US EXAM K TRANSPL W/DOPPLER: CPT

## 2018-06-02 PROCEDURE — 86900 BLOOD TYPING SEROLOGIC ABO: CPT

## 2018-06-02 PROCEDURE — 87902 NFCT AGT GNTYP ALYS HEP C: CPT

## 2018-06-02 PROCEDURE — 94640 AIRWAY INHALATION TREATMENT: CPT

## 2018-06-02 PROCEDURE — 80053 COMPREHEN METABOLIC PANEL: CPT

## 2018-06-02 PROCEDURE — 82435 ASSAY OF BLOOD CHLORIDE: CPT

## 2018-06-02 PROCEDURE — 84132 ASSAY OF SERUM POTASSIUM: CPT

## 2018-06-02 PROCEDURE — 99261: CPT

## 2018-06-02 PROCEDURE — 81001 URINALYSIS AUTO W/SCOPE: CPT

## 2018-06-02 PROCEDURE — 87521 HEPATITIS C PROBE&RVRS TRNSC: CPT

## 2018-06-02 PROCEDURE — 82553 CREATINE MB FRACTION: CPT

## 2018-06-02 PROCEDURE — 87086 URINE CULTURE/COLONY COUNT: CPT

## 2018-06-02 PROCEDURE — 82565 ASSAY OF CREATININE: CPT

## 2018-06-02 PROCEDURE — C8929: CPT

## 2018-06-02 PROCEDURE — 82550 ASSAY OF CK (CPK): CPT

## 2018-06-02 PROCEDURE — 99232 SBSQ HOSP IP/OBS MODERATE 35: CPT | Mod: 24,GC

## 2018-06-02 PROCEDURE — 86850 RBC ANTIBODY SCREEN: CPT

## 2018-06-02 PROCEDURE — 36430 TRANSFUSION BLD/BLD COMPNT: CPT

## 2018-06-02 PROCEDURE — 87522 HEPATITIS C REVRS TRNSCRPJ: CPT

## 2018-06-02 PROCEDURE — 93005 ELECTROCARDIOGRAM TRACING: CPT

## 2018-06-02 PROCEDURE — 83605 ASSAY OF LACTIC ACID: CPT

## 2018-06-02 PROCEDURE — 84295 ASSAY OF SERUM SODIUM: CPT

## 2018-06-02 PROCEDURE — 84100 ASSAY OF PHOSPHORUS: CPT

## 2018-06-02 PROCEDURE — 82947 ASSAY GLUCOSE BLOOD QUANT: CPT

## 2018-06-02 PROCEDURE — 82330 ASSAY OF CALCIUM: CPT

## 2018-06-02 PROCEDURE — 82803 BLOOD GASES ANY COMBINATION: CPT

## 2018-06-02 PROCEDURE — 85610 PROTHROMBIN TIME: CPT

## 2018-06-02 PROCEDURE — 84484 ASSAY OF TROPONIN QUANT: CPT

## 2018-06-02 PROCEDURE — 86901 BLOOD TYPING SEROLOGIC RH(D): CPT

## 2018-06-02 PROCEDURE — 85730 THROMBOPLASTIN TIME PARTIAL: CPT

## 2018-06-02 PROCEDURE — 82962 GLUCOSE BLOOD TEST: CPT

## 2018-06-02 PROCEDURE — 97161 PT EVAL LOW COMPLEX 20 MIN: CPT

## 2018-06-02 PROCEDURE — 83735 ASSAY OF MAGNESIUM: CPT

## 2018-06-02 RX ORDER — CIPROFLOXACIN LACTATE 400MG/40ML
1 VIAL (ML) INTRAVENOUS
Qty: 0 | Refills: 0 | COMMUNITY
Start: 2018-06-02

## 2018-06-02 RX ORDER — FUROSEMIDE 40 MG
1 TABLET ORAL
Qty: 30 | Refills: 0 | OUTPATIENT
Start: 2018-06-02 | End: 2018-07-01

## 2018-06-02 RX ORDER — INSULIN GLARGINE 100 [IU]/ML
8 INJECTION, SOLUTION SUBCUTANEOUS
Qty: 0 | Refills: 0 | COMMUNITY

## 2018-06-02 RX ORDER — INSULIN GLARGINE 100 [IU]/ML
8 INJECTION, SOLUTION SUBCUTANEOUS
Qty: 0 | Refills: 0 | COMMUNITY
Start: 2018-06-02

## 2018-06-02 RX ORDER — TACROLIMUS 5 MG/1
6 CAPSULE ORAL
Qty: 0 | Refills: 0 | COMMUNITY
Start: 2018-06-02

## 2018-06-02 RX ORDER — MAGNESIUM SULFATE 500 MG/ML
1 VIAL (ML) INJECTION ONCE
Qty: 0 | Refills: 0 | Status: COMPLETED | OUTPATIENT
Start: 2018-06-02 | End: 2018-06-02

## 2018-06-02 RX ORDER — TACROLIMUS 5 MG/1
6 CAPSULE ORAL EVERY 12 HOURS
Qty: 0 | Refills: 0 | Status: DISCONTINUED | OUTPATIENT
Start: 2018-06-02 | End: 2018-06-02

## 2018-06-02 RX ADMIN — Medication 3 MILLILITER(S): at 12:20

## 2018-06-02 RX ADMIN — Medication 500000 UNIT(S): at 12:20

## 2018-06-02 RX ADMIN — Medication 500 MILLIGRAM(S): at 06:16

## 2018-06-02 RX ADMIN — TACROLIMUS 7 MILLIGRAM(S): 5 CAPSULE ORAL at 06:16

## 2018-06-02 RX ADMIN — Medication 1 TABLET(S): at 12:21

## 2018-06-02 RX ADMIN — MYCOPHENOLATE MOFETIL 1000 MILLIGRAM(S): 250 CAPSULE ORAL at 06:16

## 2018-06-02 RX ADMIN — Medication 975 MILLIGRAM(S): at 12:21

## 2018-06-02 RX ADMIN — Medication 2: at 12:23

## 2018-06-02 RX ADMIN — Medication 500000 UNIT(S): at 06:22

## 2018-06-02 RX ADMIN — Medication 3 MILLILITER(S): at 06:16

## 2018-06-02 RX ADMIN — FAMOTIDINE 20 MILLIGRAM(S): 10 INJECTION INTRAVENOUS at 12:21

## 2018-06-02 RX ADMIN — Medication 5 MILLIGRAM(S): at 06:16

## 2018-06-02 RX ADMIN — Medication 975 MILLIGRAM(S): at 13:07

## 2018-06-02 RX ADMIN — VALGANCICLOVIR 450 MILLIGRAM(S): 450 TABLET, FILM COATED ORAL at 12:21

## 2018-06-02 RX ADMIN — Medication 100 GRAM(S): at 10:55

## 2018-06-02 NOTE — PROGRESS NOTE ADULT - SUBJECTIVE AND OBJECTIVE BOX
INTERVAL HPI/OVERNIGHT EVENTS:  Patient seen with multidisciplinary team (Nephrologist, pharmacist, nurse, nurse manager, NP, MD surgeons )  in am rounds and examined with Dr. Dr. Murguia.  S/P HCV + DDRT 18, doing well, Hematuria resolved, denies burning with urination, making adequate urine, Creatinine stable. Continue to hold aspirin.      MEDICATIONS  (STANDING):  ALBUTerol/ipratropium for Nebulization 3 milliLiter(s) Nebulizer every 6 hours  ciprofloxacin     Tablet 500 milliGRAM(s) Oral every 12 hours  famotidine    Tablet 20 milliGRAM(s) Oral daily  insulin glargine Injectable (LANTUS) 8 Unit(s) SubCutaneous at bedtime  insulin lispro (HumaLOG) corrective regimen sliding scale   SubCutaneous three times a day before meals  insulin lispro (HumaLOG) corrective regimen sliding scale   SubCutaneous at bedtime  magnesium sulfate  IVPB 1 Gram(s) IV Intermittent once  mycophenolate mofetil 1000 milliGRAM(s) Oral two times a day  nystatin    Suspension 893107 Unit(s) Swish and Swallow four times a day  predniSONE   Tablet 5 milliGRAM(s) Oral daily  tacrolimus 7 milliGRAM(s) Oral two times a day  trimethoprim   80 mG/sulfamethoxazole 400 mG 1 Tablet(s) Oral daily  valGANciclovir 450 milliGRAM(s) Oral daily    MEDICATIONS  (PRN):  acetaminophen   Tablet. 975 milliGRAM(s) Oral every 6 hours PRN Mild Pain (1 - 3)      Allergies    No Known Allergies    Intolerances        Vital Signs Last 24 Hrs  T(C): 36.6 (2018 09:24), Max: 36.9 (2018 21:36)  T(F): 97.8 (2018 09:24), Max: 98.4 (2018 21:36)  HR: 80 (2018 09:24) (80 - 99)  BP: 103/60 (2018 09:24) (102/65 - 131/77)  BP(mean): --  RR: 18 (2018 09:24) (18 - 18)  SpO2: 100% (2018 09:24) (95% - 100%)    LABS:                        9.6    4.3   )-----------( 186      ( 2018 05:56 )             29.7     06-02    138  |  102  |  47<H>  ----------------------------<  109<H>  3.9   |  24  |  2.07<H>    Ca    7.4<L>      2018 05:56  Phos  2.3     06-  Mg     2.0     -            RADIOLOGY & ADDITIONAL TESTS:    Color: Red / Appearance: Turbid / S.016 / pH: x  Gluc: x / Ketone: Negative  / Bili: Small / Urobili: Negative   Blood: x / Protein: 100 / Nitrite: Positive   Leuk Esterase: Moderate / RBC: >720 /HPF / WBC 12 /HPF   Sq Epi: x / Non Sq Epi: 0 /HPF / Bacteria: Negative    Review of systems  Gen: No weight changes, fatigue, fevers/chills, weakness  Skin: No rashes  Head/Eyes/Ears/Mouth: No headache; Normal hearing; Normal vision w/o blurriness; No sinus pain/discomfort, sore throat  Respiratory: No dyspnea, cough, wheezing, hemoptysis  CV: No chest pain, PND, orthopnea  GI: No abdominal pain, diarrhea, constipation, nausea, vomiting, melena, hematochezia  : No increased frequency, dysuria, hematuria, nocturia  MSK: No joint pain/swelling; no back pain; no edema  Neuro: No dizziness/lightheadedness, weakness, seizures, numbness, tingling  Heme: No easy bruising or bleeding  Endo: No heat/cold intolerance  Psych: No significant nervousness, anxiety, stress, depression  All other systems were reviewed and are negative, except as noted.    PHYSICAL EXAM:  Constitutional: Well developed / well nourished  Eyes: Anicteric, PERRLA  ENMT: nc/at  Neck: supple  Respiratory: CTA B/L  Cardiovascular: RRR  Gastrointestinal: Soft abdomen, mild tender to touch at surgical site, ND  Genitourinary: Voiding spontaneously  Extremities: SCD's in place and working bilaterally  Vascular: Palpable dp pulses bilaterally  Neurological: A&O x3  Skin: Small amount serous fluid draining from abdominal incision. Staples patent with no erythema and evidence of infection noted  Musculoskeletal: Moving all extremities  Psychiatric: Responsive

## 2018-06-02 NOTE — PROGRESS NOTE ADULT - PROBLEM SELECTOR PROBLEM 2
Immunosuppressive management encounter following kidney transplant
Coronary artery disease involving coronary bypass graft
Immunosuppression
Immunosuppressive management encounter following kidney transplant
Immunosuppression

## 2018-06-02 NOTE — PROGRESS NOTE ADULT - PROBLEM SELECTOR PLAN 2
On Tacrolimus, cellcept, steroid taper, Simulect induction, Nystatin S&S, bactrim, and Valcyte   F/U Tacrolimus level daily, tacro goal 8-10  Continue PRN Tylenol and Percocet for pain, Monitor and manage pain  Adequate urine output,  On bowel regimen  Monitor closely.
- On Tacrolimus, Cellcept, steroid taper, Simulect induction, Nystatin S&S, bactrim, and Valcyte   - F/U Tacrolimus level daily, tacro goal 8-10  - Continue PRN Tylenol and Percocet for pain, Monitor and manage pain  - 1gm MgSO4 today
On Tacrolimus, Cellcept, steroid taper, Simulect induction, Nystatin S&S, bactrim, and Valcyte   F/U Tacrolimus level daily, tacro goal 8-10  Continue PRN Tylenol and Percocet for pain, Monitor and manage pain  On bowel regimen  Monitor closely.
On nystatin, bactrim, valcyte, cellcept, steroid taper, tacrolimus  - FU Daily tacro levels, goal 8-10.   Tacro level 5.1 today. Continue Tacro 7mg BID.  -Requiring Lantus Qhs and SSI while on steroid taper.  Will need home health health eval for insulin injections.
On nystatin, bactrim, valcyte, cellcept, steroid taper, tacrolimus  -FU Daily tacro levels, goal 8-10  - Simulect dose due POD#4
On nystatin, bactrim, valcyte, cellcept, steroid taper, tacrolimus  -FU Daily tacro levels, goal 8-10  - Simulect dose due POD#4.
Pt s/p simulect induction. Now on tacrolimus, cellcept and steriod taper.   Monitor tacrolimus 12 hour trough
Pt s/p simulect induction. Now on tacrolimus, cellcept and steroid taper.   Monitor tacrolimus trough.
Will hold asa and plavix for OR  Will reassess in am with cardiology on resatrt date for plavix and or asa
On nystatin, bactrim, valcyte, cellcept, steroid taper, tacrolimus  - FU Daily tacro levels, goal 8-10.   Increase Tacro to 7mg BID today

## 2018-06-02 NOTE — PROGRESS NOTE ADULT - ASSESSMENT
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 Donor: KDPI 69%, HCV Ab+  HCV ANITA +, Terminal creatinine 1.2, CMV+ / EBV+ / HBcAb+, Left kidney, 3 arteries, 1 vein, 1 ureter, HLA 6/6 mismatch (2,2,2), CPRA 0%, Virtual crossmatch negative.  Recipient: CMV+/EBV +. Cold ischemia time: 23 hs  43 mins, Warm ischemia time: 44 minutes. Weight of the kidney: 250 grams.  Post-operative course complicated by delayed graft function requiring 2 sessions of HD and brief vasopressor support  in the SICU.  Currently, doing well with stable creatinine and good spontaneous UOP. Aspirin continues to be held and hematuria has completely resolved.  Discharge plan for today with home health assistance for insulin injection education.

## 2018-06-02 NOTE — PROGRESS NOTE ADULT - PROBLEM SELECTOR PLAN 3
Continue ASA 81mg. On Plavix 75 and  at home.
Continue to hold aspirin in the setting of hematuria
- Some hematuria noted today.  Continue ASA 81mg for now.  -On Plavix 75 and  at home.
BP stable, continue to monitor
Continue protonix QD
Currently requiring Levophed for hypotenion., Wean as tolerated for SBP >110
Pt requiring insulin lantus and ISS. Blood sugar dropping - decrease lantus to 8 units.
Pt requiring insulin lantus and ISS. Controlled on current regimen. Monitor fingersticks
Pt requiring insulin lantus and ISS. Controlled on current regimen. Monitor fingersticks
Started on Insulin post op, continue Lantus 12unit @ , teaching in progress  Continue Humalog sliding scale and monitor BG  Diabetic diet
Started on Insulin post op, on steroid taper.   Continue Lantus 8unit @HS , teaching in progress  Continue Humalog sliding scale and monitor BG  Diabetic diet.

## 2018-06-02 NOTE — PROGRESS NOTE ADULT - ATTENDING COMMENTS
Tacrolimus level 10.9 today from 8.6 from 7.3. Will decrease Tacrolimus dose to 6mg BID. Send home on Lasix 20mg daily and to complete 7 day course Cipro. JUSTINA in clinic Tuesday 6/5/18 I personally saw and examined patient with transplant team.  IMMUNOSUPPRESSION:  Tacrolimus level 10.9 today from 8.6 from 7.3. Will decrease Tacrolimus dose to 6mg BID. Send home on Lasix 20mg daily and to complete 7 day course Cipro. FU in clinic Tuesday 6/5/18 I personally saw and examined patient with transplant team.  Patient understands and agrees with discharge.  IMMUNOSUPPRESSION:  Tacrolimus level 10.9 today from 8.6 from 7.3. Will decrease Tacrolimus dose to 6mg BID. Send home on Lasix 20mg daily and to complete 7 day course Cippatsy HORN in clinic Tuesday 6/5/18

## 2018-06-02 NOTE — PROGRESS NOTE ADULT - PROBLEM SELECTOR PROBLEM 1
Kidney transplant recipient
Immunosuppressive management encounter following kidney transplant
Immunosuppressive management encounter following kidney transplant
Kidney transplant candidate
Kidney transplant recipient
PCK (Polycystic Kidney Disease)
Kidney transplant recipient

## 2018-06-02 NOTE — PROGRESS NOTE ADULT - PROBLEM SELECTOR PLAN 1
POD#5 DDRT from HCV + donor  - Creatinine stable. Making urine 50ml/hr. Last HD session 5/27.   - Hematuria presist with some clots able to pass urine without difficulty will monitor continue to hold aspirin  - FU HCV RNA and genotype. Sent Sunday 5/27, treatment to start in outpatient  - Oral diet as tolerated. PPI   - On bowel regimen.
On prograf, cellcept, steroids.
POD#1 DDRT from HCV + donor  - Oliguric with creatinine 7.37  - Renal ultrasound today  - F/U 12 noon Creatinine and K.  - Keep medina.  Strict I/Os  -Send HCV RNA and genotype on Sunday 5/27 - ordered  - DC IVF   - DC PCA and start oral analgesics  - Increase diet as tolerated. PPI   - Start bowel regimen
POD#4 DDRT from HCV + donor  - Making urine 30-40ml/hr, s/p HD yesterday  - Keep medina.  Strict I/Os  -Send HCV RNA and genotype on Sunday 5/27  - Oral diet as tolerated. PPI   - On bowel regimen.
POD#6 DDRT from HCV + donor  - Creatinine downtrending with good spontaneous uop. Last HD session 5/27.   - Lasix 40mg x1 today.  - Strict I/Os. Encourage hourly voiding  - FU HCV RNA and genotype.   - Oral diet as tolerated. PPI   - Hold bowel regimen for now due frequent stools.
Plan for DDRT today  Simulect, methylpredisolone and Ancef on call to OR.   NPO since MN
Pt had a DDRT on 5/24/18. Complicated by DGF secondary to ATN but now creatinine decreasing.  Hematuria cleared, cont to hold aspirin for now.  Has edema - lasix 40mgs IV x 1 today.  Possible d/c home tomorrow.
Pt had a DDRT on 5/24/18. Pt oliguric requiring pressor support for BP in SICU  Monitor Serum potassium and volume status. Possible dialysis later today. BMP at noon.
Pt had a DDRT on 5/24/18. Pt oliguric requiring pressor support for BP in SICU. Now off pressors. S/p HD on 5/25 for hyperK. K now improved. Only 190cc urine output.   - No plans for HD today.   - Monitor serum potassium.  - Monitor volume status.  - Monitor urine output.
Pt had a DDRT on 5/24/18. Pt oliguric requiring pressor support for BP in SICU. Now off pressors. S/p HD on 5/25 for hyperK. K now improved. S/p HD 5/27 for SOB/volume removal. UP now improved. Pt denying any SOB.    - No plan for HD today.   - Monitor serum potassium.  - Monitor volume status.  - Monitor urine output.
Pt had a DDRT on 5/24/18. Pt oliguric requiring pressor support for BP in SICU. Now off pressors. S/p HD on 5/25 for hyperK. K now improved. making 30cc/hr. More SOB and edematous today.  - Will plan for HD with UF today. Will place to remove 1.5 to 2kg. Will avoid hypotension in this transplant pt however.   - Monitor serum potassium.  - Monitor volume status.  - Monitor urine output.
Pt had a DDRT on 5/24/18. Pt was oliguric requiring pressor support for BP post operatively. Pt with DGF S/p HD on 5/25 and 5/27. UP now improved and pt non-oliguric.   - No plan for further HD.   - Monitor serum potassium.  - Monitor volume status.  - Monitor urine output.
Pt had a DDRT on 5/24/18. Pt was oliguric requiring pressor support for BP post operatively. Pt with DGF S/p HD on 5/25 and 5/27. UP now improved and pt non-oliguric. Plan for medina catheter removal today.   - No plan for HD today.   - Monitor serum potassium.  - Monitor volume status.  - Monitor urine output.
Pt had a DDRT on 5/24/18. Pt was oliguric requiring pressor support for BP post operatively. Pt with DGF S/p HD on 5/25 and 5/27. UP now improved and pt non-oliguric. Pt with blood clots/ red urine. Follow up urine culture to r/o infection   - No plan for further HD.   - Monitor serum potassium.  - Monitor volume status.  - Monitor urine output.
Pt with hx of ESRD on HD planned for DDRT from Hep C positive donor today. Last HD was 5/23/18 prior to OR for optimization. Monitor BMP.
S/P DDRT from HCV + donor 5/23/18  - Creatinine trending down. Last HD session 5/27.   - Hematuria resolved, passes urine without difficulty will monitor continue to hold aspirin  Continue Cipro 500mg BID for total of 7days for UTI  Lasix 40mg IV x1 and plan to discharge home on lasix PO  - FU HCV RNA and genotype. Sent Sunday 5/27, treatment to start in outpatient  - Oral diet as tolerated. PPI
S/P DDRT from HCV + donor 5/24/18  - Creatinine trending down. Last HD session 5/27.   - Hematuria resolved, passes urine without difficulty. Continue to hold aspirin  Continue Cipro 500mg BID for total of 7days for UTI  - FU HCV RNA and genotype. Treatment to start in outpatient  - Oral diet as tolerated. PPI.   DC home today
POD#5 DDRT from HCV + donor  - Creatinine stable. Making urine 50ml/hr. Last HD session 5/27.   - Lasix 80mg x1 today.  - Mg replacement today  - Remove medina today.  Strict I/Os. Encourage hourly voiding  - FU HCV RNA and genotype. Sent Sunday 5/27  - Oral diet as tolerated. PPI   - On bowel regimen.

## 2018-06-02 NOTE — PROGRESS NOTE ADULT - PROBLEM SELECTOR PROBLEM 3
Coronary artery disease involving coronary bypass graft
Coronary artery disease involving coronary bypass graft
Diabetes mellitus of other type without complication
Gastroesophageal reflux disease without esophagitis
HTN (hypertension)
HTN (hypertension)
Coronary artery disease involving coronary bypass graft

## 2018-06-05 ENCOUNTER — APPOINTMENT (OUTPATIENT)
Dept: TRANSPLANT | Facility: CLINIC | Age: 58
End: 2018-06-05
Payer: MEDICAID

## 2018-06-05 VITALS
HEIGHT: 65 IN | BODY MASS INDEX: 33.32 KG/M2 | HEART RATE: 101 BPM | RESPIRATION RATE: 16 BRPM | WEIGHT: 200 LBS | TEMPERATURE: 97.7 F | OXYGEN SATURATION: 99 % | SYSTOLIC BLOOD PRESSURE: 118 MMHG | DIASTOLIC BLOOD PRESSURE: 70 MMHG

## 2018-06-05 PROCEDURE — 99213 OFFICE O/P EST LOW 20 MIN: CPT | Mod: 24

## 2018-06-05 RX ORDER — ISOPROPYL ALCOHOL 0.7 ML/ML
SWAB TOPICAL
Qty: 1 | Refills: 11 | Status: DISCONTINUED | COMMUNITY
Start: 2018-05-29 | End: 2018-06-05

## 2018-06-05 RX ORDER — DOCUSATE SODIUM 100 MG/1
100 CAPSULE, LIQUID FILLED ORAL
Qty: 60 | Refills: 1 | Status: DISCONTINUED | COMMUNITY
Start: 2018-05-29 | End: 2018-06-05

## 2018-06-05 RX ORDER — TACROLIMUS 0.5 MG/1
0.5 CAPSULE ORAL
Qty: 60 | Refills: 11 | Status: DISCONTINUED | COMMUNITY
Start: 2018-05-29 | End: 2018-06-05

## 2018-06-05 RX ORDER — ALBUTEROL SULFATE 2.5 MG/3ML
(2.5 MG/3ML) SOLUTION RESPIRATORY (INHALATION)
Qty: 150 | Refills: 0 | Status: DISCONTINUED | COMMUNITY
Start: 2016-09-21 | End: 2018-06-05

## 2018-06-05 RX ORDER — SENNOSIDES 8.6 MG
8.6 TABLET ORAL
Qty: 30 | Refills: 1 | Status: DISCONTINUED | COMMUNITY
Start: 2018-05-29 | End: 2018-06-05

## 2018-06-07 ENCOUNTER — APPOINTMENT (OUTPATIENT)
Dept: TRANSPLANT | Facility: CLINIC | Age: 58
End: 2018-06-07

## 2018-06-07 LAB — BACTERIA UR CULT: NORMAL

## 2018-06-07 RX ORDER — TACROLIMUS 1 MG/1
1 CAPSULE ORAL
Qty: 120 | Refills: 11 | Status: DISCONTINUED | COMMUNITY
Start: 2018-05-29 | End: 2018-06-07

## 2018-06-08 LAB
ALBUMIN SERPL ELPH-MCNC: 3.2 G/DL
ALP BLD-CCNC: 44 U/L
ALT SERPL-CCNC: 90 U/L
ANION GAP SERPL CALC-SCNC: 13 MMOL/L
APPEARANCE: CLEAR
AST SERPL-CCNC: 95 U/L
BACTERIA: NEGATIVE
BASOPHILS # BLD AUTO: 0.01 K/UL
BASOPHILS NFR BLD AUTO: 0.3 %
BILIRUB SERPL-MCNC: 0.3 MG/DL
BILIRUBIN URINE: NEGATIVE
BLOOD URINE: ABNORMAL
BUN SERPL-MCNC: 25 MG/DL
CALCIUM SERPL-MCNC: 8.1 MG/DL
CHLORIDE SERPL-SCNC: 104 MMOL/L
CMV DNA SPEC QL NAA+PROBE: NOT DETECTED IU/ML
CO2 SERPL-SCNC: 20 MMOL/L
COLOR: YELLOW
CREAT SERPL-MCNC: 1.47 MG/DL
CREAT SPEC-SCNC: 139 MG/DL
CREAT/PROT UR: 0.1 RATIO
EOSINOPHIL # BLD AUTO: 0.12 K/UL
EOSINOPHIL NFR BLD AUTO: 3.3 %
GLUCOSE QUALITATIVE U: NEGATIVE MG/DL
GLUCOSE SERPL-MCNC: 105 MG/DL
HCT VFR BLD CALC: 31.7 %
HGB BLD-MCNC: 9.4 G/DL
IMM GRANULOCYTES NFR BLD AUTO: 0.3 %
KETONES URINE: NEGATIVE
LDH SERPL-CCNC: 368 U/L
LEUKOCYTE ESTERASE URINE: NEGATIVE
LYMPHOCYTES # BLD AUTO: 0.65 K/UL
LYMPHOCYTES NFR BLD AUTO: 17.8 %
MAGNESIUM SERPL-MCNC: 1.4 MG/DL
MAN DIFF?: NORMAL
MCHC RBC-ENTMCNC: 28.7 PG
MCHC RBC-ENTMCNC: 29.7 GM/DL
MCV RBC AUTO: 96.6 FL
MICROSCOPIC-UA: NORMAL
MONOCYTES # BLD AUTO: 0.3 K/UL
MONOCYTES NFR BLD AUTO: 8.2 %
NEUTROPHILS # BLD AUTO: 2.57 K/UL
NEUTROPHILS NFR BLD AUTO: 70.1 %
NITRITE URINE: NEGATIVE
PH URINE: 5
PHOSPHATE SERPL-MCNC: 2 MG/DL
PLATELET # BLD AUTO: 173 K/UL
POTASSIUM SERPL-SCNC: 4.3 MMOL/L
PROT SERPL-MCNC: 6.1 G/DL
PROT UR-MCNC: 14 MG/DL
PROTEIN URINE: ABNORMAL MG/DL
RBC # BLD: 3.28 M/UL
RBC # FLD: 15.1 %
RED BLOOD CELLS URINE: 16 /HPF
SODIUM SERPL-SCNC: 137 MMOL/L
SPECIFIC GRAVITY URINE: 1.02
SQUAMOUS EPITHELIAL CELLS: 1 /HPF
TACROLIMUS SERPL-MCNC: 11.9 NG/ML
URATE SERPL-MCNC: 9.1 MG/DL
UROBILINOGEN URINE: NEGATIVE MG/DL
WBC # FLD AUTO: 3.66 K/UL
WHITE BLOOD CELLS URINE: 2 /HPF

## 2018-06-14 ENCOUNTER — EMERGENCY (EMERGENCY)
Facility: HOSPITAL | Age: 58
LOS: 1 days | Discharge: HOME | End: 2018-06-14
Attending: EMERGENCY MEDICINE
Payer: MEDICAID

## 2018-06-14 ENCOUNTER — APPOINTMENT (OUTPATIENT)
Dept: NEPHROLOGY | Facility: CLINIC | Age: 58
End: 2018-06-14
Payer: COMMERCIAL

## 2018-06-14 ENCOUNTER — LABORATORY RESULT (OUTPATIENT)
Age: 58
End: 2018-06-14

## 2018-06-14 VITALS
HEART RATE: 84 BPM | SYSTOLIC BLOOD PRESSURE: 120 MMHG | WEIGHT: 181 LBS | BODY MASS INDEX: 32.89 KG/M2 | RESPIRATION RATE: 16 BRPM | TEMPERATURE: 98.2 F | DIASTOLIC BLOOD PRESSURE: 80 MMHG | HEIGHT: 62.25 IN

## 2018-06-14 VITALS
OXYGEN SATURATION: 97 % | HEART RATE: 70 BPM | TEMPERATURE: 100 F | SYSTOLIC BLOOD PRESSURE: 99 MMHG | DIASTOLIC BLOOD PRESSURE: 64 MMHG | RESPIRATION RATE: 18 BRPM

## 2018-06-14 VITALS
OXYGEN SATURATION: 98 % | HEART RATE: 76 BPM | TEMPERATURE: 98 F | DIASTOLIC BLOOD PRESSURE: 76 MMHG | SYSTOLIC BLOOD PRESSURE: 121 MMHG | RESPIRATION RATE: 16 BRPM

## 2018-06-14 DIAGNOSIS — I77.0 ARTERIOVENOUS FISTULA, ACQUIRED: Chronic | ICD-10-CM

## 2018-06-14 DIAGNOSIS — Z95.1 PRESENCE OF AORTOCORONARY BYPASS GRAFT: Chronic | ICD-10-CM

## 2018-06-14 DIAGNOSIS — Z95.5 PRESENCE OF CORONARY ANGIOPLASTY IMPLANT AND GRAFT: Chronic | ICD-10-CM

## 2018-06-14 DIAGNOSIS — Z98.890 OTHER SPECIFIED POSTPROCEDURAL STATES: Chronic | ICD-10-CM

## 2018-06-14 DIAGNOSIS — E83.42 HYPOMAGNESEMIA: ICD-10-CM

## 2018-06-14 LAB
ALBUMIN SERPL ELPH-MCNC: 3.6 G/DL — SIGNIFICANT CHANGE UP (ref 3.3–5)
ALP SERPL-CCNC: 71 U/L — SIGNIFICANT CHANGE UP (ref 40–120)
ALT FLD-CCNC: 78 U/L — HIGH (ref 10–45)
ANION GAP SERPL CALC-SCNC: 13 MMOL/L — SIGNIFICANT CHANGE UP (ref 5–17)
APPEARANCE UR: CLEAR — SIGNIFICANT CHANGE UP
AST SERPL-CCNC: 72 U/L — HIGH (ref 10–40)
BACTERIA # UR AUTO: ABNORMAL /HPF
BASE EXCESS BLDV CALC-SCNC: -3.5 MMOL/L — LOW (ref -2–2)
BASOPHILS # BLD AUTO: 0 K/UL — SIGNIFICANT CHANGE UP (ref 0–0.2)
BASOPHILS NFR BLD AUTO: 1.8 % — SIGNIFICANT CHANGE UP (ref 0–2)
BILIRUB SERPL-MCNC: 0.4 MG/DL — SIGNIFICANT CHANGE UP (ref 0.2–1.2)
BILIRUB UR-MCNC: NEGATIVE — SIGNIFICANT CHANGE UP
BUN SERPL-MCNC: 19 MG/DL — SIGNIFICANT CHANGE UP (ref 7–23)
CA-I SERPL-SCNC: 1.06 MMOL/L — LOW (ref 1.12–1.3)
CALCIUM SERPL-MCNC: 7.8 MG/DL — LOW (ref 8.4–10.5)
CHLORIDE BLDV-SCNC: 103 MMOL/L — SIGNIFICANT CHANGE UP (ref 96–108)
CHLORIDE SERPL-SCNC: 99 MMOL/L — SIGNIFICANT CHANGE UP (ref 96–108)
CO2 BLDV-SCNC: 23 MMOL/L — SIGNIFICANT CHANGE UP (ref 22–30)
CO2 SERPL-SCNC: 21 MMOL/L — LOW (ref 22–31)
COLOR SPEC: YELLOW — SIGNIFICANT CHANGE UP
CREAT SERPL-MCNC: 1.4 MG/DL — HIGH (ref 0.5–1.3)
DIFF PNL FLD: NEGATIVE — SIGNIFICANT CHANGE UP
EOSINOPHIL # BLD AUTO: 0 K/UL — SIGNIFICANT CHANGE UP (ref 0–0.5)
EOSINOPHIL NFR BLD AUTO: 1.7 % — SIGNIFICANT CHANGE UP (ref 0–6)
EPI CELLS # UR: SIGNIFICANT CHANGE UP /HPF
GAS PNL BLDV: 130 MMOL/L — LOW (ref 136–145)
GAS PNL BLDV: SIGNIFICANT CHANGE UP
GAS PNL BLDV: SIGNIFICANT CHANGE UP
GLUCOSE BLDV-MCNC: 133 MG/DL — HIGH (ref 70–99)
GLUCOSE SERPL-MCNC: 136 MG/DL — HIGH (ref 70–99)
GLUCOSE UR QL: NEGATIVE — SIGNIFICANT CHANGE UP
HCO3 BLDV-SCNC: 22 MMOL/L — SIGNIFICANT CHANGE UP (ref 21–29)
HCT VFR BLD CALC: 38.2 % — LOW (ref 39–50)
HCT VFR BLDA CALC: 36 % — LOW (ref 39–50)
HGB BLD CALC-MCNC: 11.6 G/DL — LOW (ref 13–17)
HGB BLD-MCNC: 12.2 G/DL — LOW (ref 13–17)
HYPOCHROMIA BLD QL: SLIGHT — SIGNIFICANT CHANGE UP
KETONES UR-MCNC: NEGATIVE — SIGNIFICANT CHANGE UP
LACTATE BLDV-MCNC: 1.1 MMOL/L — SIGNIFICANT CHANGE UP (ref 0.7–2)
LEUKOCYTE ESTERASE UR-ACNC: NEGATIVE — SIGNIFICANT CHANGE UP
LYMPHOCYTES # BLD AUTO: 0.5 K/UL — LOW (ref 1–3.3)
LYMPHOCYTES # BLD AUTO: 18.2 % — SIGNIFICANT CHANGE UP (ref 13–44)
MAGNESIUM SERPL-MCNC: 1 MG/DL — CRITICAL LOW (ref 1.6–2.6)
MCHC RBC-ENTMCNC: 30.9 PG — SIGNIFICANT CHANGE UP (ref 27–34)
MCHC RBC-ENTMCNC: 31.9 GM/DL — LOW (ref 32–36)
MCV RBC AUTO: 96.7 FL — SIGNIFICANT CHANGE UP (ref 80–100)
MONOCYTES # BLD AUTO: 0.2 K/UL — SIGNIFICANT CHANGE UP (ref 0–0.9)
MONOCYTES NFR BLD AUTO: 9.6 % — SIGNIFICANT CHANGE UP (ref 2–14)
NEUTROPHILS # BLD AUTO: 1.8 K/UL — SIGNIFICANT CHANGE UP (ref 1.8–7.4)
NEUTROPHILS NFR BLD AUTO: 68.7 % — SIGNIFICANT CHANGE UP (ref 43–77)
NITRITE UR-MCNC: NEGATIVE — SIGNIFICANT CHANGE UP
PCO2 BLDV: 44 MMHG — SIGNIFICANT CHANGE UP (ref 35–50)
PH BLDV: 7.32 — LOW (ref 7.35–7.45)
PH UR: 6 — SIGNIFICANT CHANGE UP (ref 5–8)
PHOSPHATE SERPL-MCNC: 2.1 MG/DL — LOW (ref 2.5–4.5)
PLAT MORPH BLD: NORMAL — SIGNIFICANT CHANGE UP
PLATELET # BLD AUTO: 207 K/UL — SIGNIFICANT CHANGE UP (ref 150–400)
PO2 BLDV: 40 MMHG — SIGNIFICANT CHANGE UP (ref 25–45)
POTASSIUM BLDV-SCNC: 4.9 MMOL/L — SIGNIFICANT CHANGE UP (ref 3.5–5.3)
POTASSIUM SERPL-MCNC: 4.6 MMOL/L — SIGNIFICANT CHANGE UP (ref 3.5–5.3)
POTASSIUM SERPL-SCNC: 4.6 MMOL/L — SIGNIFICANT CHANGE UP (ref 3.5–5.3)
PROT SERPL-MCNC: 6.9 G/DL — SIGNIFICANT CHANGE UP (ref 6–8.3)
PROT UR-MCNC: NEGATIVE — SIGNIFICANT CHANGE UP
RBC # BLD: 3.95 M/UL — LOW (ref 4.2–5.8)
RBC # FLD: 13.8 % — SIGNIFICANT CHANGE UP (ref 10.3–14.5)
RBC BLD AUTO: SIGNIFICANT CHANGE UP
RBC CASTS # UR COMP ASSIST: SIGNIFICANT CHANGE UP /HPF (ref 0–2)
SAO2 % BLDV: 67 % — SIGNIFICANT CHANGE UP (ref 67–88)
SODIUM SERPL-SCNC: 133 MMOL/L — LOW (ref 135–145)
SP GR SPEC: 1.01 — LOW (ref 1.01–1.02)
UROBILINOGEN FLD QL: NEGATIVE — SIGNIFICANT CHANGE UP
WBC # BLD: 2.6 K/UL — LOW (ref 3.8–10.5)
WBC # FLD AUTO: 2.6 K/UL — LOW (ref 3.8–10.5)
WBC UR QL: SIGNIFICANT CHANGE UP /HPF (ref 0–5)

## 2018-06-14 PROCEDURE — 82435 ASSAY OF BLOOD CHLORIDE: CPT

## 2018-06-14 PROCEDURE — 71046 X-RAY EXAM CHEST 2 VIEWS: CPT

## 2018-06-14 PROCEDURE — 85014 HEMATOCRIT: CPT

## 2018-06-14 PROCEDURE — 82803 BLOOD GASES ANY COMBINATION: CPT

## 2018-06-14 PROCEDURE — 87086 URINE CULTURE/COLONY COUNT: CPT

## 2018-06-14 PROCEDURE — 81001 URINALYSIS AUTO W/SCOPE: CPT

## 2018-06-14 PROCEDURE — 96374 THER/PROPH/DIAG INJ IV PUSH: CPT

## 2018-06-14 PROCEDURE — 71046 X-RAY EXAM CHEST 2 VIEWS: CPT | Mod: 26

## 2018-06-14 PROCEDURE — 99284 EMERGENCY DEPT VISIT MOD MDM: CPT

## 2018-06-14 PROCEDURE — 80053 COMPREHEN METABOLIC PANEL: CPT

## 2018-06-14 PROCEDURE — 83605 ASSAY OF LACTIC ACID: CPT

## 2018-06-14 PROCEDURE — 99285 EMERGENCY DEPT VISIT HI MDM: CPT | Mod: 25

## 2018-06-14 PROCEDURE — 84295 ASSAY OF SERUM SODIUM: CPT

## 2018-06-14 PROCEDURE — 82330 ASSAY OF CALCIUM: CPT

## 2018-06-14 PROCEDURE — 84132 ASSAY OF SERUM POTASSIUM: CPT

## 2018-06-14 PROCEDURE — 82947 ASSAY GLUCOSE BLOOD QUANT: CPT

## 2018-06-14 PROCEDURE — 85027 COMPLETE CBC AUTOMATED: CPT

## 2018-06-14 PROCEDURE — 99214 OFFICE O/P EST MOD 30 MIN: CPT

## 2018-06-14 PROCEDURE — 83735 ASSAY OF MAGNESIUM: CPT

## 2018-06-14 PROCEDURE — 84100 ASSAY OF PHOSPHORUS: CPT

## 2018-06-14 PROCEDURE — 87186 SC STD MICRODIL/AGAR DIL: CPT

## 2018-06-14 RX ORDER — MAGNESIUM OXIDE 400 MG ORAL TABLET 241.3 MG
1 TABLET ORAL
Qty: 14 | Refills: 0 | OUTPATIENT
Start: 2018-06-14 | End: 2018-06-20

## 2018-06-14 RX ORDER — SODIUM,POTASSIUM PHOSPHATES 278-250MG
1 POWDER IN PACKET (EA) ORAL
Qty: 14 | Refills: 0 | OUTPATIENT
Start: 2018-06-14 | End: 2018-06-20

## 2018-06-14 RX ORDER — SODIUM CHLORIDE 9 MG/ML
1000 INJECTION INTRAMUSCULAR; INTRAVENOUS; SUBCUTANEOUS ONCE
Qty: 0 | Refills: 0 | Status: COMPLETED | OUTPATIENT
Start: 2018-06-14 | End: 2018-06-14

## 2018-06-14 RX ORDER — SODIUM,POTASSIUM PHOSPHATES 278-250MG
250 POWDER IN PACKET (EA) ORAL ONCE
Qty: 0 | Refills: 0 | Status: DISCONTINUED | OUTPATIENT
Start: 2018-06-14 | End: 2018-06-14

## 2018-06-14 RX ORDER — MAGNESIUM SULFATE 500 MG/ML
2 VIAL (ML) INJECTION ONCE
Qty: 0 | Refills: 0 | Status: COMPLETED | OUTPATIENT
Start: 2018-06-14 | End: 2018-06-14

## 2018-06-14 RX ORDER — SODIUM,POTASSIUM PHOSPHATES 278-250MG
1 POWDER IN PACKET (EA) ORAL ONCE
Qty: 0 | Refills: 0 | Status: COMPLETED | OUTPATIENT
Start: 2018-06-14 | End: 2018-06-14

## 2018-06-14 RX ADMIN — SODIUM CHLORIDE 1000 MILLILITER(S): 9 INJECTION INTRAMUSCULAR; INTRAVENOUS; SUBCUTANEOUS at 19:24

## 2018-06-14 RX ADMIN — Medication 50 GRAM(S): at 20:56

## 2018-06-14 RX ADMIN — Medication 1 TABLET(S): at 20:56

## 2018-06-14 NOTE — CONSULT NOTE ADULT - PROBLEM SELECTOR RECOMMENDATION 9
replete with 2gIV   replete Phos - with NaPhos   D/c pt with PO Phos 400meq BID   pt may f/u with Dr Cortes on Monday if diarrhea persists over the weekend, or on thursday if it resolves for a lab check   Pt may be discharged after repletions in ED - post repletions labs to ensure correction     discussed with Dr Murguia and Dr Cortes replete with 2gIV   replete Phos - with NaPhos   D/c pt with PO MgOx 400meq BID and KPhos 250mg b.i.d.  pt may f/u with Dr Cortes on Monday if diarrhea persists over the weekend, or on thursday if it resolves for a lab check   Pt may be discharged after repletions in ED - post repletions labs to ensure correction     discussed with Dr Murguia and Dr Cortes

## 2018-06-14 NOTE — ED PROVIDER NOTE - PHYSICAL EXAMINATION
Salud Parisi, .:   GENERAL: Patient awake alert NAD.  HEENT: Moist mucous membranes, PERRL, EOMI  LUNGS: CTAB, no wheezes or crackles.   CARDIAC: RRR, no m/r/g.    ABDOMEN: Soft, NT, ND, No rebound, guarding. No CVA tenderness. Well healing RLQ incision from transplant, no erythema, edema, discharge, No signs of infected site.  EXT: No edema. No calf tenderness.  NEURO: A&Ox3. Gait normal.    SKIN: Warm and dry. No rash.

## 2018-06-14 NOTE — ED PROVIDER NOTE - OBJECTIVE STATEMENT
58M h/o HTN, CAD (5x CABG), ESRD s/p Renal Transplant (5/24), recent discharge, presents with fever 3 days ago (100.4F), which had resolved and when he went to clinic, found to have Magnesium of 0.9, told to come to ER. He has pain over incision site (RLQ), but no noted discharge/redness. Also admits to new productive cough with white sputum. No other complaints, no leg swelling, N/V. Of note, his Cellcept was decreased today because he was having diarrhea.

## 2018-06-14 NOTE — ED PROVIDER NOTE - ATTENDING CONTRIBUTION TO CARE
Nemes - 57yo M, s/p kidney transplant last mo, in the ER for cough for a couple of days, low grade fevers 4 days ago and hypomagnesemia on labs today in the clinic. VS wnl, well appearing, exam wnl, lungs clear, abdomen soft, nontender, surgical wound w no signs of infection. Will get labs, correct Mg, consult transplant team, reevaluate, likely admit

## 2018-06-14 NOTE — ED PROVIDER NOTE - NS ED ROS FT
CONST: + (resolved)fevers, no chills  EYES: no pain, no vision changes  ENT: no sore throat, no ear pain, no change in hearing  CV: no chest pain, no leg swelling  RESP: no shortness of breath, +cough  ABD: +incisional abdominal pain, no nausea, no vomiting, no diarrhea  : no dysuria, no flank pain, no hematuria  MSK: no back pain, no extremity pain  NEURO: no headache or additional neurologic complaints  HEME: no easy bleeding  SKIN:  no rash

## 2018-06-14 NOTE — CONSULT NOTE ADULT - SUBJECTIVE AND OBJECTIVE BOX
Patient is a 58y old  Male who presents with a chief complaint of electrolyte abnormalities and fevers at night - tmax 100.4 2 days ago     HPI:  58 year old gentleman with h/o HTN, CAD s/p CABG in 2007, ESRD secondary to PKD on HD since 3/2011, s/p DDRT from Hep C positive donor. Donor: KDPI 69%, HCV Ab+ HCV ANITA +, Terminal creatinine 1.2, CMV+ / EBV+ / HBcAb+, Left kidney, 3 arteries, 1 vein, 1 ureter, HLA 6/6 mismatch (2,2,2), CPRA 0%, Virtual crossmatch negative. Recipient: CMV+/EBV +. Cold ischemia time: 23 hs 43 mins, Warm ischemia time: 44 minutes. Weight of the kidney: 250 grams. Post-operative course complicated by delayed graft function requiring 2 sessions of HD and brief vasopressor support in the SICU. Pt has been doing well in outpt setting. Seen today in clinic c/o fevers for the past 3 days at night tmax 100.4 2 days ago and diarrhea - his Cellcept was stopped due to the possibility that this was the cause of his diarrhea. Also he was found to have multiple electrolyte abnormalities and was sent to the ED for evaluation and repletion       PAST MEDICAL & SURGICAL HISTORY:  HTN (hypertension)  Coronary artery disease involving coronary bypass graft  ESRD (end stage renal disease) on dialysis  Obesity  Hemorrhoids  Gastroesophageal reflux disease without esophagitis  High cholesterol  ESRD on Dialysis: Tue, Thur &amp; Sat  CAD (Coronary Artery Disease)  PCK (Polycystic Kidney Disease)  HTN - Hypertension  Asthma: last attack 2007, never hospitalized or intubated  AV fistula: b/l MILI GREEN  S/P coronary artery stent placement  S/P CABG x 5  S/P hemorrhoidectomy: and rectal polypectomy -2/3/2017.  AV fistula: right lower extremity 2 yrs  Left upper extrmity with graft 7 years ago  Stented coronary artery: x2 cardiac stents in 2016, one cardiac stent in 2015  CABG (Coronary Artery Bypass Graft): 2007      REVIEW OF SYSTEMS    General:	Negative,   Respiratory: neg  Cardiovascular: neg   Gastrointestinal: neg   Genitourinary: Negative  Musculoskeletal: Negative  Neurological: Negative  Psychiatric: Negative  Hematology/Lymphatics: Negative  Endocrine:	Negative        MEDICATIONS  (STANDING):    MEDICATIONS  (PRN):      Allergies    No Known Allergies    Intolerances        SOCIAL HISTORY:  Alcohol: Denied  Smoking: Nonsmoker  Drug Use: Denied  Marital Status:         FAMILY HISTORY:  Family history of polycystic kidney (Sibling)  Family history of adult polycystic kidney disease (Sibling)      Vital Signs Last 24 Hrs  T(C): 36.7 (14 Jun 2018 19:27), Max: 37.5 (14 Jun 2018 17:47)  T(F): 98.1 (14 Jun 2018 19:27), Max: 99.5 (14 Jun 2018 17:47)  HR: 71 (14 Jun 2018 19:27) (70 - 74)  BP: 116/68 (14 Jun 2018 19:27) (99/64 - 124/67)  BP(mean): --  RR: 16 (14 Jun 2018 19:27) (16 - 18)  SpO2: 98% (14 Jun 2018 19:27) (97% - 98%)    PHYSICAL EXAM:  Constitutional: well developed, well nourished, NAD  Eyes: anicteric  Respiratory: CTA b/l  Cardiovascular: s1,2, regular rate   Gastrointestinal: soft, surgical incision healing well. no erythema or drainage from surgical site   Extremities: FROM, warm  Neurological: intact, non-focal  Skin: no gross lesions  Psychiatric: oriented x 3; appropriate          LABS:                        12.2   2.6   )-----------( 207      ( 14 Jun 2018 19:08 )             38.2     06-14    133<L>  |  99  |  19  ----------------------------<  136<H>  4.6   |  21<L>  |  1.40<H>    Ca    7.8<L>      14 Jun 2018 19:08  Phos  2.1     06-14  Mg     1.0     06-14    TPro  6.9  /  Alb  3.6  /  TBili  0.4  /  DBili  x   /  AST  72<H>  /  ALT  78<H>  /  AlkPhos  71  06-14          RADIOLOGY & ADDITIONAL STUDIES:  CXR - no acute disease

## 2018-06-14 NOTE — ED ADULT NURSE NOTE - OBJECTIVE STATEMENT
58  fistula     58M h/o HTN, CAD (5x CABG), ESRD s/p Renal Transplant (5/24), recent discharge, presents with fever 3 days ago (100.4F), which had resolved and when he went to clinic, found to have Magnesium of 0.9, told to come to ER. He has pain over incision site (RLQ), but no noted discharge/redness. Also admits to new productive cough with white sputum. No other complaints, no leg swelling, N/V. Of note, his Cellcept was decreased today because he was having diarrhea. 58 y.o M arrived via EMS c/o fevers. A&Ox3. Pt has hx of kidney of transplant 1 month ago, has had intermittent fever for the past three days. also endorses 4-5 days of diahrhea recently which per pt his nephrologist believes it may be r/t medication pt is taking. pt states some of his medication doses recently changed. pt presented to clinic today, had blood work done showing low magnesium and instructed to come to ED. upon assessment respirations nonlabored, pt in no cute distress, endorses ongoing mild pain to staples to R low abdomen s/p kidney transplant - no discharge or redness noted. pt has fistulas to B/L upper extremities, states fistula in L upper arm is older than fistula in R upper arm. Denies pain, CP, SOB, N/V, pain/burning upon urination. Safety and comfort provided/maintained.

## 2018-06-14 NOTE — ED PROVIDER NOTE - PROGRESS NOTE DETAILS
Spoke with varinder from surgery: Will d/c patient home with instructions to f/u on Monday. Also to give supplemental phos 400u BID

## 2018-06-14 NOTE — CONSULT NOTE ADULT - ASSESSMENT
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 - here with electrolyte abnormalities

## 2018-06-14 NOTE — ED PROVIDER NOTE - MEDICAL DECISION MAKING DETAILS
58M p/w fever, hypomag. No fever in ER, will monitor. Recheck labs. Consult transplant team. POssible admit as per transplant.

## 2018-06-15 LAB
ALBUMIN SERPL ELPH-MCNC: 3.9 G/DL
ALP BLD-CCNC: 66 U/L
ALT SERPL-CCNC: 85 U/L
ANION GAP SERPL CALC-SCNC: 15 MMOL/L
APPEARANCE: CLEAR
AST SERPL-CCNC: 80 U/L
BASOPHILS # BLD AUTO: 0.01 K/UL
BASOPHILS NFR BLD AUTO: 0.4 %
BILIRUB SERPL-MCNC: 0.5 MG/DL
BILIRUBIN URINE: NEGATIVE
BLOOD URINE: NEGATIVE
BUN SERPL-MCNC: 21 MG/DL
CALCIUM SERPL-MCNC: 8.2 MG/DL
CHLORIDE SERPL-SCNC: 97 MMOL/L
CO2 SERPL-SCNC: 23 MMOL/L
COLOR: YELLOW
CREAT SERPL-MCNC: 1.53 MG/DL
EOSINOPHIL # BLD AUTO: 0.1 K/UL
EOSINOPHIL NFR BLD AUTO: 4.3 %
GLUCOSE QUALITATIVE U: NEGATIVE MG/DL
GLUCOSE SERPL-MCNC: 119 MG/DL
HCT VFR BLD CALC: 37.9 %
HGB BLD-MCNC: 11.9 G/DL
IMM GRANULOCYTES NFR BLD AUTO: 0.4 %
KETONES URINE: NEGATIVE
LDH SERPL-CCNC: 273 U/L
LEUKOCYTE ESTERASE URINE: NEGATIVE
LYMPHOCYTES # BLD AUTO: 0.55 K/UL
LYMPHOCYTES NFR BLD AUTO: 23.6 %
MAGNESIUM SERPL-MCNC: 0.9 MG/DL
MAN DIFF?: NORMAL
MCHC RBC-ENTMCNC: 29.8 PG
MCHC RBC-ENTMCNC: 31.4 GM/DL
MCV RBC AUTO: 95 FL
MONOCYTES # BLD AUTO: 0.22 K/UL
MONOCYTES NFR BLD AUTO: 9.4 %
NEUTROPHILS # BLD AUTO: 1.44 K/UL
NEUTROPHILS NFR BLD AUTO: 61.9 %
NITRITE URINE: NEGATIVE
PH URINE: 5
PHOSPHATE SERPL-MCNC: 2.1 MG/DL
PLATELET # BLD AUTO: 216 K/UL
POTASSIUM SERPL-SCNC: 4.4 MMOL/L
PROT SERPL-MCNC: 6.9 G/DL
PROTEIN URINE: NEGATIVE MG/DL
RBC # BLD: 3.99 M/UL
RBC # FLD: 15.4 %
SODIUM SERPL-SCNC: 135 MMOL/L
SPECIFIC GRAVITY URINE: 1.01
TACROLIMUS SERPL-MCNC: 9 NG/ML
URATE SERPL-MCNC: 8.4 MG/DL
UROBILINOGEN URINE: NEGATIVE MG/DL
WBC # FLD AUTO: 2.33 K/UL

## 2018-06-17 ENCOUNTER — INPATIENT (INPATIENT)
Facility: HOSPITAL | Age: 58
LOS: 3 days | Discharge: HOME HEALTH | DRG: 690 | End: 2018-06-21
Payer: MEDICAID

## 2018-06-17 VITALS
DIASTOLIC BLOOD PRESSURE: 71 MMHG | RESPIRATION RATE: 16 BRPM | HEART RATE: 74 BPM | OXYGEN SATURATION: 96 % | TEMPERATURE: 98 F | SYSTOLIC BLOOD PRESSURE: 107 MMHG

## 2018-06-17 DIAGNOSIS — N39.0 URINARY TRACT INFECTION, SITE NOT SPECIFIED: ICD-10-CM

## 2018-06-17 DIAGNOSIS — Z95.1 PRESENCE OF AORTOCORONARY BYPASS GRAFT: Chronic | ICD-10-CM

## 2018-06-17 DIAGNOSIS — I77.0 ARTERIOVENOUS FISTULA, ACQUIRED: Chronic | ICD-10-CM

## 2018-06-17 DIAGNOSIS — Z98.890 OTHER SPECIFIED POSTPROCEDURAL STATES: Chronic | ICD-10-CM

## 2018-06-17 DIAGNOSIS — Z95.5 PRESENCE OF CORONARY ANGIOPLASTY IMPLANT AND GRAFT: Chronic | ICD-10-CM

## 2018-06-17 LAB
ALBUMIN SERPL ELPH-MCNC: 3.5 G/DL — SIGNIFICANT CHANGE UP (ref 3.3–5)
ALP SERPL-CCNC: 68 U/L — SIGNIFICANT CHANGE UP (ref 40–120)
ALT FLD-CCNC: 70 U/L — HIGH (ref 10–45)
ANION GAP SERPL CALC-SCNC: 15 MMOL/L — SIGNIFICANT CHANGE UP (ref 5–17)
APPEARANCE UR: CLEAR — SIGNIFICANT CHANGE UP
AST SERPL-CCNC: 81 U/L — HIGH (ref 10–40)
BACTERIA # UR AUTO: ABNORMAL /HPF
BASOPHILS # BLD AUTO: 0 K/UL — SIGNIFICANT CHANGE UP (ref 0–0.2)
BASOPHILS NFR BLD AUTO: 0 % — SIGNIFICANT CHANGE UP (ref 0–2)
BILIRUB SERPL-MCNC: 0.4 MG/DL — SIGNIFICANT CHANGE UP (ref 0.2–1.2)
BILIRUB UR-MCNC: NEGATIVE — SIGNIFICANT CHANGE UP
BUN SERPL-MCNC: 22 MG/DL — SIGNIFICANT CHANGE UP (ref 7–23)
CALCIUM SERPL-MCNC: 7.5 MG/DL — LOW (ref 8.4–10.5)
CHLORIDE SERPL-SCNC: 99 MMOL/L — SIGNIFICANT CHANGE UP (ref 96–108)
CO2 SERPL-SCNC: 20 MMOL/L — LOW (ref 22–31)
COLOR SPEC: YELLOW — SIGNIFICANT CHANGE UP
CREAT SERPL-MCNC: 1.61 MG/DL — HIGH (ref 0.5–1.3)
DIFF PNL FLD: NEGATIVE — SIGNIFICANT CHANGE UP
EOSINOPHIL # BLD AUTO: 0 K/UL — SIGNIFICANT CHANGE UP (ref 0–0.5)
EOSINOPHIL NFR BLD AUTO: 1.3 % — SIGNIFICANT CHANGE UP (ref 0–6)
GAS PNL BLDV: SIGNIFICANT CHANGE UP
GLUCOSE BLDC GLUCOMTR-MCNC: 144 MG/DL — HIGH (ref 70–99)
GLUCOSE BLDC GLUCOMTR-MCNC: 151 MG/DL — HIGH (ref 70–99)
GLUCOSE SERPL-MCNC: 169 MG/DL — HIGH (ref 70–99)
GLUCOSE UR QL: NEGATIVE — SIGNIFICANT CHANGE UP
HCT VFR BLD CALC: 35.1 % — LOW (ref 39–50)
HGB BLD-MCNC: 11.5 G/DL — LOW (ref 13–17)
KETONES UR-MCNC: NEGATIVE — SIGNIFICANT CHANGE UP
LEUKOCYTE ESTERASE UR-ACNC: NEGATIVE — SIGNIFICANT CHANGE UP
LYMPHOCYTES # BLD AUTO: 0.5 K/UL — LOW (ref 1–3.3)
LYMPHOCYTES # BLD AUTO: 23.6 % — SIGNIFICANT CHANGE UP (ref 13–44)
MAGNESIUM SERPL-MCNC: 1.2 MG/DL — LOW (ref 1.6–2.6)
MCHC RBC-ENTMCNC: 31.2 PG — SIGNIFICANT CHANGE UP (ref 27–34)
MCHC RBC-ENTMCNC: 32.8 GM/DL — SIGNIFICANT CHANGE UP (ref 32–36)
MCV RBC AUTO: 95.1 FL — SIGNIFICANT CHANGE UP (ref 80–100)
MONOCYTES # BLD AUTO: 0.3 K/UL — SIGNIFICANT CHANGE UP (ref 0–0.9)
MONOCYTES NFR BLD AUTO: 17.3 % — HIGH (ref 2–14)
NEUTROPHILS # BLD AUTO: 1.1 K/UL — LOW (ref 1.8–7.4)
NEUTROPHILS NFR BLD AUTO: 57.8 % — SIGNIFICANT CHANGE UP (ref 43–77)
NITRITE UR-MCNC: NEGATIVE — SIGNIFICANT CHANGE UP
PH UR: 6 — SIGNIFICANT CHANGE UP (ref 5–8)
PHOSPHATE SERPL-MCNC: 2.1 MG/DL — LOW (ref 2.5–4.5)
PLATELET # BLD AUTO: 180 K/UL — SIGNIFICANT CHANGE UP (ref 150–400)
POTASSIUM SERPL-MCNC: 4.4 MMOL/L — SIGNIFICANT CHANGE UP (ref 3.5–5.3)
POTASSIUM SERPL-SCNC: 4.4 MMOL/L — SIGNIFICANT CHANGE UP (ref 3.5–5.3)
PROT SERPL-MCNC: 6.3 G/DL — SIGNIFICANT CHANGE UP (ref 6–8.3)
PROT UR-MCNC: NEGATIVE — SIGNIFICANT CHANGE UP
RBC # BLD: 3.69 M/UL — LOW (ref 4.2–5.8)
RBC # FLD: 13.7 % — SIGNIFICANT CHANGE UP (ref 10.3–14.5)
RBC CASTS # UR COMP ASSIST: SIGNIFICANT CHANGE UP /HPF (ref 0–2)
SODIUM SERPL-SCNC: 134 MMOL/L — LOW (ref 135–145)
SP GR SPEC: 1.01 — SIGNIFICANT CHANGE UP (ref 1.01–1.02)
TACROLIMUS SERPL-MCNC: 19.1 NG/ML — SIGNIFICANT CHANGE UP
UROBILINOGEN FLD QL: NEGATIVE — SIGNIFICANT CHANGE UP
WBC # BLD: 1.9 K/UL — LOW (ref 3.8–10.5)
WBC # FLD AUTO: 1.9 K/UL — LOW (ref 3.8–10.5)
WBC UR QL: SIGNIFICANT CHANGE UP /HPF (ref 0–5)

## 2018-06-17 PROCEDURE — 99285 EMERGENCY DEPT VISIT HI MDM: CPT

## 2018-06-17 PROCEDURE — 99222 1ST HOSP IP/OBS MODERATE 55: CPT | Mod: GC

## 2018-06-17 PROCEDURE — 99222 1ST HOSP IP/OBS MODERATE 55: CPT

## 2018-06-17 RX ORDER — DEXTROSE 50 % IN WATER 50 %
15 SYRINGE (ML) INTRAVENOUS ONCE
Qty: 0 | Refills: 0 | Status: DISCONTINUED | OUTPATIENT
Start: 2018-06-17 | End: 2018-06-21

## 2018-06-17 RX ORDER — TACROLIMUS 5 MG/1
6 CAPSULE ORAL EVERY 12 HOURS
Qty: 0 | Refills: 0 | Status: DISCONTINUED | OUTPATIENT
Start: 2018-06-17 | End: 2018-06-17

## 2018-06-17 RX ORDER — SODIUM,POTASSIUM PHOSPHATES 278-250MG
1 POWDER IN PACKET (EA) ORAL
Qty: 0 | Refills: 0 | Status: COMPLETED | OUTPATIENT
Start: 2018-06-17 | End: 2018-06-18

## 2018-06-17 RX ORDER — DEXTROSE 50 % IN WATER 50 %
25 SYRINGE (ML) INTRAVENOUS ONCE
Qty: 0 | Refills: 0 | Status: DISCONTINUED | OUTPATIENT
Start: 2018-06-17 | End: 2018-06-21

## 2018-06-17 RX ORDER — ERTAPENEM SODIUM 1 G/1
1000 INJECTION, POWDER, LYOPHILIZED, FOR SOLUTION INTRAMUSCULAR; INTRAVENOUS EVERY 24 HOURS
Qty: 0 | Refills: 0 | Status: DISCONTINUED | OUTPATIENT
Start: 2018-06-17 | End: 2018-06-19

## 2018-06-17 RX ORDER — GLUCAGON INJECTION, SOLUTION 0.5 MG/.1ML
1 INJECTION, SOLUTION SUBCUTANEOUS ONCE
Qty: 0 | Refills: 0 | Status: DISCONTINUED | OUTPATIENT
Start: 2018-06-17 | End: 2018-06-21

## 2018-06-17 RX ORDER — HEPARIN SODIUM 5000 [USP'U]/ML
5000 INJECTION INTRAVENOUS; SUBCUTANEOUS EVERY 8 HOURS
Qty: 0 | Refills: 0 | Status: DISCONTINUED | OUTPATIENT
Start: 2018-06-17 | End: 2018-06-19

## 2018-06-17 RX ORDER — MAGNESIUM SULFATE 500 MG/ML
2 VIAL (ML) INJECTION ONCE
Qty: 0 | Refills: 0 | Status: COMPLETED | OUTPATIENT
Start: 2018-06-17 | End: 2018-06-17

## 2018-06-17 RX ORDER — ERTAPENEM SODIUM 1 G/1
500 INJECTION, POWDER, LYOPHILIZED, FOR SOLUTION INTRAMUSCULAR; INTRAVENOUS ONCE
Qty: 0 | Refills: 0 | Status: COMPLETED | OUTPATIENT
Start: 2018-06-17 | End: 2018-06-17

## 2018-06-17 RX ORDER — PANTOPRAZOLE SODIUM 20 MG/1
40 TABLET, DELAYED RELEASE ORAL
Qty: 0 | Refills: 0 | Status: DISCONTINUED | OUTPATIENT
Start: 2018-06-17 | End: 2018-06-21

## 2018-06-17 RX ORDER — ACETAMINOPHEN 500 MG
650 TABLET ORAL EVERY 6 HOURS
Qty: 0 | Refills: 0 | Status: DISCONTINUED | OUTPATIENT
Start: 2018-06-17 | End: 2018-06-21

## 2018-06-17 RX ORDER — DEXTROSE 50 % IN WATER 50 %
12.5 SYRINGE (ML) INTRAVENOUS ONCE
Qty: 0 | Refills: 0 | Status: DISCONTINUED | OUTPATIENT
Start: 2018-06-17 | End: 2018-06-21

## 2018-06-17 RX ORDER — TACROLIMUS 5 MG/1
1 CAPSULE ORAL
Qty: 0 | Refills: 0 | Status: DISCONTINUED | OUTPATIENT
Start: 2018-06-17 | End: 2018-06-21

## 2018-06-17 RX ORDER — FUROSEMIDE 40 MG
20 TABLET ORAL DAILY
Qty: 0 | Refills: 0 | Status: DISCONTINUED | OUTPATIENT
Start: 2018-06-17 | End: 2018-06-21

## 2018-06-17 RX ORDER — INSULIN GLARGINE 100 [IU]/ML
6 INJECTION, SOLUTION SUBCUTANEOUS ONCE
Qty: 0 | Refills: 0 | Status: COMPLETED | OUTPATIENT
Start: 2018-06-17 | End: 2018-06-17

## 2018-06-17 RX ORDER — SODIUM CHLORIDE 9 MG/ML
1000 INJECTION, SOLUTION INTRAVENOUS
Qty: 0 | Refills: 0 | Status: DISCONTINUED | OUTPATIENT
Start: 2018-06-17 | End: 2018-06-21

## 2018-06-17 RX ORDER — INSULIN LISPRO 100/ML
VIAL (ML) SUBCUTANEOUS
Qty: 0 | Refills: 0 | Status: DISCONTINUED | OUTPATIENT
Start: 2018-06-17 | End: 2018-06-21

## 2018-06-17 RX ORDER — NYSTATIN 500MM UNIT
500000 POWDER (EA) MISCELLANEOUS
Qty: 0 | Refills: 0 | Status: DISCONTINUED | OUTPATIENT
Start: 2018-06-17 | End: 2018-06-21

## 2018-06-17 RX ORDER — INSULIN LISPRO 100/ML
VIAL (ML) SUBCUTANEOUS AT BEDTIME
Qty: 0 | Refills: 0 | Status: DISCONTINUED | OUTPATIENT
Start: 2018-06-17 | End: 2018-06-21

## 2018-06-17 RX ORDER — VALGANCICLOVIR 450 MG/1
450 TABLET, FILM COATED ORAL DAILY
Qty: 0 | Refills: 0 | Status: DISCONTINUED | OUTPATIENT
Start: 2018-06-17 | End: 2018-06-18

## 2018-06-17 RX ADMIN — TACROLIMUS 1 MILLIGRAM(S): 5 CAPSULE ORAL at 18:46

## 2018-06-17 RX ADMIN — Medication 50 GRAM(S): at 18:15

## 2018-06-17 RX ADMIN — VALGANCICLOVIR 450 MILLIGRAM(S): 450 TABLET, FILM COATED ORAL at 18:46

## 2018-06-17 RX ADMIN — Medication 5 MILLIGRAM(S): at 18:46

## 2018-06-17 RX ADMIN — Medication 1 TABLET(S): at 18:46

## 2018-06-17 RX ADMIN — ERTAPENEM SODIUM 100 MILLIGRAM(S): 1 INJECTION, POWDER, LYOPHILIZED, FOR SOLUTION INTRAMUSCULAR; INTRAVENOUS at 16:13

## 2018-06-17 RX ADMIN — Medication 20 MILLIGRAM(S): at 18:46

## 2018-06-17 RX ADMIN — Medication 500000 UNIT(S): at 18:46

## 2018-06-17 NOTE — ED ADULT NURSE NOTE - OBJECTIVE STATEMENT
Recvd pt awake, alert and responsive to all stimuli.  no sob or respiratory distress noted at this time.  pt sent by pmd for f/u to bacteria in urine; pt has hx of right kidney transplant x 3 weeks ago.  pt is afebrile at this time and states that he has some mild discomfort at surgical site but denies any urinary complaints at this time.  pt was worked up and is awaiting iv abx.  will continue to monitor.

## 2018-06-17 NOTE — CONSULT NOTE ADULT - ASSESSMENT
Assessment  Kidney transplanted  Hepatitis-C   UTI-ESBL    Renal Transplant recipient: Had immediate allograft function, normal creatinine at discharge. Has urinary frequency and now urine clx positive for ESBL. Starting ertepenem or meropenem, ER aware  ID consult called.     Immunosuppression: Reviewed; simulect induction, on tac/MMF/prednisone, last Tac level noted 11 range will recheck tomorrow. Given leukopenia, decrease tacro to 1mg BID and MMF should be held given leukopenia and diarrhea   Can cont prednisone 5mg po qd    DM: On insulin, controlled. Can continue his home regimen  Hep C: cont Epclusa, check Hep C viral load    Infection prophylaxis: On Bactrim, Valcyte and nystatin as well as GI prophylaxis.    D/w with ER and Dr Blade Conner MD  Cell   Pager   Office  Assessment  Kidney transplanted  Hepatitis-C   UTI-ESBL    Renal Transplant recipient: Had immediate allograft function, normal creatinine at discharge. Has urinary frequency and now urine clx positive for ESBL. Starting ertepenem or meropenem, ER aware  ID consult called. Admit to [address] [place] [address] only under Dr Otto Murguia/Peggy Cowart/Sunny Allred    Immunosuppression: Reviewed; simulect induction, on tac/MMF/prednisone, last Tac level noted 11 range will recheck tomorrow. Given leukopenia, decrease tacro to 1mg BID and MMF should be held given leukopenia and diarrhea   Can cont prednisone 5mg po qd    DM: On insulin, controlled. Can continue his home regimen  Hep C: cont Epclusa, check Hep C viral load    Infection prophylaxis: On Bactrim, Valcyte and nystatin as well as GI prophylaxis.    D/w with ER and Dr Blade Conner MD  Cell   Pager   Office

## 2018-06-17 NOTE — CONSULT NOTE ADULT - ASSESSMENT
58M CAD s/p stents and CABG, HTN, GERD, HLD who is 3w s/p DDRT to R iliac vessels who came in for electrolyte repletions on Fri 6/14 and had a urinalysis at that time which resulted today to reveal ESBL E.coli.   Afebrile, leucopenia  ESBL E coli UTI  Minimal wound dehiscence medially with some drainage, cx taken     Plan:  Continue with Ertapenem 1 gm iv q24h   Follow up blood cx  Follow up repeat urine cx  Follow up wound cx   Consider U/S of the RLQ

## 2018-06-17 NOTE — CONSULT NOTE ADULT - SUBJECTIVE AND OBJECTIVE BOX
Patient is a 58y old  Male who presents with a chief complaint of Called in for ESBL Bacteriuria (17 Jun 2018 16:58)      HPI:  58M CAD s/p stents and CABG, HTN, GERD, HLD who is 3w s/p DDRT to R iliac vessels who came in for electrolyte repletions on Fri 6/14 and had a urinalysis at that time which resulted today to reveal ESBL E.coli. He was called in to the ED in that context. He has mild incisional tenderness, and has had subjective fevers on and off. He denies any other symptoms, including dysuria, hematuria, n/v, changes to bowel habits, SOB/ palpitations/CP. He is taking all of his medications as prescribed.  Vital Signs Last 24 Hrs  T(C): 36.7 (17 Jun 2018 14:07), Max: 36.7 (17 Jun 2018 14:07)  T(F): 98.1 (17 Jun 2018 14:07), Max: 98.1 (17 Jun 2018 14:07)  HR: 74 (17 Jun 2018 14:07) (74 - 74)  BP: 107/71 (17 Jun 2018 14:07) (107/71 - 107/71)    Above reviewed:   Pt says he was experiencing low grade temp at home for last few days and noticed some drainage from medial aspect of incision 2 days ago. He has some pain in that area. Otherwise feels well.       PAST MEDICAL & SURGICAL HISTORY:  HTN (hypertension)  Coronary artery disease involving coronary bypass graft  ESRD (end stage renal disease) on dialysis  Obesity  Hemorrhoids  Gastroesophageal reflux disease without esophagitis  High cholesterol  ESRD on Dialysis: Tue, Thur &amp; Sat  CAD (Coronary Artery Disease)  PCK (Polycystic Kidney Disease)  HTN - Hypertension  Asthma: last attack 2007, never hospitalized or intubated  AV fistula: b/l MILI GREEN  S/P coronary artery stent placement  S/P CABG x 5  S/P hemorrhoidectomy: and rectal polypectomy -2/3/2017.  AV fistula: right lower extremity 2 yrs  Left upper extrmity with graft 7 years ago  Stented coronary artery: x2 cardiac stents in 2016, one cardiac stent in 2015  CABG (Coronary Artery Bypass Graft): 2007      REVIEW OF SYSTEMS    General: Denies any chills. + Fever    Skin: No rash  	  Ophthalmologic: Denies any visual complaints, discharge, redness.  	  ENMT: No nasal congestion or throat pain.     Respiratory and Thorax: No cough, sputum or chest pain. Denies shortness of breath.  	  Cardiovascular: No chest pain, palpitations.    Gastrointestinal: No nausea, no diarrhea.    Genitourinary: No dysuria, frequency. No flank pain.    Musculoskeletal: No joint swelling or pain.    Neurological: No confusion. No extremity weakness.    Psychiatric: No hallucinations	    Endocrine: No recent weight gain or loss. No abnormal heat/cold intolerance    Allergic/Immunologic: No hives or rash       Social history:  , lives with family, no smoking       FAMILY HISTORY:  Family history of polycystic kidney (Sibling)  Family history of adult polycystic kidney disease (Sibling)      Allergies  No Known Allergies        Antimicrobials:  ertapenem  IVPB 1000 milliGRAM(s) IV Intermittent every 24 hours  nystatin    Suspension 662727 Unit(s) Oral four times a day  valGANciclovir 450 milliGRAM(s) Oral daily        Vital Signs Last 24 Hrs  T(C): 36.7 (17 Jun 2018 14:07), Max: 36.7 (17 Jun 2018 14:07)  T(F): 98.1 (17 Jun 2018 14:07), Max: 98.1 (17 Jun 2018 14:07)  HR: 71 (17 Jun 2018 18:31) (71 - 74)  BP: 116/67 (17 Jun 2018 18:31) (107/71 - 116/67)  RR: 16 (17 Jun 2018 14:07) (16 - 16)  SpO2: 98% (17 Jun 2018 18:31) (96% - 98%)        PHYSICAL EXAM: Patient in no acute distress.    Constitutional: Comfortable. Awake and alert    Eyes: No discharge or conjunctival injection    ENMT: No thrush. No pharyngeal exudate or erythema.    Neck: Supple, No LN.    Respiratory: Good air entry bilaterally.    Cardiovascular: S1 S2 wnl, No murmurs.    Gastrointestinal: Soft BS(+) non distended, rt sided surgical incision with staples, medial end i was able to express some purulent drainage with opening. Some tenderness in that area.     Genitourinary: No CVA tenderness     Extremities: trace edema.    Vascular: peripheral pulses felt, Rt arm fistula, lt arm graft.     Neurological: AAO X 3. No grossly focal deficits.    Skin: No rash     Musculoskeletal: No joint swelling.    Psychiatric: Affect normal.                              11.5   1.9   )-----------( 180      ( 17 Jun 2018 15:13 )             35.1       06-17    134<L>  |  99  |  22  ----------------------------<  169<H>  4.4   |  20<L>  |  1.61<H>    Ca    7.5<L>      17 Jun 2018 15:13  Phos  2.1     06-17  Mg     1.2     06-17    TPro  6.3  /  Alb  3.5  /  TBili  0.4  /  DBili  x   /  AST  81<H>  /  ALT  70<H>  /  AlkPhos  68  06-17        Culture - Urine (collected 14 Jun 2018 22:28)  Source: .Urine Clean Catch (Midstream)  Final Report (16 Jun 2018 17:06):    10,000 - 49,000 CFU/mL Escherichia coli ESBL    <10,000 CFU/ml Normal Urogenital rani present  Organism: Escherichia coli ESBL (16 Jun 2018 17:06)  Organism: Escherichia coli ESBL (16 Jun 2018 17:06)          Radiology: Imaging reviewed personally [ x]  < from: Xray Chest 2 Views PA/Lat (06.14.18 @ 19:37) >  Impression:    Clear lungs.

## 2018-06-17 NOTE — ED ADULT NURSE REASSESSMENT NOTE - NS ED NURSE REASSESS COMMENT FT1
no change in baseline mental statu.  no distress noted.  vss.  pt resting in bed awaiting admitting medicine bed.  will continue to monitor.

## 2018-06-17 NOTE — H&P ADULT - HISTORY OF PRESENT ILLNESS
58M CAD s/p stents and CABG, HTN, GERD, HLD who is 3w s/p DDRT to R iliac vessels who came in for electrolyte repletions on Fri 6/14 and had a urinalysis at that time which resulted today to reveal ESBL E.coli. He was called in to the ED in that context. He has mild incisional tenderness, and has had subjective fevers on and off. He denies any other symptoms, including dysuria, hematuria, n/v, changes to bowel habits, SOB/ palpitations/CP. He is taking all of his medications as prescribed.  Vital Signs Last 24 Hrs  T(C): 36.7 (17 Jun 2018 14:07), Max: 36.7 (17 Jun 2018 14:07)  T(F): 98.1 (17 Jun 2018 14:07), Max: 98.1 (17 Jun 2018 14:07)  HR: 74 (17 Jun 2018 14:07) (74 - 74)  BP: 107/71 (17 Jun 2018 14:07) (107/71 - 107/71)  BP(mean): --  RR: 16 (17 Jun 2018 14:07) (16 - 16)  SpO2: 96% (17 Jun 2018 14:07) (96% - 96%)

## 2018-06-17 NOTE — ED PROVIDER NOTE - ATTENDING CONTRIBUTION TO CARE
Patient recent  donor renal transplant approx 3 weeks post operative, reporting recurring intermittent fevers at home.  Found on urine culture obtained at prior Emergency Department visit for hypomagnesemia to have ESBL in urine.  Denying chest pains, shortness of breath, rashes, abdominal pains, dysuria/hematuria, neck stiffness.  Compliant with transplant medications.    On exam patient well appearing, afebrile at this time, RRR S1/S2, lungs clear to ascultation bilaterally, abdomen soft, healing RLQ transplant incision without visible discharge/erythema.    Given ESBL in prior culture, immunocompromised and recent transplantation of kidney, will repeat labs, blood and urine cultures, consult transplant surgery and admit for IV antibiotic therapy.

## 2018-06-17 NOTE — CONSULT NOTE ADULT - SUBJECTIVE AND OBJECTIVE BOX
[facility] DIVISION OF KIDNEY DISEASES AND HYPERTENSION -- INITIAL CONSULT NOTE  --------------------------------------------------------------------------------  HPI:  58 year old male with PMH of HTN, CAD s/p CABG, ESRD secondary to PKD ON HD (3/2011) TTS from [facility] AVF was just admitted last month for a s/p DDRT from Hep C positive donor on 5/24/18. Pt follows with outpatient nephrologist Dr. Caleb Mckeon at [facility] dialysis center. Pt had one session of HD on 5/23/18 prior to OR. Course complicated by DGF. Pt required dialysis 5/25 and 5/27 after the operation. Pt nonoliguric with good urine output. Pt urine is red with clots today. Pt denies fevers, chills, CP, SOB, abdominal pain or LE edema. He eventually got dc in left home early june 2018 with crt of 1.5mg/dl and was on MMF 1000mg BID and tacro 5mg BID. He noted some urinary symptoms and low grade fevers and came to ER after his transplant visit on June 14th and was sent back home after urine clx was sent that showed ESBL and hence bought back to ER. He still has low grade fevers at home. He continues to take all meds. His crt has been in 1.5mg/dl range and stable. He did have some diarrhea that was watery few days ago that has been improving. He has no back pain or any hematuria. He does have some tenderness at the graft area but not new.        PAST HISTORY  --------------------------------------------------------------------------------  PAST MEDICAL & SURGICAL HISTORY:  HTN (hypertension)  Coronary artery disease involving coronary bypass graft  ESRD (end stage renal disease) on dialysis  Obesity  Hemorrhoids  Gastroesophageal reflux disease without esophagitis  High cholesterol  ESRD on Dialysis: Michelle Mcadams &amp; Sat  CAD (Coronary Artery Disease)  PCK (Polycystic Kidney Disease)  HTN - Hypertension  Asthma: last attack 2007, never hospitalized or intubated  AV fistula: ban/l RUE, LUE  S/P coronary artery stent placement  S/P CABG x 5  S/P hemorrhoidectomy: and rectal polypectomy -2/3/2017.  AV fistula: right lower extremity 2 yrs  Left upper extrmity with graft 7 years ago  Stented coronary artery: x2 cardiac stents in 2016, one cardiac stent in 2015  CABG (Coronary Artery Bypass Graft): 2007    FAMILY HISTORY:  Family history of polycystic kidney (Sibling)  Family history of adult polycystic kidney disease (Sibling)    PAST SOCIAL HISTORY:    ALLERGIES & MEDICATIONS  --------------------------------------------------------------------------------  Allergies    No Known Allergies    Intolerances      Outpatient meds:  ASA 81 mg qd  Atorvastatin Calcium 10 MG qd  Epclusa 400-100 MG Oral Tablet; TAKE 1 TABLET BY MOUTH ONCE DAILY  Famotidine 20 MG Oral Tablet; Take 1 tablet daily  HumaLOG KwikPen 100 UNIT/ML Subcutaneous Solution Pen-injector; INJECT 2 UNIT  Before meals  Lasix 40 MG Oral Tablet; TAKE 1 TABLET DAILY  Mycophenolate Mofetil 500 MG Oral Tablet; TAKE 2 TABLETS TWICE DAILY  Nystatin 085142 UNIT/ML Mouth/Throat Suspension; SWISH AND SWALLOW 5ML 4  TIMES DAILY  PredniSONE 5 MG Oral Tablet; 1 tab daily as directed  Sulfamethoxazole-Trimethoprim 400-80 MG Oral Tablet; TAKE 1 TABLET DAILY  Tacrolimus 5 MG Oral Capsule BID  ValGANciclovir HCl - 450 MG qd      REVIEW OF SYSTEMS  --------------------------------------------------------------------------------  Gen: No weight changes, fatigue, fevers/chills, weakness  Skin: No rashes  Head/Eyes/Ears/Mouth: No headache; Normal hearing; Normal vision w/o blurriness; No sinus pain/discomfort, sore throat  Respiratory: No dyspnea, cough, wheezing, hemoptysis  CV: No chest pain, PND, orthopnea  GI: No abdominal pain, diarrhea, constipation, nausea, vomiting, melena, hematochezia  : No increased frequency, dysuria, hematuria, nocturia  MSK: No joint pain/swelling; no back pain; no edema  Neuro: No dizziness/lightheadedness, weakness, seizures, numbness, tingling  Heme: No easy bruising or bleeding  Endo: No heat/cold intolerance  Psych: No significant nervousness, anxiety, stress, depression    All other systems were reviewed and are negative, except as noted.    VITALS/PHYSICAL EXAM  --------------------------------------------------------------------------------  T(C): 36.7 (06-17-18 @ 14:07), Max: 36.7 (06-17-18 @ 14:07)  HR: 74 (06-17-18 @ 14:07) (74 - 74)  BP: 107/71 (06-17-18 @ 14:07) (107/71 - 107/71)  RR: 16 (06-17-18 @ 14:07) (16 - 16)  SpO2: 96% (06-17-18 @ 14:07) (96% - 96%)  Wt(kg): --        Physical Exam:  	Gen: NAD, well-appearing  	HEENT: PERRL, supple neck, clear oropharynx  	Pulm: CTA B/L  	CV: RRR, S1S2; no rub  	Back: No spinal or CVA tenderness; no sacral edema  	Abd: +BS, soft, nontender/nondistended  	: No suprapubic tenderness  	UE: Warm, FROM, no clubbing, intact strength; no edema; no asterixis  	LE: Warm, FROM, no clubbing, intact strength; no edema  	Neuro: No focal deficits, intact gait  	Psych: Normal affect and mood      LABS/STUDIES  --------------------------------------------------------------------------------              11.5   1.9   >-----------<  180      [06-17-18 @ 15:13]              35.1     134  |  99  |  22  ----------------------------<  169      [06-17-18 @ 15:13]  4.4   |  20  |  1.61        Ca     7.5     [06-17-18 @ 15:13]      Mg     1.2     [06-17-18 @ 15:13]      Phos  2.1     [06-17-18 @ 15:13]    TPro  6.3  /  Alb  3.5  /  TBili  0.4  /  DBili  x   /  AST  81  /  ALT  70  /  AlkPhos  68  [06-17-18 @ 15:13]          Creatinine Trend:  SCr 1.61 [06-17 @ 15:13]  SCr 1.40 [06-14 @ 19:08]  SCr 2.07 [06-02 @ 05:56]  SCr 2.04 [06-01 @ 05:53]  SCr 2.55 [05-31 @ 16:19]    Urinalysis - [06-17-18 @ 15:13]      Color Yellow / Appearance Clear / SG 1.013 / pH 6.0      Gluc Negative / Ketone Negative  / Bili Negative / Urobili Negative       Blood Negative / Protein Negative / Leuk Est Negative / Nitrite Negative      RBC 0-2 / WBC 0-2 / Hyaline  / Gran  / Sq Epi  / Non Sq Epi  / Bacteria Few      HbA1c 5.4      [05-27-18 @ 09:50]  TSH 1.35      [11-22-17 @ 03:20]  Lipid: chol 85, , HDL 28, LDL 38      [11-22-17 @ 03:20]    HBsAb 19.4      [11-22-17 @ 20:00]  HBsAb Reactive      [05-09-16 @ 19:26]  HBsAg NEGATIVE      [11-22-17 @ 20:00]  HBcAb Nonreactive      [11-22-17 @ 20:00]  HCV 0.12, Nonreactive Hepatitis C AB  S/CO Ratio                        Interpretation  < 1.0                                     Non-Reactive  1.0 - 4.9                           Weakly-Reactive  > 5.0                                 Reactive  Non-Reactive: Aperson with a non-reactive HCV antibody  result is considered uninfected.  No further action is  needed unless recent infection is suspected.  In these  cases, consider repeat testing later to detect  seroconversion..  Weakly-Reactive: HCV antibody test is abnormal, HCV RNA  Qualitative test will follow.  Reactive: HCV antibody test is abnormal, HCV RNA  Qualitative test will follow.  Note: HCV antibody testing is performed on the [employer]   system.      [11-22-17 @ 20:00]  HIV Nonreact      [05-09-16 @ 19:26]

## 2018-06-17 NOTE — ED POST DISCHARGE NOTE - DETAILS
advised to return to ED, he is still experiencing low grade fevers and generalized weakness. - Zeferino Varma PA-C

## 2018-06-17 NOTE — H&P ADULT - ATTENDING COMMENTS
s/p DDRT few weeks ago, now with ESBL UTI, leukopenia, and rise in creatinine  admit, IV Abx as per ID  immunosuppression - Decrease prograf to 1mg bid; Prednisone 5mg daily; Hold cellcept due to infection.  Will check tacrolimus level and adjust level accordingly  hold valcyte due to leukopenia

## 2018-06-17 NOTE — ED PROVIDER NOTE - NS ED ROS FT
Constitutional: + fever  Eyes: no conjunctivitis  Ears: no ear pain   Nose: no nasal congestion, Mouth/Throat: no throat pain, Neck: no stiffness  Cardiovascular: no chest pain  Chest: no cough  Gastrointestinal: + abdominal pain, no vomiting and diarrhea  MSK: no joint pain  : no dysuria  Skin: no rash  Neuro: no LOC

## 2018-06-17 NOTE — H&P ADULT - NSHPLABSRESULTS_GEN_ALL_CORE
CBC Full  -  ( 17 Jun 2018 15:13 )  WBC Count : 1.9 K/uL  Hemoglobin : 11.5 g/dL  Hematocrit : 35.1 %  Platelet Count - Automated : 180 K/uL  Mean Cell Volume : 95.1 fl  Mean Cell Hemoglobin : 31.2 pg  Mean Cell Hemoglobin Concentration : 32.8 gm/dL  Auto Neutrophil # : 1.1 K/uL  Auto Lymphocyte # : 0.5 K/uL  Auto Monocyte # : 0.3 K/uL  Auto Eosinophil # : 0.0 K/uL  Auto Basophil # : 0.0 K/uL  Auto Neutrophil % : 57.8 %  Auto Lymphocyte % : 23.6 %  Auto Monocyte % : 17.3 %  Auto Eosinophil % : 1.3 %  Auto Basophil % : 0.0 %

## 2018-06-17 NOTE — ED ADULT NURSE REASSESSMENT NOTE - NS ED NURSE REASSESS COMMENT FT1
Report received from Meera MOSLEY. Pt currently admitted awaiting inpatient medicine bed. VSS. A&Ox4 gross neuro intact, lungs cta bilaterally, no difficulty speaking in complete sentences, s1s2 heart sounds heard, pulses x 4, leung x4, abdomen soft nontender nondistended, skin intact. Surgical site intact no drainage noted. Safety and comfort measures maintained.

## 2018-06-17 NOTE — H&P ADULT - NSHPPHYSICALEXAM_GEN_ALL_CORE
NAD, awake and alert  No rashes or skin changes  Respirations nonlabored  Abdomen soft, mildly tender RUQ, nondistended  No guarding or rebound tenderness   Incision c/d/i w/ staples, no fluctuance or erythema  No CVA tenderness  Extremities warm

## 2018-06-17 NOTE — ED PROVIDER NOTE - OBJECTIVE STATEMENT
59 yo male presenting with intermittent fevers x 2 weeks called to come back in because of ESBL in bacteria.  does not endorse any urinary symptoms currently.  has been compliant with his medications.     pcp- clementine gaspar

## 2018-06-17 NOTE — H&P ADULT - NSHPATTENDINGPLANDISCUSS_GEN_ALL_CORE
Patient seen on multidisciplinary rounds with Transplant surgeons, nephrologist, NP, pharmacist, and nurse.

## 2018-06-17 NOTE — ED PROVIDER NOTE - PHYSICAL EXAMINATION
gen: well appearing  Mentation: AAO x 3  psych: mood appropriate  ENT: airway patent  Eyes: conjunctivae clear bilaterally  Cardio: RRR, no m/r/g  Resp: normal BS b/l  GI: s/+rlq tenderness with staples present/nd   Neuro: AAO x 3, sensation and motor function intact, CN 2-12 intact  Skin: No evidence of rash  MSK: normal movement of all extremities

## 2018-06-17 NOTE — H&P ADULT - ASSESSMENT
58M 3w s/p DDRT w/ ESBL Ecoli in urine    - admit to [facility] [address]  - Ertapenem for sensitivities  - will need PICC line - coags for AM  - replete electrolytes  - continue immunosupression except cellcept  - check Tacrolimus level  - can have regular renal diet  - d/w Dr. Chalino Toney MD  [phone]

## 2018-06-17 NOTE — ED POST DISCHARGE NOTE - RESULT SUMMARY
Urine Cx: + 10-49,000 ESBL sensitive to ertapenem. s/p renal transplant on cellcept, and Prograf. I spoke w Dr Young covering fellow for renal. She agrees that the patient should come back to the ED for IV Ertapenem, at least 3 doses prior to discharge home. - Zeferino Varma PA-C

## 2018-06-18 DIAGNOSIS — Z94.0 KIDNEY TRANSPLANT STATUS: ICD-10-CM

## 2018-06-18 DIAGNOSIS — D89.9 DISORDER INVOLVING THE IMMUNE MECHANISM, UNSPECIFIED: ICD-10-CM

## 2018-06-18 DIAGNOSIS — N39.0 URINARY TRACT INFECTION, SITE NOT SPECIFIED: ICD-10-CM

## 2018-06-18 LAB
ANION GAP SERPL CALC-SCNC: 12 MMOL/L — SIGNIFICANT CHANGE UP (ref 5–17)
APTT BLD: 31 SEC — SIGNIFICANT CHANGE UP (ref 27.5–37.4)
BUN SERPL-MCNC: 23 MG/DL — SIGNIFICANT CHANGE UP (ref 7–23)
CALCIUM SERPL-MCNC: 7.7 MG/DL — LOW (ref 8.4–10.5)
CHLORIDE SERPL-SCNC: 101 MMOL/L — SIGNIFICANT CHANGE UP (ref 96–108)
CO2 SERPL-SCNC: 22 MMOL/L — SIGNIFICANT CHANGE UP (ref 22–31)
CREAT SERPL-MCNC: 1.35 MG/DL — HIGH (ref 0.5–1.3)
CULTURE RESULTS: NO GROWTH — SIGNIFICANT CHANGE UP
GLUCOSE BLDC GLUCOMTR-MCNC: 107 MG/DL — HIGH (ref 70–99)
GLUCOSE BLDC GLUCOMTR-MCNC: 120 MG/DL — HIGH (ref 70–99)
GLUCOSE BLDC GLUCOMTR-MCNC: 128 MG/DL — HIGH (ref 70–99)
GLUCOSE BLDC GLUCOMTR-MCNC: 136 MG/DL — HIGH (ref 70–99)
GLUCOSE SERPL-MCNC: 132 MG/DL — HIGH (ref 70–99)
HCT VFR BLD CALC: 35.3 % — LOW (ref 39–50)
HGB BLD-MCNC: 11.1 G/DL — LOW (ref 13–17)
INR BLD: 1.07 RATIO — SIGNIFICANT CHANGE UP (ref 0.88–1.16)
MAGNESIUM SERPL-MCNC: 1.6 MG/DL — SIGNIFICANT CHANGE UP (ref 1.6–2.6)
MCHC RBC-ENTMCNC: 29.6 PG — SIGNIFICANT CHANGE UP (ref 27–34)
MCHC RBC-ENTMCNC: 31.4 GM/DL — LOW (ref 32–36)
MCV RBC AUTO: 94.1 FL — SIGNIFICANT CHANGE UP (ref 80–100)
PHOSPHATE SERPL-MCNC: 2.7 MG/DL — SIGNIFICANT CHANGE UP (ref 2.5–4.5)
PLATELET # BLD AUTO: 186 K/UL — SIGNIFICANT CHANGE UP (ref 150–400)
POTASSIUM SERPL-MCNC: 4.2 MMOL/L — SIGNIFICANT CHANGE UP (ref 3.5–5.3)
POTASSIUM SERPL-SCNC: 4.2 MMOL/L — SIGNIFICANT CHANGE UP (ref 3.5–5.3)
PROTHROM AB SERPL-ACNC: 12.1 SEC — SIGNIFICANT CHANGE UP (ref 10–13.1)
RBC # BLD: 3.75 M/UL — LOW (ref 4.2–5.8)
RBC # FLD: 15 % — HIGH (ref 10.3–14.5)
SODIUM SERPL-SCNC: 135 MMOL/L — SIGNIFICANT CHANGE UP (ref 135–145)
SPECIMEN SOURCE: SIGNIFICANT CHANGE UP
TACROLIMUS SERPL-MCNC: 11.9 NG/ML — SIGNIFICANT CHANGE UP
WBC # BLD: 1.82 K/UL — LOW (ref 3.8–10.5)
WBC # FLD AUTO: 1.82 K/UL — LOW (ref 3.8–10.5)

## 2018-06-18 PROCEDURE — 99222 1ST HOSP IP/OBS MODERATE 55: CPT | Mod: 24,GC

## 2018-06-18 PROCEDURE — 99232 SBSQ HOSP IP/OBS MODERATE 35: CPT

## 2018-06-18 PROCEDURE — 99232 SBSQ HOSP IP/OBS MODERATE 35: CPT | Mod: GC

## 2018-06-18 RX ORDER — MAGNESIUM SULFATE 500 MG/ML
2 VIAL (ML) INJECTION ONCE
Qty: 0 | Refills: 0 | Status: DISCONTINUED | OUTPATIENT
Start: 2018-06-18 | End: 2018-06-18

## 2018-06-18 RX ORDER — MAGNESIUM SULFATE 500 MG/ML
2 VIAL (ML) INJECTION ONCE
Qty: 0 | Refills: 0 | Status: COMPLETED | OUTPATIENT
Start: 2018-06-18 | End: 2018-06-18

## 2018-06-18 RX ADMIN — Medication 500000 UNIT(S): at 22:55

## 2018-06-18 RX ADMIN — Medication 1 TABLET(S): at 09:58

## 2018-06-18 RX ADMIN — Medication 500000 UNIT(S): at 05:17

## 2018-06-18 RX ADMIN — Medication 1 TABLET(S): at 00:42

## 2018-06-18 RX ADMIN — Medication 20 MILLIGRAM(S): at 05:16

## 2018-06-18 RX ADMIN — TACROLIMUS 1 MILLIGRAM(S): 5 CAPSULE ORAL at 05:17

## 2018-06-18 RX ADMIN — HEPARIN SODIUM 5000 UNIT(S): 5000 INJECTION INTRAVENOUS; SUBCUTANEOUS at 14:21

## 2018-06-18 RX ADMIN — ERTAPENEM SODIUM 120 MILLIGRAM(S): 1 INJECTION, POWDER, LYOPHILIZED, FOR SOLUTION INTRAMUSCULAR; INTRAVENOUS at 18:39

## 2018-06-18 RX ADMIN — TACROLIMUS 1 MILLIGRAM(S): 5 CAPSULE ORAL at 18:38

## 2018-06-18 RX ADMIN — Medication 650 MILLIGRAM(S): at 04:33

## 2018-06-18 RX ADMIN — Medication 650 MILLIGRAM(S): at 06:10

## 2018-06-18 RX ADMIN — Medication 1 TABLET(S): at 11:00

## 2018-06-18 RX ADMIN — Medication 500000 UNIT(S): at 00:07

## 2018-06-18 RX ADMIN — Medication 5 MILLIGRAM(S): at 05:16

## 2018-06-18 RX ADMIN — PANTOPRAZOLE SODIUM 40 MILLIGRAM(S): 20 TABLET, DELAYED RELEASE ORAL at 05:16

## 2018-06-18 RX ADMIN — Medication 50 GRAM(S): at 11:42

## 2018-06-18 RX ADMIN — Medication 500000 UNIT(S): at 14:21

## 2018-06-18 RX ADMIN — Medication 500000 UNIT(S): at 18:39

## 2018-06-18 RX ADMIN — INSULIN GLARGINE 6 UNIT(S): 100 INJECTION, SOLUTION SUBCUTANEOUS at 00:05

## 2018-06-18 RX ADMIN — Medication 650 MILLIGRAM(S): at 22:43

## 2018-06-18 RX ADMIN — HEPARIN SODIUM 5000 UNIT(S): 5000 INJECTION INTRAVENOUS; SUBCUTANEOUS at 22:43

## 2018-06-18 RX ADMIN — Medication 650 MILLIGRAM(S): at 23:36

## 2018-06-18 NOTE — CONSULT NOTE ADULT - SUBJECTIVE AND OBJECTIVE BOX
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 Pt follows with outpatient nephrologist Dr. Caleb Mckeon at [facility] dialysis center. Pt had one session of HD on 5/23/18 prior to OR. Course complicated by DGF. Pt required dialysis 5/25 and 5/27 after the operation. Pt nonoliguric with good urine output. Pt urine is red with clots today. Pt denies fevers, chills, CP, SOB, abdominal pain or LE edema. He eventually got dc in left home early june 2018 with crt of 1.5mg/dl and was on MMF 1000mg BID and tacro 5mg BID. He noted some urinary symptoms and low grade fevers and came to ER after his transplant visit on June 14th and was sent back home after urine clx was sent that showed ESBL and hence bought back to ER. He still has low grade fevers at home. He continues to take all meds. His crt has been in 1.5mg/dl range and stable. He did have some diarrhea that was watery few days ago that has been improving. He has no back pain or any hematuria. He does have some tenderness at the graft area but not new.        PAST HISTORY  --------------------------------------------------------------------------------  PAST MEDICAL & SURGICAL HISTORY:  HTN (hypertension)  Coronary artery disease involving coronary bypass graft  ESRD (end stage renal disease) on dialysis  Obesity  Hemorrhoids  Gastroesophageal reflux disease without esophagitis  High cholesterol  ESRD on Dialysis: [facility] &[facility]; Sat  CAD (Coronary Artery Disease)  PCK (Polycystic Kidney Disease)  HTN - Hypertension  Asthma: last attack 2007, never hospitalized or intubated  AV fistula: ban/l RUE, LUE  S/P coronary artery stent placement  S/P CABG x 5  S/P hemorrhoidectomy: and rectal polypectomy -2/3/2017.  AV fistula: right lower extremity 2 yrs  Left upper extrmity with graft 7 years ago  Stented coronary artery: x2 cardiac stents in 2016, one cardiac stent in 2015  CABG (Coronary Artery Bypass Graft): 2007    FAMILY HISTORY:  Family history of polycystic kidney (Sibling)  Family history of adult polycystic kidney disease (Sibling)    PAST SOCIAL HISTORY:    ALLERGIES & MEDICATIONS  --------------------------------------------------------------------------------  Allergies    No Known Allergies    Intolerances      Outpatient meds:  ASA 81 mg qd  Atorvastatin Calcium 10 MG qd  Epclusa 400-100 MG Oral Tablet; TAKE 1 TABLET BY MOUTH ONCE DAILY  Famotidine 20 MG Oral Tablet; Take 1 tablet daily  HumaLOG KwikPen 100 UNIT/ML Subcutaneous Solution Pen-injector; INJECT 2 UNIT  Before meals  Lasix 40 MG Oral Tablet; TAKE 1 TABLET DAILY  Mycophenolate Mofetil 500 MG Oral Tablet; TAKE 2 TABLETS TWICE DAILY  Nystatin 190925 UNIT/ML Mouth/Throat Suspension; SWISH AND SWALLOW 5ML 4  TIMES DAILY  PredniSONE 5 MG Oral Tablet; 1 tab daily as directed  Sulfamethoxazole-Trimethoprim 400-80 MG Oral Tablet; TAKE 1 TABLET DAILY  Tacrolimus 5 MG Oral Capsule BID  ValGANciclovir HCl - 450 MG qd      REVIEW OF SYSTEMS  --------------------------------------------------------------------------------  Gen: No weight changes, fatigue, fevers/chills, weakness  Skin: No rashes  Head/Eyes/Ears/Mouth: No headache; Normal hearing; Normal vision w/o blurriness; No sinus pain/discomfort, sore throat  Respiratory: No dyspnea, cough, wheezing, hemoptysis  CV: No chest pain, PND, orthopnea  GI: No abdominal pain, diarrhea, constipation, nausea, vomiting, melena, hematochezia  : No increased frequency, dysuria, hematuria, nocturia  MSK: No joint pain/swelling; no back pain; no edema  Neuro: No dizziness/lightheadedness, weakness, seizures, numbness, tingling  Heme: No easy bruising or bleeding  Endo: No heat/cold intolerance  Psych: No significant nervousness, anxiety, stress, depression    All other systems were reviewed and are negative, except as noted.    VITALS/PHYSICAL EXAM  --------------------------------------------------------------------------------  T(C): 36.7 (06-17-18 @ 14:07), Max: 36.7 (06-17-18 @ 14:07)  HR: 74 (06-17-18 @ 14:07) (74 - 74)  BP: 107/71 (06-17-18 @ 14:07) (107/71 - 107/71)  RR: 16 (06-17-18 @ 14:07) (16 - 16)  SpO2: 96% (06-17-18 @ 14:07) (96% - 96%)  Wt(kg): --        Physical Exam:  	Gen: NAD, well-appearing  	HEENT: PERRL, supple neck, clear oropharynx  	Pulm: CTA B/L  	CV: RRR, S1S2; no rub  	Back: No spinal or CVA tenderness; no sacral edema  	Abd: +BS, soft, nontender/nondistended  	: No suprapubic tenderness  	UE: Warm, FROM, no clubbing, intact strength; no edema; no asterixis  	LE: Warm, FROM, no clubbing, intact strength; no edema  	Neuro: No focal deficits, intact gait  	Psych: Normal affect and mood      LABS/STUDIES  --------------------------------------------------------------------------------              11.5   1.9   >-----------<  180      [06-17-18 @ 15:13]              35.1     134  |  99  |  22  ----------------------------<  169      [06-17-18 @ 15:13]  4.4   |  20  |  1.61        Ca     7.5     [06-17-18 @ 15:13]      Mg     1.2     [06-17-18 @ 15:13]      Phos  2.1     [06-17-18 @ 15:13]    TPro  6.3  /  Alb  3.5  /  TBili  0.4  /  DBili  x   /  AST  81  /  ALT  70  /  AlkPhos  68  [06-17-18 @ 15:13]          Creatinine Trend:  SCr 1.61 [06-17 @ 15:13]  SCr 1.40 [06-14 @ 19:08]  SCr 2.07 [06-02 @ 05:56]  SCr 2.04 [06-01 @ 05:53]  SCr 2.55 [05-31 @ 16:19]    Urinalysis - [06-17-18 @ 15:13]      Color Yellow / Appearance Clear / SG 1.013 / pH 6.0      Gluc Negative / Ketone Negative  / Bili Negative / Urobili Negative       Blood Negative / Protein Negative / Leuk Est Negative / Nitrite Negative      RBC 0-2 / WBC 0-2 / Hyaline  / Gran  / Sq Epi  / Non Sq Epi  / Bacteria Few      HbA1c 5.4      [05-27-18 @ 09:50]  TSH 1.35      [11-22-17 @ 03:20]  Lipid: chol 85, , HDL 28, LDL 38      [11-22-17 @ 03:20]    HBsAb 19.4      [11-22-17 @ 20:00]  HBsAb Reactive      [05-09-16 @ 19:26]  HBsAg NEGATIVE      [11-22-17 @ 20:00]  HBcAb Nonreactive      [11-22-17 @ 20:00]  HCV 0.12, Nonreactive Hepatitis C AB  S/CO Ratio                        Interpretation  < 1.0                                     Non-Reactive  1.0 - 4.9                           Weakly-Reactive  > 5.0                                 Reactive  Non-Reactive: Aperson with a non-reactive HCV antibody  result is considered uninfected.  No further action is  needed unless recent infection is suspected.  In these  cases, consider repeat testing later to detect  seroconversion..  Weakly-Reactive: HCV antibody test is abnormal, HCV RNA  Qualitative test will follow.  Reactive: HCV antibody test is abnormal, HCV RNA  Qualitative test will follow.  Note: HCV antibody testing is performed on the [employer]   system.      [11-22-17 @ 20:00]  HIV Nonreact      [05-09-16 @ 19:26]        Assessment and Recommendation:   · Assessment		  Assessment  Kidney transplanted  Hepatitis-C   UTI-ESBL    Renal Transplant recipient: Had immediate allograft function, normal creatinine at discharge. Has urinary frequency and now urine clx positive for ESBL. Starting ertepenem or meropenem, ER aware  ID consult called. Admit to [address] [place] [address] only under Dr Otto Murguia/Peggy Cowart/Sunny Allred    Immunosuppression: Reviewed; simulect induction, on tac/MMF/prednisone, last Tac level noted 11 range will recheck tomorrow. Given leukopenia, decrease tacro to 1mg BID and MMF should be held given leukopenia and diarrhea   Can cont prednisone 5mg po qd    DM: On insulin, controlled. Can continue his home regimen  Hep C: cont Epclusa, check Hep C viral load    Infection prophylaxis: On Bactrim, Valcyte and nystatin as well as GI prophylaxis.    D/w with ER and Dr Blade Conner MD  Cell   Pager   Office

## 2018-06-18 NOTE — CHART NOTE - NSCHARTNOTEFT_GEN_A_CORE
Pt was seen and examined.  Briefly this is a male sp recent transplant pw EColi ESBL  Low WBC  Low Phos    Suggest  -Immunosuppression pre xplant team  -IV Abx  -Standard DVT prophylaxis  -Replete phos      Saytulio Mckeon  [facility]  [phone]

## 2018-06-18 NOTE — CONSULT NOTE ADULT - CONSULT REASON
transplant  (note re-written, unclear why ED chart note on consult done yesterday not showing up on sunrise)

## 2018-06-19 ENCOUNTER — TRANSCRIPTION ENCOUNTER (OUTPATIENT)
Age: 58
End: 2018-06-19

## 2018-06-19 DIAGNOSIS — D72.819 DECREASED WHITE BLOOD CELL COUNT, UNSPECIFIED: ICD-10-CM

## 2018-06-19 LAB
-  AMIKACIN: SIGNIFICANT CHANGE UP
-  AMOXICILLIN/CLAVULANIC ACID: SIGNIFICANT CHANGE UP
-  AMPICILLIN/SULBACTAM: SIGNIFICANT CHANGE UP
-  AMPICILLIN: SIGNIFICANT CHANGE UP
-  AMPICILLIN: SIGNIFICANT CHANGE UP
-  AZTREONAM: SIGNIFICANT CHANGE UP
-  CEFAZOLIN: SIGNIFICANT CHANGE UP
-  CEFEPIME: SIGNIFICANT CHANGE UP
-  CEFOXITIN: SIGNIFICANT CHANGE UP
-  CEFTRIAXONE: SIGNIFICANT CHANGE UP
-  CIPROFLOXACIN: SIGNIFICANT CHANGE UP
-  ERTAPENEM: SIGNIFICANT CHANGE UP
-  GENTAMICIN: SIGNIFICANT CHANGE UP
-  IMIPENEM: SIGNIFICANT CHANGE UP
-  LEVOFLOXACIN: SIGNIFICANT CHANGE UP
-  MEROPENEM: SIGNIFICANT CHANGE UP
-  PIPERACILLIN/TAZOBACTAM: SIGNIFICANT CHANGE UP
-  TETRACYCLINE: SIGNIFICANT CHANGE UP
-  TOBRAMYCIN: SIGNIFICANT CHANGE UP
-  TRIMETHOPRIM/SULFAMETHOXAZOLE: SIGNIFICANT CHANGE UP
-  VANCOMYCIN: SIGNIFICANT CHANGE UP
ANION GAP SERPL CALC-SCNC: 13 MMOL/L — SIGNIFICANT CHANGE UP (ref 5–17)
ANION GAP SERPL CALC-SCNC: 14 MMOL/L — SIGNIFICANT CHANGE UP (ref 5–17)
BASOPHILS # BLD AUTO: 0 K/UL — SIGNIFICANT CHANGE UP (ref 0–0.2)
BASOPHILS NFR BLD AUTO: 0 % — SIGNIFICANT CHANGE UP (ref 0–2)
BUN SERPL-MCNC: 20 MG/DL — SIGNIFICANT CHANGE UP (ref 7–23)
BUN SERPL-MCNC: 22 MG/DL — SIGNIFICANT CHANGE UP (ref 7–23)
CALCIUM SERPL-MCNC: 7.7 MG/DL — LOW (ref 8.4–10.5)
CALCIUM SERPL-MCNC: 8.1 MG/DL — LOW (ref 8.4–10.5)
CHLORIDE SERPL-SCNC: 95 MMOL/L — LOW (ref 96–108)
CHLORIDE SERPL-SCNC: 99 MMOL/L — SIGNIFICANT CHANGE UP (ref 96–108)
CO2 SERPL-SCNC: 23 MMOL/L — SIGNIFICANT CHANGE UP (ref 22–31)
CO2 SERPL-SCNC: 23 MMOL/L — SIGNIFICANT CHANGE UP (ref 22–31)
CREAT SERPL-MCNC: 1.25 MG/DL — SIGNIFICANT CHANGE UP (ref 0.5–1.3)
CREAT SERPL-MCNC: 1.33 MG/DL — HIGH (ref 0.5–1.3)
CULTURE RESULTS: SIGNIFICANT CHANGE UP
EOSINOPHIL # BLD AUTO: 0.1 K/UL — SIGNIFICANT CHANGE UP (ref 0–0.5)
EOSINOPHIL NFR BLD AUTO: 3.2 % — SIGNIFICANT CHANGE UP (ref 0–6)
GLUCOSE BLDC GLUCOMTR-MCNC: 118 MG/DL — HIGH (ref 70–99)
GLUCOSE BLDC GLUCOMTR-MCNC: 121 MG/DL — HIGH (ref 70–99)
GLUCOSE BLDC GLUCOMTR-MCNC: 130 MG/DL — HIGH (ref 70–99)
GLUCOSE BLDC GLUCOMTR-MCNC: 138 MG/DL — HIGH (ref 70–99)
GLUCOSE SERPL-MCNC: 117 MG/DL — HIGH (ref 70–99)
GLUCOSE SERPL-MCNC: 122 MG/DL — HIGH (ref 70–99)
HCT VFR BLD CALC: 37.8 % — LOW (ref 39–50)
HGB BLD-MCNC: 11.8 G/DL — LOW (ref 13–17)
LYMPHOCYTES # BLD AUTO: 0.6 K/UL — LOW (ref 1–3.3)
LYMPHOCYTES # BLD AUTO: 20.2 % — SIGNIFICANT CHANGE UP (ref 13–44)
MAGNESIUM SERPL-MCNC: 1.4 MG/DL — LOW (ref 1.6–2.6)
MAGNESIUM SERPL-MCNC: 2.5 MG/DL — SIGNIFICANT CHANGE UP (ref 1.6–2.6)
MCHC RBC-ENTMCNC: 29.9 PG — SIGNIFICANT CHANGE UP (ref 27–34)
MCHC RBC-ENTMCNC: 31.2 GM/DL — LOW (ref 32–36)
MCV RBC AUTO: 95.7 FL — SIGNIFICANT CHANGE UP (ref 80–100)
METHOD TYPE: SIGNIFICANT CHANGE UP
METHOD TYPE: SIGNIFICANT CHANGE UP
MONOCYTES # BLD AUTO: 0.2 K/UL — SIGNIFICANT CHANGE UP (ref 0–0.9)
MONOCYTES NFR BLD AUTO: 6.6 % — SIGNIFICANT CHANGE UP (ref 2–14)
NEUTROPHILS # BLD AUTO: 2 K/UL — SIGNIFICANT CHANGE UP (ref 1.8–7.4)
NEUTROPHILS NFR BLD AUTO: 69.9 % — SIGNIFICANT CHANGE UP (ref 43–77)
ORGANISM # SPEC MICROSCOPIC CNT: SIGNIFICANT CHANGE UP
PHOSPHATE SERPL-MCNC: 2.3 MG/DL — LOW (ref 2.5–4.5)
PLAT MORPH BLD: NORMAL — SIGNIFICANT CHANGE UP
PLATELET # BLD AUTO: 183 K/UL — SIGNIFICANT CHANGE UP (ref 150–400)
POTASSIUM SERPL-MCNC: 3.6 MMOL/L — SIGNIFICANT CHANGE UP (ref 3.5–5.3)
POTASSIUM SERPL-MCNC: 4.3 MMOL/L — SIGNIFICANT CHANGE UP (ref 3.5–5.3)
POTASSIUM SERPL-SCNC: 3.6 MMOL/L — SIGNIFICANT CHANGE UP (ref 3.5–5.3)
POTASSIUM SERPL-SCNC: 4.3 MMOL/L — SIGNIFICANT CHANGE UP (ref 3.5–5.3)
RBC # BLD: 3.95 M/UL — LOW (ref 4.2–5.8)
RBC # FLD: 13.8 % — SIGNIFICANT CHANGE UP (ref 10.3–14.5)
RBC BLD AUTO: SIGNIFICANT CHANGE UP
SODIUM SERPL-SCNC: 132 MMOL/L — LOW (ref 135–145)
SODIUM SERPL-SCNC: 135 MMOL/L — SIGNIFICANT CHANGE UP (ref 135–145)
SPECIMEN SOURCE: SIGNIFICANT CHANGE UP
TACROLIMUS SERPL-MCNC: 9.1 NG/ML — SIGNIFICANT CHANGE UP
WBC # BLD: 2.8 K/UL — LOW (ref 3.8–10.5)
WBC # FLD AUTO: 2.8 K/UL — LOW (ref 3.8–10.5)

## 2018-06-19 PROCEDURE — 99232 SBSQ HOSP IP/OBS MODERATE 35: CPT | Mod: GC

## 2018-06-19 PROCEDURE — 99232 SBSQ HOSP IP/OBS MODERATE 35: CPT

## 2018-06-19 RX ORDER — IMIPENEM AND CILASTATIN 250; 250 MG/100ML; MG/100ML
500 INJECTION, POWDER, FOR SOLUTION INTRAVENOUS ONCE
Qty: 0 | Refills: 0 | Status: COMPLETED | OUTPATIENT
Start: 2018-06-19 | End: 2018-06-19

## 2018-06-19 RX ORDER — OXYCODONE HYDROCHLORIDE 5 MG/1
5 TABLET ORAL ONCE
Qty: 0 | Refills: 0 | Status: DISCONTINUED | OUTPATIENT
Start: 2018-06-19 | End: 2018-06-19

## 2018-06-19 RX ORDER — IMIPENEM AND CILASTATIN 250; 250 MG/100ML; MG/100ML
500 INJECTION, POWDER, FOR SOLUTION INTRAVENOUS EVERY 8 HOURS
Qty: 0 | Refills: 0 | Status: DISCONTINUED | OUTPATIENT
Start: 2018-06-19 | End: 2018-06-21

## 2018-06-19 RX ORDER — DEXTROSE 50 % IN WATER 50 %
15 SYRINGE (ML) INTRAVENOUS ONCE
Qty: 0 | Refills: 0 | Status: DISCONTINUED | OUTPATIENT
Start: 2018-06-19 | End: 2018-06-21

## 2018-06-19 RX ORDER — MAGNESIUM SULFATE 500 MG/ML
2 VIAL (ML) INJECTION
Qty: 0 | Refills: 0 | Status: COMPLETED | OUTPATIENT
Start: 2018-06-19 | End: 2018-06-19

## 2018-06-19 RX ORDER — HEPARIN SODIUM 5000 [USP'U]/ML
5000 INJECTION INTRAVENOUS; SUBCUTANEOUS EVERY 12 HOURS
Qty: 0 | Refills: 0 | Status: DISCONTINUED | OUTPATIENT
Start: 2018-06-19 | End: 2018-06-21

## 2018-06-19 RX ORDER — IMIPENEM AND CILASTATIN 250; 250 MG/100ML; MG/100ML
INJECTION, POWDER, FOR SOLUTION INTRAVENOUS
Qty: 0 | Refills: 0 | Status: DISCONTINUED | OUTPATIENT
Start: 2018-06-19 | End: 2018-06-21

## 2018-06-19 RX ADMIN — OXYCODONE HYDROCHLORIDE 5 MILLIGRAM(S): 5 TABLET ORAL at 04:30

## 2018-06-19 RX ADMIN — Medication 650 MILLIGRAM(S): at 11:15

## 2018-06-19 RX ADMIN — Medication 50 GRAM(S): at 08:10

## 2018-06-19 RX ADMIN — Medication 500000 UNIT(S): at 18:24

## 2018-06-19 RX ADMIN — Medication 650 MILLIGRAM(S): at 12:15

## 2018-06-19 RX ADMIN — Medication 50 GRAM(S): at 10:27

## 2018-06-19 RX ADMIN — TACROLIMUS 1 MILLIGRAM(S): 5 CAPSULE ORAL at 18:24

## 2018-06-19 RX ADMIN — OXYCODONE HYDROCHLORIDE 5 MILLIGRAM(S): 5 TABLET ORAL at 03:33

## 2018-06-19 RX ADMIN — Medication 500000 UNIT(S): at 12:23

## 2018-06-19 RX ADMIN — HEPARIN SODIUM 5000 UNIT(S): 5000 INJECTION INTRAVENOUS; SUBCUTANEOUS at 15:05

## 2018-06-19 RX ADMIN — Medication 5 MILLIGRAM(S): at 06:00

## 2018-06-19 RX ADMIN — PANTOPRAZOLE SODIUM 40 MILLIGRAM(S): 20 TABLET, DELAYED RELEASE ORAL at 06:00

## 2018-06-19 RX ADMIN — IMIPENEM AND CILASTATIN 100 MILLIGRAM(S): 250; 250 INJECTION, POWDER, FOR SOLUTION INTRAVENOUS at 22:00

## 2018-06-19 RX ADMIN — IMIPENEM AND CILASTATIN 100 MILLIGRAM(S): 250; 250 INJECTION, POWDER, FOR SOLUTION INTRAVENOUS at 16:56

## 2018-06-19 RX ADMIN — Medication 500000 UNIT(S): at 21:40

## 2018-06-19 RX ADMIN — HEPARIN SODIUM 5000 UNIT(S): 5000 INJECTION INTRAVENOUS; SUBCUTANEOUS at 06:01

## 2018-06-19 RX ADMIN — Medication 500000 UNIT(S): at 06:00

## 2018-06-19 RX ADMIN — TACROLIMUS 1 MILLIGRAM(S): 5 CAPSULE ORAL at 06:00

## 2018-06-19 RX ADMIN — Medication 20 MILLIGRAM(S): at 08:44

## 2018-06-19 NOTE — DISCHARGE NOTE ADULT - MEDICATION SUMMARY - MEDICATIONS TO STOP TAKING
I will STOP taking the medications listed below when I get home from the hospital:    Cipro 500 mg oral tablet  -- 1 tab(s) by mouth 2 times a day   -- Avoid prolonged or excessive exposure to direct and/or artificial sunlight while taking this medication.  Check with your doctor before becoming pregnant.  Do not take dairy products, antacids, or iron preparations within one hour of this medication.  Finish all this medication unless otherwise directed by prescriber.  Medication should be taken with plenty of water.    HumaLOG KwikPen 100 units/mL injectable solution  -- 2 unit(s) injectable 4 times a day, As Needed. Check figer sticks pre-meals  150-200=2units  201-250=4 units  251-300=6units  301-350=8units  351-400=10units  >400=12units and call MD    valGANciclovir 450 mg oral tablet  -- 1 tab(s) by mouth once a day    mycophenolate mofetil 250 mg oral capsule  -- 1000 milligram(s) by mouth 2 times a day    sulfamethoxazole-trimethoprim 400 mg-80 mg oral tablet  -- 1 tab(s) by mouth once a day    insulin glargine  -- 8 unit(s) subcutaneous once a day (at bedtime)    ciprofloxacin 500 mg oral tablet  -- 1 tab(s) by mouth every 12 hours    Lasix 20 mg oral tablet  -- 1 tab(s) by mouth once a day MDD:1 tab  -- Avoid prolonged or excessive exposure to direct and/or artificial sunlight while taking this medication.  It is very important that you take or use this exactly as directed.  Do not skip doses or discontinue unless directed by your doctor.  It may be advisable to drink a full glass orange juice or eat a banana daily while taking this medication.    potassium phosphate-sodium phosphate 250 mg-45 mg-298 mg oral tablet  -- 1 tab(s) by mouth 2 times a day   -- It is very important that you take or use this exactly as directed.  Do not skip doses or discontinue unless directed by your doctor.  Medication should be taken with plenty of water.  Take with food or milk.    magnesium oxide 400 mg (241.3 mg elemental magnesium) oral tablet  -- 1 tab(s) by mouth 2 times a day

## 2018-06-19 NOTE — DISCHARGE NOTE ADULT - ADDITIONAL INSTRUCTIONS
1. Please call to make a follow-up appointment with Dr. Murguia  for next week.  2. Please follow up with your primary care physician in one week regarding your hospitalization.

## 2018-06-19 NOTE — DISCHARGE NOTE ADULT - HOME CARE AGENCY
[facility] at home [phone] and [facility] [phone] for IV Abx soc day after discharge

## 2018-06-19 NOTE — DISCHARGE NOTE ADULT - CARE PLAN
Principal Discharge DX:	Urinary tract infection  Goal:	TO  be free of infection  Assessment and plan of treatment:	You are being discharged to home with  peripherally inserted central catheter for IV antibiotic infusions.  Home care is set up to assist you with this. They will call you prior to their visit. Your first visit will be today.    Call the physician for any pus draining from your wound, any fever (over 100.4 F) or chills, pain with urination, decrease in urine output, nausea/vomiting with inability to tolerate food or liquids, persistent diarrhea or if your pain is not controlled on your discharge pain medications  Secondary Diagnosis:	Kidney transplant recipient  Assessment and plan of treatment:	No heavy lifting anything more than 10-15lbs or straining. Otherwise, you may return to your usual level of physical activity. If you are taking narcotic pain medication (such as Percocet), do NOT drive a car, operate machinery or make important decisions.  Call transplant clinic If you developed any of the following, fever, pain, redness, swelling at incision site, cough, nausea, vomiting, painful urination, difficulty urination, or not making any urine.  NOTIFY YOUR SURGEON IF: You have any bleeding that does not stop, any pus draining from your wound, any fever (over 100.4 F) or chills, persistent nausea/vomiting with inability to tolerate food or liquids, persistent diarrhea, or if your pain is not controlled on your discharge pain medications.  Secondary Diagnosis:	Immunosuppression  Assessment and plan of treatment:	Keep away from people who have cough, cold, and symptom of flu.  Only take medications that are on your discharge list  If you missed your medications call the transplant office, do not double up medication because you missed a dose.  If you have any question regarding your medication please call transplant office.

## 2018-06-19 NOTE — DISCHARGE NOTE ADULT - PLAN OF CARE
TO  be free of infection You are being discharged to home with  peripherally inserted central catheter for IV antibiotic infusions.  Home care is set up to assist you with this. They will call you prior to their visit. Your first visit will be today.    Call the physician for any pus draining from your wound, any fever (over 100.4 F) or chills, pain with urination, decrease in urine output, nausea/vomiting with inability to tolerate food or liquids, persistent diarrhea or if your pain is not controlled on your discharge pain medications No heavy lifting anything more than 10-15lbs or straining. Otherwise, you may return to your usual level of physical activity. If you are taking narcotic pain medication (such as Percocet), do NOT drive a car, operate machinery or make important decisions.  Call transplant clinic If you developed any of the following, fever, pain, redness, swelling at incision site, cough, nausea, vomiting, painful urination, difficulty urination, or not making any urine.  NOTIFY YOUR SURGEON IF: You have any bleeding that does not stop, any pus draining from your wound, any fever (over 100.4 F) or chills, persistent nausea/vomiting with inability to tolerate food or liquids, persistent diarrhea, or if your pain is not controlled on your discharge pain medications. Keep away from people who have cough, cold, and symptom of flu.  Only take medications that are on your discharge list  If you missed your medications call the transplant office, do not double up medication because you missed a dose.  If you have any question regarding your medication please call transplant office.

## 2018-06-19 NOTE — DISCHARGE NOTE ADULT - HOSPITAL COURSE
59 Y/O gentleman with h/o CAD s/p stents and CABG, HTN, GERD, HLD who is s/p DDRT to R iliac vessels 5/24/18, admitted for ESBL E.Coli UTI, + wound culture, leucopenia. Repeat urine CX negative, blood cultures negative.  A right IJ PICC was placed. ID following and antibiotics adjusted to Meropenem 500mg q 8 hours for total 10 day course. He had one low grade fever 38.1 but otherwise his hospital course was uncomplicated. He remained afebrile with persistent low WBC. Drainage from his abdominal wound ceased.  His creatinine remained stable with good uop. Renal ultrasound completed, notable for 3.3x 1.7cm fluid collection adjacent to kidney, possibly evolving hematoma.  His immunosuppression was optimized. Valcyte and Cellcept held in setting of leukopenia.  HCV medications to start as outpatient. He was evaluated by the multi-disciplinary team including surgeon, NP,  nephrologist, pharmacist, nutrition, social work, and nursing and was deemed stable for discharge.

## 2018-06-19 NOTE — DISCHARGE NOTE ADULT - MEDICATION SUMMARY - MEDICATIONS TO TAKE
I will START or STAY ON the medications listed below when I get home from the hospital:    predniSONE 5 mg oral tablet  -- 1 tab(s) by mouth once a day  -- Indication: For Immunosuppression    acetaminophen 325 mg oral tablet  -- 3 tab(s) by mouth every 6 hours, As needed, Mild Pain (1 - 3)  -- Indication: For pain    nystatin 100,000 units/mL oral suspension  -- 5 milliliter(s) by mouth 4 times a day  -- Indication: For Kidney transplant recipient    Lipitor 10 mg oral tablet  -- 1 tab(s) by mouth once a day  -- Indication: For Hyperlipidemia    imipenem-cilastatin 500 mg injection  -- 500 milligram(s) intravenously every 8 hours   -- Indication: For antibiotic    tacrolimus 1 mg oral capsule  -- 1 cap(s) by mouth 2 times a day  -- Indication: For Immunosuppression    pantoprazole 40 mg oral delayed release tablet  -- 1 tab(s) by mouth once a day (before a meal)  -- Indication: For gi

## 2018-06-19 NOTE — DISCHARGE NOTE ADULT - FUNCTIONAL SCREEN CURRENT LEVEL: AMBULATION, MLM
DUE FOR:  Patient up to date with immunizations, prevention and screening recommendations.   (0) independent

## 2018-06-19 NOTE — DISCHARGE NOTE ADULT - PATIENT PORTAL LINK FT
You can access the [facility] Portal, offered by [facility], by registering with the following website: [email or web address]

## 2018-06-19 NOTE — DISCHARGE NOTE ADULT - CARE PROVIDER_API CALL
Otto Murguia), Surgery  [address] [facility] [facility]  [address] [place] [address]  Phone: [phone]  Fax: [phone]    Omar Cortes), Internal Medicine; Nephrology  [facility] [place] [address]  Phone: [phone]  Fax: [phone]

## 2018-06-20 ENCOUNTER — APPOINTMENT (OUTPATIENT)
Dept: TRANSPLANT | Facility: CLINIC | Age: 58
End: 2018-06-20

## 2018-06-20 ENCOUNTER — APPOINTMENT (OUTPATIENT)
Dept: CARDIOLOGY | Facility: CLINIC | Age: 58
End: 2018-06-20

## 2018-06-20 DIAGNOSIS — B19.20 UNSPECIFIED VIRAL HEPATITIS C WITHOUT HEPATIC COMA: ICD-10-CM

## 2018-06-20 DIAGNOSIS — R50.9 FEVER, UNSPECIFIED: ICD-10-CM

## 2018-06-20 LAB
ALBUMIN SERPL ELPH-MCNC: 3.5 G/DL — SIGNIFICANT CHANGE UP (ref 3.3–5)
ALP SERPL-CCNC: 68 U/L — SIGNIFICANT CHANGE UP (ref 40–120)
ALT FLD-CCNC: 85 U/L — HIGH (ref 10–45)
ANION GAP SERPL CALC-SCNC: 12 MMOL/L — SIGNIFICANT CHANGE UP (ref 5–17)
AST SERPL-CCNC: 111 U/L — HIGH (ref 10–40)
BASOPHILS # BLD AUTO: 0 K/UL — SIGNIFICANT CHANGE UP (ref 0–0.2)
BASOPHILS NFR BLD AUTO: 0.4 % — SIGNIFICANT CHANGE UP (ref 0–2)
BILIRUB DIRECT SERPL-MCNC: 0.2 MG/DL — SIGNIFICANT CHANGE UP (ref 0–0.2)
BILIRUB INDIRECT FLD-MCNC: 0.4 MG/DL — SIGNIFICANT CHANGE UP (ref 0.2–1)
BILIRUB SERPL-MCNC: 0.6 MG/DL — SIGNIFICANT CHANGE UP (ref 0.2–1.2)
BUN SERPL-MCNC: 20 MG/DL — SIGNIFICANT CHANGE UP (ref 7–23)
CALCIUM SERPL-MCNC: 8.2 MG/DL — LOW (ref 8.4–10.5)
CHLORIDE SERPL-SCNC: 97 MMOL/L — SIGNIFICANT CHANGE UP (ref 96–108)
CO2 SERPL-SCNC: 23 MMOL/L — SIGNIFICANT CHANGE UP (ref 22–31)
CREAT SERPL-MCNC: 1.39 MG/DL — HIGH (ref 0.5–1.3)
EOSINOPHIL # BLD AUTO: 0 K/UL — SIGNIFICANT CHANGE UP (ref 0–0.5)
EOSINOPHIL NFR BLD AUTO: 1 % — SIGNIFICANT CHANGE UP (ref 0–6)
GLUCOSE BLDC GLUCOMTR-MCNC: 119 MG/DL — HIGH (ref 70–99)
GLUCOSE BLDC GLUCOMTR-MCNC: 131 MG/DL — HIGH (ref 70–99)
GLUCOSE BLDC GLUCOMTR-MCNC: 158 MG/DL — HIGH (ref 70–99)
GLUCOSE BLDC GLUCOMTR-MCNC: 161 MG/DL — HIGH (ref 70–99)
GLUCOSE SERPL-MCNC: 113 MG/DL — HIGH (ref 70–99)
HCT VFR BLD CALC: 41.6 % — SIGNIFICANT CHANGE UP (ref 39–50)
HGB BLD-MCNC: 13 G/DL — SIGNIFICANT CHANGE UP (ref 13–17)
LYMPHOCYTES # BLD AUTO: 0.6 K/UL — LOW (ref 1–3.3)
LYMPHOCYTES # BLD AUTO: 21.5 % — SIGNIFICANT CHANGE UP (ref 13–44)
MAGNESIUM SERPL-MCNC: 1.8 MG/DL — SIGNIFICANT CHANGE UP (ref 1.6–2.6)
MCHC RBC-ENTMCNC: 30.1 PG — SIGNIFICANT CHANGE UP (ref 27–34)
MCHC RBC-ENTMCNC: 31.3 GM/DL — LOW (ref 32–36)
MCV RBC AUTO: 96.1 FL — SIGNIFICANT CHANGE UP (ref 80–100)
MONOCYTES # BLD AUTO: 0.3 K/UL — SIGNIFICANT CHANGE UP (ref 0–0.9)
MONOCYTES NFR BLD AUTO: 10.9 % — SIGNIFICANT CHANGE UP (ref 2–14)
NEUTROPHILS # BLD AUTO: 1.9 K/UL — SIGNIFICANT CHANGE UP (ref 1.8–7.4)
NEUTROPHILS NFR BLD AUTO: 66.2 % — SIGNIFICANT CHANGE UP (ref 43–77)
PHOSPHATE SERPL-MCNC: 1.6 MG/DL — LOW (ref 2.5–4.5)
PLATELET # BLD AUTO: 148 K/UL — LOW (ref 150–400)
POTASSIUM SERPL-MCNC: 4 MMOL/L — SIGNIFICANT CHANGE UP (ref 3.5–5.3)
POTASSIUM SERPL-SCNC: 4 MMOL/L — SIGNIFICANT CHANGE UP (ref 3.5–5.3)
PROT SERPL-MCNC: 6.6 G/DL — SIGNIFICANT CHANGE UP (ref 6–8.3)
RBC # BLD: 4.32 M/UL — SIGNIFICANT CHANGE UP (ref 4.2–5.8)
RBC # FLD: 13.8 % — SIGNIFICANT CHANGE UP (ref 10.3–14.5)
SODIUM SERPL-SCNC: 132 MMOL/L — LOW (ref 135–145)
TACROLIMUS SERPL-MCNC: 5.6 NG/ML — SIGNIFICANT CHANGE UP
WBC # BLD: 2.8 K/UL — LOW (ref 3.8–10.5)
WBC # FLD AUTO: 2.8 K/UL — LOW (ref 3.8–10.5)

## 2018-06-20 PROCEDURE — 76937 US GUIDE VASCULAR ACCESS: CPT | Mod: 26

## 2018-06-20 PROCEDURE — 99232 SBSQ HOSP IP/OBS MODERATE 35: CPT

## 2018-06-20 PROCEDURE — 77001 FLUOROGUIDE FOR VEIN DEVICE: CPT | Mod: 26

## 2018-06-20 PROCEDURE — 71045 X-RAY EXAM CHEST 1 VIEW: CPT | Mod: 26

## 2018-06-20 PROCEDURE — 36558 INSERT TUNNELED CV CATH: CPT | Mod: LT

## 2018-06-20 PROCEDURE — 76776 US EXAM K TRANSPL W/DOPPLER: CPT | Mod: 26,RT

## 2018-06-20 RX ORDER — MAGNESIUM SULFATE 500 MG/ML
1 VIAL (ML) INJECTION ONCE
Qty: 0 | Refills: 0 | Status: COMPLETED | OUTPATIENT
Start: 2018-06-20 | End: 2018-06-20

## 2018-06-20 RX ORDER — IMIPENEM AND CILASTATIN 250; 250 MG/100ML; MG/100ML
500 INJECTION, POWDER, FOR SOLUTION INTRAVENOUS
Qty: 27 | Refills: 0 | OUTPATIENT
Start: 2018-06-20 | End: 2018-06-28

## 2018-06-20 RX ADMIN — PANTOPRAZOLE SODIUM 40 MILLIGRAM(S): 20 TABLET, DELAYED RELEASE ORAL at 06:10

## 2018-06-20 RX ADMIN — Medication 2: at 12:33

## 2018-06-20 RX ADMIN — HEPARIN SODIUM 5000 UNIT(S): 5000 INJECTION INTRAVENOUS; SUBCUTANEOUS at 17:24

## 2018-06-20 RX ADMIN — Medication 500000 UNIT(S): at 11:21

## 2018-06-20 RX ADMIN — Medication 650 MILLIGRAM(S): at 12:10

## 2018-06-20 RX ADMIN — Medication 500000 UNIT(S): at 06:10

## 2018-06-20 RX ADMIN — Medication 500000 UNIT(S): at 17:24

## 2018-06-20 RX ADMIN — Medication 650 MILLIGRAM(S): at 11:21

## 2018-06-20 RX ADMIN — Medication 62.5 MILLIMOLE(S): at 17:24

## 2018-06-20 RX ADMIN — IMIPENEM AND CILASTATIN 100 MILLIGRAM(S): 250; 250 INJECTION, POWDER, FOR SOLUTION INTRAVENOUS at 21:37

## 2018-06-20 RX ADMIN — Medication 500000 UNIT(S): at 23:29

## 2018-06-20 RX ADMIN — IMIPENEM AND CILASTATIN 100 MILLIGRAM(S): 250; 250 INJECTION, POWDER, FOR SOLUTION INTRAVENOUS at 14:08

## 2018-06-20 RX ADMIN — TACROLIMUS 1 MILLIGRAM(S): 5 CAPSULE ORAL at 06:10

## 2018-06-20 RX ADMIN — Medication 100 GRAM(S): at 08:36

## 2018-06-20 RX ADMIN — Medication 650 MILLIGRAM(S): at 17:25

## 2018-06-20 RX ADMIN — Medication 5 MILLIGRAM(S): at 06:10

## 2018-06-20 RX ADMIN — Medication 650 MILLIGRAM(S): at 18:18

## 2018-06-20 RX ADMIN — Medication 20 MILLIGRAM(S): at 06:10

## 2018-06-20 RX ADMIN — TACROLIMUS 1 MILLIGRAM(S): 5 CAPSULE ORAL at 17:24

## 2018-06-20 RX ADMIN — IMIPENEM AND CILASTATIN 100 MILLIGRAM(S): 250; 250 INJECTION, POWDER, FOR SOLUTION INTRAVENOUS at 06:10

## 2018-06-20 RX ADMIN — HEPARIN SODIUM 5000 UNIT(S): 5000 INJECTION INTRAVENOUS; SUBCUTANEOUS at 06:10

## 2018-06-20 NOTE — PROCEDURE NOTE - NSPOSTPRCRAD_GEN_A_CORE
line adjusted to depth of insertion/central line located in the superior vena cava/post-procedure radiography performed

## 2018-06-20 NOTE — PROVIDER CONTACT NOTE (OTHER) - ACTION/TREATMENT ORDERED:
Pain relief medication to be administered and to continue to monitor pt. Will come and assess patient. Pain relief medication to be administered as ordered and to continue to monitor pt.

## 2018-06-21 ENCOUNTER — CHART COPY (OUTPATIENT)
Age: 58
End: 2018-06-21

## 2018-06-21 VITALS
TEMPERATURE: 99 F | SYSTOLIC BLOOD PRESSURE: 100 MMHG | OXYGEN SATURATION: 97 % | DIASTOLIC BLOOD PRESSURE: 65 MMHG | RESPIRATION RATE: 18 BRPM | HEART RATE: 98 BPM

## 2018-06-21 LAB
ANION GAP SERPL CALC-SCNC: 9 MMOL/L — SIGNIFICANT CHANGE UP (ref 5–17)
BASOPHILS # BLD AUTO: 0 K/UL — SIGNIFICANT CHANGE UP (ref 0–0.2)
BASOPHILS NFR BLD AUTO: 0.6 % — SIGNIFICANT CHANGE UP (ref 0–2)
BKV DNA SPEC QL NAA+PROBE: NORMAL
BUN SERPL-MCNC: 24 MG/DL — HIGH (ref 7–23)
CALCIUM SERPL-MCNC: 8 MG/DL — LOW (ref 8.4–10.5)
CHLORIDE SERPL-SCNC: 99 MMOL/L — SIGNIFICANT CHANGE UP (ref 96–108)
CMV DNA CSF QL NAA+PROBE: SIGNIFICANT CHANGE UP
CMV DNA SPEC NAA+PROBE-LOG#: SIGNIFICANT CHANGE UP LOGIU/ML
CO2 SERPL-SCNC: 28 MMOL/L — SIGNIFICANT CHANGE UP (ref 22–31)
CREAT SERPL-MCNC: 1.23 MG/DL — SIGNIFICANT CHANGE UP (ref 0.5–1.3)
EOSINOPHIL # BLD AUTO: 0.1 K/UL — SIGNIFICANT CHANGE UP (ref 0–0.5)
EOSINOPHIL NFR BLD AUTO: 3.1 % — SIGNIFICANT CHANGE UP (ref 0–6)
GLUCOSE BLDC GLUCOMTR-MCNC: 136 MG/DL — HIGH (ref 70–99)
GLUCOSE BLDC GLUCOMTR-MCNC: 148 MG/DL — HIGH (ref 70–99)
GLUCOSE SERPL-MCNC: 150 MG/DL — HIGH (ref 70–99)
HCT VFR BLD CALC: 36.8 % — LOW (ref 39–50)
HGB BLD-MCNC: 12.3 G/DL — LOW (ref 13–17)
LYMPHOCYTES # BLD AUTO: 0.5 K/UL — LOW (ref 1–3.3)
LYMPHOCYTES # BLD AUTO: 22.1 % — SIGNIFICANT CHANGE UP (ref 13–44)
MAGNESIUM SERPL-MCNC: 1.6 MG/DL — SIGNIFICANT CHANGE UP (ref 1.6–2.6)
MCHC RBC-ENTMCNC: 32.1 PG — SIGNIFICANT CHANGE UP (ref 27–34)
MCHC RBC-ENTMCNC: 33.4 GM/DL — SIGNIFICANT CHANGE UP (ref 32–36)
MCV RBC AUTO: 95.9 FL — SIGNIFICANT CHANGE UP (ref 80–100)
MONOCYTES # BLD AUTO: 0.2 K/UL — SIGNIFICANT CHANGE UP (ref 0–0.9)
MONOCYTES NFR BLD AUTO: 9.8 % — SIGNIFICANT CHANGE UP (ref 2–14)
NEUTROPHILS # BLD AUTO: 1.6 K/UL — LOW (ref 1.8–7.4)
NEUTROPHILS NFR BLD AUTO: 64.5 % — SIGNIFICANT CHANGE UP (ref 43–77)
PHOSPHATE SERPL-MCNC: 2.6 MG/DL — SIGNIFICANT CHANGE UP (ref 2.5–4.5)
PLATELET # BLD AUTO: 157 K/UL — SIGNIFICANT CHANGE UP (ref 150–400)
POTASSIUM SERPL-MCNC: 3.6 MMOL/L — SIGNIFICANT CHANGE UP (ref 3.5–5.3)
POTASSIUM SERPL-SCNC: 3.6 MMOL/L — SIGNIFICANT CHANGE UP (ref 3.5–5.3)
RBC # BLD: 3.84 M/UL — LOW (ref 4.2–5.8)
RBC # FLD: 13.5 % — SIGNIFICANT CHANGE UP (ref 10.3–14.5)
SODIUM SERPL-SCNC: 136 MMOL/L — SIGNIFICANT CHANGE UP (ref 135–145)
TACROLIMUS SERPL-MCNC: 4.3 NG/ML — SIGNIFICANT CHANGE UP
WBC # BLD: 2.4 K/UL — LOW (ref 3.8–10.5)
WBC # FLD AUTO: 2.4 K/UL — LOW (ref 3.8–10.5)

## 2018-06-21 PROCEDURE — C1751: CPT

## 2018-06-21 PROCEDURE — 99232 SBSQ HOSP IP/OBS MODERATE 35: CPT

## 2018-06-21 PROCEDURE — 99285 EMERGENCY DEPT VISIT HI MDM: CPT | Mod: 25

## 2018-06-21 PROCEDURE — 76776 US EXAM K TRANSPL W/DOPPLER: CPT

## 2018-06-21 PROCEDURE — 93975 VASCULAR STUDY: CPT

## 2018-06-21 PROCEDURE — 87186 SC STD MICRODIL/AGAR DIL: CPT

## 2018-06-21 PROCEDURE — 85014 HEMATOCRIT: CPT

## 2018-06-21 PROCEDURE — 87086 URINE CULTURE/COLONY COUNT: CPT

## 2018-06-21 PROCEDURE — 82435 ASSAY OF BLOOD CHLORIDE: CPT

## 2018-06-21 PROCEDURE — 83735 ASSAY OF MAGNESIUM: CPT

## 2018-06-21 PROCEDURE — 87070 CULTURE OTHR SPECIMN AEROBIC: CPT

## 2018-06-21 PROCEDURE — 84132 ASSAY OF SERUM POTASSIUM: CPT

## 2018-06-21 PROCEDURE — 36558 INSERT TUNNELED CV CATH: CPT

## 2018-06-21 PROCEDURE — 80053 COMPREHEN METABOLIC PANEL: CPT

## 2018-06-21 PROCEDURE — 82962 GLUCOSE BLOOD TEST: CPT

## 2018-06-21 PROCEDURE — 85610 PROTHROMBIN TIME: CPT

## 2018-06-21 PROCEDURE — 80076 HEPATIC FUNCTION PANEL: CPT

## 2018-06-21 PROCEDURE — 85730 THROMBOPLASTIN TIME PARTIAL: CPT

## 2018-06-21 PROCEDURE — 76937 US GUIDE VASCULAR ACCESS: CPT

## 2018-06-21 PROCEDURE — C1894: CPT

## 2018-06-21 PROCEDURE — 81001 URINALYSIS AUTO W/SCOPE: CPT

## 2018-06-21 PROCEDURE — 85027 COMPLETE CBC AUTOMATED: CPT

## 2018-06-21 PROCEDURE — 80197 ASSAY OF TACROLIMUS: CPT

## 2018-06-21 PROCEDURE — 84295 ASSAY OF SERUM SODIUM: CPT

## 2018-06-21 PROCEDURE — 87040 BLOOD CULTURE FOR BACTERIA: CPT

## 2018-06-21 PROCEDURE — 82803 BLOOD GASES ANY COMBINATION: CPT

## 2018-06-21 PROCEDURE — 84100 ASSAY OF PHOSPHORUS: CPT

## 2018-06-21 PROCEDURE — 80048 BASIC METABOLIC PNL TOTAL CA: CPT

## 2018-06-21 PROCEDURE — 96374 THER/PROPH/DIAG INJ IV PUSH: CPT

## 2018-06-21 PROCEDURE — 82947 ASSAY GLUCOSE BLOOD QUANT: CPT

## 2018-06-21 PROCEDURE — 82330 ASSAY OF CALCIUM: CPT

## 2018-06-21 PROCEDURE — 71045 X-RAY EXAM CHEST 1 VIEW: CPT

## 2018-06-21 PROCEDURE — 77001 FLUOROGUIDE FOR VEIN DEVICE: CPT

## 2018-06-21 PROCEDURE — 83605 ASSAY OF LACTIC ACID: CPT

## 2018-06-21 RX ORDER — TACROLIMUS 5 MG/1
1 CAPSULE ORAL
Qty: 0 | Refills: 0 | DISCHARGE
Start: 2018-06-21

## 2018-06-21 RX ORDER — INSULIN LISPRO 100/ML
2 VIAL (ML) SUBCUTANEOUS
Qty: 0 | Refills: 0 | COMMUNITY

## 2018-06-21 RX ADMIN — Medication 500000 UNIT(S): at 06:14

## 2018-06-21 RX ADMIN — IMIPENEM AND CILASTATIN 100 MILLIGRAM(S): 250; 250 INJECTION, POWDER, FOR SOLUTION INTRAVENOUS at 06:15

## 2018-06-21 RX ADMIN — HEPARIN SODIUM 5000 UNIT(S): 5000 INJECTION INTRAVENOUS; SUBCUTANEOUS at 06:14

## 2018-06-21 RX ADMIN — Medication 5 MILLIGRAM(S): at 06:13

## 2018-06-21 RX ADMIN — PANTOPRAZOLE SODIUM 40 MILLIGRAM(S): 20 TABLET, DELAYED RELEASE ORAL at 06:13

## 2018-06-21 RX ADMIN — TACROLIMUS 1 MILLIGRAM(S): 5 CAPSULE ORAL at 06:13

## 2018-06-21 RX ADMIN — Medication 500000 UNIT(S): at 12:08

## 2018-06-21 RX ADMIN — Medication 20 MILLIGRAM(S): at 06:13

## 2018-06-21 NOTE — PROGRESS NOTE ADULT - PROBLEM SELECTOR PROBLEM 2
Kidney transplant recipient
Immunosuppression
Kidney transplant recipient

## 2018-06-21 NOTE — PROGRESS NOTE ADULT - ATTENDING COMMENTS
Anjana Taylor  Pager: [phone]. If no response or past 5 pm call [phone].
Anjana Taylor  Pager: [phone]. If no response or past 5 pm call [phone].
Anjana Taylor  Pager: [phone]. If no response or past 5 pm call [phone].     My colleague will cover 6/20
I was present during and reviewed clinical and lab data as well as assessment and plan as documented . Please contact if any additional questions with any change in clinical condition or on availability of any additional information or reports.
agree with above. feels better but had one episode of low grade fever.  IV antibiotics as per ID. immunosuppression FK and steroids. MMF on hold. for PIC line insertion.
agree with above. for discharge home today on antibiotics. immunosuppression FK, and steroids , MMF on hold. followup as outpatient.
agree with above. positive urine cultures. MMF on hold. FK dose reduced. on IV antibiotics as per ID. will need PIC line. stable kidney function
s/p DDRT few weeks ago, now with ESBL UTI, leukopenia, and rise in creatinine  admit, IV Abx as per ID  immunosuppression - Decrease prograf to 1mg bid; Prednisone 5mg daily; Hold cellcept due to infection.  Will check tacrolimus level and adjust level accordingly  hold valcyte due to leukopenia

## 2018-06-21 NOTE — PROGRESS NOTE ADULT - PROBLEM SELECTOR PLAN 3
Cont tacrolimus and pred 5.  MMF held due to leukopenia.
On Tacrolimus, steroid taper, Nystatin S&S, bactrim, Protonix  Hold cellcept and valcyte due to diarrhea and low WBC  F/U Tacrolimus level daily, tacro goal 8-10  Continue PRN Tylenol pain, Monitor and manage pain  Adequate urine output  Moitor BG, continue Humalog sliding scale  Monitor closely.
On Tacrolimus, steroidm Nystatin S&S, bactrim, Protonix  Hold cellcept and valcyte due to diarrhea and low WBC  F/U Tacrolimus level daily, tacro goal 8-10
On Tacrolimus, steroid taper, Nystatin S&S, Protonix  Hold cellcept and valcyte due to diarrhea and low WBC  F/U Tacrolimus level daily, tacro goal 8-10  Monitor closely.
On Tacrolimus, steroid taper, Nystatin S&S, bactrim, Protonix  Hold cellcept and valcyte due to diarrhea and low WBC  F/U Tacrolimus level daily, tacro goal 8-10  Continue PRN Tylenol pain, Monitor and manage pain  Adequate urine output  Moitor BG, continue Humalog sliding scale  Monitor closely.
On Tacrolimus, steroid taper, Nystatin S&S, bactrim, Protonix  Hold cellcept and valcyte due to diarrhea and low WBC  F/U Tacrolimus level daily, tacro goal 8-10  Continue PRN Tylenol pain, Monitor and manage pain  Adequate urine output  On bowel regimen  Moitor BG, continue Humalog sliding scale  Monitor closely.
increase tacrolimus to 2mgs BID and cont pred 5.  MMF, valcyte and bactrim held due to leukopenia.

## 2018-06-21 NOTE — PROGRESS NOTE ADULT - ASSESSMENT
Renal allograft recipient, HCV kidney, DGF, improved, leukopenia, ESBL E Coli UTI on Iv antibiotics. Interm,ittent lower abdominal pain loose BM and low grade fevers.
58 year old gentleman with h/o HTN, CAD s/p CABG in 2007, ESRD secondary to PKD on HD since 3/2011, s/p DDRT from Hep C positive donor on 5/27/18, He was called from transplant office to come to ED for electrolyte repletions and positive urine culture with ESBL E.coli which was done on Friday 6/15/18.
58 year old gentleman with h/o HTN, CAD s/p CABG in 2007, ESRD secondary to PKD on HD since 3/2011, s/p DDRT from Hep C positive donor on 5/27/18, He was called from transplant office to come to ED for electrolyte repletions and positive urine culture with ESBL E.coli which was done on Friday 6/15/18. Tacrolimus level elevated on admission will monitor and adjust as needed.  Discharge plan in progress.
58M CAD s/p stents and CABG, HTN, GERD, HLD who is 3w s/p DDRT to R iliac vessels who came in for electrolyte repletions on Fri 6/14 and had a urinalysis at that time which resulted today to reveal ESBL E.coli.   Afebrile, leucopenia  ESBL E coli UTI, repeat urine cx inpt negative.   Minimal wound dehiscence medially with some drainage, cx with polymicrobial growth, ? significance of CoNS from swab.     Plan:  Switch Ertapenem to Imipenem 500 mg q8h  Follow up prelim blood cx  Repeat urine cx negative.   Ultrasound of transplant kidney shows a complex fluid collection adjacent to kidney 3.3 x 1.7cm- will need to monitor and may require drainage    Rashad Love MD  [phone]  After 5pm/weekends [phone]
58 year old gentleman with h/o HTN, CAD s/p CABG in 2007, ESRD secondary to PKD on HD since 3/2011, s/p DDRT from Hep C positive donor on 5/27/18, He was called from transplant office to come to ED for electrolyte repletions and positive urine culture with ESBL E.coli which was done on Friday 6/15/18, elevated Tacrolimus level.  PICC placed yesterday with plan for outpatient Meropenem 500mg IV q 8 hours.  Home care in place for DC today.
58 year old gentleman with h/o HTN, CAD s/p CABG in 2007, ESRD secondary to PKD on HD since 3/2011, s/p DDRT from Hep C positive donor on 5/27/18, He was called from transplant office to come to ED for electrolyte repletions and positive urine culture with ESBL E.coli which was done on Friday 6/15/18. Tacrolimus level elevated on admission will monitor and adjust as needed.  Discharge plan in progress.
58 year old gentleman with h/o HTN, CAD s/p CABG in 2007, ESRD secondary to PKD on HD since 3/2011, s/p DDRT from Hep C positive donor on 5/27/18, He was called from transplant office to come to ED for electrolyte repletions and positive urine culture with ESBL E.coli which was done on Friday 6/15/18. Tacrolimus level elevated on admission will monitor and adjust as needed.  Plan for IJ PICC line today in IR and plan for homecare infusion RN x9 dyas IV ABx, possible discharge today.
58M CAD s/p stents and CABG, HTN, GERD, HLD who is 3w s/p DDRT to R iliac vessels who came in for electrolyte repletions on Fri 6/14 and had a urinalysis at that time which resulted today to reveal ESBL E.coli.   Afebrile, leucopenia  ESBL E coli UTI  Minimal wound dehiscence medially with some drainage, cx taken     Plan:  Continue with Ertapenem 1 gm iv q24h   Follow up blood cx  Follow up repeat urine cx  Follow up wound cx   Consider U/S of the RLQ   Monitor counts for leucopenia.  Can place PICC after blood cx negative at 48 hrs.
58M CAD s/p stents and CABG, HTN, GERD, HLD who is 3w s/p DDRT to R iliac vessels who came in for electrolyte repletions on Fri 6/14 and had a urinalysis at that time which resulted today to reveal ESBL E.coli.   Afebrile, leucopenia  ESBL E coli UTI, repeat urine cx inpt negative.   Minimal wound dehiscence medially with some drainage, cx with polymicrobial growth, ? significance of CoNS from swab.     Plan:  Continue with Imipenem 500 mg q8h  Follow up prelim blood cx  Repeat urine cx negative.   U/S of the RLQ shows complex collection, discussed with transplant surgeon, no concern for wound infection.   Improving leucopenia.  Needs 10 days total for skin and soft tissue infection and possible UTI
58M CAD s/p stents and CABG, HTN, GERD, HLD who is 3w s/p DDRT to R iliac vessels who came in for electrolyte repletions on Fri 6/14 and had a urinalysis at that time which resulted today to reveal ESBL E.coli.   Afebrile, leucopenia  ESBL E coli UTI, repeat urine cx inpt negative.   Minimal wound dehiscence medially with some drainage, cx with polymicrobial growth, ? significance of CoNS from swab.     Plan:  Switch Ertapenem to Imipenem 500 mg q8h  Follow up prelim blood cx  Repeat urine cx negative.   Consider U/S of the RLQ   Improving leucopenia.  Can place PICC after blood cx negative at 48 hrs.   Needs 10 days total for skin and soft tissue infection and possible UTI
Renal allograft recipient, HCV kidney, DGF, improved, leukopenia, ESBL E Coli UTI on Iv antibiotics. Interm,ittent lower abdominal pain loose BM and low grade fevers.
Renal allograft recipient, HCV kidney, DGF, improved, leukopenia, ESBL E Coli UTI on Iv antibiotics. Interm,ittent lower abdominal pain loose BM.  Plan:  1. Continue antibiotics  2. Off MMF  3. ID follow up  4. Continue Tac, prednisone  5. Monitor WBC count  6. Awaiting HCV treatment
The patient is a gabriela 58-year-old gentleman with the past medical history of hypertension, polycystic kidney disease, coronary artery disease status post DDRT and hepatitis C positive kidney, now presents with extended-spectrum beta-lactamase Escherichia coli.  The patient with delayed graft functioning postop which has subsequently improved.      He has reduced white blood cell count as well.    1.	Transplant.  CellCept is subsequently placed on hold due to WBC.     Plan per transplant.  2.	Renal.  No need for an additional dialysis.    3.	Cardiovascular.  Not in heart failure.  Once his blood pressure increases can consider Cozaar.  4.	Infectious Disease.  Intravenous antibiotics .  5.       GI RX for Hep C to begin shortly
The patient is a gabriela 58-year-old gentleman with the past medical history of hypertension, polycystic kidney disease, coronary artery disease status post DDRT and hepatitis C positive kidney, now presents with extended-spectrum beta-lactamase Escherichia coli.  The patient with delayed graft functioning postop which has subsequently improved.    He did have some hypophosphatemia which is also improved.    He has reduced white blood cell count as well.    1.	Transplant.  CellCept is subsequently placed on hold.  Prograf levels have been reduced secondary to the white count.  Plan per transplant.  2.	Renal.  No need for an additional dialysis.  He received K-Phos for hypophosphatemia.  3.	Cardiovascular.  Not in heart failure.  Once his blood pressure increases can consider Cozaar.  4.	Infectious Disease.  Intravenous antibiotics--blood and urine cx are negative.  Further workup pending above.

## 2018-06-21 NOTE — PROGRESS NOTE ADULT - NSHPATTENDINGPLANDISCUSS_GEN_ALL_CORE
Transplant NP
Transplant NP
Transplant Team
House staff, NP, Transplant surgeon
Patient seen on multidisciplinary rounds with Transplant surgeons, nephrologist, NP, pharmacist, and nurse.

## 2018-06-21 NOTE — PROGRESS NOTE ADULT - PROBLEM SELECTOR PLAN 2
Creatinine stable.  Donor was HCV positive.
S/P DDRT 5/24/18 doing well.   Pt encouraged to ambulate  On lasix 20mg PO QD  Monitor BMP
S/P DDRT 5/24/18 doing well.  Start PO diet     Pt encouraged to ambulate  On lasix 20mg PO QD  Monitor BMP.  Mag low supplemented will f/u repeat at 1 pm
Creatinine stable.  Donor was HCV positive.
S/P DDRT 5/24/18 doing well.    Diet as tolerated     Pt encouraged to ambulate  To start HCV medications as outpatient  DC lasix today  Monitor BMP.  Continue PRN Tylenol pain, Monitor and manage pain  Monitor BG, continue Humalog sliding scale
S/P DDRT 5/24/18 doing well.  Start PO diet     Pt encouraged to ambulate  On lasix 20mg PO QD  Monitor BMP
S/P DDRT 5/24/18 doing well.  Start PO diet     Pt encouraged to ambulate  On lasix 20mg PO QD  Monitor BMP.

## 2018-06-21 NOTE — PROGRESS NOTE ADULT - PROBLEM SELECTOR PLAN 6
Pt has antiviral agent at pharmacy.  Asked pt to pick it up so it can be started right away.  LFT's trending up.
Pt has antiviral agent at pharmacy.  Will start tonight.

## 2018-06-21 NOTE — PROGRESS NOTE ADULT - PROBLEM SELECTOR PLAN 5
likely medication related.  Check CMV pcr, hold MMF, valcyte and bactrim for now.
likely medication related.  Check CMV pcr, hold MMF, valcyte and bactrim for now.

## 2018-06-21 NOTE — PROGRESS NOTE ADULT - PROBLEM SELECTOR PROBLEM 3
Immunosuppression
Kidney transplant recipient
Immunosuppression

## 2018-06-21 NOTE — PROGRESS NOTE ADULT - PROBLEM SELECTOR PROBLEM 1
Fever
Urinary tract infection
Urinary tract infection
Fever
Urinary tract infection

## 2018-06-21 NOTE — PROGRESS NOTE ADULT - PROBLEM SELECTOR PLAN 1
Unclear etiology.  Possible UTI but no pyuria on UA.  Has wheezing but lungs clear on CXR and only with minimal cough.  Possible HCV?  repeat and trend LFT's, also check renal sonogram.
Continue Invanz per ID  Pt may need PICC/Mid line (has B/L AVF) can use left arm if needed  ID follow up appreciated
Continue Invanz per ID  Will f/u with ID on duration of ABx  Pt may need PICC/Mid line (has B/L AVF) can use left arm  Urine C&S no growth this far  Wound culture positive for E-Coli
Antibiotics changed yesterday per ID to [id number] Q 8 hours x 10 days  Will f/u with ID on duration of ABx  IR IJ PICC today  Urine C&S no growth this far  Wound culture positive for E-Coli.
Continue Imipenem 500mg Q 8 hours x 10 days. Home care set up for DC today.  6/17 UA, BX x2 negative  6/17 Wound culture + E-Coli.
Continue Invanz per ID  Will f/u with ID on duration of ABx  Pt may need PICC/Mid line (has B/L AVF) can use left arm  F/U urine C&S for sensitivity
Unclear etiology.  Possible UTI but no pyuria on UA.  Has wheezing but lungs clear on CXR and only with minimal cough.  Possible HCV?  sonogram wnl, LFTs mildly elevated and no further fever.  d/c home on IV antibiotics per ID and pt will start HCV treatment today.

## 2018-06-21 NOTE — PROGRESS NOTE ADULT - SUBJECTIVE AND OBJECTIVE BOX
INFECTIOUS DISEASES FOLLOW UP-- Darlyn Love  [phone]    This is a follow up note for this  58yMale with  Urinary tract infection post renal transplant,  interval events- feeling better today,       ROS:  CONSTITUTIONAL:  No fever, good appetite  CARDIOVASCULAR:  No chest pain or palpitations  RESPIRATORY:  No dyspnea  GASTROINTESTINAL:  No nausea, vomiting, diarrhea, or abdominal pain, no pain over transplanted kidney  GENITOURINARY:  No dysuria  NEUROLOGIC:  No headache,     Allergies    No Known Allergies    Intolerances        ANTIBIOTICS/RELEVANT:  antimicrobials  imipenem/cilastatin  IVPB 500 milliGRAM(s) IV Intermittent every 8 hours  imipenem/cilastatin  IVPB      nystatin    Suspension 659930 Unit(s) Oral four times a day    immunologic:  tacrolimus 1 milliGRAM(s) Oral two times a day    OTHER:  acetaminophen   Tablet. 650 milliGRAM(s) Oral every 6 hours PRN  dextrose 40% Gel 15 Gram(s) Oral once PRN  dextrose 40% Gel 15 Gram(s) Oral once PRN  dextrose 5%. 1000 milliLiter(s) IV Continuous <Continuous>  dextrose 50% Injectable 12.5 Gram(s) IV Push once  dextrose 50% Injectable 25 Gram(s) IV Push once  dextrose 50% Injectable 25 Gram(s) IV Push once  furosemide    Tablet 20 milliGRAM(s) Oral daily  glucagon  Injectable 1 milliGRAM(s) IntraMuscular once PRN  heparin  Injectable 5000 Unit(s) SubCutaneous every 12 hours  insulin lispro (HumaLOG) corrective regimen sliding scale   SubCutaneous three times a day before meals  insulin lispro (HumaLOG) corrective regimen sliding scale   SubCutaneous at bedtime  pantoprazole    Tablet 40 milliGRAM(s) Oral before breakfast  predniSONE   Tablet 5 milliGRAM(s) Oral daily      Objective:  Vital Signs Last 24 Hrs  T(C): 37 (20 Jun 2018 14:27), Max: 38.1 (20 Jun 2018 05:56)  T(F): 98.6 (20 Jun 2018 14:27), Max: 100.6 (20 Jun 2018 05:56)  HR: 96 (20 Jun 2018 14:27) (90 - 96)  BP: 112/68 (20 Jun 2018 14:27) (98/61 - 115/63)  BP(mean): --  RR: 18 (20 Jun 2018 14:27) (18 - 18)  SpO2: 96% (20 Jun 2018 14:27) (95% - 96%)    PHYSICAL EXAM:  Constitutional:no acute distress  Eyes:JESUS, EOMI  Ear/Nose/Throat: no oral lesions, 	  Respiratory: clear BL  Cardiovascular: S1S2  Gastrointestinal:soft, (+) BS, no tenderness  RLQ transplanted kidney wound is apposed no drainage, no erythema  Extremities:no e/e/c  No Lymphadenopathy  IV sites not inflammed.    LABS:                        13.0   2.8   )-----------( 148      ( 20 Jun 2018 06:02 )             41.6     06-20    132<L>  |  97  |  20  ----------------------------<  113<H>  4.0   |  23  |  1.39<H>    Ca    8.2<L>      20 Jun 2018 06:02  Phos  1.6     06-20  Mg     1.8     06-20    TPro  6.6  /  Alb  3.5  /  TBili  0.6  /  DBili  0.2  /  AST  111<H>  /  ALT  85<H>  /  AlkPhos  68  06-20          MICROBIOLOGY:            RECENT CULTURES:  06-17 @ 21:24  Skin wound  Escherichia coli ESBL  Enterococcus faecalis  Escherichia coli ESBL  JUAN ANTONIO    Moderate Escherichia coli ESBL Multiple Morphological Strains  Moderate Enterococcus faecalis  Few Alpha hemolytic strep  Moderate Coag Negative Staphylococcus  --  06-17 @ 17:05  .Blood Blood-Venous  --  --  --    No growth to date.  --  06-17 @ 17:04  .Urine Clean Catch (Midstream)  --  --  --    No growth  --  06-14 @ 22:28  .Urine Clean Catch (Midstream)  Escherichia coli ESBL  Escherichia coli ESBL  JUAN ANTONIO    10,000 - 49,000 CFU/mL Escherichia coli ESBL  <10,000 CFU/ml Normal Urogenital rani present  --      RADIOLOGY & ADDITIONAL STUDIES:    < from: US Trans Kidney w/ Doppler, Right (06.20.18 @ 16:39) >  IMPRESSION:     No evidence of a significant renal artery stenosis.    Complex fluid collection adjacent to the transplant kidney, measuring 3.3   x 1.7 cm, possibly evolving hematoma.    < end of copied text >
INTERVAL HPI/OVERNIGHT EVENTS:  Patient seen with multidisciplinary team (Nephrologist, pharmacist, nurse, nurse manager, NP, MD surgeons, transplant coordinator  nutritionist, and  )  in am rounds and examined with Dr. Cowart. 59Y/O/m s/p DDRT 18, now admitted for ESBL E.Coli in the urine and electrolyte imbalance. On admission tacro level elevated 19, tacrolimus was decreased to 1mg BID, will f/u am level, overnight with mild incisional and right groin tenderness, afebrile, making adequate urine.     MEDICATIONS  (STANDING):  dextrose 5%. 1000 milliLiter(s) (50 mL/Hr) IV Continuous <Continuous>  dextrose 50% Injectable 12.5 Gram(s) IV Push once  dextrose 50% Injectable 25 Gram(s) IV Push once  dextrose 50% Injectable 25 Gram(s) IV Push once  ertapenem  IVPB 1000 milliGRAM(s) IV Intermittent every 24 hours  furosemide    Tablet 20 milliGRAM(s) Oral daily  heparin  Injectable 5000 Unit(s) SubCutaneous every 8 hours  insulin lispro (HumaLOG) corrective regimen sliding scale   SubCutaneous three times a day before meals  insulin lispro (HumaLOG) corrective regimen sliding scale   SubCutaneous at bedtime  magnesium sulfate  IVPB 2 Gram(s) IV Intermittent every 2 hours  nystatin    Suspension 378881 Unit(s) Oral four times a day  pantoprazole    Tablet 40 milliGRAM(s) Oral before breakfast  predniSONE   Tablet 5 milliGRAM(s) Oral daily  tacrolimus 1 milliGRAM(s) Oral two times a day    MEDICATIONS  (PRN):  acetaminophen   Tablet. 650 milliGRAM(s) Oral every 6 hours PRN Mild Pain (1 - 3)  dextrose 40% Gel 15 Gram(s) Oral once PRN Blood Glucose LESS THAN 70 milliGRAM(s)/deciliter  dextrose 40% Gel 15 Gram(s) Oral once PRN Blood Glucose LESS THAN 70 milliGRAM(s)/deciliter  glucagon  Injectable 1 milliGRAM(s) IntraMuscular once PRN Glucose LESS THAN 70 milligrams/deciliter      Allergies    No Known Allergies    Intolerances        Vital Signs Last 24 Hrs  T(C): 37 (2018 05:38), Max: 37 (2018 05:38)  T(F): 98.6 (2018 05:38), Max: 98.6 (2018 05:38)  HR: 76 (2018 05:53) (67 - 77)  BP: 101/67 (2018 05:53) (98/61 - 106/68)  BP(mean): --  RR: 18 (2018 05:38) (18 - 18)  SpO2: 97% (2018 05:38) (97% - 99%)    LABS:                        11.8   2.8   )-----------( 183      ( 2018 06:34 )             37.8     19    135  |  99  |  22  ----------------------------<  117<H>  3.6   |  23  |  1.33<H>    Ca    7.7<L>      2018 06:34  Phos  2.3       Mg     1.4         TPro  6.3  /  Alb  3.5  /  TBili  0.4  /  DBili  x   /  AST  81<H>  /  ALT  70<H>  /  AlkPhos  68  -17    PT/INR - ( 2018 09:42 )   PT: 12.1 sec;   INR: 1.07 ratio         PTT - ( 2018 09:42 )  PTT:31.0 sec  Urinalysis Basic - ( 2018 15:13 )    Color: Yellow / Appearance: Clear / S.013 / pH: x  Gluc: x / Ketone: Negative  / Bili: Negative / Urobili: Negative   Blood: x / Protein: Negative / Nitrite: Negative   Leuk Esterase: Negative / RBC: 0-2 /HPF / WBC 0-2 /HPF   Sq Epi: x / Non Sq Epi: x / Bacteria: Few /HPF        RADIOLOGY & ADDITIONAL TESTS:    Review of systems  Gen: No weight changes, fatigue, fevers/chills, weakness  Skin: No rashes  Head/Eyes/Ears/Mouth: No headache; Normal hearing; Normal vision w/o blurriness; No sinus pain/discomfort, sore throat  Respiratory: No dyspnea, cough, wheezing, hemoptysis  CV: No chest pain, PND, orthopnea  GI: C/O incisional and right femoral pain, diarrhea, constipation, nausea, vomiting, melena, hematochezia  : No increased frequency, dysuria, hematuria, nocturia  MSK: No joint pain/swelling; no back pain; no edema  Neuro: No dizziness/lightheadedness, weakness, seizures, numbness, tingling  Heme: No easy bruising or bleeding  Endo: No heat/cold intolerance  Psych: No significant nervousness, anxiety, stress, depression  All other systems were reviewed and are negative, except as noted.    PHYSICAL EXAM:  Constitutional: Well developed / well nourished  Eyes: Anicteric, PERRLA  ENMT: nc/at  Neck: Supple  Respiratory: CTA B/L  Cardiovascular: RRR  Gastrointestinal: Soft abdomen, NT, ND  Genitourinary: Voiding spontaneously  Extremities: SCD's in place and working bilaterally, B/L UE AVF intact with +B/T  Vascular: Palpable dp pulses bilaterally, tender on palpation at right femoral  Neurological: A&O x3  Skin: Wound open to air no erythema and evidence of infection noted  Musculoskeletal: Moving all extremities  Psychiatric: Responsive
[facility] DIVISION OF KIDNEY DISEASES AND HYPERTENSION -- FOLLOW UP NOTE  --------------------------------------------------------------------------------  Jamel Blumahim     --------------------------------------------------------------------------------  Chief Complaint: renal transplant    24 hour events/subjective:  no acute events  R  groin pain      PAST HISTORY  --------------------------------------------------------------------------------  No significant changes to PMH, PSH, FHx, SHx, unless otherwise noted    ALLERGIES & MEDICATIONS  --------------------------------------------------------------------------------  Allergies    No Known Allergies    Intolerances      Standing Inpatient Medications  dextrose 5%. 1000 milliLiter(s) IV Continuous <Continuous>  dextrose 50% Injectable 12.5 Gram(s) IV Push once  dextrose 50% Injectable 25 Gram(s) IV Push once  dextrose 50% Injectable 25 Gram(s) IV Push once  ertapenem  IVPB 1000 milliGRAM(s) IV Intermittent every 24 hours  furosemide    Tablet 20 milliGRAM(s) Oral daily  heparin  Injectable 5000 Unit(s) SubCutaneous every 8 hours  insulin lispro (HumaLOG) corrective regimen sliding scale   SubCutaneous three times a day before meals  insulin lispro (HumaLOG) corrective regimen sliding scale   SubCutaneous at bedtime  magnesium sulfate  IVPB 2 Gram(s) IV Intermittent every 2 hours  nystatin    Suspension 087447 Unit(s) Oral four times a day  pantoprazole    Tablet 40 milliGRAM(s) Oral before breakfast  predniSONE   Tablet 5 milliGRAM(s) Oral daily  tacrolimus 1 milliGRAM(s) Oral two times a day    PRN Inpatient Medications  acetaminophen   Tablet. 650 milliGRAM(s) Oral every 6 hours PRN  dextrose 40% Gel 15 Gram(s) Oral once PRN  dextrose 40% Gel 15 Gram(s) Oral once PRN  glucagon  Injectable 1 milliGRAM(s) IntraMuscular once PRN      REVIEW OF SYSTEMS  --------------------------------------------------------------------------------  Constitutional: [ ] Fever [ ] Chills [ ] Fatigue [ ] Weight change   HEENT: [ ] Blurred vision [ ] Eye Pain [ ] Headache [ ] Runny nose [ ] Sore Throat   Respiratory: [ ] Cough [ ] Wheezing [ ] Shortness of breath  Cardiovascular: [ ] Chest Pain [ ] Palpitations [ ] ALEXANDRA [ ] PND [ ] Orthopnea  Gastrointestinal: [x ] Abdominal Pain [ ] Diarrhea [ ] Constipation [ ] Hemorrhoids [ ] Nausea [ ] Vomiting  Genitourinary: [ ] Nocturia [ ] Dysuria [ ] Incontinence  Extremities: [ ] Swelling [ ] Joint Pain  Neurologic: [ ] Focal deficit [ ] Paresthesias [ ] Syncope  Lymphatic: [ ] Swelling [ ] Lymphadenopathy   Skin: [ ] Rash [ ] Ecchymoses [ ] Wounds [ ] Lesions  Psychiatry: [ ] Depression [ ] Suicidal/Homicidal Ideation [ ] Anxiety [ ] Sleep Disturbances  [ ] 10 point review of systems is otherwise negative except as mentioned above              [ ]Unable to obtain      VITALS/PHYSICAL EXAM  --------------------------------------------------------------------------------  T(C): 37 (06-19-18 @ 05:38), Max: 37 (06-19-18 @ 05:38)  HR: 76 (06-19-18 @ 05:53) (67 - 77)  BP: 101/67 (06-19-18 @ 05:53) (98/61 - 106/68)  RR: 18 (06-19-18 @ 05:38) (18 - 18)  SpO2: 97% (06-19-18 @ 05:38) (97% - 99%)  Wt(kg): --    Height (cm): 165.1 (06-18-18 @ 04:26)  Weight (kg): 87.9 (06-18-18 @ 04:26)  BMI (kg/m2): 32.2 (06-18-18 @ 04:26)  BSA (m2): 1.95 (06-18-18 @ 04:26)  Daily     Daily   I&O's Summary    18 Jun 2018 07:01  -  19 Jun 2018 07:00  --------------------------------------------------------  IN: 1080 mL / OUT: 1735 mL / NET: -655 mL    19 Jun 2018 07:01 - 19 Jun 2018 09:47  --------------------------------------------------------  IN: 0 mL / OUT: 250 mL / NET: -250 mL          06-18-18 @ 07:01  -  06-19-18 @ 07:00  --------------------------------------------------------  IN: 1080 mL / OUT: 1735 mL / NET: -655 mL    06-19-18 @ 07:01 - 06-19-18 @ 09:47  --------------------------------------------------------  IN: 0 mL / OUT: 250 mL / NET: -250 mL        Physical Exam:              Gen: NAD   	HEENT: anicteric  	Pulm: CTA B/L   	CV: RRR  	Back: No dependent edema  	Abd: soft, , nondistended, tenderness in r groin no palpable mass noted  	: No medina  	LE: Warm, no edema  	Neuro: no asterixis  	Skin: Warm, without rashes  	Vascular access: none    LABS/STUDIES  --------------------------------------------------------------------------------              11.8   2.8   >-----------<  183      [06-19-18 @ 06:34]              37.8     135  |  99  |  22  ----------------------------<  117      [06-19-18 @ 06:34]  3.6   |  23  |  1.33        Ca     7.7     [06-19-18 @ 06:34]      Mg     1.4     [06-19-18 @ 06:34]      Phos  2.3     [06-19-18 @ 06:34]    TPro  6.3  /  Alb  3.5  /  TBili  0.4  /  DBili  x   /  AST  81  /  ALT  70  /  AlkPhos  68  [06-17-18 @ 15:13]    PT/INR: PT 12.1 , INR 1.07       [06-18-18 @ 09:42]  PTT: 31.0       [06-18-18 @ 09:42]      Creatinine Trend:  SCr 1.33 [06-19 @ 06:34]  SCr 1.35 [06-18 @ 07:19]  SCr 1.61 [06-17 @ 15:13]  SCr 1.40 [06-14 @ 19:08]  SCr 2.07 [06-02 @ 05:56]    Tacrolimus (), Serum: 11.9 ng/mL (06-18 @ 08:04)  Tacrolimus (), Serum: 19.1 ng/mL (06-17 @ 17:02)        Urinalysis - [06-17-18 @ 15:13]      Color Yellow / Appearance Clear / SG 1.013 / pH 6.0      Gluc Negative / Ketone Negative  / Bili Negative / Urobili Negative       Blood Negative / Protein Negative / Leuk Est Negative / Nitrite Negative      RBC 0-2 / WBC 0-2 / Hyaline  / Gran  / Sq Epi  / Non Sq Epi  / Bacteria Few      HbA1c 5.4      [05-27-18 @ 09:50]  TSH 1.35      [11-22-17 @ 03:20]  Lipid: chol 85, , HDL 28, LDL 38      [11-22-17 @ 03:20]          Radiology  --------------------------------------------------------------------------------    --------------------------------------------------------------------------------  Jamel Gaston   
--------------------------------------------------------------------------------  Chief Complaint: right lower quadrat pain and discomfort    24 hour events/subjective:  reviewed, afebrile      PAST HISTORY  --------------------------------------------------------------------------------  No significant changes to PMH, PSH, FHx, SHx, unless otherwise noted    ALLERGIES & MEDICATIONS  --------------------------------------------------------------------------------  Allergies    No Known Allergies    Intolerances      Standing Inpatient Medications  dextrose 5%. 1000 milliLiter(s) IV Continuous <Continuous>  dextrose 50% Injectable 12.5 Gram(s) IV Push once  dextrose 50% Injectable 25 Gram(s) IV Push once  dextrose 50% Injectable 25 Gram(s) IV Push once  furosemide    Tablet 20 milliGRAM(s) Oral daily  heparin  Injectable 5000 Unit(s) SubCutaneous every 12 hours  imipenem/cilastatin  IVPB 500 milliGRAM(s) IV Intermittent every 8 hours  imipenem/cilastatin  IVPB      insulin lispro (HumaLOG) corrective regimen sliding scale   SubCutaneous three times a day before meals  insulin lispro (HumaLOG) corrective regimen sliding scale   SubCutaneous at bedtime  nystatin    Suspension [id number] Unit(s) Oral four times a day  pantoprazole    Tablet 40 milliGRAM(s) Oral before breakfast  predniSONE   Tablet 5 milliGRAM(s) Oral daily  tacrolimus 1 milliGRAM(s) Oral two times a day    PRN Inpatient Medications  acetaminophen   Tablet. 650 milliGRAM(s) Oral every 6 hours PRN  dextrose 40% Gel 15 Gram(s) Oral once PRN  dextrose 40% Gel 15 Gram(s) Oral once PRN  glucagon  Injectable 1 milliGRAM(s) IntraMuscular once PRN      REVIEW OF SYSTEMS  --------------------------------------------------------------------------------  Gen: No fatigue, fevers/chills, Skin: No rashes  Head/Eyes/Ears/Mouth: No headache;No sore throat  Respiratory: No dyspnea, cough,   CV: No chest pain, PND, orthopnea  GI: RLQ abdominal pain, intermittent diarrhea, No, nausea, vomiting  : No increased frequency, dysuria, hematuria, nocturia  MSK: No joint pain/swelling; no back pain; no edema  Neuro: No dizziness/lightheadedness, weakness, seizures, numbness, tingling  Psych: No significant nervousness, anxiety, stress, depression    All other systems were reviewed and are negative, except as noted.    VITALS/PHYSICAL EXAM  --------------------------------------------------------------------------------  T(C): 36.7 (06-19-18 @ 14:17), Max: 37.7 (06-19-18 @ 09:58)  HR: 80 (06-19-18 @ 14:17) (72 - 89)  BP: 136/67 (06-19-18 @ 14:17) (98/61 - 136/67)  RR: 18 (06-19-18 @ 14:17) (18 - 18)  SpO2: 99% (06-19-18 @ 14:17) (97% - 99%)  Wt(kg): --  Height (cm): 165.1 (06-18-18 @ 04:26)  Weight (kg): 87.9 (06-18-18 @ 04:26)  BMI (kg/m2): 32.2 (06-18-18 @ 04:26)  BSA (m2): 1.95 (06-18-18 @ 04:26)      06-18-18 @ 07:01  -  06-19-18 @ 07:00  --------------------------------------------------------  IN: 1080 mL / OUT: 1735 mL / NET: -655 mL    06-19-18 @ 07:01  -  06-19-18 @ 17:43  --------------------------------------------------------  IN: 1000 mL / OUT: 800 mL / NET: 200 mL      Physical Exam:  	Gen: NAD, well-appearing  	HEENT: PERRL, supple neck, clear oropharynx  	Pulm: CTA B/L  	CV: RRR, S1S2; no rub  	Back: No spinal or CVA tenderness; no sacral edema  	Abd: +BS, soft, nontender/nondistended                      Transplant: No tenderness, staples have been removed. No discharge or bleeding.  	: No suprapubic tenderness  	UE: Warm, FROM, intact strength; no edema; no asterixis  	LE: Warm, FROM, intact strength; no edema  	Neuro: No focal deficits, intact gait  	Psych: Normal affect and mood  	Skin: Warm, without rashes      LABS/STUDIES  --------------------------------------------------------------------------------              11.8   2.8   >-----------<  183      [06-19-18 @ 06:34]              37.8     132  |  95  |  20  ----------------------------<  122      [06-19-18 @ 13:32]  4.3   |  23  |  1.25        Ca     8.1     [06-19-18 @ 13:32]      Mg     2.5     [06-19-18 @ 13:32]      Phos  2.3     [06-19-18 @ 06:34]      PT/INR: PT 12.1 , INR 1.07       [06-18-18 @ 09:42]  PTT: 31.0       [06-18-18 @ 09:42]      Creatinine Trend:  SCr 1.25 [06-19 @ 13:32]  SCr 1.33 [06-19 @ 06:34]  SCr 1.35 [06-18 @ 07:19]  SCr 1.61 [06-17 @ 15:13]  SCr 1.40 [06-14 @ 19:08]    Tacrolimus (), Serum: 9.1 ng/mL (06-19 @ 08:18)  Tacrolimus (), Serum: 11.9 ng/mL (06-18 @ 08:04)  Tacrolimus (), Serum: 19.1 ng/mL (06-17 @ 17:02)            Urinalysis - [06-17-18 @ 15:13]      Color Yellow / Appearance Clear / SG 1.013 / pH 6.0      Gluc Negative / Ketone Negative  / Bili Negative / Urobili Negative       Blood Negative / Protein Negative / Leuk Est Negative / Nitrite Negative      RBC 0-2 / WBC 0-2 / Hyaline  / Gran  / Sq Epi  / Non Sq Epi  / Bacteria Few      HbA1c 5.4      [05-27-18 @ 09:50]  TSH 1.35      [11-22-17 @ 03:20]  Lipid: chol 85, , HDL 28, LDL 38      [11-22-17 @ 03:20]
58y old  Male who presents with a chief complaint of Called in for ESBL Bacteriuria (17 Jun 2018 16:58)      Interval history:  Afebrile, feels well, no dysuria, no more pain around the incision, s/p removal of staples.     No Known Allergies    Antimicrobials:  ertapenem  IVPB 1000 milliGRAM(s) IV Intermittent every 24 hours  nystatin    Suspension 793886 Unit(s) Oral four times a day      REVIEW OF SYSTEMS:  No chest pain or palpitations  No cough, no SOB  No N/V/D, no abdominal pain  No dysuria or frequency  No rash.     Vital Signs Last 24 Hrs  T(C): 36.3 (06-18-18 @ 14:13), Max: 36.9 (06-17-18 @ 19:34)  T(F): 97.4 (06-18-18 @ 14:13), Max: 98.4 (06-17-18 @ 19:34)  HR: 67 (06-18-18 @ 14:13) (58 - 87)  BP: 99/62 (06-18-18 @ 14:13) (99/62 - 135/75)  RR: 18 (06-18-18 @ 14:13) (15 - 18)  SpO2: 99% (06-18-18 @ 14:13) (95% - 100%)    PHYSICAL EXAM:  Patient in no acute distress. AAOX3.  No icterus, no oral ulcers.  Cardiovascular: S1S2 normal.  Lungs: Good air entry B/L lung fields.  Gastrointestinal: soft, nontender, nondistended, surgical incision with slightly increased warmth, small opening in the wound with minimal serosanguinous drainage   Extremities: no edema.  IV sites not inflamed.   Rt sided fistula, lt sided graft.                           11.1   1.82  )-----------( 186      ( 18 Jun 2018 07:56 )             35.3   06-18    135  |  101  |  23  ----------------------------<  132<H>  4.2   |  22  |  1.35<H>    Ca    7.7<L>      18 Jun 2018 07:19  Phos  2.7     06-18  Mg     1.6     06-18    TPro  6.3  /  Alb  3.5  /  TBili  0.4  /  DBili  x   /  AST  81<H>  /  ALT  70<H>  /  AlkPhos  68  06-17      LIVER FUNCTIONS - ( 17 Jun 2018 15:13 )  Alb: 3.5 g/dL / Pro: 6.3 g/dL / ALK PHOS: 68 U/L / ALT: 70 U/L / AST: 81 U/L / GGT: x
58y old  Male who presents with a chief complaint of Called in for ESBL Bacteriuria (19 Jun 2018 14:52)      Interval history:  Afebrile today feels well, except a little bulge in the incision.     No Known Allergies    Antimicrobials:  imipenem/cilastatin  IVPB 500 milliGRAM(s) IV Intermittent every 8 hours     nystatin    Suspension 336333 Unit(s) Oral four times a day    REVIEW OF SYSTEMS:  No chest pain or palpitations  No cough, no SOB  No N/V/D,   No dysuria or frequency  No rash.     Vital Signs Last 24 Hrs  T(C): 37.4 (06-21-18 @ 09:41), Max: 37.4 (06-21-18 @ 09:41)  T(F): 99.4 (06-21-18 @ 09:41), Max: 99.4 (06-21-18 @ 09:41)  HR: 98 (06-21-18 @ 09:41) (79 - 98)  BP: 100/65 (06-21-18 @ 09:41) (100/65 - 139/84)  RR: 18 (06-21-18 @ 09:41) (18 - 18)  SpO2: 97% (06-21-18 @ 09:41) (96% - 100%)    PHYSICAL EXAM:  Patient in no acute distress. AAOX3.  + lt chest smith   Cardiovascular: S1S2 normal.  Lungs: Good air entry B/L lung fields.  Gastrointestinal: soft, nontender, nondistended, surgical incision with small opening in the wound with no drainage today.   Extremities: no edema.  IV sites not inflamed.   Rt sided fistula, lt sided graft.                           12.3   2.4   )-----------( 157      ( 21 Jun 2018 06:35 )             36.8   06-21    136  |  99  |  24<H>  ----------------------------<  150<H>  3.6   |  28  |  1.23    Ca    8.0<L>      21 Jun 2018 06:35  Phos  2.6     06-21  Mg     1.6     06-21    TPro  6.6  /  Alb  3.5  /  TBili  0.6  /  DBili  0.2  /  AST  111<H>  /  ALT  85<H>  /  AlkPhos  68  06-20      LIVER FUNCTIONS - ( 20 Jun 2018 06:02 )  Alb: 3.5 g/dL / Pro: 6.6 g/dL / ALK PHOS: 68 U/L / ALT: 85 U/L / AST: 111 U/L / GGT: x             Radiology:  < from: US Trans Kidney w/ Doppler, Right (06.20.18 @ 16:39) >  IMPRESSION:     No evidence of a significant renal artery stenosis.    Complex fluid collection adjacent to the transplant kidney, measuring 3.3   x 1.7 cm, possibly evolving hematoma.
58y old  Male who presents with a chief complaint of Called in for ESBL Bacteriuria (19 Jun 2018 14:52)      Interval history:  Afebrile, feels well, minimal discomfort around the incision where there is opening.     No Known Allergies    Antimicrobials:  imipenem/cilastatin  IVPB 500 milliGRAM(s) IV Intermittent every 8 hours  nystatin    Suspension 111304 Unit(s) Oral four times a day    REVIEW OF SYSTEMS:  No chest pain or palpitations  No cough, no SOB  No N/V/D,  No dysuria or frequency  No rash.     Vital Signs Last 24 Hrs  T(C): 36.7 (06-19-18 @ 14:17), Max: 37.7 (06-19-18 @ 09:58)  T(F): 98.1 (06-19-18 @ 14:17), Max: 99.9 (06-19-18 @ 09:58)  HR: 80 (06-19-18 @ 14:17) (72 - 89)  RR: 18 (06-19-18 @ 14:17) (18 - 18)  SpO2: 99% (06-19-18 @ 14:17) (97% - 99%)    PHYSICAL EXAM:  Patient in no acute distress. AAOX3.  No icterus, no oral ulcers.  Cardiovascular: S1S2 normal.  Lungs: Good air entry B/L lung fields.  Gastrointestinal: soft, nontender, nondistended, surgical incision with small opening in the wound with minimal serosanguinous drainage   Extremities: no edema.  IV sites not inflamed.   Rt sided fistula, lt sided graft.                             11.8   2.8   )-----------( 183      ( 19 Jun 2018 06:34 )             37.8   06-19    132<L>  |  95<L>  |  20  ----------------------------<  122<H>  4.3   |  23  |  1.25    Ca    8.1<L>      19 Jun 2018 13:32  Phos  2.3     06-19  Mg     2.5     06-19        Culture - Other (collected 17 Jun 2018 21:24)  Source: Skin wound  Preliminary Report (19 Jun 2018 15:39):    Moderate Escherichia coli ESBL Multiple Morphological Strains    Moderate Enterococcus faecalis    Few Alpha hemolytic strep    Moderate Coag Negative Staphylococcus  Organism: Escherichia coli ESBL (19 Jun 2018 15:38)  Organism: Escherichia coli ESBL (19 Jun 2018 15:38)    Culture - Blood (collected 17 Jun 2018 17:05)  Source: .Blood Blood-Peripheral  Preliminary Report (18 Jun 2018 18:01):    No growth to date.    Culture - Blood (collected 17 Jun 2018 17:05)  Source: .Blood Blood-Venous  Preliminary Report (18 Jun 2018 18:01):    No growth to date.    Culture - Urine (collected 17 Jun 2018 17:04)  Source: .Urine Clean Catch (Midstream)  Final Report (18 Jun 2018 21:59):    No growth
[facility] DIVISION OF KIDNEY DISEASES AND HYPERTENSION -- FOLLOW UP NOTE  --------------------------------------------------------------------------------  Chief Complaint:fever    24 hour events/subjective:  Pt feels well today      PAST HISTORY  --------------------------------------------------------------------------------  No significant changes to PMH, PSH, FHx, SHx, unless otherwise noted    ALLERGIES & MEDICATIONS  --------------------------------------------------------------------------------  Allergies    No Known Allergies    Intolerances      Standing Inpatient Medications  dextrose 5%. 1000 milliLiter(s) IV Continuous <Continuous>  dextrose 50% Injectable 12.5 Gram(s) IV Push once  dextrose 50% Injectable 25 Gram(s) IV Push once  dextrose 50% Injectable 25 Gram(s) IV Push once  heparin  Injectable 5000 Unit(s) SubCutaneous every 12 hours  imipenem/cilastatin  IVPB 500 milliGRAM(s) IV Intermittent every 8 hours  imipenem/cilastatin  IVPB      insulin lispro (HumaLOG) corrective regimen sliding scale   SubCutaneous three times a day before meals  insulin lispro (HumaLOG) corrective regimen sliding scale   SubCutaneous at bedtime  nystatin    Suspension 208294 Unit(s) Oral four times a day  pantoprazole    Tablet 40 milliGRAM(s) Oral before breakfast  predniSONE   Tablet 5 milliGRAM(s) Oral daily  tacrolimus 1 milliGRAM(s) Oral two times a day    PRN Inpatient Medications  acetaminophen   Tablet. 650 milliGRAM(s) Oral every 6 hours PRN  dextrose 40% Gel 15 Gram(s) Oral once PRN  dextrose 40% Gel 15 Gram(s) Oral once PRN  glucagon  Injectable 1 milliGRAM(s) IntraMuscular once PRN      REVIEW OF SYSTEMS  --------------------------------------------------------------------------------  Gen: No fatigue, fevers/chills, weakness  Skin: No rashes  Head/Eyes/Ears/Mouth: No headache;No sore throat  Respiratory: No dyspnea, cough,   CV: No chest pain, PND, orthopnea  GI: No abdominal pain, diarrhea, constipation, nausea, vomiting  Transplant: No pain  : No increased frequency, dysuria, hematuria, nocturia  MSK: No joint pain/swelling; no back pain; no edema  Neuro: No dizziness/lightheadedness, weakness, seizures, numbness, tingling  Psych: No significant nervousness, anxiety, stress, depression    All other systems were reviewed and are negative, except as noted.    VITALS/PHYSICAL EXAM  --------------------------------------------------------------------------------  T(C): 37.4 (06-21-18 @ 09:41), Max: 37.4 (06-21-18 @ 09:41)  HR: 98 (06-21-18 @ 09:41) (79 - 98)  BP: 100/65 (06-21-18 @ 09:41) (100/65 - 139/84)  RR: 18 (06-21-18 @ 09:41) (18 - 18)  SpO2: 97% (06-21-18 @ 09:41) (96% - 100%)  Wt(kg): --        06-20-18 @ 07:01  -  06-21-18 @ 07:00  --------------------------------------------------------  IN: 1240 mL / OUT: 2325 mL / NET: -1085 mL    06-21-18 @ 07:01  -  06-21-18 @ 18:25  --------------------------------------------------------  IN: 540 mL / OUT: 650 mL / NET: -110 mL      Physical Exam:  	Gen: NAD, well-appearing  	HEENT: PERRL, supple neck, clear oropharynx  	Pulm: CTA B/L  	CV: RRR, S1S2; no rub  	Back: No spinal or CVA tenderness; no sacral edema  	Abd: +BS, soft, nontender/nondistended                      Transplant: No tenderness, swelling  	: No suprapubic tenderness  	UE: Warm, FROM, intact strength; no edema; no asterixis  	LE: Warm, FROM, intact strength; no edema  	Neuro: No focal deficits, intact gait  	Psych: Normal affect and mood  	Skin: Warm, without rashes      LABS/STUDIES  --------------------------------------------------------------------------------              12.3   2.4   >-----------<  157      [06-21-18 @ 06:35]              36.8     136  |  99  |  24  ----------------------------<  150      [06-21-18 @ 06:35]  3.6   |  28  |  1.23        Ca     8.0     [06-21-18 @ 06:35]      Mg     1.6     [06-21-18 @ 06:35]      Phos  2.6     [06-21-18 @ 06:35]    TPro  6.6  /  Alb  3.5  /  TBili  0.6  /  DBili  0.2  /  AST  111  /  ALT  85  /  AlkPhos  68  [06-20-18 @ 06:02]          Creatinine Trend:  SCr 1.23 [06-21 @ 06:35]  SCr 1.39 [06-20 @ 06:02]  SCr 1.25 [06-19 @ 13:32]  SCr 1.33 [06-19 @ 06:34]  SCr 1.35 [06-18 @ 07:19]    Tacrolimus (), Serum: 4.3 ng/mL (06-21 @ 08:05)  Tacrolimus (), Serum: 5.6 ng/mL (06-20 @ 08:00)  Tacrolimus (), Serum: 9.1 ng/mL (06-19 @ 08:18)  Tacrolimus (), Serum: 11.9 ng/mL (06-18 @ 08:04)            Urinalysis - [06-17-18 @ 15:13]      Color Yellow / Appearance Clear / SG 1.013 / pH 6.0      Gluc Negative / Ketone Negative  / Bili Negative / Urobili Negative       Blood Negative / Protein Negative / Leuk Est Negative / Nitrite Negative      RBC 0-2 / WBC 0-2 / Hyaline  / Gran  / Sq Epi  / Non Sq Epi  / Bacteria Few      HbA1c 5.4      [05-27-18 @ 09:50]  TSH 1.35      [11-22-17 @ 03:20]  Lipid: chol 85, , HDL 28, LDL 38      [11-22-17 @ 03:20]
INTERVAL HPI/OVERNIGHT EVENTS:  Patient seen with multidisciplinary team (Nephrologist, pharmacist, nurse, nurse manager, NP, MD surgeons, transplant coordinator  nutritionist, and  )  in am rounds and examined with Dr. Cowart. 59Y/O/m s/p DDRT 5/24/18, now admitted for ESBL E.Coli in the urine and electrolyte imbalance. On admission tacro level elevated 19, tacrolimus was decreased to 1mg BID, will f/u am level, overnight with mild incisional and right groin tenderness, T max 38.1, making adequate urine.     MEDICATIONS  (STANDING):  dextrose 5%. 1000 milliLiter(s) (50 mL/Hr) IV Continuous <Continuous>  dextrose 50% Injectable 12.5 Gram(s) IV Push once  dextrose 50% Injectable 25 Gram(s) IV Push once  dextrose 50% Injectable 25 Gram(s) IV Push once  furosemide    Tablet 20 milliGRAM(s) Oral daily  heparin  Injectable 5000 Unit(s) SubCutaneous every 12 hours  imipenem/cilastatin  IVPB 500 milliGRAM(s) IV Intermittent every 8 hours  imipenem/cilastatin  IVPB      insulin lispro (HumaLOG) corrective regimen sliding scale   SubCutaneous three times a day before meals  insulin lispro (HumaLOG) corrective regimen sliding scale   SubCutaneous at bedtime  nystatin    Suspension 488858 Unit(s) Oral four times a day  pantoprazole    Tablet 40 milliGRAM(s) Oral before breakfast  predniSONE   Tablet 5 milliGRAM(s) Oral daily  tacrolimus 1 milliGRAM(s) Oral two times a day    MEDICATIONS  (PRN):  acetaminophen   Tablet. 650 milliGRAM(s) Oral every 6 hours PRN Mild Pain (1 - 3)  dextrose 40% Gel 15 Gram(s) Oral once PRN Blood Glucose LESS THAN 70 milliGRAM(s)/deciliter  dextrose 40% Gel 15 Gram(s) Oral once PRN Blood Glucose LESS THAN 70 milliGRAM(s)/deciliter  glucagon  Injectable 1 milliGRAM(s) IntraMuscular once PRN Glucose LESS THAN 70 milligrams/deciliter      Allergies    No Known Allergies    Intolerances        Vital Signs Last 24 Hrs  T(C): 38.1 (20 Jun 2018 05:56), Max: 38.1 (20 Jun 2018 05:56)  T(F): 100.6 (20 Jun 2018 05:56), Max: 100.6 (20 Jun 2018 05:56)  HR: 95 (20 Jun 2018 05:56) (80 - 95)  BP: 115/63 (20 Jun 2018 05:56) (110/63 - 136/67)  BP(mean): --  RR: 18 (20 Jun 2018 05:56) (18 - 18)  SpO2: 95% (20 Jun 2018 05:56) (95% - 99%)    LABS:                        13.0   2.8   )-----------( 148      ( 20 Jun 2018 06:02 )             41.6     06-20    132<L>  |  97  |  20  ----------------------------<  113<H>  4.0   |  23  |  1.39<H>    Ca    8.2<L>      20 Jun 2018 06:02  Phos  1.6     06-20  Mg     1.8     06-20    RADIOLOGY & ADDITIONAL TESTS:    Review of systems  Gen: No weight changes, fatigue, fevers/chills, weakness  Skin: No rashes  Head/Eyes/Ears/Mouth: No headache; Normal hearing; Normal vision w/o blurriness; No sinus pain/discomfort, sore throat  Respiratory: No dyspnea, cough, wheezing, hemoptysis  CV: No chest pain, PND, orthopnea  GI: C/O incisional and right femoral pain, diarrhea, constipation, nausea, vomiting, melena, hematochezia  : No increased frequency, dysuria, hematuria, nocturia  MSK: No joint pain/swelling; no back pain; no edema  Neuro: No dizziness/lightheadedness, weakness, seizures, numbness, tingling  Heme: No easy bruising or bleeding  Endo: No heat/cold intolerance  Psych: No significant nervousness, anxiety, stress, depression  All other systems were reviewed and are negative, except as noted.    PHYSICAL EXAM:  Constitutional: Well developed / well nourished  Eyes: Anicteric, PERRLA  ENMT: nc/at  Neck: Supple  Respiratory: CTA B/L  Cardiovascular: RRR  Gastrointestinal: Soft abdomen, NT, ND  Genitourinary: Voiding spontaneously  Extremities: SCD's in place and working bilaterally, B/L UE AVF intact with +B/T  Vascular: Palpable dp pulses bilaterally, tender on palpation at right femoral  Neurological: A&O x3  Skin: Wound open to air no erythema and evidence of infection noted  Musculoskeletal: Moving all extremities  Psychiatric: Responsive
INTERVAL HPI/OVERNIGHT EVENTS:  Patient seen with multidisciplinary team (Nephrologist, pharmacist, nurse, nurse manager, NP, MD surgeons, transplant cordinator,  nutritionist, and  )  in am rounds and examined with Dr. Allred. 57Y/O/m s/p DDRT 18, now admitted for ESBL E.Coli in the urine and electrolyte imbalance. On admission tacro level elevated 19, tacrolimus was decreased to 1mg BID, will f/u am level, overnight with mild incisional tenderness, afebrile, making adequate urine.     MEDICATIONS  (STANDING):  dextrose 5%. 1000 milliLiter(s) (50 mL/Hr) IV Continuous <Continuous>  dextrose 50% Injectable 12.5 Gram(s) IV Push once  dextrose 50% Injectable 25 Gram(s) IV Push once  dextrose 50% Injectable 25 Gram(s) IV Push once  ertapenem  IVPB 1000 milliGRAM(s) IV Intermittent every 24 hours  furosemide    Tablet 20 milliGRAM(s) Oral daily  heparin  Injectable 5000 Unit(s) SubCutaneous every 8 hours  insulin lispro (HumaLOG) corrective regimen sliding scale   SubCutaneous three times a day before meals  insulin lispro (HumaLOG) corrective regimen sliding scale   SubCutaneous at bedtime  nystatin    Suspension 536280 Unit(s) Oral four times a day  pantoprazole    Tablet 40 milliGRAM(s) Oral before breakfast  potassium acid phosphate/sodium acid phosphate tablet (K-PHOS No. 2) 1 Tablet(s) Oral four times a day with meals  predniSONE   Tablet 5 milliGRAM(s) Oral daily  tacrolimus 1 milliGRAM(s) Oral two times a day    MEDICATIONS  (PRN):  acetaminophen   Tablet. 650 milliGRAM(s) Oral every 6 hours PRN Mild Pain (1 - 3)  dextrose 40% Gel 15 Gram(s) Oral once PRN Blood Glucose LESS THAN 70 milliGRAM(s)/deciliter  glucagon  Injectable 1 milliGRAM(s) IntraMuscular once PRN Glucose LESS THAN 70 milligrams/deciliter      Allergies    No Known Allergies    Intolerances        Vital Signs Last 24 Hrs  T(C): 36.4 (2018 09:34), Max: 36.9 (2018 19:34)  T(F): 97.6 (2018 09:34), Max: 98.4 (2018 19:34)  HR: 58 (2018 09:34) (58 - 87)  BP: 135/75 (2018 09:34) (107/71 - 135/75)  BP(mean): --  RR: 18 (2018 09:34) (15 - 18)  SpO2: 96% (2018 09:34) (95% - 100%)    LABS:                        11.1   1.82  )-----------( 186      ( 2018 07:56 )             35.3     06-18    135  |  101  |  23  ----------------------------<  132<H>  4.2   |  22  |  1.35<H>    Ca    7.7<L>      2018 07:19  Phos  2.7     18  Mg     1.6     18    TPro  6.3  /  Alb  3.5  /  TBili  0.4  /  DBili  x   /  AST  81<H>  /  ALT  70<H>  /  AlkPhos  68  06-17      Urinalysis Basic - ( 2018 15:13 )    Color: Yellow / Appearance: Clear / S.013 / pH: x  Gluc: x / Ketone: Negative  / Bili: Negative / Urobili: Negative   Blood: x / Protein: Negative / Nitrite: Negative   Leuk Esterase: Negative / RBC: 0-2 /HPF / WBC 0-2 /HPF   Sq Epi: x / Non Sq Epi: x / Bacteria: Few /HPF        RADIOLOGY & ADDITIONAL TESTS:    Review of systems  Gen: No weight changes, fatigue, fevers/chills, weakness  Skin: No rashes  Head/Eyes/Ears/Mouth: No headache; Normal hearing; Normal vision w/o blurriness; No sinus pain/discomfort, sore throat  Respiratory: No dyspnea, cough, wheezing, hemoptysis  CV: No chest pain, PND, orthopnea  GI: No abdominal pain, diarrhea, constipation, nausea, vomiting, melena, hematochezia  : No increased frequency, dysuria, hematuria, nocturia  MSK: No joint pain/swelling; no back pain; no edema  Neuro: No dizziness/lightheadedness, weakness, seizures, numbness, tingling  Heme: No easy bruising or bleeding  Endo: No heat/cold intolerance  Psych: No significant nervousness, anxiety, stress, depression  All other systems were reviewed and are negative, except as noted.    PHYSICAL EXAM:  Constitutional: Well developed / well nourished  Eyes: Anicteric, PERRLA  ENMT: nc/at  Neck: Supple  Respiratory: CTA B/L  Cardiovascular: RRR  Gastrointestinal: Soft abdomen, NT, ND  Genitourinary: Voiding spontaneously  Extremities: SCD's in place and working bilaterally, B/L UE AVF intact with +B/T  Vascular: Palpable dp pulses bilaterally  Neurological: A&O x3  Skin: Wound open to air no erythema and evidence of infection noted  Musculoskeletal: Moving all extremities  Psychiatric: Responsive
INTERVAL HPI/OVERNIGHT EVENTS:  Patient seen with multidisciplinary team (Nephrologist, pharmacist, nurse, nurse manager, NP, MD surgeons, transplant cordinator,  nutritionist, and  )  in am rounds and examined with Dr. Cowart.  59 Y/O m s/p DDRT 5/24/18, now admitted for ESBL E.Coli in the urine and electrolyte imbalance. On admission tacro level elevated 19.    Renal ultrasound completed, notable for 3.3x 1.7cm fluid collection adjacent to kidney, possibly evolving hematoma. Fever 38.1 yesterday, leukopenia persists.  Continued on Meropenem for ESBL Ecoli UTI.  Abdominal wound without drainage.  Left IJ PICC placed yesterday. Creatinine stable with good uop. Afebrile, VSS.  No complaints overnight.         MEDICATIONS  (STANDING):  dextrose 5%. 1000 milliLiter(s) (50 mL/Hr) IV Continuous <Continuous>  dextrose 50% Injectable 12.5 Gram(s) IV Push once  dextrose 50% Injectable 25 Gram(s) IV Push once  dextrose 50% Injectable 25 Gram(s) IV Push once  heparin  Injectable 5000 Unit(s) SubCutaneous every 12 hours  imipenem/cilastatin  IVPB 500 milliGRAM(s) IV Intermittent every 8 hours  imipenem/cilastatin  IVPB      insulin lispro (HumaLOG) corrective regimen sliding scale   SubCutaneous three times a day before meals  insulin lispro (HumaLOG) corrective regimen sliding scale   SubCutaneous at bedtime  nystatin    Suspension 807222 Unit(s) Oral four times a day  pantoprazole    Tablet 40 milliGRAM(s) Oral before breakfast  predniSONE   Tablet 5 milliGRAM(s) Oral daily  tacrolimus 1 milliGRAM(s) Oral two times a day    MEDICATIONS  (PRN):  acetaminophen   Tablet. 650 milliGRAM(s) Oral every 6 hours PRN Mild Pain (1 - 3)  dextrose 40% Gel 15 Gram(s) Oral once PRN Blood Glucose LESS THAN 70 milliGRAM(s)/deciliter  dextrose 40% Gel 15 Gram(s) Oral once PRN Blood Glucose LESS THAN 70 milliGRAM(s)/deciliter  glucagon  Injectable 1 milliGRAM(s) IntraMuscular once PRN Glucose LESS THAN 70 milligrams/deciliter      Allergies    No Known Allergies    Intolerances        Vital Signs Last 24 Hrs  T(C): 37.4 (21 Jun 2018 09:41), Max: 37.4 (21 Jun 2018 09:41)  T(F): 99.4 (21 Jun 2018 09:41), Max: 99.4 (21 Jun 2018 09:41)  HR: 98 (21 Jun 2018 09:41) (79 - 98)  BP: 100/65 (21 Jun 2018 09:41) (98/61 - 139/84)  BP(mean): --  RR: 18 (21 Jun 2018 09:41) (18 - 18)  SpO2: 97% (21 Jun 2018 09:41) (95% - 100%)    LABS:                        12.3   2.4   )-----------( 157      ( 21 Jun 2018 06:35 )             36.8     06-21    136  |  99  |  24<H>  ----------------------------<  150<H>  3.6   |  28  |  1.23    Ca    8.0<L>      21 Jun 2018 06:35  Phos  2.6     06-21  Mg     1.6     06-21    TPro  6.6  /  Alb  3.5  /  TBili  0.6  /  DBili  0.2  /  AST  111<H>  /  ALT  85<H>  /  AlkPhos  68  06-20          RADIOLOGY & ADDITIONAL TESTS:  EXAM:  US KIDNEY TRANSPLANT W DOPP RT                PROCEDURE DATE:  06/20/2018    INTERPRETATION:  CLINICAL INFORMATION: Renal transplant on May 24, 2018.   UTI.    FINDINGS:    Renal Transplant: 11.1 cm. No renal mass or hydronephrosis.   Nonobstructing calculi measuring up to 0.5 cm. Small complex fluid   collection adjacent to the transplant kidney, measuring 3.3 x 1.7 cm.    Urinary bladder: Within normal limits.    Color and spectral Doppler reveals homogeneous flow throughout the   transplant.    Peak iliac artery velocity is 116 cm/sec pre-anastomosis, 192 cm/sec at   the anastomosis, and 102 cm/sec post anastomosis.    Transplant Renal Artery:   Peak systolic velocity is 119 cm/sec anastomosis, 123 cm/sec proximal, 82   cm/sec mid, 73 cm/sec distal and 80 cm/sec hilum.   Resistive Indices Range: 0.64-0.78    Transplant Renal Vein: Patent..    IMPRESSION:     No evidence of a significant renal artery stenosis.    Complex fluid collection adjacent to the transplant kidney, measuring 3.3   x 1.7 cm, possibly evolving hematoma.    Review of systems  Gen: No weight changes, fatigue, fevers/chills, weakness  Skin: No rashes  Head/Eyes/Ears/Mouth: No headache; Normal hearing; Normal vision w/o blurriness; No sinus pain/discomfort, sore throat  Respiratory: No dyspnea, cough, wheezing, hemoptysis  CV: No chest pain, PND, orthopnea  GI: C/O some incisional pain. denies diarrhea, constipation, nausea, vomiting, melena, hematochezia  : No increased frequency, dysuria, hematuria, nocturia  MSK: No joint pain/swelling; no back pain; no edema  Neuro: No dizziness/lightheadedness, weakness, seizures, numbness, tingling  Heme: No easy bruising or bleeding  Endo: No heat/cold intolerance  Psych: No significant nervousness, anxiety, stress, depression  All other systems were reviewed and are negative, except as noted.    PHYSICAL EXAM:  Constitutional: Well developed / well nourished  Eyes: Anicteric, PERRLA  ENMT: nc/at  Neck: Supple. Left IJ PICC patent without erythema  Respiratory: CTA B/L  Cardiovascular: RRR  Gastrointestinal: Soft abdomen, NT, ND  Genitourinary: Voiding spontaneously  Extremities: SCD's in place and working bilaterally, B/L UE AVF intact with +B/T  Vascular: Palpable dp pulses bilaterally  Neurological: A&O x3  Skin: Wound open to air no erythema and evidence of infection noted  Musculoskeletal: Moving all extremities  Psychiatric: Responsive
Interventional Radiology  Pre-Procedure Note    HPI:  This is a 58 year old male with hx of  CAD s/p stents and CABG, HTN, GERD, HLD who is 3w s/p DDRT to R iliac vessels who came in for electrolyte repletions on Fri 6/14 and had a urinalysis at that time which resulted today to reveal ESBL E.coli. . Pt presents to IR for IJ tunneled single lumen picc line per ID Dr Lam's request for longterm antibiotics. Pt has a right AVF & left AV graft - HD via right AVF. Negative blood cxs x 2 to date.           PAST MEDICAL & SURGICAL HISTORY:  HTN (hypertension)  Coronary artery disease involving coronary bypass graft  ESRD (end stage renal disease) on dialysis  Obesity  Hemorrhoids  Gastroesophageal reflux disease without esophagitis  High cholesterol  ESRD on Dialysis: [facility] &amp; Sat  CAD (Coronary Artery Disease)  PCK (Polycystic Kidney Disease)  HTN - Hypertension  Asthma: last attack 2007, never hospitalized or intubated  AV fistula: b/l MILI GREEN  S/P coronary artery stent placement  S/P CABG x 5  S/P hemorrhoidectomy: and rectal polypectomy -2/3/2017.  AV fistula: right lower extremity 2 yrs  Left upper extrmity with graft 7 years ago  Stented coronary artery: x2 cardiac stents in 2016, one cardiac stent in 2015  CABG (Coronary Artery Bypass Graft): 2007        FAMILY HISTORY:  Family history of polycystic kidney (Sibling)  Family history of adult polycystic kidney disease (Sibling)      Allergies: No Known Allergies      Current Medications: acetaminophen   Tablet. 650 milliGRAM(s) Oral every 6 hours PRN  dextrose 40% Gel 15 Gram(s) Oral once PRN  dextrose 40% Gel 15 Gram(s) Oral once PRN  dextrose 5%. 1000 milliLiter(s) IV Continuous <Continuous>  dextrose 50% Injectable 12.5 Gram(s) IV Push once  dextrose 50% Injectable 25 Gram(s) IV Push once  dextrose 50% Injectable 25 Gram(s) IV Push once  furosemide    Tablet 20 milliGRAM(s) Oral daily  glucagon  Injectable 1 milliGRAM(s) IntraMuscular once PRN  heparin  Injectable 5000 Unit(s) SubCutaneous every 12 hours  imipenem/cilastatin  IVPB 500 milliGRAM(s) IV Intermittent every 8 hours  imipenem/cilastatin  IVPB      insulin lispro (HumaLOG) corrective regimen sliding scale   SubCutaneous three times a day before meals  insulin lispro (HumaLOG) corrective regimen sliding scale   SubCutaneous at bedtime  nystatin    Suspension 077552 Unit(s) Oral four times a day  pantoprazole    Tablet 40 milliGRAM(s) Oral before breakfast  predniSONE   Tablet 5 milliGRAM(s) Oral daily  tacrolimus 1 milliGRAM(s) Oral two times a day      Labs:                         13.0   2.8   )-----------( 148      ( 20 Jun 2018 06:02 )             41.6       06-20    132<L>  |  97  |  20  ----------------------------<  113<H>  4.0   |  23  |  1.39<H>    Ca    8.2<L>      20 Jun 2018 06:02  Phos  1.6     06-20  Mg     1.8     06-20    TPro  6.3  /  Alb  3.5  /  TBili  0.4  /  DBili  x   /  AST  81<H>  /  ALT  70<H>  /  AlkPhos  68  06-17      PT/INR - ( 18 Jun 2018 09:42 )   PT: 12.1 sec;   INR: 1.07 ratio         PTT - ( 18 Jun 2018 09:42 )  PTT:31.0 sec      Blood Bank: Type + Screen 05-31 @ 16:25  B  --  Negative  Negative        Assessment/Plan:   This is a 58 year old male  presents with E Coli bacteriuria s/p renal transplant 1 mo ago requiring longterm IV antibiotics per Dr Lam  Patient presents to IR for tunneled IJ PICC line.  Pt a&o x3. Procedure/ risks/ benefits/ goals/ alternatives were explained. All questions answered. Informed content obtained from patient. Consent placed in chart.     Amanda RIVAS BC  ext [phone]  # [phone]
[facility] [phone]        Patient seen and examined in bed.  He felt wel        MEDICATIONS  (STANDING):  dextrose 5%. 1000 milliLiter(s) (50 mL/Hr) IV Continuous <Continuous>  dextrose 50% Injectable 12.5 Gram(s) IV Push once  dextrose 50% Injectable 25 Gram(s) IV Push once  dextrose 50% Injectable 25 Gram(s) IV Push once  ertapenem  IVPB 1000 milliGRAM(s) IV Intermittent every 24 hours  furosemide    Tablet 20 milliGRAM(s) Oral daily  heparin  Injectable 5000 Unit(s) SubCutaneous every 8 hours  insulin lispro (HumaLOG) corrective regimen sliding scale   SubCutaneous three times a day before meals  insulin lispro (HumaLOG) corrective regimen sliding scale   SubCutaneous at bedtime  nystatin    Suspension 384973 Unit(s) Oral four times a day  pantoprazole    Tablet 40 milliGRAM(s) Oral before breakfast  predniSONE   Tablet 5 milliGRAM(s) Oral daily  tacrolimus 1 milliGRAM(s) Oral two times a day      VITAL:  T(C): , Max: 37.7 (18 @ 09:58)  T(F): , Max: 99.9 (18 @ 09:58)  HR: 89 (18 @ 09:58)  BP: 110/63 (18 @ 09:58)  BP(mean): --  RR: 18 (18 @ 09:58)  SpO2: 97% (18 @ 09:58)  Wt(kg): --    I and O's:     @ 07:  -   @ 07:00  --------------------------------------------------------  IN: 1080 mL / OUT: 1735 mL / NET: -655 mL     @ 07:01  -   @ 14:12  --------------------------------------------------------  IN: 0 mL / OUT: 250 mL / NET: -250 mL          PHYSICAL EXAM:    Constitutional: NAD  HEENT: PERRLA    Neck:  No JVD  Respiratory: CTAB/L  Cardiovascular: S1 and S2  Gastrointestinal: BS+, soft, NT/ND  Extremities: No peripheral edema  Neurological: A/O x 3, no focal deficits  Psychiatric: Normal mood, normal affect  : No Jean  Skin: No rashes  Access: Not applicable    LABS:                        11.8   2.8   )-----------( 183      ( 2018 06:34 )             37.8     06-    132<L>  |  95<L>  |  20  ----------------------------<  122<H>  4.3   |  23  |  1.25    Ca    8.1<L>      2018 13:32  Phos  2.3       Mg     2.5         TPro  6.3  /  Alb  3.5  /  TBili  0.4  /  DBili  x   /  AST  81<H>  /  ALT  70<H>  /  AlkPhos  68  -17          Urine Studies:  Urinalysis Basic - ( 2018 15:13 )    Color: Yellow / Appearance: Clear / S.013 / pH: x  Gluc: x / Ketone: Negative  / Bili: Negative / Urobili: Negative   Blood: x / Protein: Negative / Nitrite: Negative   Leuk Esterase: Negative / RBC: 0-2 /HPF / WBC 0-2 /HPF   Sq Epi: x / Non Sq Epi: x / Bacteria: Few /HPF            RADIOLOGY & ADDITIONAL STUDIES:
NEPHROLOGY-[facility] [phone]        Patient seen and examined in bed.  He was in good spirits        MEDICATIONS  (STANDING):  dextrose 5%. 1000 milliLiter(s) (50 mL/Hr) IV Continuous <Continuous>  dextrose 50% Injectable 12.5 Gram(s) IV Push once  dextrose 50% Injectable 25 Gram(s) IV Push once  dextrose 50% Injectable 25 Gram(s) IV Push once  heparin  Injectable 5000 Unit(s) SubCutaneous every 12 hours  imipenem/cilastatin  IVPB 500 milliGRAM(s) IV Intermittent every 8 hours  imipenem/cilastatin  IVPB      insulin lispro (HumaLOG) corrective regimen sliding scale   SubCutaneous three times a day before meals  insulin lispro (HumaLOG) corrective regimen sliding scale   SubCutaneous at bedtime  nystatin    Suspension 722506 Unit(s) Oral four times a day  pantoprazole    Tablet 40 milliGRAM(s) Oral before breakfast  predniSONE   Tablet 5 milliGRAM(s) Oral daily  tacrolimus 1 milliGRAM(s) Oral two times a day      VITAL:  T(C): , Max: 37.4 (06-21-18 @ 09:41)  T(F): , Max: 99.4 (06-21-18 @ 09:41)  HR: 98 (06-21-18 @ 09:41)  BP: 100/65 (06-21-18 @ 09:41)  BP(mean): --  RR: 18 (06-21-18 @ 09:41)  SpO2: 97% (06-21-18 @ 09:41)  Wt(kg): --    I and O's:    06-20 @ 07:01  -  06-21 @ 07:00  --------------------------------------------------------  IN: 1240 mL / OUT: 2325 mL / NET: -1085 mL    06-21 @ 07:01  -  06-21 @ 11:47  --------------------------------------------------------  IN: 240 mL / OUT: 400 mL / NET: -160 mL          PHYSICAL EXAM:    Constitutional: NAD  HEENT: PERRLA    Neck:  No JVD  Respiratory: CTAB/L  Cardiovascular: S1 and S2  Gastrointestinal: BS+, soft, NT/ND  Extremities: No peripheral edema  Neurological: A/O x 3, no focal deficits  Psychiatric: Normal mood, normal affect  : No Jean  Skin: No rashes  Access: avf    LABS:                        12.3   2.4   )-----------( 157      ( 21 Jun 2018 06:35 )             36.8     06-21    136  |  99  |  24<H>  ----------------------------<  150<H>  3.6   |  28  |  1.23    Ca    8.0<L>      21 Jun 2018 06:35  Phos  2.6     06-21  Mg     1.6     06-21    TPro  6.6  /  Alb  3.5  /  TBili  0.6  /  DBili  0.2  /  AST  111<H>  /  ALT  85<H>  /  AlkPhos  68  06-20          Urine Studies:          RADIOLOGY & ADDITIONAL STUDIES:
[facility] DIVISION OF KIDNEY DISEASES AND HYPERTENSION -- FOLLOW UP NOTE  --------------------------------------------------------------------------------  Chief Complaint:  fever    24 hour events/subjective:  Pt with low grade fever this morning.  No pain.        PAST HISTORY  --------------------------------------------------------------------------------  No significant changes to PMH, PSH, FHx, SHx, unless otherwise noted    ALLERGIES & MEDICATIONS  --------------------------------------------------------------------------------  Allergies    No Known Allergies    Intolerances      Standing Inpatient Medications  dextrose 5%. 1000 milliLiter(s) IV Continuous <Continuous>  dextrose 50% Injectable 12.5 Gram(s) IV Push once  dextrose 50% Injectable 25 Gram(s) IV Push once  dextrose 50% Injectable 25 Gram(s) IV Push once  furosemide    Tablet 20 milliGRAM(s) Oral daily  heparin  Injectable 5000 Unit(s) SubCutaneous every 12 hours  imipenem/cilastatin  IVPB 500 milliGRAM(s) IV Intermittent every 8 hours  imipenem/cilastatin  IVPB      insulin lispro (HumaLOG) corrective regimen sliding scale   SubCutaneous three times a day before meals  insulin lispro (HumaLOG) corrective regimen sliding scale   SubCutaneous at bedtime  nystatin    Suspension [id number] Unit(s) Oral four times a day  pantoprazole    Tablet 40 milliGRAM(s) Oral before breakfast  predniSONE   Tablet 5 milliGRAM(s) Oral daily  sodium phosphate IVPB 15 milliMole(s) IV Intermittent once  tacrolimus 1 milliGRAM(s) Oral two times a day    PRN Inpatient Medications  acetaminophen   Tablet. 650 milliGRAM(s) Oral every 6 hours PRN  dextrose 40% Gel 15 Gram(s) Oral once PRN  dextrose 40% Gel 15 Gram(s) Oral once PRN  glucagon  Injectable 1 milliGRAM(s) IntraMuscular once PRN      REVIEW OF SYSTEMS  --------------------------------------------------------------------------------  Gen: mild fatigue  Skin: No rashes  Head/Eyes/Ears/Mouth: No headache;No sore throat  Respiratory: No dyspnea, cough,   CV: No chest pain, PND, orthopnea  GI: No abdominal pain, diarrhea, constipation, nausea, vomiting  Transplant: No pain  : No increased frequency, dysuria, hematuria, nocturia  MSK: No joint pain/swelling; no back pain; no edema  Neuro: No dizziness/lightheadedness, weakness, seizures, numbness, tingling  Psych: No significant nervousness, anxiety, stress, depression    All other systems were reviewed and are negative, except as noted.    VITALS/PHYSICAL EXAM  --------------------------------------------------------------------------------  T(C): 37 (06-20-18 @ 14:27), Max: 38.1 (06-20-18 @ 05:56)  HR: 96 (06-20-18 @ 14:27) (90 - 96)  BP: 112/68 (06-20-18 @ 14:27) (98/61 - 115/63)  RR: 18 (06-20-18 @ 14:27) (18 - 18)  SpO2: 96% (06-20-18 @ 14:27) (95% - 96%)  Wt(kg): --        06-19-18 @ 07:01 - 06-20-18 @ 07:00  --------------------------------------------------------  IN: 1480 mL / OUT: 2150 mL / NET: -670 mL    06-20-18 @ 07:01  -  06-20-18 @ 16:52  --------------------------------------------------------  IN: 750 mL / OUT: 1025 mL / NET: -275 mL      Physical Exam:  	Gen: NAD, well-appearing  	HEENT: PERRL, supple neck, clear oropharynx  	Pulm: few scattered wheezes    	CV: RRR, S1S2; no rub  	Back: No spinal or CVA tenderness; no sacral edema  	Abd: +BS, soft, nontender/nondistended                      Transplant: No tenderness, swelling  	: No suprapubic tenderness  	UE: Warm, FROM, intact strength; no edema; no asterixis  	LE: Warm, FROM, intact strength; no edema  	Neuro: No focal deficits, intact gait  	Psych: Normal affect and mood  	Skin: Warm, without rashes      LABS/STUDIES  --------------------------------------------------------------------------------              13.0   2.8   >-----------<  148      [06-20-18 @ 06:02]              41.6     132  |  97  |  20  ----------------------------<  113      [06-20-18 @ 06:02]  4.0   |  23  |  1.39        Ca     8.2     [06-20-18 @ 06:02]      Mg     1.8     [06-20-18 @ 06:02]      Phos  1.6     [06-20-18 @ 06:02]    TPro  6.6  /  Alb  3.5  /  TBili  0.6  /  DBili  0.2  /  AST  111  /  ALT  85  /  AlkPhos  68  [06-20-18 @ 06:02]          Creatinine Trend:  SCr 1.39 [06-20 @ 06:02]  SCr 1.25 [06-19 @ 13:32]  SCr 1.33 [06-19 @ 06:34]  SCr 1.35 [06-18 @ 07:19]  SCr 1.61 [06-17 @ 15:13]    Tacrolimus (), Serum: 5.6 ng/mL (06-20 @ 08:00)  Tacrolimus (), Serum: 9.1 ng/mL (06-19 @ 08:18)  Tacrolimus (), Serum: 11.9 ng/mL (06-18 @ 08:04)  Tacrolimus (), Serum: 19.1 ng/mL (06-17 @ 17:02)            Urinalysis - [06-17-18 @ 15:13]      Color Yellow / Appearance Clear / SG 1.013 / pH 6.0      Gluc Negative / Ketone Negative  / Bili Negative / Urobili Negative       Blood Negative / Protein Negative / Leuk Est Negative / Nitrite Negative      RBC 0-2 / WBC 0-2 / Hyaline  / Gran  / Sq Epi  / Non Sq Epi  / Bacteria Few      HbA1c 5.4      [05-27-18 @ 09:50]  TSH 1.35      [11-22-17 @ 03:20]  Lipid: chol 85, , HDL 28, LDL 38      [11-22-17 @ 03:20]

## 2018-06-21 NOTE — PROGRESS NOTE ADULT - PROVIDER SPECIALTY LIST ADULT
Infectious Disease
Internal Medicine
Internal Medicine
Intervent Radiology
Nephrology
Transplant Medicine
Transplant Surgery
Infectious Disease
Nephrology
Transplant Surgery
Nephrology

## 2018-06-22 DIAGNOSIS — R69 ILLNESS, UNSPECIFIED: ICD-10-CM

## 2018-06-22 LAB
CULTURE RESULTS: SIGNIFICANT CHANGE UP
CULTURE RESULTS: SIGNIFICANT CHANGE UP
SPECIMEN SOURCE: SIGNIFICANT CHANGE UP
SPECIMEN SOURCE: SIGNIFICANT CHANGE UP

## 2018-06-28 ENCOUNTER — LABORATORY RESULT (OUTPATIENT)
Age: 58
End: 2018-06-28

## 2018-06-28 ENCOUNTER — APPOINTMENT (OUTPATIENT)
Dept: NEPHROLOGY | Facility: CLINIC | Age: 58
End: 2018-06-28
Payer: MEDICAID

## 2018-06-28 VITALS
SYSTOLIC BLOOD PRESSURE: 132 MMHG | HEIGHT: 60 IN | RESPIRATION RATE: 16 BRPM | TEMPERATURE: 97.9 F | OXYGEN SATURATION: 100 % | WEIGHT: 187 LBS | BODY MASS INDEX: 36.71 KG/M2 | HEART RATE: 69 BPM | DIASTOLIC BLOOD PRESSURE: 66 MMHG

## 2018-06-28 PROCEDURE — 99214 OFFICE O/P EST MOD 30 MIN: CPT

## 2018-06-29 ENCOUNTER — OUTPATIENT (OUTPATIENT)
Dept: OUTPATIENT SERVICES | Facility: HOSPITAL | Age: 58
LOS: 1 days | End: 2018-06-29

## 2018-06-29 ENCOUNTER — OUTPATIENT (OUTPATIENT)
Dept: OUTPATIENT SERVICES | Facility: HOSPITAL | Age: 58
LOS: 1 days | End: 2018-06-29
Payer: MEDICAID

## 2018-06-29 ENCOUNTER — APPOINTMENT (OUTPATIENT)
Dept: ULTRASOUND IMAGING | Facility: IMAGING CENTER | Age: 58
End: 2018-06-29
Payer: COMMERCIAL

## 2018-06-29 ENCOUNTER — LABORATORY RESULT (OUTPATIENT)
Age: 58
End: 2018-06-29

## 2018-06-29 DIAGNOSIS — I77.0 ARTERIOVENOUS FISTULA, ACQUIRED: Chronic | ICD-10-CM

## 2018-06-29 DIAGNOSIS — Z95.5 PRESENCE OF CORONARY ANGIOPLASTY IMPLANT AND GRAFT: Chronic | ICD-10-CM

## 2018-06-29 DIAGNOSIS — Z95.1 PRESENCE OF AORTOCORONARY BYPASS GRAFT: Chronic | ICD-10-CM

## 2018-06-29 DIAGNOSIS — Z98.890 OTHER SPECIFIED POSTPROCEDURAL STATES: Chronic | ICD-10-CM

## 2018-06-29 DIAGNOSIS — I25.10 ATHEROSCLEROTIC HEART DISEASE OF NATIVE CORONARY ARTERY WITHOUT ANGINA PECTORIS: ICD-10-CM

## 2018-06-29 PROCEDURE — 76776 US EXAM K TRANSPL W/DOPPLER: CPT | Mod: 26,RT

## 2018-06-29 PROCEDURE — 76776 US EXAM K TRANSPL W/DOPPLER: CPT

## 2018-06-29 NOTE — ED ADULT NURSE NOTE - PAIN RATING/NUMBER SCALE (0-10): REST
67 y/o M with CAD s/p 12-14 stents placed in 1990's, and BABAR x 5 placed in Sept 2017, CHF with EF 20%, DM, HTN, HLD, AF on coumadin, CVA , ESRD on HD (MWF) COPD, bipolar disorder, dementia admitted for SOB secondary to fluid overload. 0

## 2018-07-01 ENCOUNTER — OUTPATIENT (OUTPATIENT)
Dept: OUTPATIENT SERVICES | Facility: HOSPITAL | Age: 58
LOS: 1 days | End: 2018-07-01

## 2018-07-01 DIAGNOSIS — I77.0 ARTERIOVENOUS FISTULA, ACQUIRED: Chronic | ICD-10-CM

## 2018-07-01 DIAGNOSIS — Z95.1 PRESENCE OF AORTOCORONARY BYPASS GRAFT: Chronic | ICD-10-CM

## 2018-07-01 DIAGNOSIS — Z95.5 PRESENCE OF CORONARY ANGIOPLASTY IMPLANT AND GRAFT: Chronic | ICD-10-CM

## 2018-07-01 DIAGNOSIS — Z98.890 OTHER SPECIFIED POSTPROCEDURAL STATES: Chronic | ICD-10-CM

## 2018-07-02 LAB
ALBUMIN SERPL ELPH-MCNC: 3.6 G/DL
ALP BLD-CCNC: 58 U/L
ALT SERPL-CCNC: 16 U/L
ANION GAP SERPL CALC-SCNC: 16 MMOL/L
APPEARANCE: CLEAR
AST SERPL-CCNC: 31 U/L
BASOPHILS # BLD AUTO: 0.02 K/UL
BASOPHILS NFR BLD AUTO: 0.6 %
BILIRUB SERPL-MCNC: 0.6 MG/DL
BILIRUBIN URINE: NEGATIVE
BKV DNA SPEC QL NAA+PROBE: NORMAL
BLOOD URINE: NEGATIVE
BUN SERPL-MCNC: 62 MG/DL
CALCIUM SERPL-MCNC: 7.6 MG/DL
CHLORIDE SERPL-SCNC: 99 MMOL/L
CO2 SERPL-SCNC: 18 MMOL/L
COLOR: YELLOW
CREAT SERPL-MCNC: 2.42 MG/DL
CREAT SPEC-SCNC: 79 MG/DL
CREAT/PROT UR: 0.3 RATIO
EOSINOPHIL # BLD AUTO: 0.1 K/UL
EOSINOPHIL NFR BLD AUTO: 3.2 %
GLUCOSE QUALITATIVE U: NEGATIVE MG/DL
GLUCOSE SERPL-MCNC: 147 MG/DL
HBV DNA # SERPL NAA+PROBE: NOT DETECTED
HBV SURFACE AG SER QL: NONREACTIVE
HCT VFR BLD CALC: 32.2 %
HCV AB SER QL: NONREACTIVE
HCV RNA SERPL NAA DL=5-ACNC: ABNORMAL IU/ML
HCV RNA SERPL NAA+PROBE-LOG IU: 4.68 LOGIU/ML
HCV S/CO RATIO: 0.31 S/CO
HEPB DNA PCR LOG: NOT DETECTED LOGIU/ML
HGB BLD-MCNC: 10.3 G/DL
HIV1 RNA # SERPL NAA+PROBE: NORMAL
HIV1 RNA # SERPL NAA+PROBE: NORMAL COPIES/ML
HIV1+2 AB SPEC QL IA.RAPID: NONREACTIVE
IMM GRANULOCYTES NFR BLD AUTO: 1.3 %
KETONES URINE: ABNORMAL
LDH SERPL-CCNC: 276 U/L
LEUKOCYTE ESTERASE URINE: NEGATIVE
LYMPHOCYTES # BLD AUTO: 0.58 K/UL
LYMPHOCYTES NFR BLD AUTO: 18.5 %
MAGNESIUM SERPL-MCNC: 1.7 MG/DL
MAN DIFF?: NORMAL
MCHC RBC-ENTMCNC: 29 PG
MCHC RBC-ENTMCNC: 32 GM/DL
MCV RBC AUTO: 90.7 FL
MONOCYTES # BLD AUTO: 0.22 K/UL
MONOCYTES NFR BLD AUTO: 7 %
NEUTROPHILS # BLD AUTO: 2.18 K/UL
NEUTROPHILS NFR BLD AUTO: 69.4 %
NITRITE URINE: NEGATIVE
PH URINE: 5.5
PHOSPHATE SERPL-MCNC: 3.6 MG/DL
PLATELET # BLD AUTO: 138 K/UL
POTASSIUM SERPL-SCNC: 3.6 MMOL/L
PROT SERPL-MCNC: 6.4 G/DL
PROT UR-MCNC: 27 MG/DL
PROTEIN URINE: 30 MG/DL
RBC # BLD: 3.55 M/UL
RBC # FLD: 14 %
SODIUM SERPL-SCNC: 133 MMOL/L
SPECIFIC GRAVITY URINE: 1.01
TACROLIMUS SERPL-MCNC: 4.9 NG/ML
URATE SERPL-MCNC: 13.8 MG/DL
UROBILINOGEN URINE: 1 MG/DL
VIRAL LOAD INTERP: NORMAL
VIRAL LOAD LOG: NORMAL LG COP/ML
WBC # FLD AUTO: 3.14 K/UL

## 2018-07-03 ENCOUNTER — APPOINTMENT (OUTPATIENT)
Dept: TRANSPLANT | Facility: CLINIC | Age: 58
End: 2018-07-03

## 2018-07-03 LAB
ALBUMIN SERPL ELPH-MCNC: 3.4 G/DL
ALP BLD-CCNC: 52 U/L
ALT SERPL-CCNC: 17 U/L
ANION GAP SERPL CALC-SCNC: 13 MMOL/L
APPEARANCE: CLEAR
AST SERPL-CCNC: 21 U/L
BACTERIA: NEGATIVE
BASOPHILS # BLD AUTO: 0.04 K/UL
BASOPHILS # BLD AUTO: 0.04 K/UL
BASOPHILS NFR BLD AUTO: 0.9 %
BASOPHILS NFR BLD AUTO: 0.9 %
BILIRUB SERPL-MCNC: 0.6 MG/DL
BILIRUBIN URINE: NEGATIVE
BLOOD URINE: NEGATIVE
BUN SERPL-MCNC: 32 MG/DL
CALCIUM SERPL-MCNC: 7.7 MG/DL
CHLORIDE SERPL-SCNC: 97 MMOL/L
CO2 SERPL-SCNC: 17 MMOL/L
COLOR: YELLOW
CREAT SERPL-MCNC: 1.36 MG/DL
EOSINOPHIL # BLD AUTO: 0.18 K/UL
EOSINOPHIL # BLD AUTO: 0.22 K/UL
EOSINOPHIL NFR BLD AUTO: 4.1 %
EOSINOPHIL NFR BLD AUTO: 5.1 %
GLUCOSE QUALITATIVE U: NEGATIVE MG/DL
GLUCOSE SERPL-MCNC: 119 MG/DL
HCT VFR BLD CALC: 32.7 %
HCT VFR BLD CALC: 32.9 %
HGB BLD-MCNC: 10.3 G/DL
HGB BLD-MCNC: 10.4 G/DL
HYALINE CASTS: 0 /LPF
IMM GRANULOCYTES NFR BLD AUTO: 0.2 %
IMM GRANULOCYTES NFR BLD AUTO: 0.2 %
KETONES URINE: NEGATIVE
LDH SERPL-CCNC: 265 U/L
LEUKOCYTE ESTERASE URINE: NEGATIVE
LYMPHOCYTES # BLD AUTO: 0.79 K/UL
LYMPHOCYTES # BLD AUTO: 0.82 K/UL
LYMPHOCYTES NFR BLD AUTO: 18 %
LYMPHOCYTES NFR BLD AUTO: 18.9 %
MAGNESIUM SERPL-MCNC: 1.3 MG/DL
MAN DIFF?: NORMAL
MAN DIFF?: NORMAL
MCHC RBC-ENTMCNC: 28.1 PG
MCHC RBC-ENTMCNC: 28.6 PG
MCHC RBC-ENTMCNC: 31.3 GM/DL
MCHC RBC-ENTMCNC: 31.8 GM/DL
MCV RBC AUTO: 89.6 FL
MCV RBC AUTO: 89.8 FL
MICROSCOPIC-UA: NORMAL
MONOCYTES # BLD AUTO: 0.58 K/UL
MONOCYTES # BLD AUTO: 0.64 K/UL
MONOCYTES NFR BLD AUTO: 13.4 %
MONOCYTES NFR BLD AUTO: 14.5 %
NEUTROPHILS # BLD AUTO: 2.66 K/UL
NEUTROPHILS # BLD AUTO: 2.74 K/UL
NEUTROPHILS NFR BLD AUTO: 61.5 %
NEUTROPHILS NFR BLD AUTO: 62.3 %
NITRITE URINE: NEGATIVE
PH URINE: 6
PHOSPHATE SERPL-MCNC: 2.4 MG/DL
PLATELET # BLD AUTO: 168 K/UL
PLATELET # BLD AUTO: 170 K/UL
POTASSIUM SERPL-SCNC: 4 MMOL/L
PROT SERPL-MCNC: 6.3 G/DL
PROTEIN URINE: NEGATIVE MG/DL
RBC # BLD: 3.64 M/UL
RBC # BLD: 3.67 M/UL
RBC # FLD: 13.9 %
RBC # FLD: 13.9 %
RED BLOOD CELLS URINE: 1 /HPF
SODIUM SERPL-SCNC: 127 MMOL/L
SPECIFIC GRAVITY URINE: 1.01
SQUAMOUS EPITHELIAL CELLS: 0 /HPF
TACROLIMUS SERPL-MCNC: 7.5 NG/ML
URATE SERPL-MCNC: 12.2 MG/DL
UROBILINOGEN URINE: NEGATIVE MG/DL
WBC # FLD AUTO: 4.33 K/UL
WBC # FLD AUTO: 4.4 K/UL
WHITE BLOOD CELLS URINE: 1 /HPF

## 2018-07-05 ENCOUNTER — FORM ENCOUNTER (OUTPATIENT)
Age: 58
End: 2018-07-05

## 2018-07-05 ENCOUNTER — APPOINTMENT (OUTPATIENT)
Dept: TRANSPLANT | Facility: CLINIC | Age: 58
End: 2018-07-05

## 2018-07-05 LAB
CREAT SPEC-SCNC: 39 MG/DL
CREAT/PROT UR: 0.1 RATIO
PROT UR-MCNC: 5 MG/DL

## 2018-07-06 ENCOUNTER — APPOINTMENT (OUTPATIENT)
Dept: TRANSPLANT | Facility: CLINIC | Age: 58
End: 2018-07-06
Payer: MEDICAID

## 2018-07-06 ENCOUNTER — OUTPATIENT (OUTPATIENT)
Dept: OUTPATIENT SERVICES | Facility: HOSPITAL | Age: 58
LOS: 1 days | End: 2018-07-06
Payer: MEDICAID

## 2018-07-06 VITALS
TEMPERATURE: 97.5 F | DIASTOLIC BLOOD PRESSURE: 87 MMHG | HEIGHT: 60 IN | HEART RATE: 76 BPM | RESPIRATION RATE: 13 BRPM | SYSTOLIC BLOOD PRESSURE: 147 MMHG | OXYGEN SATURATION: 98 % | WEIGHT: 187 LBS | BODY MASS INDEX: 36.71 KG/M2

## 2018-07-06 DIAGNOSIS — N18.9 CHRONIC KIDNEY DISEASE, UNSPECIFIED: ICD-10-CM

## 2018-07-06 DIAGNOSIS — I77.0 ARTERIOVENOUS FISTULA, ACQUIRED: Chronic | ICD-10-CM

## 2018-07-06 DIAGNOSIS — Z95.5 PRESENCE OF CORONARY ANGIOPLASTY IMPLANT AND GRAFT: Chronic | ICD-10-CM

## 2018-07-06 DIAGNOSIS — Z98.890 OTHER SPECIFIED POSTPROCEDURAL STATES: Chronic | ICD-10-CM

## 2018-07-06 DIAGNOSIS — Z95.1 PRESENCE OF AORTOCORONARY BYPASS GRAFT: Chronic | ICD-10-CM

## 2018-07-06 PROCEDURE — 99213 OFFICE O/P EST LOW 20 MIN: CPT | Mod: 24

## 2018-07-06 PROCEDURE — 36589 REMOVAL TUNNELED CV CATH: CPT

## 2018-07-06 RX ORDER — VALGANCICLOVIR HYDROCHLORIDE 450 MG/1
450 TABLET ORAL
Qty: 30 | Refills: 5 | Status: DISCONTINUED | COMMUNITY
Start: 2018-05-29 | End: 2018-07-06

## 2018-07-06 RX ORDER — MYCOPHENOLATE MOFETIL 500 MG/1
500 TABLET ORAL
Refills: 0 | Status: DISCONTINUED | COMMUNITY
Start: 2018-06-28 | End: 2018-07-06

## 2018-07-06 RX ORDER — SODIUM BICARBONATE 650 MG/1
650 TABLET ORAL TWICE DAILY
Qty: 120 | Refills: 3 | Status: DISCONTINUED | COMMUNITY
Start: 2018-06-29 | End: 2018-07-06

## 2018-07-06 RX ORDER — FUROSEMIDE 20 MG/1
20 TABLET ORAL
Qty: 90 | Refills: 3 | Status: DISCONTINUED | COMMUNITY
Start: 2018-06-05 | End: 2018-07-06

## 2018-07-07 LAB
ALBUMIN SERPL ELPH-MCNC: 4 G/DL
ALP BLD-CCNC: 58 U/L
ALT SERPL-CCNC: 16 U/L
ANION GAP SERPL CALC-SCNC: 13 MMOL/L
AST SERPL-CCNC: 21 U/L
BASOPHILS # BLD AUTO: 0.02 K/UL
BASOPHILS NFR BLD AUTO: 0.6 %
BILIRUB SERPL-MCNC: 0.3 MG/DL
BKV DNA SPEC QL NAA+PROBE: NORMAL
BUN SERPL-MCNC: 33 MG/DL
CALCIUM SERPL-MCNC: 8.6 MG/DL
CHLORIDE SERPL-SCNC: 100 MMOL/L
CO2 SERPL-SCNC: 21 MMOL/L
CREAT SERPL-MCNC: 1.84 MG/DL
EOSINOPHIL # BLD AUTO: 0.06 K/UL
EOSINOPHIL NFR BLD AUTO: 1.7 %
GLUCOSE SERPL-MCNC: 154 MG/DL
HCT VFR BLD CALC: 33.4 %
HGB BLD-MCNC: 10.9 G/DL
IMM GRANULOCYTES NFR BLD AUTO: 0 %
LYMPHOCYTES # BLD AUTO: 0.71 K/UL
LYMPHOCYTES NFR BLD AUTO: 19.7 %
MAGNESIUM SERPL-MCNC: 1.5 MG/DL
MAN DIFF?: NORMAL
MCHC RBC-ENTMCNC: 29.8 PG
MCHC RBC-ENTMCNC: 32.6 GM/DL
MCV RBC AUTO: 91.3 FL
MONOCYTES # BLD AUTO: 0.35 K/UL
MONOCYTES NFR BLD AUTO: 9.7 %
NEUTROPHILS # BLD AUTO: 2.46 K/UL
NEUTROPHILS NFR BLD AUTO: 68.3 %
PLATELET # BLD AUTO: 124 K/UL
POTASSIUM SERPL-SCNC: 4.4 MMOL/L
PROT SERPL-MCNC: 6.7 G/DL
RBC # BLD: 3.66 M/UL
RBC # FLD: 14.4 %
SODIUM SERPL-SCNC: 134 MMOL/L
TACROLIMUS SERPL-MCNC: 9.5 NG/ML
WBC # FLD AUTO: 3.6 K/UL

## 2018-07-09 DIAGNOSIS — Z45.2 ENCOUNTER FOR ADJUSTMENT AND MANAGEMENT OF VASCULAR ACCESS DEVICE: ICD-10-CM

## 2018-07-09 LAB
ALBUMIN SERPL ELPH-MCNC: 3.9 G/DL
ALP BLD-CCNC: 57 U/L
ALT SERPL-CCNC: 16 U/L
ANION GAP SERPL CALC-SCNC: 19 MMOL/L
APPEARANCE: CLEAR
AST SERPL-CCNC: 23 U/L
BACTERIA: NEGATIVE
BASOPHILS # BLD AUTO: 0.07 K/UL
BASOPHILS NFR BLD AUTO: 1.5 %
BILIRUB SERPL-MCNC: 0.4 MG/DL
BILIRUBIN URINE: NEGATIVE
BLOOD URINE: NEGATIVE
BUN SERPL-MCNC: 33 MG/DL
CALCIUM SERPL-MCNC: 8.5 MG/DL
CHLORIDE SERPL-SCNC: 99 MMOL/L
CMV DNA SPEC QL NAA+PROBE: NOT DETECTED IU/ML
CMVPCR LOG: NOT DETECTED LOGIU/ML
CO2 SERPL-SCNC: 19 MMOL/L
COLOR: YELLOW
CREAT SERPL-MCNC: 1.81 MG/DL
CREAT SPEC-SCNC: 36 MG/DL
CREAT/PROT UR: 0.2 RATIO
EOSINOPHIL # BLD AUTO: 0.18 K/UL
EOSINOPHIL NFR BLD AUTO: 3.8 %
GLUCOSE QUALITATIVE U: NEGATIVE MG/DL
GLUCOSE SERPL-MCNC: 151 MG/DL
HCT VFR BLD CALC: 35.5 %
HGB BLD-MCNC: 11.2 G/DL
IMM GRANULOCYTES NFR BLD AUTO: 0.4 %
KETONES URINE: NEGATIVE
LDH SERPL-CCNC: 272 U/L
LEUKOCYTE ESTERASE URINE: NEGATIVE
LYMPHOCYTES # BLD AUTO: 1.07 K/UL
LYMPHOCYTES NFR BLD AUTO: 22.6 %
MAGNESIUM SERPL-MCNC: 1.5 MG/DL
MAN DIFF?: NORMAL
MCHC RBC-ENTMCNC: 29.2 PG
MCHC RBC-ENTMCNC: 31.5 GM/DL
MCV RBC AUTO: 92.7 FL
MICROSCOPIC-UA: NORMAL
MONOCYTES # BLD AUTO: 0.54 K/UL
MONOCYTES NFR BLD AUTO: 11.4 %
NEUTROPHILS # BLD AUTO: 2.86 K/UL
NEUTROPHILS NFR BLD AUTO: 60.3 %
NITRITE URINE: NEGATIVE
PH URINE: 6.5
PHOSPHATE SERPL-MCNC: 2.8 MG/DL
PLATELET # BLD AUTO: 156 K/UL
POTASSIUM SERPL-SCNC: 4.5 MMOL/L
PROT SERPL-MCNC: 6.7 G/DL
PROT UR-MCNC: 6 MG/DL
PROTEIN URINE: NEGATIVE MG/DL
RBC # BLD: 3.83 M/UL
RBC # FLD: 14.3 %
RED BLOOD CELLS URINE: 2 /HPF
SODIUM SERPL-SCNC: 137 MMOL/L
SPECIFIC GRAVITY URINE: 1.01
SQUAMOUS EPITHELIAL CELLS: 0 /HPF
URATE SERPL-MCNC: 11 MG/DL
UROBILINOGEN URINE: NEGATIVE MG/DL
WBC # FLD AUTO: 4.74 K/UL
WHITE BLOOD CELLS URINE: 1 /HPF

## 2018-07-11 LAB — BKV DNA SPEC QL NAA+PROBE: NORMAL

## 2018-07-12 ENCOUNTER — APPOINTMENT (OUTPATIENT)
Dept: NEPHROLOGY | Facility: CLINIC | Age: 58
End: 2018-07-12
Payer: MEDICAID

## 2018-07-12 VITALS
HEIGHT: 60 IN | TEMPERATURE: 97.7 F | OXYGEN SATURATION: 97 % | RESPIRATION RATE: 13 BRPM | HEART RATE: 66 BPM | SYSTOLIC BLOOD PRESSURE: 143 MMHG | BODY MASS INDEX: 35.93 KG/M2 | WEIGHT: 183 LBS | DIASTOLIC BLOOD PRESSURE: 73 MMHG

## 2018-07-12 LAB
BASOPHILS # BLD AUTO: 0.05 K/UL
BASOPHILS NFR BLD AUTO: 0.9 %
EOSINOPHIL # BLD AUTO: 0.24 K/UL
EOSINOPHIL NFR BLD AUTO: 4.2 %
HCT VFR BLD CALC: 38.2 %
HGB BLD-MCNC: 12 G/DL
IMM GRANULOCYTES NFR BLD AUTO: 0.2 %
LYMPHOCYTES # BLD AUTO: 1.41 K/UL
LYMPHOCYTES NFR BLD AUTO: 24.9 %
MAN DIFF?: NORMAL
MCHC RBC-ENTMCNC: 29.7 PG
MCHC RBC-ENTMCNC: 31.4 GM/DL
MCV RBC AUTO: 94.6 FL
MONOCYTES # BLD AUTO: 0.52 K/UL
MONOCYTES NFR BLD AUTO: 9.2 %
NEUTROPHILS # BLD AUTO: 3.43 K/UL
NEUTROPHILS NFR BLD AUTO: 60.6 %
PLATELET # BLD AUTO: 128 K/UL
RBC # BLD: 4.04 M/UL
RBC # FLD: 14.2 %
WBC # FLD AUTO: 5.66 K/UL

## 2018-07-12 PROCEDURE — 99214 OFFICE O/P EST MOD 30 MIN: CPT

## 2018-07-12 RX ORDER — AMLODIPINE BESYLATE 10 MG/1
10 TABLET ORAL
Qty: 30 | Refills: 0 | Status: DISCONTINUED | COMMUNITY
Start: 2017-10-11

## 2018-07-12 RX ORDER — INSULIN GLARGINE 100 [IU]/ML
100 INJECTION, SOLUTION SUBCUTANEOUS
Qty: 5 | Refills: 3 | Status: DISCONTINUED | COMMUNITY
Start: 2018-05-29 | End: 2018-07-12

## 2018-07-12 RX ORDER — MOMETASONE 50 UG/1
50 SPRAY, METERED NASAL
Qty: 17 | Refills: 0 | Status: DISCONTINUED | COMMUNITY
Start: 2018-03-05

## 2018-07-12 RX ORDER — AZITHROMYCIN 250 MG/1
250 TABLET, FILM COATED ORAL
Qty: 6 | Refills: 0 | Status: DISCONTINUED | COMMUNITY
Start: 2018-03-05

## 2018-07-13 LAB
ALBUMIN SERPL ELPH-MCNC: 4 G/DL
ALP BLD-CCNC: 59 U/L
ALT SERPL-CCNC: 16 U/L
ANION GAP SERPL CALC-SCNC: 16 MMOL/L
APPEARANCE: CLEAR
AST SERPL-CCNC: 20 U/L
BACTERIA: NEGATIVE
BILIRUB SERPL-MCNC: 0.3 MG/DL
BILIRUBIN URINE: NEGATIVE
BLOOD URINE: NEGATIVE
BUN SERPL-MCNC: 21 MG/DL
CALCIUM SERPL-MCNC: 9.2 MG/DL
CHLORIDE SERPL-SCNC: 98 MMOL/L
CO2 SERPL-SCNC: 21 MMOL/L
COLOR: YELLOW
CREAT SERPL-MCNC: 1.54 MG/DL
CREAT SPEC-SCNC: 17 MG/DL
CREAT/PROT UR: NORMAL
GLUCOSE QUALITATIVE U: NEGATIVE MG/DL
GLUCOSE SERPL-MCNC: 141 MG/DL
KETONES URINE: NEGATIVE
LDH SERPL-CCNC: 265 U/L
LEUKOCYTE ESTERASE URINE: NEGATIVE
MAGNESIUM SERPL-MCNC: 1.2 MG/DL
MICROSCOPIC-UA: NORMAL
NITRITE URINE: NEGATIVE
PH URINE: 6
PHOSPHATE SERPL-MCNC: 3 MG/DL
POTASSIUM SERPL-SCNC: 5 MMOL/L
PROT SERPL-MCNC: 6.8 G/DL
PROT UR-MCNC: <4 MG/DL
PROTEIN URINE: NEGATIVE MG/DL
RED BLOOD CELLS URINE: 0 /HPF
SODIUM SERPL-SCNC: 135 MMOL/L
SPECIFIC GRAVITY URINE: 1.01
SQUAMOUS EPITHELIAL CELLS: 0 /HPF
TACROLIMUS SERPL-MCNC: 7.6 NG/ML
URATE SERPL-MCNC: 8.4 MG/DL
UROBILINOGEN URINE: NEGATIVE MG/DL
WHITE BLOOD CELLS URINE: 0 /HPF

## 2018-07-17 PROBLEM — E66.9 OBESITY, UNSPECIFIED: Chronic | Status: ACTIVE | Noted: 2017-05-17

## 2018-07-17 PROBLEM — K64.9 UNSPECIFIED HEMORRHOIDS: Chronic | Status: ACTIVE | Noted: 2017-05-17

## 2018-07-18 ENCOUNTER — APPOINTMENT (OUTPATIENT)
Dept: TRANSPLANT | Facility: CLINIC | Age: 58
End: 2018-07-18
Payer: MEDICAID

## 2018-07-18 ENCOUNTER — LABORATORY RESULT (OUTPATIENT)
Age: 58
End: 2018-07-18

## 2018-07-18 VITALS
BODY MASS INDEX: 35.93 KG/M2 | SYSTOLIC BLOOD PRESSURE: 124 MMHG | TEMPERATURE: 97.3 F | HEIGHT: 60 IN | DIASTOLIC BLOOD PRESSURE: 77 MMHG | WEIGHT: 183 LBS | HEART RATE: 83 BPM | OXYGEN SATURATION: 97 % | RESPIRATION RATE: 13 BRPM

## 2018-07-18 PROCEDURE — 99213 OFFICE O/P EST LOW 20 MIN: CPT | Mod: 24

## 2018-07-18 RX ORDER — FUROSEMIDE 20 MG/1
20 TABLET ORAL TWICE DAILY
Qty: 6 | Refills: 3 | Status: DISCONTINUED | COMMUNITY
Start: 2018-07-06 | End: 2018-07-18

## 2018-07-19 ENCOUNTER — RX RENEWAL (OUTPATIENT)
Age: 58
End: 2018-07-19

## 2018-07-19 LAB
ALBUMIN SERPL ELPH-MCNC: 4.1 G/DL
ALP BLD-CCNC: 71 U/L
ALT SERPL-CCNC: 13 U/L
ANION GAP SERPL CALC-SCNC: 16 MMOL/L
APPEARANCE: CLEAR
AST SERPL-CCNC: 17 U/L
BACTERIA: NEGATIVE
BASOPHILS # BLD AUTO: 0.02 K/UL
BASOPHILS NFR BLD AUTO: 0.3 %
BILIRUB SERPL-MCNC: 0.3 MG/DL
BILIRUBIN URINE: NEGATIVE
BKV DNA SPEC QL NAA+PROBE: NORMAL
BLOOD URINE: NEGATIVE
BUN SERPL-MCNC: 24 MG/DL
CALCIUM SERPL-MCNC: 8.9 MG/DL
CHLORIDE SERPL-SCNC: 102 MMOL/L
CO2 SERPL-SCNC: 20 MMOL/L
COLOR: YELLOW
CREAT SERPL-MCNC: 1.26 MG/DL
CREAT SPEC-SCNC: 72 MG/DL
CREAT/PROT UR: 0.1 RATIO
EOSINOPHIL # BLD AUTO: 0.16 K/UL
EOSINOPHIL NFR BLD AUTO: 2.5 %
GLUCOSE QUALITATIVE U: NEGATIVE MG/DL
GLUCOSE SERPL-MCNC: 151 MG/DL
HCT VFR BLD CALC: 40.9 %
HCV RNA SERPL NAA DL=5-ACNC: 155 IU/ML
HCV RNA SERPL NAA+PROBE-LOG IU: 2.19 LOGIU/ML
HGB BLD-MCNC: 12.5 G/DL
HYALINE CASTS: 2 /LPF
IMM GRANULOCYTES NFR BLD AUTO: 0.5 %
KETONES URINE: NEGATIVE
LDH SERPL-CCNC: 257 U/L
LEUKOCYTE ESTERASE URINE: NEGATIVE
LYMPHOCYTES # BLD AUTO: 1.4 K/UL
LYMPHOCYTES NFR BLD AUTO: 21.5 %
MAGNESIUM SERPL-MCNC: 1.5 MG/DL
MAN DIFF?: NORMAL
MCHC RBC-ENTMCNC: 28.9 PG
MCHC RBC-ENTMCNC: 30.6 GM/DL
MCV RBC AUTO: 94.7 FL
MICROSCOPIC-UA: NORMAL
MONOCYTES # BLD AUTO: 0.56 K/UL
MONOCYTES NFR BLD AUTO: 8.6 %
NEUTROPHILS # BLD AUTO: 4.33 K/UL
NEUTROPHILS NFR BLD AUTO: 66.6 %
NITRITE URINE: NEGATIVE
PH URINE: 5.5
PHOSPHATE SERPL-MCNC: 2.4 MG/DL
PLATELET # BLD AUTO: 139 K/UL
POTASSIUM SERPL-SCNC: 4.6 MMOL/L
PROT SERPL-MCNC: 7.2 G/DL
PROT UR-MCNC: 10 MG/DL
PROTEIN URINE: NEGATIVE MG/DL
RBC # BLD: 4.32 M/UL
RBC # FLD: 14.1 %
RED BLOOD CELLS URINE: 0 /HPF
SODIUM SERPL-SCNC: 138 MMOL/L
SPECIFIC GRAVITY URINE: 1.01
SQUAMOUS EPITHELIAL CELLS: 0 /HPF
TACROLIMUS SERPL-MCNC: 9.2 NG/ML
URATE SERPL-MCNC: 7.5 MG/DL
UROBILINOGEN URINE: NEGATIVE MG/DL
WBC # FLD AUTO: 6.5 K/UL
WHITE BLOOD CELLS URINE: 1 /HPF

## 2018-07-20 DIAGNOSIS — Z71.89 OTHER SPECIFIED COUNSELING: ICD-10-CM

## 2018-07-20 LAB — BKV DNA SPEC QL NAA+PROBE: NORMAL

## 2018-07-24 ENCOUNTER — APPOINTMENT (OUTPATIENT)
Dept: TRANSPLANT | Facility: CLINIC | Age: 58
End: 2018-07-24

## 2018-07-24 ENCOUNTER — APPOINTMENT (OUTPATIENT)
Dept: NEPHROLOGY | Facility: CLINIC | Age: 58
End: 2018-07-24

## 2018-07-26 ENCOUNTER — APPOINTMENT (OUTPATIENT)
Dept: NEPHROLOGY | Facility: CLINIC | Age: 58
End: 2018-07-26
Payer: MEDICAID

## 2018-07-26 VITALS
WEIGHT: 180 LBS | RESPIRATION RATE: 13 BRPM | TEMPERATURE: 97.6 F | HEIGHT: 60 IN | SYSTOLIC BLOOD PRESSURE: 137 MMHG | DIASTOLIC BLOOD PRESSURE: 81 MMHG | BODY MASS INDEX: 35.34 KG/M2 | OXYGEN SATURATION: 96 % | HEART RATE: 73 BPM

## 2018-07-26 DIAGNOSIS — J45.20 MILD INTERMITTENT ASTHMA, UNCOMPLICATED: ICD-10-CM

## 2018-07-26 LAB
APPEARANCE: CLEAR
BASOPHILS # BLD AUTO: 0.03 K/UL
BASOPHILS NFR BLD AUTO: 0.4 %
BILIRUBIN URINE: NEGATIVE
BLOOD URINE: NEGATIVE
COLOR: YELLOW
CREAT SPEC-SCNC: 53 MG/DL
CREAT/PROT UR: 0.2 RATIO
EOSINOPHIL # BLD AUTO: 0.21 K/UL
EOSINOPHIL NFR BLD AUTO: 2.8 %
GLUCOSE QUALITATIVE U: NEGATIVE MG/DL
HCT VFR BLD CALC: 43.3 %
HGB BLD-MCNC: 13.4 G/DL
IMM GRANULOCYTES NFR BLD AUTO: 0.5 %
KETONES URINE: NEGATIVE
LEUKOCYTE ESTERASE URINE: NEGATIVE
LYMPHOCYTES # BLD AUTO: 1.54 K/UL
LYMPHOCYTES NFR BLD AUTO: 20.4 %
MAN DIFF?: NORMAL
MCHC RBC-ENTMCNC: 28.8 PG
MCHC RBC-ENTMCNC: 30.9 GM/DL
MCV RBC AUTO: 93.1 FL
MONOCYTES # BLD AUTO: 0.28 K/UL
MONOCYTES NFR BLD AUTO: 3.7 %
NEUTROPHILS # BLD AUTO: 5.44 K/UL
NEUTROPHILS NFR BLD AUTO: 72.2 %
NITRITE URINE: NEGATIVE
PH URINE: 5.5
PLATELET # BLD AUTO: 190 K/UL
PROT UR-MCNC: 8 MG/DL
PROTEIN URINE: NEGATIVE MG/DL
RBC # BLD: 4.65 M/UL
RBC # FLD: 14.3 %
SPECIFIC GRAVITY URINE: 1.01
TACROLIMUS SERPL-MCNC: 8.9 NG/ML
UROBILINOGEN URINE: NEGATIVE MG/DL
WBC # FLD AUTO: 7.54 K/UL

## 2018-07-26 PROCEDURE — 99214 OFFICE O/P EST MOD 30 MIN: CPT

## 2018-07-26 RX ORDER — INSULIN LISPRO 100 [IU]/ML
100 INJECTION, SOLUTION INTRAVENOUS; SUBCUTANEOUS
Qty: 5 | Refills: 3 | Status: DISCONTINUED | COMMUNITY
Start: 2018-05-29 | End: 2018-07-26

## 2018-07-27 LAB
ALBUMIN SERPL ELPH-MCNC: 4.3 G/DL
ALP BLD-CCNC: 70 U/L
ALT SERPL-CCNC: 12 U/L
ANION GAP SERPL CALC-SCNC: 18 MMOL/L
AST SERPL-CCNC: 20 U/L
BILIRUB SERPL-MCNC: 0.3 MG/DL
BKV DNA SPEC QL NAA+PROBE: NORMAL
BUN SERPL-MCNC: 15 MG/DL
CALCIUM SERPL-MCNC: 9.4 MG/DL
CHLORIDE SERPL-SCNC: 100 MMOL/L
CMV DNA SPEC QL NAA+PROBE: NOT DETECTED IU/ML
CO2 SERPL-SCNC: 17 MMOL/L
CREAT SERPL-MCNC: 1.15 MG/DL
CREAT SPEC-SCNC: 58 MG/DL
CREAT/PROT UR: 0.1 RATIO
GLUCOSE SERPL-MCNC: 109 MG/DL
LDH SERPL-CCNC: 280 U/L
MAGNESIUM SERPL-MCNC: 1.6 MG/DL
PHOSPHATE SERPL-MCNC: 3.3 MG/DL
POTASSIUM SERPL-SCNC: 5 MMOL/L
PROT SERPL-MCNC: 7.4 G/DL
PROT UR-MCNC: 5 MG/DL
SODIUM SERPL-SCNC: 135 MMOL/L
URATE SERPL-MCNC: 6.8 MG/DL

## 2018-07-30 LAB — BKV DNA SPEC QL NAA+PROBE: NORMAL

## 2018-08-01 ENCOUNTER — APPOINTMENT (OUTPATIENT)
Dept: NEPHROLOGY | Facility: CLINIC | Age: 58
End: 2018-08-01

## 2018-08-01 ENCOUNTER — APPOINTMENT (OUTPATIENT)
Dept: TRANSPLANT | Facility: CLINIC | Age: 58
End: 2018-08-01

## 2018-08-01 ENCOUNTER — APPOINTMENT (OUTPATIENT)
Dept: NEPHROLOGY | Facility: CLINIC | Age: 58
End: 2018-08-01
Payer: MEDICAID

## 2018-08-01 VITALS
TEMPERATURE: 97.6 F | SYSTOLIC BLOOD PRESSURE: 137 MMHG | OXYGEN SATURATION: 98 % | DIASTOLIC BLOOD PRESSURE: 85 MMHG | RESPIRATION RATE: 13 BRPM | WEIGHT: 198 LBS | HEART RATE: 73 BPM | BODY MASS INDEX: 38.87 KG/M2 | HEIGHT: 60 IN

## 2018-08-01 LAB
ALBUMIN SERPL ELPH-MCNC: 4.4 G/DL
ALP BLD-CCNC: 68 U/L
ALT SERPL-CCNC: 9 U/L
ANION GAP SERPL CALC-SCNC: 15 MMOL/L
APPEARANCE: CLEAR
AST SERPL-CCNC: 18 U/L
BACTERIA: NEGATIVE
BASOPHILS # BLD AUTO: 0.02 K/UL
BASOPHILS NFR BLD AUTO: 0.3 %
BILIRUB SERPL-MCNC: 0.3 MG/DL
BILIRUBIN URINE: NEGATIVE
BLOOD URINE: ABNORMAL
BUN SERPL-MCNC: 19 MG/DL
CALCIUM SERPL-MCNC: 9.6 MG/DL
CHLORIDE SERPL-SCNC: 99 MMOL/L
CO2 SERPL-SCNC: 22 MMOL/L
COLOR: YELLOW
CREAT SERPL-MCNC: 1.13 MG/DL
CREAT SPEC-SCNC: 69 MG/DL
CREAT/PROT UR: 0.2 RATIO
EOSINOPHIL # BLD AUTO: 0.06 K/UL
EOSINOPHIL NFR BLD AUTO: 1 %
GLUCOSE QUALITATIVE U: NEGATIVE MG/DL
GLUCOSE SERPL-MCNC: 112 MG/DL
HCT VFR BLD CALC: 43.1 %
HGB BLD-MCNC: 13.7 G/DL
IMM GRANULOCYTES NFR BLD AUTO: 1 %
KETONES URINE: NEGATIVE
LDH SERPL-CCNC: 241 U/L
LEUKOCYTE ESTERASE URINE: NEGATIVE
LYMPHOCYTES # BLD AUTO: 0.98 K/UL
LYMPHOCYTES NFR BLD AUTO: 16.9 %
MAGNESIUM SERPL-MCNC: 1.7 MG/DL
MAN DIFF?: NORMAL
MCHC RBC-ENTMCNC: 29.1 PG
MCHC RBC-ENTMCNC: 31.8 GM/DL
MCV RBC AUTO: 91.7 FL
MICROSCOPIC-UA: NORMAL
MONOCYTES # BLD AUTO: 0.24 K/UL
MONOCYTES NFR BLD AUTO: 4.1 %
NEUTROPHILS # BLD AUTO: 4.44 K/UL
NEUTROPHILS NFR BLD AUTO: 76.7 %
NITRITE URINE: NEGATIVE
PH URINE: 5.5
PHOSPHATE SERPL-MCNC: 3.2 MG/DL
PLATELET # BLD AUTO: 181 K/UL
POTASSIUM SERPL-SCNC: 3.8 MMOL/L
PROT SERPL-MCNC: 7.4 G/DL
PROT UR-MCNC: 15 MG/DL
PROTEIN URINE: NEGATIVE MG/DL
RBC # BLD: 4.7 M/UL
RBC # FLD: 14.1 %
RED BLOOD CELLS URINE: 3 /HPF
SODIUM SERPL-SCNC: 136 MMOL/L
SPECIFIC GRAVITY URINE: 1.01
SQUAMOUS EPITHELIAL CELLS: 0 /HPF
TACROLIMUS SERPL-MCNC: 10.7 NG/ML
URATE SERPL-MCNC: 6.7 MG/DL
UROBILINOGEN URINE: NEGATIVE MG/DL
WBC # FLD AUTO: 5.8 K/UL
WHITE BLOOD CELLS URINE: 2 /HPF

## 2018-08-01 PROCEDURE — 99214 OFFICE O/P EST MOD 30 MIN: CPT

## 2018-08-02 LAB — BKV DNA SPEC QL NAA+PROBE: NORMAL

## 2018-08-08 ENCOUNTER — APPOINTMENT (OUTPATIENT)
Dept: TRANSPLANT | Facility: CLINIC | Age: 58
End: 2018-08-08
Payer: MEDICAID

## 2018-08-08 VITALS
WEIGHT: 178 LBS | DIASTOLIC BLOOD PRESSURE: 75 MMHG | TEMPERATURE: 97.9 F | SYSTOLIC BLOOD PRESSURE: 115 MMHG | HEART RATE: 74 BPM | RESPIRATION RATE: 13 BRPM | OXYGEN SATURATION: 98 % | BODY MASS INDEX: 34.95 KG/M2 | HEIGHT: 60 IN

## 2018-08-08 LAB
ALBUMIN SERPL ELPH-MCNC: 4.3 G/DL
ALP BLD-CCNC: 63 U/L
ALT SERPL-CCNC: 9 U/L
ANION GAP SERPL CALC-SCNC: 14 MMOL/L
APPEARANCE: CLEAR
AST SERPL-CCNC: 12 U/L
BACTERIA: NEGATIVE
BASOPHILS # BLD AUTO: 0.02 K/UL
BASOPHILS NFR BLD AUTO: 0.4 %
BILIRUB SERPL-MCNC: 0.3 MG/DL
BILIRUBIN URINE: NEGATIVE
BLOOD URINE: NEGATIVE
BUN SERPL-MCNC: 20 MG/DL
CALCIUM SERPL-MCNC: 8.9 MG/DL
CHLORIDE SERPL-SCNC: 98 MMOL/L
CO2 SERPL-SCNC: 22 MMOL/L
COLOR: YELLOW
CREAT SERPL-MCNC: 1.19 MG/DL
EOSINOPHIL # BLD AUTO: 0.05 K/UL
EOSINOPHIL NFR BLD AUTO: 1.1 %
GLUCOSE QUALITATIVE U: NEGATIVE MG/DL
GLUCOSE SERPL-MCNC: 147 MG/DL
HCT VFR BLD CALC: 42.5 %
HGB BLD-MCNC: 13.5 G/DL
IMM GRANULOCYTES NFR BLD AUTO: 0.4 %
KETONES URINE: NEGATIVE
LDH SERPL-CCNC: 246 U/L
LEUKOCYTE ESTERASE URINE: NEGATIVE
LYMPHOCYTES # BLD AUTO: 1.29 K/UL
LYMPHOCYTES NFR BLD AUTO: 28.4 %
MAGNESIUM SERPL-MCNC: 1.4 MG/DL
MAN DIFF?: NORMAL
MCHC RBC-ENTMCNC: 29 PG
MCHC RBC-ENTMCNC: 31.8 GM/DL
MCV RBC AUTO: 91.4 FL
MICROSCOPIC-UA: NORMAL
MONOCYTES # BLD AUTO: 0.25 K/UL
MONOCYTES NFR BLD AUTO: 5.5 %
NEUTROPHILS # BLD AUTO: 2.92 K/UL
NEUTROPHILS NFR BLD AUTO: 64.2 %
NITRITE URINE: NEGATIVE
PH URINE: 6
PHOSPHATE SERPL-MCNC: 3.3 MG/DL
PLATELET # BLD AUTO: 151 K/UL
POTASSIUM SERPL-SCNC: 4.2 MMOL/L
PROT SERPL-MCNC: 7.1 G/DL
PROTEIN URINE: NEGATIVE MG/DL
RBC # BLD: 4.65 M/UL
RBC # FLD: 14.3 %
RED BLOOD CELLS URINE: 0 /HPF
SODIUM SERPL-SCNC: 135 MMOL/L
SPECIFIC GRAVITY URINE: 1.01
SQUAMOUS EPITHELIAL CELLS: 0 /HPF
TACROLIMUS SERPL-MCNC: 9.7 NG/ML
URATE SERPL-MCNC: 7.8 MG/DL
UROBILINOGEN URINE: NEGATIVE MG/DL
WBC # FLD AUTO: 4.55 K/UL
WHITE BLOOD CELLS URINE: 0 /HPF

## 2018-08-08 PROCEDURE — 99213 OFFICE O/P EST LOW 20 MIN: CPT | Mod: 24

## 2018-08-09 ENCOUNTER — APPOINTMENT (OUTPATIENT)
Dept: NEPHROLOGY | Facility: CLINIC | Age: 58
End: 2018-08-09

## 2018-08-09 LAB
BKV DNA SPEC QL NAA+PROBE: NORMAL
CREAT SPEC-SCNC: 17 MG/DL
CREAT/PROT UR: NORMAL
PROT UR-MCNC: <4 MG/DL

## 2018-08-15 ENCOUNTER — APPOINTMENT (OUTPATIENT)
Dept: TRANSPLANT | Facility: CLINIC | Age: 58
End: 2018-08-15
Payer: MEDICAID

## 2018-08-15 VITALS
DIASTOLIC BLOOD PRESSURE: 81 MMHG | OXYGEN SATURATION: 98 % | HEIGHT: 60 IN | WEIGHT: 179 LBS | SYSTOLIC BLOOD PRESSURE: 128 MMHG | HEART RATE: 73 BPM | BODY MASS INDEX: 35.14 KG/M2 | TEMPERATURE: 98.2 F

## 2018-08-15 PROCEDURE — 99213 OFFICE O/P EST LOW 20 MIN: CPT

## 2018-08-16 LAB
ALBUMIN SERPL ELPH-MCNC: 4.2 G/DL
ALP BLD-CCNC: 65 U/L
ALT SERPL-CCNC: 7 U/L
ANION GAP SERPL CALC-SCNC: 15 MMOL/L
APPEARANCE: CLEAR
AST SERPL-CCNC: 17 U/L
BACTERIA: NEGATIVE
BASOPHILS # BLD AUTO: 0.02 K/UL
BASOPHILS NFR BLD AUTO: 0.4 %
BILIRUB SERPL-MCNC: 0.3 MG/DL
BILIRUBIN URINE: NEGATIVE
BLOOD URINE: NEGATIVE
BUN SERPL-MCNC: 20 MG/DL
CALCIUM SERPL-MCNC: 9.1 MG/DL
CHLORIDE SERPL-SCNC: 98 MMOL/L
CO2 SERPL-SCNC: 22 MMOL/L
COLOR: YELLOW
CREAT SERPL-MCNC: 1.2 MG/DL
CREAT SPEC-SCNC: 96 MG/DL
CREAT/PROT UR: 0.1 RATIO
EOSINOPHIL # BLD AUTO: 0.07 K/UL
EOSINOPHIL NFR BLD AUTO: 1.2 %
GLUCOSE QUALITATIVE U: NEGATIVE MG/DL
GLUCOSE SERPL-MCNC: 142 MG/DL
HCT VFR BLD CALC: 46.4 %
HGB BLD-MCNC: 14 G/DL
IMM GRANULOCYTES NFR BLD AUTO: 0.5 %
KETONES URINE: NEGATIVE
LDH SERPL-CCNC: 279 U/L
LEUKOCYTE ESTERASE URINE: NEGATIVE
LYMPHOCYTES # BLD AUTO: 1.37 K/UL
LYMPHOCYTES NFR BLD AUTO: 24.2 %
MAGNESIUM SERPL-MCNC: 1.2 MG/DL
MAN DIFF?: NORMAL
MCHC RBC-ENTMCNC: 28.3 PG
MCHC RBC-ENTMCNC: 30.2 GM/DL
MCV RBC AUTO: 93.7 FL
MICROSCOPIC-UA: NORMAL
MONOCYTES # BLD AUTO: 0.5 K/UL
MONOCYTES NFR BLD AUTO: 8.8 %
NEUTROPHILS # BLD AUTO: 3.67 K/UL
NEUTROPHILS NFR BLD AUTO: 64.9 %
NITRITE URINE: NEGATIVE
PH URINE: 6
PHOSPHATE SERPL-MCNC: 3.3 MG/DL
PLATELET # BLD AUTO: 177 K/UL
POTASSIUM SERPL-SCNC: 4.1 MMOL/L
PROT SERPL-MCNC: 7.4 G/DL
PROT UR-MCNC: 12 MG/DL
PROTEIN URINE: NEGATIVE MG/DL
RBC # BLD: 4.95 M/UL
RBC # FLD: 14.3 %
RED BLOOD CELLS URINE: 1 /HPF
SODIUM SERPL-SCNC: 135 MMOL/L
SPECIFIC GRAVITY URINE: 1.02
SQUAMOUS EPITHELIAL CELLS: 0 /HPF
T3FREE SERPL-MCNC: 2.75 PG/ML
T4 FREE SERPL-MCNC: 1.3 NG/DL
TACROLIMUS SERPL-MCNC: 11.5 NG/ML
TSH SERPL-ACNC: 1.5 UIU/ML
URATE SERPL-MCNC: 8.1 MG/DL
UROBILINOGEN URINE: NEGATIVE MG/DL
WBC # FLD AUTO: 5.66 K/UL
WHITE BLOOD CELLS URINE: 2 /HPF

## 2018-08-20 LAB — BKV DNA SPEC QL NAA+PROBE: NORMAL

## 2018-08-23 ENCOUNTER — APPOINTMENT (OUTPATIENT)
Dept: NEPHROLOGY | Facility: CLINIC | Age: 58
End: 2018-08-23
Payer: MEDICAID

## 2018-08-23 VITALS
TEMPERATURE: 97.5 F | WEIGHT: 181 LBS | HEART RATE: 70 BPM | DIASTOLIC BLOOD PRESSURE: 73 MMHG | HEIGHT: 60 IN | SYSTOLIC BLOOD PRESSURE: 136 MMHG | OXYGEN SATURATION: 94 % | RESPIRATION RATE: 13 BRPM | BODY MASS INDEX: 35.53 KG/M2

## 2018-08-23 LAB
ALBUMIN SERPL ELPH-MCNC: 4.1 G/DL
ALP BLD-CCNC: 63 U/L
ALT SERPL-CCNC: 9 U/L
ANION GAP SERPL CALC-SCNC: 15 MMOL/L
APPEARANCE: CLEAR
AST SERPL-CCNC: 13 U/L
BILIRUB SERPL-MCNC: 0.3 MG/DL
BILIRUBIN URINE: NEGATIVE
BLOOD URINE: NEGATIVE
BUN SERPL-MCNC: 19 MG/DL
CALCIUM SERPL-MCNC: 9.3 MG/DL
CHLORIDE SERPL-SCNC: 98 MMOL/L
CO2 SERPL-SCNC: 20 MMOL/L
COLOR: YELLOW
CREAT SERPL-MCNC: 1.1 MG/DL
CREAT SPEC-SCNC: 53 MG/DL
CREAT/PROT UR: 0.2 RATIO
GLUCOSE QUALITATIVE U: NEGATIVE MG/DL
GLUCOSE SERPL-MCNC: 104 MG/DL
KETONES URINE: NEGATIVE
LDH SERPL-CCNC: 250 U/L
LEUKOCYTE ESTERASE URINE: NEGATIVE
MAGNESIUM SERPL-MCNC: 1.4 MG/DL
NITRITE URINE: NEGATIVE
PH URINE: 5.5
PHOSPHATE SERPL-MCNC: 3.5 MG/DL
POTASSIUM SERPL-SCNC: 3.9 MMOL/L
PROT SERPL-MCNC: 7.1 G/DL
PROT UR-MCNC: 9 MG/DL
PROTEIN URINE: NEGATIVE MG/DL
SODIUM SERPL-SCNC: 133 MMOL/L
SPECIFIC GRAVITY URINE: 1.01
TACROLIMUS SERPL-MCNC: 7.3 NG/ML
URATE SERPL-MCNC: 7.1 MG/DL
UROBILINOGEN URINE: NEGATIVE MG/DL

## 2018-08-23 PROCEDURE — 99214 OFFICE O/P EST MOD 30 MIN: CPT

## 2018-08-23 RX ORDER — FUROSEMIDE 20 MG/1
20 TABLET ORAL
Qty: 30 | Refills: 0 | Status: DISCONTINUED | COMMUNITY
Start: 2018-08-01 | End: 2018-08-23

## 2018-08-24 LAB
HBV DNA # SERPL NAA+PROBE: NOT DETECTED
HBV SURFACE AB SER QL: REACTIVE
HCV AB SER QL: NONREACTIVE
HCV S/CO RATIO: 0.2 S/CO
HEPB DNA PCR LOG: NOT DETECTED LOGIU/ML
HIV1 RNA # SERPL NAA+PROBE: NORMAL
HIV1 RNA # SERPL NAA+PROBE: NORMAL COPIES/ML
HIV1+2 AB SPEC QL IA.RAPID: NONREACTIVE
VIRAL LOAD INTERP: NORMAL
VIRAL LOAD LOG: NORMAL LG COP/ML

## 2018-08-27 LAB
BKV DNA SPEC QL NAA+PROBE: NORMAL
HCV RNA SERPL NAA DL=5-ACNC: 22 IU/ML
HCV RNA SERPL NAA+PROBE-LOG IU: 1.35 LOGIU/ML

## 2018-08-29 ENCOUNTER — APPOINTMENT (OUTPATIENT)
Dept: TRANSPLANT | Facility: CLINIC | Age: 58
End: 2018-08-29
Payer: MEDICAID

## 2018-08-29 ENCOUNTER — APPOINTMENT (OUTPATIENT)
Dept: OTOLARYNGOLOGY | Facility: CLINIC | Age: 58
End: 2018-08-29
Payer: MEDICAID

## 2018-08-29 VITALS
WEIGHT: 180 LBS | TEMPERATURE: 97.4 F | RESPIRATION RATE: 14 BRPM | HEIGHT: 60 IN | BODY MASS INDEX: 35.34 KG/M2 | DIASTOLIC BLOOD PRESSURE: 63 MMHG | HEART RATE: 80 BPM | SYSTOLIC BLOOD PRESSURE: 109 MMHG | OXYGEN SATURATION: 97 %

## 2018-08-29 VITALS
HEART RATE: 65 BPM | BODY MASS INDEX: 29.99 KG/M2 | WEIGHT: 180 LBS | SYSTOLIC BLOOD PRESSURE: 131 MMHG | HEIGHT: 65 IN | DIASTOLIC BLOOD PRESSURE: 83 MMHG

## 2018-08-29 DIAGNOSIS — K13.70 UNSPECIFIED LESIONS OF ORAL MUCOSA: ICD-10-CM

## 2018-08-29 PROCEDURE — 99204 OFFICE O/P NEW MOD 45 MIN: CPT

## 2018-08-29 PROCEDURE — 99213 OFFICE O/P EST LOW 20 MIN: CPT

## 2018-09-05 ENCOUNTER — APPOINTMENT (OUTPATIENT)
Dept: TRANSPLANT | Facility: CLINIC | Age: 58
End: 2018-09-05

## 2018-09-06 LAB
ALBUMIN SERPL ELPH-MCNC: 4.3 G/DL
ALP BLD-CCNC: 66 U/L
ALT SERPL-CCNC: 8 U/L
ANION GAP SERPL CALC-SCNC: 14 MMOL/L
APPEARANCE: CLEAR
AST SERPL-CCNC: 16 U/L
BASOPHILS # BLD AUTO: 0.03 K/UL
BASOPHILS NFR BLD AUTO: 0.5 %
BILIRUB SERPL-MCNC: 0.3 MG/DL
BILIRUBIN URINE: NEGATIVE
BKV DNA SPEC QL NAA+PROBE: NORMAL
BLOOD URINE: NEGATIVE
BUN SERPL-MCNC: 17 MG/DL
CALCIUM SERPL-MCNC: 9.5 MG/DL
CHLORIDE SERPL-SCNC: 100 MMOL/L
CO2 SERPL-SCNC: 23 MMOL/L
COLOR: YELLOW
CREAT SERPL-MCNC: 1.15 MG/DL
CREAT SPEC-SCNC: 56 MG/DL
CREAT/PROT UR: 0.1 RATIO
EOSINOPHIL # BLD AUTO: 0.06 K/UL
EOSINOPHIL NFR BLD AUTO: 0.9 %
GLUCOSE QUALITATIVE U: NEGATIVE MG/DL
GLUCOSE SERPL-MCNC: 96 MG/DL
HCT VFR BLD CALC: 46.6 %
HGB BLD-MCNC: 14.6 G/DL
IMM GRANULOCYTES NFR BLD AUTO: 0.3 %
KETONES URINE: NEGATIVE
LDH SERPL-CCNC: 304 U/L
LEUKOCYTE ESTERASE URINE: NEGATIVE
LYMPHOCYTES # BLD AUTO: 1.56 K/UL
LYMPHOCYTES NFR BLD AUTO: 24.7 %
MAGNESIUM SERPL-MCNC: 1.6 MG/DL
MAN DIFF?: NORMAL
MCHC RBC-ENTMCNC: 28.3 PG
MCHC RBC-ENTMCNC: 31.3 GM/DL
MCV RBC AUTO: 90.3 FL
MONOCYTES # BLD AUTO: 0.46 K/UL
MONOCYTES NFR BLD AUTO: 7.3 %
NEUTROPHILS # BLD AUTO: 4.19 K/UL
NEUTROPHILS NFR BLD AUTO: 66.3 %
NITRITE URINE: NEGATIVE
PH URINE: 6.5
PHOSPHATE SERPL-MCNC: 3.3 MG/DL
PLATELET # BLD AUTO: 149 K/UL
POTASSIUM SERPL-SCNC: 4 MMOL/L
PROT SERPL-MCNC: 7.5 G/DL
PROT UR-MCNC: 8 MG/DL
PROTEIN URINE: NEGATIVE MG/DL
RBC # BLD: 5.16 M/UL
RBC # FLD: 14.1 %
SODIUM SERPL-SCNC: 137 MMOL/L
SPECIFIC GRAVITY URINE: 1.01
TACROLIMUS SERPL-MCNC: 7.2 NG/ML
URATE SERPL-MCNC: 6.5 MG/DL
UROBILINOGEN URINE: NEGATIVE MG/DL
WBC # FLD AUTO: 6.32 K/UL

## 2018-09-12 ENCOUNTER — APPOINTMENT (OUTPATIENT)
Dept: TRANSPLANT | Facility: CLINIC | Age: 58
End: 2018-09-12

## 2018-09-12 ENCOUNTER — APPOINTMENT (OUTPATIENT)
Dept: OTOLARYNGOLOGY | Facility: CLINIC | Age: 58
End: 2018-09-12
Payer: MEDICAID

## 2018-09-12 VITALS
SYSTOLIC BLOOD PRESSURE: 134 MMHG | HEART RATE: 72 BPM | BODY MASS INDEX: 29.99 KG/M2 | DIASTOLIC BLOOD PRESSURE: 85 MMHG | HEIGHT: 65 IN | WEIGHT: 180 LBS

## 2018-09-12 PROCEDURE — 99213 OFFICE O/P EST LOW 20 MIN: CPT

## 2018-09-17 LAB
ALBUMIN SERPL ELPH-MCNC: 4.2 G/DL
ALP BLD-CCNC: 59 U/L
ALT SERPL-CCNC: 12 U/L
ANION GAP SERPL CALC-SCNC: 14 MMOL/L
APPEARANCE: CLEAR
AST SERPL-CCNC: 18 U/L
BACTERIA: NEGATIVE
BASOPHILS # BLD AUTO: 0.01 K/UL
BASOPHILS NFR BLD AUTO: 0.2 %
BILIRUB SERPL-MCNC: 0.3 MG/DL
BILIRUBIN URINE: NEGATIVE
BKV DNA SPEC QL NAA+PROBE: NORMAL
BLOOD URINE: ABNORMAL
BUN SERPL-MCNC: 15 MG/DL
CALCIUM SERPL-MCNC: 9.4 MG/DL
CHLORIDE SERPL-SCNC: 100 MMOL/L
CO2 SERPL-SCNC: 22 MMOL/L
COLOR: YELLOW
CREAT SERPL-MCNC: 1.16 MG/DL
CREAT SPEC-SCNC: 31 MG/DL
CREAT/PROT UR: 0.2 RATIO
EOSINOPHIL # BLD AUTO: 0.12 K/UL
EOSINOPHIL NFR BLD AUTO: 2.6 %
GLUCOSE QUALITATIVE U: NEGATIVE MG/DL
GLUCOSE SERPL-MCNC: 106 MG/DL
HCT VFR BLD CALC: 45.2 %
HGB BLD-MCNC: 13.9 G/DL
IMM GRANULOCYTES NFR BLD AUTO: 0.4 %
KETONES URINE: NEGATIVE
LDH SERPL-CCNC: 272 U/L
LEUKOCYTE ESTERASE URINE: NEGATIVE
LYMPHOCYTES # BLD AUTO: 0.98 K/UL
LYMPHOCYTES NFR BLD AUTO: 21.6 %
MAGNESIUM SERPL-MCNC: 1.5 MG/DL
MAN DIFF?: NORMAL
MCHC RBC-ENTMCNC: 28.2 PG
MCHC RBC-ENTMCNC: 30.8 GM/DL
MCV RBC AUTO: 91.7 FL
MICROSCOPIC-UA: NORMAL
MONOCYTES # BLD AUTO: 0.39 K/UL
MONOCYTES NFR BLD AUTO: 8.6 %
NEUTROPHILS # BLD AUTO: 3.02 K/UL
NEUTROPHILS NFR BLD AUTO: 66.6 %
NITRITE URINE: NEGATIVE
PH URINE: 6
PHOSPHATE SERPL-MCNC: 3.3 MG/DL
PLATELET # BLD AUTO: 112 K/UL
POTASSIUM SERPL-SCNC: 4.5 MMOL/L
PROT SERPL-MCNC: 7.3 G/DL
PROT UR-MCNC: 5 MG/DL
PROTEIN URINE: NEGATIVE MG/DL
RBC # BLD: 4.93 M/UL
RBC # FLD: 14 %
RED BLOOD CELLS URINE: 2 /HPF
SODIUM SERPL-SCNC: 136 MMOL/L
SPECIFIC GRAVITY URINE: 1.01
SQUAMOUS EPITHELIAL CELLS: 0 /HPF
TACROLIMUS SERPL-MCNC: 7.6 NG/ML
URATE SERPL-MCNC: 5.6 MG/DL
UROBILINOGEN URINE: NEGATIVE MG/DL
WBC # FLD AUTO: 4.54 K/UL
WHITE BLOOD CELLS URINE: 1 /HPF

## 2018-09-18 ENCOUNTER — APPOINTMENT (OUTPATIENT)
Dept: TRANSPLANT | Facility: CLINIC | Age: 58
End: 2018-09-18

## 2018-09-18 LAB
APPEARANCE: CLEAR
BASOPHILS # BLD AUTO: 0.03 K/UL
BASOPHILS NFR BLD AUTO: 0.7 %
BILIRUBIN URINE: NEGATIVE
BLOOD URINE: NEGATIVE
COLOR: YELLOW
CREAT SPEC-SCNC: 60 MG/DL
CREAT/PROT UR: 0.2 RATIO
EOSINOPHIL # BLD AUTO: 0.1 K/UL
EOSINOPHIL NFR BLD AUTO: 2.5 %
GLUCOSE QUALITATIVE U: NEGATIVE MG/DL
HCT VFR BLD CALC: 46 %
HGB BLD-MCNC: 14 G/DL
IMM GRANULOCYTES NFR BLD AUTO: 0.2 %
KETONES URINE: NEGATIVE
LEUKOCYTE ESTERASE URINE: NEGATIVE
LYMPHOCYTES # BLD AUTO: 0.96 K/UL
LYMPHOCYTES NFR BLD AUTO: 23.7 %
MAN DIFF?: NORMAL
MCHC RBC-ENTMCNC: 27.8 PG
MCHC RBC-ENTMCNC: 30.4 GM/DL
MCV RBC AUTO: 91.3 FL
MONOCYTES # BLD AUTO: 0.56 K/UL
MONOCYTES NFR BLD AUTO: 13.8 %
NEUTROPHILS # BLD AUTO: 2.39 K/UL
NEUTROPHILS NFR BLD AUTO: 59.1 %
NITRITE URINE: NEGATIVE
PH URINE: 6
PLATELET # BLD AUTO: 117 K/UL
PROT UR-MCNC: 11 MG/DL
PROTEIN URINE: NEGATIVE MG/DL
RBC # BLD: 5.04 M/UL
RBC # FLD: 14.1 %
SPECIFIC GRAVITY URINE: 1.01
TACROLIMUS SERPL-MCNC: 6.4 NG/ML
UROBILINOGEN URINE: NEGATIVE MG/DL
WBC # FLD AUTO: 4.05 K/UL

## 2018-09-19 LAB
ALBUMIN SERPL ELPH-MCNC: 4 G/DL
ALP BLD-CCNC: 57 U/L
ALT SERPL-CCNC: 14 U/L
ANION GAP SERPL CALC-SCNC: 14 MMOL/L
AST SERPL-CCNC: 23 U/L
BILIRUB SERPL-MCNC: 0.4 MG/DL
BUN SERPL-MCNC: 15 MG/DL
CALCIUM SERPL-MCNC: 9.2 MG/DL
CHLORIDE SERPL-SCNC: 101 MMOL/L
CO2 SERPL-SCNC: 20 MMOL/L
CREAT SERPL-MCNC: 0.95 MG/DL
GLUCOSE SERPL-MCNC: 91 MG/DL
LDH SERPL-CCNC: 255 U/L
MAGNESIUM SERPL-MCNC: 1.6 MG/DL
PHOSPHATE SERPL-MCNC: 3.1 MG/DL
POTASSIUM SERPL-SCNC: 4 MMOL/L
PROT SERPL-MCNC: 7 G/DL
SODIUM SERPL-SCNC: 135 MMOL/L
URATE SERPL-MCNC: 5.5 MG/DL

## 2018-09-21 ENCOUNTER — LABORATORY RESULT (OUTPATIENT)
Age: 58
End: 2018-09-21

## 2018-09-22 LAB
BKV DNA SPEC QL NAA+PROBE: NORMAL
HCV RNA SERPL NAA DL=5-ACNC: 16 IU/ML
HCV RNA SERPL NAA+PROBE-LOG IU: 1.2 LOGIU/ML

## 2018-09-24 ENCOUNTER — APPOINTMENT (OUTPATIENT)
Dept: NEPHROLOGY | Facility: CLINIC | Age: 58
End: 2018-09-24
Payer: MEDICAID

## 2018-09-24 VITALS
TEMPERATURE: 97.3 F | SYSTOLIC BLOOD PRESSURE: 111 MMHG | DIASTOLIC BLOOD PRESSURE: 69 MMHG | RESPIRATION RATE: 16 BRPM | WEIGHT: 184 LBS | HEART RATE: 62 BPM | HEIGHT: 62.5 IN | OXYGEN SATURATION: 100 % | BODY MASS INDEX: 33.01 KG/M2

## 2018-09-24 PROCEDURE — 99214 OFFICE O/P EST MOD 30 MIN: CPT

## 2018-09-24 RX ORDER — VELPATASVIR AND SOFOSBUVIR 100; 400 MG/1; MG/1
400-100 TABLET, FILM COATED ORAL DAILY
Qty: 28 | Refills: 2 | Status: DISCONTINUED | COMMUNITY
Start: 2018-06-07 | End: 2018-09-24

## 2018-09-24 RX ORDER — NYSTATIN 100000 [USP'U]/ML
100000 SUSPENSION ORAL 4 TIMES DAILY
Qty: 600 | Refills: 1 | Status: DISCONTINUED | COMMUNITY
Start: 2018-05-29 | End: 2018-09-24

## 2018-09-25 LAB
ALBUMIN SERPL ELPH-MCNC: 4 G/DL
ALP BLD-CCNC: 57 U/L
ALT SERPL-CCNC: 17 U/L
ANION GAP SERPL CALC-SCNC: 14 MMOL/L
APPEARANCE: CLEAR
AST SERPL-CCNC: 22 U/L
BACTERIA: NEGATIVE
BASOPHILS # BLD AUTO: 0.13 K/UL
BASOPHILS NFR BLD AUTO: 7 %
BILIRUB SERPL-MCNC: 0.3 MG/DL
BILIRUBIN URINE: NEGATIVE
BLOOD URINE: NEGATIVE
BUN SERPL-MCNC: 16 MG/DL
CALCIUM SERPL-MCNC: 9 MG/DL
CHLORIDE SERPL-SCNC: 101 MMOL/L
CO2 SERPL-SCNC: 22 MMOL/L
COLOR: YELLOW
CREAT SERPL-MCNC: 1.03 MG/DL
CREAT SPEC-SCNC: 83 MG/DL
CREAT/PROT UR: 0.2 RATIO
EOSINOPHIL # BLD AUTO: 0.02 K/UL
EOSINOPHIL NFR BLD AUTO: 1 %
GLUCOSE QUALITATIVE U: NEGATIVE MG/DL
GLUCOSE SERPL-MCNC: 94 MG/DL
HCT VFR BLD CALC: 46.6 %
HGB BLD-MCNC: 14.5 G/DL
HYALINE CASTS: 0 /LPF
KETONES URINE: NEGATIVE
LDH SERPL-CCNC: 255 U/L
LEUKOCYTE ESTERASE URINE: NEGATIVE
LYMPHOCYTES # BLD AUTO: 0.93 K/UL
LYMPHOCYTES NFR BLD AUTO: 50 %
MAGNESIUM SERPL-MCNC: 1.6 MG/DL
MAN DIFF?: NORMAL
MCHC RBC-ENTMCNC: 28.1 PG
MCHC RBC-ENTMCNC: 31.1 GM/DL
MCV RBC AUTO: 90.3 FL
MICROSCOPIC-UA: NORMAL
MONOCYTES # BLD AUTO: 0.45 K/UL
MONOCYTES NFR BLD AUTO: 24 %
NEUTROPHILS # BLD AUTO: 0.2 K/UL
NEUTROPHILS NFR BLD AUTO: 9 %
NITRITE URINE: NEGATIVE
PH URINE: 6
PHOSPHATE SERPL-MCNC: 3 MG/DL
PLATELET # BLD AUTO: 88 K/UL
POTASSIUM SERPL-SCNC: 4.2 MMOL/L
PROT SERPL-MCNC: 6.7 G/DL
PROT UR-MCNC: 16 MG/DL
PROTEIN URINE: NEGATIVE MG/DL
RBC # BLD: 5.16 M/UL
RBC # FLD: 14 %
RED BLOOD CELLS URINE: 2 /HPF
SODIUM SERPL-SCNC: 136 MMOL/L
SPECIFIC GRAVITY URINE: 1.02
SQUAMOUS EPITHELIAL CELLS: 0 /HPF
TACROLIMUS SERPL-MCNC: 6.9 NG/ML
URATE SERPL-MCNC: 5.3 MG/DL
UROBILINOGEN URINE: NEGATIVE MG/DL
WBC # FLD AUTO: 1.86 K/UL
WHITE BLOOD CELLS URINE: 1 /HPF

## 2018-09-26 LAB
BKV DNA SPEC QL NAA+PROBE: NORMAL
HCV RNA SERPL NAA DL=5-ACNC: NOT DETECTED IU/ML
HCV RNA SERPL NAA+PROBE-LOG IU: NOT DETECTED LOGIU/ML

## 2018-09-28 ENCOUNTER — APPOINTMENT (OUTPATIENT)
Dept: ULTRASOUND IMAGING | Facility: CLINIC | Age: 58
End: 2018-09-28
Payer: MEDICAID

## 2018-09-28 ENCOUNTER — OUTPATIENT (OUTPATIENT)
Dept: OUTPATIENT SERVICES | Facility: HOSPITAL | Age: 58
LOS: 1 days | End: 2018-09-28
Payer: COMMERCIAL

## 2018-09-28 DIAGNOSIS — I77.0 ARTERIOVENOUS FISTULA, ACQUIRED: Chronic | ICD-10-CM

## 2018-09-28 DIAGNOSIS — Z00.8 ENCOUNTER FOR OTHER GENERAL EXAMINATION: ICD-10-CM

## 2018-09-28 DIAGNOSIS — Z95.1 PRESENCE OF AORTOCORONARY BYPASS GRAFT: Chronic | ICD-10-CM

## 2018-09-28 DIAGNOSIS — Z98.890 OTHER SPECIFIED POSTPROCEDURAL STATES: Chronic | ICD-10-CM

## 2018-09-28 DIAGNOSIS — Z95.5 PRESENCE OF CORONARY ANGIOPLASTY IMPLANT AND GRAFT: Chronic | ICD-10-CM

## 2018-09-28 DIAGNOSIS — Z94.0 KIDNEY TRANSPLANT STATUS: ICD-10-CM

## 2018-09-28 PROCEDURE — 93971 EXTREMITY STUDY: CPT | Mod: 26,LT

## 2018-09-28 PROCEDURE — 93971 EXTREMITY STUDY: CPT

## 2018-10-01 DIAGNOSIS — R60.0 LOCALIZED EDEMA: ICD-10-CM

## 2018-10-10 ENCOUNTER — APPOINTMENT (OUTPATIENT)
Dept: TRANSPLANT | Facility: CLINIC | Age: 58
End: 2018-10-10

## 2018-10-17 ENCOUNTER — RX RENEWAL (OUTPATIENT)
Age: 58
End: 2018-10-17

## 2018-10-18 ENCOUNTER — APPOINTMENT (OUTPATIENT)
Dept: TRANSPLANT | Facility: CLINIC | Age: 58
End: 2018-10-18

## 2018-10-22 LAB
ALBUMIN SERPL ELPH-MCNC: 4.2 G/DL
ALP BLD-CCNC: 55 U/L
ALT SERPL-CCNC: 22 U/L
ANION GAP SERPL CALC-SCNC: 14 MMOL/L
APPEARANCE: CLEAR
AST SERPL-CCNC: 27 U/L
BASOPHILS # BLD AUTO: 0.03 K/UL
BASOPHILS NFR BLD AUTO: 0.5 %
BILIRUB SERPL-MCNC: 0.4 MG/DL
BILIRUBIN URINE: NEGATIVE
BKV DNA SPEC QL NAA+PROBE: NORMAL
BLOOD URINE: NEGATIVE
BUN SERPL-MCNC: 17 MG/DL
CALCIUM SERPL-MCNC: 9.3 MG/DL
CHLORIDE SERPL-SCNC: 98 MMOL/L
CO2 SERPL-SCNC: 25 MMOL/L
COLOR: YELLOW
CREAT SERPL-MCNC: 1.15 MG/DL
CREAT SPEC-SCNC: 86 MG/DL
CREAT/PROT UR: 0.2 RATIO
EOSINOPHIL # BLD AUTO: 0.07 K/UL
EOSINOPHIL NFR BLD AUTO: 1.3 %
GLUCOSE QUALITATIVE U: NEGATIVE MG/DL
GLUCOSE SERPL-MCNC: 101 MG/DL
HCT VFR BLD CALC: 51.5 %
HGB BLD-MCNC: 16.2 G/DL
IMM GRANULOCYTES NFR BLD AUTO: 0.2 %
KETONES URINE: NEGATIVE
LDH SERPL-CCNC: 225 U/L
LEUKOCYTE ESTERASE URINE: NEGATIVE
LYMPHOCYTES # BLD AUTO: 1.56 K/UL
LYMPHOCYTES NFR BLD AUTO: 28.1 %
MAGNESIUM SERPL-MCNC: 1.5 MG/DL
MAN DIFF?: NORMAL
MCHC RBC-ENTMCNC: 29 PG
MCHC RBC-ENTMCNC: 31.5 GM/DL
MCV RBC AUTO: 92.1 FL
MONOCYTES # BLD AUTO: 0.56 K/UL
MONOCYTES NFR BLD AUTO: 10.1 %
NEUTROPHILS # BLD AUTO: 3.33 K/UL
NEUTROPHILS NFR BLD AUTO: 59.8 %
NITRITE URINE: NEGATIVE
PH URINE: 6
PHOSPHATE SERPL-MCNC: 3.2 MG/DL
PLATELET # BLD AUTO: 113 K/UL
POTASSIUM SERPL-SCNC: 4 MMOL/L
PROT SERPL-MCNC: 7.2 G/DL
PROT UR-MCNC: 17 MG/DL
PROTEIN URINE: NEGATIVE MG/DL
RBC # BLD: 5.59 M/UL
RBC # FLD: 13.6 %
SODIUM SERPL-SCNC: 137 MMOL/L
SPECIFIC GRAVITY URINE: 1.01
TACROLIMUS SERPL-MCNC: 13.7 NG/ML
URATE SERPL-MCNC: 5.5 MG/DL
UROBILINOGEN URINE: NEGATIVE MG/DL
WBC # FLD AUTO: 5.56 K/UL

## 2018-10-31 ENCOUNTER — APPOINTMENT (OUTPATIENT)
Dept: OTOLARYNGOLOGY | Facility: CLINIC | Age: 58
End: 2018-10-31

## 2018-11-01 ENCOUNTER — APPOINTMENT (OUTPATIENT)
Dept: NEPHROLOGY | Facility: CLINIC | Age: 58
End: 2018-11-01
Payer: MEDICAID

## 2018-11-01 VITALS
RESPIRATION RATE: 14 BRPM | SYSTOLIC BLOOD PRESSURE: 120 MMHG | DIASTOLIC BLOOD PRESSURE: 74 MMHG | BODY MASS INDEX: 32.38 KG/M2 | TEMPERATURE: 97.8 F | WEIGHT: 179.9 LBS | HEART RATE: 66 BPM

## 2018-11-01 DIAGNOSIS — B19.20 UNSPECIFIED VIRAL HEPATITIS C W/OUT HEPATIC COMA: ICD-10-CM

## 2018-11-01 PROCEDURE — 99214 OFFICE O/P EST MOD 30 MIN: CPT

## 2018-11-01 RX ORDER — ALBUTEROL SULFATE 90 UG/1
108 (90 BASE) AEROSOL, METERED RESPIRATORY (INHALATION)
Qty: 1 | Refills: 3 | Status: DISCONTINUED | COMMUNITY
Start: 2018-04-09 | End: 2018-11-01

## 2018-11-02 LAB
ALBUMIN SERPL ELPH-MCNC: 4.2 G/DL
ALP BLD-CCNC: 60 U/L
ALT SERPL-CCNC: 25 U/L
ANION GAP SERPL CALC-SCNC: 14 MMOL/L
APPEARANCE: CLEAR
AST SERPL-CCNC: 31 U/L
BACTERIA: NEGATIVE
BASOPHILS # BLD AUTO: 0.03 K/UL
BASOPHILS NFR BLD AUTO: 0.6 %
BILIRUB SERPL-MCNC: 0.5 MG/DL
BILIRUBIN URINE: NEGATIVE
BLOOD URINE: ABNORMAL
BUN SERPL-MCNC: 17 MG/DL
CALCIUM SERPL-MCNC: 9.6 MG/DL
CHLORIDE SERPL-SCNC: 98 MMOL/L
CO2 SERPL-SCNC: 23 MMOL/L
COLOR: YELLOW
CREAT SERPL-MCNC: 0.9 MG/DL
CREAT SPEC-SCNC: 79 MG/DL
CREAT/PROT UR: 0.2 RATIO
EOSINOPHIL # BLD AUTO: 0.09 K/UL
EOSINOPHIL NFR BLD AUTO: 1.9 %
GLUCOSE QUALITATIVE U: NEGATIVE MG/DL
GLUCOSE SERPL-MCNC: 133 MG/DL
HCT VFR BLD CALC: 48.6 %
HGB BLD-MCNC: 15.7 G/DL
HYALINE CASTS: 1 /LPF
IMM GRANULOCYTES NFR BLD AUTO: 0.2 %
KETONES URINE: NEGATIVE
LDH SERPL-CCNC: 298 U/L
LEUKOCYTE ESTERASE URINE: NEGATIVE
LYMPHOCYTES # BLD AUTO: 1.48 K/UL
LYMPHOCYTES NFR BLD AUTO: 31.6 %
MAGNESIUM SERPL-MCNC: 1.5 MG/DL
MAN DIFF?: NORMAL
MCHC RBC-ENTMCNC: 28.8 PG
MCHC RBC-ENTMCNC: 32.3 GM/DL
MCV RBC AUTO: 89.2 FL
MICROSCOPIC-UA: NORMAL
MONOCYTES # BLD AUTO: 0.51 K/UL
MONOCYTES NFR BLD AUTO: 10.9 %
NEUTROPHILS # BLD AUTO: 2.56 K/UL
NEUTROPHILS NFR BLD AUTO: 54.8 %
NITRITE URINE: NEGATIVE
PH URINE: 6
PHOSPHATE SERPL-MCNC: 3.5 MG/DL
PLATELET # BLD AUTO: 103 K/UL
POTASSIUM SERPL-SCNC: 4.5 MMOL/L
PROT SERPL-MCNC: 7.2 G/DL
PROT UR-MCNC: 14 MG/DL
PROTEIN URINE: NEGATIVE MG/DL
RBC # BLD: 5.45 M/UL
RBC # FLD: 13.8 %
RED BLOOD CELLS URINE: 2 /HPF
SODIUM SERPL-SCNC: 135 MMOL/L
SPECIFIC GRAVITY URINE: 1.01
SQUAMOUS EPITHELIAL CELLS: 0 /HPF
TACROLIMUS SERPL-MCNC: 6.7 NG/ML
URATE SERPL-MCNC: 5.7 MG/DL
UROBILINOGEN URINE: NEGATIVE MG/DL
WBC # FLD AUTO: 4.68 K/UL
WHITE BLOOD CELLS URINE: 1 /HPF

## 2018-11-05 LAB — BKV DNA SPEC QL NAA+PROBE: NORMAL

## 2018-11-07 ENCOUNTER — RX RENEWAL (OUTPATIENT)
Age: 58
End: 2018-11-07

## 2018-11-07 RX ORDER — BLOOD SUGAR DIAGNOSTIC
STRIP MISCELLANEOUS 4 TIMES DAILY
Qty: 100 | Refills: 5 | Status: ACTIVE | COMMUNITY
Start: 2018-05-29 | End: 1900-01-01

## 2018-11-07 RX ORDER — LANCETS 28 GAUGE
EACH MISCELLANEOUS
Qty: 100 | Refills: 5 | Status: ACTIVE | COMMUNITY
Start: 2018-05-29 | End: 1900-01-01

## 2018-11-15 ENCOUNTER — RX RENEWAL (OUTPATIENT)
Age: 58
End: 2018-11-15

## 2018-12-05 ENCOUNTER — APPOINTMENT (OUTPATIENT)
Dept: CARDIOLOGY | Facility: CLINIC | Age: 58
End: 2018-12-05
Payer: MEDICAID

## 2018-12-05 ENCOUNTER — NON-APPOINTMENT (OUTPATIENT)
Age: 58
End: 2018-12-05

## 2018-12-05 VITALS — SYSTOLIC BLOOD PRESSURE: 119 MMHG | DIASTOLIC BLOOD PRESSURE: 78 MMHG | BODY MASS INDEX: 32.94 KG/M2 | WEIGHT: 183 LBS

## 2018-12-05 VITALS
DIASTOLIC BLOOD PRESSURE: 86 MMHG | HEART RATE: 71 BPM | HEIGHT: 62.5 IN | SYSTOLIC BLOOD PRESSURE: 136 MMHG | OXYGEN SATURATION: 97 %

## 2018-12-05 DIAGNOSIS — Z86.2 PERSONAL HISTORY OF DISEASES OF THE BLOOD AND BLOOD-FORMING ORGANS AND CERTAIN DISORDERS INVOLVING THE IMMUNE MECHANISM: ICD-10-CM

## 2018-12-05 DIAGNOSIS — Z87.448 PERSONAL HISTORY OF OTHER DISEASES OF URINARY SYSTEM: ICD-10-CM

## 2018-12-05 PROCEDURE — 93000 ELECTROCARDIOGRAM COMPLETE: CPT

## 2018-12-05 PROCEDURE — 99214 OFFICE O/P EST MOD 30 MIN: CPT

## 2018-12-05 NOTE — REVIEW OF SYSTEMS
[see HPI] : see HPI [Cough] : cough [Wheezing] : no wheezing [Coughing Up Blood] : no hemoptysis [Negative] : Psychiatric

## 2018-12-05 NOTE — PHYSICAL EXAM
[General Appearance - Well Developed] : well developed [Normal Appearance] : normal appearance [Well Groomed] : well groomed [General Appearance - Well Nourished] : well nourished [No Deformities] : no deformities [General Appearance - In No Acute Distress] : no acute distress [Normal Conjunctiva] : the conjunctiva exhibited no abnormalities [Eyelids - No Xanthelasma] : the eyelids demonstrated no xanthelasmas [Normal Oral Mucosa] : normal oral mucosa [No Oral Pallor] : no oral pallor [No Oral Cyanosis] : no oral cyanosis [Respiration, Rhythm And Depth] : normal respiratory rhythm and effort [Exaggerated Use Of Accessory Muscles For Inspiration] : no accessory muscle use [Auscultation Breath Sounds / Voice Sounds] : lungs were clear to auscultation bilaterally [Heart Rate And Rhythm] : heart rate and rhythm were normal [Heart Sounds] : normal S1 and S2 [Murmurs] : no murmurs present [Edema] : no peripheral edema present [Abdomen Soft] : soft [Abdomen Tenderness] : non-tender [Abnormal Walk] : normal gait [Gait - Sufficient For Exercise Testing] : the gait was sufficient for exercise testing [Nail Clubbing] : no clubbing of the fingernails [Cyanosis, Localized] : no localized cyanosis [Petechial Hemorrhages (___cm)] : no petechial hemorrhages [FreeTextEntry1] : right forearm fistula [Skin Color & Pigmentation] : normal skin color and pigmentation [] : no rash [No Venous Stasis] : no venous stasis [Skin Lesions] : no skin lesions [No Skin Ulcers] : no skin ulcer [No Xanthoma] : no  xanthoma was observed [Oriented To Time, Place, And Person] : oriented to person, place, and time [Affect] : the affect was normal [Mood] : the mood was normal [No Anxiety] : not feeling anxious

## 2018-12-05 NOTE — DISCUSSION/SUMMARY
[Cardiomyopathy] : cardiomyopathy [Coronary Artery Disease] : coronary artery disease [Hypertension] : hypertension [Stable] : stable [FreeTextEntry1] : \par Currently stable from a cardiovascular standpoint. Normotensive. History of ischemic cardiomyopathy. Currently euvolemic. Stable CAD (s/p CABG). No ischemic or CHF symptoms. Continue current medications. ECG completed today and reviewed. Follow up in 4-6 months.

## 2018-12-05 NOTE — HISTORY OF PRESENT ILLNESS
[FreeTextEntry1] : Doing okay. Denies chest pain, shortness of breath or palpitations. Recently developed a cough.

## 2018-12-05 NOTE — REASON FOR VISIT
[Follow-Up - Clinic] : a clinic follow-up of [Cardiomyopathy] : cardiomyopathy [Coronary Artery Disease] : coronary artery disease [Hypertension] : hypertension

## 2018-12-07 ENCOUNTER — APPOINTMENT (OUTPATIENT)
Dept: TRANSPLANT | Facility: CLINIC | Age: 58
End: 2018-12-07

## 2018-12-07 LAB
ALBUMIN SERPL ELPH-MCNC: 4 G/DL
ALP BLD-CCNC: 67 U/L
ALT SERPL-CCNC: 34 U/L
ANION GAP SERPL CALC-SCNC: 13 MMOL/L
APPEARANCE: CLEAR
AST SERPL-CCNC: 39 U/L
BACTERIA: NEGATIVE
BASOPHILS # BLD AUTO: 0.02 K/UL
BASOPHILS NFR BLD AUTO: 0.3 %
BILIRUB SERPL-MCNC: 0.4 MG/DL
BILIRUBIN URINE: NEGATIVE
BLOOD URINE: NEGATIVE
BUN SERPL-MCNC: 15 MG/DL
CALCIUM SERPL-MCNC: 9.3 MG/DL
CHLORIDE SERPL-SCNC: 101 MMOL/L
CO2 SERPL-SCNC: 23 MMOL/L
COLOR: YELLOW
CREAT SERPL-MCNC: 0.95 MG/DL
CREAT SPEC-SCNC: 20 MG/DL
CREAT/PROT UR: 0.3 RATIO
EOSINOPHIL # BLD AUTO: 0.08 K/UL
EOSINOPHIL NFR BLD AUTO: 1.3 %
GLUCOSE QUALITATIVE U: NEGATIVE MG/DL
GLUCOSE SERPL-MCNC: 100 MG/DL
HCT VFR BLD CALC: 51.9 %
HGB BLD-MCNC: 16.1 G/DL
HYALINE CASTS: 0 /LPF
IMM GRANULOCYTES NFR BLD AUTO: 0.3 %
KETONES URINE: NEGATIVE
LDH SERPL-CCNC: 316 U/L
LEUKOCYTE ESTERASE URINE: NEGATIVE
LYMPHOCYTES # BLD AUTO: 1.57 K/UL
LYMPHOCYTES NFR BLD AUTO: 26.1 %
MAGNESIUM SERPL-MCNC: 1.7 MG/DL
MAN DIFF?: NORMAL
MCHC RBC-ENTMCNC: 28.3 PG
MCHC RBC-ENTMCNC: 31 GM/DL
MCV RBC AUTO: 91.2 FL
MICROSCOPIC-UA: NORMAL
MONOCYTES # BLD AUTO: 0.49 K/UL
MONOCYTES NFR BLD AUTO: 8.2 %
NEUTROPHILS # BLD AUTO: 3.83 K/UL
NEUTROPHILS NFR BLD AUTO: 63.8 %
NITRITE URINE: NEGATIVE
PH URINE: 6.5
PHOSPHATE SERPL-MCNC: 3.6 MG/DL
PLATELET # BLD AUTO: 117 K/UL
POTASSIUM SERPL-SCNC: 4.1 MMOL/L
PROT SERPL-MCNC: 7.2 G/DL
PROT UR-MCNC: 5 MG/DL
PROTEIN URINE: NEGATIVE MG/DL
RBC # BLD: 5.69 M/UL
RBC # FLD: 13.8 %
RED BLOOD CELLS URINE: 0 /HPF
SODIUM SERPL-SCNC: 137 MMOL/L
SPECIFIC GRAVITY URINE: 1.01
SQUAMOUS EPITHELIAL CELLS: 0 /HPF
TACROLIMUS SERPL-MCNC: 5.6 NG/ML
URATE SERPL-MCNC: 5.1 MG/DL
UROBILINOGEN URINE: NEGATIVE MG/DL
WBC # FLD AUTO: 6.01 K/UL
WHITE BLOOD CELLS URINE: 0 /HPF

## 2018-12-10 LAB
BKV DNA SPEC QL NAA+PROBE: NOT DETECTED COPIES/ML
HCV RNA SERPL NAA DL=5-ACNC: NOT DETECTED IU/ML
HCV RNA SERPL NAA+PROBE-LOG IU: NOT DETECTED LOGIU/ML

## 2018-12-11 LAB
CMV DNA SPEC QL NAA+PROBE: NOT DETECTED IU/ML
CMVPCR LOG: NOT DETECTED LOGIU/ML

## 2018-12-21 ENCOUNTER — RX RENEWAL (OUTPATIENT)
Age: 58
End: 2018-12-21

## 2018-12-26 ENCOUNTER — INBOUND DOCUMENT (OUTPATIENT)
Age: 58
End: 2018-12-26

## 2019-01-03 NOTE — ED ADULT NURSE REASSESSMENT NOTE - NS ED NURSE REASSESS COMMENT FT1
18g PIV removed from L hand per asdi CORBETT. Tele CHAPO CORBETT aware that patient now has no IV access. Pt medicated per orders, cardiac monitor in place showing NSR HR 60s. Pt with no apparent acute distress observed at this time, safety maintained, will continue to monitor. no

## 2019-01-06 ENCOUNTER — INPATIENT (INPATIENT)
Facility: HOSPITAL | Age: 59
LOS: 1 days | Discharge: HOME | DRG: 202 | End: 2019-01-08
Attending: INTERNAL MEDICINE | Admitting: INTERNAL MEDICINE
Payer: MEDICAID

## 2019-01-06 VITALS
SYSTOLIC BLOOD PRESSURE: 168 MMHG | TEMPERATURE: 102 F | DIASTOLIC BLOOD PRESSURE: 90 MMHG | RESPIRATION RATE: 18 BRPM | OXYGEN SATURATION: 100 % | HEART RATE: 80 BPM

## 2019-01-06 DIAGNOSIS — Z98.890 OTHER SPECIFIED POSTPROCEDURAL STATES: Chronic | ICD-10-CM

## 2019-01-06 DIAGNOSIS — Z95.1 PRESENCE OF AORTOCORONARY BYPASS GRAFT: Chronic | ICD-10-CM

## 2019-01-06 DIAGNOSIS — Z94.0 KIDNEY TRANSPLANT STATUS: ICD-10-CM

## 2019-01-06 DIAGNOSIS — R50.9 FEVER, UNSPECIFIED: ICD-10-CM

## 2019-01-06 DIAGNOSIS — R07.9 CHEST PAIN, UNSPECIFIED: ICD-10-CM

## 2019-01-06 DIAGNOSIS — I77.0 ARTERIOVENOUS FISTULA, ACQUIRED: Chronic | ICD-10-CM

## 2019-01-06 DIAGNOSIS — Z95.5 PRESENCE OF CORONARY ANGIOPLASTY IMPLANT AND GRAFT: Chronic | ICD-10-CM

## 2019-01-06 LAB
ALBUMIN SERPL ELPH-MCNC: 3.8 G/DL — SIGNIFICANT CHANGE UP (ref 3.3–5)
ALP SERPL-CCNC: 54 U/L — SIGNIFICANT CHANGE UP (ref 40–120)
ALT FLD-CCNC: 21 U/L — SIGNIFICANT CHANGE UP (ref 10–45)
ANION GAP SERPL CALC-SCNC: 12 MMOL/L — SIGNIFICANT CHANGE UP (ref 5–17)
APPEARANCE UR: CLEAR — SIGNIFICANT CHANGE UP
APTT BLD: 31.2 SEC — SIGNIFICANT CHANGE UP (ref 27.5–36.3)
AST SERPL-CCNC: 25 U/L — SIGNIFICANT CHANGE UP (ref 10–40)
BASE EXCESS BLDV CALC-SCNC: 0.8 MMOL/L — SIGNIFICANT CHANGE UP (ref -2–2)
BILIRUB SERPL-MCNC: 0.6 MG/DL — SIGNIFICANT CHANGE UP (ref 0.2–1.2)
BILIRUB UR-MCNC: NEGATIVE — SIGNIFICANT CHANGE UP
BUN SERPL-MCNC: 10 MG/DL — SIGNIFICANT CHANGE UP (ref 7–23)
C DIFF GDH STL QL: NEGATIVE — SIGNIFICANT CHANGE UP
C DIFF GDH STL QL: SIGNIFICANT CHANGE UP
CA-I SERPL-SCNC: 1.09 MMOL/L — LOW (ref 1.12–1.3)
CALCIUM SERPL-MCNC: 9 MG/DL — SIGNIFICANT CHANGE UP (ref 8.4–10.5)
CHLORIDE BLDV-SCNC: 101 MMOL/L — SIGNIFICANT CHANGE UP (ref 96–108)
CHLORIDE SERPL-SCNC: 98 MMOL/L — SIGNIFICANT CHANGE UP (ref 96–108)
CO2 BLDV-SCNC: 26 MMOL/L — SIGNIFICANT CHANGE UP (ref 22–30)
CO2 SERPL-SCNC: 21 MMOL/L — LOW (ref 22–31)
COLOR SPEC: COLORLESS — SIGNIFICANT CHANGE UP
CREAT SERPL-MCNC: 0.94 MG/DL — SIGNIFICANT CHANGE UP (ref 0.5–1.3)
DIFF PNL FLD: NEGATIVE — SIGNIFICANT CHANGE UP
FLUAV H3 RNA SPEC QL NAA+PROBE: DETECTED
GAS PNL BLDV: 127 MMOL/L — LOW (ref 136–145)
GAS PNL BLDV: SIGNIFICANT CHANGE UP
GLUCOSE BLDC GLUCOMTR-MCNC: 123 MG/DL — HIGH (ref 70–99)
GLUCOSE BLDV-MCNC: 153 MG/DL — HIGH (ref 70–99)
GLUCOSE SERPL-MCNC: 155 MG/DL — HIGH (ref 70–99)
GLUCOSE UR QL: NEGATIVE — SIGNIFICANT CHANGE UP
HCO3 BLDV-SCNC: 25 MMOL/L — SIGNIFICANT CHANGE UP (ref 21–29)
HCT VFR BLD CALC: 49.8 % — SIGNIFICANT CHANGE UP (ref 39–50)
HCT VFR BLDA CALC: 48 % — SIGNIFICANT CHANGE UP (ref 39–50)
HGB BLD CALC-MCNC: 15.7 G/DL — SIGNIFICANT CHANGE UP (ref 13–17)
HGB BLD-MCNC: 16 G/DL — SIGNIFICANT CHANGE UP (ref 13–17)
INR BLD: 1.17 RATIO — HIGH (ref 0.88–1.16)
KETONES UR-MCNC: NEGATIVE — SIGNIFICANT CHANGE UP
LACTATE BLDV-MCNC: 1.8 MMOL/L — SIGNIFICANT CHANGE UP (ref 0.7–2)
LEUKOCYTE ESTERASE UR-ACNC: NEGATIVE — SIGNIFICANT CHANGE UP
LIDOCAIN IGE QN: 47 U/L — SIGNIFICANT CHANGE UP (ref 7–60)
MAGNESIUM SERPL-MCNC: 1.5 MG/DL — LOW (ref 1.6–2.6)
MCHC RBC-ENTMCNC: 28.2 PG — SIGNIFICANT CHANGE UP (ref 27–34)
MCHC RBC-ENTMCNC: 32 GM/DL — SIGNIFICANT CHANGE UP (ref 32–36)
MCV RBC AUTO: 88 FL — SIGNIFICANT CHANGE UP (ref 80–100)
NITRITE UR-MCNC: NEGATIVE — SIGNIFICANT CHANGE UP
PCO2 BLDV: 41 MMHG — SIGNIFICANT CHANGE UP (ref 35–50)
PH BLDV: 7.41 — SIGNIFICANT CHANGE UP (ref 7.35–7.45)
PH UR: 6 — SIGNIFICANT CHANGE UP (ref 5–8)
PLATELET # BLD AUTO: 94 K/UL — LOW (ref 150–400)
PO2 BLDV: 52 MMHG — HIGH (ref 25–45)
POTASSIUM BLDV-SCNC: 6.1 MMOL/L — HIGH (ref 3.5–5.3)
POTASSIUM SERPL-MCNC: 3.8 MMOL/L — SIGNIFICANT CHANGE UP (ref 3.5–5.3)
POTASSIUM SERPL-SCNC: 3.8 MMOL/L — SIGNIFICANT CHANGE UP (ref 3.5–5.3)
PROT SERPL-MCNC: 6.7 G/DL — SIGNIFICANT CHANGE UP (ref 6–8.3)
PROT UR-MCNC: NEGATIVE — SIGNIFICANT CHANGE UP
PROTHROM AB SERPL-ACNC: 13.4 SEC — HIGH (ref 10–12.9)
RAPID RVP RESULT: DETECTED
RBC # BLD: 5.66 M/UL — SIGNIFICANT CHANGE UP (ref 4.2–5.8)
RBC # FLD: 13.3 % — SIGNIFICANT CHANGE UP (ref 10.3–14.5)
SAO2 % BLDV: 85 % — SIGNIFICANT CHANGE UP (ref 67–88)
SODIUM SERPL-SCNC: 131 MMOL/L — LOW (ref 135–145)
SP GR SPEC: 1.01 — LOW (ref 1.01–1.02)
TACROLIMUS SERPL-MCNC: 7.2 NG/ML — SIGNIFICANT CHANGE UP
TROPONIN T, HIGH SENSITIVITY RESULT: 12 NG/L — SIGNIFICANT CHANGE UP (ref 0–51)
TROPONIN T, HIGH SENSITIVITY RESULT: 15 NG/L — SIGNIFICANT CHANGE UP (ref 0–51)
UROBILINOGEN FLD QL: NEGATIVE — SIGNIFICANT CHANGE UP
WBC # BLD: 6.1 K/UL — SIGNIFICANT CHANGE UP (ref 3.8–10.5)
WBC # FLD AUTO: 6.1 K/UL — SIGNIFICANT CHANGE UP (ref 3.8–10.5)

## 2019-01-06 PROCEDURE — 71046 X-RAY EXAM CHEST 2 VIEWS: CPT | Mod: 26

## 2019-01-06 PROCEDURE — 99285 EMERGENCY DEPT VISIT HI MDM: CPT | Mod: 25

## 2019-01-06 PROCEDURE — 93010 ELECTROCARDIOGRAM REPORT: CPT

## 2019-01-06 RX ORDER — TACROLIMUS 5 MG/1
1 CAPSULE ORAL
Qty: 0 | Refills: 0 | Status: DISCONTINUED | OUTPATIENT
Start: 2019-01-06 | End: 2019-01-07

## 2019-01-06 RX ORDER — ATORVASTATIN CALCIUM 80 MG/1
1 TABLET, FILM COATED ORAL
Qty: 0 | Refills: 0 | COMMUNITY

## 2019-01-06 RX ORDER — ONDANSETRON 8 MG/1
4 TABLET, FILM COATED ORAL EVERY 6 HOURS
Qty: 0 | Refills: 0 | Status: DISCONTINUED | OUTPATIENT
Start: 2019-01-06 | End: 2019-01-08

## 2019-01-06 RX ORDER — SODIUM CHLORIDE 9 MG/ML
1000 INJECTION INTRAMUSCULAR; INTRAVENOUS; SUBCUTANEOUS
Qty: 0 | Refills: 0 | Status: DISCONTINUED | OUTPATIENT
Start: 2019-01-06 | End: 2019-01-07

## 2019-01-06 RX ORDER — PANTOPRAZOLE SODIUM 20 MG/1
40 TABLET, DELAYED RELEASE ORAL
Qty: 0 | Refills: 0 | Status: DISCONTINUED | OUTPATIENT
Start: 2019-01-06 | End: 2019-01-08

## 2019-01-06 RX ORDER — ACETAMINOPHEN 500 MG
975 TABLET ORAL ONCE
Qty: 0 | Refills: 0 | Status: COMPLETED | OUTPATIENT
Start: 2019-01-06 | End: 2019-01-06

## 2019-01-06 RX ORDER — SODIUM CHLORIDE 9 MG/ML
500 INJECTION INTRAMUSCULAR; INTRAVENOUS; SUBCUTANEOUS ONCE
Qty: 0 | Refills: 0 | Status: COMPLETED | OUTPATIENT
Start: 2019-01-06 | End: 2019-01-06

## 2019-01-06 RX ORDER — MAGNESIUM SULFATE 500 MG/ML
2 VIAL (ML) INJECTION ONCE
Qty: 0 | Refills: 0 | Status: COMPLETED | OUTPATIENT
Start: 2019-01-06 | End: 2019-01-06

## 2019-01-06 RX ORDER — ATORVASTATIN CALCIUM 80 MG/1
10 TABLET, FILM COATED ORAL AT BEDTIME
Qty: 0 | Refills: 0 | Status: DISCONTINUED | OUTPATIENT
Start: 2019-01-06 | End: 2019-01-08

## 2019-01-06 RX ORDER — ACETAMINOPHEN 500 MG
650 TABLET ORAL EVERY 6 HOURS
Qty: 0 | Refills: 0 | Status: DISCONTINUED | OUTPATIENT
Start: 2019-01-06 | End: 2019-01-08

## 2019-01-06 RX ADMIN — SODIUM CHLORIDE 75 MILLILITER(S): 9 INJECTION INTRAMUSCULAR; INTRAVENOUS; SUBCUTANEOUS at 21:53

## 2019-01-06 RX ADMIN — Medication 975 MILLIGRAM(S): at 06:55

## 2019-01-06 RX ADMIN — Medication 650 MILLIGRAM(S): at 17:06

## 2019-01-06 RX ADMIN — SODIUM CHLORIDE 75 MILLILITER(S): 9 INJECTION INTRAMUSCULAR; INTRAVENOUS; SUBCUTANEOUS at 14:52

## 2019-01-06 RX ADMIN — Medication 50 GRAM(S): at 14:52

## 2019-01-06 RX ADMIN — SODIUM CHLORIDE 500 MILLILITER(S): 9 INJECTION INTRAMUSCULAR; INTRAVENOUS; SUBCUTANEOUS at 08:27

## 2019-01-06 RX ADMIN — ATORVASTATIN CALCIUM 10 MILLIGRAM(S): 80 TABLET, FILM COATED ORAL at 21:53

## 2019-01-06 RX ADMIN — Medication 975 MILLIGRAM(S): at 08:25

## 2019-01-06 RX ADMIN — Medication 650 MILLIGRAM(S): at 17:45

## 2019-01-06 RX ADMIN — Medication 75 MILLIGRAM(S): at 08:27

## 2019-01-06 RX ADMIN — SODIUM CHLORIDE 500 MILLILITER(S): 9 INJECTION INTRAMUSCULAR; INTRAVENOUS; SUBCUTANEOUS at 05:16

## 2019-01-06 NOTE — CONSULT NOTE ADULT - SUBJECTIVE AND OBJECTIVE BOX
HPI:  58 yr old with pmh of  CABG and kidney transplant presenting with 2 days of fever, productive cough, and crushing chest pain(worse with cough). On asa, plavix, proraf, cellcept, bactrim. O2 91 on RA, 100% with supplemental O2. CP constant, nonexertional. Also notes congestion. No chills, abdominal pain, dysuria.   Nephrology was consulted for kidney transplant management. The patient had a kidney transplant May 2018 here at [facility]. Does not take NSAIDs or OTC supplements. He is compliant with medications. Unfortunately he could not tel me the milligrams of his immunosuppressive medications       PAST MEDICAL & SURGICAL HISTORY:  HTN (hypertension)  Coronary artery disease involving coronary bypass graft  ESRD (end stage renal disease) on dialysis  Obesity  Hemorrhoids  Gastroesophageal reflux disease without esophagitis  High cholesterol  ESRD on Dialysis: [facility] &amp; Sat  CAD (Coronary Artery Disease)  PCK (Polycystic Kidney Disease)  HTN - Hypertension  Asthma: last attack , never hospitalized or intubated  AV fistula: b/l MILI GREEN  S/P coronary artery stent placement  S/P CABG x 5  S/P hemorrhoidectomy: and rectal polypectomy -2/3/2017.  AV fistula: right lower extremity 2 yrs  Left upper extrmity with graft 7 years ago  Stented coronary artery: x2 cardiac stents in , one cardiac stent in   CABG (Coronary Artery Bypass Graft):       MEDICATIONS  (STANDING):  atorvastatin 10 milliGRAM(s) Oral at bedtime  oseltamivir 75 milliGRAM(s) Oral two times a day  pantoprazole    Tablet 40 milliGRAM(s) Oral before breakfast  predniSONE   Tablet 5 milliGRAM(s) Oral daily  tacrolimus 1 milliGRAM(s) Oral two times a day      Allergies    No Known Allergies    Intolerances        SOCIAL HISTORY:  Denies ETOh,Smoking,     FAMILY HISTORY:  Family history of polycystic kidney (Sibling)  Family history of adult polycystic kidney disease (Sibling)      REVIEW OF SYSTEMS:    As per HPI  All other review of systems is negative unless indicated above.    VITAL:  T(C): , Max: 38.9 (19 @ 04:06)  T(F): , Max: 102 (19 @ 04:06)  HR: 75 (19 @ 12:24)  BP: 114/75 (19 @ 12:24)  BP(mean): --  RR: 19 (19 @ 12:24)  SpO2: 99% (19 @ 12:24)  Wt(kg): --    I and O's:        PHYSICAL EXAM:    Constitutional: NAD  HEENT: PERRLA, EOMI,  MMM  Neck: No LAD, No JVD  Respiratory: CTAB  Cardiovascular: S1 and S2  Gastrointestinal: BS+, soft, NT/ND  Extremities: No peripheral edema  Neurological: A/O x 3, no focal deficits      LABS:                        16.0   6.1   )-----------( 94       ( 2019 05:23 )             49.8         131<L>  |  98  |  10  ----------------------------<  155<H>  3.8   |  21<L>  |  0.94    Ca    9.0      2019 05:23  Mg     1.5         TPro  6.7  /  Alb  3.8  /  TBili  0.6  /  DBili  x   /  AST  25  /  ALT  21  /  AlkPhos  54        Urine Studies:  Urinalysis Basic - ( 2019 09:04 )    Color: Colorless / Appearance: Clear / S.006 / pH: x  Gluc: x / Ketone: Negative  / Bili: Negative / Urobili: Negative   Blood: x / Protein: Negative / Nitrite: Negative   Leuk Esterase: Negative / RBC: x / WBC x   Sq Epi: x / Non Sq Epi: x / Bacteria: x            RADIOLOGY & ADDITIONAL STUDIES:        ASSESSMENT:    58 yr old with pmh of  CABG and kidney transplant presenting with influenza      - ESRD s/p kidney Txp May 2018- stable kidney function  - BP controlled  - Volume status- euvolemic  - Hypomagnesemia         PLAN:   - Urinalysis  - Mag sulfate 2 g IV x 1   - NS at 75 cc/hr x 20 hours  - c/w prograf 1 mg po BID  - c/w prednisone 5 mg po daily   - Prograf trough in AM  - Need to Verify Cellcept dose- I tried to call his spouse  - Renal dosing of meds to creatinine clearance of 40-45 ml/min  - Avoid nephrotoxins as able      Ruddy Wilson MD  [facility]   [phone]

## 2019-01-06 NOTE — ED ADULT NURSE REASSESSMENT NOTE - COMFORT CARE
side rails up/warm blanket provided/plan of care explained/darkened lights/treatment delay explained/wait time explained

## 2019-01-06 NOTE — ED ADULT NURSE NOTE - NSIMPLEMENTINTERV_GEN_ALL_ED
Implemented All Universal Safety Interventions:  [place] to call system. Call bell, personal items and telephone within reach. Instruct patient to call for assistance. Room bathroom lighting operational. Non-slip footwear when patient is off stretcher. Physically safe environment: no spills, clutter or unnecessary equipment. Stretcher in lowest position, wheels locked, appropriate side rails in place.

## 2019-01-06 NOTE — ED ADULT NURSE REASSESSMENT NOTE - COMFORT CARE
side rails up/meal provided/treatment delay explained/wait time explained/darkened lights/plan of care explained/warm blanket provided

## 2019-01-06 NOTE — H&P ADULT - NSHPPHYSICALEXAM_GEN_ALL_CORE
General: WN/WD NAD  PERRLA  Neurology: A&Ox3, nonfocal, GALINDO x 4  Respiratory: CTA B/L  CV: RRR, S1S2, no murmurs, rubs or gallops  Abdominal: Soft, NT, ND +BS, Last BM  Extremities: No edema, + peripheral pulses  Skin Normal

## 2019-01-06 NOTE — ED PROVIDER NOTE - ATTENDING CONTRIBUTION TO CARE
58 yom pmhx cad w cabg prev, renal transplant few mo ago on immunosuppresive meds, p/w fever/ chest pain and nonproductive cough. pt describes cp as 'crushing' worse w coughing spells. no rhinorrhea or sore throat. nonexertional, constant. denies abd pain or dysuria. no sick contacts. has been complaint w immunosuppressant meds    ROS:   constitutional - + fever, + chills  eyes - no visual changes, no redness  eent - no sore throat, + nasal congestion  cvs - + chest pain, no leg swelling  resp - no shortness of breath, + cough  gi - no abdominal pain, no vomiting, no diarrhea  gu - no dysuria, no hematuria  msk - no acute back pain, no joint swelling  skin - no rashes, no jaundice  neuro - no headache, no focal weakness  psych - no acute mental health issue     Physical Exam:   constitutional - well appearing, awake and alert, oriented x3  head - no external evidence of trauma  cvs - rrr, no murmurs, no peripheral edema  resp - breath sounds clear and equal bilat  gi - abdomen soft and nontender, no rigidity, guarding or rebound, bowel sounds present  msk - moving all extremities spontaneously  neuro - alert and oriented x3, no focal deficits, CNs 2-12 grossly intact  skin- no jaundice, warm and dry  psych - mood and affect wnl, no apparent risk to self or others     differential diagnoses considered include, but are not limited to bactermia, pna, viral syndrome/ uri in setting of fever on immunosuppresive meds. ekg w/o acute st changes. will hold on iv abx at this time until source identified = found to be influ positive likely cause for sxs. will admit as pt is immunocompromised, also for further cp w/janes Freeman MD

## 2019-01-06 NOTE — ED ADULT NURSE NOTE - OBJECTIVE STATEMENT
Patient is a 58 year old male complaining of chest pain and productive cough with brown sputum x2 days. Arrived by EMS from home. Patient has history of CKD with prior dialysis, renal transplant 7mo ago. Patient is A&O x 4 and appears well. He is febrile on arrival. Pt reports pain in his chest is 8/10 when he coughs. Endorsing complaints of fatigue and chest pain/ cough in ED. Lungs are clear and equal ruddy. Spo2 >95% on room air. Respirations are non labored. Denies complaints of sob, n/v/d, headache, syncope, burning urination, blood in urine, blood in stool. Abd is soft, non tender, non distended. Skin is warm and dry. Color is consistent with ethnicity. VSS/ NAD. Safety and comfort maintained. No visitors at the bedside. Will continue to monitor.

## 2019-01-06 NOTE — H&P ADULT - HISTORY OF PRESENT ILLNESS
58 yr old with pmh of  CABG and kidney transplant presenting with 2 days of fever, productive cough, and crushing chest pain(worse with cough). On asa, plavix, proraf, cellcept, bactrim. Satting 91 on RA, 100% with supplemental O2. CP constant, nonexertional. Also notes congestion. No chills, abdominal pain, dysuria.

## 2019-01-06 NOTE — ED PROVIDER NOTE - PHYSICAL EXAMINATION
Gen: No acute distress, alert, cooperative  Head: Normocephalic, Atraumatic  HEENT: normal conjunctiva  Lung: Mild wheeze, intermittent, otherwise clear, no crackles.  CV: rrr, no murmur  Abd: soft, NTND, no rebound or guarding, scar from prior surgery  MSK: JOSE R LE edema  Neuro: No focal neurologic deficits  Skin: Warm and dry, no evidence of rash   Psych: normal affect, follows commands

## 2019-01-06 NOTE — H&P ADULT - NSHPLABSRESULTS_GEN_ALL_CORE
Lab Results:  CBC  CBC Full  -  ( 2019 05:23 )  WBC Count : 6.1 K/uL  Hemoglobin : 16.0 g/dL  Hematocrit : 49.8 %  Platelet Count - Automated : 94 K/uL  Mean Cell Volume : 88.0 fl  Mean Cell Hemoglobin : 28.2 pg  Mean Cell Hemoglobin Concentration : 32.0 gm/dL  Auto Neutrophil # : x  Auto Lymphocyte # : x  Auto Monocyte # : x  Auto Eosinophil # : x  Auto Basophil # : x  Auto Neutrophil % : x  Auto Lymphocyte % : x  Auto Monocyte % : x  Auto Eosinophil % : x  Auto Basophil % : x    .		Differential:	[] Automated		[] Manual  Chemistry                        16.0   6.1   )-----------( 94       ( 2019 05:23 )             49.8     01-06    131<L>  |  98  |  10  ----------------------------<  155<H>  3.8   |  21<L>  |  0.94    Ca    9.0      2019 05:23  Mg     1.5     01-    TPro  6.7  /  Alb  3.8  /  TBili  0.6  /  DBili  x   /  AST  25  /  ALT  21  /  AlkPhos  54  01-06    LIVER FUNCTIONS - ( 2019 05:23 )  Alb: 3.8 g/dL / Pro: 6.7 g/dL / ALK PHOS: 54 U/L / ALT: 21 U/L / AST: 25 U/L / GGT: x           PT/INR - ( 2019 05:23 )   PT: 13.4 sec;   INR: 1.17 ratio         PTT - ( 2019 05:23 )  PTT:31.2 sec  Urinalysis Basic - ( 2019 09:04 )    Color: Colorless / Appearance: Clear / S.006 / pH: x  Gluc: x / Ketone: Negative  / Bili: Negative / Urobili: Negative   Blood: x / Protein: Negative / Nitrite: Negative   Leuk Esterase: Negative / RBC: x / WBC x   Sq Epi: x / Non Sq Epi: x / Bacteria: x            MICROBIOLOGY/CULTURES:      RADIOLOGY RESULTS: reviewed

## 2019-01-06 NOTE — ED PROVIDER NOTE - PROGRESS NOTE DETAILS
AK: Spoke with PMD, accepted for admission. Requests admission under Dr. Spence. PMD will pass on information to Dr. Spence. UA and RVP pending, no abx given, this all passed onto PMD.

## 2019-01-06 NOTE — ED PROVIDER NOTE - OBJECTIVE STATEMENT
57yo M hx CABG and kidney transplant presenting with 2 days of fever, productive cough, and crushing chest pain(worse with cough). On asa, plavix, proraf, cellcept, bactrim. Satting 91 on RA, 100% with supplemental O2. CP constant, nonexertional. Also notes congestion. No chills, abdominal pain, dysuria.

## 2019-01-06 NOTE — ED PROVIDER NOTE - MEDICAL DECISION MAKING DETAILS
57yo M hx CABG and kidney transplant presenting with 2 days of fever, productive cough, and crushing chest pain(worse with cough). Septic workup and r/o ACS. Likely admit.

## 2019-01-06 NOTE — ED PROVIDER NOTE - CARE PLAN
Principal Discharge DX:	Chest pain  Secondary Diagnosis:	Fever Principal Discharge DX:	Chest pain  Secondary Diagnosis:	Fever  Secondary Diagnosis:	Influenza A

## 2019-01-07 DIAGNOSIS — J10.1 INFLUENZA DUE TO OTHER IDENTIFIED INFLUENZA VIRUS WITH OTHER RESPIRATORY MANIFESTATIONS: ICD-10-CM

## 2019-01-07 DIAGNOSIS — J45.909 UNSPECIFIED ASTHMA, UNCOMPLICATED: ICD-10-CM

## 2019-01-07 LAB
ALBUMIN SERPL ELPH-MCNC: 3.7 G/DL — SIGNIFICANT CHANGE UP (ref 3.3–5)
ALP SERPL-CCNC: 55 U/L — SIGNIFICANT CHANGE UP (ref 40–120)
ALT FLD-CCNC: 23 U/L — SIGNIFICANT CHANGE UP (ref 10–45)
ANION GAP SERPL CALC-SCNC: 11 MMOL/L — SIGNIFICANT CHANGE UP (ref 5–17)
AST SERPL-CCNC: 28 U/L — SIGNIFICANT CHANGE UP (ref 10–40)
BILIRUB SERPL-MCNC: 0.7 MG/DL — SIGNIFICANT CHANGE UP (ref 0.2–1.2)
BUN SERPL-MCNC: 11 MG/DL — SIGNIFICANT CHANGE UP (ref 7–23)
CALCIUM SERPL-MCNC: 8.7 MG/DL — SIGNIFICANT CHANGE UP (ref 8.4–10.5)
CHLORIDE SERPL-SCNC: 95 MMOL/L — LOW (ref 96–108)
CHLORIDE UR-SCNC: <35 MMOL/L — SIGNIFICANT CHANGE UP
CO2 SERPL-SCNC: 22 MMOL/L — SIGNIFICANT CHANGE UP (ref 22–31)
CREAT SERPL-MCNC: 1.03 MG/DL — SIGNIFICANT CHANGE UP (ref 0.5–1.3)
CULTURE RESULTS: NO GROWTH — SIGNIFICANT CHANGE UP
GLUCOSE SERPL-MCNC: 127 MG/DL — HIGH (ref 70–99)
HCT VFR BLD CALC: 47.9 % — SIGNIFICANT CHANGE UP (ref 39–50)
HGB BLD-MCNC: 15.2 G/DL — SIGNIFICANT CHANGE UP (ref 13–17)
MANUAL SMEAR VERIFICATION: SIGNIFICANT CHANGE UP
MCHC RBC-ENTMCNC: 28 PG — SIGNIFICANT CHANGE UP (ref 27–34)
MCHC RBC-ENTMCNC: 31.7 GM/DL — LOW (ref 32–36)
MCV RBC AUTO: 88.4 FL — SIGNIFICANT CHANGE UP (ref 80–100)
OSMOLALITY UR: 288 MOS/KG — SIGNIFICANT CHANGE UP (ref 50–1200)
PLAT MORPH BLD: SIGNIFICANT CHANGE UP
PLATELET # BLD AUTO: 98 K/UL — LOW (ref 150–400)
POTASSIUM SERPL-MCNC: 4 MMOL/L — SIGNIFICANT CHANGE UP (ref 3.5–5.3)
POTASSIUM SERPL-SCNC: 4 MMOL/L — SIGNIFICANT CHANGE UP (ref 3.5–5.3)
POTASSIUM UR-SCNC: 15 MMOL/L — SIGNIFICANT CHANGE UP
PROT SERPL-MCNC: 7 G/DL — SIGNIFICANT CHANGE UP (ref 6–8.3)
RBC # BLD: 5.42 M/UL — SIGNIFICANT CHANGE UP (ref 4.2–5.8)
RBC # FLD: 14 % — SIGNIFICANT CHANGE UP (ref 10.3–14.5)
RBC BLD AUTO: SIGNIFICANT CHANGE UP
SODIUM SERPL-SCNC: 128 MMOL/L — LOW (ref 135–145)
SODIUM UR-SCNC: <20 MMOL/L — SIGNIFICANT CHANGE UP
SPECIMEN SOURCE: SIGNIFICANT CHANGE UP
TACROLIMUS SERPL-MCNC: 6.9 NG/ML — SIGNIFICANT CHANGE UP
WBC # BLD: 8.84 K/UL — SIGNIFICANT CHANGE UP (ref 3.8–10.5)
WBC # FLD AUTO: 8.84 K/UL — SIGNIFICANT CHANGE UP (ref 3.8–10.5)

## 2019-01-07 PROCEDURE — 99222 1ST HOSP IP/OBS MODERATE 55: CPT

## 2019-01-07 RX ORDER — VALGANCICLOVIR 450 MG/1
450 TABLET, FILM COATED ORAL DAILY
Qty: 0 | Refills: 0 | Status: DISCONTINUED | OUTPATIENT
Start: 2019-01-07 | End: 2019-01-08

## 2019-01-07 RX ORDER — MYCOPHENOLATE MOFETIL 250 MG/1
1 CAPSULE ORAL
Qty: 0 | Refills: 0 | COMMUNITY

## 2019-01-07 RX ORDER — MYCOPHENOLATE MOFETIL 250 MG/1
500 CAPSULE ORAL
Qty: 0 | Refills: 0 | Status: DISCONTINUED | OUTPATIENT
Start: 2019-01-07 | End: 2019-01-08

## 2019-01-07 RX ORDER — BUDESONIDE AND FORMOTEROL FUMARATE DIHYDRATE 160; 4.5 UG/1; UG/1
2 AEROSOL RESPIRATORY (INHALATION)
Qty: 0 | Refills: 0 | Status: DISCONTINUED | OUTPATIENT
Start: 2019-01-07 | End: 2019-01-08

## 2019-01-07 RX ORDER — VALGANCICLOVIR 450 MG/1
1 TABLET, FILM COATED ORAL
Qty: 0 | Refills: 0 | COMMUNITY

## 2019-01-07 RX ORDER — TACROLIMUS 5 MG/1
1.5 CAPSULE ORAL
Qty: 0 | Refills: 0 | Status: DISCONTINUED | OUTPATIENT
Start: 2019-01-07 | End: 2019-01-08

## 2019-01-07 RX ORDER — ASPIRIN/CALCIUM CARB/MAGNESIUM 324 MG
81 TABLET ORAL DAILY
Qty: 0 | Refills: 0 | Status: DISCONTINUED | OUTPATIENT
Start: 2019-01-07 | End: 2019-01-08

## 2019-01-07 RX ORDER — FAMOTIDINE 10 MG/ML
1 INJECTION INTRAVENOUS
Qty: 0 | Refills: 0 | COMMUNITY

## 2019-01-07 RX ORDER — ATORVASTATIN CALCIUM 80 MG/1
1 TABLET, FILM COATED ORAL
Qty: 0 | Refills: 0 | COMMUNITY

## 2019-01-07 RX ADMIN — Medication 75 MILLIGRAM(S): at 06:11

## 2019-01-07 RX ADMIN — Medication 650 MILLIGRAM(S): at 09:05

## 2019-01-07 RX ADMIN — MYCOPHENOLATE MOFETIL 500 MILLIGRAM(S): 250 CAPSULE ORAL at 23:13

## 2019-01-07 RX ADMIN — Medication 20 MILLIGRAM(S): at 18:50

## 2019-01-07 RX ADMIN — Medication 1 TABLET(S): at 18:50

## 2019-01-07 RX ADMIN — TACROLIMUS 1.5 MILLIGRAM(S): 5 CAPSULE ORAL at 18:50

## 2019-01-07 RX ADMIN — Medication 650 MILLIGRAM(S): at 07:57

## 2019-01-07 RX ADMIN — VALGANCICLOVIR 450 MILLIGRAM(S): 450 TABLET, FILM COATED ORAL at 09:44

## 2019-01-07 RX ADMIN — Medication 81 MILLIGRAM(S): at 06:13

## 2019-01-07 RX ADMIN — MYCOPHENOLATE MOFETIL 500 MILLIGRAM(S): 250 CAPSULE ORAL at 09:44

## 2019-01-07 RX ADMIN — Medication 5 MILLIGRAM(S): at 06:11

## 2019-01-07 RX ADMIN — PANTOPRAZOLE SODIUM 40 MILLIGRAM(S): 20 TABLET, DELAYED RELEASE ORAL at 07:36

## 2019-01-07 RX ADMIN — BUDESONIDE AND FORMOTEROL FUMARATE DIHYDRATE 2 PUFF(S): 160; 4.5 AEROSOL RESPIRATORY (INHALATION) at 18:52

## 2019-01-07 RX ADMIN — Medication 75 MILLIGRAM(S): at 18:50

## 2019-01-07 RX ADMIN — ATORVASTATIN CALCIUM 10 MILLIGRAM(S): 80 TABLET, FILM COATED ORAL at 23:13

## 2019-01-07 RX ADMIN — TACROLIMUS 1 MILLIGRAM(S): 5 CAPSULE ORAL at 09:44

## 2019-01-07 NOTE — CHART NOTE - NSCHARTNOTEFT_GEN_A_CORE
Tele strip reviewed: NSR without ectopy. No events.  hsTnT downtrending in intermediate range - not consistent with ACS  Electrolytes OK  Will d/c tele at this time.

## 2019-01-07 NOTE — PROGRESS NOTE ADULT - SUBJECTIVE AND OBJECTIVE BOX
No pain, no shortness of breath      VITAL:  T(C): , Max: 38.7 (19 @ 16:57)  T(F): , Max: 101.6 (19 @ 16:57)  HR: 81 (19 @ 07:38)  BP: 113/67 (19 @ 07:38)  RR: 18 (19 @ 07:38)  SpO2: 97% (19 @ 07:38)      PHYSICAL EXAM:    Constitutional: NAD; Alert  HEENT:  NCAT; DMM  Neck: No JVD; supple  Respiratory: CTA-b/l  Cardiac: RRR s1s2  Gastrointestinal: BS+, soft, NT/ND  Urologic: No medina  Extremities: No peripheral edema  Back: No CVAT b/l    LABS:                        15.2   8.84  )-----------( 98       ( 2019 08:00 )             47.9     Na(128)/K(4.0)/Cl(95)/HCO3(22)/BUN(11)/Cr(1.03)Glu(127)/Ca(8.7)/Mg(--)/PO4(--)    01 @ 06:34  Na(131)/K(3.8)/Cl(98)/HCO3(21)/BUN(10)/Cr(0.94)Glu(155)/Ca(9.0)/Mg(1.5)/PO4(--)     @ 05:23    Urinalysis Basic - ( 2019 09:04 )  Color: Colorless / Appearance: Clear / S.006 / pH: x  Gluc: x / Ketone: Negative  / Bili: Negative / Urobili: Negative   Blood: x / Protein: Negative / Nitrite: Negative   Leuk Esterase: Negative / RBC: x / WBC x   Sq Epi: x / Non Sq Epi: x / Bacteria: x    Tacrolimus 6.9      IMPRESSION: 58M w/ HTN, Asthma, CAD-CABG and renal transplant 2018; 19 a/w Influenza A    (1)Renal - renal transplant - stable function - on Prograf, Cellcept, Prednisone. Tacrolimus level mildly low (goal 8-12)  (2)Pulm - SOB - ?asthma/COPD exacerbation - Pulm on board; steroid dose increased from outpatient dosage  (3)Hyponatremia - Unclear exact etiology      RECOMMEND:  (1)Increase Prograf to 1.5mg po q12h  (2)Will defer to Pulmonary regarding Prednisone dosing  (3)Could d/c IVF at this point  (4)Tamiflu as ordered  (5)Urine: lytes, osm  (6)Serum: uric acid, TSH, osm, cortisol (in am, along with BMP)          Irvin Morrow MD  [facility]  [phone] No pain; SOB improving; (+)loose stool      VITAL:  T(C): , Max: 38.7 (19 @ 16:57)  T(F): , Max: 101.6 (19 @ 16:57)  HR: 81 (19 @ 07:38)  BP: 113/67 (19 @ 07:38)  RR: 18 (19 @ 07:38)  SpO2: 97% (19 @ 07:38)      PHYSICAL EXAM:  Constitutional: NAD; Alert  HEENT:  NCAT; DMM  Neck: No JVD; supple  Respiratory: CTA-b/l  Cardiac: RRR s1s2  Gastrointestinal: BS+, soft, NT/ND  Urologic: No medina  Extremities: No peripheral edema  Back: No CVAT b/l  Access: RUE AVF (+)thrill      LABS:                        15.2   8.84  )-----------( 98       ( 2019 08:00 )             47.9     Na(128)/K(4.0)/Cl(95)/HCO3(22)/BUN(11)/Cr(1.03)Glu(127)/Ca(8.7)/Mg(--)/PO4(--)    01 @ 06:34  Na(131)/K(3.8)/Cl(98)/HCO3(21)/BUN(10)/Cr(0.94)Glu(155)/Ca(9.0)/Mg(1.5)/PO4(--)     @ 05:23    Urinalysis Basic - ( 2019 09:04 )  Color: Colorless / Appearance: Clear / S.006 / pH: x  Gluc: x / Ketone: Negative  / Bili: Negative / Urobili: Negative   Blood: x / Protein: Negative / Nitrite: Negative   Leuk Esterase: Negative / RBC: x / WBC x   Sq Epi: x / Non Sq Epi: x / Bacteria: x    Tacrolimus 6.9      IMPRESSION: 58M w/ HTN, Asthma, CAD-CABG and renal transplant 2018; 19 a/w Influenza A    (1)Renal - renal transplant - stable function - on Prograf, Cellcept, Prednisone. Tacrolimus level mildly low (goal 8-12); he has been receiving 1mg bid at [facility] but takes 1.5mg bid at home.  (2)Pulm - SOB - ?asthma/COPD exacerbation - Pulm on board; steroid dose increased from outpatient dosage  (3)Hyponatremia - Unclear exact etiology      RECOMMEND:  (1)Increase Prograf to 1.5mg po q12h  (2)Will defer to Pulmonary regarding Prednisone dosing  (3)Could d/c IVF at this point  (4)Tamiflu as ordered  (5)Urine: lytes, osm  (6)Serum: uric acid, TSH, osm, cortisol (in am, along with BMP)          Irvin Morrow MD  [facility]  [phone]

## 2019-01-07 NOTE — CONSULT NOTE ADULT - SUBJECTIVE AND OBJECTIVE BOX
Patient is a 58y old  Male who presents with a chief complaint of fever , CP (2019 14:08)      HPI:  58 yr old with pmh of  CABG and kidney transplant presenting with 2 days of fever, productive cough, and crushing chest pain(worse with cough). On asa, plavix, proraf, cellcept, bactrim. Satting 91 on RA, 100% with supplemental O2. CP constant, nonexertional. Also notes congestion. No chills, abdominal pain, dysuria. (2019 13:07)    he says he used to have asthma and has not been taking any meds: He came in with above symptoms And found to have Influenza and hence pulmonary called: He says he feels congestion in chest and has been coughing a littel        ?FOLLOWING PRESENT  [ x] Hx of PE/DVT, [x ] Hx COPD, y[ ] Hx of Asthma, [y ] Hx of Hospitalization, [x ]  Hx of BiPAP/CPAP use, [x] Hx of ELIJAH    Allergies    No Known Allergies    Intolerances        PAST MEDICAL & SURGICAL HISTORY:  HTN (hypertension)  Coronary artery disease involving coronary bypass graft  ESRD (end stage renal disease) on dialysis  Obesity  Hemorrhoids  Gastroesophageal reflux disease without esophagitis  High cholesterol  ESRD on Dialysis: [facility] &amp; Sat  CAD (Coronary Artery Disease)  PCK (Polycystic Kidney Disease)  HTN - Hypertension  Asthma: last attack , never hospitalized or intubated  AV fistula: b/l MILI GREEN  S/P coronary artery stent placement  S/P CABG x 5  S/P hemorrhoidectomy: and rectal polypectomy -2/3/2017.  AV fistula: right lower extremity 2 yrs  Left upper extrmity with graft 7 years ago  Stented coronary artery: x2 cardiac stents in , one cardiac stent in   CABG (Coronary Artery Bypass Graft):       FAMILY HISTORY:  Family history of polycystic kidney (Sibling)  Family history of adult polycystic kidney disease (Sibling)      Social History: x ] TOBACCO                  [ x ] ETOH                                 [ x ] IVDA/DRUGS    REVIEW OF SYSTEMS      General:	fever    Skin/Breast:x  	  Ophthalmologic:x  	  ENMT:	x    Respiratory and Thorax:sob, chest congestion, wheezing  	  Cardiovascular:	x    Gastrointestinal:	x    Genitourinary:	x    Musculoskeletal:	x    Neurological:	x    Psychiatric:	x    Hematology/Lymphatics:	x    Endocrine:x    Allergic/Immunologic:	x    MEDICATIONS  (STANDING):  aspirin enteric coated 81 milliGRAM(s) Oral daily  atorvastatin 10 milliGRAM(s) Oral at bedtime  mycophenolate mofetil 500 milliGRAM(s) Oral two times a day  oseltamivir 75 milliGRAM(s) Oral two times a day  pantoprazole    Tablet 40 milliGRAM(s) Oral before breakfast  predniSONE   Tablet 5 milliGRAM(s) Oral daily  sodium chloride 0.9%. 1000 milliLiter(s) (75 mL/Hr) IV Continuous <Continuous>  tacrolimus 1 milliGRAM(s) Oral two times a day  valGANciclovir 450 milliGRAM(s) Oral daily    MEDICATIONS  (PRN):  acetaminophen   Tablet .. 650 milliGRAM(s) Oral every 6 hours PRN Temp greater or equal to 38C (100.4F), Mild Pain (1 - 3)  ondansetron Injectable 4 milliGRAM(s) IV Push every 6 hours PRN Nausea       Vital Signs Last 24 Hrs  T(C): 37.6 (2019 07:38), Max: 38.7 (2019 16:57)  T(F): 99.6 (2019 07:38), Max: 101.6 (2019 16:57)  HR: 81 (2019 07:38) (75 - 86)  BP: 113/67 (2019 07:38) (108/72 - 125/79)  BP(mean): --  RR: 18 (2019 07:38) (18 - 22)  SpO2: 97% (2019 07:38) (95% - 100%)        I&O's Summary    2019 07:01  -  2019 07:00  --------------------------------------------------------  IN: 360 mL / OUT: 800 mL / NET: -440 mL        Physical Exam:   GENERAL: NAD, well-groomed, well-developed  HEENT: JESUS/   Atraumatic, Normocephalic  ENMT: No tonsillar erythema, exudates, or enlargement; Moist mucous membranes, Good dentition, No lesions  NECK: Supple, No JVD, Normal thyroid  CHEST/LUNG: Mild Wheezing  CVS: Regular rate and rhythm; No murmurs, rubs, or gallops  GI: : Soft, Nontender, Nondistended; Bowel sounds present  NERVOUS SYSTEM:  Alert & Oriented X3  EXTREMITIES:  - edema  LYMPH: No lymphadenopathy noted  SKIN: No rashes or lesions  ENDOCRINOLOGY: No Thyromegaly  PSYCH: Appropriate    Labs:  0.8<41<4>>52<<7.415>>0.8<<3><<4><<5<<529>>                            16.0   6.1   )-----------( 94       ( 2019 05:23 )             49.8     01-07    128<L>  |  95<L>  |  11  ----------------------------<  127<H>  4.0   |  22  |  1.03  01-06    131<L>  |  98  |  10  ----------------------------<  155<H>  3.8   |  21<L>  |  0.94    Ca    8.7      2019 06:34  Ca    9.0      2019 05:23  Mg     1.5     01-06    TPro  7.0  /  Alb  3.7  /  TBili  0.7  /  DBili  x   /  AST  28  /  ALT  23  /  AlkPhos  55  01-07  TPro  6.7  /  Alb  3.8  /  TBili  0.6  /  DBili  x   /  AST  25  /  ALT  21  /  AlkPhos  54  01-06    CAPILLARY BLOOD GLUCOSE      POCT Blood Glucose.: 123 mg/dL (2019 09:30)    LIVER FUNCTIONS - ( 2019 06:34 )  Alb: 3.7 g/dL / Pro: 7.0 g/dL / ALK PHOS: 55 U/L / ALT: 23 U/L / AST: 28 U/L / GGT: x           PT/INR - ( 2019 05:23 )   PT: 13.4 sec;   INR: 1.17 ratio         PTT - ( 2019 05:23 )  PTT:31.2 sec  Urinalysis Basic - ( 2019 09:04 )    Color: Colorless / Appearance: Clear / S.006 / pH: x  Gluc: x / Ketone: Negative  / Bili: Negative / Urobili: Negative   Blood: x / Protein: Negative / Nitrite: Negative   Leuk Esterase: Negative / RBC: x / WBC x   Sq Epi: x / Non Sq Epi: x / Bacteria: x      D DImer      Studies  Chest X-RAY  CT SCAN Chest   CT Abdomen  Venous Dopplers: LE:   Others        < from: Xray Chest 2 Views PA/Lat (19 @ 06:20) >  EXAM:  XR CHEST PA LAT 2V                            PROCEDURE DATE:  2019            INTERPRETATION:  CLINICAL INFORMATION: Chest pain.    EXAM: Frontal and lateral radiograph of the chest.    COMPARISON: Chest radiograph from 2018    FINDINGS:  The heart size is normal. Status post sternotomy.    The lungs are clear. No pneumothorax or pleural effusions.    Vascular stents in the left upper thorax.    IMPRESSION: Clear lungs.        Rapid RVP Result: Detected: The [employer] RVP Rapid uses polymerase chain reaction (PCR) and melt  curve analysis to screen for adenovirus; coronavirus HKU1, NL63, 229E,  OC43; human metapneumovirus (hMPV); human enterovirus/rhinovirus  (Entero/RV); influenza A; influenza A/H1;influenza A/H3; influenza  A/H1-2009; influenza B; parainfluenza viruses 1, 2, 3, 4; respiratory  syncytial virus; Bordetella pertussis; Mycoplasma pneumoniae; and  Chlamydophila pneumoniae. (19 @ 05:23)            SARAH MITCHELL M.D., RADIOLOGY RESIDENT  This document has been electronically signed.  JACK WHITLOCK M.D., ATTENDING RADIOLOGIST  This document has been electronically signed. 2019  8:16AM    < end of copied text >

## 2019-01-07 NOTE — PROGRESS NOTE ADULT - SUBJECTIVE AND OBJECTIVE BOX
Patient is a 58y old  Male who presents with a chief complaint of fever , CP (2019 11:17)      INTERVAL HPI/OVERNIGHT EVENTS:  T(C): 37.6 (19 @ 07:38), Max: 38.7 (19 @ 16:57)  HR: 81 (19 @ 07:38) (75 - 86)  BP: 113/67 (19 @ 07:38) (108/72 - 125/79)  RR: 18 (19 @ 07:38) (18 - 22)  SpO2: 97% (19 @ 07:38) (95% - 100%)  Wt(kg): --  I&O's Summary    2019 07:01  -  2019 07:00  --------------------------------------------------------  IN: 360 mL / OUT: 800 mL / NET: -440 mL        LABS:                        15.2   8.84  )-----------( 98       ( 2019 08:00 )             47.9     07    128<L>  |  95<L>  |  11  ----------------------------<  127<H>  4.0   |  22  |  1.03    Ca    8.7      2019 06:34  Mg     1.5         TPro  7.0  /  Alb  3.7  /  TBili  0.7  /  DBili  x   /  AST  28  /  ALT  23  /  AlkPhos  55  01-07    PT/INR - ( 2019 05:23 )   PT: 13.4 sec;   INR: 1.17 ratio         PTT - ( 2019 05:23 )  PTT:31.2 sec  Urinalysis Basic - ( 2019 09:04 )    Color: Colorless / Appearance: Clear / S.006 / pH: x  Gluc: x / Ketone: Negative  / Bili: Negative / Urobili: Negative   Blood: x / Protein: Negative / Nitrite: Negative   Leuk Esterase: Negative / RBC: x / WBC x   Sq Epi: x / Non Sq Epi: x / Bacteria: x      CAPILLARY BLOOD GLUCOSE            Urinalysis Basic - ( 2019 09:04 )    Color: Colorless / Appearance: Clear / S.006 / pH: x  Gluc: x / Ketone: Negative  / Bili: Negative / Urobili: Negative   Blood: x / Protein: Negative / Nitrite: Negative   Leuk Esterase: Negative / RBC: x / WBC x   Sq Epi: x / Non Sq Epi: x / Bacteria: x        MEDICATIONS  (STANDING):  aspirin enteric coated 81 milliGRAM(s) Oral daily  atorvastatin 10 milliGRAM(s) Oral at bedtime  buDESOnide 160 MICROgram(s)/formoterol 4.5 MICROgram(s) Inhaler 2 Puff(s) Inhalation two times a day  mycophenolate mofetil 500 milliGRAM(s) Oral two times a day  oseltamivir 75 milliGRAM(s) Oral two times a day  pantoprazole    Tablet 40 milliGRAM(s) Oral before breakfast  predniSONE   Tablet 20 milliGRAM(s) Oral every 12 hours  predniSONE   Tablet   Oral   tacrolimus 1.5 milliGRAM(s) Oral two times a day  valGANciclovir 450 milliGRAM(s) Oral daily    MEDICATIONS  (PRN):  acetaminophen   Tablet .. 650 milliGRAM(s) Oral every 6 hours PRN Temp greater or equal to 38C (100.4F), Mild Pain (1 - 3)  ondansetron Injectable 4 milliGRAM(s) IV Push every 6 hours PRN Nausea          PHYSICAL EXAM:  GENERAL: NAD, well-groomed, well-developed  HEAD:  Atraumatic, Normocephalic  CHEST/LUNG: Coarse breath sounds bilaterally  HEART: Regular rate and rhythm; No murmurs, rubs, or gallops  ABDOMEN: Soft, Nontender, Nondistended; Bowel sounds present  EXTREMITIES: edema+    Care Discussed with Consultants/Other Providers [ ] YES  [ ] NO

## 2019-01-07 NOTE — CONSULT NOTE ADULT - PROBLEM SELECTOR RECOMMENDATION 4
secondary to Influenza: Cont tamiflu: HIs chest x-ray is clear: and hi venous PH seems to be normal:

## 2019-01-07 NOTE — CONSULT NOTE ADULT - ASSESSMENT
1. renal transplant---immunocompromised.  He is on valcyte, but is not Rx'd PCP prophylaxis at this time.  I suggest he should be receiving pcp prophylaxis---typically bactrim 1 DS or 1 SS daily---mepron or dapsone if not tolerating bactrim.    2. influenza A--suggest tamiflu 75 mg bid--likely for 5 days.    3. immunosuppression--per transplant.
58 yr old with pmh of  CABG and kidney transplant presenting with 2 days of fever, productive cough, and crushing chest pain(worse with cough). On asa, plavix, proraf, cellcept, bactrim. Satting 91 on RA, 100% with supplemental O2. CP constant, nonexertional. Also notes congestion. No chills, abdominal pain, dysuria.
58 yr old with pmh of CAD s/p CABG/PCI, HTN, ESRD s/p kidney transplant, asthma presenting with 2 days of fever, productive cough, and crushing chest pain(worse with cough) found to have +flu, asthma exac.     1. Chest pain, atypical, resolved  likely secondary to wheeze, asthma exac, cough, +flu  cv stable no chest pain or sob. HST negative x 2, no evidence of acute ischemia/ACS   no evidence of fluid overload on exam , cxr clear   recent echo in may 2018 with normal LV function    can can check echo to evaluate LV function, valvular disease     2. CAD s/p CABG/PCI   cv stable   continue asa,statin     3. Flu , asthma exac, improving   on tamiflu , po steroids, inhaled steroids  cxr clear   ID/ pulm f/u     4. ESRD s/p renal transplant  renal f/u , creat stable     dvt ppx

## 2019-01-07 NOTE — CONSULT NOTE ADULT - ATTENDING COMMENTS
Agree with above NP note.  cv stable  patient presenting with atypical cp in setting of acute + flu, viral illness  no ace  trop negative  cont home cv meds  care per med

## 2019-01-07 NOTE — PROGRESS NOTE ADULT - ASSESSMENT
58 yr old with pmh of  CABG and kidney transplant presenting with 2 days of fever, productive cough, and crushing chest pain(worse with cough). On asa, plavix, proraf, cellcept, bactrim. Satting 91 on RA, 100% with supplemental O2. CP constant, nonexertional. Also notes congestion. No chills, abdominal pain, dysuria.    Problem/Plan - 1:  ·  Problem: Chest pain.  Plan: telemonitor  unlikely cardiac   cards fu  will monitor     Problem/Plan - 2:  ·  Problem: Fever.  Plan: sec to flu  start tamiflu.     Problem/Plan - 3:  ·  Problem: Renal transplant recipient.  Plan: renal fu   tacrolimus levels reviewed  cw immunosuppressants  started bactrim for prophylaxis  will monitor

## 2019-01-07 NOTE — CONSULT NOTE ADULT - PROBLEM SELECTOR RECOMMENDATION 9
seems mild asthma exacerbation secondary to Influenza infection: He is wheezing a little bit: Would bump up steroids to 40 mg a day and then cut it down to 5 mg a day: Cont Tamiflu

## 2019-01-07 NOTE — CONSULT NOTE ADULT - CONSULT REASON
Kidney Txp
chest pain    hx CAD s/p CABG/PCI, HTN, ESRD s/p kidney transplant, asthma
Renal xplant  fever
Fever

## 2019-01-07 NOTE — CONSULT NOTE ADULT - SUBJECTIVE AND OBJECTIVE BOX
ID CONSULTATION--Elías Tam MD  Pager [phone]    Patient is a 58y old  Male who presents with a chief complaint of fever , CP (07 Jan 2019 08:49)    HPI:  58 yr old with pmh of  CABG and kidney transplant presenting with 2 days of fever, productive cough, and crushing chest pain(worse with cough). On asa, plavix, proraf, cellcept, bactrim. Satting 91 on RA, 100% with supplemental O2. CP constant, nonexertional. Also notes congestion. No chills, abdominal pain, dysuria. (06 Jan 2019 13:07)  In [facility] until 4 days ago--developed diarrhea, vague lower abd pain, and then cough, nasal congestion and fever.  Presented to ER and Influenza A +  CXR clear lungs.  On tamiflu now.    PAST MEDICAL & SURGICAL HISTORY:  HTN (hypertension)  Coronary artery disease involving coronary bypass graft  ESRD (end stage renal disease) on dialysis  Obesity  Hemorrhoids  Gastroesophageal reflux disease without esophagitis  High cholesterol  ESRD on Dialysis: Tue, Thur &amp; Sat  CAD (Coronary Artery Disease)  PCK (Polycystic Kidney Disease)  HTN - Hypertension  Asthma: last attack 2007, never hospitalized or intubated  AV fistula: b/l MILI GREEN  S/P coronary artery stent placement  S/P CABG x 5  S/P hemorrhoidectomy: and rectal polypectomy -2/3/2017.  AV fistula: right lower extremity 2 yrs  Left upper extrmity with graft 7 years ago  Stented coronary artery: x2 cardiac stents in 2016, one cardiac stent in 2015  CABG (Coronary Artery Bypass Graft): 2007    SOCIAL: no tobacco    FAMILY HISTORY:  Family history of polycystic kidney (Sibling)  Family history of adult polycystic kidney disease (Sibling)    REVIEW OF SYSTEMS  General: + malaise fatigue and chills. Fevers +  Skin:No rash	  Ophthalmologic:Denies any visual complaints,discharge redness or photophobia	  ENMT:nasal discharge, and,sinus congestion with some throat pain. No dental complaints  Respiratory and Thorax:+ cough, and thick sputum	  Cardiovascular:	No chest pain,palpitaions or dizziness  Genitourinary:	No dysuria,frequency. No flank pain  Psychiatric:No delusions or hallucinations	    Allergies  No Known Allergies    ANTIMICROBIALS:  oseltamivir 75 milliGRAM(s) Oral two times a day  valGANciclovir 450 milliGRAM(s) Oral daily    Vital Signs Last 24 Hrs  T(C): 37.6 (07 Jan 2019 07:38), Max: 38.7 (06 Jan 2019 16:57)  T(F): 99.6 (07 Jan 2019 07:38), Max: 101.6 (06 Jan 2019 16:57)  HR: 81 (07 Jan 2019 07:38) (75 - 86)  BP: 113/67 (07 Jan 2019 07:38) (108/72 - 125/79)  BP(mean): --  RR: 18 (07 Jan 2019 07:38) (18 - 22)  SpO2: 97% (07 Jan 2019 07:38) (95% - 100%)    PHYSICAL EXAM:Pleasant patient in no acute distress.  Constitutional:Comfortable.Awake and alert--sitting in chair and eating  Eyes:PERRL EOMI.NO discharge or conjunctival injection  ENMT:No sinus tenderness.No thrush.No pharyngeal exudate or erythema.Fair dental hygiene  Neck:Supple,No LN,no JVD  Respiratory:Good air entry bilaterally,CTA-- no rales and no wheezing.  No bronchial bs  Cardiovascular:S1 S2 wnl, No murmurs,rub or gallops  Genitourinary:No renal graft tenderness   Extremities:No cyanosis,clubbing or edema.  Neurological:AAO X 3,No grossly focal deficits  Skin:No rash   Lymph Nodes:No palpable LNs  Musculoskeletal:No joint swelling or LOM  Psychiatric:Affect normal.                        15.2   8.84  )-----------( 98       ( 07 Jan 2019 08:00 )             47.9     01-07    128<L>  |  95<L>  |  11  ----------------------------<  127<H>  4.0   |  22  |  1.03    Ca    8.7      07 Jan 2019 06:34  Mg     1.5     01-06    TPro  7.0  /  Alb  3.7  /  TBili  0.7  /  DBili  x   /  AST  28  /  ALT  23  /  AlkPhos  55  01-07      RECENT CULTURES:  01-06 @ 08:32  .Blood Blood-Peripheral  --No growth to date.  --  RADIOLOGY:  CXR with clear lungs    IMPRESSION:  This patient has fevers, cough, is a recipient of renal transplant for PCKD 7 months ago here at [facility].  He has influenza A infection.  I do not see supportive evidence for a secondary bacterial pna now.  He has appropriately rec'd tamiflu

## 2019-01-07 NOTE — CONSULT NOTE ADULT - SUBJECTIVE AND OBJECTIVE BOX
CARDIOLOGY CONSULT - Dr. Pérez     CHIEF COMPLAINT: chest pain     HPI:  58 yr old with pmh of CAD s/p CABG/PCI, HTN, ESRD s/p kidney transplant, asthma presenting with 2 days of fever, productive cough, and crushing chest pain(worse with cough) found to have +flu, asthma exac. Pt. currently resting comfortably, denies cp/sob/johnson at this time. States all of his symptoms have resolved except for productive cough which is improving.  Pt. sees Dr. Chauhan for cards as outpt. Recent echo in May normal LV function.     PAST MEDICAL & SURGICAL HISTORY:  HTN (hypertension)  Coronary artery disease involving coronary bypass graft  ESRD (end stage renal disease) on dialysis  Obesity  Hemorrhoids  Gastroesophageal reflux disease without esophagitis  High cholesterol  ESRD on Dialysis: Tue, Thur &amp; Sat  CAD (Coronary Artery Disease)  PCK (Polycystic Kidney Disease)  HTN - Hypertension  Asthma: last attack , never hospitalized or intubated  AV fistula: b/l RUE, LUE  S/P coronary artery stent placement  S/P CABG x 5  S/P hemorrhoidectomy: and rectal polypectomy -2/3/2017.  AV fistula: right lower extremity 2 yrs  Left upper extrmity with graft 7 years ago  Stented coronary artery: x2 cardiac stents in , one cardiac stent in   CABG (Coronary Artery Bypass Graft):           PREVIOUS DIAGNOSTIC TESTING:    [ ] Echocardiogram: < from: TTE with Doppler (w/Cont) (18 @ 14:29) >  Conclusions:  1. Normal left ventricular internal dimensions and wall  thicknesses.  2. Normal left ventricular systolic function.   Endocardial  visualization enhanced with intravenous injection of echo  contrast (Definity).    < end of copied text >    [ ]  Catheterization: < from: Cardiac Cath Lab - Adult (17 @ 12:24) >  [place] [address] [address] [place], [place] [id number] [phone]  Cath Lab Report -- Comprehensive Report  Patient: FLOYD NEVES  Study date: 2017  Account number: [id number]  MR number: [id number]  : 1960  Gender: Male  Race:   Case Physician(s):  Wily Chauhan M.D.  Fellow:  Johnson Diaz M.D.  Referring Physician:  Wily Tien, M.D.  INDICATIONS: Other chest pain. Abnormal stress test.  HISTORY: History includes ischemic cardiomyopathy. The patient has a  history of coronary artery disease. The patient has hypertension,  dialysis-treated renal failure, and medication-treated dyslipidemia. PRIOR  CARDIOVASCULAR PROCEDURES: Stent of the 2nd obtuse marginal ( 2015).  RESOLUTE stents to proximal circumflex and OM2 ( 2016). Coronary  bypass ().  PRIOR DIAGNOSTIC TEST RESULTS: Nuclear pharmacologic stress test was  positive.  PROCEDURE:  --  Left coronary angiography.  --  Left heart catheterization.  --  Right coronary angiography.  --  Saphenous vein graft angiography.  --  LIMA graft angiography.  TECHNIQUE: Oxygen 2 L/min. The risks and alternatives of the procedures and  conscious sedation were explained to the patient and informed consent was  obtained. Cardiac catheterization performed electively.  Right femoral artery access. Local anesthetic given. The puncture site was  infiltrated with 2 % lidocaine. A 5fr Sheath [place] was inserted in the  vessel. Left coronary artery angiography. The vessel wasinjected  utilizing a 5fr FL 4 Expo catheter. Left heart catheterization. A 5fr FR 4  Expo catheter was utilized. After recording ascending aortic pressure, the  catheter was advanced across the aortic valve and left ventricular  pressure was recorded. Right coronary artery angiography. The vessel was  injected utilizing a 5fr FR 4 Expo catheter. Saphenous vein graft  angiography. The vessel was injected utilizing a 5fr FR 4 Expo catheter.  Left internal mammary graft angiography. The vessel was injected utilizing  a 5fr FR 4 Expo catheter. RADIATION EXPOSURE: 7.1 min.  CONTRAST GIVEN: 32 ml Optiray.  MEDICATIONS GIVEN: 2% Lidocaine, 10 ml, subcutaneously. 0.9% Normal saline,  10 ml, IV.  VENTRICLES: No left ventriculogram was performed.  CORONARY VESSELS: The coronary circulation is right dominant.  LM:   --  Distal left main: There was a discrete 30 % stenosis.  LAD:   --  Proximal LAD: There was a 100 % stenosis. There was DESTINEE grade 0  flow through the vessel (no flow).  --  Distal LAD: Angiography showed minor luminal irregularities with no  flow limiting lesions.  CX:   --  Proximal circumflex: There was a tubular 10 % stenosis at the  site of a prior stent.  --  Mid circumflex: There was a tubular 20 % stenosis at the site of a  prior stent.  RCA:   --  Proximal RCA: The distal vessel was supplied by collaterals from  septal branches of LAD and the first obtuse marginal. There was a 100 %  stenosis. There was DESTINEE grade 0 flow through the vessel (no flow).  GRAFTS:   --  Graft to the mid LAD: The graft was a LIMA. It was normal.  --  Graft to the 1st obtuse marginal: The graft was a saphenous vein graft  from the aorta. Graft angiography showed minor luminal irregularities.  COMPLICATIONS: There were no complications.  DIAGNOSTIC RECOMMENDATIONS: Medical management is recommended. Elevated  LVEDP. Patient with RCA .  Prepared and signed by  Wily Chauhan M.D.    < end of copied text >    [ ] Stress Test:   	< from: Nuclear Stress Test-Pharmacologic (17 @ 12:49) >  IMPRESSIONS:Abnormal Study  * Consistent with infarction with moderate mckay-infarct  ischemia.  * Review of raw data shows: The study is of good technical  quality.  * The left ventricle was enlarged. There are large,  moderate to severe defects in inferior, inferolateral and  lateral walls that are fixed with moderatereversible  mckay-infarct ischemia, consistent with infarction with  moderate mckay-infarct ischemia.  * Post-stress gated wall motion analysis was performed  (LVEF = 46 %;LVEDV = 152 ml.)  *** Compared with Nuclear/Stress test of 2016, no  significant changes noted. Prior LVEF was 30% with   mL    < end of copied text >      MEDICATIONS:  MEDICATIONS  (STANDING):  aspirin enteric coated 81 milliGRAM(s) Oral daily  atorvastatin 10 milliGRAM(s) Oral at bedtime  buDESOnide 160 MICROgram(s)/formoterol 4.5 MICROgram(s) Inhaler 2 Puff(s) Inhalation two times a day  mycophenolate mofetil 500 milliGRAM(s) Oral two times a day  oseltamivir 75 milliGRAM(s) Oral two times a day  pantoprazole    Tablet 40 milliGRAM(s) Oral before breakfast  predniSONE   Tablet 20 milliGRAM(s) Oral every 12 hours  predniSONE   Tablet   Oral   tacrolimus 1.5 milliGRAM(s) Oral two times a day  trimethoprim   80 mG/sulfamethoxazole 400 mG 1 Tablet(s) Oral daily  valGANciclovir 450 milliGRAM(s) Oral daily      FAMILY HISTORY:  Family history of polycystic kidney (Sibling)  Family history of adult polycystic kidney disease (Sibling)      SOCIAL HISTORY:    [x] Non-smoker  [ ] Smoker  [ ] Alcohol    Allergies    No Known Allergies    Intolerances    	    REVIEW OF SYSTEMS:  CONSTITUTIONAL: +fever, weight loss, or fatigue  EYES: No eye pain, visual disturbances, or discharge  ENMT:  No difficulty hearing, tinnitus, vertigo; No sinus or throat pain  NECK: No pain or stiffness  RESPIRATORY: + cough, wheezing, chills or hemoptysis; no Shortness of Breath  CARDIOVASCULAR: + chest pain, palpitations, passing out, dizziness, or leg swelling  GASTROINTESTINAL: No abdominal or epigastric pain. No nausea, vomiting, or hematemesis; No diarrhea or constipation. No melena or hematochezia.  GENITOURINARY: No dysuria, frequency, hematuria, or incontinence  NEUROLOGICAL: No headaches, memory loss, loss of strength, numbness, or tremors  SKIN: No itching, burning, rashes, or lesions   	    [x] All others negative	  [ ] Unable to obtain    PHYSICAL EXAM:  T(C): 37.6 (19 @ 07:38), Max: 38.7 (19 @ 16:57)  HR: 81 (19 @ 07:38) (75 - 86)  BP: 113/67 (19 @ 07:38) (108/72 - 125/79)  RR: 18 (19 @ 07:38) (18 - 22)  SpO2: 97% (19 @ 07:38) (95% - 100%)  Wt(kg): --  I&O's Summary    2019 07:01  -  2019 07:00  --------------------------------------------------------  IN: 360 mL / OUT: 800 mL / NET: -440 mL        Appearance: Normal	  Psychiatry: A & O x 3, Mood & affect appropriate  HEENT:   Normal oral mucosa, PERRL, EOMI	  Lymphatic: No lymphadenopathy  Cardiovascular: Normal S1 S2,RRR, No JVD, No murmurs  Respiratory: slight exp wheeze   Gastrointestinal:  Soft, Non-tender, + BS	  Skin: No rashes, No ecchymoses, No cyanosis	  Neurologic: Non-focal  Extremities: Normal range of motion, No clubbing, cyanosis or edema  Vascular: Peripheral pulses palpable 2+ bilaterally    TELEMETRY: 	    ECG:  	  RADIOLOGY: < from: Xray Chest 2 Views PA/Lat (19 @ 06:20) >    IMPRESSION: Clear lungs.    < end of copied text >    OTHER: 	  	  LABS:	 	    CARDIAC MARKERS:                                  15.2   8.84  )-----------( 98       ( 2019 08:00 )             47.9     01-07    128<L>  |  95<L>  |  11  ----------------------------<  127<H>  4.0   |  22  |  1.03    Ca    8.7      2019 06:34  Mg     1.5     -    TPro  7.0  /  Alb  3.7  /  TBili  0.7  /  DBili  x   /  AST  28  /  ALT  23  /  AlkPhos  55  01-07    PT/INR - ( 2019 05:23 )   PT: 13.4 sec;   INR: 1.17 ratio         PTT - ( 2019 05:23 )  PTT:31.2 sec  proBNP:   Lipid Profile:   HgA1c:   TSH: CARDIOLOGY CONSULT - Dr. Pérez     CHIEF COMPLAINT: chest pain     HPI:  58 yr old with pmh of CAD s/p CABG/PCI, HTN, ESRD s/p kidney transplant, asthma presenting with 2 days of fever, productive cough, and crushing chest pain(worse with cough) found to have +flu, asthma exac. Pt. currently resting comfortably, denies cp/sob/johnson at this time. States all of his symptoms have resolved except for productive cough which is improving.  Pt. sees Dr. Chauhan for cards as outpt. Recent echo in May normal LV function.     PAST MEDICAL & SURGICAL HISTORY:  HTN (hypertension)  Coronary artery disease involving coronary bypass graft  ESRD (end stage renal disease) on dialysis  Obesity  Hemorrhoids  Gastroesophageal reflux disease without esophagitis  High cholesterol  ESRD on Dialysis: Tue, Thur &amp; Sat  CAD (Coronary Artery Disease)  PCK (Polycystic Kidney Disease)  HTN - Hypertension  Asthma: last attack , never hospitalized or intubated  AV fistula: b/l RUE, LUE  S/P coronary artery stent placement  S/P CABG x 5  S/P hemorrhoidectomy: and rectal polypectomy -2/3/2017.  AV fistula: right lower extremity 2 yrs  Left upper extrmity with graft 7 years ago  Stented coronary artery: x2 cardiac stents in , one cardiac stent in   CABG (Coronary Artery Bypass Graft):           PREVIOUS DIAGNOSTIC TESTING:    [ ] Echocardiogram: < from: TTE with Doppler (w/Cont) (18 @ 14:29) >  Conclusions:  1. Normal left ventricular internal dimensions and wall  thicknesses.  2. Normal left ventricular systolic function.   Endocardial  visualization enhanced with intravenous injection of echo  contrast (Definity).    < end of copied text >    [ ]  Catheterization: < from: Cardiac Cath Lab - Adult (17 @ 12:24) >  [place] [address] [place], [place] [id number] [phone]  Cath Lab Report -- Comprehensive Report  Patient: FLOYD NEVES  Study date: 2017  Account number: [id number]  MR number: [id number]  : 1960  Gender: Male  Race:   Case Physician(s):  Wily Chauhan M.D.  Fellow:  Johnson Diaz M.D.  Referring Physician:  Wily Tien, M.D.  INDICATIONS: Other chest pain. Abnormal stress test.  HISTORY: History includes ischemic cardiomyopathy. The patient has a  history of coronary artery disease. The patient has hypertension,  dialysis-treated renal failure, and medication-treated dyslipidemia. PRIOR  CARDIOVASCULAR PROCEDURES: Stent of the 2nd obtuse marginal ( 2015).  RESOLUTE stents to proximal circumflex and OM2 ( 2016). Coronary  bypass ().  PRIOR DIAGNOSTIC TEST RESULTS: Nuclear pharmacologic stress test was  positive.  PROCEDURE:  --  Left coronary angiography.  --  Left heart catheterization.  --  Right coronary angiography.  --  Saphenous vein graft angiography.  --  LIMA graft angiography.  TECHNIQUE: Oxygen 2 L/min. The risks and alternatives of the procedures and  conscious sedation were explained to the patient and informed consent was  obtained. Cardiac catheterization performed electively.  Right femoral artery access. Local anesthetic given. The puncture site was  infiltrated with 2 % lidocaine. A 5fr Sheath [place] was inserted in the  vessel. Left coronary artery angiography. The vessel wasinjected  utilizing a 5fr FL 4 Expo catheter. Left heart catheterization. A 5fr FR 4  Expo catheter was utilized. After recording ascending aortic pressure, the  catheter was advanced across the aortic valve and left ventricular  pressure was recorded. Right coronary artery angiography. The vessel was  injected utilizing a 5fr FR 4 Expo catheter. Saphenous vein graft  angiography. The vessel was injected utilizing a 5fr FR 4 Expo catheter.  Left internal mammary graft angiography. The vessel was injected utilizing  a 5fr FR 4 Expo catheter. RADIATION EXPOSURE: 7.1 min.  CONTRAST GIVEN: 32 ml Optiray.  MEDICATIONS GIVEN: 2% Lidocaine, 10 ml, subcutaneously. 0.9% Normal saline,  10 ml, IV.  VENTRICLES: No left ventriculogram was performed.  CORONARY VESSELS: The coronary circulation is right dominant.  LM:   --  Distal left main: There was a discrete 30 % stenosis.  LAD:   --  Proximal LAD: There was a 100 % stenosis. There was DESTINEE grade 0  flow through the vessel (no flow).  --  Distal LAD: Angiography showed minor luminal irregularities with no  flow limiting lesions.  CX:   --  Proximal circumflex: There was a tubular 10 % stenosis at the  site of a prior stent.  --  Mid circumflex: There was a tubular 20 % stenosis at the site of a  prior stent.  RCA:   --  Proximal RCA: The distal vessel was supplied by collaterals from  septal branches of LAD and the first obtuse marginal. There was a 100 %  stenosis. There was DESTINEE grade 0 flow through the vessel (no flow).  GRAFTS:   --  Graft to the mid LAD: The graft was a LIMA. It was normal.  --  Graft to the 1st obtuse marginal: The graft was a saphenous vein graft  from the aorta. Graft angiography showed minor luminal irregularities.  COMPLICATIONS: There were no complications.  DIAGNOSTIC RECOMMENDATIONS: Medical management is recommended. Elevated  LVEDP. Patient with RCA .  Prepared and signed by  Wily Chauhna M.D.    < end of copied text >    [ ] Stress Test:   	< from: Nuclear Stress Test-Pharmacologic (17 @ 12:49) >  IMPRESSIONS:Abnormal Study  * Consistent with infarction with moderate mckay-infarct  ischemia.  * Review of raw data shows: The study is of good technical  quality.  * The left ventricle was enlarged. There are large,  moderate to severe defects in inferior, inferolateral and  lateral walls that are fixed with moderatereversible  mckay-infarct ischemia, consistent with infarction with  moderate mckay-infarct ischemia.  * Post-stress gated wall motion analysis was performed  (LVEF = 46 %;LVEDV = 152 ml.)  *** Compared with Nuclear/Stress test of 2016, no  significant changes noted. Prior LVEF was 30% with   mL    < end of copied text >      MEDICATIONS:  MEDICATIONS  (STANDING):  aspirin enteric coated 81 milliGRAM(s) Oral daily  atorvastatin 10 milliGRAM(s) Oral at bedtime  buDESOnide 160 MICROgram(s)/formoterol 4.5 MICROgram(s) Inhaler 2 Puff(s) Inhalation two times a day  mycophenolate mofetil 500 milliGRAM(s) Oral two times a day  oseltamivir 75 milliGRAM(s) Oral two times a day  pantoprazole    Tablet 40 milliGRAM(s) Oral before breakfast  predniSONE   Tablet 20 milliGRAM(s) Oral every 12 hours  predniSONE   Tablet   Oral   tacrolimus 1.5 milliGRAM(s) Oral two times a day  trimethoprim   80 mG/sulfamethoxazole 400 mG 1 Tablet(s) Oral daily  valGANciclovir 450 milliGRAM(s) Oral daily      FAMILY HISTORY:  Family history of polycystic kidney (Sibling)  Family history of adult polycystic kidney disease (Sibling)      SOCIAL HISTORY:    [x] Non-smoker  [ ] Smoker  [ ] Alcohol    Allergies    No Known Allergies    Intolerances    	    REVIEW OF SYSTEMS:  CONSTITUTIONAL: +fever, weight loss, or fatigue  EYES: No eye pain, visual disturbances, or discharge  ENMT:  No difficulty hearing, tinnitus, vertigo; No sinus or throat pain  NECK: No pain or stiffness  RESPIRATORY: + cough, wheezing, chills or hemoptysis; no Shortness of Breath  CARDIOVASCULAR: + chest pain, palpitations, passing out, dizziness, or leg swelling  GASTROINTESTINAL: No abdominal or epigastric pain. No nausea, vomiting, or hematemesis; No diarrhea or constipation. No melena or hematochezia.  GENITOURINARY: No dysuria, frequency, hematuria, or incontinence  NEUROLOGICAL: No headaches, memory loss, loss of strength, numbness, or tremors  SKIN: No itching, burning, rashes, or lesions   	    [x] All others negative	  [ ] Unable to obtain    PHYSICAL EXAM:  T(C): 37.6 (19 @ 07:38), Max: 38.7 (19 @ 16:57)  HR: 81 (19 @ 07:38) (75 - 86)  BP: 113/67 (19 @ 07:38) (108/72 - 125/79)  RR: 18 (19 @ 07:38) (18 - 22)  SpO2: 97% (19 @ 07:38) (95% - 100%)  Wt(kg): --  I&O's Summary    2019 07:01  -  2019 07:00  --------------------------------------------------------  IN: 360 mL / OUT: 800 mL / NET: -440 mL        Appearance: Normal	  Psychiatry: A & O x 3, Mood & affect appropriate  HEENT:   Normal oral mucosa, PERRL, EOMI	  Lymphatic: No lymphadenopathy  Cardiovascular: Normal S1 S2,RRR, No JVD, No murmurs  Respiratory: slight exp wheeze   Gastrointestinal:  Soft, Non-tender, + BS	  Skin: No rashes, No ecchymoses, No cyanosis	  Neurologic: Non-focal  Extremities: Normal range of motion, No clubbing, cyanosis or edema  Vascular: Peripheral pulses palpable 2+ bilaterally    TELEMETRY: 	    ECG:  	NSR, RBBB   RADIOLOGY: < from: Xray Chest 2 Views PA/Lat (19 @ 06:20) >    IMPRESSION: Clear lungs.    < end of copied text >    OTHER: 	  	  LABS:	 	    CARDIAC MARKERS:                                  15.2   8.84  )-----------( 98       ( 2019 08:00 )             47.9     01-07    128<L>  |  95<L>  |  11  ----------------------------<  127<H>  4.0   |  22  |  1.03    Ca    8.7      2019 06:34  Mg     1.5     -    TPro  7.0  /  Alb  3.7  /  TBili  0.7  /  DBili  x   /  AST  28  /  ALT  23  /  AlkPhos  55  01-07    PT/INR - ( 2019 05:23 )   PT: 13.4 sec;   INR: 1.17 ratio         PTT - ( 2019 05:23 )  PTT:31.2 sec  proBNP:   Lipid Profile:   HgA1c:   TSH:

## 2019-01-08 ENCOUNTER — TRANSCRIPTION ENCOUNTER (OUTPATIENT)
Age: 59
End: 2019-01-08

## 2019-01-08 VITALS
HEART RATE: 72 BPM | SYSTOLIC BLOOD PRESSURE: 112 MMHG | RESPIRATION RATE: 18 BRPM | OXYGEN SATURATION: 96 % | DIASTOLIC BLOOD PRESSURE: 71 MMHG | TEMPERATURE: 98 F

## 2019-01-08 DIAGNOSIS — R50.81 FEVER PRESENTING WITH CONDITIONS CLASSIFIED ELSEWHERE: ICD-10-CM

## 2019-01-08 LAB
ANION GAP SERPL CALC-SCNC: 11 MMOL/L — SIGNIFICANT CHANGE UP (ref 5–17)
BUN SERPL-MCNC: 14 MG/DL — SIGNIFICANT CHANGE UP (ref 7–23)
CALCIUM SERPL-MCNC: 9 MG/DL — SIGNIFICANT CHANGE UP (ref 8.4–10.5)
CHLORIDE SERPL-SCNC: 100 MMOL/L — SIGNIFICANT CHANGE UP (ref 96–108)
CO2 SERPL-SCNC: 21 MMOL/L — LOW (ref 22–31)
CREAT SERPL-MCNC: 0.86 MG/DL — SIGNIFICANT CHANGE UP (ref 0.5–1.3)
GLUCOSE SERPL-MCNC: 226 MG/DL — HIGH (ref 70–99)
OSMOLALITY SERPL: 290 MOS/KG — SIGNIFICANT CHANGE UP (ref 275–300)
POTASSIUM SERPL-MCNC: 4.6 MMOL/L — SIGNIFICANT CHANGE UP (ref 3.5–5.3)
POTASSIUM SERPL-SCNC: 4.6 MMOL/L — SIGNIFICANT CHANGE UP (ref 3.5–5.3)
SODIUM SERPL-SCNC: 132 MMOL/L — LOW (ref 135–145)
TSH SERPL-MCNC: 0.69 UIU/ML — SIGNIFICANT CHANGE UP (ref 0.27–4.2)
URATE SERPL-MCNC: 4.1 MG/DL — SIGNIFICANT CHANGE UP (ref 3.4–8.8)

## 2019-01-08 PROCEDURE — 84484 ASSAY OF TROPONIN QUANT: CPT

## 2019-01-08 PROCEDURE — 99233 SBSQ HOSP IP/OBS HIGH 50: CPT

## 2019-01-08 PROCEDURE — 83935 ASSAY OF URINE OSMOLALITY: CPT

## 2019-01-08 PROCEDURE — 83930 ASSAY OF BLOOD OSMOLALITY: CPT

## 2019-01-08 PROCEDURE — 82330 ASSAY OF CALCIUM: CPT

## 2019-01-08 PROCEDURE — 83605 ASSAY OF LACTIC ACID: CPT

## 2019-01-08 PROCEDURE — 71046 X-RAY EXAM CHEST 2 VIEWS: CPT

## 2019-01-08 PROCEDURE — 84300 ASSAY OF URINE SODIUM: CPT

## 2019-01-08 PROCEDURE — 82947 ASSAY GLUCOSE BLOOD QUANT: CPT

## 2019-01-08 PROCEDURE — 82435 ASSAY OF BLOOD CHLORIDE: CPT

## 2019-01-08 PROCEDURE — 82436 ASSAY OF URINE CHLORIDE: CPT

## 2019-01-08 PROCEDURE — 84132 ASSAY OF SERUM POTASSIUM: CPT

## 2019-01-08 PROCEDURE — 87040 BLOOD CULTURE FOR BACTERIA: CPT

## 2019-01-08 PROCEDURE — 87581 M.PNEUMON DNA AMP PROBE: CPT

## 2019-01-08 PROCEDURE — 83690 ASSAY OF LIPASE: CPT

## 2019-01-08 PROCEDURE — 99285 EMERGENCY DEPT VISIT HI MDM: CPT | Mod: 25

## 2019-01-08 PROCEDURE — 85730 THROMBOPLASTIN TIME PARTIAL: CPT

## 2019-01-08 PROCEDURE — 87324 CLOSTRIDIUM AG IA: CPT

## 2019-01-08 PROCEDURE — 85014 HEMATOCRIT: CPT

## 2019-01-08 PROCEDURE — 85610 PROTHROMBIN TIME: CPT

## 2019-01-08 PROCEDURE — 93005 ELECTROCARDIOGRAM TRACING: CPT

## 2019-01-08 PROCEDURE — 83735 ASSAY OF MAGNESIUM: CPT

## 2019-01-08 PROCEDURE — 80197 ASSAY OF TACROLIMUS: CPT

## 2019-01-08 PROCEDURE — 94640 AIRWAY INHALATION TREATMENT: CPT

## 2019-01-08 PROCEDURE — 84550 ASSAY OF BLOOD/URIC ACID: CPT

## 2019-01-08 PROCEDURE — 81003 URINALYSIS AUTO W/O SCOPE: CPT

## 2019-01-08 PROCEDURE — 87633 RESP VIRUS 12-25 TARGETS: CPT

## 2019-01-08 PROCEDURE — 84295 ASSAY OF SERUM SODIUM: CPT

## 2019-01-08 PROCEDURE — 80053 COMPREHEN METABOLIC PANEL: CPT

## 2019-01-08 PROCEDURE — 87449 NOS EACH ORGANISM AG IA: CPT

## 2019-01-08 PROCEDURE — 82803 BLOOD GASES ANY COMBINATION: CPT

## 2019-01-08 PROCEDURE — 85027 COMPLETE CBC AUTOMATED: CPT

## 2019-01-08 PROCEDURE — 82962 GLUCOSE BLOOD TEST: CPT

## 2019-01-08 PROCEDURE — 84443 ASSAY THYROID STIM HORMONE: CPT

## 2019-01-08 PROCEDURE — 87486 CHLMYD PNEUM DNA AMP PROBE: CPT

## 2019-01-08 PROCEDURE — 87086 URINE CULTURE/COLONY COUNT: CPT

## 2019-01-08 PROCEDURE — 84133 ASSAY OF URINE POTASSIUM: CPT

## 2019-01-08 PROCEDURE — 87798 DETECT AGENT NOS DNA AMP: CPT

## 2019-01-08 PROCEDURE — 80048 BASIC METABOLIC PNL TOTAL CA: CPT

## 2019-01-08 RX ORDER — BUDESONIDE AND FORMOTEROL FUMARATE DIHYDRATE 160; 4.5 UG/1; UG/1
1 AEROSOL RESPIRATORY (INHALATION)
Qty: 1 | Refills: 0
Start: 2019-01-08 | End: 2019-02-06

## 2019-01-08 RX ORDER — SIMETHICONE 80 MG/1
80 TABLET, CHEWABLE ORAL THREE TIMES A DAY
Qty: 0 | Refills: 0 | Status: DISCONTINUED | OUTPATIENT
Start: 2019-01-08 | End: 2019-01-08

## 2019-01-08 RX ORDER — SIMETHICONE 80 MG/1
1 TABLET, CHEWABLE ORAL
Qty: 15 | Refills: 0
Start: 2019-01-08 | End: 2019-01-12

## 2019-01-08 RX ADMIN — SIMETHICONE 80 MILLIGRAM(S): 80 TABLET, CHEWABLE ORAL at 11:57

## 2019-01-08 RX ADMIN — Medication 650 MILLIGRAM(S): at 09:23

## 2019-01-08 RX ADMIN — VALGANCICLOVIR 450 MILLIGRAM(S): 450 TABLET, FILM COATED ORAL at 09:23

## 2019-01-08 RX ADMIN — Medication 20 MILLIGRAM(S): at 18:06

## 2019-01-08 RX ADMIN — TACROLIMUS 1.5 MILLIGRAM(S): 5 CAPSULE ORAL at 18:05

## 2019-01-08 RX ADMIN — Medication 75 MILLIGRAM(S): at 18:06

## 2019-01-08 RX ADMIN — Medication 20 MILLIGRAM(S): at 07:04

## 2019-01-08 RX ADMIN — Medication 75 MILLIGRAM(S): at 07:04

## 2019-01-08 RX ADMIN — Medication 1 TABLET(S): at 11:57

## 2019-01-08 RX ADMIN — Medication 650 MILLIGRAM(S): at 10:29

## 2019-01-08 RX ADMIN — PANTOPRAZOLE SODIUM 40 MILLIGRAM(S): 20 TABLET, DELAYED RELEASE ORAL at 07:04

## 2019-01-08 RX ADMIN — BUDESONIDE AND FORMOTEROL FUMARATE DIHYDRATE 2 PUFF(S): 160; 4.5 AEROSOL RESPIRATORY (INHALATION) at 18:06

## 2019-01-08 RX ADMIN — Medication 81 MILLIGRAM(S): at 09:23

## 2019-01-08 RX ADMIN — MYCOPHENOLATE MOFETIL 500 MILLIGRAM(S): 250 CAPSULE ORAL at 07:03

## 2019-01-08 RX ADMIN — BUDESONIDE AND FORMOTEROL FUMARATE DIHYDRATE 2 PUFF(S): 160; 4.5 AEROSOL RESPIRATORY (INHALATION) at 07:05

## 2019-01-08 RX ADMIN — TACROLIMUS 1.5 MILLIGRAM(S): 5 CAPSULE ORAL at 07:05

## 2019-01-08 NOTE — DISCHARGE NOTE ADULT - PLAN OF CARE
Resolving. Continue present treatment with Tamiflu.   Encourage fluids. Stable. Continue present medication regimen.   Follow up with PMD in 1 week.

## 2019-01-08 NOTE — DISCHARGE NOTE ADULT - CARE PROVIDER_API CALL
Zeferino Arguello), Internal Medicine  [address] [place] [address]  Phone: [phone]  Fax: [phone]

## 2019-01-08 NOTE — CHART NOTE - NSCHARTNOTEFT_GEN_A_CORE
Called the [facility] and spoke with Gaviota.  Asked Gaviota if a CMP could be done with remaining blood from patient's blood draw that was done 9/21/17.  The CMP was actually originally ordered to be done along with the other blood draws, but somehow this got missed.  Per Gaviota, there is enough blood left, and so she is able to complete the CMP.     Patient is a 58y old  Male who presents with a chief complaint of fever , CP (08 Jan 2019 12:37)  Pt seen by ID and Attending.  Pt to be discharged to home today.  NO complaints at present.      Vital Signs Last 24 Hrs  T(C): 36.6 (08 Jan 2019 12:14), Max: 36.7 (07 Jan 2019 21:28)  T(F): 97.8 (08 Jan 2019 12:14), Max: 98.1 (07 Jan 2019 21:28)  HR: 72 (08 Jan 2019 12:14) (72 - 78)  BP: 112/71 (08 Jan 2019 12:14) (105/66 - 123/70)  BP(mean): --  RR: 18 (08 Jan 2019 12:14) (16 - 18)  SpO2: 96% (08 Jan 2019 12:14) (95% - 97%)      Labs:                          15.2   8.84  )-----------( 98       ( 07 Jan 2019 08:00 )             47.9     01-08    132<L>  |  100  |  14  ----------------------------<  226<H>  4.6   |  21<L>  |  0.86    Ca    9.0      08 Jan 2019 12:06    TPro  7.0  /  Alb  3.7  /  TBili  0.7  /  DBili  x   /  AST  28  /  ALT  23  /  AlkPhos  55  01-07      Physical Exam:  General: 59 y/o Male WN/WD NAD  Neurology: A&Ox3, nonfocal, GALINDO x 4  Head:  Normocephalic, atraumatic  Respiratory: CTA B/L  CV: RRR, S1S2, no murmur  Abdominal: Soft, NT, ND no palpable mass  MSK: No edema, + peripheral pulses, FROM all 4 extremity    Impression:  Influenza A  Renal Transplant Recipient    Plan:  Continue Tamiflu x 3 days to complete 5 day course.  Continue Prednisone 20 mg x 3 days 2/2 wheezing then on 1/12/19 return to taking Prednisone 5 mg daily.   Pt to follow up with PMD, Dr. Daly in 1 week.   Pt is hemodynamically stable for discharge to home today.  Pt verbalize understanding of discharge plan and medication reconciliation.      Alka Márquez DNP, ANP-BC  Spectralink #[id number]

## 2019-01-08 NOTE — PROGRESS NOTE ADULT - SUBJECTIVE AND OBJECTIVE BOX
INFECTIOUS DISEASES FOLLOW UP--Elías Tam MD  Pager [phone]    This is a follow up note for this  58y Male with  renal xplant  influenza A  much improved.  'i feel much better'.  less cough.... + night sweats    Further ROS:  CONSTITUTIONAL:  No fever, good appetite  CARDIOVASCULAR:  No chest pain or palpitations  RESPIRATORY:  less cough and not sob  GASTROINTESTINAL:  No nausea, vomiting, diarrhea, or abdominal pain  GENITOURINARY:  No dysuria    Allergies  No Known Allergies    ANTIBIOTICS/RELEVANT:  antimicrobials  oseltamivir 75 milliGRAM(s) Oral two times a day  trimethoprim   80 mG/sulfamethoxazole 400 mG 1 Tablet(s) Oral daily  valGANciclovir 450 milliGRAM(s) Oral daily    immunologic:  mycophenolate mofetil 500 milliGRAM(s) Oral two times a day  tacrolimus 1.5 milliGRAM(s) Oral two times a day    OTHER:  acetaminophen   Tablet .. 650 milliGRAM(s) Oral every 6 hours PRN  aspirin enteric coated 81 milliGRAM(s) Oral daily  atorvastatin 10 milliGRAM(s) Oral at bedtime  buDESOnide 160 MICROgram(s)/formoterol 4.5 MICROgram(s) Inhaler 2 Puff(s) Inhalation two times a day  ondansetron Injectable 4 milliGRAM(s) IV Push every 6 hours PRN  pantoprazole    Tablet 40 milliGRAM(s) Oral before breakfast  predniSONE   Tablet 20 milliGRAM(s) Oral every 12 hours  predniSONE   Tablet   Oral     Objective:  Vital Signs Last 24 Hrs  T(C): 36.4 (2019 04:09), Max: 36.9 (2019 14:52)  T(F): 97.5 (2019 04:09), Max: 98.4 (2019 14:52)  HR: 76 (2019 04:09) (74 - 104)  BP: 105/66 (2019 04:09) (105/66 - 144/88)  BP(mean): --  RR: 17 (2019 04:09) (16 - 18)  SpO2: 95% (2019 04:09) (95% - 97%)    PHYSICAL EXAM:  Constitutional:no acute distress  Eyes:JESUS, EOMI  Ear/Nose/Throat: no oral lesions, 	  Respiratory: clear BL with no wheeze  Cardiovascular: S1S2  Gastrointestinal:soft, (+) BS, no tenderness  Extremities:no e/e/c  No Lymphadenopathy  IV sites not inflammed.    LABS:                        15.2   8.84  )-----------( 98       ( 2019 08:00 )             47.9     01-07    128<L>  |  95<L>  |  11  ----------------------------<  127<H>  4.0   |  22  |  1.03    Ca    8.7      2019 06:34    TPro  7.0  /  Alb  3.7  /  TBili  0.7  /  DBili  x   /  AST  28  /  ALT  23  /  AlkPhos  55  -07      Urinalysis Basic - ( 2019 09:04 )    Color: Colorless / Appearance: Clear / S.006 / pH: x  Gluc: x / Ketone: Negative  / Bili: Negative / Urobili: Negative   Blood: x / Protein: Negative / Nitrite: Negative   Leuk Esterase: Negative / RBC: x / WBC x   Sq Epi: x / Non Sq Epi: x / Bacteria: x    MICROBIOLOGY:  BC neg    RADIOLOGY & ADDITIONAL STUDIES: no new data

## 2019-01-08 NOTE — PROGRESS NOTE ADULT - SUBJECTIVE AND OBJECTIVE BOX
Patient is a 58y old  Male who presents with a chief complaint of fever , CP (08 Jan 2019 09:33)      Any change in ROS: Doing much better:     MEDICATIONS  (STANDING):  aspirin enteric coated 81 milliGRAM(s) Oral daily  atorvastatin 10 milliGRAM(s) Oral at bedtime  buDESOnide 160 MICROgram(s)/formoterol 4.5 MICROgram(s) Inhaler 2 Puff(s) Inhalation two times a day  mycophenolate mofetil 500 milliGRAM(s) Oral two times a day  oseltamivir 75 milliGRAM(s) Oral two times a day  pantoprazole    Tablet 40 milliGRAM(s) Oral before breakfast  predniSONE   Tablet 20 milliGRAM(s) Oral every 12 hours  predniSONE   Tablet   Oral   tacrolimus 1.5 milliGRAM(s) Oral two times a day  trimethoprim   80 mG/sulfamethoxazole 400 mG 1 Tablet(s) Oral daily  valGANciclovir 450 milliGRAM(s) Oral daily    MEDICATIONS  (PRN):  acetaminophen   Tablet .. 650 milliGRAM(s) Oral every 6 hours PRN Temp greater or equal to 38C (100.4F), Mild Pain (1 - 3)  ondansetron Injectable 4 milliGRAM(s) IV Push every 6 hours PRN Nausea    Vital Signs Last 24 Hrs  T(C): 36.4 (08 Jan 2019 04:09), Max: 36.9 (07 Jan 2019 14:52)  T(F): 97.5 (08 Jan 2019 04:09), Max: 98.4 (07 Jan 2019 14:52)  HR: 76 (08 Jan 2019 04:09) (74 - 104)  BP: 105/66 (08 Jan 2019 04:09) (105/66 - 144/88)  BP(mean): --  RR: 17 (08 Jan 2019 04:09) (16 - 18)  SpO2: 95% (08 Jan 2019 04:09) (95% - 97%)    I&O's Summary    07 Jan 2019 07:01  -  08 Jan 2019 07:00  --------------------------------------------------------  IN: 240 mL / OUT: 0 mL / NET: 240 mL          Physical Exam:   GENERAL: NAD, well-groomed, well-developed  HEENT: JESUS/   Atraumatic, Normocephalic  ENMT: No tonsillar erythema, exudates, or enlargement; Moist mucous membranes, Good dentition, No lesions  NECK: Supple, No JVD, Normal thyroid  CHEST/LUNG: Mild wheezing   CVS: Regular rate and rhythm; No murmurs, rubs, or gallops  GI: : Soft, Nontender, Nondistended; Bowel sounds present  NERVOUS SYSTEM:  Alert & Oriented X3  EXTREMITIES:  2+ Peripheral Pulses, No clubbing, cyanosis, or edema  LYMPH: No lymphadenopathy noted  SKIN: No rashes or lesions  ENDOCRINOLOGY: No Thyromegaly  PSYCH: Appropriate    Labs:  25                            15.2   8.84  )-----------( 98       ( 07 Jan 2019 08:00 )             47.9                         16.0   6.1   )-----------( 94       ( 06 Jan 2019 05:23 )             49.8     01-07    128<L>  |  95<L>  |  11  ----------------------------<  127<H>  4.0   |  22  |  1.03  01-06    131<L>  |  98  |  10  ----------------------------<  155<H>  3.8   |  21<L>  |  0.94    Ca    8.7      07 Jan 2019 06:34    TPro  7.0  /  Alb  3.7  /  TBili  0.7  /  DBili  x   /  AST  28  /  ALT  23  /  AlkPhos  55  01-07  TPro  6.7  /  Alb  3.8  /  TBili  0.6  /  DBili  x   /  AST  25  /  ALT  21  /  AlkPhos  54  01-06    CAPILLARY BLOOD GLUCOSE          LIVER FUNCTIONS - ( 07 Jan 2019 06:34 )  Alb: 3.7 g/dL / Pro: 7.0 g/dL / ALK PHOS: 55 U/L / ALT: 23 U/L / AST: 28 U/L / GGT: x                     RECENT CULTURES:  01-06 @ 12:07 .Urine Clean Catch (Midstream)         < from: Xray Chest 2 Views PA/Lat (01.06.19 @ 06:20) >    EXAM:  XR CHEST PA LAT 2V                            PROCEDURE DATE:  01/06/2019            INTERPRETATION:  CLINICAL INFORMATION: Chest pain.    EXAM: Frontal and lateral radiograph of the chest.    COMPARISON: Chest radiograph from 6/20/2018    FINDINGS:  The heart size is normal. Status post sternotomy.    The lungs are clear. No pneumothorax or pleural effusions.    Vascular stents in the left upper thorax.    IMPRESSION: Clear lungs.                SARAH MITCHELL M.D., RADIOLOGY RESIDENT  This document has been electronically signed.  JACK WHITLOCK M.D., ATTENDING RADIOLOGIST  This document has been electronically signed. Jan 6 2019  8:16AM    < end of copied text >         No growth    01-06 @ 08:32 .Blood Blood-Peripheral                No growth to date.          RESPIRATORY CULTURES:      Clostridium difficile GD Toxins A&amp;B, EIA:   Negative (01-06 @ 10:01)      Studies  Chest X-RAY  CT SCAN Chest   Venous Dopplers: LE:   CT Abdomen  Others

## 2019-01-08 NOTE — PROGRESS NOTE ADULT - SUBJECTIVE AND OBJECTIVE BOX
CARDIOLOGY FOLLOW UP - Dr. Pérez    CC no cp/sob   feeling better   c/o abd pain / indigestion       PHYSICAL EXAM:  T(C): 36.4 (01-08-19 @ 04:09), Max: 36.9 (01-07-19 @ 14:52)  HR: 76 (01-08-19 @ 04:09) (74 - 104)  BP: 105/66 (01-08-19 @ 04:09) (105/66 - 144/88)  RR: 17 (01-08-19 @ 04:09) (16 - 18)  SpO2: 95% (01-08-19 @ 04:09) (95% - 97%)  Wt(kg): --  I&O's Summary    07 Jan 2019 07:01  -  08 Jan 2019 07:00  --------------------------------------------------------  IN: 240 mL / OUT: 0 mL / NET: 240 mL        Appearance: Normal	  Cardiovascular: Normal S1 S2,RRR, No JVD, No murmurs  Respiratory: Lungs clear to auscultation	  Gastrointestinal:  Soft, Non-tender, + BS	  Extremities: Normal range of motion, No clubbing, trace b/l le edema         MEDICATIONS  (STANDING):  aspirin enteric coated 81 milliGRAM(s) Oral daily  atorvastatin 10 milliGRAM(s) Oral at bedtime  buDESOnide 160 MICROgram(s)/formoterol 4.5 MICROgram(s) Inhaler 2 Puff(s) Inhalation two times a day  mycophenolate mofetil 500 milliGRAM(s) Oral two times a day  oseltamivir 75 milliGRAM(s) Oral two times a day  pantoprazole    Tablet 40 milliGRAM(s) Oral before breakfast  predniSONE   Tablet 20 milliGRAM(s) Oral every 12 hours  predniSONE   Tablet   Oral   tacrolimus 1.5 milliGRAM(s) Oral two times a day  trimethoprim   80 mG/sulfamethoxazole 400 mG 1 Tablet(s) Oral daily  valGANciclovir 450 milliGRAM(s) Oral daily      TELEMETRY: 	    ECG:  	  RADIOLOGY:   DIAGNOSTIC TESTING:  [ ] Echocardiogram:  [ ]  Catheterization:  [ ] Stress Test:    OTHER: 	    LABS:	 	                      15.2   8.84  )-----------( 98       ( 07 Jan 2019 08:00 )             47.9     01-07    128<L>  |  95<L>  |  11  ----------------------------<  127<H>  4.0   |  22  |  1.03    Ca    8.7      07 Jan 2019 06:34    TPro  7.0  /  Alb  3.7  /  TBili  0.7  /  DBili  x   /  AST  28  /  ALT  23  /  AlkPhos  55  01-07

## 2019-01-08 NOTE — DISCHARGE NOTE ADULT - MEDICATION SUMMARY - MEDICATIONS TO TAKE
I will START or STAY ON the medications listed below when I get home from the hospital:    metoprolol 25 mg oral tablet  -- 1 tab(s) by mouth once a day  -- Indication: For CAD    magnesium  -- Indication: For Supplement    predniSONE 5 mg oral tablet  -- 1 tab(s) by mouth once a day  Resume on 1/12/19.  -- Indication: For Renal transplant recipient    predniSONE 20 mg oral tablet  -- 1 tab(s) by mouth once a day MDD:1  -- It is very important that you take or use this exactly as directed.  Do not skip doses or discontinue unless directed by your doctor.  Obtain medical advice before taking any non-prescription drugs as some may affect the action of this medication.  Take with food or milk.    -- Indication: For Influenza A    aspirin 81 mg oral tablet  -- 1 tab(s) by mouth once a day  -- Indication: For CAD    acetaminophen 325 mg oral tablet  -- 2 tab(s) by mouth , As Needed  -- Indication: For Pain     atorvastatin 10 mg oral tablet  -- 1 tab(s) by mouth once a day  -- Indication: For CAD    Plavix 75 mg oral tablet  -- 1 tab(s) by mouth once a day  -- Indication: For s/p Cardiac Stent Placement    oseltamivir 75 mg oral capsule  -- 1 cap(s) by mouth 2 times a day  -- Indication: For Influenza A    Epclusa 400 mg-100 mg oral tablet  -- 1 tab(s) by mouth once a day  -- Indication: For Prophylactic    Valcyte 450 mg oral tablet  -- 1 tab(s) by mouth once a day  -- Indication: For Prophylactic Renal Transplant REcipent    budesonide-formoterol 160 mcg-4.5 mcg/inh inhalation aerosol  -- 1 puff(s) inhaled 2 times a day   -- Indication: For Asthma    Pepcid 20 mg oral tablet  -- 1 tab(s) by mouth once a day  -- Indication: For GERDS    tacrolimus 1 mg oral capsule  -- 1 cap(s) by mouth 2 times a day  -- Indication: For Renal transplant recipient    tacrolimus 0.5 mg oral capsule  -- 1 cap(s) by mouth 2 times a day  -- Indication: For Renal transplant recipient    CellCept 500 mg oral tablet  -- 2 tab(s) by mouth 2 times a day  -- Indication: For Renal transplant recipient    Bactrim 400 mg-80 mg oral tablet  -- 1 tab(s) by mouth once a day  -- Indication: For Renal transplant recipient    Vitamin D2 50,000 intl units (1.25 mg) oral capsule  -- 1 cap(s) by mouth once a week  -- Indication: For Supplement I will START or STAY ON the medications listed below when I get home from the hospital:    metoprolol 25 mg oral tablet  -- 1 tab(s) by mouth once a day  -- Indication: For CAD    magnesium  -- Indication: For Supplement    predniSONE 5 mg oral tablet  -- 1 tab(s) by mouth once a day  Resume on 1/12/19.  -- Indication: For Renal transplant recipient    predniSONE 20 mg oral tablet  -- 1 tab(s) by mouth once a day MDD:1  -- It is very important that you take or use this exactly as directed.  Do not skip doses or discontinue unless directed by your doctor.  Obtain medical advice before taking any non-prescription drugs as some may affect the action of this medication.  Take with food or milk.    -- Indication: For Influenza A    aspirin 81 mg oral tablet  -- 1 tab(s) by mouth once a day  -- Indication: For CAD    acetaminophen 325 mg oral tablet  -- 2 tab(s) by mouth , As Needed  -- Indication: For Pain     atorvastatin 10 mg oral tablet  -- 1 tab(s) by mouth once a day  -- Indication: For CAD    Plavix 75 mg oral tablet  -- 1 tab(s) by mouth once a day  -- Indication: For s/p Cardiac Stent Placement    oseltamivir 75 mg oral capsule  -- 1 cap(s) by mouth 2 times a day  -- Indication: For Influenza A    Epclusa 400 mg-100 mg oral tablet  -- 1 tab(s) by mouth once a day  -- Indication: For Prophylactic    Valcyte 450 mg oral tablet  -- 1 tab(s) by mouth once a day  -- Indication: For Prophylactic Renal Transplant REcipent    budesonide-formoterol 160 mcg-4.5 mcg/inh inhalation aerosol  -- 1 puff(s) inhaled 2 times a day   -- Indication: For Asthma    Pepcid 20 mg oral tablet  -- 1 tab(s) by mouth once a day  -- Indication: For GERDS    tacrolimus 1 mg oral capsule  -- 1 cap(s) by mouth 2 times a day  -- Indication: For Renal transplant recipient    tacrolimus 0.5 mg oral capsule  -- 1 cap(s) by mouth 2 times a day  -- Indication: For Renal transplant recipient    CellCept 500 mg oral tablet  -- 2 tab(s) by mouth 2 times a day  -- Indication: For Renal transplant recipient    simethicone 80 mg oral tablet  -- 1 tab(s) by mouth 3 times a day (after meals), As Needed   -- Indication: For Gas    Bactrim 400 mg-80 mg oral tablet  -- 1 tab(s) by mouth once a day  -- Indication: For Renal transplant recipient    Vitamin D2 50,000 intl units (1.25 mg) oral capsule  -- 1 cap(s) by mouth once a week  -- Indication: For Supplement

## 2019-01-08 NOTE — PROGRESS NOTE ADULT - ASSESSMENT
58 yr old with pmh of CAD s/p CABG/PCI, HTN, ESRD s/p kidney transplant, asthma presenting with 2 days of fever, productive cough, and crushing chest pain(worse with cough) found to have +flu, asthma exac.     1. Chest pain, atypical, resolved  likely secondary to wheeze, asthma exac, cough, +flu  cv stable no chest pain or sob. HST negative x 2, no evidence of acute ischemia/ACS   no evidence of fluid overload on exam , cxr clear   recent echo in may 2018 with normal LV function    can can check echo to evaluate LV function, valvular disease     2. CAD s/p CABG/PCI   cv stable   continue asa,statin     3. Flu , asthma exac, improving   on tamiflu , po steroids, inhaled steroids  cxr clear   ID/ pulm f/u     4. ESRD s/p renal transplant  renal f/u , creat stable     dvt ppx

## 2019-01-08 NOTE — DISCHARGE NOTE ADULT - CARE PLAN
Principal Discharge DX:	Influenza A  Goal:	Resolving.  Assessment and plan of treatment:	Continue present treatment with Tamiflu.   Encourage fluids.  Secondary Diagnosis:	Renal transplant recipient  Goal:	Stable.  Assessment and plan of treatment:	Continue present medication regimen.   Follow up with PMD in 1 week.

## 2019-01-08 NOTE — PROGRESS NOTE ADULT - SUBJECTIVE AND OBJECTIVE BOX
NEPHROLOGY-[facility] [phone]        Patient seen and examined in bed.  He felt well and offered no complaints        MEDICATIONS  (STANDING):  aspirin enteric coated 81 milliGRAM(s) Oral daily  atorvastatin 10 milliGRAM(s) Oral at bedtime  buDESOnide 160 MICROgram(s)/formoterol 4.5 MICROgram(s) Inhaler 2 Puff(s) Inhalation two times a day  mycophenolate mofetil 500 milliGRAM(s) Oral two times a day  oseltamivir 75 milliGRAM(s) Oral two times a day  pantoprazole    Tablet 40 milliGRAM(s) Oral before breakfast  predniSONE   Tablet 20 milliGRAM(s) Oral every 12 hours  predniSONE   Tablet   Oral   tacrolimus 1.5 milliGRAM(s) Oral two times a day  trimethoprim   80 mG/sulfamethoxazole 400 mG 1 Tablet(s) Oral daily  valGANciclovir 450 milliGRAM(s) Oral daily      VITAL:  T(C): , Max: 36.9 (01-07-19 @ 14:52)  T(F): , Max: 98.4 (01-07-19 @ 14:52)  HR: 72 (01-08-19 @ 12:14)  BP: 112/71 (01-08-19 @ 12:14)  BP(mean): --  RR: 18 (01-08-19 @ 12:14)  SpO2: 96% (01-08-19 @ 12:14)  Wt(kg): --    I and O's:    01-07 @ 07:01  -  01-08 @ 07:00  --------------------------------------------------------  IN: 240 mL / OUT: 0 mL / NET: 240 mL          PHYSICAL EXAM:    Constitutional: NAD  Neck:  No JVD  Respiratory: course  Cardiovascular: S1 and S2  Gastrointestinal: BS+, soft, NT/ND  Extremities: No peripheral edema  Neurological: A/O x 3, no focal deficits  Psychiatric: Normal mood, normal affect  : No Jean  Skin: No rashes  Access: Not applicable    LABS:                        15.2   8.84  )-----------( 98       ( 07 Jan 2019 08:00 )             47.9     01-07    128<L>  |  95<L>  |  11  ----------------------------<  127<H>  4.0   |  22  |  1.03    Ca    8.7      07 Jan 2019 06:34    TPro  7.0  /  Alb  3.7  /  TBili  0.7  /  DBili  x   /  AST  28  /  ALT  23  /  AlkPhos  55  01-07          Urine Studies:    Osmolality, Random Urine: 288 mos/kg (01-07 @ 16:55)  Sodium, Random Urine: <20 mmol/L (01-07 @ 14:26)  Chloride, Random Urine: <35 mmol/L (01-07 @ 14:26)  Potassium, Random Urine: 15 mmol/L (01-07 @ 14:26)        RADIOLOGY & ADDITIONAL STUDIES:

## 2019-01-08 NOTE — PROGRESS NOTE ADULT - ASSESSMENT
58M w/ HTN, Asthma, CAD-CABG and renal transplant 5/2018; 1/6/19 a/w Influenza A    (1)Renal - renal transplant - stable function - on Prograf, Prednisone.   (2)Pulm - SOB - ?asthma/COPD exacerbation - Pulm on board; steroid dose increased from outpatient dosage  (3)Hyponatremia - Unclear exact etiology      RECOMMEND:  (1)Increase Prograf to 1.5mg po q12h.  Will dc the Cellcept for 1-2 days   (2)Will defer to Pulmonary regarding Prednisone dosing  (3)Could d/c IVF at this point  (4)Tamiflu as ordered

## 2019-01-08 NOTE — PROGRESS NOTE ADULT - PROBLEM SELECTOR PLAN 2
7 months s/p renal xplant for PCKD---DDRT  continuing prophylaxis.  However, does he still require valcyte?---may be opportunity 6 months out to dc valcyte

## 2019-01-08 NOTE — DISCHARGE NOTE ADULT - PATIENT PORTAL LINK FT
You can access the [facility] Portal, offered by [facility], by registering with the following website: [email or web address]

## 2019-01-10 ENCOUNTER — APPOINTMENT (OUTPATIENT)
Dept: NEPHROLOGY | Facility: CLINIC | Age: 59
End: 2019-01-10

## 2019-01-24 ENCOUNTER — APPOINTMENT (OUTPATIENT)
Dept: NEPHROLOGY | Facility: CLINIC | Age: 59
End: 2019-01-24
Payer: MEDICAID

## 2019-01-24 ENCOUNTER — LABORATORY RESULT (OUTPATIENT)
Age: 59
End: 2019-01-24

## 2019-01-24 VITALS
DIASTOLIC BLOOD PRESSURE: 87 MMHG | HEIGHT: 62 IN | SYSTOLIC BLOOD PRESSURE: 147 MMHG | HEART RATE: 63 BPM | OXYGEN SATURATION: 94 % | TEMPERATURE: 97.4 F | RESPIRATION RATE: 16 BRPM | BODY MASS INDEX: 33.68 KG/M2 | WEIGHT: 183 LBS

## 2019-01-24 VITALS — SYSTOLIC BLOOD PRESSURE: 128 MMHG | DIASTOLIC BLOOD PRESSURE: 76 MMHG

## 2019-01-24 DIAGNOSIS — Z00.00 ENCOUNTER FOR GENERAL ADULT MEDICAL EXAMINATION W/OUT ABNORMAL FINDINGS: ICD-10-CM

## 2019-01-24 PROCEDURE — 99214 OFFICE O/P EST MOD 30 MIN: CPT

## 2019-01-24 NOTE — REASON FOR VISIT
[Follow-Up] : a follow-up visit [Spouse] : spouse [FreeTextEntry1] : post transplant follow up, no acute symptoms

## 2019-01-24 NOTE — HISTORY OF PRESENT ILLNESS
[FreeTextEntry1] : Was hospitalized for a day, was discharged on 1/14/19 for Influenza.\par Overall feeling well. \par Glucose ok, not on  insulin.\par On Tac - he is taking 1.5 mg twice daily.  \par Taking 500 mg/dose MMF since hospitalization.\par No fever, no respiratory symptoms\par

## 2019-01-24 NOTE — ASSESSMENT
[FreeTextEntry1] : Renal Transplant recipient: No dysuria/hematuria. No fever/chills. Tolerating medications.\par recent hospitalization for influenza.\par Immunosuppression: Reviewed;  on tac/MMF/prednisone, last Tac level noted; will recheck. Currently on 1.5 mg twice daily.; 500 mg/dose MMF and prednisone at 5 mg daily\par DM: Not taking insulin, controlled.\par Will continue to monitor and adjust treatment\par Hep C:Completed Epcluza. September 23, 2018: Noted Hep C.PCR negative.\par Edema: Resolved.\par Hypertension: controlled; Continue metoprolol.\par Hyperlipidemia: On statin, continue the same.\par Cardiovascular risk reduction discussed. \par Infection prophylaxis: on Bactrim, Valcyte. Discussed discontinuing valcyte since he completed 6 months.\par Discussed ambulation, optimal glucose and blood pressure readings, adherence with medications and follow ups, follow up clinic visit schedule, avoiding dehydration, mosquito bites; prevention of DVT as well as food safety.\par \par

## 2019-01-24 NOTE — PHYSICAL EXAM
[General Appearance - Alert] : alert [General Appearance - In No Acute Distress] : in no acute distress [Sclera] : the sclera and conjunctiva were normal [Outer Ear] : the ears and nose were normal in appearance [Jugular Venous Distention Increased] : there was no jugular-venous distention [Auscultation Breath Sounds / Voice Sounds] : lungs were clear to auscultation bilaterally [Heart Sounds Gallop] : no gallops [Heart Sounds Pericardial Friction Rub] : no pericardial rub [Bowel Sounds] : normal bowel sounds [Abdomen Soft] : soft [Abdomen Tenderness] : non-tender [Abdomen Mass (___ Cm)] : no abdominal mass palpated [Cervical Lymph Nodes Enlarged Posterior Bilaterally] : posterior cervical [Cervical Lymph Nodes Enlarged Anterior Bilaterally] : anterior cervical [Supraclavicular Lymph Nodes Enlarged Bilaterally] : supraclavicular [Axillary Lymph Nodes Enlarged Bilaterally] : axillary [Inguinal Lymph Nodes Enlarged Bilaterally] : inguinal [Involuntary Movements] : no involuntary movements were seen [___ (cm) Fistula] : [unfilled] (cm) fistula [FreeTextEntry1] : has left arm and right forearm AVF [] : no rash [No Focal Deficits] : no focal deficits [Oriented To Time, Place, And Person] : oriented to person, place, and time [Impaired Insight] : insight and judgment were intact [Affect] : the affect was normal

## 2019-01-25 LAB
ALBUMIN SERPL ELPH-MCNC: 4 G/DL
ALP BLD-CCNC: 54 U/L
ALT SERPL-CCNC: 41 U/L
ANION GAP SERPL CALC-SCNC: 13 MMOL/L
APPEARANCE: CLEAR
AST SERPL-CCNC: 35 U/L
BACTERIA: NEGATIVE
BASOPHILS # BLD AUTO: 0.02 K/UL
BASOPHILS NFR BLD AUTO: 0.3 %
BILIRUB SERPL-MCNC: 0.5 MG/DL
BILIRUBIN URINE: NEGATIVE
BLOOD URINE: NEGATIVE
BUN SERPL-MCNC: 18 MG/DL
CALCIUM SERPL-MCNC: 9.5 MG/DL
CHLORIDE SERPL-SCNC: 100 MMOL/L
CO2 SERPL-SCNC: 24 MMOL/L
COLOR: YELLOW
CREAT SERPL-MCNC: 1.17 MG/DL
CREAT SPEC-SCNC: 43 MG/DL
CREAT/PROT UR: 0.2 RATIO
EOSINOPHIL # BLD AUTO: 0.07 K/UL
EOSINOPHIL NFR BLD AUTO: 1.1 %
GLUCOSE QUALITATIVE U: NEGATIVE MG/DL
GLUCOSE SERPL-MCNC: 108 MG/DL
HCT VFR BLD CALC: 51.3 %
HGB BLD-MCNC: 16.2 G/DL
IMM GRANULOCYTES NFR BLD AUTO: 0.2 %
KETONES URINE: NEGATIVE
LDH SERPL-CCNC: 307 U/L
LEUKOCYTE ESTERASE URINE: NEGATIVE
LYMPHOCYTES # BLD AUTO: 1.61 K/UL
LYMPHOCYTES NFR BLD AUTO: 25 %
MAGNESIUM SERPL-MCNC: 1.4 MG/DL
MAN DIFF?: NORMAL
MCHC RBC-ENTMCNC: 28.8 PG
MCHC RBC-ENTMCNC: 31.6 GM/DL
MCV RBC AUTO: 91.1 FL
MICROSCOPIC-UA: NORMAL
MONOCYTES # BLD AUTO: 0.48 K/UL
MONOCYTES NFR BLD AUTO: 7.5 %
NEUTROPHILS # BLD AUTO: 4.25 K/UL
NEUTROPHILS NFR BLD AUTO: 65.9 %
NITRITE URINE: NEGATIVE
PH URINE: 6
PHOSPHATE SERPL-MCNC: 4.1 MG/DL
PLATELET # BLD AUTO: 117 K/UL
POTASSIUM SERPL-SCNC: 4.6 MMOL/L
PROT SERPL-MCNC: 7.6 G/DL
PROT UR-MCNC: 7 MG/DL
PROTEIN URINE: NEGATIVE MG/DL
RBC # BLD: 5.63 M/UL
RBC # FLD: 14.4 %
RED BLOOD CELLS URINE: 0 /HPF
SODIUM SERPL-SCNC: 137 MMOL/L
SPECIFIC GRAVITY URINE: 1.01
SQUAMOUS EPITHELIAL CELLS: 0 /HPF
TACROLIMUS SERPL-MCNC: 7.4 NG/ML
URATE SERPL-MCNC: 6.5 MG/DL
UROBILINOGEN URINE: NEGATIVE MG/DL
WBC # FLD AUTO: 6.44 K/UL
WHITE BLOOD CELLS URINE: 0 /HPF

## 2019-01-29 LAB
BKV DNA SPEC QL NAA+PROBE: NOT DETECTED COPIES/ML
CMV DNA SPEC QL NAA+PROBE: NOT DETECTED IU/ML
CMVPCR LOG: NOT DETECTED LOGIU/ML

## 2019-02-27 NOTE — ASU DISCHARGE PLAN (ADULT/PEDIATRIC). - MEDICATION SUMMARY - MEDICATIONS TO CHANGE
Patient (s)  given copy of dc instructions and 1 script(s). Patient(s)  verbalized understanding of instructions and script (s). Patient given a current medication reconciliation form and verbalized understanding of their medications. Patient (s) verbalized understanding of the importance of discussing medications with  his or her physician or clinic when they follow up. Patient alert and oriented and in no acute distress. Pt verbalizes pain scale of 0 out of 10. Patient discharged home ambulatory with friend/family. I will SWITCH the dose or number of times a day I take the medications listed below when I get home from the hospital:  None

## 2019-03-18 ENCOUNTER — INBOUND DOCUMENT (OUTPATIENT)
Age: 59
End: 2019-03-18

## 2019-04-04 ENCOUNTER — APPOINTMENT (OUTPATIENT)
Dept: CARDIOLOGY | Facility: CLINIC | Age: 59
End: 2019-04-04
Payer: MEDICAID

## 2019-04-04 ENCOUNTER — NON-APPOINTMENT (OUTPATIENT)
Age: 59
End: 2019-04-04

## 2019-04-04 VITALS
WEIGHT: 184 LBS | HEIGHT: 62 IN | BODY MASS INDEX: 33.86 KG/M2 | SYSTOLIC BLOOD PRESSURE: 157 MMHG | DIASTOLIC BLOOD PRESSURE: 76 MMHG | HEART RATE: 64 BPM | OXYGEN SATURATION: 96 % | RESPIRATION RATE: 16 BRPM

## 2019-04-04 PROCEDURE — 99214 OFFICE O/P EST MOD 30 MIN: CPT

## 2019-04-04 PROCEDURE — 93000 ELECTROCARDIOGRAM COMPLETE: CPT

## 2019-04-04 NOTE — PHYSICAL EXAM
[General Appearance - Well Developed] : well developed [Normal Appearance] : normal appearance [Well Groomed] : well groomed [General Appearance - Well Nourished] : well nourished [No Deformities] : no deformities [General Appearance - In No Acute Distress] : no acute distress [Normal Conjunctiva] : the conjunctiva exhibited no abnormalities [Eyelids - No Xanthelasma] : the eyelids demonstrated no xanthelasmas [Normal Oral Mucosa] : normal oral mucosa [No Oral Pallor] : no oral pallor [No Oral Cyanosis] : no oral cyanosis [Respiration, Rhythm And Depth] : normal respiratory rhythm and effort [Exaggerated Use Of Accessory Muscles For Inspiration] : no accessory muscle use [Auscultation Breath Sounds / Voice Sounds] : lungs were clear to auscultation bilaterally [Heart Rate And Rhythm] : heart rate and rhythm were normal [Heart Sounds] : normal S1 and S2 [Murmurs] : no murmurs present [Edema] : no peripheral edema present [Abdomen Soft] : soft [Abdomen Tenderness] : non-tender [Abnormal Walk] : normal gait [Gait - Sufficient For Exercise Testing] : the gait was sufficient for exercise testing [Cyanosis, Localized] : no localized cyanosis [FreeTextEntry1] : right forearm fistula [Skin Color & Pigmentation] : normal skin color and pigmentation [] : no rash [Oriented To Time, Place, And Person] : oriented to person, place, and time [Affect] : the affect was normal [Mood] : the mood was normal [No Anxiety] : not feeling anxious

## 2019-04-04 NOTE — DISCUSSION/SUMMARY
[Cardiomyopathy] : cardiomyopathy [Coronary Artery Disease] : coronary artery disease [Stable] : stable [Hypertension] : hypertension [FreeTextEntry1] : \par Currently stable from a cardiovascular standpoint. Hypertensive today. History of ischemic cardiomyopathy. Currently euvolemic. Stable CAD. Asymptomatic. Patient with renal transplant on immunosuppressive therapy. Continue current medications. ECG completed today and reviewed. Follow up in 4-6 months.

## 2019-04-17 ENCOUNTER — APPOINTMENT (OUTPATIENT)
Dept: TRANSPLANT | Facility: CLINIC | Age: 59
End: 2019-04-17
Payer: MEDICAID

## 2019-04-17 VITALS
RESPIRATION RATE: 16 BRPM | HEART RATE: 59 BPM | DIASTOLIC BLOOD PRESSURE: 67 MMHG | SYSTOLIC BLOOD PRESSURE: 132 MMHG | BODY MASS INDEX: 33.86 KG/M2 | OXYGEN SATURATION: 96 % | HEIGHT: 62 IN | TEMPERATURE: 97.6 F | WEIGHT: 184 LBS

## 2019-04-17 PROCEDURE — 99213 OFFICE O/P EST LOW 20 MIN: CPT

## 2019-04-17 NOTE — PHYSICAL EXAM
[General Appearance - Alert] : alert [General Appearance - In No Acute Distress] : in no acute distress [General Appearance - Well Nourished] : well nourished [General Appearance - Well Developed] : well developed [General Appearance - Well-Appearing] : healthy appearing [Sclera] : the sclera and conjunctiva were normal [Extraocular Movements] : extraocular movements were intact [Strabismus] : no strabismus was seen [Outer Ear] : the ears and nose were normal in appearance [Hearing Threshold Finger Rub Not Foster] : hearing was normal [Examination Of The Oral Cavity] : the lips and gums were normal [Respiration, Rhythm And Depth] : normal respiratory rhythm and effort [] : no respiratory distress [Exaggerated Use Of Accessory Muscles For Inspiration] : no accessory muscle use [Auscultation Breath Sounds / Voice Sounds] : lungs were clear to auscultation bilaterally [Chest Palpation] : palpation of the chest revealed no abnormalities [Heart Rate And Rhythm] : heart rate was normal and rhythm regular [Heart Sounds] : normal S1 and S2 [Arterial Pulses Carotid] : carotid pulses were normal with no bruits [Abdominal Aorta] : the abdominal aorta was normal [Arterial Pulses Femoral] : femoral pulses were normal without bruits [Full Pulse] : the pedal pulses are present [Abdomen Soft] : soft [Abdomen Tenderness] : non-tender [Abdomen Mass (___ Cm)] : no abdominal mass palpated [Abdomen Hernia] : no hernia was discovered [Site: ___] : Site: [unfilled] [Clean] : clean [Dry] : dry [Healing Well] : healing well [Bleeding] : no active bleeding [Foul Odor] : no foul smell [Purulent Drainage] : no purulent drainage [Serosanguinous Drainage] : no serosanguinous drainage [Erythema] : not erythematous [Warm] : not warm [Tender] : not tender [No Edema] : no edema [Pitting Edema] : no pitting edema present [___ +] : no sacral edema [Cranial Nerves] : cranial nerves 2-12 were intact [Sensation] : the sensory exam was normal to light touch and pinprick [Motor Exam] : the motor exam was normal [No Focal Deficits] : no focal deficits [Oriented To Time, Place, And Person] : oriented to person, place, and time [Impaired Insight] : insight and judgment were intact [Affect] : the affect was normal [Mood] : the mood was normal [Memory Recent] : recent memory was not impaired [Memory Remote] : remote memory was not impaired [FreeTextEntry1] : IMMUNOSUPPRESSION:\par No change in prograf dose\par \par \par

## 2019-04-17 NOTE — REASON FOR VISIT
[de-identified] : Immunosuppression management follow up encounter\par No major complaints.\par IMMUNOSUPPRESSION:\par Prograf 1.5-1.5\par Cellcept 500-500\par Prednisone 5

## 2019-04-18 LAB
25(OH)D3 SERPL-MCNC: 24 NG/ML
ALBUMIN SERPL ELPH-MCNC: 4.4 G/DL
ALP BLD-CCNC: 60 U/L
ALT SERPL-CCNC: 31 U/L
ANION GAP SERPL CALC-SCNC: 16 MMOL/L
APPEARANCE: CLEAR
AST SERPL-CCNC: 27 U/L
BACTERIA: NEGATIVE
BASOPHILS # BLD AUTO: 0.04 K/UL
BASOPHILS NFR BLD AUTO: 0.6 %
BILIRUB SERPL-MCNC: 0.5 MG/DL
BILIRUBIN URINE: NEGATIVE
BKV DNA SPEC QL NAA+PROBE: NOT DETECTED COPIES/ML
BLOOD URINE: NEGATIVE
BUN SERPL-MCNC: 13 MG/DL
CALCIUM SERPL-MCNC: 9.3 MG/DL
CHLORIDE SERPL-SCNC: 101 MMOL/L
CHOLEST SERPL-MCNC: 205 MG/DL
CHOLEST/HDLC SERPL: 2.7 RATIO
CO2 SERPL-SCNC: 22 MMOL/L
COLOR: NORMAL
CREAT SERPL-MCNC: 1.05 MG/DL
CREAT SPEC-SCNC: 59 MG/DL
CREAT/PROT UR: 0.3 RATIO
EOSINOPHIL # BLD AUTO: 0.1 K/UL
EOSINOPHIL NFR BLD AUTO: 1.5 %
ESTIMATED AVERAGE GLUCOSE: 146 MG/DL
GLUCOSE QUALITATIVE U: NEGATIVE
GLUCOSE SERPL-MCNC: 121 MG/DL
HBA1C MFR BLD HPLC: 6.7 %
HCT VFR BLD CALC: 50.8 %
HDLC SERPL-MCNC: 75 MG/DL
HGB BLD-MCNC: 16.2 G/DL
HYALINE CASTS: 0 /LPF
IMM GRANULOCYTES NFR BLD AUTO: 0.5 %
KETONES URINE: NEGATIVE
LDH SERPL-CCNC: 210 U/L
LDLC SERPL CALC-MCNC: 96 MG/DL
LEUKOCYTE ESTERASE URINE: NEGATIVE
LYMPHOCYTES # BLD AUTO: 1.96 K/UL
LYMPHOCYTES NFR BLD AUTO: 29.8 %
MAGNESIUM SERPL-MCNC: 1.7 MG/DL
MAN DIFF?: NORMAL
MCHC RBC-ENTMCNC: 29.1 PG
MCHC RBC-ENTMCNC: 31.9 GM/DL
MCV RBC AUTO: 91.4 FL
MICROSCOPIC-UA: NORMAL
MONOCYTES # BLD AUTO: 0.73 K/UL
MONOCYTES NFR BLD AUTO: 11.1 %
NEUTROPHILS # BLD AUTO: 3.71 K/UL
NEUTROPHILS NFR BLD AUTO: 56.5 %
NITRITE URINE: NEGATIVE
PH URINE: 6
PHOSPHATE SERPL-MCNC: 3.6 MG/DL
PLATELET # BLD AUTO: 113 K/UL
POTASSIUM SERPL-SCNC: 4.2 MMOL/L
PROT SERPL-MCNC: 7.3 G/DL
PROT UR-MCNC: 20 MG/DL
PROTEIN URINE: NORMAL
RBC # BLD: 5.56 M/UL
RBC # FLD: 12.8 %
RED BLOOD CELLS URINE: 0 /HPF
SODIUM SERPL-SCNC: 138 MMOL/L
SPECIFIC GRAVITY URINE: 1.01
SQUAMOUS EPITHELIAL CELLS: 0 /HPF
TACROLIMUS SERPL-MCNC: 4.1 NG/ML
TRIGL SERPL-MCNC: 168 MG/DL
URATE SERPL-MCNC: 7.1 MG/DL
UROBILINOGEN URINE: NORMAL
WBC # FLD AUTO: 6.57 K/UL
WHITE BLOOD CELLS URINE: 0 /HPF

## 2019-04-22 LAB
CMV DNA SPEC QL NAA+PROBE: ABNORMAL IU/ML
CMVPCR LOG: ABNORMAL LOGIU/ML

## 2019-05-01 ENCOUNTER — INBOUND DOCUMENT (OUTPATIENT)
Age: 59
End: 2019-05-01

## 2019-05-23 ENCOUNTER — RX RENEWAL (OUTPATIENT)
Age: 59
End: 2019-05-23

## 2019-05-24 ENCOUNTER — RX RENEWAL (OUTPATIENT)
Age: 59
End: 2019-05-24

## 2019-06-05 ENCOUNTER — RX RENEWAL (OUTPATIENT)
Age: 59
End: 2019-06-05

## 2019-06-20 ENCOUNTER — APPOINTMENT (OUTPATIENT)
Dept: NEPHROLOGY | Facility: CLINIC | Age: 59
End: 2019-06-20
Payer: MEDICAID

## 2019-06-20 VITALS
TEMPERATURE: 97.4 F | SYSTOLIC BLOOD PRESSURE: 161 MMHG | OXYGEN SATURATION: 96 % | WEIGHT: 191 LBS | HEART RATE: 62 BPM | DIASTOLIC BLOOD PRESSURE: 87 MMHG | RESPIRATION RATE: 14 BRPM | BODY MASS INDEX: 34.93 KG/M2

## 2019-06-20 PROCEDURE — 99214 OFFICE O/P EST MOD 30 MIN: CPT

## 2019-06-20 NOTE — ASSESSMENT
[FreeTextEntry1] : Renal Transplant recipient: No dysuria/hematuria. No fever/chills. Tolerating medications.\par recent hospitalization for influenza.\par Immunosuppression: Reviewed;  on tac/MMF/prednisone, last Tac level noted; will recheck. Currently on 1.5 mg twice daily.; 500 mg/dose MMF and prednisone at 5 mg daily\par DM: Not taking insulin, controlled.\par Will continue to monitor and adjust treatment\par Hep C:Completed Epcluza. September 23, 2018: Noted Hep C.PCR negative. PHS high risk \par Hypertension: controlled; Continue metoprolol.\par Hyperlipidemia: On statin, continue the same.\par Cardiovascular risk reduction discussed. \par Infection prophylaxis: on Bactrim, Valcyte. Discussed discontinuing valcyte since he completed 6 months.\par Discussed ambulation, optimal glucose and blood pressure readings, adherence with medications and follow ups, follow up clinic visit schedule, avoiding dehydration, mosquito bites; prevention of DVT as well as food safety.\par \par

## 2019-06-20 NOTE — PHYSICAL EXAM
[General Appearance - Alert] : alert [General Appearance - In No Acute Distress] : in no acute distress [Outer Ear] : the ears and nose were normal in appearance [Sclera] : the sclera and conjunctiva were normal [Jugular Venous Distention Increased] : there was no jugular-venous distention [Auscultation Breath Sounds / Voice Sounds] : lungs were clear to auscultation bilaterally [Heart Sounds Pericardial Friction Rub] : no pericardial rub [Heart Sounds Gallop] : no gallops [Bowel Sounds] : normal bowel sounds [Abdomen Tenderness] : non-tender [Abdomen Soft] : soft [Abdomen Mass (___ Cm)] : no abdominal mass palpated [Cervical Lymph Nodes Enlarged Anterior Bilaterally] : anterior cervical [Cervical Lymph Nodes Enlarged Posterior Bilaterally] : posterior cervical [Supraclavicular Lymph Nodes Enlarged Bilaterally] : supraclavicular [Axillary Lymph Nodes Enlarged Bilaterally] : axillary [Inguinal Lymph Nodes Enlarged Bilaterally] : inguinal [Involuntary Movements] : no involuntary movements were seen [___ (cm) Fistula] : [unfilled] (cm) fistula [No Focal Deficits] : no focal deficits [] : no rash [Oriented To Time, Place, And Person] : oriented to person, place, and time [Impaired Insight] : insight and judgment were intact [Affect] : the affect was normal [FreeTextEntry1] : has left arm and right forearm AVF

## 2019-06-20 NOTE — REASON FOR VISIT
[Spouse] : spouse [Follow-Up] : a follow-up visit [FreeTextEntry1] : post transplant follow up, no acute symptoms, weight gain

## 2019-06-20 NOTE — HISTORY OF PRESENT ILLNESS
[FreeTextEntry1] : Concerned about weight gain\par Overall feeling well. \par Glucose ok, not on  insulin.\par On Tac - he is taking 1.5 mg twice daily.  \par Taking 1000 mg/dose MMF \par No fever, no respiratory symptoms\par planning to go to [place] soon for a visit.

## 2019-06-21 ENCOUNTER — OTHER (OUTPATIENT)
Age: 59
End: 2019-06-21

## 2019-06-21 LAB
ALBUMIN SERPL ELPH-MCNC: 4.3 G/DL
ALP BLD-CCNC: 54 U/L
ALT SERPL-CCNC: 23 U/L
ANION GAP SERPL CALC-SCNC: 13 MMOL/L
APPEARANCE: CLEAR
AST SERPL-CCNC: 20 U/L
BACTERIA: NEGATIVE
BASOPHILS # BLD AUTO: 0.03 K/UL
BASOPHILS NFR BLD AUTO: 0.5 %
BILIRUB SERPL-MCNC: 0.4 MG/DL
BILIRUBIN URINE: NEGATIVE
BLOOD URINE: NORMAL
BUN SERPL-MCNC: 22 MG/DL
CALCIUM SERPL-MCNC: 9.4 MG/DL
CHLORIDE SERPL-SCNC: 102 MMOL/L
CMV DNA SPEC QL NAA+PROBE: NOT DETECTED
CMVPCR LOG: NOT DETECTED LOGIU/ML
CO2 SERPL-SCNC: 26 MMOL/L
COLOR: NORMAL
CREAT SERPL-MCNC: 1.43 MG/DL
CREAT SPEC-SCNC: 113 MG/DL
CREAT/PROT UR: 0.1 RATIO
EOSINOPHIL # BLD AUTO: 0.07 K/UL
EOSINOPHIL NFR BLD AUTO: 1.1 %
GLUCOSE QUALITATIVE U: NEGATIVE
GLUCOSE SERPL-MCNC: 139 MG/DL
HBV CORE IGG+IGM SER QL: NONREACTIVE
HBV SURFACE AG SER QL: NONREACTIVE
HCT VFR BLD CALC: 49.5 %
HCV AB SER QL: NONREACTIVE
HCV S/CO RATIO: 0.12 S/CO
HGB BLD-MCNC: 15.9 G/DL
HIV1 RNA # SERPL NAA+PROBE: NORMAL
HIV1 RNA # SERPL NAA+PROBE: NORMAL COPIES/ML
HIV1+2 AB SPEC QL IA.RAPID: NONREACTIVE
HYALINE CASTS: 0 /LPF
IMM GRANULOCYTES NFR BLD AUTO: 0.2 %
KETONES URINE: NEGATIVE
LDH SERPL-CCNC: 184 U/L
LEUKOCYTE ESTERASE URINE: NEGATIVE
LYMPHOCYTES # BLD AUTO: 1.6 K/UL
LYMPHOCYTES NFR BLD AUTO: 25.4 %
MAGNESIUM SERPL-MCNC: 1.5 MG/DL
MAN DIFF?: NORMAL
MCHC RBC-ENTMCNC: 28.7 PG
MCHC RBC-ENTMCNC: 32.1 GM/DL
MCV RBC AUTO: 89.4 FL
MICROSCOPIC-UA: NORMAL
MONOCYTES # BLD AUTO: 0.51 K/UL
MONOCYTES NFR BLD AUTO: 8.1 %
NEUTROPHILS # BLD AUTO: 4.07 K/UL
NEUTROPHILS NFR BLD AUTO: 64.7 %
NITRITE URINE: NEGATIVE
PH URINE: 6
PHOSPHATE SERPL-MCNC: 4 MG/DL
PLATELET # BLD AUTO: 117 K/UL
POTASSIUM SERPL-SCNC: 4.5 MMOL/L
PROT SERPL-MCNC: 7.3 G/DL
PROT UR-MCNC: 16 MG/DL
PROTEIN URINE: NORMAL
RBC # BLD: 5.54 M/UL
RBC # FLD: 13.1 %
RED BLOOD CELLS URINE: 1 /HPF
SODIUM SERPL-SCNC: 141 MMOL/L
SPECIFIC GRAVITY URINE: 1.02
SQUAMOUS EPITHELIAL CELLS: 0 /HPF
TACROLIMUS SERPL-MCNC: 6.1 NG/ML
URATE SERPL-MCNC: 6.4 MG/DL
UROBILINOGEN URINE: NORMAL
VIRAL LOAD INTERP: NORMAL
VIRAL LOAD LOG: NORMAL LG COP/ML
WBC # FLD AUTO: 6.29 K/UL
WHITE BLOOD CELLS URINE: 0 /HPF

## 2019-06-24 ENCOUNTER — FORM ENCOUNTER (OUTPATIENT)
Age: 59
End: 2019-06-24

## 2019-06-24 LAB
BKV DNA SPEC QL NAA+PROBE: NOT DETECTED COPIES/ML
HBV DNA # SERPL NAA+PROBE: NOT DETECTED IU/ML
HCV RNA SERPL NAA DL=5-ACNC: NOT DETECTED IU/ML
HCV RNA SERPL NAA+PROBE-LOG IU: NOT DETECTED LOGIU/ML
HEPB DNA PCR LOG: NOT DETECTED LOGIU/ML

## 2019-06-25 ENCOUNTER — APPOINTMENT (OUTPATIENT)
Dept: ULTRASOUND IMAGING | Facility: HOSPITAL | Age: 59
End: 2019-06-25

## 2019-06-25 ENCOUNTER — OUTPATIENT (OUTPATIENT)
Dept: OUTPATIENT SERVICES | Facility: HOSPITAL | Age: 59
LOS: 1 days | End: 2019-06-25
Payer: MEDICAID

## 2019-06-25 DIAGNOSIS — I77.0 ARTERIOVENOUS FISTULA, ACQUIRED: Chronic | ICD-10-CM

## 2019-06-25 DIAGNOSIS — Z95.1 PRESENCE OF AORTOCORONARY BYPASS GRAFT: Chronic | ICD-10-CM

## 2019-06-25 DIAGNOSIS — Z94.0 KIDNEY TRANSPLANT STATUS: ICD-10-CM

## 2019-06-25 DIAGNOSIS — Z95.5 PRESENCE OF CORONARY ANGIOPLASTY IMPLANT AND GRAFT: Chronic | ICD-10-CM

## 2019-06-25 DIAGNOSIS — Z98.890 OTHER SPECIFIED POSTPROCEDURAL STATES: Chronic | ICD-10-CM

## 2019-06-25 PROCEDURE — 76776 US EXAM K TRANSPL W/DOPPLER: CPT | Mod: 26,RT

## 2019-06-25 PROCEDURE — 76776 US EXAM K TRANSPL W/DOPPLER: CPT

## 2019-06-26 ENCOUNTER — APPOINTMENT (OUTPATIENT)
Dept: TRANSPLANT | Facility: CLINIC | Age: 59
End: 2019-06-26

## 2019-06-28 ENCOUNTER — RX RENEWAL (OUTPATIENT)
Age: 59
End: 2019-06-28

## 2019-07-01 ENCOUNTER — OTHER (OUTPATIENT)
Age: 59
End: 2019-07-01

## 2019-07-02 ENCOUNTER — OTHER (OUTPATIENT)
Age: 59
End: 2019-07-02

## 2019-07-03 ENCOUNTER — APPOINTMENT (OUTPATIENT)
Dept: TRANSPLANT | Facility: CLINIC | Age: 59
End: 2019-07-03

## 2019-07-03 ENCOUNTER — RX RENEWAL (OUTPATIENT)
Age: 59
End: 2019-07-03

## 2019-07-03 LAB
ALBUMIN SERPL ELPH-MCNC: 4.3 G/DL
ALP BLD-CCNC: 53 U/L
ALT SERPL-CCNC: 22 U/L
ANION GAP SERPL CALC-SCNC: 15 MMOL/L
APPEARANCE: CLEAR
AST SERPL-CCNC: 22 U/L
BACTERIA: NEGATIVE
BASOPHILS # BLD AUTO: 0.02 K/UL
BASOPHILS NFR BLD AUTO: 0.3 %
BILIRUB SERPL-MCNC: 0.5 MG/DL
BILIRUBIN URINE: NEGATIVE
BLOOD URINE: NEGATIVE
BUN SERPL-MCNC: 17 MG/DL
CALCIUM SERPL-MCNC: 8.9 MG/DL
CHLORIDE SERPL-SCNC: 99 MMOL/L
CO2 SERPL-SCNC: 22 MMOL/L
COLOR: COLORLESS
CREAT SERPL-MCNC: 1.05 MG/DL
CREAT SPEC-SCNC: 37 MG/DL
CREAT/PROT UR: 0.2 RATIO
EOSINOPHIL # BLD AUTO: 0.11 K/UL
EOSINOPHIL NFR BLD AUTO: 1.8 %
GLUCOSE QUALITATIVE U: NEGATIVE
GLUCOSE SERPL-MCNC: 164 MG/DL
HCT VFR BLD CALC: 48.3 %
HGB BLD-MCNC: 15.3 G/DL
HYALINE CASTS: 0 /LPF
IMM GRANULOCYTES NFR BLD AUTO: 0.3 %
KETONES URINE: NEGATIVE
LDH SERPL-CCNC: 191 U/L
LEUKOCYTE ESTERASE URINE: NEGATIVE
LYMPHOCYTES # BLD AUTO: 1.53 K/UL
LYMPHOCYTES NFR BLD AUTO: 24.8 %
MAGNESIUM SERPL-MCNC: 1.6 MG/DL
MAN DIFF?: NORMAL
MCHC RBC-ENTMCNC: 28.5 PG
MCHC RBC-ENTMCNC: 31.7 GM/DL
MCV RBC AUTO: 90.1 FL
MICROSCOPIC-UA: NORMAL
MONOCYTES # BLD AUTO: 0.62 K/UL
MONOCYTES NFR BLD AUTO: 10 %
NEUTROPHILS # BLD AUTO: 3.88 K/UL
NEUTROPHILS NFR BLD AUTO: 62.8 %
NITRITE URINE: NEGATIVE
PH URINE: 6
PHOSPHATE SERPL-MCNC: 3.4 MG/DL
PLATELET # BLD AUTO: 109 K/UL
POTASSIUM SERPL-SCNC: 3.9 MMOL/L
PROT SERPL-MCNC: 6.6 G/DL
PROT UR-MCNC: 8 MG/DL
PROTEIN URINE: NEGATIVE
RBC # BLD: 5.36 M/UL
RBC # FLD: 12.9 %
RED BLOOD CELLS URINE: 0 /HPF
SODIUM SERPL-SCNC: 136 MMOL/L
SPECIFIC GRAVITY URINE: 1.01
SQUAMOUS EPITHELIAL CELLS: 0 /HPF
TACROLIMUS SERPL-MCNC: 4.5 NG/ML
URATE SERPL-MCNC: 6.4 MG/DL
UROBILINOGEN URINE: NORMAL
WBC # FLD AUTO: 6.18 K/UL
WHITE BLOOD CELLS URINE: 0 /HPF

## 2019-07-08 LAB
BKV DNA SPEC QL NAA+PROBE: NOT DETECTED COPIES/ML
CMV DNA SPEC QL NAA+PROBE: NOT DETECTED
CMVPCR LOG: NOT DETECTED LOGIU/ML

## 2019-07-30 ENCOUNTER — RX RENEWAL (OUTPATIENT)
Age: 59
End: 2019-07-30

## 2019-08-01 ENCOUNTER — RX RENEWAL (OUTPATIENT)
Age: 59
End: 2019-08-01

## 2019-08-25 ENCOUNTER — EMERGENCY (EMERGENCY)
Facility: HOSPITAL | Age: 59
LOS: 1 days | Discharge: HOME | End: 2019-08-25
Attending: STUDENT IN AN ORGANIZED HEALTH CARE EDUCATION/TRAINING PROGRAM
Payer: MEDICAID

## 2019-08-25 VITALS
TEMPERATURE: 98 F | RESPIRATION RATE: 18 BRPM | SYSTOLIC BLOOD PRESSURE: 128 MMHG | OXYGEN SATURATION: 98 % | HEART RATE: 67 BPM | DIASTOLIC BLOOD PRESSURE: 73 MMHG

## 2019-08-25 VITALS
HEART RATE: 66 BPM | SYSTOLIC BLOOD PRESSURE: 150 MMHG | WEIGHT: 180.78 LBS | RESPIRATION RATE: 19 BRPM | OXYGEN SATURATION: 99 % | HEIGHT: 65 IN | TEMPERATURE: 98 F | DIASTOLIC BLOOD PRESSURE: 82 MMHG

## 2019-08-25 DIAGNOSIS — Z95.1 PRESENCE OF AORTOCORONARY BYPASS GRAFT: Chronic | ICD-10-CM

## 2019-08-25 DIAGNOSIS — Z98.890 OTHER SPECIFIED POSTPROCEDURAL STATES: Chronic | ICD-10-CM

## 2019-08-25 DIAGNOSIS — Z95.5 PRESENCE OF CORONARY ANGIOPLASTY IMPLANT AND GRAFT: Chronic | ICD-10-CM

## 2019-08-25 DIAGNOSIS — I77.0 ARTERIOVENOUS FISTULA, ACQUIRED: Chronic | ICD-10-CM

## 2019-08-25 LAB
ALBUMIN SERPL ELPH-MCNC: 4.1 G/DL — SIGNIFICANT CHANGE UP (ref 3.3–5)
ALP SERPL-CCNC: 50 U/L — SIGNIFICANT CHANGE UP (ref 40–120)
ALT FLD-CCNC: 24 U/L — SIGNIFICANT CHANGE UP (ref 10–45)
ANION GAP SERPL CALC-SCNC: 13 MMOL/L — SIGNIFICANT CHANGE UP (ref 5–17)
AST SERPL-CCNC: 26 U/L — SIGNIFICANT CHANGE UP (ref 10–40)
BASOPHILS # BLD AUTO: 0 K/UL — SIGNIFICANT CHANGE UP (ref 0–0.2)
BASOPHILS NFR BLD AUTO: 0.6 % — SIGNIFICANT CHANGE UP (ref 0–2)
BILIRUB SERPL-MCNC: 0.5 MG/DL — SIGNIFICANT CHANGE UP (ref 0.2–1.2)
BUN SERPL-MCNC: 24 MG/DL — HIGH (ref 7–23)
CALCIUM SERPL-MCNC: 9.4 MG/DL — SIGNIFICANT CHANGE UP (ref 8.4–10.5)
CHLORIDE SERPL-SCNC: 99 MMOL/L — SIGNIFICANT CHANGE UP (ref 96–108)
CO2 SERPL-SCNC: 21 MMOL/L — LOW (ref 22–31)
CREAT SERPL-MCNC: 0.99 MG/DL — SIGNIFICANT CHANGE UP (ref 0.5–1.3)
EOSINOPHIL # BLD AUTO: 0.1 K/UL — SIGNIFICANT CHANGE UP (ref 0–0.5)
EOSINOPHIL NFR BLD AUTO: 0.8 % — SIGNIFICANT CHANGE UP (ref 0–6)
GLUCOSE SERPL-MCNC: 146 MG/DL — HIGH (ref 70–99)
HCT VFR BLD CALC: 49.3 % — SIGNIFICANT CHANGE UP (ref 39–50)
HGB BLD-MCNC: 15.3 G/DL — SIGNIFICANT CHANGE UP (ref 13–17)
LYMPHOCYTES # BLD AUTO: 1.1 K/UL — SIGNIFICANT CHANGE UP (ref 1–3.3)
LYMPHOCYTES # BLD AUTO: 15.2 % — SIGNIFICANT CHANGE UP (ref 13–44)
MAGNESIUM SERPL-MCNC: 1.6 MG/DL — SIGNIFICANT CHANGE UP (ref 1.6–2.6)
MCHC RBC-ENTMCNC: 27.2 PG — SIGNIFICANT CHANGE UP (ref 27–34)
MCHC RBC-ENTMCNC: 31.1 GM/DL — LOW (ref 32–36)
MCV RBC AUTO: 87.4 FL — SIGNIFICANT CHANGE UP (ref 80–100)
MONOCYTES # BLD AUTO: 0.5 K/UL — SIGNIFICANT CHANGE UP (ref 0–0.9)
MONOCYTES NFR BLD AUTO: 7.3 % — SIGNIFICANT CHANGE UP (ref 2–14)
NEUTROPHILS # BLD AUTO: 5.6 K/UL — SIGNIFICANT CHANGE UP (ref 1.8–7.4)
NEUTROPHILS NFR BLD AUTO: 76.1 % — SIGNIFICANT CHANGE UP (ref 43–77)
PHOSPHATE SERPL-MCNC: 3.6 MG/DL — SIGNIFICANT CHANGE UP (ref 2.5–4.5)
PLATELET # BLD AUTO: 97 K/UL — LOW (ref 150–400)
POTASSIUM SERPL-MCNC: 5.2 MMOL/L — SIGNIFICANT CHANGE UP (ref 3.5–5.3)
POTASSIUM SERPL-SCNC: 5.2 MMOL/L — SIGNIFICANT CHANGE UP (ref 3.5–5.3)
PROT SERPL-MCNC: 7.2 G/DL — SIGNIFICANT CHANGE UP (ref 6–8.3)
RBC # BLD: 5.64 M/UL — SIGNIFICANT CHANGE UP (ref 4.2–5.8)
RBC # FLD: 13.3 % — SIGNIFICANT CHANGE UP (ref 10.3–14.5)
SODIUM SERPL-SCNC: 133 MMOL/L — LOW (ref 135–145)
WBC # BLD: 7.4 K/UL — SIGNIFICANT CHANGE UP (ref 3.8–10.5)
WBC # FLD AUTO: 7.4 K/UL — SIGNIFICANT CHANGE UP (ref 3.8–10.5)

## 2019-08-25 PROCEDURE — 93005 ELECTROCARDIOGRAM TRACING: CPT

## 2019-08-25 PROCEDURE — 80053 COMPREHEN METABOLIC PANEL: CPT

## 2019-08-25 PROCEDURE — 96374 THER/PROPH/DIAG INJ IV PUSH: CPT

## 2019-08-25 PROCEDURE — 85027 COMPLETE CBC AUTOMATED: CPT

## 2019-08-25 PROCEDURE — 83735 ASSAY OF MAGNESIUM: CPT

## 2019-08-25 PROCEDURE — 84100 ASSAY OF PHOSPHORUS: CPT

## 2019-08-25 PROCEDURE — 99284 EMERGENCY DEPT VISIT MOD MDM: CPT | Mod: 25

## 2019-08-25 PROCEDURE — 99284 EMERGENCY DEPT VISIT MOD MDM: CPT

## 2019-08-25 PROCEDURE — 93010 ELECTROCARDIOGRAM REPORT: CPT

## 2019-08-25 RX ORDER — METOCLOPRAMIDE HCL 10 MG
10 TABLET ORAL ONCE
Refills: 0 | Status: COMPLETED | OUTPATIENT
Start: 2019-08-25 | End: 2019-08-25

## 2019-08-25 RX ORDER — ACETAMINOPHEN 500 MG
500 TABLET ORAL ONCE
Refills: 0 | Status: COMPLETED | OUTPATIENT
Start: 2019-08-25 | End: 2019-08-25

## 2019-08-25 RX ADMIN — Medication 500 MILLIGRAM(S): at 10:22

## 2019-08-25 RX ADMIN — Medication 10 MILLIGRAM(S): at 10:22

## 2019-08-25 NOTE — ED ADULT NURSE NOTE - OBJECTIVE STATEMENT
Patient presents to Ed with c/o headache. Patient with hx of Renal transplant last year reports experiencing a headache  since yesterday to the back of his head. Patient has been taking Tylenol for pain relief and headache returned, patient  ate bananas and yucca yesterday and was told by Md to come to the Ed to have his blood checked. Patient denies chest  pain or sob, no nausea vomiting fever chills or visual changes +headache c/o bilat lower ext pain. Lt hand 20g iv lock placed,   labs drawn and sent comfort measures provided.

## 2019-08-25 NOTE — ED PROVIDER NOTE - OBJECTIVE STATEMENT
59 Y M yr old with pmh of CABG and kidney transplant presenting on asa, plavix, proraf, cellcept. PT says that he has a bad HA since yesterday, he states that sometimes he has HAs but not usually in the back of his head like today. He says that the pain has been gradually worsening. He denies any weakness however he says that he has BL LE pain x 2 weeks. He denies chest pain, SOB, LOC, neck pain or stiffness. Denies vision changes, or hearing changes. Denies nausea, vomiting. He has tried taking Tylenol yesterday and this morning without much relief. He says that this AM he took 1 500 mg tablet. He says that after the Tylenol this morning the pain went away, he exercised and came back to rest and the HA started again. He said that he talked with his Dr and told him that he was worried this HA was from high potasium because the pt ate bananas yesterday and his Dr told him to come to the ED to have it checked.

## 2019-08-25 NOTE — ED PROVIDER NOTE - NSFOLLOWUPINSTRUCTIONS_ED_ALL_ED_FT
You can use 500-1000mg Tylenol every 6 hours for pain - as needed.  This is an over-the-counter medications - please respect the warnings on the label. This medication come with certain risks and side effects that you need to discuss with your doctor, especially if you are taking it for a prolonged period. If you have any severe increase in pain, fever, uncontrollable nausea vomiting, or inability to tolerate eating and drinking you need to come right back to the emergency room. We printed out your labs for you so that you can show your doctor your normal creatinine and potassium.

## 2019-08-25 NOTE — ED PROVIDER NOTE - PHYSICAL EXAMINATION
Neuro: CN2-12 grossly intact, A&Ox4, MS +5/5 in UE and LE BL, finger to nose smooth and rapid, gross sensation intact in UE and LE BL, gait smooth and coordinated, negative rhomberg, negative pronator drift

## 2019-08-25 NOTE — ED PROVIDER NOTE - CLINICAL SUMMARY MEDICAL DECISION MAKING FREE TEXT BOX
59 Y M with hx of kidney transplant here with HA gradually increasing since yesterday, worried that his potasium may be high because he at bananas yesterday. No hx of trauma, normal neuro exam low suspicion for intracranial pathology. Will give HA medications, basic labs, likely DC if feeling better.

## 2019-08-25 NOTE — ED PROVIDER NOTE - PROGRESS NOTE DETAILS
Resident: Demetrio Dsouza – Pt was re-evaluated at bedside, VSS, feeling better overall, now reporting 0/10 pain from head ache. Results were discussed with patient as well as return precautions and follow up plan with PCP and/or specialist. Time was taken to answer any questions that the patient had before providing them with discharge paperwork.

## 2019-08-25 NOTE — ED PROVIDER NOTE - ATTENDING CONTRIBUTION TO CARE
59 YOM pmh of CABG and kidney transplant, HTN here with posterior headache since yesterday that has been improving. Atraumatic headache. Patient called his doctor who recommended he come to ED to have his potassium checked as it may be high as he ate bananas and yuca yesterday. Here reports headache has resolved. Denies change in vision, neck pain, cp, sob, abd pain, numbness, tingling, weakness.    Vital Signs Stable  Gen: well appearing, NAD  HEENT: NCAT, neck supple  Cardiac: regular rate rhythm, normal S1S2  Chest: CTA BL, no wheezes or crackles  Abdomen: normal BS, soft, non tender non distended  Extremity: no gross deformity, good perfusion  Skin: no rash  Neuro: grossly normal    AP - headache yesterday now improved. here requesting potassium and creatinine check. low concern for ICH/SAH or meningitis. if w/up wnl anticipate dc with close pmd f/u

## 2019-08-26 ENCOUNTER — RX RENEWAL (OUTPATIENT)
Age: 59
End: 2019-08-26

## 2019-08-29 ENCOUNTER — APPOINTMENT (OUTPATIENT)
Dept: CARDIOLOGY | Facility: CLINIC | Age: 59
End: 2019-08-29

## 2019-08-29 NOTE — PATIENT PROFILE ADULT. - INFLUENZA IMMUNIZATION DATE:
Detail Level: Detailed Post-Care Instructions: I reviewed with the patient in detail post-care instructions. Patient is to wear sunprotection, and avoid picking at any of the treated lesions. Pt may apply Vaseline to crusted or scabbing areas. Duration Of Freeze Thaw-Cycle (Seconds): 0 Render Note In Bullet Format When Appropriate: No Consent: The patient's consent was obtained including but not limited to risks of crusting, scabbing, blistering, scarring, darker or lighter pigmentary change, recurrence, incomplete removal and infection. Fall 2017

## 2019-09-01 ENCOUNTER — OUTPATIENT (OUTPATIENT)
Dept: OUTPATIENT SERVICES | Facility: HOSPITAL | Age: 59
LOS: 1 days | End: 2019-09-01
Payer: MEDICAID

## 2019-09-01 DIAGNOSIS — Z98.890 OTHER SPECIFIED POSTPROCEDURAL STATES: Chronic | ICD-10-CM

## 2019-09-01 DIAGNOSIS — I77.0 ARTERIOVENOUS FISTULA, ACQUIRED: Chronic | ICD-10-CM

## 2019-09-01 DIAGNOSIS — Z95.5 PRESENCE OF CORONARY ANGIOPLASTY IMPLANT AND GRAFT: Chronic | ICD-10-CM

## 2019-09-01 DIAGNOSIS — Z95.1 PRESENCE OF AORTOCORONARY BYPASS GRAFT: Chronic | ICD-10-CM

## 2019-09-08 ENCOUNTER — INPATIENT (INPATIENT)
Facility: HOSPITAL | Age: 59
LOS: 1 days | Discharge: HOME | DRG: 392 | End: 2019-09-10
Attending: INTERNAL MEDICINE | Admitting: INTERNAL MEDICINE
Payer: MEDICAID

## 2019-09-08 VITALS
WEIGHT: 185.19 LBS | TEMPERATURE: 98 F | DIASTOLIC BLOOD PRESSURE: 84 MMHG | HEIGHT: 65 IN | HEART RATE: 62 BPM | OXYGEN SATURATION: 97 % | RESPIRATION RATE: 20 BRPM | SYSTOLIC BLOOD PRESSURE: 177 MMHG

## 2019-09-08 DIAGNOSIS — I77.0 ARTERIOVENOUS FISTULA, ACQUIRED: Chronic | ICD-10-CM

## 2019-09-08 DIAGNOSIS — R07.9 CHEST PAIN, UNSPECIFIED: ICD-10-CM

## 2019-09-08 DIAGNOSIS — Z95.5 PRESENCE OF CORONARY ANGIOPLASTY IMPLANT AND GRAFT: Chronic | ICD-10-CM

## 2019-09-08 DIAGNOSIS — J45.909 UNSPECIFIED ASTHMA, UNCOMPLICATED: ICD-10-CM

## 2019-09-08 DIAGNOSIS — I25.810 ATHEROSCLEROSIS OF CORONARY ARTERY BYPASS GRAFT(S) WITHOUT ANGINA PECTORIS: ICD-10-CM

## 2019-09-08 DIAGNOSIS — Z98.890 OTHER SPECIFIED POSTPROCEDURAL STATES: Chronic | ICD-10-CM

## 2019-09-08 DIAGNOSIS — Z94.0 KIDNEY TRANSPLANT STATUS: ICD-10-CM

## 2019-09-08 DIAGNOSIS — G47.33 OBSTRUCTIVE SLEEP APNEA (ADULT) (PEDIATRIC): ICD-10-CM

## 2019-09-08 DIAGNOSIS — K21.9 GASTRO-ESOPHAGEAL REFLUX DISEASE WITHOUT ESOPHAGITIS: ICD-10-CM

## 2019-09-08 DIAGNOSIS — I10 ESSENTIAL (PRIMARY) HYPERTENSION: ICD-10-CM

## 2019-09-08 DIAGNOSIS — Z29.9 ENCOUNTER FOR PROPHYLACTIC MEASURES, UNSPECIFIED: ICD-10-CM

## 2019-09-08 DIAGNOSIS — Z95.1 PRESENCE OF AORTOCORONARY BYPASS GRAFT: Chronic | ICD-10-CM

## 2019-09-08 LAB
ALBUMIN SERPL ELPH-MCNC: 4.2 G/DL — SIGNIFICANT CHANGE UP (ref 3.3–5)
ALP SERPL-CCNC: 49 U/L — SIGNIFICANT CHANGE UP (ref 40–120)
ALT FLD-CCNC: 21 U/L — SIGNIFICANT CHANGE UP (ref 10–45)
ANION GAP SERPL CALC-SCNC: 14 MMOL/L — SIGNIFICANT CHANGE UP (ref 5–17)
AST SERPL-CCNC: 20 U/L — SIGNIFICANT CHANGE UP (ref 10–40)
BILIRUB SERPL-MCNC: 0.5 MG/DL — SIGNIFICANT CHANGE UP (ref 0.2–1.2)
BUN SERPL-MCNC: 17 MG/DL — SIGNIFICANT CHANGE UP (ref 7–23)
CALCIUM SERPL-MCNC: 9.5 MG/DL — SIGNIFICANT CHANGE UP (ref 8.4–10.5)
CHLORIDE SERPL-SCNC: 97 MMOL/L — SIGNIFICANT CHANGE UP (ref 96–108)
CK MB CFR SERPL CALC: 4 NG/ML — SIGNIFICANT CHANGE UP (ref 0–6.7)
CK SERPL-CCNC: 92 U/L — SIGNIFICANT CHANGE UP (ref 30–200)
CO2 SERPL-SCNC: 23 MMOL/L — SIGNIFICANT CHANGE UP (ref 22–31)
CREAT SERPL-MCNC: 1.02 MG/DL — SIGNIFICANT CHANGE UP (ref 0.5–1.3)
GLUCOSE SERPL-MCNC: 130 MG/DL — HIGH (ref 70–99)
HCT VFR BLD CALC: 50.8 % — HIGH (ref 39–50)
HGB BLD-MCNC: 16.3 G/DL — SIGNIFICANT CHANGE UP (ref 13–17)
INR BLD: 1.1 RATIO — SIGNIFICANT CHANGE UP (ref 0.88–1.16)
MCHC RBC-ENTMCNC: 28.9 PG — SIGNIFICANT CHANGE UP (ref 27–34)
MCHC RBC-ENTMCNC: 32.1 GM/DL — SIGNIFICANT CHANGE UP (ref 32–36)
MCV RBC AUTO: 89.8 FL — SIGNIFICANT CHANGE UP (ref 80–100)
PLATELET # BLD AUTO: 95 K/UL — LOW (ref 150–400)
POTASSIUM SERPL-MCNC: 4 MMOL/L — SIGNIFICANT CHANGE UP (ref 3.5–5.3)
POTASSIUM SERPL-SCNC: 4 MMOL/L — SIGNIFICANT CHANGE UP (ref 3.5–5.3)
PROT SERPL-MCNC: 7.2 G/DL — SIGNIFICANT CHANGE UP (ref 6–8.3)
PROTHROM AB SERPL-ACNC: 12.6 SEC — SIGNIFICANT CHANGE UP (ref 10–12.9)
RBC # BLD: 5.66 M/UL — SIGNIFICANT CHANGE UP (ref 4.2–5.8)
RBC # FLD: 13.5 % — SIGNIFICANT CHANGE UP (ref 10.3–14.5)
SODIUM SERPL-SCNC: 134 MMOL/L — LOW (ref 135–145)
TROPONIN T, HIGH SENSITIVITY RESULT: 10 NG/L — SIGNIFICANT CHANGE UP (ref 0–51)
TROPONIN T, HIGH SENSITIVITY RESULT: 10 NG/L — SIGNIFICANT CHANGE UP (ref 0–51)
WBC # BLD: 8.3 K/UL — SIGNIFICANT CHANGE UP (ref 3.8–10.5)
WBC # FLD AUTO: 8.3 K/UL — SIGNIFICANT CHANGE UP (ref 3.8–10.5)

## 2019-09-08 PROCEDURE — 99285 EMERGENCY DEPT VISIT HI MDM: CPT

## 2019-09-08 PROCEDURE — 99223 1ST HOSP IP/OBS HIGH 75: CPT

## 2019-09-08 PROCEDURE — 71046 X-RAY EXAM CHEST 2 VIEWS: CPT | Mod: 26

## 2019-09-08 RX ORDER — ASPIRIN/CALCIUM CARB/MAGNESIUM 324 MG
81 TABLET ORAL DAILY
Refills: 0 | Status: DISCONTINUED | OUTPATIENT
Start: 2019-09-08 | End: 2019-09-10

## 2019-09-08 RX ORDER — TACROLIMUS 5 MG/1
1 CAPSULE ORAL
Qty: 0 | Refills: 0 | DISCHARGE

## 2019-09-08 RX ORDER — FAMOTIDINE 10 MG/ML
20 INJECTION INTRAVENOUS ONCE
Refills: 0 | Status: COMPLETED | OUTPATIENT
Start: 2019-09-08 | End: 2019-09-08

## 2019-09-08 RX ORDER — METOPROLOL TARTRATE 50 MG
1 TABLET ORAL
Qty: 0 | Refills: 0 | DISCHARGE

## 2019-09-08 RX ORDER — TACROLIMUS 5 MG/1
2 CAPSULE ORAL
Refills: 0 | Status: DISCONTINUED | OUTPATIENT
Start: 2019-09-08 | End: 2019-09-10

## 2019-09-08 RX ORDER — CLOPIDOGREL BISULFATE 75 MG/1
75 TABLET, FILM COATED ORAL DAILY
Refills: 0 | Status: DISCONTINUED | OUTPATIENT
Start: 2019-09-08 | End: 2019-09-10

## 2019-09-08 RX ORDER — ATORVASTATIN CALCIUM 80 MG/1
10 TABLET, FILM COATED ORAL AT BEDTIME
Refills: 0 | Status: DISCONTINUED | OUTPATIENT
Start: 2019-09-08 | End: 2019-09-10

## 2019-09-08 RX ORDER — MAGNESIUM OXIDE 400 MG ORAL TABLET 241.3 MG
400 TABLET ORAL DAILY
Refills: 0 | Status: DISCONTINUED | OUTPATIENT
Start: 2019-09-08 | End: 2019-09-10

## 2019-09-08 RX ORDER — FAMOTIDINE 10 MG/ML
20 INJECTION INTRAVENOUS DAILY
Refills: 0 | Status: DISCONTINUED | OUTPATIENT
Start: 2019-09-08 | End: 2019-09-10

## 2019-09-08 RX ORDER — METOPROLOL TARTRATE 50 MG
25 TABLET ORAL DAILY
Refills: 0 | Status: DISCONTINUED | OUTPATIENT
Start: 2019-09-08 | End: 2019-09-10

## 2019-09-08 RX ORDER — SIMETHICONE 80 MG/1
80 TABLET, CHEWABLE ORAL
Refills: 0 | Status: DISCONTINUED | OUTPATIENT
Start: 2019-09-08 | End: 2019-09-10

## 2019-09-08 RX ORDER — MYCOPHENOLATE MOFETIL 250 MG/1
500 CAPSULE ORAL
Refills: 0 | Status: DISCONTINUED | OUTPATIENT
Start: 2019-09-08 | End: 2019-09-10

## 2019-09-08 RX ORDER — ASPIRIN/CALCIUM CARB/MAGNESIUM 324 MG
324 TABLET ORAL ONCE
Refills: 0 | Status: COMPLETED | OUTPATIENT
Start: 2019-09-08 | End: 2019-09-08

## 2019-09-08 RX ORDER — MYCOPHENOLATE MOFETIL 250 MG/1
2 CAPSULE ORAL
Qty: 0 | Refills: 0 | DISCHARGE

## 2019-09-08 RX ORDER — ACETAMINOPHEN 500 MG
650 TABLET ORAL ONCE
Refills: 0 | Status: COMPLETED | OUTPATIENT
Start: 2019-09-08 | End: 2019-09-08

## 2019-09-08 RX ORDER — INFLUENZA VIRUS VACCINE 15; 15; 15; 15 UG/.5ML; UG/.5ML; UG/.5ML; UG/.5ML
0.5 SUSPENSION INTRAMUSCULAR ONCE
Refills: 0 | Status: DISCONTINUED | OUTPATIENT
Start: 2019-09-08 | End: 2019-09-10

## 2019-09-08 RX ORDER — VALGANCICLOVIR 450 MG/1
1 TABLET, FILM COATED ORAL
Qty: 0 | Refills: 0 | DISCHARGE

## 2019-09-08 RX ORDER — CLOPIDOGREL BISULFATE 75 MG/1
1 TABLET, FILM COATED ORAL
Qty: 0 | Refills: 0 | DISCHARGE

## 2019-09-08 RX ORDER — VELPATASVIR AND SOFOSBUVIR 37.5; 15 MG/1; MG/1
1 PELLET ORAL
Qty: 0 | Refills: 0 | DISCHARGE

## 2019-09-08 RX ORDER — ASPIRIN/CALCIUM CARB/MAGNESIUM 324 MG
1 TABLET ORAL
Qty: 0 | Refills: 0 | DISCHARGE

## 2019-09-08 RX ORDER — TACROLIMUS 5 MG/1
2 CAPSULE ORAL
Refills: 0 | Status: DISCONTINUED | OUTPATIENT
Start: 2019-09-08 | End: 2019-09-08

## 2019-09-08 RX ADMIN — MYCOPHENOLATE MOFETIL 500 MILLIGRAM(S): 250 CAPSULE ORAL at 08:58

## 2019-09-08 RX ADMIN — Medication 650 MILLIGRAM(S): at 17:21

## 2019-09-08 RX ADMIN — Medication 324 MILLIGRAM(S): at 03:38

## 2019-09-08 RX ADMIN — MYCOPHENOLATE MOFETIL 500 MILLIGRAM(S): 250 CAPSULE ORAL at 20:30

## 2019-09-08 RX ADMIN — TACROLIMUS 2 MILLIGRAM(S): 5 CAPSULE ORAL at 08:18

## 2019-09-08 RX ADMIN — TACROLIMUS 2 MILLIGRAM(S): 5 CAPSULE ORAL at 20:31

## 2019-09-08 RX ADMIN — Medication 5 MILLIGRAM(S): at 11:10

## 2019-09-08 RX ADMIN — Medication 650 MILLIGRAM(S): at 18:08

## 2019-09-08 RX ADMIN — FAMOTIDINE 20 MILLIGRAM(S): 10 INJECTION INTRAVENOUS at 11:10

## 2019-09-08 RX ADMIN — MAGNESIUM OXIDE 400 MG ORAL TABLET 400 MILLIGRAM(S): 241.3 TABLET ORAL at 11:10

## 2019-09-08 RX ADMIN — Medication 1 TABLET(S): at 11:10

## 2019-09-08 RX ADMIN — Medication 25 MILLIGRAM(S): at 21:47

## 2019-09-08 RX ADMIN — CLOPIDOGREL BISULFATE 75 MILLIGRAM(S): 75 TABLET, FILM COATED ORAL at 11:10

## 2019-09-08 RX ADMIN — FAMOTIDINE 20 MILLIGRAM(S): 10 INJECTION INTRAVENOUS at 03:38

## 2019-09-08 RX ADMIN — Medication 81 MILLIGRAM(S): at 11:10

## 2019-09-08 RX ADMIN — ATORVASTATIN CALCIUM 10 MILLIGRAM(S): 80 TABLET, FILM COATED ORAL at 21:47

## 2019-09-08 NOTE — H&P ADULT - ASSESSMENT
59 year old male with a PMHx of CABG x5, renal transplant secondary to PKD, and HTN presents to [facility] c/o sided chest pain, is being admitted for ACS workup

## 2019-09-08 NOTE — H&P ADULT - NSHPREVIEWOFSYSTEMS_GEN_ALL_CORE
REVIEW OF SYSTEMS:  CONSTITUTIONAL: No weakness, fevers or chills  EYES: no blurry vision or eye pain.   ENT: No throat pain. No dysphagia.    NECK: No pain or stiffness  RESPIRATORY: No cough, wheezing, hemoptysis; No shortness of breath  CARDIOVASCULAR: No chest pain or palpitations.  GASTROINTESTINAL: No abdominal pain. No nausea or vomiting; No diarrhea or constipation. No melena or hematochezia.  GENITOURINARY: No dysuria, frequency or hematuria  NEUROLOGICAL: No numbness or weakness. No dizziness or falls.   SKIN: No itching, burning, rashes, or lesions.   LYMPHATIC: No masses or swelling.

## 2019-09-08 NOTE — H&P ADULT - PROBLEM SELECTOR PLAN 4
had HTN urgency at home, no BP stabilized (pt took metoprolol 60 mg at home to treat elevated BP)  c/w metoprolol 25 mg daily  - monitor BP closely

## 2019-09-08 NOTE — H&P ADULT - NSICDXPASTSURGICALHX_GEN_ALL_CORE_FT
PAST SURGICAL HISTORY:  AV fistula right lower extremity 2 yrs  Left upper extrmity with graft 7 years ago    AV fistula b/l MILI GREEN    CABG (Coronary Artery Bypass Graft) 2007    S/P CABG x 5     S/P coronary artery stent placement     S/P hemorrhoidectomy and rectal polypectomy -2/3/2017.    Stented coronary artery x2 cardiac stents in 2016, one cardiac stent in 2015

## 2019-09-08 NOTE — ED ADULT NURSE NOTE - NSIMPLEMENTINTERV_GEN_ALL_ED
Implemented All Universal Safety Interventions:  [place] to call system. Call bell, personal items and telephone within reach. Instruct patient to call for assistance. Room bathroom lighting operational. Non-slip footwear when patient is off stretcher. Physically safe environment: no spills, clutter or unnecessary equipment. Stretcher in lowest position, wheels locked, appropriate side rails in place.

## 2019-09-08 NOTE — CONSULT NOTE ADULT - ASSESSMENT
58 yr old with pmh of CAD s/p CABG/PCI, HTN, ESRD s/p kidney transplant, asthma presenting with chest pain, elevated BP.     1. Chest pain  atypical, likely 2/2 to elevated bp  HST negative x 2, no evidence of acute ischemia/ACS , EKG unchanged, NSR w/ RBBB  no evidence of fluid overload, CXR clear   last echo from May 2018 with normal LV function  recheck echo   continue asa, statin    2. HTN urgency  bp elevated on arrival to ED  now improved 58 yr old with pmh of CAD s/p CABG/PCI, HTN, ESRD, s/p kidney transplant, htn, asthma presenting with chest pain.     1. Chest pain, atypical  likely GI related , occurred after eating associated with indigestion   HST negative x 2, no evidence of acute ischemia/ACS , EKG unchanged, NSR w/ RBBB  no evidence of fluid overload, CXR clear   last echo from May 2018 with normal LV function  can recheck echo   continue pepcid as ordered  can start PPI, GI eval   continue asa, statin    2. HTN  bp stable   c/w metoprolol 25 mg daily    3. Asthma   pulm f/u   stable    4. ESRD s/p renal transplant  on tacrolimus  renal eval     dvt ppx 58 yr old with pmh of CAD s/p CABG/PCI, HTN, ESRD, s/p kidney transplant, htn, asthma presenting with chest pain.     1. Chest pain, atypical  likely GI related , hx of GERD   HST negative x 2, no evidence of acute ischemia/ACS , EKG unchanged, NSR w/ RBBB  no evidence of fluid overload, CXR clear   last echo from May 2018 with normal LV function  recheck echo   continue pepcid as ordered  can start PPI, GI eval   continue asa, statin    2. HTN  bp stable   c/w metoprolol 25 mg daily    3. Asthma   pulm f/u   stable    4. ESRD s/p renal transplant  on tacrolimus  renal eval     dvt ppx

## 2019-09-08 NOTE — ED PROVIDER NOTE - CLINICAL SUMMARY MEDICAL DECISION MAKING FREE TEXT BOX
59 year old male, pmhx of renal transplant, CABG x5, presenting with chest pain. Will obtain EKG, cxr, cbc, cmp, magnesium, coags, BNP, troponin. Plan to rule out ACS and monitor patient's BP. Low suspicion for PE given exam findings and history 59 year old male, pmhx of renal transplant, CABG x5, presenting with chest pain. Will obtain EKG, cxr, cbc, cmp, magnesium, coags, BNP, troponin. Plan to rule out ACS and monitor patient's BP. Low suspicion for PE given exam findings and history    Attending Statement: Agree with the above.  CP concerning for acute coronary syndrome in pt c numerous comorbidities as above.  Initial ekg nonischemic.  Plan as above; down pathology requiring CT-A (dissection, PE, etc).  Will reassess.  --BMM

## 2019-09-08 NOTE — ED ADULT NURSE NOTE - OBJECTIVE STATEMENT
59y male with hx of kidney transplant, HTN, obesity, asthma presents to the ER c/o chest pain. pt is alert and oriented x 4 and speaking coherently. Pt states that around 10pm he began to have left sided chest pain, pt states he took his BP and noticed he was hypertensive. pt became concerned because of his transplant. pt then took 3 20mg metoprolol tabs. Pt still c/o chest pain in ER. Pt states he also feels short of breath. PT placed on cardiac monitor. ekg completed. family at the bedside. will reassess.

## 2019-09-08 NOTE — H&P ADULT - NSICDXFAMILYHX_GEN_ALL_CORE_FT
FAMILY HISTORY:  Sibling  Still living? Yes, Estimated age: Age Unknown  Family history of adult polycystic kidney disease, Age at diagnosis: Age Unknown  Family history of polycystic kidney, Age at diagnosis: Age Unknown

## 2019-09-08 NOTE — ED PROVIDER NOTE - CARDIAC, MLM
Normal rate, regular rhythm.  Heart sounds S1, S2.  No murmurs, rubs or gallops. Pitting edema to mid shin b/l

## 2019-09-08 NOTE — CONSULT NOTE ADULT - SUBJECTIVE AND OBJECTIVE BOX
Patient is a 59y old  Male who presents with a chief complaint of Chest pain (08 Sep 2019 09:45)      HPI:  59 year old male with a PMHx of CABG x5, renal transplant secondary to PKD, and HTN presents to [facility] c/o sided chest pain. Patient states the pain started yesterday evening around 9 pm. He was walking in his room when he noted pressure like left chest pain pain, 7/10 and constant The pain radiated to the shoulder and later for ~1. He states during this time his son-in-law took his blood pressure which was elevated 170s/104. Patient states he took 60 mg of Metoprolol to treat the BP. He notes also having headache and sob during this event. Patient denies any sweating, fever, chills, cough, abd pain, nausea, vomiting, diarrhea, or dysuria. He states he has been compliant with his medications. He states he is currently CP free, denies any complaints at this time.     In the ED Pt's BP stabilized spontaneously (08 Sep 2019 09:45)    he has no SOB and he used to take nebulizers which he stopped 56  months ago and he had ELIJAH TOO AND BUT LATELY HAVE NOT BEEN USING CPAP MACHINE: HE IS NOT SOB AND HAS MINOR PHLEGM AT TIMES ESPECIALLY FATHER HE BRUSHES HIS TEETH IN am     ?FOLLOWING PRESENT  [ x] Hx of PE/DVT, [ x] Hx COPD, x[ ] Hx of Asthma, [y] Hx of Hospitalization, [x ]  Hx of BiPAP/CPAP use, [ x] Hx of ELIJAH    Allergies    No Known Allergies    Intolerances        PAST MEDICAL & SURGICAL HISTORY:  HTN (hypertension)  Coronary artery disease involving coronary bypass graft  ESRD (end stage renal disease) on dialysis  Obesity  Hemorrhoids  Gastroesophageal reflux disease without esophagitis  High cholesterol  ESRD on Dialysis: [facility]; [facility]  CAD (Coronary Artery Disease)  PCK (Polycystic Kidney Disease)  HTN - Hypertension  Asthma: last attack 2007, never hospitalized or intubated  AV fistula: b/l MILI GREEN  S/P coronary artery stent placement  S/P CABG x 5  S/P hemorrhoidectomy: and rectal polypectomy -2/3/2017.  AV fistula: right lower extremity 2 yrs  Left upper extrmity with graft 7 years ago  Stented coronary artery: x2 cardiac stents in 2016, one cardiac stent in 2015  CABG (Coronary Artery Bypass Graft): 2007      FAMILY HISTORY:  Family history of polycystic kidney (Sibling)  Family history of adult polycystic kidney disease (Sibling)      Social History: [ x ] TOBACCO                  [x  ] ETOH                                 [ x ] IVDA/DRUGS    REVIEW OF SYSTEMS      General:	x    Skin/Breast:x  	  Ophthalmologic:x  	x  ENMT:	    Respiratory and Thorax: mnior phlegm   	  Cardiovascular:	chest pain     Gastrointestinal:	x    Genitourinary:	x    Musculoskeletal:	x    Neurological:	x    Psychiatric:	x    Hematology/Lymphatics:	x    Endocrine:	x    Allergic/Immunologic:	x    MEDICATIONS  (STANDING):  aspirin enteric coated 81 milliGRAM(s) Oral daily  atorvastatin 10 milliGRAM(s) Oral at bedtime  clopidogrel Tablet 75 milliGRAM(s) Oral daily  famotidine    Tablet 20 milliGRAM(s) Oral daily  influenza   Vaccine 0.5 milliLiter(s) IntraMuscular once  magnesium oxide 400 milliGRAM(s) Oral daily  mycophenolate mofetil 500 milliGRAM(s) Oral two times a day  predniSONE   Tablet 5 milliGRAM(s) Oral daily  tacrolimus 2 milliGRAM(s) Oral two times a day  trimethoprim   80 mG/sulfamethoxazole 400 mG 1 Tablet(s) Oral daily    MEDICATIONS  (PRN):  simethicone 80 milliGRAM(s) Chew two times a day PRN Upset Stomach       Vital Signs Last 24 Hrs  T(C): 36.3 (08 Sep 2019 09:45), Max: 36.6 (08 Sep 2019 01:35)  T(F): 97.4 (08 Sep 2019 09:45), Max: 97.8 (08 Sep 2019 01:35)  HR: 53 (08 Sep 2019 09:45) (50 - 62)  BP: 142/77 (08 Sep 2019 09:45) (118/79 - 177/84)  BP(mean): 98 (08 Sep 2019 09:45) (98 - 98)  RR: 18 (08 Sep 2019 09:45) (16 - 20)  SpO2: 96% (08 Sep 2019 09:45) (96% - 98%)        I&O's Summary      Physical Exam:   GENERAL: NAD, well-groomed, well-developed  HEENT: JESUS/   Atraumatic, Normocephalic  ENMT: No tonsillar erythema, exudates, or enlargement; Moist mucous membranes, Good dentition, No lesions  NECK: Supple, No JVD, Normal thyroid  CHEST/LUNG: Clear to auscultation bilaterally  CVS: Regular rate and rhythm; No murmurs, rubs, or gallops  GI: : Soft, Nontender, Nondistended; Bowel sounds present  NERVOUS SYSTEM:  Alert & Oriented X3  EXTREMITIES:  2+ Peripheral Pulses, No clubbing, cyanosis, or edema  LYMPH: No lymphadenopathy noted  SKIN: No rashes or lesions  ENDOCRINOLOGY: No Thyromegaly  PSYCH: Appropriate    Labs:    CARDIAC MARKERS ( 08 Sep 2019 06:21 )  x     / x     / 92 U/L / x     / 4.0 ng/mL                            16.3   8.3   )-----------( 95       ( 08 Sep 2019 02:25 )             50.8     09-08    134<L>  |  97  |  17  ----------------------------<  130<H>  4.0   |  23  |  1.02    Ca    9.5      08 Sep 2019 02:25  Mg     1.6     09-08    TPro  7.2  /  Alb  4.2  /  TBili  0.5  /  DBili  x   /  AST  20  /  ALT  21  /  AlkPhos  49  09-08    CAPILLARY BLOOD GLUCOSE        LIVER FUNCTIONS - ( 08 Sep 2019 02:25 )  Alb: 4.2 g/dL / Pro: 7.2 g/dL / ALK PHOS: 49 U/L / ALT: 21 U/L / AST: 20 U/L / GGT: x           PT/INR - ( 08 Sep 2019 02:25 )   PT: See Note;   INR: See Note ratio             D DImer  Serum Pro-Brain Natriuretic Peptide: 372 pg/mL (09-08 @ 02:25)      Studies  Chest X-RAY  CT SCAN Chest   CT Abdomen  Venous Dopplers: LE:   Others    < from: Xray Chest 2 Views PA/Lat (09.08.19 @ 03:11) >  EXAM:  XR CHEST PA LAT 2V                            PROCEDURE DATE:  09/08/2019            INTERPRETATION:  CLINICAL INFORMATION: Chest pain.    TECHNIQUE: Frontal and lateral radiographs of the chest dated 9/8/2019   3:11 AM.    COMPARISON: Chestradiograph from 1/6/2019.    FINDINGS:  Status post median sternotomy. Two left upper extremity vascular stents.  The lungs are clear.  No pleural effusions. No pneumothorax.  Cardiac size is within normal limits.   No acute osseous abnormality.       IMPRESSION: Clear lungs.                BRAYAN MO M.D., RADIOLOGY RESIDENT  This document has been electronically signed.  CARMEN RAINES M.D., ATTENDING RADIOLOGIST  This document has been electronically signed. Sep  8 2019  6:11AM    < end of copied text >

## 2019-09-08 NOTE — CONSULT NOTE ADULT - PROBLEM SELECTOR RECOMMENDATION 2
He says he used to have asthma dn used to use albuterol prn: but now the medications have arnulfo dceD: he hasnot been wheezing

## 2019-09-08 NOTE — H&P ADULT - NSHPLABSRESULTS_GEN_ALL_CORE
Labs personally reviewed:                          16.3   8.3   )-----------( 95       ( 08 Sep 2019 02:25 )             50.8       09-08    134<L>  |  97  |  17  ----------------------------<  130<H>  4.0   |  23  |  1.02    Ca    9.5      08 Sep 2019 02:25  Mg     1.6     09-08    TPro  7.2  /  Alb  4.2  /  TBili  0.5  /  DBili  x   /  AST  20  /  ALT  21  /  AlkPhos  49  09-08      PT/INR - ( 08 Sep 2019 02:25 )   PT: See Note;   INR: See Note ratio               Imaging personally reviewed: CXR clear lungs     EKG personally reviewed: NSR 63 HR , unchanged from prior EKG

## 2019-09-08 NOTE — H&P ADULT - PROBLEM SELECTOR PLAN 1
Presented with pressure like pain radiating to the shoulder, assoicated with mild SOB. trop 10 x2 , no EKG changes, now CP free and asymptomatic   - has significant CAD h/o   - trend trop till peak   - c/w ASA and Plavix   - c/w statin   - tele monitoring   - cardiology consult, consider stress test

## 2019-09-08 NOTE — CONSULT NOTE ADULT - SUBJECTIVE AND OBJECTIVE BOX
CARDIOLOGY CONSULT - Dr. Pérez     CHIEF COMPLAINT: Chest pain     HPI: 59 year old male with a pmhx of CABG x5, renal transplant secondary to PKD, presents to ED for evaluation of left sided chest pain. Patient states the pain started yesterday evening around the time is blood pressure was found to be elevated (170s systolic). It is unclear if the cp or elevated BP was first. Patient states he took 60 mg of Metoprolol to treat the BP. He notes also having headache and sob. Patient denies any radiation of the pain, sweating, fever, chills, cough, abd pain, nausea, vomiting, diarrhea, or dysuria. He states he has been compliant with his medications. Currently resting comfortably with improved BP.      PAST MEDICAL & SURGICAL HISTORY:  HTN (hypertension)  Coronary artery disease involving coronary bypass graft  ESRD (end stage renal disease) on dialysis  Obesity  Hemorrhoids  Gastroesophageal reflux disease without esophagitis  High cholesterol  ESRD on Dialysis: Tue, Thur &amp; Sat  CAD (Coronary Artery Disease)  PCK (Polycystic Kidney Disease)  HTN - Hypertension  Asthma: last attack , never hospitalized or intubated  AV fistula: b/l MILI GREEN  S/P coronary artery stent placement  S/P CABG x 5  S/P hemorrhoidectomy: and rectal polypectomy -2/3/2017.  AV fistula: right lower extremity 2 yrs  Left upper extrmity with graft 7 years ago  Stented coronary artery: x2 cardiac stents in , one cardiac stent in   CABG (Coronary Artery Bypass Graft):           PREVIOUS DIAGNOSTIC TESTING:    [ ] Echocardiogram: < from: TTE with Doppler (w/Cont) (18 @ 14:29) >  Conclusions:  1. Normal left ventricular internal dimensions and wall  thicknesses.  2. Normal left ventricular systolic function.   Endocardial  visualization enhanced with intravenous injection of echo  contrast (Definity).    < end of copied text >    [ ]  Catheterization: < from: Cardiac Cath Lab - Adult (17 @ 12:24) >  [address] [place] [address] [phone]  Cath Lab Report -- Comprehensive Report  Patient: FLOYD NEVES  Study date: 2017  Account number: [id number]  MR number: [id number]  : 1960  Gender: Male  Race:   Case Physician(s):  Wily Chauhan M.D.  Fellow:  Johnson Diaz M.D.  Referring Physician:  Wily Chauhan M.D.  INDICATIONS: Other chest pain. Abnormal stress test.  HISTORY: History includes ischemic cardiomyopathy. The patient has a history of  coronary artery disease. The patient has hypertension, dialysis-treated renal  failure, and medication-treated dyslipidemia. PRIOR CARDIOVASCULAR PROCEDURES:  Stent of the 2nd obtuse marginal ( 2015). RESOLUTE stents to proximal  circumflex and OM2 ( 2016). Coronary bypass ().  PRIOR DIAGNOSTIC TEST RESULTS: Nuclear pharmacologic stress test was positive.  PROCEDURE:  --  Left coronary angiography.  --  Left heart catheterization.  --  Right coronary angiography.  --  Saphenous vein graft angiography.  --  LIMA graft angiography.  TECHNIQUE: Oxygen 2 L/min. The risks and alternatives of the procedures and  conscious sedation were explained to the patient and informed consent was  obtained. Cardiac catheterization performed electively.  Right femoral artery access. Local anesthetic given. The puncture site was  infiltrated with 2 % lidocaine. A 5fr Sheath [place] was inserted in the  vessel. Left coronary artery angiography. The vessel was injected utilizing a  5fr FL 4 Expo catheter. Left heart catheterization. A 5fr FR 4 Expo catheter  was utilized. After recording ascending aortic pressure, the catheter was  advanced across the aortic valve and left ventricular pressure was recorded.  Right coronary artery angiography. The vessel was injected utilizing a 5fr FR 4  Expo catheter. Saphenous vein graft angiography. The vessel was injected  utilizing a 5fr FR 4 Expo catheter. Left internal mammary graft angiography.  The vessel was injected utilizing a 5fr FR 4 Expo catheter. RADIATION EXPOSURE:  7.1 min.  CONTRAST GIVEN: 32 ml Optiray.  MEDICATIONS GIVEN: 2% Lidocaine, 10 ml, subcutaneously. 0.9% Normal saline, 10  ml, IV.  VENTRICLES: No left ventriculogram was performed.  CORONARY VESSELS: The coronary circulation is right dominant.  LM:   --  Distal left main: There was a discrete 30 % stenosis.  LAD:   --  Proximal LAD: There was a 100 % stenosis. There was DESTINEE grade 0  flow through the vessel (no flow). --  Distal LAD: Angiography showed minor  luminal irregularities with no flow limiting lesions.  CX:   --  Proximal circumflex: There was a tubular 10 % stenosis at the site of  a prior stent. --  Mid circumflex: There was a tubular 20 % stenosis at the  site of a prior stent.  RCA:   --  Proximal RCA: The distal vessel was supplied by collaterals from  septal branches of LAD and the first obtuse marginal. There was a 100 %  stenosis. There was DESTINEE grade 0 flow through the vessel (no flow).  GRAFTS:   --  Graft to the mid LAD: The graft was a LIMA. It was normal.  --  Graft to the 1st obtuse marginal: The graft was a saphenous vein graft from  the aorta. Graft angiography showed minor luminal irregularities.  COMPLICATIONS: There were no complications.  DIAGNOSTIC RECOMMENDATIONS: Medical management is recommended. Elevated LVEDP.  Patient with RCA .  Prepared and signed by  Wily Chauhan M.D.    < end of copied text >    [ ] Stress Test:  	< from: Nuclear Stress Test-Pharmacologic (17 @ 12:49) >  IMPRESSIONS:Abnormal Study  * Consistent with infarction with moderate mckay-infarct  ischemia.  * Review of raw data shows: The study is of good technical  quality.  * The left ventricle was enlarged. There are large,  moderate to severe defects in inferior, inferolateral and  lateral walls that are fixed with moderatereversible  mckay-infarct ischemia, consistent with infarction with  moderate mckay-infarct ischemia.  * Post-stress gated wall motion analysis was performed  (LVEF = 46 %;LVEDV = 152 ml.)  *** Compared with Nuclear/Stress test of 2016, no  significant changes noted. Prior LVEF was 30% with   mL    < end of copied text >      MEDICATIONS:  MEDICATIONS  (STANDING):  aspirin enteric coated 81 milliGRAM(s) Oral daily  atorvastatin 10 milliGRAM(s) Oral at bedtime  famotidine    Tablet 20 milliGRAM(s) Oral daily  influenza   Vaccine 0.5 milliLiter(s) IntraMuscular once  magnesium oxide 400 milliGRAM(s) Oral daily  mycophenolate mofetil 500 milliGRAM(s) Oral two times a day  predniSONE   Tablet 5 milliGRAM(s) Oral daily  tacrolimus 2 milliGRAM(s) Oral two times a day  trimethoprim   80 mG/sulfamethoxazole 400 mG 1 Tablet(s) Oral daily      FAMILY HISTORY:  Family history of polycystic kidney (Sibling)  Family history of adult polycystic kidney disease (Sibling)      SOCIAL HISTORY:    [X] Non-smoker  [ ] Smoker  [ ] Alcohol    Allergies    No Known Allergies    Intolerances    	    REVIEW OF SYSTEMS:  CONSTITUTIONAL: No fever, weight loss, or fatigue  EYES: No eye pain, visual disturbances, or discharge  ENMT:  No difficulty hearing, tinnitus, vertigo; No sinus or throat pain  NECK: No pain or stiffness  RESPIRATORY: No cough, wheezing, chills or hemoptysis; +Shortness of Breath  CARDIOVASCULAR: +chest pain, palpitations, passing out, dizziness, or leg swelling  GASTROINTESTINAL: No abdominal or epigastric pain. No nausea, vomiting, or hematemesis; No diarrhea or constipation. No melena or hematochezia.  GENITOURINARY: No dysuria, frequency, hematuria, or incontinence  NEUROLOGICAL: +headaches, memory loss, loss of strength, numbness, or tremors  SKIN: No itching, burning, rashes, or lesions   	    [ X] All others negative	  [ ] Unable to obtain    PHYSICAL EXAM:  T(C): 36.3 (19 @ 07:55), Max: 36.6 (19 @ 01:35)  HR: 53 (19 @ 07:55) (50 - 62)  BP: 142/77 (19 @ 07:55) (118/79 - 177/84)  RR: 18 (19 @ 07:55) (16 - 20)  SpO2: 96% (19 @ 07:55) (96% - 98%)  Wt(kg): --  I&O's Summary      Appearance: Normal	  Psychiatry: A & O x 3, Mood & affect appropriate  HEENT:   Normal oral mucosa, PERRL, EOMI	  Lymphatic: No lymphadenopathy  Cardiovascular: Normal S1 S2,RRR, No JVD, No murmurs  Respiratory: Lungs clear to auscultation	  Gastrointestinal:  Soft, Non-tender, + BS	  Skin: No rashes, No ecchymoses, No cyanosis	  Neurologic: Non-focal  Extremities: Normal range of motion, No clubbing, cyanosis or edema  Vascular: Peripheral pulses palpable 2+ bilaterally    TELEMETRY: 	    ECG:  	nsr 63, rbbb, unchanged from prior EKG, no evidence of acute ischemia   RADIOLOGY: < from: Xray Chest 2 Views PA/Lat (19 @ 03:11) >  IMPRESSION: Clear lungs.    < end of copied text >    OTHER: 	  	  LABS:	 	    CARDIAC MARKERS:                                  16.3   8.3   )-----------( 95       ( 08 Sep 2019 02:25 )             50.8         134<L>  |  97  |  17  ----------------------------<  130<H>  4.0   |  23  |  1.02    Ca    9.5      08 Sep 2019 02:25  Mg     1.6         TPro  7.2  /  Alb  4.2  /  TBili  0.5  /  DBili  x   /  AST  20  /  ALT  21  /  AlkPhos  49      PT/INR - ( 08 Sep 2019 02:25 )   PT: See Note;   INR: See Note ratio           proBNP: Serum Pro-Brain Natriuretic Peptide: 372 pg/mL ( @ 02:25)    Lipid Profile:   HgA1c:   TSH: CARDIOLOGY CONSULT - Dr. Pérez     CHIEF COMPLAINT: Chest pain     HPI: 59 year old male with a pmhx of CABG x5, renal transplant secondary to PKD, HTN,  presents to ED for evaluation of left sided chest pain. As per patient he states he had this pain on thursday after eating out at [employer]. It starting in the stomach and felt like indigestion, burning sensastion. He then states he had broccoli on saturday and the pain returned. Patient also states his bp was elevated. Currently resting comfortably, chest pain resolved, denies cp/sob/johnson, denies le edema. Denies fevers, chills, cough.       PAST MEDICAL & SURGICAL HISTORY:  HTN (hypertension)  Coronary artery disease involving coronary bypass graft  ESRD (end stage renal disease) on dialysis  Obesity  Hemorrhoids  Gastroesophageal reflux disease without esophagitis  High cholesterol  ESRD on Dialysis: Tue, Thur &amp; Sat  CAD (Coronary Artery Disease)  PCK (Polycystic Kidney Disease)  HTN - Hypertension  Asthma: last attack , never hospitalized or intubated  AV fistula: b/l MILI GREEN  S/P coronary artery stent placement  S/P CABG x 5  S/P hemorrhoidectomy: and rectal polypectomy -2/3/2017.  AV fistula: right lower extremity 2 yrs  Left upper extrmity with graft 7 years ago  Stented coronary artery: x2 cardiac stents in , one cardiac stent in   CABG (Coronary Artery Bypass Graft):           PREVIOUS DIAGNOSTIC TESTING:    [ ] Echocardiogram: < from: TTE with Doppler (w/Cont) (18 @ 14:29) >  Conclusions:  1. Normal left ventricular internal dimensions and wall  thicknesses.  2. Normal left ventricular systolic function.   Endocardial  visualization enhanced with intravenous injection of echo  contrast (Definity).    < end of copied text >    [ ]  Catheterization: < from: Cardiac Cath Lab - Adult (17 @ 12:24) >  [address] [place] [address]  [phone]  Cath Lab Report -- Comprehensive Report  Patient: FLOYD NEVES  Study date: 2017  Account number: [id number]  MR number: [id number]  : 1960  Gender: Male  Race:   Case Physician(s):  Wily Chauhan M.D.  Fellow:  Johnson Diaz M.D.  Referring Physician:  Wily Chauhan M.D.  INDICATIONS: Other chest pain. Abnormal stress test.  HISTORY: History includes ischemic cardiomyopathy. The patient has a history of  coronary artery disease. The patient has hypertension, dialysis-treated renal  failure, and medication-treated dyslipidemia. PRIOR CARDIOVASCULAR PROCEDURES:  Stent of the 2nd obtuse marginal ( 2015). RESOLUTE stents to proximal  circumflex and OM2 ( 2016). Coronary bypass ().  PRIOR DIAGNOSTIC TEST RESULTS: Nuclear pharmacologic stress test was positive.  PROCEDURE:  --  Left coronary angiography.  --  Left heart catheterization.  --  Right coronary angiography.  --  Saphenous vein graft angiography.  --  LIMA graft angiography.  TECHNIQUE: Oxygen 2 L/min. The risks and alternatives of the procedures and  conscious sedation were explained to the patient and informed consent was  obtained. Cardiac catheterization performed electively.  Right femoral artery access. Local anesthetic given. The puncture site was  infiltrated with 2 % lidocaine. A 5fr Sheath [place] was inserted in the  vessel. Left coronary artery angiography. The vessel was injected utilizing a  5fr FL 4 Expo catheter. Left heart catheterization. A 5fr FR 4 Expo catheter  was utilized. After recording ascending aortic pressure, the catheter was  advanced across the aortic valve and left ventricular pressure was recorded.  Right coronary artery angiography. The vessel was injected utilizing a 5fr FR 4  Expo catheter. Saphenous vein graft angiography. The vessel was injected  utilizing a 5fr FR 4 Expo catheter. Left internal mammary graft angiography.  The vessel was injected utilizing a 5fr FR 4 Expo catheter. RADIATION EXPOSURE:  7.1 min.  CONTRAST GIVEN: 32 ml Optiray.  MEDICATIONS GIVEN: 2% Lidocaine, 10 ml, subcutaneously. 0.9% Normal saline, 10  ml, IV.  VENTRICLES: No left ventriculogram was performed.  CORONARY VESSELS: The coronary circulation is right dominant.  LM:   --  Distal left main: There was a discrete 30 % stenosis.  LAD:   --  Proximal LAD: There was a 100 % stenosis. There was DESTINEE grade 0  flow through the vessel (no flow). --  Distal LAD: Angiography showed minor  luminal irregularities with no flow limiting lesions.  CX:   --  Proximal circumflex: There was a tubular 10 % stenosis at the site of  a prior stent. --  Mid circumflex: There was a tubular 20 % stenosis at the  site of a prior stent.  RCA:   --  Proximal RCA: The distal vessel was supplied by collaterals from  septal branches of LAD and the first obtuse marginal. There was a 100 %  stenosis. There was DESTINEE grade 0 flow through the vessel (no flow).  GRAFTS:   --  Graft to the mid LAD: The graft was a LIMA. It was normal.  --  Graft to the 1st obtuse marginal: The graft was a saphenous vein graft from  the aorta. Graft angiography showed minor luminal irregularities.  COMPLICATIONS: There were no complications.  DIAGNOSTIC RECOMMENDATIONS: Medical management is recommended. Elevated LVEDP.  Patient with RCA .  Prepared and signed by  Wily Chauhan M.D.    < end of copied text >    [ ] Stress Test:  	< from: Nuclear Stress Test-Pharmacologic (17 @ 12:49) >  IMPRESSIONS:Abnormal Study  * Consistent with infarction with moderate mckay-infarct  ischemia.  * Review of raw data shows: The study is of good technical  quality.  * The left ventricle was enlarged. There are large,  moderate to severe defects in inferior, inferolateral and  lateral walls that are fixed with moderatereversible  mckay-infarct ischemia, consistent with infarction with  moderate mckay-infarct ischemia.  * Post-stress gated wall motion analysis was performed  (LVEF = 46 %;LVEDV = 152 ml.)  *** Compared with Nuclear/Stress test of 2016, no  significant changes noted. Prior LVEF was 30% with   mL    < end of copied text >      MEDICATIONS:  MEDICATIONS  (STANDING):  aspirin enteric coated 81 milliGRAM(s) Oral daily  atorvastatin 10 milliGRAM(s) Oral at bedtime  famotidine    Tablet 20 milliGRAM(s) Oral daily  influenza   Vaccine 0.5 milliLiter(s) IntraMuscular once  magnesium oxide 400 milliGRAM(s) Oral daily  mycophenolate mofetil 500 milliGRAM(s) Oral two times a day  predniSONE   Tablet 5 milliGRAM(s) Oral daily  tacrolimus 2 milliGRAM(s) Oral two times a day  trimethoprim   80 mG/sulfamethoxazole 400 mG 1 Tablet(s) Oral daily      FAMILY HISTORY:  Family history of polycystic kidney (Sibling)  Family history of adult polycystic kidney disease (Sibling)      SOCIAL HISTORY:    [X] Non-smoker  [ ] Smoker  [ ] Alcohol    Allergies    No Known Allergies    Intolerances    	    REVIEW OF SYSTEMS:  CONSTITUTIONAL: No fever, weight loss, or fatigue  EYES: No eye pain, visual disturbances, or discharge  ENMT:  No difficulty hearing, tinnitus, vertigo; No sinus or throat pain  NECK: No pain or stiffness  RESPIRATORY: No cough, wheezing, chills or hemoptysis;   CARDIOVASCULAR: +chest pain, palpitations, passing out, dizziness, or leg swelling  GASTROINTESTINAL: +abdominal or epigastric pain. No nausea, vomiting, or hematemesis; No diarrhea or constipation. No melena or hematochezia.  GENITOURINARY: No dysuria, frequency, hematuria, or incontinence  NEUROLOGICAL: +headaches, memory loss, loss of strength, numbness, or tremors  SKIN: No itching, burning, rashes, or lesions   	    [ X] All others negative	  [ ] Unable to obtain    PHYSICAL EXAM:  T(C): 36.3 (19 @ 07:55), Max: 36.6 (19 @ 01:35)  HR: 53 (19 @ 07:55) (50 - 62)  BP: 142/77 (19 @ 07:55) (118/79 - 177/84)  RR: 18 (19 @ 07:55) (16 - 20)  SpO2: 96% (19 @ 07:55) (96% - 98%)  Wt(kg): --  I&O's Summary      Appearance: Normal	  Psychiatry: A & O x 3, Mood & affect appropriate  HEENT:   Normal oral mucosa, PERRL, EOMI	  Lymphatic: No lymphadenopathy  Cardiovascular: Normal S1 S2,RRR, No JVD, No murmurs  Respiratory: Lungs clear to auscultation	  Gastrointestinal:  Soft, Non-tender, + BS	  Skin: No rashes, No ecchymoses, No cyanosis	  Neurologic: Non-focal  Extremities: Normal range of motion, No clubbing, cyanosis or edema  Vascular: Peripheral pulses palpable 2+ bilaterally    TELEMETRY: 	    ECG:  	nsr 63, rbbb, unchanged from prior EKG, no evidence of acute ischemia   RADIOLOGY: < from: Xray Chest 2 Views PA/Lat (19 @ 03:11) >  IMPRESSION: Clear lungs.    < end of copied text >    OTHER: 	  	  LABS:	 	    CARDIAC MARKERS:                                  16.3   8.3   )-----------( 95       ( 08 Sep 2019 02:25 )             50.8     -    134<L>  |  97  |  17  ----------------------------<  130<H>  4.0   |  23  |  1.02    Ca    9.5      08 Sep 2019 02:25  Mg     1.6         TPro  7.2  /  Alb  4.2  /  TBili  0.5  /  DBili  x   /  AST  20  /  ALT  21  /  AlkPhos  49      PT/INR - ( 08 Sep 2019 02:25 )   PT: See Note;   INR: See Note ratio           proBNP: Serum Pro-Brain Natriuretic Peptide: 372 pg/mL ( @ 02:25)    Lipid Profile:   HgA1c:   TSH: CARDIOLOGY CONSULT - Dr. Pérez     CHIEF COMPLAINT: Chest pain     HPI: 59 year old male with a pmhx of CABG x5, renal transplant secondary to PKD, HTN,  presents to ED for evaluation of left sided chest pain. As per patient he states he had this pain on thursday after eating out at [employer]. It starting in the stomach and felt like indigestion, burning sensastion. He then states he had broccoli on saturday and the pain returned. Patient also states his bp was elevated in which he took metoprolol. The pain started in the abdomen and radiated to shoulder. It felt like pressure and was associated with gas. Currently resting comfortably, chest pain resolved, denies cp/sob/johnson, denies le edema. Denies fevers, chills, cough.       PAST MEDICAL & SURGICAL HISTORY:  HTN (hypertension)  Coronary artery disease involving coronary bypass graft  ESRD (end stage renal disease) on dialysis  Obesity  Hemorrhoids  Gastroesophageal reflux disease without esophagitis  High cholesterol  ESRD on Dialysis: Tue, [place] &amp; Sat  CAD (Coronary Artery Disease)  PCK (Polycystic Kidney Disease)  HTN - Hypertension  Asthma: last attack , never hospitalized or intubated  AV fistula: b/l MILI GREEN  S/P coronary artery stent placement  S/P CABG x 5  S/P hemorrhoidectomy: and rectal polypectomy -2/3/2017.  AV fistula: right lower extremity 2 yrs  Left upper extrmity with graft 7 years ago  Stented coronary artery: x2 cardiac stents in , one cardiac stent in   CABG (Coronary Artery Bypass Graft):           PREVIOUS DIAGNOSTIC TESTING:    [ ] Echocardiogram: < from: TTE with Doppler (w/Cont) (18 @ 14:29) >  Conclusions:  1. Normal left ventricular internal dimensions and wall  thicknesses.  2. Normal left ventricular systolic function.   Endocardial  visualization enhanced with intravenous injection of echo  contrast (Definity).    < end of copied text >    [ ]  Catheterization: < from: Cardiac Cath Lab - Adult (17 @ 12:24) >  [place] [address] [place] [address] [phone]  Cath Lab Report -- Comprehensive Report  Patient: FLOYD NEVES  Study date: 2017  Account number: [id number]  MR number: [id number]  : 1960  Gender: Male  Race:   Case Physician(s):  Wily Chauhan M.D.  Fellow:  Johnson Diaz M.D.  Referring Physician:  Wily Chauhan M.D.  INDICATIONS: Other chest pain. Abnormal stress test.  HISTORY: History includes ischemic cardiomyopathy. The patient has a history of  coronary artery disease. The patient has hypertension, dialysis-treated renal  failure, and medication-treated dyslipidemia. PRIOR CARDIOVASCULAR PROCEDURES:  Stent of the 2nd obtuse marginal ( 2015). RESOLUTE stents to proximal  circumflex and OM2 ( 2016). Coronary bypass ().  PRIOR DIAGNOSTIC TEST RESULTS: Nuclear pharmacologic stress test was positive.  PROCEDURE:  --  Left coronary angiography.  --  Left heart catheterization.  --  Right coronary angiography.  --  Saphenous vein graft angiography.  --  LIMA graft angiography.  TECHNIQUE: Oxygen 2 L/min. The risks and alternatives of the procedures and  conscious sedation were explained to the patient and informed consent was  obtained. Cardiac catheterization performed electively.  Right femoral artery access. Local anesthetic given. The puncture site was  infiltrated with 2 % lidocaine. A 5fr Sheath [place] was inserted in the  vessel. Left coronary artery angiography. The vessel was injected utilizing a  5fr FL 4 Expo catheter. Left heart catheterization. A 5fr FR 4 Expo catheter  was utilized. After recording ascending aortic pressure, the catheter was  advanced across the aortic valve and left ventricular pressure was recorded.  Right coronary artery angiography. The vessel was injected utilizing a 5fr FR 4  Expo catheter. Saphenous vein graft angiography. The vessel was injected  utilizing a 5fr FR 4 Expo catheter. Left internal mammary graft angiography.  The vessel was injected utilizing a 5fr FR 4 Expo catheter. RADIATION EXPOSURE:  7.1 min.  CONTRAST GIVEN: 32 ml Optiray.  MEDICATIONS GIVEN: 2% Lidocaine, 10 ml, subcutaneously. 0.9% Normal saline, 10  ml, IV.  VENTRICLES: No left ventriculogram was performed.  CORONARY VESSELS: The coronary circulation is right dominant.  LM:   --  Distal left main: There was a discrete 30 % stenosis.  LAD:   --  Proximal LAD: There was a 100 % stenosis. There was DESTINEE grade 0  flow through the vessel (no flow). --  Distal LAD: Angiography showed minor  luminal irregularities with no flow limiting lesions.  CX:   --  Proximal circumflex: There was a tubular 10 % stenosis at the site of  a prior stent. --  Mid circumflex: There was a tubular 20 % stenosis at the  site of a prior stent.  RCA:   --  Proximal RCA: The distal vessel was supplied by collaterals from  septal branches of LAD and the first obtuse marginal. There was a 100 %  stenosis. There was DESTINEE grade 0 flow through the vessel (no flow).  GRAFTS:   --  Graft to the mid LAD: The graft was a LIMA. It was normal.  --  Graft to the 1st obtuse marginal: The graft was a saphenous vein graft from  the aorta. Graft angiography showed minor luminal irregularities.  COMPLICATIONS: There were no complications.  DIAGNOSTIC RECOMMENDATIONS: Medical management is recommended. Elevated LVEDP.  Patient with RCA .  Prepared and signed by  Wily Chauhan M.D.    < end of copied text >    [ ] Stress Test:  	< from: Nuclear Stress Test-Pharmacologic (17 @ 12:49) >  IMPRESSIONS:Abnormal Study  * Consistent with infarction with moderate mckay-infarct  ischemia.  * Review of raw data shows: The study is of good technical  quality.  * The left ventricle was enlarged. There are large,  moderate to severe defects in inferior, inferolateral and  lateral walls that are fixed with moderatereversible  mckay-infarct ischemia, consistent with infarction with  moderate mckay-infarct ischemia.  * Post-stress gated wall motion analysis was performed  (LVEF = 46 %;LVEDV = 152 ml.)  *** Compared with Nuclear/Stress test of 2016, no  significant changes noted. Prior LVEF was 30% with   mL    < end of copied text >      MEDICATIONS:  MEDICATIONS  (STANDING):  aspirin enteric coated 81 milliGRAM(s) Oral daily  atorvastatin 10 milliGRAM(s) Oral at bedtime  famotidine    Tablet 20 milliGRAM(s) Oral daily  influenza   Vaccine 0.5 milliLiter(s) IntraMuscular once  magnesium oxide 400 milliGRAM(s) Oral daily  mycophenolate mofetil 500 milliGRAM(s) Oral two times a day  predniSONE   Tablet 5 milliGRAM(s) Oral daily  tacrolimus 2 milliGRAM(s) Oral two times a day  trimethoprim   80 mG/sulfamethoxazole 400 mG 1 Tablet(s) Oral daily      FAMILY HISTORY:  Family history of polycystic kidney (Sibling)  Family history of adult polycystic kidney disease (Sibling)      SOCIAL HISTORY:    [X] Non-smoker  [ ] Smoker  [ ] Alcohol    Allergies    No Known Allergies    Intolerances    	    REVIEW OF SYSTEMS:  CONSTITUTIONAL: No fever, weight loss, or fatigue  EYES: No eye pain, visual disturbances, or discharge  ENMT:  No difficulty hearing, tinnitus, vertigo; No sinus or throat pain  NECK: No pain or stiffness  RESPIRATORY: No cough, wheezing, chills or hemoptysis;   CARDIOVASCULAR: +chest pain, palpitations, passing out, dizziness, or leg swelling  GASTROINTESTINAL: +abdominal or epigastric pain. No nausea, vomiting, or hematemesis; No diarrhea or constipation. No melena or hematochezia.  GENITOURINARY: No dysuria, frequency, hematuria, or incontinence  NEUROLOGICAL: +headaches, memory loss, loss of strength, numbness, or tremors  SKIN: No itching, burning, rashes, or lesions   	    [ X] All others negative	  [ ] Unable to obtain    PHYSICAL EXAM:  T(C): 36.3 (19 @ 07:55), Max: 36.6 (19 @ 01:35)  HR: 53 (19 @ 07:55) (50 - 62)  BP: 142/77 (19 @ 07:55) (118/79 - 177/84)  RR: 18 (19 @ 07:55) (16 - 20)  SpO2: 96% (19 @ 07:55) (96% - 98%)  Wt(kg): --  I&O's Summary      Appearance: Normal	  Psychiatry: A & O x 3, Mood & affect appropriate  HEENT:   Normal oral mucosa, PERRL, EOMI	  Lymphatic: No lymphadenopathy  Cardiovascular: Normal S1 S2,RRR, No JVD, No murmurs  Respiratory: Lungs clear to auscultation	  Gastrointestinal:  Soft, Non-tender, + BS	  Skin: No rashes, No ecchymoses, No cyanosis	  Neurologic: Non-focal  Extremities: Normal range of motion, No clubbing, cyanosis or edema  Vascular: Peripheral pulses palpable 2+ bilaterally    TELEMETRY: 	    ECG:  	nsr 63, rbbb, unchanged from prior EKG, no evidence of acute ischemia   RADIOLOGY: < from: Xray Chest 2 Views PA/Lat (19 @ 03:11) >  IMPRESSION: Clear lungs.    < end of copied text >    OTHER: 	  	  LABS:	 	    CARDIAC MARKERS:                                  16.3   8.3   )-----------( 95       ( 08 Sep 2019 02:25 )             50.8         134<L>  |  97  |  17  ----------------------------<  130<H>  4.0   |  23  |  1.02    Ca    9.5      08 Sep 2019 02:25  Mg     1.6         TPro  7.2  /  Alb  4.2  /  TBili  0.5  /  DBili  x   /  AST  20  /  ALT  21  /  AlkPhos  49      PT/INR - ( 08 Sep 2019 02:25 )   PT: See Note;   INR: See Note ratio           proBNP: Serum Pro-Brain Natriuretic Peptide: 372 pg/mL ( @ 02:25)    Lipid Profile:   HgA1c:   TSH: CARDIOLOGY CONSULT - Dr. Pérez     CHIEF COMPLAINT: Chest pain     HPI: 59 year old male with a pmhx of CABG x5, renal transplant secondary to PKD, HTN,  presents to ED for evaluation of left sided chest pain. As per patient he states he had this pain on thursday after eating out at [employer]. It starting in the stomach and felt like indigestion, burning sensastion. He then states he had broccoli on saturday and the pain returned. Patient also states his bp was elevated in which he took metoprolol and experienced SOB. He states the gas pains came first followed by elevated bp and SOB. The pain started in the abdomen and radiated to chest and shoulders. It felt like pressure and was associated with gas. Currently resting comfortably, chest pain resolved, denies cp/sob/johnson, denies le edema. Denies fevers, chills, cough.       PAST MEDICAL & SURGICAL HISTORY:  HTN (hypertension)  Coronary artery disease involving coronary bypass graft  ESRD (end stage renal disease) on dialysis  Obesity  Hemorrhoids  Gastroesophageal reflux disease without esophagitis  High cholesterol  ESRD on Dialysis: [facility] &amp; Sat  CAD (Coronary Artery Disease)  PCK (Polycystic Kidney Disease)  HTN - Hypertension  Asthma: last attack , never hospitalized or intubated  AV fistula: b/l MILI GREEN  S/P coronary artery stent placement  S/P CABG x 5  S/P hemorrhoidectomy: and rectal polypectomy -2/3/2017.  AV fistula: right lower extremity 2 yrs  Left upper extrmity with graft 7 years ago  Stented coronary artery: x2 cardiac stents in , one cardiac stent in   CABG (Coronary Artery Bypass Graft):           PREVIOUS DIAGNOSTIC TESTING:    [ ] Echocardiogram: < from: TTE with Doppler (w/Cont) (18 @ 14:29) >  Conclusions:  1. Normal left ventricular internal dimensions and wall  thicknesses.  2. Normal left ventricular systolic function.   Endocardial  visualization enhanced with intravenous injection of echo  contrast (Definity).    < end of copied text >    [ ]  Catheterization: < from: Cardiac Cath Lab - Adult (17 @ 12:24) >  [address] [place] [id number] [phone]  Cath Lab Report -- Comprehensive Report  Patient: FLOYD NEVES  Study date: 2017  Account number: [id number]  MR number: [id number]  : 1960  Gender: Male  Race:   Case Physician(s):  Wily Chauhan M.D.  Fellow:  Johnson Diaz M.D.  Referring Physician:  Wily Chauhan M.D.  INDICATIONS: Other chest pain. Abnormal stress test.  HISTORY: History includes ischemic cardiomyopathy. The patient has a history of  coronary artery disease. The patient has hypertension, dialysis-treated renal  failure, and medication-treated dyslipidemia. PRIOR CARDIOVASCULAR PROCEDURES:  Stent of the 2nd obtuse marginal ( 2015). RESOLUTE stents to proximal  circumflex and OM2 ( 2016). Coronary bypass ().  PRIOR DIAGNOSTIC TEST RESULTS: Nuclear pharmacologic stress test was positive.  PROCEDURE:  --  Left coronary angiography.  --  Left heart catheterization.  --  Right coronary angiography.  --  Saphenous vein graft angiography.  --  LIMA graft angiography.  TECHNIQUE: Oxygen 2 L/min. The risks and alternatives of the procedures and  conscious sedation were explained to the patient and informed consent was  obtained. Cardiac catheterization performed electively.  Right femoral artery access. Local anesthetic given. The puncture site was  infiltrated with 2 % lidocaine. A 5fr Sheath Macomb was inserted in the  vessel. Left coronary artery angiography. The vessel was injected utilizing a  5fr FL 4 Expo catheter. Left heart catheterization. A 5fr FR 4 Expo catheter  was utilized. After recording ascending aortic pressure, the catheter was  advanced across the aortic valve and left ventricular pressure was recorded.  Right coronary artery angiography. The vessel was injected utilizing a 5fr FR 4  Expo catheter. Saphenous vein graft angiography. The vessel was injected  utilizing a 5fr FR 4 Expo catheter. Left internal mammary graft angiography.  The vessel was injected utilizing a 5fr FR 4 Expo catheter. RADIATION EXPOSURE:  7.1 min.  CONTRAST GIVEN: 32 ml Optiray.  MEDICATIONS GIVEN: 2% Lidocaine, 10 ml, subcutaneously. 0.9% Normal saline, 10  ml, IV.  VENTRICLES: No left ventriculogram was performed.  CORONARY VESSELS: The coronary circulation is right dominant.  LM:   --  Distal left main: There was a discrete 30 % stenosis.  LAD:   --  Proximal LAD: There was a 100 % stenosis. There was DESTINEE grade 0  flow through the vessel (no flow). --  Distal LAD: Angiography showed minor  luminal irregularities with no flow limiting lesions.  CX:   --  Proximal circumflex: There was a tubular 10 % stenosis at the site of  a prior stent. --  Mid circumflex: There was a tubular 20 % stenosis at the  site of a prior stent.  RCA:   --  Proximal RCA: The distal vessel was supplied by collaterals from  septal branches of LAD and the first obtuse marginal. There was a 100 %  stenosis. There was DESTINEE grade 0 flow through the vessel (no flow).  GRAFTS:   --  Graft to the mid LAD: The graft was a LIMA. It was normal.  --  Graft to the 1st obtuse marginal: The graft was a saphenous vein graft from  the aorta. Graft angiography showed minor luminal irregularities.  COMPLICATIONS: There were no complications.  DIAGNOSTIC RECOMMENDATIONS: Medical management is recommended. Elevated LVEDP.  Patient with RCA .  Prepared and signed by  Wily Chauhan M.D.    < end of copied text >    [ ] Stress Test:  	< from: Nuclear Stress Test-Pharmacologic (17 @ 12:49) >  IMPRESSIONS:Abnormal Study  * Consistent with infarction with moderate mckay-infarct  ischemia.  * Review of raw data shows: The study is of good technical  quality.  * The left ventricle was enlarged. There are large,  moderate to severe defects in inferior, inferolateral and  lateral walls that are fixed with moderatereversible  mckay-infarct ischemia, consistent with infarction with  moderate mckay-infarct ischemia.  * Post-stress gated wall motion analysis was performed  (LVEF = 46 %;LVEDV = 152 ml.)  *** Compared with Nuclear/Stress test of 2016, no  significant changes noted. Prior LVEF was 30% with   mL    < end of copied text >      MEDICATIONS:  MEDICATIONS  (STANDING):  aspirin enteric coated 81 milliGRAM(s) Oral daily  atorvastatin 10 milliGRAM(s) Oral at bedtime  famotidine    Tablet 20 milliGRAM(s) Oral daily  influenza   Vaccine 0.5 milliLiter(s) IntraMuscular once  magnesium oxide 400 milliGRAM(s) Oral daily  mycophenolate mofetil 500 milliGRAM(s) Oral two times a day  predniSONE   Tablet 5 milliGRAM(s) Oral daily  tacrolimus 2 milliGRAM(s) Oral two times a day  trimethoprim   80 mG/sulfamethoxazole 400 mG 1 Tablet(s) Oral daily      FAMILY HISTORY:  Family history of polycystic kidney (Sibling)  Family history of adult polycystic kidney disease (Sibling)      SOCIAL HISTORY:    [X] Non-smoker  [ ] Smoker  [ ] Alcohol    Allergies    No Known Allergies    Intolerances    	    REVIEW OF SYSTEMS:  CONSTITUTIONAL: No fever, weight loss, or fatigue  EYES: No eye pain, visual disturbances, or discharge  ENMT:  No difficulty hearing, tinnitus, vertigo; No sinus or throat pain  NECK: No pain or stiffness  RESPIRATORY: No cough, wheezing, chills or hemoptysis;   CARDIOVASCULAR: +chest pain, palpitations, passing out, dizziness, or leg swelling  GASTROINTESTINAL: +abdominal or epigastric pain. No nausea, vomiting, or hematemesis; No diarrhea or constipation. No melena or hematochezia.  GENITOURINARY: No dysuria, frequency, hematuria, or incontinence  NEUROLOGICAL: +headaches, memory loss, loss of strength, numbness, or tremors  SKIN: No itching, burning, rashes, or lesions   	    [ X] All others negative	  [ ] Unable to obtain    PHYSICAL EXAM:  T(C): 36.3 (19 @ 07:55), Max: 36.6 (19 @ 01:35)  HR: 53 (19 @ 07:55) (50 - 62)  BP: 142/77 (19 @ 07:55) (118/79 - 177/84)  RR: 18 (19 @ 07:55) (16 - 20)  SpO2: 96% (19 @ 07:55) (96% - 98%)  Wt(kg): --  I&O's Summary      Appearance: Normal	  Psychiatry: A & O x 3, Mood & affect appropriate  HEENT:   Normal oral mucosa, PERRL, EOMI	  Lymphatic: No lymphadenopathy  Cardiovascular: Normal S1 S2,RRR, No JVD, No murmurs  Respiratory: Lungs clear to auscultation	  Gastrointestinal:  Soft, Non-tender, + BS	  Skin: No rashes, No ecchymoses, No cyanosis	  Neurologic: Non-focal  Extremities: Normal range of motion, No clubbing, cyanosis or edema  Vascular: Peripheral pulses palpable 2+ bilaterally    TELEMETRY: 	    ECG:  	nsr 63, rbbb, unchanged from prior EKG, no evidence of acute ischemia   RADIOLOGY: < from: Xray Chest 2 Views PA/Lat (19 @ 03:11) >  IMPRESSION: Clear lungs.    < end of copied text >    OTHER: 	  	  LABS:	 	    CARDIAC MARKERS:                                  16.3   8.3   )-----------( 95       ( 08 Sep 2019 02:25 )             50.8         134<L>  |  97  |  17  ----------------------------<  130<H>  4.0   |  23  |  1.02    Ca    9.5      08 Sep 2019 02:25  Mg     1.6         TPro  7.2  /  Alb  4.2  /  TBili  0.5  /  DBili  x   /  AST  20  /  ALT  21  /  AlkPhos  49      PT/INR - ( 08 Sep 2019 02:25 )   PT: See Note;   INR: See Note ratio           proBNP: Serum Pro-Brain Natriuretic Peptide: 372 pg/mL ( @ 02:25)    Lipid Profile:   HgA1c:   TSH:

## 2019-09-08 NOTE — H&P ADULT - HISTORY OF PRESENT ILLNESS
59 year old male with a PMHx of CABG x5, renal transplant secondary to PKD, and HTN presents to [facility] c/o sided chest pain. Patient states the pain started yesterday evening around 9 pm. He was walking in his room when he noted pressure like left chest pain pain, 7/10 and constant The pain radiated to the shoulder and later for ~1. He states during this time his son-in-law took his blood pressure which was elevated 170s/104. Patient states he took 60 mg of Metoprolol to treat the BP. He notes also having headache and sob during this event. Patient denies any sweating, fever, chills, cough, abd pain, nausea, vomiting, diarrhea, or dysuria. He states he has been compliant with his medications. He states he is currently CP free, denies any complaints at this time.     In the ED Pt's BP stabilized spontaneously

## 2019-09-08 NOTE — ED PROVIDER NOTE - OBJECTIVE STATEMENT
59 year old male with a pmhx of CABG x5, renal transplant secondary to PKD, presents to ED for evaluation of left sided chest pain. Patient states the pain started this evening around the time is blood pressure was found to be elevated (170s systolic). It is unclear if the cp or elevated BP was first. Patient states he took 60 mg of Metoprolol to treat the BP. He notes also having headache and sob. Patient denies any radiation of the pain, sweating, fever, chills, cough, abd pain, nausea, vomiting, diarrhea, or dysuria. He states he has been compliant with his medications. Patient is very concerned about his BP.

## 2019-09-08 NOTE — H&P ADULT - NSICDXPASTMEDICALHX_GEN_ALL_CORE_FT
PAST MEDICAL HISTORY:  Asthma last attack 2007, never hospitalized or intubated    CAD (Coronary Artery Disease)     Coronary artery disease involving coronary bypass graft     ESRD (end stage renal disease) on dialysis     ESRD on Dialysis Tue, Thmakayla & Sat    Gastroesophageal reflux disease without esophagitis     Hemorrhoids     High cholesterol     HTN (hypertension)     HTN - Hypertension     Obesity     PCK (Polycystic Kidney Disease)

## 2019-09-08 NOTE — H&P ADULT - RS GEN PE MLT RESP DETAILS PC
normal/good air movement/clear to auscultation bilaterally/respirations non-labored/breath sounds equal/airway patent

## 2019-09-09 PROCEDURE — 93306 TTE W/DOPPLER COMPLETE: CPT | Mod: 26

## 2019-09-09 RX ORDER — MAGNESIUM SULFATE 500 MG/ML
2 VIAL (ML) INJECTION ONCE
Refills: 0 | Status: COMPLETED | OUTPATIENT
Start: 2019-09-09 | End: 2019-09-09

## 2019-09-09 RX ADMIN — Medication 5 MILLIGRAM(S): at 06:52

## 2019-09-09 RX ADMIN — MAGNESIUM OXIDE 400 MG ORAL TABLET 400 MILLIGRAM(S): 241.3 TABLET ORAL at 12:06

## 2019-09-09 RX ADMIN — Medication 25 MILLIGRAM(S): at 06:52

## 2019-09-09 RX ADMIN — ATORVASTATIN CALCIUM 10 MILLIGRAM(S): 80 TABLET, FILM COATED ORAL at 21:25

## 2019-09-09 RX ADMIN — TACROLIMUS 2 MILLIGRAM(S): 5 CAPSULE ORAL at 17:29

## 2019-09-09 RX ADMIN — MYCOPHENOLATE MOFETIL 500 MILLIGRAM(S): 250 CAPSULE ORAL at 17:58

## 2019-09-09 RX ADMIN — TACROLIMUS 2 MILLIGRAM(S): 5 CAPSULE ORAL at 06:53

## 2019-09-09 RX ADMIN — CLOPIDOGREL BISULFATE 75 MILLIGRAM(S): 75 TABLET, FILM COATED ORAL at 12:07

## 2019-09-09 RX ADMIN — MYCOPHENOLATE MOFETIL 500 MILLIGRAM(S): 250 CAPSULE ORAL at 06:53

## 2019-09-09 RX ADMIN — Medication 1 TABLET(S): at 06:56

## 2019-09-09 RX ADMIN — FAMOTIDINE 20 MILLIGRAM(S): 10 INJECTION INTRAVENOUS at 12:12

## 2019-09-09 RX ADMIN — Medication 50 GRAM(S): at 09:47

## 2019-09-09 RX ADMIN — Medication 81 MILLIGRAM(S): at 12:06

## 2019-09-09 NOTE — CONSULT NOTE ADULT - SUBJECTIVE AND OBJECTIVE BOX
Patient is a 59y old  Male who presents with a chief complaint of Chest pain (08 Sep 2019 11:04)      HPI:  59 year old male with a PMHx of CABG x5, renal transplant secondary to PKD, and HTN presents to [facility] c/o sided chest pain. Patient states the pain started yesterday evening around 9 pm. He was walking in his room when he noted pressure like left chest pain pain, 7/10 and constant The pain radiated to the shoulder and later for ~1. He states during this time his son-in-law took his blood pressure which was elevated 170s/104. Patient states he took 60 mg of Metoprolol to treat the BP. He notes also having headache and sob during this event. Patient denies any sweating, fever, chills, cough, abd pain, nausea, vomiting, diarrhea, or dysuria. He states he has been compliant with his medications. He states he is currently CP free, denies any complaints at this time.     In the ED Pt's BP stabilized spontaneously (08 Sep 2019 09:45)      PAST MEDICAL & SURGICAL HISTORY:  HTN (hypertension)  Coronary artery disease involving coronary bypass graft  ESRD (end stage renal disease) on dialysis  Obesity  Hemorrhoids  Gastroesophageal reflux disease without esophagitis  High cholesterol  ESRD on Dialysis: Michelle Mcadams &amp; Sat  CAD (Coronary Artery Disease)  PCK (Polycystic Kidney Disease)  HTN - Hypertension  Asthma: last attack 2007, never hospitalized or intubated  AV fistula: b/l MILI GREEN  S/P coronary artery stent placement  S/P CABG x 5  S/P hemorrhoidectomy: and rectal polypectomy -2/3/2017.  AV fistula: right lower extremity 2 yrs  Left upper extrmity with graft 7 years ago  Stented coronary artery: x2 cardiac stents in 2016, one cardiac stent in 2015  CABG (Coronary Artery Bypass Graft): 2007      MEDICATIONS  (STANDING):  aspirin enteric coated 81 milliGRAM(s) Oral daily  atorvastatin 10 milliGRAM(s) Oral at bedtime  clopidogrel Tablet 75 milliGRAM(s) Oral daily  famotidine    Tablet 20 milliGRAM(s) Oral daily  influenza   Vaccine 0.5 milliLiter(s) IntraMuscular once  magnesium oxide 400 milliGRAM(s) Oral daily  metoprolol succinate ER 25 milliGRAM(s) Oral daily  mycophenolate mofetil 500 milliGRAM(s) Oral two times a day  predniSONE   Tablet 5 milliGRAM(s) Oral daily  tacrolimus 2 milliGRAM(s) Oral two times a day  trimethoprim   80 mG/sulfamethoxazole 400 mG 1 Tablet(s) Oral daily      Allergies    No Known Allergies    Intolerances        SOCIAL HISTORY:  Denies alcohol or tobacco abuse.    FAMILY HISTORY:  Family history of polycystic kidney (Sibling)  Family history of adult polycystic kidney disease (Sibling)    No kidney disease in family.    REVIEW OF SYSTEMS:  CONSTITUTIONAL: No weakness, fevers or chills  EYES/ENT: No visual changes  NECK: No pain or stiffness  RESPIRATORY: No cough, wheezing, hemoptysis. No shortness of breath  CARDIOVASCULAR: No chest pain or palpitations  GASTROINTESTINAL: No abdominal or epigastric pain. No nausea, vomiting, or hematemesis. No diarrhea or constipation. No melena or hematochezia  GENITOURINARY: No dysuria, frequency or hematuria. No stones or infection  NEUROLOGICAL: No numbness or weakness  SKIN: No itching, burning, rashes, or lesions   All other review of systems is negative unless indicated above    VITAL:  T(C): , Max: 36.4 (09-08-19 @ 11:35)  T(F): , Max: 97.6 (09-08-19 @ 11:35)  HR: 54 (09-09-19 @ 03:57)  BP: 152/83 (09-09-19 @ 03:57)  BP(mean): 98 (09-08-19 @ 09:45)  RR: 18 (09-09-19 @ 03:57)  SpO2: 96% (09-09-19 @ 03:57)  Wt(kg): --    PHYSICAL EXAM:  General: NAD, Alert, Pleasent  HEENT: NCAT, PERRLA  Neck: Supple, No JVD  Respiratory: CTA-b/l  Cardiovascular: RRR s1s2, no m/r/g  Gastrointestinal: +BS, soft, NT/ND  Extremities: No peripheral edema b/l  Neurological: no focal deficits; strength grossly intact  Psychiatric: Normal mood, normal affect  Back: no CVAT b/l  Skin: No rashes, no nevi  Access:    LABS:                        16.3   8.3   )-----------( 95       ( 08 Sep 2019 02:25 )             50.8     Na(134)/K(4.0)/Cl(97)/HCO3(23)/BUN(17)/Cr(1.02)Glu(130)/Ca(9.5)/Mg(1.6)/PO4(--)    09-08 @ 02:25              IMAGING:    ASSESSMENT:    RECOMMEND:    Thank you for involving [employer] in this patient's care.    With warm regards,    Evette Morrison, DO  [employer]  [phone] Patient is a 59y old  Male who presents with a chief complaint of Chest pain (08 Sep 2019 11:04)    HPI:  59 year old male with a PMHx of CABG x5, renal transplant secondary to PKD, and HTN presents to [facility] c/o sided chest pain. Patient states the pain started yesterday evening around 9 pm. He was walking in his room when he noted pressure like left chest pain pain, 7/10 and constant The pain radiated to the shoulder and later for ~1. He states during this time his son-in-law took his blood pressure which was elevated 170s/104. Patient states he took 60 mg of Metoprolol to treat the BP. He notes also having headache and sob during this event. Patient denies any sweating, fever, chills, cough, abd pain, nausea, vomiting, diarrhea, or dysuria. He states he has been compliant with his medications. He states he is currently CP free, denies any complaints at this time.     In the ED Pt's BP stabilized spontaneously (08 Sep 2019 09:45)      PAST MEDICAL & SURGICAL HISTORY:  HTN (hypertension)  Coronary artery disease involving coronary bypass graft  ESRD (end stage renal disease) on dialysis  Obesity  Hemorrhoids  Gastroesophageal reflux disease without esophagitis  High cholesterol  ESRD on Dialysis: Michelle Mcadams &amp; Sat  CAD (Coronary Artery Disease)  PCK (Polycystic Kidney Disease)  HTN - Hypertension  Asthma: last attack 2007, never hospitalized or intubated  AV fistula: b/l MILI GREEN  S/P coronary artery stent placement  S/P CABG x 5  S/P hemorrhoidectomy: and rectal polypectomy -2/3/2017.  AV fistula: right lower extremity 2 yrs  Left upper extrmity with graft 7 years ago  Stented coronary artery: x2 cardiac stents in 2016, one cardiac stent in 2015  CABG (Coronary Artery Bypass Graft): 2007      MEDICATIONS  (STANDING):  aspirin enteric coated 81 milliGRAM(s) Oral daily  atorvastatin 10 milliGRAM(s) Oral at bedtime  clopidogrel Tablet 75 milliGRAM(s) Oral daily  famotidine    Tablet 20 milliGRAM(s) Oral daily  influenza   Vaccine 0.5 milliLiter(s) IntraMuscular once  magnesium oxide 400 milliGRAM(s) Oral daily  metoprolol succinate ER 25 milliGRAM(s) Oral daily  mycophenolate mofetil 500 milliGRAM(s) Oral two times a day  predniSONE   Tablet 5 milliGRAM(s) Oral daily  tacrolimus 2 milliGRAM(s) Oral two times a day  trimethoprim   80 mG/sulfamethoxazole 400 mG 1 Tablet(s) Oral daily      Allergies  No Known Allergies    Intolerances        SOCIAL HISTORY:  Denies alcohol or tobacco abuse.    FAMILY HISTORY:  Family history of polycystic kidney (Sibling)  Family history of adult polycystic kidney disease (Sibling)    No kidney disease in family.    REVIEW OF SYSTEMS:  CONSTITUTIONAL: No weakness, fevers or chills  EYES/ENT: No visual changes  NECK: No pain or stiffness  RESPIRATORY: No cough, wheezing, hemoptysis. No shortness of breath  CARDIOVASCULAR: No chest pain or palpitations  GASTROINTESTINAL: No abdominal or epigastric pain. No nausea, vomiting, or hematemesis. No diarrhea or constipation. No melena or hematochezia  GENITOURINARY: No dysuria, frequency or hematuria. No stones or infection  NEUROLOGICAL: No numbness or weakness  SKIN: No itching, burning, rashes, or lesions   All other review of systems is negative unless indicated above    VITAL:  T(C): , Max: 36.4 (09-08-19 @ 11:35)  T(F): , Max: 97.6 (09-08-19 @ 11:35)  HR: 54 (09-09-19 @ 03:57)  BP: 152/83 (09-09-19 @ 03:57)  BP(mean): 98 (09-08-19 @ 09:45)  RR: 18 (09-09-19 @ 03:57)  SpO2: 96% (09-09-19 @ 03:57)  Wt(kg): --    PHYSICAL EXAM:  General: NAD, Alert, Pleasent  HEENT: NCAT, PERRLA  Neck: Supple, No JVD  Respiratory: CTA-b/l  Cardiovascular: RRR s1s2, no m/r/g  Gastrointestinal: +BS, soft, NT/ND  Extremities: No peripheral edema b/l  Neurological: no focal deficits; strength grossly intact  Psychiatric: Normal mood, normal affect  Back: no CVAT b/l  Skin: No rashes, no nevi      LABS:                        16.3   8.3   )-----------( 95       ( 08 Sep 2019 02:25 )             50.8     Na(134)/K(4.0)/Cl(97)/HCO3(23)/BUN(17)/Cr(1.02)Glu(130)/Ca(9.5)/Mg(1.6)/PO4(--)    09-08 @ 02:25    09-08    134<L>  |  97  |  17  ----------------------------<  130<H>  4.0   |  23  |  1.02    Ca    9.5      08 Sep 2019 02:25  Mg     1.6     09-08    TPro  7.2  /  Alb  4.2  /  TBili  0.5  /  DBili  x   /  AST  20  /  ALT  21  /  AlkPhos  49  09-08

## 2019-09-09 NOTE — PROGRESS NOTE ADULT - ATTENDING COMMENTS
Agree with above NP note.  cv stable  atypical sx, resolved and sec to pulmo etiology  cont current tx  tx of asthma per med Agree with above NP note.  cv stable  atypical sx,   r/o gi etiology  await echo

## 2019-09-09 NOTE — CONSULT NOTE ADULT - PROBLEM SELECTOR RECOMMENDATION 9
suspect 2/2 gerd  diet as tolerated  resume proton pump inhibitor, patient self dced 1 month ago   upper gastrointestinal endoscopy with dr kamara about a year ago unremarkable  no need to repeat  dc planning once cardiac w/u complete

## 2019-09-09 NOTE — PROGRESS NOTE ADULT - ASSESSMENT
58 yr old with pmh of CAD s/p CABG/PCI, HTN, ESRD s/p kidney transplant, asthma presenting with 2 days of fever, productive cough, and crushing chest pain(worse with cough) found to have +flu, asthma exac.     1. Chest pain, atypical, resolved  likely secondary to wheeze, asthma exac, cough, +flu  cv stable no chest pain or sob. HST negative x 2, no evidence of acute ischemia/ACS   no evidence of fluid overload on exam , cxr clear   recent echo in may 2018 with normal LV function    pending echo to evaluate LV function, valvular disease     2. CAD s/p CABG/PCI   cv stable   continue asa,statin     3. Flu , asthma exac, improving   on tamiflu , po steroids, inhaled steroids  cxr clear   ID/ pulm f/u     4. ESRD s/p renal transplant  renal f/u , creat stable     dvt ppx 58 yr old with pmh of CAD s/p CABG/PCI, HTN, ESRD, s/p kidney transplant, htn, asthma presenting with chest pain.     1. Chest pain, atypical  likely GI related , hx of GERD   HST negative x 2, no evidence of acute ischemia/ACS , EKG unchanged, NSR w/ RBBB  no evidence of fluid overload, CXR clear   last echo from May 2018 with normal LV function  pending echo to evaluate LV function   continue pepcid as ordered  continue asa, statin  GI f/u     2. HTN  bp stable   c/w metoprolol 25 mg daily    3. Asthma   pulm f/u   stable    4. ESRD s/p renal transplant  on tacrolimus  renal f/u    dvt ppx

## 2019-09-09 NOTE — CONSULT NOTE ADULT - SUBJECTIVE AND OBJECTIVE BOX
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 c/o sided chest pain. Patient states the pain started yesterday evening around 9 pm. He was walking in his room when he noted pressure like left chest pain pain, 7/10 and constant The pain radiated to the shoulder and later for ~1. He states during this time his son-in-law took his blood pressure which was elevated 170s/104. Patient states he took 60 mg of Metoprolol to treat the BP. He notes also having headache and sob during this event. Patient denies any sweating, fever, chills, cough, abd pain, nausea, vomiting, diarrhea, or dysuria. He states he has been compliant with his medications. He states he is currently CP free, denies any complaints at this time.     Allergies:  No Known Allergies      Medications:  aspirin enteric coated 81 milliGRAM(s) Oral daily  atorvastatin 10 milliGRAM(s) Oral at bedtime  clopidogrel Tablet 75 milliGRAM(s) Oral daily  famotidine    Tablet 20 milliGRAM(s) Oral daily  influenza   Vaccine 0.5 milliLiter(s) IntraMuscular once  magnesium oxide 400 milliGRAM(s) Oral daily  metoprolol succinate ER 25 milliGRAM(s) Oral daily  mycophenolate mofetil 500 milliGRAM(s) Oral two times a day  predniSONE   Tablet 5 milliGRAM(s) Oral daily  simethicone 80 milliGRAM(s) Chew two times a day PRN  tacrolimus 2 milliGRAM(s) Oral two times a day  trimethoprim   80 mG/sulfamethoxazole 400 mG 1 Tablet(s) Oral daily      PMHX/PSHX:  HTN (hypertension)  Coronary artery disease involving coronary bypass graft  ESRD (end stage renal disease) on dialysis  Obesity  Hemorrhoids  Gastroesophageal reflux disease without esophagitis  High cholesterol  ESRD on Dialysis  CAD (Coronary Artery Disease)  HTN (Hypertension)  CAD (Coronary Atherosclerotic Disease)  PCK (Polycystic Kidney Disease)  HTN - Hypertension  Asthma  AV fistula  S/P coronary artery stent placement  S/P CABG x 5  S/P CABG x 7  S/P hemorrhoidectomy  AV fistula  Stented coronary artery  CABG (Coronary Artery Bypass Graft)  S/P CABG      Family history:  Family history of polycystic kidney (Sibling)  Family history of adult polycystic kidney disease (Sibling)  No pertinent family history in first degree relatives      Social History:     ROS:     General:  No wt loss, fevers, chills, night sweats, fatigue,   Eyes:  Good vision, no reported pain  ENT:  No sore throat, pain, runny nose, dysphagia  CV:  No pain, palpitations, hypo/hypertension  Resp:  No dyspnea, cough, tachypnea, wheezing  GI:  No pain, No nausea, No vomiting, No diarrhea, No constipation, No weight loss, No fever, No pruritis, No rectal bleeding, No tarry stools, No dysphagia,  :  No pain, bleeding, incontinence, nocturia  Muscle:  No pain, weakness  Neuro:  No weakness, tingling, memory problems  Psych:  No fatigue, insomnia, mood problems, depression  Endocrine:  No polyuria, polydipsia, cold/heat intolerance  Heme:  No petechiae, ecchymosis, easy bruisability  Skin:  No rash, tattoos, scars, edema      PHYSICAL EXAM:   Vital Signs:  Vital Signs Last 24 Hrs  T(C): 36.6 (09 Sep 2019 11:13), Max: 36.6 (09 Sep 2019 11:13)  T(F): 97.8 (09 Sep 2019 11:13), Max: 97.8 (09 Sep 2019 11:13)  HR: 57 (09 Sep 2019 11:13) (54 - 61)  BP: 158/80 (09 Sep 2019 11:13) (135/72 - 158/80)  BP(mean): --  RR: 18 (09 Sep 2019 11:13) (18 - 18)  SpO2: 94% (09 Sep 2019 11:13) (94% - 97%)  Daily Height in cm: 165.1 (08 Sep 2019 18:44)    Daily Weight in k.2 (09 Sep 2019 07:37)    GENERAL:  Appears stated age, well-groomed, well-nourished, no distress  HEENT:  NC/AT,  conjunctivae clear and pink, no thyromegaly, nodules, adenopathy, no JVD, sclera -anicteric  CHEST:  Full & symmetric excursion, no increased effort, breath sounds clear  HEART:  Regular rhythm, S1, S2, no murmur/rub/S3/S4, no abdominal bruit, no edema  ABDOMEN:  Soft, non-tender, non-distended, normoactive bowel sounds,  no masses ,no hepato-splenomegaly, no signs of chronic liver disease  EXTEREMITIES:  no cyanosis,clubbing or edema  SKIN:  No rash/erythema/ecchymoses/petechiae/wounds/abscess/warm/dry  NEURO:  Alert, oriented, no asterixis, no tremor, no encephalopathy    LABS:                        16.3   8.3   )-----------( 95       ( 08 Sep 2019 02:25 )             50.8         134<L>  |  97  |  17  ----------------------------<  130<H>  4.0   |  23  |  1.02    Ca    9.5      08 Sep 2019 02:25  Mg     1.6         TPro  7.2  /  Alb  4.2  /  TBili  0.5  /  DBili  x   /  AST  20  /  ALT  21  /  AlkPhos  49      LIVER FUNCTIONS - ( 08 Sep 2019 02:25 )  Alb: 4.2 g/dL / Pro: 7.2 g/dL / ALK PHOS: 49 U/L / ALT: 21 U/L / AST: 20 U/L / GGT: x           PT/INR - ( 08 Sep 2019 02:25 )   PT: See Note;   INR: See Note ratio                 Imaging:

## 2019-09-09 NOTE — PROGRESS NOTE ADULT - ASSESSMENT
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  Plan: Presented with pressure like pain radiating to the shoulder, assoicated with mild SOB. trop 10 x2 , no EKG changes, now CP free and asymptomatic   - has significant CAD h/o   - trend trop till peak   - c/w ASA and Plavix   - c/w statin   - management as per cards     Problem/Plan - 2:  ·  Problem: Coronary artery disease involving coronary bypass graft.  Plan: h/o CABG x5  - c/w ASA, plavix and statin   Problem/Plan - 3:  ·  Problem: Kidney transplant status.  Plan: 2/2 to PKD   - c/w prednisone, cellcept and prograf   - BMP reviewed Cr at bl.     Problem/Plan - 4:  ·  Problem: HTN (hypertension).  Plan: had HTN urgency at home, no BP stabilized (pt took metoprolol 60 mg at home to treat elevated BP)  c/w metoprolol 25 mg daily  - monitor BP closely.     Problem/Plan - 5:  ·  Problem: Gastroesophageal reflux disease without esophagitis.  Plan: c/w pepcid.     dc planning in am if stable

## 2019-09-09 NOTE — CONSULT NOTE ADULT - ASSESSMENT
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 Patient states the pain started yesterday evening around 9 pm. He was walking in his room when he noted pressure like left chest pain pain, 7/10 and constant The pain radiated to the shoulder and later for ~1. He states during this time his son-in-law took his blood pressure which was elevated 170s/104. Nephrology consulted for management of fluid and electrolytes including immunosuppression.      1. DCD KTX on 5/2018. He is on tacrolimus of 2 mg po BID,  MMF of 500 mg po BID and prednisone of 5 mg po daily.   2. Hypomagnesemia.   3. Hyponatremia.   4. Scr 1.02 mg/dl Baseline is 0.9 mg/dl.   5. Euvolemic and at goal.     RECOMMEND:  1. Magnesium of 2 gram IV x one dose.   2. BMP daily. CBC daily, and phosphorus.   3.  level tomorrow.   4. Keep the immunosuppression of tacrolimus, prednisone and MMF.     Thank you for involving [employer] in this patient's care.    With warm regards,    Evette Morrison, DO  [employer]  [phone]

## 2019-09-10 ENCOUNTER — TRANSCRIPTION ENCOUNTER (OUTPATIENT)
Age: 59
End: 2019-09-10

## 2019-09-10 VITALS
OXYGEN SATURATION: 95 % | HEART RATE: 63 BPM | SYSTOLIC BLOOD PRESSURE: 129 MMHG | DIASTOLIC BLOOD PRESSURE: 71 MMHG | TEMPERATURE: 99 F | RESPIRATION RATE: 18 BRPM

## 2019-09-10 DIAGNOSIS — Z71.89 OTHER SPECIFIED COUNSELING: ICD-10-CM

## 2019-09-10 LAB
ANION GAP SERPL CALC-SCNC: 11 MMOL/L — SIGNIFICANT CHANGE UP (ref 5–17)
BUN SERPL-MCNC: 19 MG/DL — SIGNIFICANT CHANGE UP (ref 7–23)
CALCIUM SERPL-MCNC: 9.3 MG/DL — SIGNIFICANT CHANGE UP (ref 8.4–10.5)
CHLORIDE SERPL-SCNC: 98 MMOL/L — SIGNIFICANT CHANGE UP (ref 96–108)
CO2 SERPL-SCNC: 24 MMOL/L — SIGNIFICANT CHANGE UP (ref 22–31)
CREAT SERPL-MCNC: 1.15 MG/DL — SIGNIFICANT CHANGE UP (ref 0.5–1.3)
GLUCOSE SERPL-MCNC: 117 MG/DL — HIGH (ref 70–99)
HCT VFR BLD CALC: 46.2 % — SIGNIFICANT CHANGE UP (ref 39–50)
HGB BLD-MCNC: 14.7 G/DL — SIGNIFICANT CHANGE UP (ref 13–17)
MAGNESIUM SERPL-MCNC: 1.6 MG/DL — SIGNIFICANT CHANGE UP (ref 1.6–2.6)
MCHC RBC-ENTMCNC: 27.9 PG — SIGNIFICANT CHANGE UP (ref 27–34)
MCHC RBC-ENTMCNC: 31.8 GM/DL — LOW (ref 32–36)
MCV RBC AUTO: 87.7 FL — SIGNIFICANT CHANGE UP (ref 80–100)
PHOSPHATE SERPL-MCNC: 4.3 MG/DL — SIGNIFICANT CHANGE UP (ref 2.5–4.5)
PLATELET # BLD AUTO: 93 K/UL — LOW (ref 150–400)
POTASSIUM SERPL-MCNC: 3.7 MMOL/L — SIGNIFICANT CHANGE UP (ref 3.5–5.3)
POTASSIUM SERPL-SCNC: 3.7 MMOL/L — SIGNIFICANT CHANGE UP (ref 3.5–5.3)
RBC # BLD: 5.27 M/UL — SIGNIFICANT CHANGE UP (ref 4.2–5.8)
RBC # FLD: 13.4 % — SIGNIFICANT CHANGE UP (ref 10.3–14.5)
SODIUM SERPL-SCNC: 133 MMOL/L — LOW (ref 135–145)
TACROLIMUS SERPL-MCNC: 6.1 NG/ML — SIGNIFICANT CHANGE UP
WBC # BLD: 6.02 K/UL — SIGNIFICANT CHANGE UP (ref 3.8–10.5)
WBC # FLD AUTO: 6.02 K/UL — SIGNIFICANT CHANGE UP (ref 3.8–10.5)

## 2019-09-10 PROCEDURE — 96374 THER/PROPH/DIAG INJ IV PUSH: CPT

## 2019-09-10 PROCEDURE — 71046 X-RAY EXAM CHEST 2 VIEWS: CPT

## 2019-09-10 PROCEDURE — 82550 ASSAY OF CK (CPK): CPT

## 2019-09-10 PROCEDURE — 83735 ASSAY OF MAGNESIUM: CPT

## 2019-09-10 PROCEDURE — C8929: CPT

## 2019-09-10 PROCEDURE — 99285 EMERGENCY DEPT VISIT HI MDM: CPT | Mod: 25

## 2019-09-10 PROCEDURE — 85027 COMPLETE CBC AUTOMATED: CPT

## 2019-09-10 PROCEDURE — 82553 CREATINE MB FRACTION: CPT

## 2019-09-10 PROCEDURE — 93005 ELECTROCARDIOGRAM TRACING: CPT

## 2019-09-10 PROCEDURE — 84484 ASSAY OF TROPONIN QUANT: CPT

## 2019-09-10 PROCEDURE — 80053 COMPREHEN METABOLIC PANEL: CPT

## 2019-09-10 PROCEDURE — 83880 ASSAY OF NATRIURETIC PEPTIDE: CPT

## 2019-09-10 PROCEDURE — 80197 ASSAY OF TACROLIMUS: CPT

## 2019-09-10 PROCEDURE — 85610 PROTHROMBIN TIME: CPT

## 2019-09-10 PROCEDURE — 80048 BASIC METABOLIC PNL TOTAL CA: CPT

## 2019-09-10 PROCEDURE — 84100 ASSAY OF PHOSPHORUS: CPT

## 2019-09-10 PROCEDURE — 85730 THROMBOPLASTIN TIME PARTIAL: CPT

## 2019-09-10 PROCEDURE — 99238 HOSP IP/OBS DSCHRG MGMT 30/<: CPT

## 2019-09-10 RX ORDER — CLOPIDOGREL BISULFATE 75 MG/1
1 TABLET, FILM COATED ORAL
Qty: 30 | Refills: 0
Start: 2019-09-10 | End: 2019-10-09

## 2019-09-10 RX ORDER — MAGNESIUM SULFATE 500 MG/ML
2 VIAL (ML) INJECTION ONCE
Refills: 0 | Status: COMPLETED | OUTPATIENT
Start: 2019-09-10 | End: 2019-09-10

## 2019-09-10 RX ORDER — FAMOTIDINE 10 MG/ML
1 INJECTION INTRAVENOUS
Qty: 0 | Refills: 0 | DISCHARGE

## 2019-09-10 RX ORDER — SODIUM BICARBONATE 1 MEQ/ML
1 SYRINGE (ML) INTRAVENOUS
Qty: 0 | Refills: 0 | DISCHARGE

## 2019-09-10 RX ADMIN — CLOPIDOGREL BISULFATE 75 MILLIGRAM(S): 75 TABLET, FILM COATED ORAL at 12:25

## 2019-09-10 RX ADMIN — MAGNESIUM OXIDE 400 MG ORAL TABLET 400 MILLIGRAM(S): 241.3 TABLET ORAL at 12:25

## 2019-09-10 RX ADMIN — Medication 25 MILLIGRAM(S): at 06:13

## 2019-09-10 RX ADMIN — MYCOPHENOLATE MOFETIL 500 MILLIGRAM(S): 250 CAPSULE ORAL at 06:13

## 2019-09-10 RX ADMIN — TACROLIMUS 2 MILLIGRAM(S): 5 CAPSULE ORAL at 06:13

## 2019-09-10 RX ADMIN — Medication 1 TABLET(S): at 12:25

## 2019-09-10 RX ADMIN — FAMOTIDINE 20 MILLIGRAM(S): 10 INJECTION INTRAVENOUS at 12:26

## 2019-09-10 RX ADMIN — Medication 5 MILLIGRAM(S): at 06:13

## 2019-09-10 RX ADMIN — Medication 50 GRAM(S): at 12:33

## 2019-09-10 RX ADMIN — Medication 81 MILLIGRAM(S): at 12:25

## 2019-09-10 NOTE — DISCHARGE NOTE PROVIDER - CARE PROVIDER_API CALL
Lawrence Schmidt (DO)  Gastroenterology; Internal Medicine  [address] [place] [id number]  Phone: [phone]  Fax: [phone]  Follow Up Time:

## 2019-09-10 NOTE — PROGRESS NOTE ADULT - PROBLEM SELECTOR PLAN 2
no wheezing: no SOB  9/10: no wheezing: outpatient PFT
Advanced care planning was discussed with patient and family.  Advanced care planning forms were reviewed and discussed.  Risks, benefits and alternatives of gastroenterologic procedures were discussed in detail and all questions were answered.    30 minutes spent.
no wheezing: no SOB

## 2019-09-10 NOTE — PROGRESS NOTE ADULT - ASSESSMENT
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 Patient states the pain started yesterday evening around 9 pm. He was walking in his room when he noted pressure like left chest pain pain, 7/10 and constant The pain radiated to the shoulder and later for ~1. He states during this time his son-in-law took his blood pressure which was elevated 170s/104. Nephrology consulted for management of fluid and electrolytes including immunosuppression.      1. DCD KTX on 5/2018. He is on tacrolimus of 2 mg po BID,  MMF of 500 mg po BID and prednisone of 5 mg po daily.   2. Hypomagnesemia.   3. Hyponatremia.        RECOMMEND:  1. Magnesium of 2 gram IV x one dose.   2. Cards follow up   3. Keep the immunosuppression of tacrolimus, prednisone and MMF.

## 2019-09-10 NOTE — PROGRESS NOTE ADULT - ASSESSMENT
58 yr old with pmh of CAD s/p CABG/PCI, HTN, ESRD, s/p kidney transplant, htn, asthma presenting with chest pain.     1. Chest pain, atypical  likely GI related , hx of GERD   HST negative x 2, no evidence of acute ischemia/ACS , EKG unchanged, NSR w/ RBBB  no evidence of fluid overload, CXR clear   echo EF 50%, mild segmental LV dysfunction   continue pepcid as ordered  continue asa, statin  GI f/u     2. HTN  bp stable   c/w metoprolol 25 mg daily    3. Asthma   pulm f/u   stable    4. ESRD s/p renal transplant  on tacrolimus  renal f/u    dvt ppx   dc planning per primary team

## 2019-09-10 NOTE — DISCHARGE NOTE PROVIDER - NSDCCPCAREPLAN_GEN_ALL_CORE_FT
PRINCIPAL DISCHARGE DIAGNOSIS  Diagnosis: Chest pain  Assessment and Plan of Treatment: Suspect chest pain is related to GERD  Diet as tolerated  Resume proton pump inhibitor,   No need to repeat upper gastrointestinal endoscopy, had one about a year ago which was unremarkable  Follow up with gastroenterologist      SECONDARY DISCHARGE DIAGNOSES  Diagnosis: Gastroesophageal reflux disease without esophagitis  Assessment and Plan of Treatment: Continue Protonix as prescribed, do not stop unless instructed by your healthcare provider    Diagnosis: Kidney transplant status  Assessment and Plan of Treatment: Continue current medication regimen  Follow up with transplant providers PRINCIPAL DISCHARGE DIAGNOSIS  Diagnosis: Chest pain  Assessment and Plan of Treatment: EKG with no significant changes, cardiac enzymes negative  Suspect chest pain is related to GERD  Diet as tolerated  Resume proton pump inhibitor,   No need to repeat upper gastrointestinal endoscopy, had one about a year ago which was unremarkable  Follow up with gastroenterologist      SECONDARY DISCHARGE DIAGNOSES  Diagnosis: Gastroesophageal reflux disease without esophagitis  Assessment and Plan of Treatment: Continue Protonix as prescribed, do not stop unless instructed by your healthcare provider    Diagnosis: Kidney transplant status  Assessment and Plan of Treatment: Continue current medication regimen  Follow up with transplant providers

## 2019-09-10 NOTE — DISCHARGE NOTE NURSING/CASE MANAGEMENT/SOCIAL WORK - PATIENT PORTAL LINK FT
You can access the FollowMyHealth Patient Portal offered by [facility] by registering at the following website: [email or web address]. By joining [employer]’s FollowMyHealth portal, you will also be able to view your health information using other applications (apps) compatible with our system.

## 2019-09-10 NOTE — PROGRESS NOTE ADULT - SUBJECTIVE AND OBJECTIVE BOX
CARDIOLOGY FOLLOW UP - Dr. Pérez    CC no cp/sob       PHYSICAL EXAM:  T(C): 36.7 (09-10-19 @ 04:35), Max: 36.7 (09-09-19 @ 20:58)  HR: 60 (09-10-19 @ 04:35) (58 - 60)  BP: 139/70 (09-10-19 @ 04:35) (139/70 - 157/77)  RR: 18 (09-10-19 @ 04:35) (18 - 18)  SpO2: 96% (09-10-19 @ 04:35) (96% - 97%)  Wt(kg): --  I&O's Summary    09 Sep 2019 07:01  -  10 Sep 2019 07:00  --------------------------------------------------------  IN: 800 mL / OUT: 0 mL / NET: 800 mL        Appearance: Normal	  Cardiovascular: Normal S1 S2,RRR, No JVD, No murmurs  Respiratory: Lungs clear to auscultation	  Gastrointestinal:  Soft, Non-tender, + BS	  Extremities: Normal range of motion, No clubbing, cyanosis or edema        MEDICATIONS  (STANDING):  aspirin enteric coated 81 milliGRAM(s) Oral daily  atorvastatin 10 milliGRAM(s) Oral at bedtime  clopidogrel Tablet 75 milliGRAM(s) Oral daily  famotidine    Tablet 20 milliGRAM(s) Oral daily  influenza   Vaccine 0.5 milliLiter(s) IntraMuscular once  magnesium oxide 400 milliGRAM(s) Oral daily  magnesium sulfate  IVPB 2 Gram(s) IV Intermittent once  metoprolol succinate ER 25 milliGRAM(s) Oral daily  mycophenolate mofetil 500 milliGRAM(s) Oral two times a day  predniSONE   Tablet 5 milliGRAM(s) Oral daily  tacrolimus 2 milliGRAM(s) Oral two times a day  trimethoprim   80 mG/sulfamethoxazole 400 mG 1 Tablet(s) Oral daily      TELEMETRY: SB-NSR	    ECG:  	  RADIOLOGY:   DIAGNOSTIC TESTING:  [ ] Echocardiogram: < from: TTE with Doppler (w/Cont) (09.09.19 @ 11:54) >  ------------------------------------------------------------------------  Conclusions:  1. Normal mitral valve. Mild mitral regurgitation.  2. Normal trileaflet aortic valve. No aortic valve  regurgitation seen.  3. Endocardial visualization enhanced with intravenous  injection of Ultrasonic Enhancing Agent (Definity). Mild  segmental left ventricular systolic dysfunction. The mid to  distal inferoseptal and basal inferior segments are  hypokinetic.  4. The right ventricle is not well visualized; grossly  normal right ventricular size and systolic function..    < end of copied text >    [ ]  Catheterization:  [ ] Stress Test:    OTHER: 	    LABS:	 	                                14.7   6.02  )-----------( 93       ( 10 Sep 2019 09:22 )             46.2     09-10    133<L>  |  98  |  19  ----------------------------<  117<H>  3.7   |  24  |  1.15    Ca    9.3      10 Sep 2019 06:26  Phos  4.3     09-10  Mg     1.6     09-10
CARDIOLOGY FOLLOW UP - Dr. Pérez    CC no cp/sob     PHYSICAL EXAM:  T(C): 36.6 (09-09-19 @ 11:13), Max: 36.6 (09-09-19 @ 11:13)  HR: 57 (09-09-19 @ 11:13) (54 - 61)  BP: 158/80 (09-09-19 @ 11:13) (135/72 - 158/80)  RR: 18 (09-09-19 @ 11:13) (18 - 18)  SpO2: 94% (09-09-19 @ 11:13) (94% - 97%)  Wt(kg): --  I&O's Summary    08 Sep 2019 07:01  -  09 Sep 2019 07:00  --------------------------------------------------------  IN: 100 mL / OUT: 0 mL / NET: 100 mL        Appearance: Normal	  Cardiovascular: Normal S1 S2,RRR, No JVD, No murmurs  Respiratory: Lungs clear to auscultation	  Gastrointestinal:  Soft, Non-tender, + BS	  Extremities: Normal range of motion, No clubbing, cyanosis or edema      MEDICATIONS  (STANDING):  aspirin enteric coated 81 milliGRAM(s) Oral daily  atorvastatin 10 milliGRAM(s) Oral at bedtime  clopidogrel Tablet 75 milliGRAM(s) Oral daily  famotidine    Tablet 20 milliGRAM(s) Oral daily  influenza   Vaccine 0.5 milliLiter(s) IntraMuscular once  magnesium oxide 400 milliGRAM(s) Oral daily  metoprolol succinate ER 25 milliGRAM(s) Oral daily  mycophenolate mofetil 500 milliGRAM(s) Oral two times a day  predniSONE   Tablet 5 milliGRAM(s) Oral daily  tacrolimus 2 milliGRAM(s) Oral two times a day  trimethoprim   80 mG/sulfamethoxazole 400 mG 1 Tablet(s) Oral daily      TELEMETRY: NSR     ECG:  	  RADIOLOGY:   DIAGNOSTIC TESTING:  [ ] Echocardiogram:  [ ]  Catheterization:  [ ] Stress Test:    OTHER: 	    LABS:	 	                                16.3   8.3   )-----------( 95       ( 08 Sep 2019 02:25 )             50.8     09-08    134<L>  |  97  |  17  ----------------------------<  130<H>  4.0   |  23  |  1.02    Ca    9.5      08 Sep 2019 02:25  Mg     1.6     09-08    TPro  7.2  /  Alb  4.2  /  TBili  0.5  /  DBili  x   /  AST  20  /  ALT  21  /  AlkPhos  49  09-08    PT/INR - ( 08 Sep 2019 02:25 )   PT: See Note;   INR: See Note ratio
INTERVAL HPI/OVERNIGHT EVENTS:    pt seen and examined. He denies abdominal pain, n/v. Tolerating PO well.    MEDICATIONS  (STANDING):  aspirin enteric coated 81 milliGRAM(s) Oral daily  atorvastatin 10 milliGRAM(s) Oral at bedtime  clopidogrel Tablet 75 milliGRAM(s) Oral daily  famotidine    Tablet 20 milliGRAM(s) Oral daily  influenza   Vaccine 0.5 milliLiter(s) IntraMuscular once  magnesium oxide 400 milliGRAM(s) Oral daily  metoprolol succinate ER 25 milliGRAM(s) Oral daily  mycophenolate mofetil 500 milliGRAM(s) Oral two times a day  predniSONE   Tablet 5 milliGRAM(s) Oral daily  tacrolimus 2 milliGRAM(s) Oral two times a day  trimethoprim   80 mG/sulfamethoxazole 400 mG 1 Tablet(s) Oral daily    MEDICATIONS  (PRN):  simethicone 80 milliGRAM(s) Chew two times a day PRN Upset Stomach      Allergies    No Known Allergies    Intolerances        Review of Systems:    General:  No wt loss, fevers, chills, night sweats,fatigue,   Eyes:  Good vision, no reported pain  ENT:  No sore throat, pain, runny nose, dysphagia  CV:  No pain, palpitations, hypo/hypertension  Resp:  No dyspnea, cough, tachypnea, wheezing  GI:  No pain, No nausea, No vomiting, No diarrhea, No constipation, No weight loss, No fever, No pruritis, No rectal bleeding, No melena, No dysphagia  :  No pain, bleeding, incontinence, nocturia  Muscle:  No pain, weakness  Neuro:  No weakness, tingling, memory problems  Psych:  No fatigue, insomnia, mood problems, depression  Endocrine:  No polyuria, polydypsia, cold/heat intolerance  Heme:  No petechiae, ecchymosis, easy bruisability  Skin:  No rash, tattoos, scars, edema      Vital Signs Last 24 Hrs  T(C): 37 (10 Sep 2019 12:12), Max: 37 (10 Sep 2019 12:12)  T(F): 98.6 (10 Sep 2019 12:12), Max: 98.6 (10 Sep 2019 12:12)  HR: 63 (10 Sep 2019 12:12) (58 - 63)  BP: 129/71 (10 Sep 2019 12:12) (129/71 - 157/77)  BP(mean): --  RR: 18 (10 Sep 2019 12:12) (18 - 18)  SpO2: 95% (10 Sep 2019 12:12) (95% - 97%)    PHYSICAL EXAM:    Constitutional: NAD, well-developed  HEENT: EOMI, throat clear  Neck: No LAD, supple  Respiratory: CTA and P  Cardiovascular: S1 and S2, RRR, no M  Gastrointestinal: BS+, soft, NT/ND, neg HSM,  Extremities: No peripheral edema, neg clubing, cyanosis  Vascular: 2+ peripheral pulses  Neurological: A/O x 3, no focal deficits  Psychiatric: Normal mood, normal affect  Skin: No rashes      LABS:                        14.7   6.02  )-----------( 93       ( 10 Sep 2019 09:22 )             46.2     09-10    133<L>  |  98  |  19  ----------------------------<  117<H>  3.7   |  24  |  1.15    Ca    9.3      10 Sep 2019 06:26  Phos  4.3     09-10  Mg     1.6     09-10            RADIOLOGY & ADDITIONAL TESTS:
NEPHROLOGY-NSN [phone]        Patient seen and examined in bed.  He was in good spirits         MEDICATIONS  (STANDING):  aspirin enteric coated 81 milliGRAM(s) Oral daily  atorvastatin 10 milliGRAM(s) Oral at bedtime  clopidogrel Tablet 75 milliGRAM(s) Oral daily  famotidine    Tablet 20 milliGRAM(s) Oral daily  influenza   Vaccine 0.5 milliLiter(s) IntraMuscular once  magnesium oxide 400 milliGRAM(s) Oral daily  metoprolol succinate ER 25 milliGRAM(s) Oral daily  mycophenolate mofetil 500 milliGRAM(s) Oral two times a day  predniSONE   Tablet 5 milliGRAM(s) Oral daily  tacrolimus 2 milliGRAM(s) Oral two times a day  trimethoprim   80 mG/sulfamethoxazole 400 mG 1 Tablet(s) Oral daily      VITAL:  T(C): , Max: 36.7 (09-09-19 @ 20:58)  T(F): , Max: 98.1 (09-09-19 @ 20:58)  HR: 60 (09-10-19 @ 04:35)  BP: 139/70 (09-10-19 @ 04:35)  BP(mean): --  RR: 18 (09-10-19 @ 04:35)  SpO2: 96% (09-10-19 @ 04:35)  Wt(kg): --    I and O's:    09-09 @ 07:01  -  09-10 @ 07:00  --------------------------------------------------------  IN: 800 mL / OUT: 0 mL / NET: 800 mL          PHYSICAL EXAM:    Constitutional: NAD  Neck:  No JVD  Respiratory: CTAB/L  Cardiovascular: S1 and S2  Gastrointestinal: BS+, soft, NT/ND  Extremities: No peripheral edema  Neurological: A/O x 3, no focal deficits  Psychiatric: Normal mood, normal affect  : No Jean  Skin: No rashes  Access: Not applicable    LABS:                        14.7   6.02  )-----------( 93       ( 10 Sep 2019 09:22 )             46.2     09-10    133<L>  |  98  |  19  ----------------------------<  117<H>  3.7   |  24  |  1.15    Ca    9.3      10 Sep 2019 06:26  Phos  4.3     09-10  Mg     1.6     09-10            Urine Studies:          RADIOLOGY & ADDITIONAL STUDIES:
Patient is a 59y old  Male who presents with a chief complaint of Chest pain (09 Sep 2019 12:34)      Any change in ROS: Dong well     MEDICATIONS  (STANDING):  aspirin enteric coated 81 milliGRAM(s) Oral daily  atorvastatin 10 milliGRAM(s) Oral at bedtime  clopidogrel Tablet 75 milliGRAM(s) Oral daily  famotidine    Tablet 20 milliGRAM(s) Oral daily  influenza   Vaccine 0.5 milliLiter(s) IntraMuscular once  magnesium oxide 400 milliGRAM(s) Oral daily  metoprolol succinate ER 25 milliGRAM(s) Oral daily  mycophenolate mofetil 500 milliGRAM(s) Oral two times a day  predniSONE   Tablet 5 milliGRAM(s) Oral daily  tacrolimus 2 milliGRAM(s) Oral two times a day  trimethoprim   80 mG/sulfamethoxazole 400 mG 1 Tablet(s) Oral daily    MEDICATIONS  (PRN):  simethicone 80 milliGRAM(s) Chew two times a day PRN Upset Stomach    Vital Signs Last 24 Hrs  T(C): 36.6 (09 Sep 2019 11:13), Max: 36.6 (09 Sep 2019 11:13)  T(F): 97.8 (09 Sep 2019 11:13), Max: 97.8 (09 Sep 2019 11:13)  HR: 57 (09 Sep 2019 11:13) (54 - 61)  BP: 158/80 (09 Sep 2019 11:13) (135/72 - 158/80)  BP(mean): --  RR: 18 (09 Sep 2019 11:13) (18 - 18)  SpO2: 94% (09 Sep 2019 11:13) (94% - 97%)    I&O's Summary    08 Sep 2019 07:01  -  09 Sep 2019 07:00  --------------------------------------------------------  IN: 100 mL / OUT: 0 mL / NET: 100 mL          Physical Exam:   GENERAL: NAD, well-groomed, well-developed  HEENT: JESUS/   Atraumatic, Normocephalic  ENMT: No tonsillar erythema, exudates, or enlargement; Moist mucous membranes, Good dentition, No lesions  NECK: Supple, No JVD, Normal thyroid  CHEST/LUNG: Clear to auscultaion, ; No rales, rhonchi, wheezing, or rubs  CVS: Regular rate and rhythm; No murmurs, rubs, or gallops  GI: : Soft, Nontender, Nondistended; Bowel sounds present  NERVOUS SYSTEM:  Alert & Oriented X3  EXTREMITIES:  2+ Peripheral Pulses, No clubbing, cyanosis, or edema  LYMPH: No lymphadenopathy noted  SKIN: No rashes or lesions  ENDOCRINOLOGY: No Thyromegaly  PSYCH: Appropriate    Labs:    CARDIAC MARKERS ( 08 Sep 2019 06:21 )  x     / x     / 92 U/L / x     / 4.0 ng/mL                            16.3   8.3   )-----------( 95       ( 08 Sep 2019 02:25 )             50.8     09-08    134<L>  |  97  |  17  ----------------------------<  130<H>  4.0   |  23  |  1.02    Ca    9.5      08 Sep 2019 02:25  Mg     1.6     09-08    TPro  7.2  /  Alb  4.2  /  TBili  0.5  /  DBili  x   /  AST  20  /  ALT  21  /  AlkPhos  49  09-08    CAPILLARY BLOOD GLUCOSE          LIVER FUNCTIONS - ( 08 Sep 2019 02:25 )  Alb: 4.2 g/dL / Pro: 7.2 g/dL / ALK PHOS: 49 U/L / ALT: 21 U/L / AST: 20 U/L / GGT: x           PT/INR - ( 08 Sep 2019 02:25 )   PT: See Note;   INR: See Note ratio             Serum Pro-Brain Natriuretic Peptide: 372 pg/mL (09-08 @ 02:25)    < from: Xray Chest 2 Views PA/Lat (09.08.19 @ 03:11) >  EXAM:  XR CHEST PA LAT 2V                            PROCEDURE DATE:  09/08/2019            INTERPRETATION:  CLINICAL INFORMATION: Chest pain.    TECHNIQUE: Frontal and lateral radiographs of the chest dated 9/8/2019   3:11 AM.    COMPARISON: Chestradiograph from 1/6/2019.    FINDINGS:  Status post median sternotomy. Two left upper extremity vascular stents.  The lungs are clear.  No pleural effusions. No pneumothorax.  Cardiac size is within normal limits.   No acute osseous abnormality.       IMPRESSION: Clear lungs.                BRAYAN MO M.D., RADIOLOGY RESIDENT  This document has been electronically signed.  CARMEN RAINES M.D., ATTENDING RADIOLOGIST  This document has been electronically signed. Sep  8 2019  6:11AM        < end of copied text >      RECENT CULTURES:        RESPIRATORY CULTURES:          Studies  Chest X-RAY  CT SCAN Chest   Venous Dopplers: LE:   CT Abdomen  Others
Patient is a 59y old  Male who presents with a chief complaint of Chest pain (09 Sep 2019 13:27)      INTERVAL HPI/OVERNIGHT EVENTS:  T(C): 36.6 (09-09-19 @ 11:13), Max: 36.6 (09-09-19 @ 11:13)  HR: 57 (09-09-19 @ 11:13) (54 - 57)  BP: 158/80 (09-09-19 @ 11:13) (135/72 - 158/80)  RR: 18 (09-09-19 @ 11:13) (18 - 18)  SpO2: 94% (09-09-19 @ 11:13) (94% - 96%)  Wt(kg): --  I&O's Summary    08 Sep 2019 07:01  -  09 Sep 2019 07:00  --------------------------------------------------------  IN: 100 mL / OUT: 0 mL / NET: 100 mL    09 Sep 2019 07:01  -  09 Sep 2019 19:27  --------------------------------------------------------  IN: 800 mL / OUT: 0 mL / NET: 800 mL        LABS:                        16.3   8.3   )-----------( 95       ( 08 Sep 2019 02:25 )             50.8     09-08    134<L>  |  97  |  17  ----------------------------<  130<H>  4.0   |  23  |  1.02    Ca    9.5      08 Sep 2019 02:25  Mg     1.6     09-08    TPro  7.2  /  Alb  4.2  /  TBili  0.5  /  DBili  x   /  AST  20  /  ALT  21  /  AlkPhos  49  09-08    PT/INR - ( 08 Sep 2019 02:25 )   PT: See Note;   INR: See Note ratio             CAPILLARY BLOOD GLUCOSE                MEDICATIONS  (STANDING):  aspirin enteric coated 81 milliGRAM(s) Oral daily  atorvastatin 10 milliGRAM(s) Oral at bedtime  clopidogrel Tablet 75 milliGRAM(s) Oral daily  famotidine    Tablet 20 milliGRAM(s) Oral daily  influenza   Vaccine 0.5 milliLiter(s) IntraMuscular once  magnesium oxide 400 milliGRAM(s) Oral daily  metoprolol succinate ER 25 milliGRAM(s) Oral daily  mycophenolate mofetil 500 milliGRAM(s) Oral two times a day  predniSONE   Tablet 5 milliGRAM(s) Oral daily  tacrolimus 2 milliGRAM(s) Oral two times a day  trimethoprim   80 mG/sulfamethoxazole 400 mG 1 Tablet(s) Oral daily    MEDICATIONS  (PRN):  simethicone 80 milliGRAM(s) Chew two times a day PRN Upset Stomach          PHYSICAL EXAM:  GENERAL: NAD, well-groomed, well-developed  HEAD:  Atraumatic, Normocephalic  CHEST/LUNG: Clear to percussion bilaterally; No rales, rhonchi, wheezing, or rubs  HEART: Regular rate and rhythm; No murmurs, rubs, or gallops  ABDOMEN: Soft, Nontender, Nondistended; Bowel sounds present  EXTREMITIES:  2+ Peripheral Pulses, No clubbing, cyanosis, or edema  LYMPH: No lymphadenopathy noted  SKIN: No rashes or lesions    Care Discussed with Consultants/Other Providers [ ] YES  [ ] NO
Patient is a 59y old  Male who presents with a chief complaint of Chest pain (10 Sep 2019 13:46)      Any change in ROS: Doing ok : no SOB : no chest pain     MEDICATIONS  (STANDING):  aspirin enteric coated 81 milliGRAM(s) Oral daily  atorvastatin 10 milliGRAM(s) Oral at bedtime  clopidogrel Tablet 75 milliGRAM(s) Oral daily  famotidine    Tablet 20 milliGRAM(s) Oral daily  influenza   Vaccine 0.5 milliLiter(s) IntraMuscular once  magnesium oxide 400 milliGRAM(s) Oral daily  metoprolol succinate ER 25 milliGRAM(s) Oral daily  mycophenolate mofetil 500 milliGRAM(s) Oral two times a day  predniSONE   Tablet 5 milliGRAM(s) Oral daily  tacrolimus 2 milliGRAM(s) Oral two times a day  trimethoprim   80 mG/sulfamethoxazole 400 mG 1 Tablet(s) Oral daily    MEDICATIONS  (PRN):  simethicone 80 milliGRAM(s) Chew two times a day PRN Upset Stomach    Vital Signs Last 24 Hrs  T(C): 37 (10 Sep 2019 12:12), Max: 37 (10 Sep 2019 12:12)  T(F): 98.6 (10 Sep 2019 12:12), Max: 98.6 (10 Sep 2019 12:12)  HR: 63 (10 Sep 2019 12:12) (58 - 63)  BP: 129/71 (10 Sep 2019 12:12) (129/71 - 157/77)  BP(mean): --  RR: 18 (10 Sep 2019 12:12) (18 - 18)  SpO2: 95% (10 Sep 2019 12:12) (95% - 97%)    I&O's Summary    09 Sep 2019 07:01  -  10 Sep 2019 07:00  --------------------------------------------------------  IN: 800 mL / OUT: 0 mL / NET: 800 mL          Physical Exam:   GENERAL: NAD, well-groomed, well-developed  HEENT: JESUS/   Atraumatic, Normocephalic  ENMT: No tonsillar erythema, exudates, or enlargement; Moist mucous membranes, Good dentition, No lesions  NECK: Supple, No JVD, Normal thyroid  CHEST/LUNG: Clear to auscultaion, ; No rales, rhonchi, wheezing, or rubs  CVS: Regular rate and rhythm; No murmurs, rubs, or gallops  GI: : Soft, Nontender, Nondistended; Bowel sounds present  NERVOUS SYSTEM:  Alert & Oriented X3  EXTREMITIES:  2+ Peripheral Pulses, No clubbing, cyanosis, or edema  LYMPH: No lymphadenopathy noted  SKIN: No rashes or lesions  ENDOCRINOLOGY: No Thyromegaly  PSYCH: Appropriate    Labs:                              14.7   6.02  )-----------( 93       ( 10 Sep 2019 09:22 )             46.2                         16.3   8.3   )-----------( 95       ( 08 Sep 2019 02:25 )             50.8     09-10    133<L>  |  98  |  19  ----------------------------<  117<H>  3.7   |  24  |  1.15  09-08    134<L>  |  97  |  17  ----------------------------<  130<H>  4.0   |  23  |  1.02    Ca    9.3      10 Sep 2019 06:26  Phos  4.3     09-10  Mg     1.6     09-10    TPro  7.2  /  Alb  4.2  /  TBili  0.5  /  DBili  x   /  AST  20  /  ALT  21  /  AlkPhos  49  09-08    CAPILLARY BLOOD GLUCOSE          < from: Xray Chest 2 Views PA/Lat (09.08.19 @ 03:11) >  EXAM:  XR CHEST PA LAT 2V                            PROCEDURE DATE:  09/08/2019            INTERPRETATION:  CLINICAL INFORMATION: Chest pain.    TECHNIQUE: Frontal and lateral radiographs of the chest dated 9/8/2019   3:11 AM.    COMPARISON: Chestradiograph from 1/6/2019.    FINDINGS:  Status post median sternotomy. Two left upper extremity vascular stents.  The lungs are clear.  No pleural effusions. No pneumothorax.  Cardiac size is within normal limits.   No acute osseous abnormality.       IMPRESSION: Clear lungs.          < from: CT Chest w/o Cont (07.25.16 @ 08:46) >  COMPARISON: CTA chest June 19, 2012.    PROCEDURE:   CT of the Chest was performed without intravenous contrast.  Sagittal and coronal reformats were performed.      FINDINGS:    CHEST:     LUNGS AND LARGE AIRWAYS: Patent central airways. No pulmonary nodules.   Left lower lobe subsegmental atelectasis. Left lower lobe calcified   granuloma.  PLEURA: No pleural effusion.  VESSELS: Mild atheromatous disease of the aorta. Left axillary and   subclavian vein vascular stent and partially visualized bypass graft.  HEART: Heart size is normal.No pericardial effusion. Status post coronary   artery bypass graft.  MEDIASTINUM AND AYLA: No lymphadenopathy.  CHEST WALL AND LOWER NECK: Status post median sternotomy.  VISUALIZED UPPER ABDOMEN: Innumerable bilateral renal cysts.  BONES: Degenerative changes of the spine.    IMPRESSION: No pneumonia.         MANAN CHAVEZ M.D., RADIOLOGY RESIDENT  This document has been electronically signed.  CRISTIANO GARCIA M.D., ATTENDING RADIOLOGIST  This document has been electronically signed. Jul 25 2016 12:48P    < end of copied text >        BRAYAN OM M.D., RADIOLOGY RESIDENT  This document has been electronically signed.  CARMEN RAINES M.D., ATTENDING RADIOLOGIST  This document has been electronically signed. Sep  8 2019  6:11AM        < end of copied text >        Serum Pro-Brain Natriuretic Peptide: 372 pg/mL (09-08 @ 02:25)        RECENT CULTURES:        RESPIRATORY CULTURES:          Studies  Chest X-RAY  CT SCAN Chest   Venous Dopplers: LE:   CT Abdomen  Others

## 2019-09-10 NOTE — PROGRESS NOTE ADULT - PROBLEM SELECTOR PLAN 1
resolved
suspect 2/2 gerd  diet as tolerated  resume proton pump inhibitor, patient self dced 1 month ago  upper gastrointestinal endoscopy with dr kamara about a year ago unremarkable  no need to repeat  dc planning once cardiac w/u complete
resolved  9/10: he I stble: no more chest pain or SOB

## 2019-09-10 NOTE — DISCHARGE NOTE PROVIDER - NSDCFUADDINST_GEN_ALL_CORE_FT
Follow up with primary care physician   and gastroenterologist within 1 week of discharge.  Call to schedule appointment

## 2019-09-10 NOTE — DISCHARGE NOTE PROVIDER - HOSPITAL COURSE
58 yr old with pmh of CAD s/p CABG/PCI, HTN, ESRD, s/p kidney transplant, htn, asthma presenting with chest pain.     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 c/o sided chest pain.            1. Chest pain, atypical    likely GI related , hx of GERD     HST negative x 2, no evidence of acute ischemia/ACS , EKG unchanged, NSR w/ RBBB    no evidence of fluid overload, CXR clear     echo EF 50%, mild segmental LV dysfunction     continue pepcid as ordered    continue asa, statin    GI f/u         2. HTN    bp stable     c/w metoprolol 25 mg daily        3. Asthma     pulm f/u     stable        4. ESRD s/p renal transplant    on tacrolimus    renal f/u 58 yr old with pmh of CAD s/p CABGx5/PCI, HTN, ESRD, renal transplant secondary to PKD, HTN, asthma presenting with chest pain.         Chest pain, atypical    likely GI related , hx of GERD     HST negative x 2, no evidence of acute ischemia/ACS , EKG unchanged, NSR w/ RBBB    no evidence of fluid overload, CXR clear     echo EF 50%, mild segmental LV dysfunction     continue pepcid as ordered    continue asa, statin    GI f/u     bp stable ,c/w metoprolol 25 mg daily    ESRD s/p renal transplant, renal follwing, continue prednisone, cellcept and prograf

## 2019-09-17 ENCOUNTER — INPATIENT (INPATIENT)
Facility: HOSPITAL | Age: 59
LOS: 6 days | Discharge: HOME | DRG: 247 | End: 2019-09-24
Attending: INTERNAL MEDICINE | Admitting: INTERNAL MEDICINE
Payer: MEDICAID

## 2019-09-17 VITALS
SYSTOLIC BLOOD PRESSURE: 137 MMHG | HEIGHT: 65 IN | HEART RATE: 60 BPM | DIASTOLIC BLOOD PRESSURE: 82 MMHG | RESPIRATION RATE: 16 BRPM | WEIGHT: 179.9 LBS | OXYGEN SATURATION: 99 % | TEMPERATURE: 98 F

## 2019-09-17 DIAGNOSIS — Z95.5 PRESENCE OF CORONARY ANGIOPLASTY IMPLANT AND GRAFT: Chronic | ICD-10-CM

## 2019-09-17 DIAGNOSIS — Z98.890 OTHER SPECIFIED POSTPROCEDURAL STATES: Chronic | ICD-10-CM

## 2019-09-17 DIAGNOSIS — R07.9 CHEST PAIN, UNSPECIFIED: ICD-10-CM

## 2019-09-17 DIAGNOSIS — Z95.1 PRESENCE OF AORTOCORONARY BYPASS GRAFT: Chronic | ICD-10-CM

## 2019-09-17 DIAGNOSIS — I77.0 ARTERIOVENOUS FISTULA, ACQUIRED: Chronic | ICD-10-CM

## 2019-09-17 LAB
ALBUMIN SERPL ELPH-MCNC: 4 G/DL — SIGNIFICANT CHANGE UP (ref 3.3–5)
ALP SERPL-CCNC: 49 U/L — SIGNIFICANT CHANGE UP (ref 40–120)
ALT FLD-CCNC: 26 U/L — SIGNIFICANT CHANGE UP (ref 10–45)
ANION GAP SERPL CALC-SCNC: 15 MMOL/L — SIGNIFICANT CHANGE UP (ref 5–17)
APTT BLD: 29.6 SEC — SIGNIFICANT CHANGE UP (ref 27.5–36.3)
AST SERPL-CCNC: 26 U/L — SIGNIFICANT CHANGE UP (ref 10–40)
BASOPHILS # BLD AUTO: 0 K/UL — SIGNIFICANT CHANGE UP (ref 0–0.2)
BASOPHILS NFR BLD AUTO: 0.3 % — SIGNIFICANT CHANGE UP (ref 0–2)
BILIRUB SERPL-MCNC: 0.3 MG/DL — SIGNIFICANT CHANGE UP (ref 0.2–1.2)
BUN SERPL-MCNC: 20 MG/DL — SIGNIFICANT CHANGE UP (ref 7–23)
CALCIUM SERPL-MCNC: 9.2 MG/DL — SIGNIFICANT CHANGE UP (ref 8.4–10.5)
CHLORIDE SERPL-SCNC: 97 MMOL/L — SIGNIFICANT CHANGE UP (ref 96–108)
CO2 SERPL-SCNC: 23 MMOL/L — SIGNIFICANT CHANGE UP (ref 22–31)
CREAT SERPL-MCNC: 1.05 MG/DL — SIGNIFICANT CHANGE UP (ref 0.5–1.3)
EOSINOPHIL # BLD AUTO: 0.1 K/UL — SIGNIFICANT CHANGE UP (ref 0–0.5)
EOSINOPHIL NFR BLD AUTO: 1.4 % — SIGNIFICANT CHANGE UP (ref 0–6)
GLUCOSE SERPL-MCNC: 129 MG/DL — HIGH (ref 70–99)
HCT VFR BLD CALC: 48.4 % — SIGNIFICANT CHANGE UP (ref 39–50)
HGB BLD-MCNC: 15.2 G/DL — SIGNIFICANT CHANGE UP (ref 13–17)
INR BLD: 1.11 RATIO — SIGNIFICANT CHANGE UP (ref 0.88–1.16)
LYMPHOCYTES # BLD AUTO: 1.8 K/UL — SIGNIFICANT CHANGE UP (ref 1–3.3)
LYMPHOCYTES # BLD AUTO: 25.6 % — SIGNIFICANT CHANGE UP (ref 13–44)
MCHC RBC-ENTMCNC: 27.6 PG — SIGNIFICANT CHANGE UP (ref 27–34)
MCHC RBC-ENTMCNC: 31.4 GM/DL — LOW (ref 32–36)
MCV RBC AUTO: 87.6 FL — SIGNIFICANT CHANGE UP (ref 80–100)
MONOCYTES # BLD AUTO: 0.6 K/UL — SIGNIFICANT CHANGE UP (ref 0–0.9)
MONOCYTES NFR BLD AUTO: 8.3 % — SIGNIFICANT CHANGE UP (ref 2–14)
NEUTROPHILS # BLD AUTO: 4.4 K/UL — SIGNIFICANT CHANGE UP (ref 1.8–7.4)
NEUTROPHILS NFR BLD AUTO: 64.4 % — SIGNIFICANT CHANGE UP (ref 43–77)
PLATELET # BLD AUTO: 100 K/UL — LOW (ref 150–400)
POTASSIUM SERPL-MCNC: 4.5 MMOL/L — SIGNIFICANT CHANGE UP (ref 3.5–5.3)
POTASSIUM SERPL-SCNC: 4.5 MMOL/L — SIGNIFICANT CHANGE UP (ref 3.5–5.3)
PROT SERPL-MCNC: 6.7 G/DL — SIGNIFICANT CHANGE UP (ref 6–8.3)
PROTHROM AB SERPL-ACNC: 12.7 SEC — SIGNIFICANT CHANGE UP (ref 10–12.9)
RBC # BLD: 5.52 M/UL — SIGNIFICANT CHANGE UP (ref 4.2–5.8)
RBC # FLD: 13.7 % — SIGNIFICANT CHANGE UP (ref 10.3–14.5)
SODIUM SERPL-SCNC: 135 MMOL/L — SIGNIFICANT CHANGE UP (ref 135–145)
TROPONIN T, HIGH SENSITIVITY RESULT: 8 NG/L — SIGNIFICANT CHANGE UP (ref 0–51)
WBC # BLD: 6.9 K/UL — SIGNIFICANT CHANGE UP (ref 3.8–10.5)
WBC # FLD AUTO: 6.9 K/UL — SIGNIFICANT CHANGE UP (ref 3.8–10.5)

## 2019-09-17 PROCEDURE — 71045 X-RAY EXAM CHEST 1 VIEW: CPT | Mod: 26

## 2019-09-17 PROCEDURE — 99223 1ST HOSP IP/OBS HIGH 75: CPT

## 2019-09-17 PROCEDURE — 99285 EMERGENCY DEPT VISIT HI MDM: CPT

## 2019-09-17 PROCEDURE — 93010 ELECTROCARDIOGRAM REPORT: CPT

## 2019-09-17 RX ORDER — ASPIRIN/CALCIUM CARB/MAGNESIUM 324 MG
162 TABLET ORAL ONCE
Refills: 0 | Status: COMPLETED | OUTPATIENT
Start: 2019-09-17 | End: 2019-09-17

## 2019-09-17 RX ORDER — ASPIRIN/CALCIUM CARB/MAGNESIUM 324 MG
162 TABLET ORAL ONCE
Refills: 0 | Status: DISCONTINUED | OUTPATIENT
Start: 2019-09-17 | End: 2019-09-17

## 2019-09-17 RX ORDER — INFLUENZA VIRUS VACCINE 15; 15; 15; 15 UG/.5ML; UG/.5ML; UG/.5ML; UG/.5ML
0.5 SUSPENSION INTRAMUSCULAR ONCE
Refills: 0 | Status: COMPLETED | OUTPATIENT
Start: 2019-09-17 | End: 2019-09-24

## 2019-09-17 RX ADMIN — Medication 162 MILLIGRAM(S): at 22:34

## 2019-09-17 NOTE — H&P ADULT - PROBLEM SELECTOR PLAN 4
fluctuating BP as per patient, when blood pressure is elevated, he gets symptoms  c/w home metoprolol for now; adjust meds as needed

## 2019-09-17 NOTE — ED PROVIDER NOTE - CLINICAL SUMMARY MEDICAL DECISION MAKING FREE TEXT BOX
Appears anginal chest pain, r/o ACS, r/o CHF, HEART 5; labs, EKG as documented, XR, likely admit given extensive history and symptomology.

## 2019-09-17 NOTE — H&P ADULT - PROBLEM SELECTOR PLAN 1
patient with chest pain, without acute EKG changes  r/o ACS; ?reflux related;  not taking pepcid as prescribed; will restart  HST 8, previously was 10; will continue to trend  monitor on telemetry  Wells criteria 6, if symptoms persists, consider CTA chest to r/o PE patient with chest pain, without acute EKG changes  r/o ACS; ?reflux related;  not taking pepcid as prescribed; will restart  HST 8, previously was 10; will continue to trend  monitor on telemetry  Wells criteria 0, if symptoms persists, consider CTA chest to r/o PE

## 2019-09-17 NOTE — H&P ADULT - ASSESSMENT
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

## 2019-09-17 NOTE — H&P ADULT - HISTORY OF PRESENT ILLNESS
60 yo male with PMH of CAD, CABG x 5/PCI on plavix, HTN, ESRD, renal transplant secondary to PCKD (on bactrim, prednsione, tacro, cellcept), HTN, Asthma brought in via EMS for intermittent diffuse chest pressure.      58 y/o male PMHx CAD s/p CABGx5/PCI on plavix, HTN, ESRD, renal transplant secondary to PKD (on bactrim, prednisone 5mg, tacro, Cellcept), HTN, asthma brought in via EMS for intermittent diffuse chest pressure since 8:30pm. Patient rated the chest pressure 7/10, dry non productive cough and has associated SOB. Patient took ASA 162mg total today. Patient recently admitted one week ago for chest pain and had negative trop x2. TTE done which showed EF 50%, mild segmental LV dysfunction. Patient denied radiation of pain, N/V/D, back pain, neck pain, dizziness, palpitations, syncope, prolonged sedentary period or travel, orthopnea, 60 yo male with PMH of CAD, CABG x 5/PCI on plavix, HTN, ESRD, renal transplant secondary to PCKD (on bactrim, prednsione, tacro, cellcept), HTN, Asthma brought in via EMS for intermittent diffuse chest pain.  Patient was recently admitted for chest pain one week ago, was thought to be due to GI related.  HST negative x 2, EKG unchanged, echo showed EF 50%, mild segmental LV dysfunction.  Patient was ordered for pepcid, but he is not taking that.  Patient was discharged last Tuesday.  He says he has been doing well, except his BP has been fluctuating. Today patient woke up with productive coughing in the morning and then again in the evening.  Cough then resolved.  In the evening, he was reading a book and started feeling dizzy, had blurry vision.  He went to the bathroom and came back and was still feeling dizzy.  Then he started having Chest pain, around 8pm.  He describes it as middle of the chest, pressure like, nonradiating, constant, not associated with diaphoresis or SOB.  He checked his BP at the time, it was 171/95.  He took Aspirin and decided to take rest.  At 9pm, his Chest pain still persisted and he continued to feel dizzy.  Therefore, he decided to come to hospital. 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  Patient was recently admitted for chest pain one week ago, was thought to be due to GI related.  HST negative x 2, EKG unchanged, echo showed EF 50%, mild segmental LV dysfunction.  Patient was ordered for pepcid, but he is not taking that.  Patient was discharged last Tuesday.  He says he has been doing well, except his BP has been fluctuating. Today patient woke up with productive coughing in the morning and then again in the evening.  Cough then resolved.  In the evening, he was reading a book and started feeling dizzy, had blurry vision.  He went to the bathroom and came back and was still feeling dizzy.  She also felt SOB with walking.  Then he started having Chest pain, around 8pm while sitting down.  He describes it as middle of the chest, pressure like, nonradiating, constant, not associated with diaphoresis or SOB.  He checked his BP at the time, it was 171/95.  He took Aspirin and decided to take rest.  At 9pm, his Chest pain still persisted and he continued to feel dizzy.  Therefore, he decided to come to hospital.  Currently patient does have some chest pressure, but improved from before.

## 2019-09-17 NOTE — ED PROVIDER NOTE - OBJECTIVE STATEMENT
58 y/o male PMHx CAD s/p CABGx5/PCI on plavix, HTN, ESRD, renal transplant secondary to PKD (on bactrim, prednisone 5mg, tacro, Cellcept), HTN, asthma brought in via EMS for intermittent diffuse chest pressure since 8:30pm. Patient rated the chest pressure 7/10, dry non productive cough and has associated SOB. Patient took ASA 162mg total today. Patient recently admitted one week ago for chest pain and had negative trop x2. TTE done which showed EF 50%, mild segmental LV dysfunction. Patient denied radiation of pain, N/V/D, back pain, neck pain, dizziness, palpitations, syncope, prolonged sedentary period or travel, orthopnea, 60 y/o male PMHx CAD s/p CABGx5/PCI on plavix, HTN, ESRD, renal transplant secondary to PKD (on bactrim, prednisone 5mg, tacro, Cellcept), HTN, asthma brought in via EMS for intermittent diffuse chest pressure since 8:30pm. Patient rated the chest pressure 7/10, dry non productive cough and has associated SOB. Patient states the chest pressure was so bad he called EMS who recommended he take ASA 162mg total today. States the pressure radiated to the L side, also up to the back of his head. Exacerbated by walking, alleviated with rest. Patient recently admitted one week ago for chest pain and had negative trop x2. TTE done which showed EF 50%, mild segmental LV dysfunction. Patient denies N/V/D, back pain, neck pain, dizziness, palpitations, syncope, prolonged sedentary period or travel, orthopnea,

## 2019-09-17 NOTE — H&P ADULT - NSHPLABSRESULTS_GEN_ALL_CORE
Labs personally reviewed:                          15.2   6.9   )-----------( 100      ( 17 Sep 2019 22:36 )             48.4     09-17    135  |  97  |  20  ----------------------------<  129<H>  4.5   |  23  |  1.05    Ca    9.2      17 Sep 2019 22:36    TPro  6.7  /  Alb  4.0  /  TBili  0.3  /  DBili  x   /  AST  26  /  ALT  26  /  AlkPhos  49  09-17        LIVER FUNCTIONS - ( 17 Sep 2019 22:36 )  Alb: 4.0 g/dL / Pro: 6.7 g/dL / ALK PHOS: 49 U/L / ALT: 26 U/L / AST: 26 U/L / GGT: x           PT/INR - ( 17 Sep 2019 22:36 )   PT: 12.7 sec;   INR: 1.11 ratio         PTT - ( 17 Sep 2019 22:36 )  PTT:29.6 sec    CAPILLARY BLOOD GLUCOSE          Imaging:  CXR personally reviewed: clear    EKG personally reviewed: sinus rhythm; RBBB

## 2019-09-17 NOTE — H&P ADULT - ATTENDING COMMENTS
Patient assigned to me by night hospitalist in charge for management and care for patient for this evening only. Care to be resumed by day hospitalist (Dr. Spence) in the morning and thereafter.     Patient's care rendered on 9/17/19 at 11:45PM.      I was physically present for the key portions of the evaluation and management (E/M) service provided.  I agree with the above history, physical, and plan which I have reviewed and edited where appropriate.     Plan discussed with Patient, RN.

## 2019-09-17 NOTE — H&P ADULT - NSHPPHYSICALEXAM_GEN_ALL_CORE
PHYSICAL EXAM:  Vital Signs Last 24 Hrs  T(C): 36.4 (09-17-19 @ 23:50)  T(F): 97.5 (09-17-19 @ 23:50), Max: 98 (09-17-19 @ 21:50)  HR: 58 (09-17-19 @ 23:50) (58 - 60)  BP: 165/84 (09-17-19 @ 23:50)  BP(mean): --  RR: 18 (09-17-19 @ 23:50) (16 - 18)  SpO2: 97% (09-17-19 @ 23:50) (97% - 99%)  Wt(kg): --    Constitutional: NAD, awake and alert  EYES: EOMI  ENT:  Normal Hearing, no tonsillar exudates   Neck: Soft and supple, No JVD  Lungs: Breath sounds are clear bilaterally, No wheezing, rales or rhonchi  Heart: S1 and S2, regular rate and rhythm, no Murmurs, gallops or rubs  Abdomen: Bowel Sounds present, soft, nontender, nondistended, no guarding, no rebound  Extremities: No cyanosis or clubbing; warm to touch  Vascular: 2+ peripheral pulses lower ex  Neurological: A/O x 3, no focal deficits  Musculoskeletal: 5/5 strength b/l upper and lower extremities  Skin: No rashes  Psych: no depression or anhedonia  HEME: no bruises, no nose bleeds

## 2019-09-17 NOTE — ED ADULT NURSE NOTE - OBJECTIVE STATEMENT
59 y.o M presents to the ED from home c/o chest pain. Patient is awake, alert and speaking coherently. As per patient he was recently in the hospital for chest pain. Patient states he was discharged last week and his chest pain started again this morning. Reports taking 81 mg of aspirin prior to ED arrival. Patient reports intermittent sternal chest pain. Patient presents A&ox3, afebrile and ambulatory with a standby assist; denies headache, dizziness, numbness, tingling, shortness of breath, nausea and vomiting. Cardiac monitoring initiated- sinus tachycardia on the monitor.

## 2019-09-17 NOTE — H&P ADULT - NSHPREVIEWOFSYSTEMS_GEN_ALL_CORE
CONSTITUTIONAL: No weakness, fevers or chills  EYES/ENT: No visual changes;  No dysphagia  NECK: No pain or stiffness  RESPIRATORY: SOB with activities  CARDIOVASCULAR: +chest pain  EXTREMITIES: no le edema, cyanosis, clubbing  MUSCULOSKELETAL: no joint pain, swelling  GASTROINTESTINAL: No abdominal or epigastric pain. No nausea, vomiting, or hematemesis; No diarrhea or constipation. No melena or hematochezia.  BACK: No back pain  GENITOURINARY: No dysuria, frequency or hematuria  NEUROLOGICAL: No numbness or weakness  SKIN: No itching, burning, rashes, or lesions   PSYCH: no agitation  All other review of systems is negative unless indicated above.

## 2019-09-17 NOTE — ED PROVIDER NOTE - ATTENDING CONTRIBUTION TO CARE
I performed a history and physical exam of the patient and discussed their management with the resident/ACP. I reviewed the resident/ACP's note and agree with the documented findings and plan of care. I have edited the HPI/ROS/PE/MDM to reflect my findings.

## 2019-09-18 ENCOUNTER — TRANSCRIPTION ENCOUNTER (OUTPATIENT)
Age: 59
End: 2019-09-18

## 2019-09-18 DIAGNOSIS — E78.5 HYPERLIPIDEMIA, UNSPECIFIED: ICD-10-CM

## 2019-09-18 DIAGNOSIS — I10 ESSENTIAL (PRIMARY) HYPERTENSION: ICD-10-CM

## 2019-09-18 DIAGNOSIS — Z29.9 ENCOUNTER FOR PROPHYLACTIC MEASURES, UNSPECIFIED: ICD-10-CM

## 2019-09-18 DIAGNOSIS — I25.10 ATHEROSCLEROTIC HEART DISEASE OF NATIVE CORONARY ARTERY WITHOUT ANGINA PECTORIS: ICD-10-CM

## 2019-09-18 DIAGNOSIS — R09.89 OTHER SPECIFIED SYMPTOMS AND SIGNS INVOLVING THE CIRCULATORY AND RESPIRATORY SYSTEMS: ICD-10-CM

## 2019-09-18 DIAGNOSIS — R07.9 CHEST PAIN, UNSPECIFIED: ICD-10-CM

## 2019-09-18 DIAGNOSIS — Z94.0 KIDNEY TRANSPLANT STATUS: ICD-10-CM

## 2019-09-18 LAB
ALBUMIN SERPL ELPH-MCNC: 3.9 G/DL — SIGNIFICANT CHANGE UP (ref 3.3–5)
ALP SERPL-CCNC: 45 U/L — SIGNIFICANT CHANGE UP (ref 40–120)
ALT FLD-CCNC: 27 U/L — SIGNIFICANT CHANGE UP (ref 10–45)
ANION GAP SERPL CALC-SCNC: 11 MMOL/L — SIGNIFICANT CHANGE UP (ref 5–17)
AST SERPL-CCNC: 21 U/L — SIGNIFICANT CHANGE UP (ref 10–40)
BASOPHILS # BLD AUTO: 0 K/UL — SIGNIFICANT CHANGE UP (ref 0–0.2)
BASOPHILS NFR BLD AUTO: 0.5 % — SIGNIFICANT CHANGE UP (ref 0–2)
BILIRUB SERPL-MCNC: 0.4 MG/DL — SIGNIFICANT CHANGE UP (ref 0.2–1.2)
BUN SERPL-MCNC: 17 MG/DL — SIGNIFICANT CHANGE UP (ref 7–23)
CALCIUM SERPL-MCNC: 9.5 MG/DL — SIGNIFICANT CHANGE UP (ref 8.4–10.5)
CHLORIDE SERPL-SCNC: 98 MMOL/L — SIGNIFICANT CHANGE UP (ref 96–108)
CO2 SERPL-SCNC: 25 MMOL/L — SIGNIFICANT CHANGE UP (ref 22–31)
CREAT SERPL-MCNC: 0.98 MG/DL — SIGNIFICANT CHANGE UP (ref 0.5–1.3)
EOSINOPHIL # BLD AUTO: 0.1 K/UL — SIGNIFICANT CHANGE UP (ref 0–0.5)
EOSINOPHIL NFR BLD AUTO: 1.4 % — SIGNIFICANT CHANGE UP (ref 0–6)
GLUCOSE SERPL-MCNC: 126 MG/DL — HIGH (ref 70–99)
HCT VFR BLD CALC: 47.4 % — SIGNIFICANT CHANGE UP (ref 39–50)
HGB BLD-MCNC: 15.1 G/DL — SIGNIFICANT CHANGE UP (ref 13–17)
LYMPHOCYTES # BLD AUTO: 1.8 K/UL — SIGNIFICANT CHANGE UP (ref 1–3.3)
LYMPHOCYTES # BLD AUTO: 31.8 % — SIGNIFICANT CHANGE UP (ref 13–44)
MAGNESIUM SERPL-MCNC: 1.5 MG/DL — LOW (ref 1.6–2.6)
MCHC RBC-ENTMCNC: 28 PG — SIGNIFICANT CHANGE UP (ref 27–34)
MCHC RBC-ENTMCNC: 31.8 GM/DL — LOW (ref 32–36)
MCV RBC AUTO: 88 FL — SIGNIFICANT CHANGE UP (ref 80–100)
MONOCYTES # BLD AUTO: 0.5 K/UL — SIGNIFICANT CHANGE UP (ref 0–0.9)
MONOCYTES NFR BLD AUTO: 8.2 % — SIGNIFICANT CHANGE UP (ref 2–14)
NEUTROPHILS # BLD AUTO: 3.2 K/UL — SIGNIFICANT CHANGE UP (ref 1.8–7.4)
NEUTROPHILS NFR BLD AUTO: 58.1 % — SIGNIFICANT CHANGE UP (ref 43–77)
PHOSPHATE SERPL-MCNC: 4.2 MG/DL — SIGNIFICANT CHANGE UP (ref 2.5–4.5)
PLATELET # BLD AUTO: 88 K/UL — LOW (ref 150–400)
POTASSIUM SERPL-MCNC: 3.8 MMOL/L — SIGNIFICANT CHANGE UP (ref 3.5–5.3)
POTASSIUM SERPL-SCNC: 3.8 MMOL/L — SIGNIFICANT CHANGE UP (ref 3.5–5.3)
PROT SERPL-MCNC: 6.6 G/DL — SIGNIFICANT CHANGE UP (ref 6–8.3)
RBC # BLD: 5.39 M/UL — SIGNIFICANT CHANGE UP (ref 4.2–5.8)
RBC # FLD: 13.6 % — SIGNIFICANT CHANGE UP (ref 10.3–14.5)
SODIUM SERPL-SCNC: 134 MMOL/L — LOW (ref 135–145)
TACROLIMUS SERPL-MCNC: 8.2 NG/ML — SIGNIFICANT CHANGE UP
TROPONIN T, HIGH SENSITIVITY RESULT: 8 NG/L — SIGNIFICANT CHANGE UP (ref 0–51)
WBC # BLD: 5.5 K/UL — SIGNIFICANT CHANGE UP (ref 3.8–10.5)
WBC # FLD AUTO: 5.5 K/UL — SIGNIFICANT CHANGE UP (ref 3.8–10.5)

## 2019-09-18 RX ORDER — METOPROLOL TARTRATE 50 MG
25 TABLET ORAL DAILY
Refills: 0 | Status: DISCONTINUED | OUTPATIENT
Start: 2019-09-18 | End: 2019-09-18

## 2019-09-18 RX ORDER — METOPROLOL TARTRATE 50 MG
25 TABLET ORAL DAILY
Refills: 0 | Status: DISCONTINUED | OUTPATIENT
Start: 2019-09-18 | End: 2019-09-24

## 2019-09-18 RX ORDER — ASPIRIN/CALCIUM CARB/MAGNESIUM 324 MG
81 TABLET ORAL DAILY
Refills: 0 | Status: DISCONTINUED | OUTPATIENT
Start: 2019-09-18 | End: 2019-09-24

## 2019-09-18 RX ORDER — SIMETHICONE 80 MG/1
80 TABLET, CHEWABLE ORAL
Refills: 0 | Status: DISCONTINUED | OUTPATIENT
Start: 2019-09-18 | End: 2019-09-24

## 2019-09-18 RX ORDER — TACROLIMUS 5 MG/1
2 CAPSULE ORAL
Refills: 0 | Status: DISCONTINUED | OUTPATIENT
Start: 2019-09-18 | End: 2019-09-24

## 2019-09-18 RX ORDER — MAGNESIUM OXIDE 400 MG ORAL TABLET 241.3 MG
400 TABLET ORAL DAILY
Refills: 0 | Status: DISCONTINUED | OUTPATIENT
Start: 2019-09-18 | End: 2019-09-24

## 2019-09-18 RX ORDER — FAMOTIDINE 10 MG/ML
20 INJECTION INTRAVENOUS DAILY
Refills: 0 | Status: DISCONTINUED | OUTPATIENT
Start: 2019-09-18 | End: 2019-09-19

## 2019-09-18 RX ORDER — ASPIRIN/CALCIUM CARB/MAGNESIUM 324 MG
2 TABLET ORAL
Qty: 0 | Refills: 0 | DISCHARGE

## 2019-09-18 RX ORDER — MAGNESIUM SULFATE 500 MG/ML
1 VIAL (ML) INJECTION ONCE
Refills: 0 | Status: COMPLETED | OUTPATIENT
Start: 2019-09-18 | End: 2019-09-18

## 2019-09-18 RX ORDER — ATORVASTATIN CALCIUM 80 MG/1
10 TABLET, FILM COATED ORAL AT BEDTIME
Refills: 0 | Status: DISCONTINUED | OUTPATIENT
Start: 2019-09-18 | End: 2019-09-24

## 2019-09-18 RX ORDER — MYCOPHENOLATE MOFETIL 250 MG/1
500 CAPSULE ORAL
Refills: 0 | Status: DISCONTINUED | OUTPATIENT
Start: 2019-09-18 | End: 2019-09-24

## 2019-09-18 RX ORDER — CLOPIDOGREL BISULFATE 75 MG/1
75 TABLET, FILM COATED ORAL DAILY
Refills: 0 | Status: DISCONTINUED | OUTPATIENT
Start: 2019-09-18 | End: 2019-09-24

## 2019-09-18 RX ADMIN — Medication 81 MILLIGRAM(S): at 11:48

## 2019-09-18 RX ADMIN — Medication 25 MILLIGRAM(S): at 08:52

## 2019-09-18 RX ADMIN — SIMETHICONE 80 MILLIGRAM(S): 80 TABLET, CHEWABLE ORAL at 16:39

## 2019-09-18 RX ADMIN — MAGNESIUM OXIDE 400 MG ORAL TABLET 400 MILLIGRAM(S): 241.3 TABLET ORAL at 12:22

## 2019-09-18 RX ADMIN — MYCOPHENOLATE MOFETIL 500 MILLIGRAM(S): 250 CAPSULE ORAL at 16:39

## 2019-09-18 RX ADMIN — TACROLIMUS 2 MILLIGRAM(S): 5 CAPSULE ORAL at 16:39

## 2019-09-18 RX ADMIN — CLOPIDOGREL BISULFATE 75 MILLIGRAM(S): 75 TABLET, FILM COATED ORAL at 11:48

## 2019-09-18 RX ADMIN — Medication 5 MILLIGRAM(S): at 06:06

## 2019-09-18 RX ADMIN — FAMOTIDINE 20 MILLIGRAM(S): 10 INJECTION INTRAVENOUS at 11:48

## 2019-09-18 RX ADMIN — Medication 100 GRAM(S): at 16:39

## 2019-09-18 RX ADMIN — ATORVASTATIN CALCIUM 10 MILLIGRAM(S): 80 TABLET, FILM COATED ORAL at 21:22

## 2019-09-18 RX ADMIN — Medication 1 TABLET(S): at 11:48

## 2019-09-18 RX ADMIN — MYCOPHENOLATE MOFETIL 500 MILLIGRAM(S): 250 CAPSULE ORAL at 06:06

## 2019-09-18 RX ADMIN — TACROLIMUS 2 MILLIGRAM(S): 5 CAPSULE ORAL at 06:06

## 2019-09-18 RX ADMIN — SIMETHICONE 80 MILLIGRAM(S): 80 TABLET, CHEWABLE ORAL at 06:05

## 2019-09-18 NOTE — CONSULT NOTE ADULT - SUBJECTIVE AND OBJECTIVE BOX
HPI:  Mr. Smith is a 59 year-old man with history of multiple medical issues including hypertesnion, asthma, coronary artery disease s/p CABG, end stage renal disease due to polycystic kidney disease, s/p renal transplantation. He was admitted last week with chest pain, and was discharged on Pepcid, as the pain was believed to be GI-related. He returned to the ER yesterday with a productive cough for 1 day, as well as chest pain, dizziness, blurry vision, and shortness of breath.      PAST MEDICAL & SURGICAL HISTORY:  HTN (hypertension)  Coronary artery disease involving coronary bypass graft  ESRD (end stage renal disease) on dialysis  Obesity  Hemorrhoids  Gastroesophageal reflux disease without esophagitis  High cholesterol  ESRD on Dialysis: [facility]; Sat  CAD (Coronary Artery Disease)  PCK (Polycystic Kidney Disease)  HTN - Hypertension  Asthma: last attack 2007, never hospitalized or intubated  AV fistula: b/l MILI GREEN  S/P coronary artery stent placement  S/P CABG x 5  S/P hemorrhoidectomy: and rectal polypectomy -2/3/2017.  AV fistula: right lower extremity 2 yrs  Left upper extrmity with graft 7 years ago  Stented coronary artery: x2 cardiac stents in 2016, one cardiac stent in 2015  CABG (Coronary Artery Bypass Graft): 2007      MEDICATIONS  -transplant-related  mycophenolate mofetil 500 milliGRAM(s) Oral two times a day  predniSONE   Tablet 5 milliGRAM(s) Oral daily  tacrolimus 2 milliGRAM(s) Oral two times a day  trimethoprim   80 mG/sulfamethoxazole 400 mG 1 Tablet(s) Oral daily      Allergies  No Known Allergies    SOCIAL HISTORY:  Denies ETOh,Smoking,     FAMILY HISTORY:  Family history of polycystic kidney (Sibling)  Family history of adult polycystic kidney disease (Sibling)    REVIEW OF SYSTEMS:  CONSTITUTIONAL: (+)weakness; no fever or chills  EYES/ENT: No visual changes;  No vertigo or throat pain   NECK: No pain or stiffness  RESPIRATORY: (+)cough, (+)phlegm, (+)SOB  CARDIOVASCULAR: (+)chest pain, (+)dizziness  GASTROINTESTINAL: No abdominal or epigastric pain. No nausea, vomiting, or hematemesis; No diarrhea or constipation. No melena or hematochezia.  GENITOURINARY: No dysuria, frequency or hematuria  NEUROLOGICAL: No numbness or weakness  SKIN: No itching, burning, rashes, or lesions   All other review of systems is negative unless indicated above.    VITAL:  T(C): , Max: 36.7 (09-17-19 @ 21:50)  T(F): , Max: 98 (09-17-19 @ 21:50)  HR: 56 (09-18-19 @ 13:11)  BP: 133/63 (09-18-19 @ 13:11)  RR: 17 (09-18-19 @ 13:11)  SpO2: 99% (09-18-19 @ 13:11)    PHYSICAL EXAM:  Constitutional: NAD, Alert  HEENT: NCAT, MMM  Neck: Supple, No JVD  Respiratory: CTA-b/l  Cardiovascular: RRR s1s2, no m/r/g  Gastrointestinal: BS+, soft, NT/ND  Extremities: No peripheral edema b/l  Neurological: no focal deficits; strength grossly intact  Back: no CVAT b/l  Skin: No rashes, no nevi    LABS:                        15.1   5.5   )-----------( 88       ( 18 Sep 2019 06:13 )             47.4     Na(134)/K(3.8)/Cl(98)/HCO3(25)/BUN(17)/Cr(0.98)Glu(126)/Ca(9.5)/Mg(1.5)/PO4(4.2)    09-18 @ 06:13  Na(135)/K(4.5)/Cl(97)/HCO3(23)/BUN(20)/Cr(1.05)Glu(129)/Ca(9.2)/Mg(--)/PO4(--)    09-17 @ 22:36    Tacrolimus 6.1-8.2    IMAGING:  < from: Xray Chest 1 View AP/PA (09.17.19 @ 22:59) >  Clear lungs.    ASSESSMENT:  (1)Renal - renal transplant, with superb function. At baseline. On Prograf 2mg po bid, Cellcept 500mg po bid, and Prednisone 5mg po daily. Tacro levels are reasonable for now.  (2)Chest pain - for stress test.   (3)Hypomagnesemia    RECOMMEND:  (1)If to require cath, would treat periprocedurally with isotonic IVF (NS 250cc bolus pre-cath and 125cc/h x 4 hours post cath would be reasonable) to minimize risk of contrast nephropathy    (2)Transplant meds as taken at home/Bactrim as taken at home    (3)Mg repletion    (4)Dose new meds for GFR 50ml/min      Thank you for involving [facility] in this patient's care.    With warm regards,    Irvin Morrow MD   [facility]  [phone] HPI:  Mr. Smith is a 59 year-old man with history of multiple medical issues including hypertension, asthma, coronary artery disease s/p CABG, end stage renal disease due to polycystic kidney disease, s/p renal transplantation. He was admitted last week with chest pain, and was discharged on Pepcid, as the pain was believed to be GI-related. He returned to the ER yesterday with a productive cough for 1 day, as well as chest pain, dizziness, blurry vision, and shortness of breath.    Mr. Smith tells me that he got the transplant from the wait list in 5/2018. He was on dialysis for 7 years at [facility] prior to that, under the care of my partner Dr. Sung Mckeon. He received his transplant at [facility] with Dr. Otto Murguia.    PAST MEDICAL & SURGICAL HISTORY:  HTN (hypertension)  Coronary artery disease involving coronary bypass graft  ESRD (end stage renal disease) on dialysis  Obesity  Hemorrhoids  Gastroesophageal reflux disease without esophagitis  High cholesterol  ESRD on Dialysis: [facility]; Sat  CAD (Coronary Artery Disease)  PCK (Polycystic Kidney Disease)  HTN - Hypertension  Asthma: last attack 2007, never hospitalized or intubated  AV fistula: b/l MILI GREEN  S/P coronary artery stent placement  S/P CABG x 5  S/P hemorrhoidectomy: and rectal polypectomy -2/3/2017.  AV fistula: right lower extremity 2 yrs  Left upper extrmity with graft 7 years ago  Stented coronary artery: x2 cardiac stents in 2016, one cardiac stent in 2015  CABG (Coronary Artery Bypass Graft): 2007      MEDICATIONS  -transplant-related  mycophenolate mofetil 500 milliGRAM(s) Oral two times a day  predniSONE   Tablet 5 milliGRAM(s) Oral daily  tacrolimus 2 milliGRAM(s) Oral two times a day  trimethoprim   80 mG/sulfamethoxazole 400 mG 1 Tablet(s) Oral daily      Allergies  No Known Allergies    SOCIAL HISTORY:  Denies ETOh,Smoking,     FAMILY HISTORY:  Family history of polycystic kidney (Sibling)  Family history of adult polycystic kidney disease (Sibling)    REVIEW OF SYSTEMS:  CONSTITUTIONAL: (+)weakness; no fever or chills  EYES/ENT: No visual changes;  No vertigo or throat pain   NECK: No pain or stiffness  RESPIRATORY: (+)cough, (+)phlegm, (+)SOB  CARDIOVASCULAR: (+)chest pain, (+)dizziness  GASTROINTESTINAL: No abdominal or epigastric pain. No nausea, vomiting, or hematemesis; No diarrhea or constipation. No melena or hematochezia.  GENITOURINARY: No dysuria, frequency or hematuria  NEUROLOGICAL: No numbness or weakness  SKIN: No itching, burning, rashes, or lesions   All other review of systems is negative unless indicated above.    VITAL:  T(C): , Max: 36.7 (09-17-19 @ 21:50)  T(F): , Max: 98 (09-17-19 @ 21:50)  HR: 56 (09-18-19 @ 13:11)  BP: 133/63 (09-18-19 @ 13:11)  RR: 17 (09-18-19 @ 13:11)  SpO2: 99% (09-18-19 @ 13:11)    PHYSICAL EXAM:  Constitutional: NAD, Alert  HEENT: NCAT, MMM  Neck: Supple, No JVD  Respiratory: CTA-b/l  Cardiovascular: RRR s1s2, no m/r/g  Gastrointestinal: BS+, soft, NT/ND  Extremities: No peripheral edema b/l  Neurological: no focal deficits; strength grossly intact  Back: no CVAT b/l  Skin: No rashes, no nevi    LABS:                        15.1   5.5   )-----------( 88       ( 18 Sep 2019 06:13 )             47.4     Na(134)/K(3.8)/Cl(98)/HCO3(25)/BUN(17)/Cr(0.98)Glu(126)/Ca(9.5)/Mg(1.5)/PO4(4.2)    09-18 @ 06:13  Na(135)/K(4.5)/Cl(97)/HCO3(23)/BUN(20)/Cr(1.05)Glu(129)/Ca(9.2)/Mg(--)/PO4(--)    09-17 @ 22:36    Tacrolimus 6.1-8.2    IMAGING:  < from: Xray Chest 1 View AP/PA (09.17.19 @ 22:59) >  Clear lungs.    ASSESSMENT:  (1)Renal - renal transplant, with superb function. At baseline. On Prograf 2mg po bid, Cellcept 500mg po bid, and Prednisone 5mg po daily. Tacro levels are reasonable for now.  (2)Chest pain - for stress test.   (3)Hypomagnesemia  (4)Vascular - his left arm AVF has a good thrill and bruit; his right arm AVF does not. Were he to require further HD, it should be from the left arm. We should preserve the vasculature of the left arm, not the right arm.    RECOMMEND:  (1)If to require cath, would treat periprocedurally with isotonic IVF (NS 250cc bolus pre-cath and 125cc/h x 4 hours post cath would be reasonable) to minimize risk of contrast nephropathy    (2)Transplant meds as taken at home/Bactrim as taken at home    (3)Mg repletion    (4)Dose new meds for GFR 50ml/min    (5)No needlesticks to left arm - switch pink bracelet from right arm to left arm - the IV in the left arm can be maintained for now.    Thank you for involving [facility] in this patient's care.    With warm regards,    Irvin Morrow MD   [facility]  [phone] HPI:  Mr. Smith is a 59 year-old man with history of multiple medical issues including hypertension, asthma, coronary artery disease s/p CABG, end stage renal disease due to polycystic kidney disease, s/p renal transplantation. He was admitted last week with chest pain, and was discharged on Pepcid, as the pain was believed to be GI-related. He returned to the ER yesterday with a productive cough for 1 day, as well as chest pain, dizziness, blurry vision, and shortness of breath.    Mr. Smith tells me that he got the transplant from the wait list in 5/2018. He was on dialysis for 7 years at [facility] prior to that, under the care of my partner Dr. Sung Mckeon. He received his transplant at [facility] with Dr. Otto Murguia.    PAST MEDICAL & SURGICAL HISTORY:  HTN (hypertension)  Coronary artery disease involving coronary bypass graft  ESRD (end stage renal disease) on dialysis  Obesity  Hemorrhoids  Gastroesophageal reflux disease without esophagitis  High cholesterol  ESRD on Dialysis: [facility]  CAD (Coronary Artery Disease)  PCK (Polycystic Kidney Disease)  HTN - Hypertension  Asthma: last attack 2007, never hospitalized or intubated  AV fistula: b/l MILI GREEN  S/P coronary artery stent placement  S/P CABG x 5  S/P hemorrhoidectomy: and rectal polypectomy -2/3/2017.  AV fistula: right lower extremity 2 yrs  Left upper extrmity with graft 7 years ago  Stented coronary artery: x2 cardiac stents in 2016, one cardiac stent in 2015  CABG (Coronary Artery Bypass Graft): 2007      MEDICATIONS  -transplant-related  mycophenolate mofetil 500 milliGRAM(s) Oral two times a day  predniSONE   Tablet 5 milliGRAM(s) Oral daily  tacrolimus 2 milliGRAM(s) Oral two times a day  trimethoprim   80 mG/sulfamethoxazole 400 mG 1 Tablet(s) Oral daily      Allergies  No Known Allergies    SOCIAL HISTORY:  Denies ETOh,Smoking,     FAMILY HISTORY:  Family history of polycystic kidney (Sibling)  Family history of adult polycystic kidney disease (Sibling)    REVIEW OF SYSTEMS:  CONSTITUTIONAL: (+)weakness; no fever or chills  EYES/ENT: No visual changes;  No vertigo or throat pain   NECK: No pain or stiffness  RESPIRATORY: (+)cough, (+)phlegm, (+)SOB  CARDIOVASCULAR: (+)chest pain, (+)dizziness  GASTROINTESTINAL: No abdominal or epigastric pain. No nausea, vomiting, or hematemesis; No diarrhea or constipation. No melena or hematochezia.  GENITOURINARY: No dysuria, frequency or hematuria  NEUROLOGICAL: No numbness or weakness  SKIN: No itching, burning, rashes, or lesions   All other review of systems is negative unless indicated above.    VITAL:  T(C): , Max: 36.7 (09-17-19 @ 21:50)  T(F): , Max: 98 (09-17-19 @ 21:50)  HR: 56 (09-18-19 @ 13:11)  BP: 133/63 (09-18-19 @ 13:11)  RR: 17 (09-18-19 @ 13:11)  SpO2: 99% (09-18-19 @ 13:11)    PHYSICAL EXAM:  Constitutional: NAD, Alert  HEENT: NCAT, MMM  Neck: Supple, No JVD  Respiratory: CTA-b/l  Cardiovascular: RRR s1s2, no m/r/g  Gastrointestinal: BS+, soft, NT/ND  Extremities: No peripheral edema b/l  Neurological: no focal deficits; strength grossly intact  Back: no CVAT b/l  Skin: No rashes, no nevi  Access: RUE AVF without thrill/bruit; LUE AVF (+)thrill/bruit    LABS:                        15.1   5.5   )-----------( 88       ( 18 Sep 2019 06:13 )             47.4     Na(134)/K(3.8)/Cl(98)/HCO3(25)/BUN(17)/Cr(0.98)Glu(126)/Ca(9.5)/Mg(1.5)/PO4(4.2)    09-18 @ 06:13  Na(135)/K(4.5)/Cl(97)/HCO3(23)/BUN(20)/Cr(1.05)Glu(129)/Ca(9.2)/Mg(--)/PO4(--)    09-17 @ 22:36    Tacrolimus 6.1-8.2    IMAGING:  < from: Xray Chest 1 View AP/PA (09.17.19 @ 22:59) >  Clear lungs.    ASSESSMENT:  (1)Renal - renal transplant, with superb function. At baseline. On Prograf 2mg po bid, Cellcept 500mg po bid, and Prednisone 5mg po daily. Tacro levels are reasonable for now.  (2)Chest pain - for stress test.   (3)Hypomagnesemia  (4)Vascular - his left arm AVF has a good thrill and bruit; his right arm AVF does not. Were he to require further HD, it should be from the left arm. We should preserve the vasculature of the left arm, not the right arm.    RECOMMEND:  (1)If to require cath, would treat periprocedurally with isotonic IVF (NS 250cc bolus pre-cath and 125cc/h x 4 hours post cath would be reasonable) to minimize risk of contrast nephropathy    (2)Transplant meds as taken at home/Bactrim as taken at home    (3)Mg repletion    (4)Dose new meds for GFR 50ml/min    (5)No needlesticks to left arm - switch pink bracelet from right arm to left arm - the IV in the left arm can be maintained for now.    Thank you for involving [facility] in this patient's care.    With warm regards,    Irvin Morrow MD   [facility]  [phone]

## 2019-09-18 NOTE — CONSULT NOTE ADULT - SUBJECTIVE AND OBJECTIVE BOX
CARDIOLOGY CONSULT - Dr. Pérez     CHIEF COMPLAINT:    HPI:  58 yo male with PMH of CAD, CABG x 5/PCI on plavix, HTN, ESRD, renal transplant secondary to PCKD (on bactrim, prednsione, tacro, cellcept), HTN, Asthma brought in via EMS for intermittent diffuse chest pain.  Patient was recently admitted for chest pain one week ago, was thought to be due to GI related.  HST negative x 2, EKG unchanged, echo showed EF 50%, mild segmental LV dysfunction.  Patient was ordered for pepcid, but he is not taking that.  Patient was discharged last Tuesday.  He says he has been doing well, except his BP has been fluctuating. Today patient woke up with productive coughing in the morning and then again in the evening.  Cough then resolved.  In the evening, he was reading a book and started feeling dizzy, had blurry vision.  He went to the bathroom and came back and was still feeling dizzy.  Then he started having Chest pain, around 8pm while sitting down.  He describes it as middle of the chest, pressure like, nonradiating, constant, no associated diaphoresis or SOB.  He checked his BP at the time, it was 171/95.  He took Aspirin and decided to take rest. Chest pain persisted so he decided to come to ED.  Patient seen in [facility], states chest pain resolved, cough resolved.     PAST MEDICAL & SURGICAL HISTORY:  HTN (hypertension)  Coronary artery disease involving coronary bypass graft  ESRD (end stage renal disease) on dialysis  Obesity  Hemorrhoids  Gastroesophageal reflux disease without esophagitis  High cholesterol  ESRD on Dialysis: Tue, Thur &amp; Sat  CAD (Coronary Artery Disease)  PCK (Polycystic Kidney Disease)  HTN - Hypertension  Asthma: last attack , never hospitalized or intubated  AV fistula: b/l MILI GREEN  S/P coronary artery stent placement  S/P CABG x 5  S/P hemorrhoidectomy: and rectal polypectomy -2/3/2017.  AV fistula: right lower extremity 2 yrs  Left upper extrmity with graft 7 years ago  Stented coronary artery: x2 cardiac stents in , one cardiac stent in   CABG (Coronary Artery Bypass Graft):           PREVIOUS DIAGNOSTIC TESTING:    [ ] Echocardiogram: < from: TTE with Doppler (w/Cont) (19 @ 11:54) >  Conclusions:  1. Normal mitral valve. Mild mitral regurgitation.  2. Normal trileaflet aortic valve. No aortic valve  regurgitation seen.  3. Endocardial visualization enhanced with intravenous  injection of Ultrasonic Enhancing Agent (Definity). Mild  segmental left ventricular systolic dysfunction. The mid to  distal inferoseptal and basal inferior segments are  hypokinetic.  4. The right ventricle is not well visualized; grossly  normal right ventricular size and systolic function..    < end of copied text >    [ ]  Catheterization: < from: Cardiac Cath Lab - Adult (17 @ 12:24) >    [address] [place], [place] [id number] [phone]  Cath Lab Report -- Comprehensive Report  Patient: FLOYD NEVES  Study date: 2017  Account number: [id number]  MR number: [id number]  : 1960  Gender: Male  Race:   Case Physician(s):  Wily Chauhan M.D.  Fellow:  Johnson Diaz M.D.  Referring Physician:  Wily Chauhan M.D.  INDICATIONS: Other chest pain. Abnormal stress test.  HISTORY: History includes ischemic cardiomyopathy. The patient has a history of  coronary artery disease. The patient has hypertension, dialysis-treated renal  failure, and medication-treated dyslipidemia. PRIOR CARDIOVASCULAR PROCEDURES:  Stent of the 2nd obtuse marginal ( 2015). RESOLUTE stents to proximal  circumflex and OM2 ( 2016). Coronary bypass ().  PRIOR DIAGNOSTIC TEST RESULTS: Nuclear pharmacologic stress test was positive.  PROCEDURE:  --  Left coronary angiography.  --  Left heart catheterization.  --  Right coronary angiography.  --  Saphenous vein graft angiography.  --  LIMA graft angiography.  TECHNIQUE: Oxygen 2 L/min. The risks and alternatives of the procedures and  conscious sedation were explained to the patient and informed consent was  obtained. Cardiac catheterization performed electively.  Right femoral artery access. Local anesthetic given. The puncture site was  infiltrated with 2 % lidocaine. A 5fr [place] was inserted in the  vessel. Left coronary artery angiography. The vessel was injected utilizing a  5fr FL 4 Expo catheter. Left heart catheterization. A 5fr FR 4 Expo catheter  was utilized. After recording ascending aortic pressure, the catheter was  advanced across the aortic valve and left ventricular pressure was recorded.  Right coronary artery angiography. The vessel was injected utilizing a 5fr FR 4  Expo catheter. Saphenous vein graft angiography. The vessel was injected  utilizing a 5fr FR 4 Expo catheter. Left internal mammary graft angiography.  The vessel was injected utilizing a 5fr FR 4 Expo catheter. RADIATION EXPOSURE:  7.1 min.  CONTRAST GIVEN: 32 ml Optiray.  MEDICATIONS GIVEN: 2% Lidocaine, 10 ml, subcutaneously. 0.9% Normal saline, 10  ml, IV.  VENTRICLES: No left ventriculogram was performed.  CORONARY VESSELS: The coronary circulation is right dominant.  LM:   --  Distal left main: There was a discrete 30 % stenosis.  LAD:   --  Proximal LAD: There was a 100 % stenosis. There was DESTINEE grade 0  flow through the vessel (no flow). --  Distal LAD: Angiography showed minor  luminal irregularities with no flow limiting lesions.  CX:   --  Proximal circumflex: There was a tubular 10 % stenosis at the site of  a prior stent. --  Mid circumflex: There was a tubular 20 % stenosis at the  site of a prior stent.  RCA:   --  Proximal RCA: The distal vessel was supplied by collaterals from  septal branches of LAD and the first obtuse marginal. There was a 100 %  stenosis. There was DESTINEE grade 0 flow through the vessel (no flow).  GRAFTS:   --  Graft to the mid LAD: The graft was a LIMA. It was normal.  --  Graft to the 1st obtuse marginal: The graft was a saphenous vein graft from  the aorta. Graft angiography showed minor luminal irregularities.  COMPLICATIONS: There were no complications.  DIAGNOSTIC RECOMMENDATIONS: Medical management is recommended. Elevated LVEDP.  Patient with RCA .  Prepared and signed by  Wily Chauhan M.D.    < end of copied text >    [ ] Stress Test:  	< from: Nuclear Stress Test-Pharmacologic (17 @ 12:49) >  IMPRESSIONS:Abnormal Study  * Consistent with infarction with moderate mckay-infarct  ischemia.  * Review of raw data shows: The study is of good technical  quality.  * The left ventricle was enlarged. There are large,  moderate to severe defects in inferior, inferolateral and  lateral walls that are fixed with moderatereversible  mckay-infarct ischemia, consistent with infarction with  moderate mckay-infarct ischemia.  * Post-stress gated wall motion analysis was performed  (LVEF = 46 %;LVEDV = 152 ml.)  *** Compared with Nuclear/Stress test of 2016, no  significant changes noted. Prior LVEF was 30% with   mL    < end of copied text >      MEDICATIONS:  MEDICATIONS  (STANDING):  aspirin enteric coated 81 milliGRAM(s) Oral daily  atorvastatin 10 milliGRAM(s) Oral at bedtime  clopidogrel Tablet 75 milliGRAM(s) Oral daily  famotidine    Tablet 20 milliGRAM(s) Oral daily  influenza   Vaccine 0.5 milliLiter(s) IntraMuscular once  metoprolol succinate ER 25 milliGRAM(s) Oral daily  mycophenolate mofetil 500 milliGRAM(s) Oral two times a day  predniSONE   Tablet 5 milliGRAM(s) Oral daily  simethicone 80 milliGRAM(s) Chew two times a day  tacrolimus 2 milliGRAM(s) Oral two times a day  trimethoprim   80 mG/sulfamethoxazole 400 mG 1 Tablet(s) Oral daily      FAMILY HISTORY:  Family history of polycystic kidney (Sibling)  Family history of adult polycystic kidney disease (Sibling)      SOCIAL HISTORY:    [ x] Non-smoker  [ ] Smoker  [ ] Alcohol    Allergies    No Known Allergies    Intolerances    	    REVIEW OF SYSTEMS:  CONSTITUTIONAL: No fever, weight loss, or fatigue  EYES: No eye pain, visual disturbances, or discharge  ENMT:  No difficulty hearing, tinnitus, vertigo; No sinus or throat pain  NECK: No pain or stiffness  RESPIRATORY: No cough, wheezing, chills or hemoptysis; No Shortness of Breath  CARDIOVASCULAR: No chest pain, palpitations, passing out, dizziness, or leg swelling  GASTROINTESTINAL: No abdominal or epigastric pain. No nausea, vomiting, or hematemesis; No diarrhea or constipation. No melena or hematochezia.  GENITOURINARY: No dysuria, frequency, hematuria, or incontinence  NEUROLOGICAL: No headaches, memory loss, loss of strength, numbness, or tremors  SKIN: No itching, burning, rashes, or lesions   	    [x ] All others negative	  [ ] Unable to obtain    PHYSICAL EXAM:  T(C): 36.3 (19 @ 04:44), Max: 36.7 (19 @ 21:50)  HR: 67 (19 @ 08:53) (52 - 67)  BP: 154/84 (19 @ 08:53) (121/75 - 165/84)  RR: 17 (19 @ 04:44) (16 - 18)  SpO2: 99% (09-18-19 @ 04:44) (97% - 100%)  Wt(kg): --  I&O's Summary    17 Sep 2019 07:  -  18 Sep 2019 07:00  --------------------------------------------------------  IN: 240 mL / OUT: 400 mL / NET: -160 mL    18 Sep 2019 07:  -  18 Sep 2019 11:22  --------------------------------------------------------  IN: 240 mL / OUT: 0 mL / NET: 240 mL        Appearance: Normal	  Psychiatry: A & O x 3, Mood & affect appropriate  HEENT:   Normal oral mucosa, PERRL, EOMI	  Lymphatic: No lymphadenopathy  Cardiovascular: Normal S1 S2,RRR, No JVD, No murmurs  Respiratory: Lungs clear to auscultation	  Gastrointestinal:  Soft, Non-tender, + BS	  Skin: No rashes, No ecchymoses, No cyanosis	  Neurologic: Non-focal  Extremities: Normal range of motion, No clubbing, cyanosis or edema  Vascular: Peripheral pulses palpable 2+ bilaterally    TELEMETRY: 	nsr     ECG:  SB , RBBB, no evidence of acute ischemia, unchanged from prior EKG 	  RADIOLOGY: < from: Xray Chest 1 View AP/PA (19 @ 22:59) >    IMPRESSION:  Clear lungs.    < end of copied text >    OTHER: 	  	  LABS:	 	    CARDIAC MARKERS:                                  15.1   5.5   )-----------( 88       ( 18 Sep 2019 06:13 )             47.4     -18    134<L>  |  98  |  17  ----------------------------<  126<H>  3.8   |  25  |  0.98    Ca    9.5      18 Sep 2019 06:13  Phos  4.2     -  Mg     1.5         TPro  6.6  /  Alb  3.9  /  TBili  0.4  /  DBili  x   /  AST  21  /  ALT  27  /  AlkPhos  45      PT/INR - ( 17 Sep 2019 22:36 )   PT: 12.7 sec;   INR: 1.11 ratio         PTT - ( 17 Sep 2019 22:36 )  PTT:29.6 sec  proBNP: Serum Pro-Brain Natriuretic Peptide: 298 pg/mL ( @ 22:36)    Lipid Profile:   HgA1c:   TSH:

## 2019-09-18 NOTE — PROGRESS NOTE ADULT - ASSESSMENT
Problem/Plan - 1:  ·  Problem: Chest pain.  Plan: patient with chest pain, without acute EKG changes  unlikely cardiac  cards fu  ischemia cain as per cards  likely gI etiology  GI called     Problem/Plan - 2:  ·  Problem: Renal transplant, status post.  Plan: c/w home regimen with cellcept, tacrolimus, prednisone and bactrim  renal fu     Problem/Plan - 3:  ·  Problem: CAD (Coronary Artery Disease).  Plan: c/w aspirin, plavix, atorvastatin, metoprolol.     Problem/Plan - 4:  ·  Problem: HTN (hypertension).  Plan: fluctuating BP as per patient, when blood pressure is elevated, he gets symptoms  c/w home metoprolol for now; adjust meds as needed.     Problem/Plan - 5:  ·  Problem: HLD (hyperlipidemia).  Plan: c/w atorvastatin.     Problem/Plan - 6:  Problem: Prophylactic measure. Plan: heparin sc.

## 2019-09-18 NOTE — PROGRESS NOTE ADULT - SUBJECTIVE AND OBJECTIVE BOX
Patient is a 59y old  Male who presents with a chief complaint of chest pain (18 Sep 2019 11:22)      INTERVAL HPI/OVERNIGHT EVENTS:  T(C): 36.7 (09-18-19 @ 13:11), Max: 36.7 (09-17-19 @ 21:50)  HR: 56 (09-18-19 @ 13:11) (52 - 67)  BP: 133/63 (09-18-19 @ 13:11) (121/75 - 165/84)  RR: 17 (09-18-19 @ 13:11) (16 - 18)  SpO2: 99% (09-18-19 @ 13:11) (97% - 100%)  Wt(kg): --  I&O's Summary    17 Sep 2019 07:01  -  18 Sep 2019 07:00  --------------------------------------------------------  IN: 240 mL / OUT: 400 mL / NET: -160 mL    18 Sep 2019 07:01  -  18 Sep 2019 15:54  --------------------------------------------------------  IN: 480 mL / OUT: 0 mL / NET: 480 mL        LABS:                        15.1   5.5   )-----------( 88       ( 18 Sep 2019 06:13 )             47.4     09-18    134<L>  |  98  |  17  ----------------------------<  126<H>  3.8   |  25  |  0.98    Ca    9.5      18 Sep 2019 06:13  Phos  4.2     09-18  Mg     1.5     09-18    TPro  6.6  /  Alb  3.9  /  TBili  0.4  /  DBili  x   /  AST  21  /  ALT  27  /  AlkPhos  45  09-18    PT/INR - ( 17 Sep 2019 22:36 )   PT: 12.7 sec;   INR: 1.11 ratio         PTT - ( 17 Sep 2019 22:36 )  PTT:29.6 sec    CAPILLARY BLOOD GLUCOSE                MEDICATIONS  (STANDING):  aspirin enteric coated 81 milliGRAM(s) Oral daily  atorvastatin 10 milliGRAM(s) Oral at bedtime  clopidogrel Tablet 75 milliGRAM(s) Oral daily  famotidine    Tablet 20 milliGRAM(s) Oral daily  influenza   Vaccine 0.5 milliLiter(s) IntraMuscular once  magnesium oxide 400 milliGRAM(s) Oral daily  metoprolol succinate ER 25 milliGRAM(s) Oral daily  mycophenolate mofetil 500 milliGRAM(s) Oral two times a day  predniSONE   Tablet 5 milliGRAM(s) Oral daily  simethicone 80 milliGRAM(s) Chew two times a day  tacrolimus 2 milliGRAM(s) Oral two times a day  trimethoprim   80 mG/sulfamethoxazole 400 mG 1 Tablet(s) Oral daily    MEDICATIONS  (PRN):          PHYSICAL EXAM:  GENERAL: NAD, well-groomed, well-developed  HEAD:  Atraumatic, Normocephalic  CHEST/LUNG: Clear to percussion bilaterally; No rales, rhonchi, wheezing, or rubs  HEART: Regular rate and rhythm; No murmurs, rubs, or gallops  ABDOMEN: Soft, Nontender, Nondistended; Bowel sounds present  EXTREMITIES:  2+ Peripheral Pulses, No clubbing, cyanosis, or edema  LYMPH: No lymphadenopathy noted  SKIN: No rashes or lesions    Care Discussed with Consultants/Other Providers [ ] YES  [ ] NO

## 2019-09-18 NOTE — CONSULT NOTE ADULT - ASSESSMENT
59 yr old with pmh of CAD s/p CABG/PCI, HTN, ESRD, s/p kidney transplant, htn, asthma presenting with atypical chest pain.       58 yr old with pmh of CAD s/p CABG/PCI, HTN, ESRD, s/p kidney transplant, htn, asthma presenting with chest pain.     1. Chest pain, atypical  symptoms now resolved   likely GI related , hx of GERD   HST negative x 2, no evidence of acute ischemia/ACS , EKG unchanged, NSR w/ RBBB  no evidence of fluid overload, CXR clear   recent echo EF 50%, mild segmental LV dysfunction   continue pepcid as ordered  continue asa, statin  consider GI eval      2. HTN  bp stable   c/w metoprolol 25 mg daily, up-titrate if needed     3. Asthma   stable    4. ESRD s/p renal transplant  on tacrolimus      dvt ppx 59 yr old with pmh of CAD s/p CABG/PCI, HTN, ESRD, s/p kidney transplant, htn, asthma presenting with atypical chest pain.       58 yr old with pmh of CAD s/p CABG/PCI, HTN, ESRD, s/p kidney transplant, htn, asthma presenting with chest pain.     1. Chest pain, atypical  symptoms now resolved   HST negative x 2, no evidence of acute ischemia/ACS , EKG unchanged, NSR w/ RBBB  no evidence of fluid overload, CXR clear   recent echo EF 50%, mild segmental LV dysfunction   continue pepcid as ordered  continue asa, statin  plan for pharm stress  consider GI eval      2. HTN  bp stable   c/w metoprolol 25 mg daily, up-titrate if needed     3. Asthma   stable    4. ESRD s/p renal transplant  on tacrolimus      dvt ppx

## 2019-09-19 DIAGNOSIS — K21.9 GASTRO-ESOPHAGEAL REFLUX DISEASE WITHOUT ESOPHAGITIS: ICD-10-CM

## 2019-09-19 LAB
CULTURE RESULTS: SIGNIFICANT CHANGE UP
GLUCOSE BLDC GLUCOMTR-MCNC: 123 MG/DL — HIGH (ref 70–99)
SPECIMEN SOURCE: SIGNIFICANT CHANGE UP

## 2019-09-19 PROCEDURE — 93016 CV STRESS TEST SUPVJ ONLY: CPT

## 2019-09-19 PROCEDURE — 93018 CV STRESS TEST I&R ONLY: CPT

## 2019-09-19 RX ORDER — ACETAMINOPHEN 500 MG
650 TABLET ORAL ONCE
Refills: 0 | Status: COMPLETED | OUTPATIENT
Start: 2019-09-19 | End: 2019-09-19

## 2019-09-19 RX ORDER — SUCRALFATE 1 G
1 TABLET ORAL EVERY 6 HOURS
Refills: 0 | Status: DISCONTINUED | OUTPATIENT
Start: 2019-09-19 | End: 2019-09-24

## 2019-09-19 RX ORDER — PANTOPRAZOLE SODIUM 20 MG/1
40 TABLET, DELAYED RELEASE ORAL
Refills: 0 | Status: DISCONTINUED | OUTPATIENT
Start: 2019-09-19 | End: 2019-09-24

## 2019-09-19 RX ADMIN — Medication 1 TABLET(S): at 11:46

## 2019-09-19 RX ADMIN — SIMETHICONE 80 MILLIGRAM(S): 80 TABLET, CHEWABLE ORAL at 17:36

## 2019-09-19 RX ADMIN — Medication 650 MILLIGRAM(S): at 16:24

## 2019-09-19 RX ADMIN — TACROLIMUS 2 MILLIGRAM(S): 5 CAPSULE ORAL at 05:08

## 2019-09-19 RX ADMIN — Medication 25 MILLIGRAM(S): at 05:08

## 2019-09-19 RX ADMIN — SIMETHICONE 80 MILLIGRAM(S): 80 TABLET, CHEWABLE ORAL at 05:08

## 2019-09-19 RX ADMIN — MYCOPHENOLATE MOFETIL 500 MILLIGRAM(S): 250 CAPSULE ORAL at 05:08

## 2019-09-19 RX ADMIN — Medication 5 MILLIGRAM(S): at 05:08

## 2019-09-19 RX ADMIN — Medication 650 MILLIGRAM(S): at 17:00

## 2019-09-19 RX ADMIN — FAMOTIDINE 20 MILLIGRAM(S): 10 INJECTION INTRAVENOUS at 11:46

## 2019-09-19 RX ADMIN — MAGNESIUM OXIDE 400 MG ORAL TABLET 400 MILLIGRAM(S): 241.3 TABLET ORAL at 11:46

## 2019-09-19 RX ADMIN — ATORVASTATIN CALCIUM 10 MILLIGRAM(S): 80 TABLET, FILM COATED ORAL at 21:22

## 2019-09-19 RX ADMIN — PANTOPRAZOLE SODIUM 40 MILLIGRAM(S): 20 TABLET, DELAYED RELEASE ORAL at 17:36

## 2019-09-19 RX ADMIN — Medication 81 MILLIGRAM(S): at 11:46

## 2019-09-19 RX ADMIN — MYCOPHENOLATE MOFETIL 500 MILLIGRAM(S): 250 CAPSULE ORAL at 17:36

## 2019-09-19 RX ADMIN — TACROLIMUS 2 MILLIGRAM(S): 5 CAPSULE ORAL at 17:36

## 2019-09-19 RX ADMIN — Medication 1 GRAM(S): at 17:36

## 2019-09-19 RX ADMIN — CLOPIDOGREL BISULFATE 75 MILLIGRAM(S): 75 TABLET, FILM COATED ORAL at 11:46

## 2019-09-19 NOTE — DISCHARGE NOTE PROVIDER - NSDCCPTREATMENT_GEN_ALL_CORE_FT
PRINCIPAL PROCEDURE  Procedure: Nuclear medicine  Findings and Treatment: PRINCIPAL PROCEDURE  Procedure: Left heart catheterization  Findings and Treatment: TOSHIA x 1 OM via LFA access

## 2019-09-19 NOTE — PROGRESS NOTE ADULT - SUBJECTIVE AND OBJECTIVE BOX
CC: no cp, pt refuses nuclear imaging, prefers exercise stress, feels his symptoms are abdominal and GI related    TELEMETRY:     PHYSICAL EXAM:    T(C): 36.4 (09-19-19 @ 04:56), Max: 36.7 (09-18-19 @ 13:11)  HR: 62 (09-19-19 @ 09:35) (56 - 63)  BP: 135/65 (09-19-19 @ 09:35) (120/64 - 135/65)  RR: 16 (09-19-19 @ 04:56) (16 - 17)  SpO2: 97% (09-19-19 @ 04:56) (97% - 99%)  Wt(kg): --  I&O's Summary    18 Sep 2019 07:01  -  19 Sep 2019 07:00  --------------------------------------------------------  IN: 720 mL / OUT: 1800 mL / NET: -1080 mL    19 Sep 2019 07:01  -  19 Sep 2019 10:45  --------------------------------------------------------  IN: 240 mL / OUT: 0 mL / NET: 240 mL        Appearance: Normal	  Cardiovascular: Normal S1 S2,RRR, No JVD, No murmurs  Respiratory: Lungs clear to auscultation	  Gastrointestinal:  Soft, Non-tender, + BS	  Extremities: Normal range of motion, No clubbing, cyanosis or edema  Vascular: Peripheral pulses palpable 2+ bilaterally     LABS:	 	                          15.1   5.5   )-----------( 88       ( 18 Sep 2019 06:13 )             47.4     09-18    134<L>  |  98  |  17  ----------------------------<  126<H>  3.8   |  25  |  0.98    Ca    9.5      18 Sep 2019 06:13  Phos  4.2     09-18  Mg     1.5     09-18    TPro  6.6  /  Alb  3.9  /  TBili  0.4  /  DBili  x   /  AST  21  /  ALT  27  /  AlkPhos  45  09-18      PT/INR - ( 17 Sep 2019 22:36 )   PT: 12.7 sec;   INR: 1.11 ratio         PTT - ( 17 Sep 2019 22:36 )  PTT:29.6 sec    CARDIAC MARKERS:

## 2019-09-19 NOTE — PROGRESS NOTE ADULT - ASSESSMENT
59 yr old with pmh of CAD s/p CABG/PCI, HTN, ESRD, s/p kidney transplant, htn, asthma presenting with atypical chest pain.       58 yr old with pmh of CAD s/p CABG/PCI, HTN, ESRD, s/p kidney transplant, htn, asthma presenting with chest pain.     1. Chest pain, atypical  symptoms now resolved   HST negative x 2, no evidence of acute ischemia/ACS , EKG unchanged, NSR w/ RBBB  no evidence of fluid overload, CXR clear   recent echo EF 50%, mild segmental LV dysfunction   continue pepcid as ordered  continue asa, statin  pt refuses pharm stress, his symptoms remain atypical, he agrees to treadmill stress. please order treadmill stress to assess exercise capacity and assess for angina  consider GI eval      2. HTN  bp stable   c/w metoprolol 25 mg daily, up-titrate if needed     3. Asthma   stable    4. ESRD s/p renal transplant  on tacrolimus      dvt ppx

## 2019-09-19 NOTE — DISCHARGE NOTE PROVIDER - CARE PROVIDERS DIRECT ADDRESSES
,[email or web address]

## 2019-09-19 NOTE — DISCHARGE NOTE PROVIDER - CARE PROVIDER_API CALL
Charles Kwon (MD)  Cardiovascular Disease; Internal Medicine  [address] [facility] [address]  [place] [address]  Phone: [phone]  Fax: [phone]  Follow Up Time: Charles Kwon (MD)  Cardiovascular Disease; Internal Medicine  [address] [facility] [address]  [place] [address]  Phone: [phone]  Fax: [phone]  Follow Up Time:     Zeferino Arguello)  Internal Medicine  [address] [place] [address]  Phone: [phone]  Fax: [phone]  Follow Up Time:

## 2019-09-19 NOTE — DISCHARGE NOTE PROVIDER - HOSPITAL COURSE
HPI:    58 yo male with PMH of CAD, CABG x 5/PCI on plavix, HTN, ESRD, renal transplant secondary to PCKD (on bactrim, prednsione, tacro, cellcept), HTN, Asthma brought in via EMS for intermittent diffuse chest pain.  Patient was recently admitted for chest pain one week ago, was thought to be due to GI related.  HST negative x 2, EKG unchanged, echo showed EF 50%, mild segmental LV dysfunction.  Patient was ordered for pepcid, but he is not taking that.  Patient was discharged last Tuesday.  He says he has been doing well, except his BP has been fluctuating. Today patient woke up with productive coughing in the morning and then again in the evening.  Cough then resolved.  In the evening, he was reading a book and started feeling dizzy, had blurry vision.  He went to the bathroom and came back and was still feeling dizzy.  She also felt SOB with walking.  Then he started having Chest pain, around 8pm while sitting down.  He describes it as middle of the chest, pressure like, nonradiating, constant, not associated with diaphoresis or SOB.  He checked his BP at the time, it was 171/95.  He took Aspirin and decided to take rest.  At 9pm, his Chest pain still persisted and he continued to feel dizzy.  Therefore, he decided to come to hospital.  Currently patient does have some chest pressure, but improved from before. (17 Sep 2019 23:55) HPI:    60 yo male with PMH of CAD, CABG x 5/PCI on plavix, HTN, ESRD, renal transplant secondary to PCKD (on bactrim, prednsione, tacro, cellcept), HTN, Asthma brought in via EMS for intermittent diffuse chest pain.  Patient was recently admitted for chest pain one week ago, was thought to be due to GI related.  HST negative x 2, EKG unchanged, echo showed EF 50%, mild segmental LV dysfunction.  Patient was ordered for pepcid, but he is not taking that.  Patient was discharged last Tuesday.  He says he has been doing well, except his BP has been fluctuating. Today patient woke up with productive coughing in the morning and then again in the evening.  Cough then resolved.  In the evening, he was reading a book and started feeling dizzy, had blurry vision.  He went to the bathroom and came back and was still feeling dizzy.  She also felt SOB with walking.  Then he started having Chest pain, around 8pm while sitting down.  He describes it as middle of the chest, pressure like, nonradiating, constant, not associated with diaphoresis or SOB.  He checked his BP at the time, it was 171/95.  He took Aspirin and decided to take rest.  At 9pm, his Chest pain still persisted and he continued to feel dizzy.  Therefore, he decided to come to hospital.  Currently patient does have some chest pressure, but improved from before. HPI:    58 yo male with PMH of CAD, CABG x 5/PCI on plavix, HTN, ESRD, renal transplant secondary to PCKD (on bactrim, prednsione, tacro, cellcept), HTN, Asthma brought in via EMS for intermittent diffuse chest pain. 59 yr old with pmh of CAD s/p CABG/PCI, HTN, ESRD, s/p kidney transplant, htn, asthma presenting with atypical chest pain. recent echo EF 50%, mild segmental LV dysfunction     pharm stress abnormal  s/p cath with pci to lcx course complicated by  post cath groin hematoma, site now appears stable, no further evidence of bleeding, slight drop in heme today     vasc sono with psa amenable to injection, s/p US guided thrombin injection of L groin PSA 59 yr old with pmh of CAD s/p CABG/PCI, HTN, ESRD, s/p kidney transplant, htn, asthma presenting with atypical chest pain. recent echo EF 50%, mild segmental LV dysfunction     pharm stress abnormal  s/p cath with pci to lcx course complicated by  post cath groin hematoma, Femoral artery pseudo-aneurysm, left, seen and evaluated by vascular now     site now appears stable, no further evidence of bleeding, slight drop in heme today vasc sono with psa amenable to injection, s/p US guided thrombin injection of L groin PSA 59 yr old with pmh of CAD s/p CABG/PCI, HTN, ESRD, s/p kidney transplant, htn, asthma presenting with atypical chest pain. recent echo EF 50%, mild segmental LV dysfunction     pharm stress abnormal  s/p cath with pci to lcx course complicated by  post cath groin hematoma, Femoral artery pseudo-aneurysm, left, seen and evaluated by vascular now     site now appears stable, no further evidence of bleeding, slight drop in heme today vasc sono with psa amenable to injection, s/p US guided thrombin injection of L groin PSA , had US site stable. pt now stable for discharge with out pt follow up

## 2019-09-19 NOTE — CHART NOTE - NSCHARTNOTEFT_GEN_A_CORE
Patient went for exercise ST this am  refused NST this am - was concerned radioactive isotope may be damaging to his kidney  Seen and evaluated by Renal Dr Mckeon and cards Dr Pérez and still declined NST. Agreed to exercise ST  Same done and revealed- Patient went for exercise ST this am  refused NST this am - was concerned radioactive isotope may be damaging to his kidney  Seen and evaluated by Renal Dr Mckeon and cards Dr Pérez and still declined NST. Agreed to exercise ST  Same done and revealed-  STRESS TEST IMPRESSIONS:  Exercise capacity: 5 METS, Poor for age and gender.  Chest Pain: No chest pain with exercise.  Symptom: Shortness of breath, fatigue.  HR Response: Excessive increase in HR with stress due to  poor physical condition and/or reduced cardiovascular  reserve.  BP Response: Appropriate.  Heart Rhythm: Sinus Rhythm - 76 BPM.  Conduction defects: right bundle branch block.  Q Waves: Inferior MI, Anterolateral MI.  Baseline ECG: Nonspecific ST-T wave abnormality.  ECG Changes: ECG interpretation limited by excessive  artifact during exercise.  Grossly, there were  approximately 0.5 mm upsloping ST segment depressions in  leads V3-V6 started at 2:56 min of exercise at HR of 134  bpm and persisted 01:07 min into recovery.  T wave  inversions developed in lead V2 starting at 3:00 min of  recovery at 84 bpm and persisted at least 5:20 min in  recovery.  Arrhythmia: Rare VPDs occurred during recovery.      Upon arrival back to unit pt is now requesting NST (is now sorry he didn't agree to nuclear imaging)  Case discussed w Dr Pérez. Will proceed w NST in am      Also reports episodes of loose stool x last few days  Seen by GI Dr Scruggs  PCR panel stool test ordered  d/c pepcid  start pantoprazole 40mg PO BID  carafate 1 gm PO q 6 hours update  on exam   A+O x 4  Pulm CTA ruddy  CV S1S2 RRR   abd soft, non tender +BS  ext no edema    Patient went for exercise ST this am  refused NST this am - was concerned radioactive isotope may be damaging to his kidney  Seen and evaluated by Renal Dr Mckeon and cards Dr Pérez and still declined NST. Agreed to exercise ST  Same done and revealed-  STRESS TEST IMPRESSIONS:  Exercise capacity: 5 METS, Poor for age and gender.  Chest Pain: No chest pain with exercise.  Symptom: Shortness of breath, fatigue.  HR Response: Excessive increase in HR with stress due to  poor physical condition and/or reduced cardiovascular  reserve.  BP Response: Appropriate.  Heart Rhythm: Sinus Rhythm - 76 BPM.  Conduction defects: right bundle branch block.  Q Waves: Inferior MI, Anterolateral MI.  Baseline ECG: Nonspecific ST-T wave abnormality.  ECG Changes: ECG interpretation limited by excessive  artifact during exercise.  Grossly, there were  approximately 0.5 mm upsloping ST segment depressions in  leads V3-V6 started at 2:56 min of exercise at HR of 134  bpm and persisted 01:07 min into recovery.  T wave  inversions developed in lead V2 starting at 3:00 min of  recovery at 84 bpm and persisted at least 5:20 min in  recovery.  Arrhythmia: Rare VPDs occurred during recovery.      Upon arrival back to unit pt is now requesting NST (is now sorry he didn't agree to nuclear imaging)  Case discussed w Dr Pérez. Will proceed w NST in am      Also reports episodes of loose stool x last few days.    Seen by GI Dr Scruggs  PCR panel stool test ordered. (Stool sample obtained - brown soft, not watery)  d/c pepcid  start pantoprazole 40mg PO BID  carafate 1 gm PO q 6 hours update  on exam   A+O x 4  Pulm CTA ruddy  CV S1S2 RRR   abd soft, non tender +BS  ext no edema    Patient went for exercise ST this am  refused NST this am - was concerned radioactive isotope may be damaging to his kidney  Seen and evaluated by Renal Dr Mckeon and cards Dr Pérez and still declined NST. Agreed to exercise ST  Same done and revealed-  STRESS TEST IMPRESSIONS:  Exercise capacity: 5 METS, Poor for age and gender.  Chest Pain: No chest pain with exercise.  Symptom: Shortness of breath, fatigue.  HR Response: Excessive increase in HR with stress due to  poor physical condition and/or reduced cardiovascular  reserve.  BP Response: Appropriate.  Heart Rhythm: Sinus Rhythm - 76 BPM.  Conduction defects: right bundle branch block.  Q Waves: Inferior MI, Anterolateral MI.  Baseline ECG: Nonspecific ST-T wave abnormality.  ECG Changes: ECG interpretation limited by excessive  artifact during exercise.  Grossly, there were  approximately 0.5 mm upsloping ST segment depressions in  leads V3-V6 started at 2:56 min of exercise at HR of 134  bpm and persisted 01:07 min into recovery.  T wave  inversions developed in lead V2 starting at 3:00 min of  recovery at 84 bpm and persisted at least 5:20 min in  recovery.  Arrhythmia: Rare VPDs occurred during recovery.      Upon arrival back to unit pt is now requesting NST (is now sorry he didn't agree to nuclear imaging)  Case discussed w Dr Pérez. Will proceed w NST in am      Also reports episodes of loose stool x last few days.    Seen by GI Dr Scruggs  PCR panel stool test ordered. (Stool sample obtained - brown soft, not watery)  d/c pepcid  start pantoprazole 40mg PO BID  carafate 1 gm PO q 6 hours    thrombocytopenia   discussed w Dr Spence  pt ambulating   hold DVT prophylaxis until further eval- readdress tomorrow update  on exam   A+O x 4  Pulm CTA ruddy  CV S1S2 RRR   abd soft, non tender +BS  ext no edema    Patient went for exercise ST this am  refused NST this am - was concerned radioactive isotope may be damaging to his kidney  Seen and evaluated by Renal Dr Mckeon and cards Dr Pérez and still declined NST. Agreed to exercise ST  Same done and revealed-  STRESS TEST IMPRESSIONS:  Exercise capacity: 5 METS, Poor for age and gender.  Chest Pain: No chest pain with exercise.  Symptom: Shortness of breath, fatigue.  HR Response: Excessive increase in HR with stress due to  poor physical condition and/or reduced cardiovascular  reserve.  BP Response: Appropriate.  Heart Rhythm: Sinus Rhythm - 76 BPM.  Conduction defects: right bundle branch block.  Q Waves: Inferior MI, Anterolateral MI.  Baseline ECG: Nonspecific ST-T wave abnormality.  ECG Changes: ECG interpretation limited by excessive  artifact during exercise.  Grossly, there were  approximately 0.5 mm upsloping ST segment depressions in  leads V3-V6 started at 2:56 min of exercise at HR of 134  bpm and persisted 01:07 min into recovery.  T wave  inversions developed in lead V2 starting at 3:00 min of  recovery at 84 bpm and persisted at least 5:20 min in  recovery.  Arrhythmia: Rare VPDs occurred during recovery.      Upon arrival back to unit pt is now requesting NST (is now sorry he didn't agree to nuclear imaging)  Case discussed w Dr Pérez. Will proceed w NST in am      Also reports episodes of loose stool x last few days.    Seen by GI Dr Scruggs  PCR panel stool test ordered. (Stool sample obtained - brown soft, not watery)  d/c pepcid  start pantoprazole 40mg PO BID  carafate 1 gm PO q 6 hours    Thrombocytopenia - plt 88 (running <100 since 1/2019 as noted in [facility])  discussed w Dr Spence  pt ambulating- will hold DVT prophylaxis until further eval- readdress tomorrow

## 2019-09-19 NOTE — PROGRESS NOTE ADULT - SUBJECTIVE AND OBJECTIVE BOX
[facility] [phone]        Patient seen and examined in bed.  He had no chest pain.  Diarrhea for 2 weeks         MEDICATIONS  (STANDING):  aspirin enteric coated 81 milliGRAM(s) Oral daily  atorvastatin 10 milliGRAM(s) Oral at bedtime  clopidogrel Tablet 75 milliGRAM(s) Oral daily  famotidine    Tablet 20 milliGRAM(s) Oral daily  influenza   Vaccine 0.5 milliLiter(s) IntraMuscular once  magnesium oxide 400 milliGRAM(s) Oral daily  metoprolol succinate ER 25 milliGRAM(s) Oral daily  mycophenolate mofetil 500 milliGRAM(s) Oral two times a day  predniSONE   Tablet 5 milliGRAM(s) Oral daily  simethicone 80 milliGRAM(s) Chew two times a day  tacrolimus 2 milliGRAM(s) Oral two times a day  trimethoprim   80 mG/sulfamethoxazole 400 mG 1 Tablet(s) Oral daily      VITAL:  T(C): , Max: 36.7 (09-18-19 @ 13:11)  T(F): , Max: 98 (09-18-19 @ 13:11)  HR: 61 (09-19-19 @ 04:56)  BP: 123/60 (09-19-19 @ 04:56)  BP(mean): --  RR: 16 (09-19-19 @ 04:56)  SpO2: 97% (09-19-19 @ 04:56)  Wt(kg): --    I and O's:    09-18 @ 07:01  -  09-19 @ 07:00  --------------------------------------------------------  IN: 720 mL / OUT: 1800 mL / NET: -1080 mL          PHYSICAL EXAM:    Constitutional: NAD  Neck:  No JVD  Respiratory: CTAB/L  Cardiovascular: S1 and S2  Gastrointestinal: BS+, soft, NT/ND  Extremities: No peripheral edema  Neurological: A/O x 3, no focal deficits  Psychiatric: Normal mood, normal affect  : No Jean  Skin: No rashes  Access: Not applicable    LABS:                        15.1   5.5   )-----------( 88       ( 18 Sep 2019 06:13 )             47.4     09-18    134<L>  |  98  |  17  ----------------------------<  126<H>  3.8   |  25  |  0.98    Ca    9.5      18 Sep 2019 06:13  Phos  4.2     09-18  Mg     1.5     09-18    TPro  6.6  /  Alb  3.9  /  TBili  0.4  /  DBili  x   /  AST  21  /  ALT  27  /  AlkPhos  45  09-18          Urine Studies:          RADIOLOGY & ADDITIONAL STUDIES:

## 2019-09-19 NOTE — CONSULT NOTE ADULT - SUBJECTIVE AND OBJECTIVE BOX
[facility]  Isaías Carmichael PA-C  [address] [place] [address]  [phone]      Chief Complaint:  Patient is a 59y old  Male who presents with a chief complaint of chest pain (19 Sep 2019 10:45)      HPI: 58 yo male with PMH of CAD, CABG x 5/PCI on plavix, HTN, ESRD, renal transplant secondary to PCKD (on bactrim, prednsione, tacro, cellcept), HTN, Asthma brought in via EMS for intermittent diffuse chest pain.  Patient was recently admitted for chest pain one week ago, was thought to be due to GI related.  HST negative x 2, EKG unchanged, echo showed EF 50%, mild segmental LV dysfunction.  Patient was ordered for pepcid, but he is not taking that.  Patient was discharged last Tuesday.  He says he has been doing well, except his BP has been fluctuating. Today patient woke up with productive coughing in the morning and then again in the evening.  Cough then resolved.  In the evening, he was reading a book and started feeling dizzy, had blurry vision.  He went to the bathroom and came back and was still feeling dizzy.  She also felt SOB with walking.  Then he started having Chest pain, around 8pm while sitting down.  He describes it as middle of the chest, pressure like, nonradiating, constant, not associated with diaphoresis or SOB.  He checked his BP at the time, it was 171/95.  He took Aspirin and decided to take rest.  At 9pm, his Chest pain still persisted and he continued to feel dizzy.  Therefore, he decided to come to hospital.  Currently patient does have some chest pressure, but improved from before.     Allergies:  No Known Allergies      Medications:  aspirin enteric coated 81 milliGRAM(s) Oral daily  atorvastatin 10 milliGRAM(s) Oral at bedtime  clopidogrel Tablet 75 milliGRAM(s) Oral daily  famotidine    Tablet 20 milliGRAM(s) Oral daily  influenza   Vaccine 0.5 milliLiter(s) IntraMuscular once  magnesium oxide 400 milliGRAM(s) Oral daily  metoprolol succinate ER 25 milliGRAM(s) Oral daily  mycophenolate mofetil 500 milliGRAM(s) Oral two times a day  predniSONE   Tablet 5 milliGRAM(s) Oral daily  simethicone 80 milliGRAM(s) Chew two times a day  tacrolimus 2 milliGRAM(s) Oral two times a day  trimethoprim   80 mG/sulfamethoxazole 400 mG 1 Tablet(s) Oral daily      PMHX/PSHX:  HTN (hypertension)  Coronary artery disease involving coronary bypass graft  ESRD (end stage renal disease) on dialysis  Obesity  Hemorrhoids  Gastroesophageal reflux disease without esophagitis  High cholesterol  ESRD on Dialysis  CAD (Coronary Artery Disease)  HTN (Hypertension)  CAD (Coronary Atherosclerotic Disease)  PCK (Polycystic Kidney Disease)  HTN - Hypertension  Asthma  AV fistula  S/P coronary artery stent placement  S/P CABG x 5  S/P CABG x 7  S/P hemorrhoidectomy  AV fistula  Stented coronary artery  CABG (Coronary Artery Bypass Graft)  S/P CABG      Family history:  Family history of polycystic kidney (Sibling)  Family history of adult polycystic kidney disease (Sibling)  No pertinent family history in first degree relatives      Social History:     ROS:     General:  No wt loss, fevers, chills, night sweats, fatigue,   Eyes:  Good vision, no reported pain  ENT:  No sore throat, pain, runny nose, dysphagia  CV:  No pain, palpitations, hypo/hypertension  Resp:  No dyspnea, cough, tachypnea, wheezing  GI:  No pain, No nausea, No vomiting, No diarrhea, No constipation, No weight loss, No fever, No pruritis, No rectal bleeding, No tarry stools, No dysphagia,  :  No pain, bleeding, incontinence, nocturia  Muscle:  No pain, weakness  Neuro:  No weakness, tingling, memory problems  Psych:  No fatigue, insomnia, mood problems, depression  Endocrine:  No polyuria, polydipsia, cold/heat intolerance  Heme:  No petechiae, ecchymosis, easy bruisability  Skin:  No rash, tattoos, scars, edema      PHYSICAL EXAM:   Vital Signs:  Vital Signs Last 24 Hrs  T(C): 36.7 (19 Sep 2019 11:11), Max: 36.7 (18 Sep 2019 13:11)  T(F): 98 (19 Sep 2019 11:11), Max: 98 (18 Sep 2019 13:11)  HR: 62 (19 Sep 2019 11:11) (56 - 63)  BP: 141/76 (19 Sep 2019 11:11) (120/64 - 141/76)  BP(mean): --  RR: 16 (19 Sep 2019 11:11) (16 - 17)  SpO2: 98% (19 Sep 2019 11:11) (97% - 99%)  Daily     Daily     GENERAL:  Appears stated age, well-groomed, well-nourished, no distress  HEENT:  NC/AT,  conjunctivae clear and pink, no thyromegaly, nodules, adenopathy, no JVD, sclera -anicteric  CHEST:  Full & symmetric excursion, no increased effort, breath sounds clear  HEART:  Regular rhythm, S1, S2, no murmur/rub/S3/S4, no abdominal bruit, no edema  ABDOMEN:  Soft, non-tender, non-distended, normoactive bowel sounds,  no masses ,no hepato-splenomegaly, no signs of chronic liver disease  EXTEREMITIES:  no cyanosis,clubbing or edema  SKIN:  No rash/erythema/ecchymoses/petechiae/wounds/abscess/warm/dry  NEURO:  Alert, oriented, no asterixis, no tremor, no encephalopathy    LABS:                        15.1   5.5   )-----------( 88       ( 18 Sep 2019 06:13 )             47.4     09-18    134<L>  |  98  |  17  ----------------------------<  126<H>  3.8   |  25  |  0.98    Ca    9.5      18 Sep 2019 06:13  Phos  4.2     09-18  Mg     1.5     09-18    TPro  6.6  /  Alb  3.9  /  TBili  0.4  /  DBili  x   /  AST  21  /  ALT  27  /  AlkPhos  45  09-18    LIVER FUNCTIONS - ( 18 Sep 2019 06:13 )  Alb: 3.9 g/dL / Pro: 6.6 g/dL / ALK PHOS: 45 U/L / ALT: 27 U/L / AST: 21 U/L / GGT: x           PT/INR - ( 17 Sep 2019 22:36 )   PT: 12.7 sec;   INR: 1.11 ratio         PTT - ( 17 Sep 2019 22:36 )  PTT:29.6 sec        Imaging:

## 2019-09-19 NOTE — PROGRESS NOTE ADULT - ASSESSMENT
ASSESSMENT:  (1)Renal - renal transplant, with superb function. At baseline. On Prograf 2mg po bid, Cellcept 500mg po bid, and Prednisone 5mg po daily. Tacro levels are reasonable for now.  (2)Chest pain - for stress test.   (3)Hypomagnesemia  (4)Vascular - his left arm AVF has a good thrill and bruit; his right arm AVF does not. Were he to require further HD, it should be from the left arm. We should preserve the vasculature of the left arm, not the right arm.    RECOMMEND:  (1)Stress test today-- If negative then dc home;  If positive then angiogram of the heart with IVF.  No renal objection    (2)Transplant meds as taken at home/Bactrim will be stopped soon    (3)Mg repletion as outlined    (4)Dose new meds for GFR 50ml/min

## 2019-09-19 NOTE — PROGRESS NOTE ADULT - SUBJECTIVE AND OBJECTIVE BOX
Subjective: Patient seen and examined. No new events except as noted.       REVIEW OF SYSTEMS:    CONSTITUTIONAL: No weakness, fevers or chills  EYES/ENT: No visual changes;  No vertigo or throat pain   NECK: No pain or stiffness  RESPIRATORY: No cough, wheezing, hemoptysis; No shortness of breath  CARDIOVASCULAR: No chest pain or palpitations  GASTROINTESTINAL: No abdominal or epigastric pain. No nausea, vomiting, or hematemesis; No diarrhea or constipation. No melena or hematochezia.  GENITOURINARY: No dysuria, frequency or hematuria  NEUROLOGICAL: No numbness or weakness  SKIN: No itching, burning, rashes, or lesions   All other review of systems is negative unless indicated above.    MEDICATIONS:  MEDICATIONS  (STANDING):  aspirin enteric coated 81 milliGRAM(s) Oral daily  atorvastatin 10 milliGRAM(s) Oral at bedtime  clopidogrel Tablet 75 milliGRAM(s) Oral daily  influenza   Vaccine 0.5 milliLiter(s) IntraMuscular once  magnesium oxide 400 milliGRAM(s) Oral daily  metoprolol succinate ER 25 milliGRAM(s) Oral daily  mycophenolate mofetil 500 milliGRAM(s) Oral two times a day  pantoprazole    Tablet 40 milliGRAM(s) Oral two times a day  predniSONE   Tablet 5 milliGRAM(s) Oral daily  simethicone 80 milliGRAM(s) Chew two times a day  sucralfate 1 Gram(s) Oral every 6 hours  tacrolimus 2 milliGRAM(s) Oral two times a day  trimethoprim   80 mG/sulfamethoxazole 400 mG 1 Tablet(s) Oral daily      PHYSICAL EXAM:  T(C): 36.7 (09-19-19 @ 11:11), Max: 36.7 (09-19-19 @ 11:11)  HR: 72 (09-19-19 @ 13:58) (61 - 72)  BP: 130/63 (09-19-19 @ 13:58) (120/64 - 141/76)  RR: 16 (09-19-19 @ 13:58) (16 - 16)  SpO2: 98% (09-19-19 @ 13:58) (97% - 99%)  Wt(kg): --  I&O's Summary    18 Sep 2019 07:01  -  19 Sep 2019 07:00  --------------------------------------------------------  IN: 720 mL / OUT: 1800 mL / NET: -1080 mL    19 Sep 2019 07:01  -  19 Sep 2019 19:03  --------------------------------------------------------  IN: 520 mL / OUT: 0 mL / NET: 520 mL          Appearance: Normal	  HEENT:   Normal oral mucosa, PERRL, EOMI	  Lymphatic: No lymphadenopathy , no edema  Cardiovascular: Normal S1 S2, No JVD, No murmurs , Peripheral pulses palpable 2+ bilaterally  Respiratory: Lungs clear to auscultation, normal effort 	  Gastrointestinal:  Soft, Non-tender, + BS	  Skin: No rashes, No ecchymoses, No cyanosis, warm to touch  Musculoskeletal: Normal range of motion, normal strength  Psychiatry:  Mood & affect appropriate  Ext: No edema      All labs, Imaging and EKGs personally reviewed                           15.1   5.5   )-----------( 88       ( 18 Sep 2019 06:13 )             47.4               09-18    134<L>  |  98  |  17  ----------------------------<  126<H>  3.8   |  25  |  0.98    Ca    9.5      18 Sep 2019 06:13  Phos  4.2     09-18  Mg     1.5     09-18    TPro  6.6  /  Alb  3.9  /  TBili  0.4  /  DBili  x   /  AST  21  /  ALT  27  /  AlkPhos  45  09-18    PT/INR - ( 17 Sep 2019 22:36 )   PT: 12.7 sec;   INR: 1.11 ratio         PTT - ( 17 Sep 2019 22:36 )  PTT:29.6 sec

## 2019-09-19 NOTE — PROGRESS NOTE ADULT - ASSESSMENT
Problem/Plan - 1:  ·  Problem: Chest pain.  Plan: patient with chest pain, without acute EKG changes  unlikely cardiac  cards fu  ischemia cain as per cards  likely gI etiology  GI eval appreciated   stress test     Problem/Plan - 2:  ·  Problem: Renal transplant, status post.  Plan: c/w home regimen with cellcept, tacrolimus, prednisone and bactrim  renal fu     Problem/Plan - 3:  ·  Problem: CAD (Coronary Artery Disease).  Plan: c/w aspirin, plavix, atorvastatin, metoprolol.     Problem/Plan - 4:  ·  Problem: HTN (hypertension).  Plan: fluctuating BP as per patient, when blood pressure is elevated, he gets symptoms  c/w home metoprolol for now; adjust meds as needed.     Problem/Plan - 5:  ·  Problem: HLD (hyperlipidemia).  Plan: c/w atorvastatin.     Problem/Plan - 6:  Problem: Prophylactic measure. Plan: heparin sc.

## 2019-09-20 ENCOUNTER — TRANSCRIPTION ENCOUNTER (OUTPATIENT)
Age: 59
End: 2019-09-20

## 2019-09-20 DIAGNOSIS — Z71.89 OTHER SPECIFIED COUNSELING: ICD-10-CM

## 2019-09-20 DIAGNOSIS — Z94.0 KIDNEY TRANSPLANT STATUS: ICD-10-CM

## 2019-09-20 LAB
ANION GAP SERPL CALC-SCNC: 13 MMOL/L — SIGNIFICANT CHANGE UP (ref 5–17)
ANION GAP SERPL CALC-SCNC: 15 MMOL/L — SIGNIFICANT CHANGE UP (ref 5–17)
BUN SERPL-MCNC: 12 MG/DL — SIGNIFICANT CHANGE UP (ref 7–23)
BUN SERPL-MCNC: 14 MG/DL — SIGNIFICANT CHANGE UP (ref 7–23)
CALCIUM SERPL-MCNC: 8.8 MG/DL — SIGNIFICANT CHANGE UP (ref 8.4–10.5)
CALCIUM SERPL-MCNC: 9.3 MG/DL — SIGNIFICANT CHANGE UP (ref 8.4–10.5)
CHLORIDE SERPL-SCNC: 98 MMOL/L — SIGNIFICANT CHANGE UP (ref 96–108)
CHLORIDE SERPL-SCNC: 99 MMOL/L — SIGNIFICANT CHANGE UP (ref 96–108)
CO2 SERPL-SCNC: 22 MMOL/L — SIGNIFICANT CHANGE UP (ref 22–31)
CO2 SERPL-SCNC: 22 MMOL/L — SIGNIFICANT CHANGE UP (ref 22–31)
CREAT SERPL-MCNC: 0.95 MG/DL — SIGNIFICANT CHANGE UP (ref 0.5–1.3)
CREAT SERPL-MCNC: 0.97 MG/DL — SIGNIFICANT CHANGE UP (ref 0.5–1.3)
GLUCOSE SERPL-MCNC: 122 MG/DL — HIGH (ref 70–99)
GLUCOSE SERPL-MCNC: 167 MG/DL — HIGH (ref 70–99)
HCT VFR BLD CALC: 49.1 % — SIGNIFICANT CHANGE UP (ref 39–50)
HGB BLD-MCNC: 15.1 G/DL — SIGNIFICANT CHANGE UP (ref 13–17)
MCHC RBC-ENTMCNC: 27.2 PG — SIGNIFICANT CHANGE UP (ref 27–34)
MCHC RBC-ENTMCNC: 30.8 GM/DL — LOW (ref 32–36)
MCV RBC AUTO: 88.3 FL — SIGNIFICANT CHANGE UP (ref 80–100)
MYCOPHENOLATE SERPL-MCNC: ABNORMAL
PLATELET # BLD AUTO: 91 K/UL — LOW (ref 150–400)
POTASSIUM SERPL-MCNC: 3.9 MMOL/L — SIGNIFICANT CHANGE UP (ref 3.5–5.3)
POTASSIUM SERPL-MCNC: 4.1 MMOL/L — SIGNIFICANT CHANGE UP (ref 3.5–5.3)
POTASSIUM SERPL-SCNC: 3.9 MMOL/L — SIGNIFICANT CHANGE UP (ref 3.5–5.3)
POTASSIUM SERPL-SCNC: 4.1 MMOL/L — SIGNIFICANT CHANGE UP (ref 3.5–5.3)
RBC # BLD: 5.56 M/UL — SIGNIFICANT CHANGE UP (ref 4.2–5.8)
RBC # FLD: 13.6 % — SIGNIFICANT CHANGE UP (ref 10.3–14.5)
SODIUM SERPL-SCNC: 134 MMOL/L — LOW (ref 135–145)
SODIUM SERPL-SCNC: 135 MMOL/L — SIGNIFICANT CHANGE UP (ref 135–145)
WBC # BLD: 5.8 K/UL — SIGNIFICANT CHANGE UP (ref 3.8–10.5)
WBC # FLD AUTO: 5.8 K/UL — SIGNIFICANT CHANGE UP (ref 3.8–10.5)

## 2019-09-20 PROCEDURE — 78452 HT MUSCLE IMAGE SPECT MULT: CPT | Mod: 26

## 2019-09-20 PROCEDURE — 93018 CV STRESS TEST I&R ONLY: CPT

## 2019-09-20 PROCEDURE — 99152 MOD SED SAME PHYS/QHP 5/>YRS: CPT | Mod: GC

## 2019-09-20 PROCEDURE — 93459 L HRT ART/GRFT ANGIO: CPT | Mod: 26,59,GC

## 2019-09-20 PROCEDURE — 93010 ELECTROCARDIOGRAM REPORT: CPT

## 2019-09-20 PROCEDURE — 93016 CV STRESS TEST SUPVJ ONLY: CPT

## 2019-09-20 PROCEDURE — 92928 PRQ TCAT PLMT NTRAC ST 1 LES: CPT | Mod: LC,GC

## 2019-09-20 RX ORDER — ACETAMINOPHEN 500 MG
1000 TABLET ORAL ONCE
Refills: 0 | Status: COMPLETED | OUTPATIENT
Start: 2019-09-20 | End: 2019-09-20

## 2019-09-20 RX ORDER — SODIUM CHLORIDE 9 MG/ML
400 INJECTION INTRAMUSCULAR; INTRAVENOUS; SUBCUTANEOUS
Refills: 0 | Status: DISCONTINUED | OUTPATIENT
Start: 2019-09-20 | End: 2019-09-24

## 2019-09-20 RX ORDER — SODIUM CHLORIDE 9 MG/ML
250 INJECTION INTRAMUSCULAR; INTRAVENOUS; SUBCUTANEOUS ONCE
Refills: 0 | Status: COMPLETED | OUTPATIENT
Start: 2019-09-20 | End: 2019-09-20

## 2019-09-20 RX ADMIN — Medication 1 GRAM(S): at 05:38

## 2019-09-20 RX ADMIN — Medication 400 MILLIGRAM(S): at 21:15

## 2019-09-20 RX ADMIN — Medication 25 MILLIGRAM(S): at 05:38

## 2019-09-20 RX ADMIN — Medication 1 TABLET(S): at 12:48

## 2019-09-20 RX ADMIN — Medication 1 GRAM(S): at 16:58

## 2019-09-20 RX ADMIN — TACROLIMUS 2 MILLIGRAM(S): 5 CAPSULE ORAL at 05:38

## 2019-09-20 RX ADMIN — Medication 1000 MILLIGRAM(S): at 21:45

## 2019-09-20 RX ADMIN — SODIUM CHLORIDE 750 MILLILITER(S): 9 INJECTION INTRAMUSCULAR; INTRAVENOUS; SUBCUTANEOUS at 18:58

## 2019-09-20 RX ADMIN — PANTOPRAZOLE SODIUM 40 MILLIGRAM(S): 20 TABLET, DELAYED RELEASE ORAL at 16:58

## 2019-09-20 RX ADMIN — Medication 1 GRAM(S): at 12:48

## 2019-09-20 RX ADMIN — TACROLIMUS 2 MILLIGRAM(S): 5 CAPSULE ORAL at 16:58

## 2019-09-20 RX ADMIN — Medication 81 MILLIGRAM(S): at 12:48

## 2019-09-20 RX ADMIN — Medication 5 MILLIGRAM(S): at 05:38

## 2019-09-20 RX ADMIN — CLOPIDOGREL BISULFATE 75 MILLIGRAM(S): 75 TABLET, FILM COATED ORAL at 12:48

## 2019-09-20 RX ADMIN — MYCOPHENOLATE MOFETIL 500 MILLIGRAM(S): 250 CAPSULE ORAL at 05:38

## 2019-09-20 RX ADMIN — MYCOPHENOLATE MOFETIL 500 MILLIGRAM(S): 250 CAPSULE ORAL at 16:58

## 2019-09-20 RX ADMIN — MAGNESIUM OXIDE 400 MG ORAL TABLET 400 MILLIGRAM(S): 241.3 TABLET ORAL at 12:48

## 2019-09-20 RX ADMIN — SIMETHICONE 80 MILLIGRAM(S): 80 TABLET, CHEWABLE ORAL at 16:58

## 2019-09-20 RX ADMIN — ATORVASTATIN CALCIUM 10 MILLIGRAM(S): 80 TABLET, FILM COATED ORAL at 22:33

## 2019-09-20 RX ADMIN — SODIUM CHLORIDE 100 MILLILITER(S): 9 INJECTION INTRAMUSCULAR; INTRAVENOUS; SUBCUTANEOUS at 20:00

## 2019-09-20 RX ADMIN — SIMETHICONE 80 MILLIGRAM(S): 80 TABLET, CHEWABLE ORAL at 05:38

## 2019-09-20 RX ADMIN — PANTOPRAZOLE SODIUM 40 MILLIGRAM(S): 20 TABLET, DELAYED RELEASE ORAL at 05:38

## 2019-09-20 NOTE — PROGRESS NOTE ADULT - SUBJECTIVE AND OBJECTIVE BOX
CARDIOLOGY FOLLOW UP - Dr. Pérez    CC no cp/sob       PHYSICAL EXAM:  T(C): 36.4 (09-20-19 @ 15:47), Max: 36.5 (09-20-19 @ 12:41)  HR: 72 (09-20-19 @ 15:47) (65 - 72)  BP: 145/74 (09-20-19 @ 12:41) (116/72 - 145/74)  RR: 16 (09-20-19 @ 15:47) (16 - 16)  SpO2: 100% (09-20-19 @ 15:47) (98% - 100%)  Wt(kg): --  I&O's Summary    19 Sep 2019 07:01  -  20 Sep 2019 07:00  --------------------------------------------------------  IN: 760 mL / OUT: 500 mL / NET: 260 mL    20 Sep 2019 07:01  -  20 Sep 2019 15:53  --------------------------------------------------------  IN: 520 mL / OUT: 0 mL / NET: 520 mL        Appearance: Normal	  Cardiovascular: Normal S1 S2,RRR, No JVD, No murmurs  Respiratory: Lungs clear to auscultation	  Gastrointestinal:  Soft, Non-tender, + BS	  Extremities: Normal range of motion, No clubbing, cyanosis or edema        MEDICATIONS  (STANDING):  aspirin enteric coated 81 milliGRAM(s) Oral daily  atorvastatin 10 milliGRAM(s) Oral at bedtime  clopidogrel Tablet 75 milliGRAM(s) Oral daily  influenza   Vaccine 0.5 milliLiter(s) IntraMuscular once  magnesium oxide 400 milliGRAM(s) Oral daily  metoprolol succinate ER 25 milliGRAM(s) Oral daily  mycophenolate mofetil 500 milliGRAM(s) Oral two times a day  pantoprazole    Tablet 40 milliGRAM(s) Oral two times a day  predniSONE   Tablet 5 milliGRAM(s) Oral daily  simethicone 80 milliGRAM(s) Chew two times a day  sucralfate 1 Gram(s) Oral every 6 hours  tacrolimus 2 milliGRAM(s) Oral two times a day  trimethoprim   80 mG/sulfamethoxazole 400 mG 1 Tablet(s) Oral daily      TELEMETRY: 	    ECG:  	  RADIOLOGY:   DIAGNOSTIC TESTING:  [ ] Echocardiogram:  [ ]  Catheterization:  [ ] Stress Test:  < from: Nuclear Stress Test-Pharmacologic (09.20.19 @ 11:12) >  IMPRESSIONS:Abnormal Study  * Chest Pain: No chest pain with administration of  Regadenoson.  * Symptom: No Symptom.  * HR Response: Appropriate.  * BP Response: Appropriate.  * Heart Rhythm: Sinus Rhythm - 65 BPM.  * Conduction defects: right bundle branch block.  * Q Waves: II , III, aVF.  * Baseline ECG: T wave abnormality in aVL.  * ECG Changes: ST Elevation:  in leads V1 started at 02:00  min of infusion at HR of 96 and persisted 07:00 min into  post infusion.  * Arrhythmia: None.  * The left ventricle was enlarged. There are large-sized,  moderate to severe defects in the lateral and mid to basal  inferior/inferolateral that are fixed on prone imaging,  consistent with infarcts.  There is a large-sized, severe  defects in the inferopical and apicolateral walls that are  partially fixed but demonstrate reversibility, suggestive  of infarct with at least moderate mckay-infarct ischemia.  Notably there is right ventricular uptake.  * Post-stress gated wall motion analysis was performed  (LVEF = 43 %;LVEDV = 155 ml.), revealing moderate to  severe  hypokinesis in apical, inferolateral, lateral  wall(s).  Conclusion:  The left ventricle was enlarged. There are large-sized,  moderate to severe defects in the lateral and mid to basal  inferior/inferolateral that are fixed on prone imaging,  consistent with infarcts.  There is a large-sized, severe  defects in the inferopical and apicolateral walls that are  partially fixed but demonstrate reversibility, suggestive  of infarct with at least moderate mckay-infarct ischemia.  Notably, there is right ventricular uptake.    < end of copied text >    OTHER: 	    LABS:	 	                                15.1   5.8   )-----------( 91       ( 20 Sep 2019 05:39 )             49.1     09-20    135  |  98  |  12  ----------------------------<  122<H>  3.9   |  22  |  0.95    Ca    9.3      20 Sep 2019 05:39

## 2019-09-20 NOTE — PROGRESS NOTE ADULT - ASSESSMENT
59 yr old with pmh of CAD s/p CABG/PCI, HTN, ESRD, s/p kidney transplant, htn, asthma presenting with atypical chest pain.       58 yr old with pmh of CAD s/p CABG/PCI, HTN, ESRD, s/p kidney transplant, htn, asthma presenting with chest pain.     1. Chest pain, atypical  symptoms now resolved   HST negative x 2, no evidence of acute ischemia/ACS , EKG unchanged, NSR w/ RBBB  no evidence of fluid overload, CXR clear   recent echo EF 50%, mild segmental LV dysfunction   continue pepcid as ordered  continue asa, statin  pharm stress abnormal   plan for cardiac cath today   GI f/u     2. HTN  bp stable   c/w metoprolol 25 mg daily, up-titrate if needed     3. Asthma   stable    4. ESRD s/p renal transplant  on tacrolimus      dvt ppx

## 2019-09-20 NOTE — PROGRESS NOTE ADULT - SUBJECTIVE AND OBJECTIVE BOX
Patient is a 59y old  Male who presents with a chief complaint of chest pain (20 Sep 2019 15:52)      INTERVAL HPI/OVERNIGHT EVENTS:  T(C): 36.5 (09-20-19 @ 12:41), Max: 36.5 (09-20-19 @ 12:41)  HR: 67 (09-20-19 @ 12:41) (65 - 68)  BP: 145/74 (09-20-19 @ 12:41) (116/72 - 145/74)  RR: 16 (09-20-19 @ 12:41) (16 - 16)  SpO2: 100% (09-20-19 @ 12:41) (98% - 100%)  Wt(kg): --  I&O's Summary    19 Sep 2019 07:01  -  20 Sep 2019 07:00  --------------------------------------------------------  IN: 760 mL / OUT: 500 mL / NET: 260 mL    20 Sep 2019 07:01  -  20 Sep 2019 18:00  --------------------------------------------------------  IN: 520 mL / OUT: 0 mL / NET: 520 mL        LABS:                        15.1   5.8   )-----------( 91       ( 20 Sep 2019 05:39 )             49.1     09-20    135  |  98  |  12  ----------------------------<  122<H>  3.9   |  22  |  0.95    Ca    9.3      20 Sep 2019 05:39          CAPILLARY BLOOD GLUCOSE                MEDICATIONS  (STANDING):  aspirin enteric coated 81 milliGRAM(s) Oral daily  atorvastatin 10 milliGRAM(s) Oral at bedtime  clopidogrel Tablet 75 milliGRAM(s) Oral daily  influenza   Vaccine 0.5 milliLiter(s) IntraMuscular once  magnesium oxide 400 milliGRAM(s) Oral daily  metoprolol succinate ER 25 milliGRAM(s) Oral daily  mycophenolate mofetil 500 milliGRAM(s) Oral two times a day  pantoprazole    Tablet 40 milliGRAM(s) Oral two times a day  predniSONE   Tablet 5 milliGRAM(s) Oral daily  simethicone 80 milliGRAM(s) Chew two times a day  sodium chloride 0.9% Bolus 250 milliLiter(s) IV Bolus once  sucralfate 1 Gram(s) Oral every 6 hours  tacrolimus 2 milliGRAM(s) Oral two times a day  trimethoprim   80 mG/sulfamethoxazole 400 mG 1 Tablet(s) Oral daily    MEDICATIONS  (PRN):          PHYSICAL EXAM:  GENERAL: NAD, well-groomed, well-developed  HEAD:  Atraumatic, Normocephalic  CHEST/LUNG: Clear to percussion bilaterally; No rales, rhonchi, wheezing, or rubs  HEART: Regular rate and rhythm; No murmurs, rubs, or gallops  ABDOMEN: Soft, Nontender, Nondistended; Bowel sounds present  EXTREMITIES:  2+ Peripheral Pulses, No clubbing, cyanosis, or edema  LYMPH: No lymphadenopathy noted  SKIN: No rashes or lesions    Care Discussed with Consultants/Other Providers [ ] YES  [ ] NO

## 2019-09-20 NOTE — CHART NOTE - NSCHARTNOTEFT_GEN_A_CORE
Removal of Femoral Sheath    Pulses in the left) lower extremity are  by doppler. The patient was placed in the supine position. The insertion site was identified and the sutures were removed per protocol.  The 6 Romanian femoral sheath was then removed. Direct pressure was applied for 30 minutes.   Monitoring of the right groin and both lower extremities including neuro-vascular checks and vital signs every 15 minutes x 4, then every 30 minutes x 2, then every 1 hour was ordered.    Complications: Positive ecchymosis and hematoma

## 2019-09-20 NOTE — PROGRESS NOTE ADULT - PROBLEM SELECTOR PLAN 2
symptoms now resolved   recent echo EF 50%, mild segmental LV dysfunction   continue asa, statin  s/p stress test, await cardiology follow up

## 2019-09-20 NOTE — DISCHARGE NOTE NURSING/CASE MANAGEMENT/SOCIAL WORK - PATIENT PORTAL LINK FT
You can access the FollowMyHealth Patient Portal offered by [facility] by registering at the following website: [email or web address]. By joining [employer]’s FollowMyHealth portal, you will also be able to view your health information using other applications (apps) compatible with our system.

## 2019-09-20 NOTE — PROGRESS NOTE ADULT - ASSESSMENT
Problem/Plan - 1:  ·  Problem: Chest pain.  Plan: patient with chest pain, without acute EKG changes  unlikely cardiac  cards fu  ischemia cain as per cards  likely gI etiology  GI eval appreciated   stress test REVIEWED  cath as per cards     Problem/Plan - 2:  ·  Problem: Renal transplant, status post.  Plan: c/w home regimen with cellcept, tacrolimus, prednisone and bactrim  renal fu     Problem/Plan - 3:  ·  Problem: CAD (Coronary Artery Disease).  Plan: c/w aspirin, plavix, atorvastatin, metoprolol.     Problem/Plan - 4:  ·  Problem: HTN (hypertension).  Plan: fluctuating BP as per patient, when blood pressure is elevated, he gets symptoms  c/w home metoprolol for now; adjust meds as needed.     Problem/Plan - 5:  ·  Problem: HLD (hyperlipidemia).  Plan: c/w atorvastatin.     Problem/Plan - 6:  Problem: Prophylactic measure. Plan: heparin sc.

## 2019-09-20 NOTE — PROGRESS NOTE ADULT - SUBJECTIVE AND OBJECTIVE BOX
NEPHROLOGY-[facility] [phone]        Patient seen and examined in bed.  He was in good spirits and offered no complaints         MEDICATIONS  (STANDING):  aspirin enteric coated 81 milliGRAM(s) Oral daily  atorvastatin 10 milliGRAM(s) Oral at bedtime  clopidogrel Tablet 75 milliGRAM(s) Oral daily  influenza   Vaccine 0.5 milliLiter(s) IntraMuscular once  magnesium oxide 400 milliGRAM(s) Oral daily  metoprolol succinate ER 25 milliGRAM(s) Oral daily  mycophenolate mofetil 500 milliGRAM(s) Oral two times a day  pantoprazole    Tablet 40 milliGRAM(s) Oral two times a day  predniSONE   Tablet 5 milliGRAM(s) Oral daily  simethicone 80 milliGRAM(s) Chew two times a day  sucralfate 1 Gram(s) Oral every 6 hours  tacrolimus 2 milliGRAM(s) Oral two times a day  trimethoprim   80 mG/sulfamethoxazole 400 mG 1 Tablet(s) Oral daily      VITAL:  T(C): , Max: 36.7 (09-19-19 @ 11:11)  T(F): , Max: 98 (09-19-19 @ 11:11)  HR: 65 (09-20-19 @ 05:05)  BP: 120/67 (09-20-19 @ 05:05)  BP(mean): --  RR: 16 (09-20-19 @ 05:05)  SpO2: 98% (09-20-19 @ 05:05)  Wt(kg): --    I and O's:    09-19 @ 07:01  -  09-20 @ 07:00  --------------------------------------------------------  IN: 760 mL / OUT: 500 mL / NET: 260 mL          PHYSICAL EXAM:    Constitutional: NAD; obese   Neck:  No JVD  Respiratory: CTAB/L  Cardiovascular: S1 and S2  Gastrointestinal: BS+, soft, NT/ND  Extremities: No peripheral edema  Neurological: A/O x 3, no focal deficits  Psychiatric: Normal mood, normal affect  : No Jean  Skin: No rashes  Access: Not applicable    LABS:                        15.1   5.8   )-----------( 91       ( 20 Sep 2019 05:39 )             49.1     09-20    135  |  98  |  12  ----------------------------<  122<H>  3.9   |  22  |  0.95    Ca    9.3      20 Sep 2019 05:39            Urine Studies:          RADIOLOGY & ADDITIONAL STUDIES:

## 2019-09-20 NOTE — PROGRESS NOTE ADULT - PROBLEM SELECTOR PLAN 1
patient denies taking ppi/carafate as prescribed on prior admission  if stress test negative, no objection to dc  proton pump inhibitor bid, carafate 1g four times a day  he should be discharged on above  he will follow up with is primary gi, dr. kamara.

## 2019-09-20 NOTE — PROGRESS NOTE ADULT - SUBJECTIVE AND OBJECTIVE BOX
INTERVAL HPI/OVERNIGHT EVENTS:    pt seen and examined, he denies abdominal pain, n/v. Tolerating PO     MEDICATIONS  (STANDING):  aspirin enteric coated 81 milliGRAM(s) Oral daily  atorvastatin 10 milliGRAM(s) Oral at bedtime  clopidogrel Tablet 75 milliGRAM(s) Oral daily  influenza   Vaccine 0.5 milliLiter(s) IntraMuscular once  magnesium oxide 400 milliGRAM(s) Oral daily  metoprolol succinate ER 25 milliGRAM(s) Oral daily  mycophenolate mofetil 500 milliGRAM(s) Oral two times a day  pantoprazole    Tablet 40 milliGRAM(s) Oral two times a day  predniSONE   Tablet 5 milliGRAM(s) Oral daily  simethicone 80 milliGRAM(s) Chew two times a day  sucralfate 1 Gram(s) Oral every 6 hours  tacrolimus 2 milliGRAM(s) Oral two times a day  trimethoprim   80 mG/sulfamethoxazole 400 mG 1 Tablet(s) Oral daily    MEDICATIONS  (PRN):      Allergies    No Known Allergies    Intolerances        Review of Systems:    General:  No wt loss, fevers, chills, night sweats,fatigue,   Eyes:  Good vision, no reported pain  ENT:  No sore throat, pain, runny nose, dysphagia  CV:  No pain, palpitations, hypo/hypertension  Resp:  No dyspnea, cough, tachypnea, wheezing  GI:  No pain, No nausea, No vomiting, No diarrhea, No constipation, No weight loss, No fever, No pruritis, No rectal bleeding, No melena, No dysphagia  :  No pain, bleeding, incontinence, nocturia  Muscle:  No pain, weakness  Neuro:  No weakness, tingling, memory problems  Psych:  No fatigue, insomnia, mood problems, depression  Endocrine:  No polyuria, polydypsia, cold/heat intolerance  Heme:  No petechiae, ecchymosis, easy bruisability  Skin:  No rash, tattoos, scars, edema      Vital Signs Last 24 Hrs  T(C): 36.5 (20 Sep 2019 12:41), Max: 36.5 (20 Sep 2019 12:41)  T(F): 97.7 (20 Sep 2019 12:41), Max: 97.7 (20 Sep 2019 12:41)  HR: 67 (20 Sep 2019 12:41) (65 - 68)  BP: 145/74 (20 Sep 2019 12:41) (116/72 - 145/74)  BP(mean): --  RR: 16 (20 Sep 2019 12:41) (16 - 16)  SpO2: 100% (20 Sep 2019 12:41) (98% - 100%)    PHYSICAL EXAM:    Constitutional: NAD, well-developed  HEENT: EOMI, throat clear  Neck: No LAD, supple  Respiratory: CTA and P  Cardiovascular: S1 and S2, RRR, no M  Gastrointestinal: BS+, soft, NT/ND, neg HSM,  Extremities: No peripheral edema, neg clubing, cyanosis  Vascular: 2+ peripheral pulses  Neurological: A/O x 3, no focal deficits  Psychiatric: Normal mood, normal affect  Skin: No rashes      LABS:                        15.1   5.8   )-----------( 91       ( 20 Sep 2019 05:39 )             49.1     09-20    135  |  98  |  12  ----------------------------<  122<H>  3.9   |  22  |  0.95    Ca    9.3      20 Sep 2019 05:39            RADIOLOGY & ADDITIONAL TESTS:

## 2019-09-20 NOTE — PROGRESS NOTE ADULT - ASSESSMENT
ASSESSMENT:  (1)Renal - renal transplant, with superb function. At baseline. On Prograf 2mg po bid, Cellcept 500mg po bid, and Prednisone 5mg po daily.   (2)Chest pain - for pharm stress test.   (3)Nonspecific abd pain-resolved     RECOMMEND:  (1)Nuclear Stress test today-- If negative then dc home;  If positive then angiogram of the heart with IVF.  No renal objection to cath if required     (2)Transplant meds as taken at home/Bactrim will be stopped soon    (3)Dose new meds for GFR 50ml/min

## 2019-09-21 LAB
ANION GAP SERPL CALC-SCNC: 13 MMOL/L — SIGNIFICANT CHANGE UP (ref 5–17)
BLD GP AB SCN SERPL QL: NEGATIVE — SIGNIFICANT CHANGE UP
BUN SERPL-MCNC: 13 MG/DL — SIGNIFICANT CHANGE UP (ref 7–23)
CALCIUM SERPL-MCNC: 9 MG/DL — SIGNIFICANT CHANGE UP (ref 8.4–10.5)
CHLORIDE SERPL-SCNC: 99 MMOL/L — SIGNIFICANT CHANGE UP (ref 96–108)
CO2 SERPL-SCNC: 25 MMOL/L — SIGNIFICANT CHANGE UP (ref 22–31)
CREAT SERPL-MCNC: 0.96 MG/DL — SIGNIFICANT CHANGE UP (ref 0.5–1.3)
GLUCOSE SERPL-MCNC: 179 MG/DL — HIGH (ref 70–99)
HCT VFR BLD CALC: 40.9 % — SIGNIFICANT CHANGE UP (ref 39–50)
HCT VFR BLD CALC: 40.9 % — SIGNIFICANT CHANGE UP (ref 39–50)
HCT VFR BLD CALC: 42.2 % — SIGNIFICANT CHANGE UP (ref 39–50)
HCT VFR BLD CALC: 42.9 % — SIGNIFICANT CHANGE UP (ref 39–50)
HGB BLD-MCNC: 13.3 G/DL — SIGNIFICANT CHANGE UP (ref 13–17)
HGB BLD-MCNC: 13.4 G/DL — SIGNIFICANT CHANGE UP (ref 13–17)
HGB BLD-MCNC: 13.5 G/DL — SIGNIFICANT CHANGE UP (ref 13–17)
HGB BLD-MCNC: 14.1 G/DL — SIGNIFICANT CHANGE UP (ref 13–17)
MCHC RBC-ENTMCNC: 27.7 PG — SIGNIFICANT CHANGE UP (ref 27–34)
MCHC RBC-ENTMCNC: 28.1 PG — SIGNIFICANT CHANGE UP (ref 27–34)
MCHC RBC-ENTMCNC: 28.6 PG — SIGNIFICANT CHANGE UP (ref 27–34)
MCHC RBC-ENTMCNC: 29.2 PG — SIGNIFICANT CHANGE UP (ref 27–34)
MCHC RBC-ENTMCNC: 31.4 GM/DL — LOW (ref 32–36)
MCHC RBC-ENTMCNC: 32.5 GM/DL — SIGNIFICANT CHANGE UP (ref 32–36)
MCHC RBC-ENTMCNC: 32.6 GM/DL — SIGNIFICANT CHANGE UP (ref 32–36)
MCHC RBC-ENTMCNC: 33.3 GM/DL — SIGNIFICANT CHANGE UP (ref 32–36)
MCV RBC AUTO: 86.5 FL — SIGNIFICANT CHANGE UP (ref 80–100)
MCV RBC AUTO: 87.7 FL — SIGNIFICANT CHANGE UP (ref 80–100)
MCV RBC AUTO: 87.7 FL — SIGNIFICANT CHANGE UP (ref 80–100)
MCV RBC AUTO: 88.3 FL — SIGNIFICANT CHANGE UP (ref 80–100)
PLATELET # BLD AUTO: 102 K/UL — LOW (ref 150–400)
PLATELET # BLD AUTO: 102 K/UL — LOW (ref 150–400)
PLATELET # BLD AUTO: 93 K/UL — LOW (ref 150–400)
PLATELET # BLD AUTO: 95 K/UL — LOW (ref 150–400)
POTASSIUM SERPL-MCNC: 4.5 MMOL/L — SIGNIFICANT CHANGE UP (ref 3.5–5.3)
POTASSIUM SERPL-SCNC: 4.5 MMOL/L — SIGNIFICANT CHANGE UP (ref 3.5–5.3)
RBC # BLD: 4.67 M/UL — SIGNIFICANT CHANGE UP (ref 4.2–5.8)
RBC # BLD: 4.73 M/UL — SIGNIFICANT CHANGE UP (ref 4.2–5.8)
RBC # BLD: 4.82 M/UL — SIGNIFICANT CHANGE UP (ref 4.2–5.8)
RBC # BLD: 4.86 M/UL — SIGNIFICANT CHANGE UP (ref 4.2–5.8)
RBC # FLD: 13.4 % — SIGNIFICANT CHANGE UP (ref 10.3–14.5)
RBC # FLD: 13.5 % — SIGNIFICANT CHANGE UP (ref 10.3–14.5)
RBC # FLD: 13.6 % — SIGNIFICANT CHANGE UP (ref 10.3–14.5)
RBC # FLD: 13.9 % — SIGNIFICANT CHANGE UP (ref 10.3–14.5)
RH IG SCN BLD-IMP: NEGATIVE — SIGNIFICANT CHANGE UP
SODIUM SERPL-SCNC: 137 MMOL/L — SIGNIFICANT CHANGE UP (ref 135–145)
WBC # BLD: 7.8 K/UL — SIGNIFICANT CHANGE UP (ref 3.8–10.5)
WBC # BLD: 7.98 K/UL — SIGNIFICANT CHANGE UP (ref 3.8–10.5)
WBC # BLD: 8.2 K/UL — SIGNIFICANT CHANGE UP (ref 3.8–10.5)
WBC # BLD: 9 K/UL — SIGNIFICANT CHANGE UP (ref 3.8–10.5)
WBC # FLD AUTO: 7.8 K/UL — SIGNIFICANT CHANGE UP (ref 3.8–10.5)
WBC # FLD AUTO: 7.98 K/UL — SIGNIFICANT CHANGE UP (ref 3.8–10.5)
WBC # FLD AUTO: 8.2 K/UL — SIGNIFICANT CHANGE UP (ref 3.8–10.5)
WBC # FLD AUTO: 9 K/UL — SIGNIFICANT CHANGE UP (ref 3.8–10.5)

## 2019-09-21 PROCEDURE — 93926 LOWER EXTREMITY STUDY: CPT | Mod: 26,LT

## 2019-09-21 PROCEDURE — 99223 1ST HOSP IP/OBS HIGH 75: CPT | Mod: GC

## 2019-09-21 RX ORDER — CLOPIDOGREL BISULFATE 75 MG/1
1 TABLET, FILM COATED ORAL
Qty: 30 | Refills: 2
Start: 2019-09-21 | End: 2019-12-19

## 2019-09-21 RX ORDER — AZITHROMYCIN 500 MG/1
1 TABLET, FILM COATED ORAL
Qty: 2 | Refills: 0
Start: 2019-09-21 | End: 2019-09-22

## 2019-09-21 RX ORDER — AZITHROMYCIN 500 MG/1
500 TABLET, FILM COATED ORAL DAILY
Refills: 0 | Status: COMPLETED | OUTPATIENT
Start: 2019-09-21 | End: 2019-09-23

## 2019-09-21 RX ORDER — ACETAMINOPHEN 500 MG
1000 TABLET ORAL ONCE
Refills: 0 | Status: COMPLETED | OUTPATIENT
Start: 2019-09-21 | End: 2019-09-21

## 2019-09-21 RX ADMIN — PANTOPRAZOLE SODIUM 40 MILLIGRAM(S): 20 TABLET, DELAYED RELEASE ORAL at 18:09

## 2019-09-21 RX ADMIN — SIMETHICONE 80 MILLIGRAM(S): 80 TABLET, CHEWABLE ORAL at 05:49

## 2019-09-21 RX ADMIN — AZITHROMYCIN 500 MILLIGRAM(S): 500 TABLET, FILM COATED ORAL at 05:59

## 2019-09-21 RX ADMIN — TACROLIMUS 2 MILLIGRAM(S): 5 CAPSULE ORAL at 05:51

## 2019-09-21 RX ADMIN — SIMETHICONE 80 MILLIGRAM(S): 80 TABLET, CHEWABLE ORAL at 18:10

## 2019-09-21 RX ADMIN — Medication 1 GRAM(S): at 05:49

## 2019-09-21 RX ADMIN — PANTOPRAZOLE SODIUM 40 MILLIGRAM(S): 20 TABLET, DELAYED RELEASE ORAL at 05:50

## 2019-09-21 RX ADMIN — MYCOPHENOLATE MOFETIL 500 MILLIGRAM(S): 250 CAPSULE ORAL at 18:10

## 2019-09-21 RX ADMIN — ATORVASTATIN CALCIUM 10 MILLIGRAM(S): 80 TABLET, FILM COATED ORAL at 21:39

## 2019-09-21 RX ADMIN — Medication 1000 MILLIGRAM(S): at 20:52

## 2019-09-21 RX ADMIN — Medication 5 MILLIGRAM(S): at 05:50

## 2019-09-21 RX ADMIN — Medication 1 GRAM(S): at 23:47

## 2019-09-21 RX ADMIN — CLOPIDOGREL BISULFATE 75 MILLIGRAM(S): 75 TABLET, FILM COATED ORAL at 05:51

## 2019-09-21 RX ADMIN — Medication 400 MILLIGRAM(S): at 20:22

## 2019-09-21 RX ADMIN — TACROLIMUS 2 MILLIGRAM(S): 5 CAPSULE ORAL at 18:10

## 2019-09-21 RX ADMIN — MAGNESIUM OXIDE 400 MG ORAL TABLET 400 MILLIGRAM(S): 241.3 TABLET ORAL at 11:43

## 2019-09-21 RX ADMIN — Medication 1 GRAM(S): at 11:43

## 2019-09-21 RX ADMIN — MYCOPHENOLATE MOFETIL 500 MILLIGRAM(S): 250 CAPSULE ORAL at 05:49

## 2019-09-21 RX ADMIN — Medication 81 MILLIGRAM(S): at 05:51

## 2019-09-21 RX ADMIN — Medication 1 GRAM(S): at 18:10

## 2019-09-21 RX ADMIN — Medication 1 GRAM(S): at 00:06

## 2019-09-21 RX ADMIN — Medication 25 MILLIGRAM(S): at 05:50

## 2019-09-21 RX ADMIN — Medication 1 TABLET(S): at 11:43

## 2019-09-21 NOTE — PROVIDER CONTACT NOTE (CRITICAL VALUE NOTIFICATION) - BACKGROUND
Patient with Hx of kidney transplant, admitted with chest pain, + stress test. S/P Cardiac Cath and stent

## 2019-09-21 NOTE — PROGRESS NOTE ADULT - ASSESSMENT
59 yr old with pmh of CAD s/p CABG/PCI, HTN, ESRD, s/p kidney transplant, htn, asthma presented with atypical chest pain yesterday.     No BMP available for now    (1)Renal - s/p renal transplant, with superb function. At baseline. On Prograf 2mg po bid, Cellcept 500mg po bid, and prednisone 5 mg daily    (2)Chest pain- s/p cardiac cath yesterday: patient was transferred to [address] [place] [address] d/t the persistent right groin hematoma requiring several sessions for compressions x 5, ecchymotic but now gets soft  (3) Discharge pending     RECOMMEND:  (1) trend BMP/ renal fnx  (2)Transplant meds as taken at home/Bactrim will be stopped soon  (3)Dose new meds for GFR 50ml/min 59 yr old with pmh of CAD s/p CABG/PCI, HTN, ESRD, s/p kidney transplant, htn, asthma presented with atypical chest pain yesterday.     No BMP available for now    (1)Renal - s/p renal transplant, with superb function. At baseline. On Prograf 2mg po bid, Cellcept 500mg po bid, and prednisone 5 mg daily    (2)Chest pain- s/p cardiac cath yesterday: patient was transferred to [address] [place] [address] d/t the persistent right groin hematoma requiring several sessions for compressions x 5, ecchymotic but now gets soft  (3) Discharge pending     RECOMMEND:  (1) trend BMP  (2)Transplant meds as taken at home/Bactrim will be stopped soon  (3)Dose new meds for GFR 50ml/min    f/u BMP 16:30  -  Creatinine intact  - Hyponatremia: improved today

## 2019-09-21 NOTE — PROGRESS NOTE ADULT - SUBJECTIVE AND OBJECTIVE BOX
Subjective: Patient seen and examined. No new events except as noted.     REVIEW OF SYSTEMS:    CONSTITUTIONAL: No weakness, fevers or chills  EYES/ENT: No visual changes;  No vertigo or throat pain   NECK: No pain or stiffness  RESPIRATORY: No cough, wheezing, hemoptysis; No shortness of breath  CARDIOVASCULAR: No chest pain or palpitations  GASTROINTESTINAL: No abdominal or epigastric pain. No nausea, vomiting, or hematemesis; No diarrhea or constipation. No melena or hematochezia.  GENITOURINARY: No dysuria, frequency or hematuria  NEUROLOGICAL: No numbness or weakness  SKIN: No itching, burning, rashes, or lesions   All other review of systems is negative unless indicated above.    MEDICATIONS:  MEDICATIONS  (STANDING):  aspirin enteric coated 81 milliGRAM(s) Oral daily  atorvastatin 10 milliGRAM(s) Oral at bedtime  azithromycin   Tablet 500 milliGRAM(s) Oral daily  clopidogrel Tablet 75 milliGRAM(s) Oral daily  influenza   Vaccine 0.5 milliLiter(s) IntraMuscular once  magnesium oxide 400 milliGRAM(s) Oral daily  metoprolol succinate ER 25 milliGRAM(s) Oral daily  mycophenolate mofetil 500 milliGRAM(s) Oral two times a day  pantoprazole    Tablet 40 milliGRAM(s) Oral two times a day  predniSONE   Tablet 5 milliGRAM(s) Oral daily  simethicone 80 milliGRAM(s) Chew two times a day  sodium chloride 0.9%. 400 milliLiter(s) (100 mL/Hr) IV Continuous <Continuous>  sucralfate 1 Gram(s) Oral every 6 hours  tacrolimus 2 milliGRAM(s) Oral two times a day  trimethoprim   80 mG/sulfamethoxazole 400 mG 1 Tablet(s) Oral daily      PHYSICAL EXAM:  T(C): 36.5 (09-21-19 @ 15:00), Max: 36.7 (09-21-19 @ 13:58)  HR: 66 (09-21-19 @ 15:00) (59 - 75)  BP: 133/80 (09-21-19 @ 15:00) (104/54 - 142/76)  RR: 18 (09-21-19 @ 15:00) (16 - 18)  SpO2: 97% (09-21-19 @ 15:00) (93% - 100%)  Wt(kg): --  I&O's Summary    20 Sep 2019 07:01  -  21 Sep 2019 07:00  --------------------------------------------------------  IN: 1440 mL / OUT: 900 mL / NET: 540 mL          Appearance: Normal	  HEENT:   Normal oral mucosa, PERRL, EOMI	  Lymphatic: No lymphadenopathy , no edema  Cardiovascular: Normal S1 S2, No JVD, No murmurs , Peripheral pulses palpable 2+ bilaterally  Respiratory: Lungs clear to auscultation, normal effort 	  Gastrointestinal:  Soft, Non-tender, + BS	  Skin: No rashes, No ecchymoses, No cyanosis, warm to touch  Musculoskeletal: groin swelling and achymosis   Psychiatry:  Mood & affect appropriate  Ext: No edema      All labs, Imaging and EKGs personally reviewed                           13.5   7.8   )-----------( 95       ( 21 Sep 2019 12:31 )             42.9               09-21    137  |  99  |  13  ----------------------------<  179<H>  4.5   |  25  |  0.96    Ca    9.0      21 Sep 2019 15:54             < from:  Duplex Arterial Lower Ext Ltd, Left (09.21.19 @ 10:24) >  IMPRESSION: Partially thrombosed left femoral artery pseudoaneurysm.

## 2019-09-21 NOTE — PROGRESS NOTE ADULT - SUBJECTIVE AND OBJECTIVE BOX
CARDIOLOGY FOLLOW UP NOTE - DR. CAICEDO    Subjective:    no chest pain, sob, palpitations  events noted  groin hematoma, s/p manual compression      PHYSICAL EXAM:  T(C): 36.4 (09-21-19 @ 08:20), Max: 36.6 (09-20-19 @ 19:25)  HR: 75 (09-21-19 @ 08:20) (59 - 75)  BP: 134/70 (09-21-19 @ 08:20) (104/54 - 142/76)  RR: 18 (09-21-19 @ 08:20) (16 - 18)  SpO2: 97% (09-21-19 @ 08:20) (93% - 100%)  Wt(kg): --  I&O's Summary    20 Sep 2019 07:01  -  21 Sep 2019 07:00  --------------------------------------------------------  IN: 1440 mL / OUT: 900 mL / NET: 540 mL      Daily     Daily     Appearance: Normal	  Cardiovascular: Normal S1 S2,RRR, No JVD, No murmurs  Respiratory: Lungs clear to auscultation	  Gastrointestinal:  Soft, Non-tender, + BS	  Extremities: Normal range of motion, left groin  +++ ecchymoses  + tender        Home Medications:  aspirin 81 mg oral tablet: 1 tab(s) orally once a day (18 Sep 2019 01:12)  atorvastatin 10 mg oral tablet: 1 tab(s) orally once a day (18 Sep 2019 01:12)  Bactrim 400 mg-80 mg oral tablet: 1 tab(s) orally once a day (18 Sep 2019 01:12)  CellCept 500 mg oral tablet: 1 tab(s) orally 2 times a day (18 Sep 2019 01:12)  Mag-Ox 400 oral tablet: 1 tab(s) orally once a day (18 Sep 2019 01:12)  Metoprolol Tartrate 25 mg oral tablet: 1 tab(s) orally once a day (18 Sep 2019 01:12)  predniSONE 5 mg oral tablet: 1 tab(s) orally once a day   (18 Sep 2019 01:12)  simethicone 80 mg oral tablet: 1 tab(s) orally 2 times a day, As Needed (18 Sep 2019 01:12)  tacrolimus 1 mg oral capsule: 2 cap(s) orally 2 times a day  0600 and 1800 (18 Sep 2019 01:12)  Vitamin D2 50,000 intl units (1.25 mg) oral capsule: 1 cap(s) orally once a week (18 Sep 2019 01:12)      MEDICATIONS  (STANDING):  aspirin enteric coated 81 milliGRAM(s) Oral daily  atorvastatin 10 milliGRAM(s) Oral at bedtime  azithromycin   Tablet 500 milliGRAM(s) Oral daily  clopidogrel Tablet 75 milliGRAM(s) Oral daily  influenza   Vaccine 0.5 milliLiter(s) IntraMuscular once  magnesium oxide 400 milliGRAM(s) Oral daily  metoprolol succinate ER 25 milliGRAM(s) Oral daily  mycophenolate mofetil 500 milliGRAM(s) Oral two times a day  pantoprazole    Tablet 40 milliGRAM(s) Oral two times a day  predniSONE   Tablet 5 milliGRAM(s) Oral daily  simethicone 80 milliGRAM(s) Chew two times a day  sodium chloride 0.9%. 400 milliLiter(s) (100 mL/Hr) IV Continuous <Continuous>  sucralfate 1 Gram(s) Oral every 6 hours  tacrolimus 2 milliGRAM(s) Oral two times a day  trimethoprim   80 mG/sulfamethoxazole 400 mG 1 Tablet(s) Oral daily      TELEMETRY: 	    ECG:  	  RADIOLOGY:   DIAGNOSTIC TESTING:  [ ] Echocardiogram:  [ ] Catheterization:  [ ] Stress Test:    OTHER: 	    LABS:	 	    CARDIAC MARKERS:  Troponin T, High Sensitivity Result: 8 ng/L (09-18 @ 01:22)  Troponin T, High Sensitivity Result: 8 ng/L (09-17 @ 22:36)                                13.5   7.8   )-----------( 95       ( 21 Sep 2019 12:31 )             42.9     09-20    134<L>  |  99  |  14  ----------------------------<  167<H>  4.1   |  22  |  0.97    Ca    8.8      20 Sep 2019 23:21      proBNP:     Lipid Profile:   HgA1c:     Creatinine, Serum: 0.97 mg/dL (09-20-19 @ 23:21)  Creatinine, Serum: 0.95 mg/dL (09-20-19 @ 05:39)

## 2019-09-21 NOTE — PROGRESS NOTE ADULT - SUBJECTIVE AND OBJECTIVE BOX
GASTROENTEROLOGY    Patient seen and examined at bedside. Awake, alert, oriented. S/P cardiac cath on 9/20 with TOSHIA to OM   He denies sob, chest pain, n/v, abdominal pain      MEDICATIONS  (STANDING):  aspirin enteric coated 81 milliGRAM(s) Oral daily  atorvastatin 10 milliGRAM(s) Oral at bedtime  azithromycin   Tablet 500 milliGRAM(s) Oral daily  clopidogrel Tablet 75 milliGRAM(s) Oral daily  influenza   Vaccine 0.5 milliLiter(s) IntraMuscular once  magnesium oxide 400 milliGRAM(s) Oral daily  metoprolol succinate ER 25 milliGRAM(s) Oral daily  mycophenolate mofetil 500 milliGRAM(s) Oral two times a day  pantoprazole    Tablet 40 milliGRAM(s) Oral two times a day  predniSONE   Tablet 5 milliGRAM(s) Oral daily  simethicone 80 milliGRAM(s) Chew two times a day  sodium chloride 0.9%. 400 milliLiter(s) (100 mL/Hr) IV Continuous <Continuous>  sucralfate 1 Gram(s) Oral every 6 hours  tacrolimus 2 milliGRAM(s) Oral two times a day  trimethoprim   80 mG/sulfamethoxazole 400 mG 1 Tablet(s) Oral daily        T(F): 97.9 (09-21-19 @ 04:48), Max: 97.9 (09-21-19 @ 04:48)  HR: 75 (09-21-19 @ 04:48) (59 - 75)  BP: 128/68 (09-21-19 @ 04:48) (104/54 - 145/74)  RR: 16 (09-21-19 @ 04:48) (16 - 18)  SpO2: 96% (09-21-19 @ 04:48) (93% - 100%)  Wt(kg): --    PHYSICAL EXAM  GENERAL:   NAD  HEENT:  NC/AT, no JVD, sclera-anicteric  CHEST:  clear to ascultation bilaterally, respirations nonlabored  HEART:  +S1+S2   ABDOMEN:  Soft, non-tender, non-distended, normoactive bowel sounds  EXTREMITIES:  + left groin ecchymosis and hematoma  NEURO:  Alert, oriented  SKIN:  No rash/warm/dry      LABS:                        13.4   8.2   )-----------( 102<L>    ( 21 Sep 2019 06:12 )             40.9   21 Sep 2019 06:12    09-20    134<L>  |  99  |  14  ----------------------------<  167<H>  4.1   |  22  |  0.97    Ca    8.8      20 Sep 2019 23:21          I&O's Detail    20 Sep 2019 07:01  -  21 Sep 2019 07:00  --------------------------------------------------------  IN:    Oral Fluid: 1040 mL    sodium chloride 0.9%.: 400 mL  Total IN: 1440 mL    OUT:    Voided: 900 mL  Total OUT: 900 mL    Total NET: 540 mL          GI PCR Panel, Stool (collected 19 Sep 2019 21:49)  Source: .Stool  Final Report (19 Sep 2019 23:54):    Campylobacter species    DETECTED by PCR    *******Please Note:*******    GI panel PCR evaluates for:    Campylobacter, Plesiomonas shigelloides, Salmonella,    Vibrio, Yersinia enterocolitica, Enteroaggregative    Escherichia coli (EAEC), Enteropathogenic E.coli (EPEC),    Enterotoxigenic E. coli (ETEC) lt/st, Shiga-like    toxin-producing E. coli (STEC) stx1/stx2,    Shigella/ Enteroinvasive E. coli (EIEC), Cryptosporidium,    Cyclospora cayetanensis, Entamoeba histolytica,    Giardia lamblia, Adenovirus F 40/41, Astrovirus,    Norovirus GI/GII, Rotavirus A, Sapovirus

## 2019-09-21 NOTE — PROGRESS NOTE ADULT - SUBJECTIVE AND OBJECTIVE BOX
Patient seen and examined.  Resting in bed.  Said he had cardiac cath yesterday and he was told to stay in bed because  the persistent right groin hematoma. Said his urinating and eating is good.    REVIEW OF SYSTEMS:  As per HPI, otherwise 8 full 10 ROS were unremarkable    MEDICATIONS  (STANDING):  aspirin enteric coated 81 milliGRAM(s) Oral daily  atorvastatin 10 milliGRAM(s) Oral at bedtime  azithromycin   Tablet 500 milliGRAM(s) Oral daily  clopidogrel Tablet 75 milliGRAM(s) Oral daily  influenza   Vaccine 0.5 milliLiter(s) IntraMuscular once  magnesium oxide 400 milliGRAM(s) Oral daily  metoprolol succinate ER 25 milliGRAM(s) Oral daily  mycophenolate mofetil 500 milliGRAM(s) Oral two times a day  pantoprazole    Tablet 40 milliGRAM(s) Oral two times a day  predniSONE   Tablet 5 milliGRAM(s) Oral daily  simethicone 80 milliGRAM(s) Chew two times a day  sodium chloride 0.9%. 400 milliLiter(s) (100 mL/Hr) IV Continuous <Continuous>  sucralfate 1 Gram(s) Oral every 6 hours  tacrolimus 2 milliGRAM(s) Oral two times a day  trimethoprim   80 mG/sulfamethoxazole 400 mG 1 Tablet(s) Oral daily      VITAL:  T(C): , Max: 36.7 (09-21-19 @ 13:58)  T(F): , Max: 98.1 (09-21-19 @ 13:58)  HR: 66 (09-21-19 @ 15:00)  BP: 133/80 (09-21-19 @ 15:00)  BP(mean): --  RR: 18 (09-21-19 @ 15:00)  SpO2: 97% (09-21-19 @ 15:00)  Wt(kg): --    I and O's:    09-20 @ 07:01  -  09-21 @ 07:00  --------------------------------------------------------  IN: 1440 mL / OUT: 900 mL / NET: 540 mL          PHYSICAL EXAM:    Constitutional: NAD; obese   Neck:  No JVD  Respiratory: CTAB/L  Cardiovascular: S1 and S2  Gastrointestinal: BS+, soft, NT/ND  Extremities: No peripheral edema  Neurological: A/O x 3, no focal deficits  Psychiatric: Normal mood, normal affect  : No Jean      LABS:                        13.5   7.8   )-----------( 95       ( 21 Sep 2019 12:31 )             42.9     09-20    134<L>  |  99  |  14  ----------------------------<  167<H>  4.1   |  22  |  0.97    Ca    8.8      20 Sep 2019 23:21

## 2019-09-21 NOTE — PROGRESS NOTE ADULT - ASSESSMENT
59 yr old with pmh of CAD s/p CABG/PCI, HTN, ESRD, s/p kidney transplant, htn, asthma presenting with atypical chest pain.     1. Chest pain, atypical  recent echo EF 50%, mild segmental LV dysfunction   pharm stress abnormal   s/p cath with pci to lcx   post cath groin hematoma  site stable, no further evidence of bleeding, heme stable  vasc sono with psa amenable to injection  vasc f/u appreciated, await injection    2. HTN  bp stable   c/w metoprolol 25 mg daily, up-titrate if needed     3. Asthma   stable    4. ESRD s/p renal transplant  on tacrolimus      dvt ppx         d/w caRDIO NP/VASC

## 2019-09-21 NOTE — CONSULT NOTE ADULT - ATTENDING COMMENTS
Agree with above NP note.  cv stable  patient presenting with atypical cp   plan for nuclear stress  PPI  trop negative  cont home cv meds  care per med
I performed a history and physical exam of the patient and discussed  the findings and plan with the house officer. I reviewed the resident note and agree with the findings and plan

## 2019-09-21 NOTE — PROGRESS NOTE ADULT - PROBLEM SELECTOR PLAN 1
patient denies taking ppi/carafate as prescribed on prior admission  if stress test negative, no objection to dc  proton pump inhibitor bid, carafate 1g four times a day  he should be discharged on above  GI PCR with Campylobacter species detected as above  agree with zithromax 500mg daily x 3 days  he will follow up with is primary gi, dr. kamara.

## 2019-09-21 NOTE — CHART NOTE - NSCHARTNOTEFT_GEN_A_CORE
58 yr old with pmh of CAD s/p CABG/PCI, HTN, ESRD, s/p kidney transplant, htn, asthma presenting with chest pain.  pharm stress abnormal   s/p  cardiac cath on 9/20 via left groin X1 TOSHIA to OM   pt with groin complications over night, as per night NP patient had multiple episodes of left groin hematomas that were successfully manually compressed latest  hematoma occurred at approx 5:30 am this morning after patient ambulated to bathroom ( as per nursing staff)   on exam left groin now soft but tender, very ecchymotic   left groin U/S ordered to r/o pseudoaneurysm - pending   re-peat CBC stable, type and screen ordered   patient denies back pain, chest pain, leg numbness or tingling + bl pedal and femoral pulses   vs remain stable, no events on tele  will cont to monitor on tele, serial CBCs, prolonged bed rest   spoke to interventional cardiologist Dr Tanmay Burger, who agrees with above plan and would like to monitor patient for at least 1 more day to ensure site stability before dc  will make Dr Pérez aware as well and CCU NP                             13.4   8.2   )-----------( 102      ( 21 Sep 2019 06:12 )             40.9       Mynor Mina NP  [facility] ext [id number] 58 yr old with pmh of CAD s/p CABG/PCI, HTN, ESRD, s/p kidney transplant, htn, asthma presenting with chest pain.  pharm stress abnormal   s/p  cardiac cath on 9/20 via left groin X1 TOSHIA to OM   pt with groin complications over night, as per night NP patient had multiple episodes of left groin hematomas that were successfully manually compressed latest  hematoma occurred at approx 5:30 am this morning after patient ambulated to bathroom ( as per nursing staff)   on exam left groin now soft but tender, very ecchymotic   left groin U/S ordered to r/o pseudoaneurysm - pending   re-peat CBC stable, type and screen ordered   patient denies back pain, chest pain, leg numbness or tingling + bl pedal and femoral pulses   vs remain stable, no events on tele  will cont to monitor on tele, serial CBCs, prolonged bed rest   spoke to interventional cardiologist Dr Tanmay Burger, who agrees with above plan and would like to monitor patient for at least 1 more day to ensure site stability before dc  will make Dr Pérez and Dr Spence aware as well and CCU NP                             13.4   8.2   )-----------( 102      ( 21 Sep 2019 06:12 )             40.9       Mynor Mina NP  [facility] ext [id number] 58 yr old with pmh of CAD s/p CABG/PCI, HTN, ESRD, s/p kidney transplant, htn, asthma presenting with chest pain.  pharm stress abnormal   s/p  cardiac cath on 9/20 via left groin X1 TOSHIA to OM   pt with groin complications over night, as per night NP patient had multiple episodes of left groin hematomas that were successfully manually compressed latest  hematoma occurred at approx 5:30 am this morning after patient ambulated to bathroom ( as per nursing staff)   on exam left groin now soft but tender, very ecchymotic   left groin U/S ordered to r/o pseudoaneurysm - pending   re-peat CBC stable, type and screen ordered   patient denies back pain, chest pain, leg numbness or tingling + bl pedal and femoral pulses   vs remain stable, no events on tele  will cont to monitor on tele, serial CBCs, prolonged bed rest   spoke to interventional cardiologist Dr Tanmay Burger, who agrees with above plan and would like to monitor patient for at least 1 more day to ensure site stability before dc  will make Dr Pérez and Dr Spence aware as well and CCU NP                             13.4   8.2   )-----------( 102      ( 21 Sep 2019 06:12 )             40.9       Mynor Mina NP  [facility] ext [id number]        ADDENDUM TO ABOVE NOTE   U/S + for pseudoaneurysm ( see report below)  Dr JINA Pérez aware, he states he will call vascular and will make Dr Donato aware   pt cont to be stable  will cont to monitor on tele       EXAM:  US DPLX LWR EXT [employer] LTD LT                        PROCEDURE DATE:  09/21/2019    INTERPRETATION:  CLINICAL INFORMATION: Status post left groin area.   Evaluate for pseudoaneurysm..    COMPARISON: None available.    TECHNIQUE: Targeted sonography of the region of interest (left groin)   with color Doppler.    FINDINGS:    A 2.8 x 1.7 x 2.2 cm partially thrombosed aneurysm superficial to the   left femoral artery consistent with pseudoaneurysm. The pseudoaneurysm   neck measuring approximate 1 cm in length.     The left femoral vein is not well visualized secondary to patient body   habitus.     IMPRESSION: Partially thrombosed left femoral artery pseudoaneurysm.    < end of copied text >

## 2019-09-21 NOTE — PROGRESS NOTE ADULT - SUBJECTIVE AND OBJECTIVE BOX
Patient is a 59y old  Male who presents with a chief complaint of chest pain (20 Sep 2019 18:00) now s/p C TOSHIA x 1 OM via LFA access.           Allergies    No Known Allergies    Intolerances        Medications:  aspirin enteric coated 81 milliGRAM(s) Oral daily  atorvastatin 10 milliGRAM(s) Oral at bedtime  azithromycin   Tablet 500 milliGRAM(s) Oral daily  clopidogrel Tablet 75 milliGRAM(s) Oral daily  influenza   Vaccine 0.5 milliLiter(s) IntraMuscular once  magnesium oxide 400 milliGRAM(s) Oral daily  metoprolol succinate ER 25 milliGRAM(s) Oral daily  mycophenolate mofetil 500 milliGRAM(s) Oral two times a day  pantoprazole    Tablet 40 milliGRAM(s) Oral two times a day  predniSONE   Tablet 5 milliGRAM(s) Oral daily  simethicone 80 milliGRAM(s) Chew two times a day  sodium chloride 0.9%. 400 milliLiter(s) IV Continuous <Continuous>  sucralfate 1 Gram(s) Oral every 6 hours  tacrolimus 2 milliGRAM(s) Oral two times a day  trimethoprim   80 mG/sulfamethoxazole 400 mG 1 Tablet(s) Oral daily      Vitals:  T(C): 36.6 (09-20-19 @ 19:25), Max: 36.6 (09-20-19 @ 19:25)  HR: 66 (09-21-19 @ 02:45) (59 - 69)  BP: 124/72 (09-21-19 @ 02:45) (104/54 - 145/74)  BP(mean): --  RR: 18 (09-21-19 @ 02:45) (16 - 18)  SpO2: 95% (09-21-19 @ 02:45) (95% - 100%)  Wt(kg): --  Daily     Daily   I&O's Summary    19 Sep 2019 07:01  -  20 Sep 2019 07:00  --------------------------------------------------------  IN: 760 mL / OUT: 500 mL / NET: 260 mL    20 Sep 2019 07:01  -  21 Sep 2019 04:25  --------------------------------------------------------  IN: 1260 mL / OUT: 900 mL / NET: 360 mL          Physical Exam:  Appearance: Normal  Eyes: PERRL, EOMI  Procedural Access Site: LFA access +_ hematoma, Non-tender to palpation, 2+ pulse, No bruit, + Ecchymosis  Neurologic: Non-focal  Psychiatry: AAOx3, Mood & affect appropriate  Skin: No rashes, No ecchymoses, No cyanosis    09-20    134<L>  |  99  |  14  ----------------------------<  167<H>  4.1   |  22  |  0.97    Ca    8.8      20 Sep 2019 23:21            Serum Pro-Brain Natriuretic Peptide: 298 pg/mL (09-17 @ 22:36)    Lipid panel   Hgb A1c         ECG:    Echo:    Stress Testing:     Cath:    Imaging:    Interpretation of Telemetry:

## 2019-09-21 NOTE — CONSULT NOTE ADULT - PROBLEM SELECTOR RECOMMENDATION 9
patient denies taking ppi/carafate as prescribed on prior admission  if stress test negative, no objection to dc  proton pump inhibitor bid, carafate 1g four times a day  he should be discharged on above  he will follow up with is primary gi, dr. kamara
I performed a history and physical exam of the patient and discussed  the findings and plan with the house officer. I reviewed the resident note and agree with the findings and plan

## 2019-09-21 NOTE — CONSULT NOTE ADULT - SUBJECTIVE AND OBJECTIVE BOX
HPI: 58 yo man with CAD s/p CABG x 5 (2007), PCI on ASA, Plavix, ESRD 2/2 PCKD s/p DDRT (2018) presenting to the ED with diffuse chest pain. Patient found to have atypical chest pain, but his pharmacologic stress test was abnormal so he underwent a cardiac cath. Patient now POD 1 s/p LHC with placement of TOSHIA x 1 via LCFA access with 6 [place] femoral sheath. Subsequently, patient developed L groin ecchymosis. US indicated a L CFA partially thrombosed PSA (2.8 x 1.7 x 2.2cm) with a 1cm neck. Patient denies LLE pain. He has not yet ambulated since his LHC.     PAST MEDICAL & SURGICAL HISTORY:  HTN (hypertension)  Coronary artery disease involving coronary bypass graft  ESRD (end stage renal disease) on dialysis  Obesity  Hemorrhoids  Gastroesophageal reflux disease without esophagitis  High cholesterol  ESRD on Dialysis: [facility]; [facility]  CAD (Coronary Artery Disease)  PCK (Polycystic Kidney Disease)  HTN - Hypertension  Asthma: last attack 2007, never hospitalized or intubated  AV fistula: b/l MILI GREEN  S/P coronary artery stent placement  S/P CABG x 5  S/P hemorrhoidectomy: and rectal polypectomy -2/3/2017.  AV fistula: right lower extremity 2 yrs  Left upper extrmity with graft 7 years ago  Stented coronary artery: x2 cardiac stents in 2016, one cardiac stent in 2015  CABG (Coronary Artery Bypass Graft): 2007      Medications (inpatient): aspirin enteric coated 81 milliGRAM(s) Oral daily  atorvastatin 10 milliGRAM(s) Oral at bedtime  azithromycin   Tablet 500 milliGRAM(s) Oral daily  clopidogrel Tablet 75 milliGRAM(s) Oral daily  influenza   Vaccine 0.5 milliLiter(s) IntraMuscular once  magnesium oxide 400 milliGRAM(s) Oral daily  metoprolol succinate ER 25 milliGRAM(s) Oral daily  mycophenolate mofetil 500 milliGRAM(s) Oral two times a day  pantoprazole    Tablet 40 milliGRAM(s) Oral two times a day  predniSONE   Tablet 5 milliGRAM(s) Oral daily  simethicone 80 milliGRAM(s) Chew two times a day  sodium chloride 0.9%. 400 milliLiter(s) IV Continuous <Continuous>  sucralfate 1 Gram(s) Oral every 6 hours  tacrolimus 2 milliGRAM(s) Oral two times a day  trimethoprim   80 mG/sulfamethoxazole 400 mG 1 Tablet(s) Oral daily    Medications (PRN):  Allergies: No Known Allergies  (Intolerances: )    Physical Exam  T(C): 36.4 (09-21-19 @ 08:20)  HR: 75 (09-21-19 @ 08:20) (59 - 75)  BP: 134/70 (09-21-19 @ 08:20) (104/54 - 142/76)  RR: 18 (09-21-19 @ 08:20) (16 - 18)  SpO2: 97% (09-21-19 @ 08:20) (93% - 100%)  Tmax: T(C): , Max: 36.6 (09-20-19 @ 19:25)    09-20-19  -  09-21-19  --------------------------------------------------------  IN:    Oral Fluid: 1040 mL    sodium chloride 0.9%.: 400 mL  Total IN: 1440 mL    OUT:    Voided: 900 mL  Total OUT: 900 mL    Total NET: 540 mL        General: well developed, well nourished, NAD  Neuro: alert and oriented, no focal deficits, moves all extremities spontaneously  HEENT: NCAT, EOMI, anicteric, mucosa moist  Respiratory: airway patent, respirations unlabored  CVS: regular rate and rhythm  Abdomen: soft, nontender, nondistended. RLQ incision well-healed.   Extremities: Left groin soft but with ecchymosis, no hematoma. LLE medial incision well-healed. No edema, sensation and movement grossly intact.   Vascular: Bilateral femoral pulse palpable, DP / PT palpable bilaterally.   Skin: warm, dry, appropriate color    Labs:                        13.5   7.8   )-----------( x        ( 21 Sep 2019 12:31 )             42.9       09-20    134<L>  |  99  |  14  ----------------------------<  167<H>  4.1   |  22  |  0.97    Ca    8.8      20 Sep 2019 23:21    Imaging and other studies:  < from: US Duplex Arterial Lower Ext Ltd, Left (09.21.19 @ 10:24) >    EXAM:  US DPLX LWR EXT ARTS LTD LT                        PROCEDURE DATE:  09/21/2019    INTERPRETATION:  CLINICAL INFORMATION: Status post left groin area.   Evaluate for pseudoaneurysm..    COMPARISON: None available.    TECHNIQUE: Targeted sonography of the region of interest (left groin)   with color Doppler.    FINDINGS:    A 2.8 x 1.7 x 2.2 cm partially thrombosed aneurysm superficial to the   left femoral artery consistent with pseudoaneurysm. The pseudoaneurysm   neck measuring approximate 1 cm in length.     The left femoral vein is not well visualized secondary to patient body   habitus.     IMPRESSION: Partially thrombosed left femoral artery pseudoaneurysm.    SARAH MITCHELL M.D., RADIOLOGY RESIDENT  This document has been electronically signed.  DESIREE BAUER M.D., ATTENDING RADIOLOGIST  This document has been electronically signed. Sep 21 2019 11:31AM  < end of copied text > HPI: 60 yo man with CAD s/p CABG x 5 (2007), PCI on ASA, Plavix, ESRD 2/2 PCKD s/p DDRT (2018) presenting to the ED with diffuse chest pain. Patient found to have atypical chest pain, but his pharmacologic stress test was abnormal so he underwent a cardiac cath. Patient now POD 1 s/p LHC with placement of TOSHIA x 1 via LCFA access with 6 Portuguese femoral sheath. Subsequently, patient developed L groin ecchymosis. US indicated a L CFA partially thrombosed PSA (2.8 x 1.7 x 2.2cm) with a 1cm neck. Patient denies LLE pain. He has not yet ambulated since his LHC.     PAST MEDICAL & SURGICAL HISTORY:  HTN (hypertension)  Coronary artery disease involving coronary bypass graft  ESRD (end stage renal disease) on dialysis  Obesity  Hemorrhoids  Gastroesophageal reflux disease without esophagitis  High cholesterol  ESRD on Dialysis: [facility]; [facility]  CAD (Coronary Artery Disease)  PCK (Polycystic Kidney Disease)  HTN - Hypertension  Asthma: last attack 2007, never hospitalized or intubated  AV fistula: b/l MILI GREEN  S/P coronary artery stent placement  S/P CABG x 5  S/P hemorrhoidectomy: and rectal polypectomy -2/3/2017.  AV fistula: right lower extremity 2 yrs  Left upper extrmity with graft 7 years ago  Stented coronary artery: x2 cardiac stents in 2016, one cardiac stent in 2015  CABG (Coronary Artery Bypass Graft): 2007      Medications (inpatient): aspirin enteric coated 81 milliGRAM(s) Oral daily  atorvastatin 10 milliGRAM(s) Oral at bedtime  azithromycin   Tablet 500 milliGRAM(s) Oral daily  clopidogrel Tablet 75 milliGRAM(s) Oral daily  influenza   Vaccine 0.5 milliLiter(s) IntraMuscular once  magnesium oxide 400 milliGRAM(s) Oral daily  metoprolol succinate ER 25 milliGRAM(s) Oral daily  mycophenolate mofetil 500 milliGRAM(s) Oral two times a day  pantoprazole    Tablet 40 milliGRAM(s) Oral two times a day  predniSONE   Tablet 5 milliGRAM(s) Oral daily  simethicone 80 milliGRAM(s) Chew two times a day  sodium chloride 0.9%. 400 milliLiter(s) IV Continuous <Continuous>  sucralfate 1 Gram(s) Oral every 6 hours  tacrolimus 2 milliGRAM(s) Oral two times a day  trimethoprim   80 mG/sulfamethoxazole 400 mG 1 Tablet(s) Oral daily    Medications (PRN):  Allergies: No Known Allergies  (Intolerances: )    Physical Exam  T(C): 36.4 (09-21-19 @ 08:20)  HR: 75 (09-21-19 @ 08:20) (59 - 75)  BP: 134/70 (09-21-19 @ 08:20) (104/54 - 142/76)  RR: 18 (09-21-19 @ 08:20) (16 - 18)  SpO2: 97% (09-21-19 @ 08:20) (93% - 100%)  Tmax: T(C): , Max: 36.6 (09-20-19 @ 19:25)    09-20-19  -  09-21-19  --------------------------------------------------------  IN:    Oral Fluid: 1040 mL    sodium chloride 0.9%.: 400 mL  Total IN: 1440 mL    OUT:    Voided: 900 mL  Total OUT: 900 mL    Total NET: 540 mL    General: well developed, well nourished, NAD  Neuro: alert and oriented, no focal deficits, moves all extremities spontaneously  HEENT: NCAT, EOMI, anicteric, mucosa moist  Respiratory: airway patent, respirations unlabored  CVS: regular rate and rhythm  Abdomen: soft, nontender, nondistended. RLQ incision well-healed.   Extremities: Left groin soft but with ecchymosis, no hematoma. LLE medial incision well-healed. No edema, sensation and movement grossly intact.   Vascular: Bilateral femoral pulse palpable, DP / PT palpable bilaterally.   Skin: warm, dry, appropriate color    Labs:                        13.5   7.8   )-----------( x        ( 21 Sep 2019 12:31 )             42.9       09-20    134<L>  |  99  |  14  ----------------------------<  167<H>  4.1   |  22  |  0.97    Ca    8.8      20 Sep 2019 23:21    Imaging and other studies:  < from: US Duplex Arterial Lower Ext Ltd, Left (09.21.19 @ 10:24) >    EXAM:  US DPLX LWR EXT ARTS LTD LT                        PROCEDURE DATE:  09/21/2019    INTERPRETATION:  CLINICAL INFORMATION: Status post left groin area.   Evaluate for pseudoaneurysm..    COMPARISON: None available.    TECHNIQUE: Targeted sonography of the region of interest (left groin)   with color Doppler.    FINDINGS:    A 2.8 x 1.7 x 2.2 cm partially thrombosed aneurysm superficial to the   left femoral artery consistent with pseudoaneurysm. The pseudoaneurysm   neck measuring approximate 1 cm in length.     The left femoral vein is not well visualized secondary to patient body   habitus.     IMPRESSION: Partially thrombosed left femoral artery pseudoaneurysm.    SARAH MITCHELL M.D., RADIOLOGY RESIDENT  This document has been electronically signed.  DESIREE BAUER M.D., ATTENDING RADIOLOGIST  This document has been electronically signed. Sep 21 2019 11:31AM  < end of copied text >

## 2019-09-21 NOTE — PROGRESS NOTE ADULT - ASSESSMENT
Problem/Plan - 1:  ·  Problem: Chest pain.  Plan: patient with chest pain, without acute EKG changes  unlikely cardiac  cards fu  ischemia cain as per cards  likely gI etiology  GI eval appreciated   stress test REVIEWED  cath as per cards   R groin swelling, US showed pseudoaneurysm   vascular eval appreciated  trend HGb level     Problem/Plan - 2:  ·  Problem: Renal transplant, status post.  Plan: c/w home regimen with cellcept, tacrolimus, prednisone and bactrim  renal fu     Problem/Plan - 3:  ·  Problem: CAD (Coronary Artery Disease).  Plan: c/w aspirin, plavix, atorvastatin, metoprolol.     Problem/Plan - 4:  ·  Problem: HTN (hypertension).  Plan: fluctuating BP as per patient, when blood pressure is elevated, he gets symptoms  c/w home metoprolol for now; adjust meds as needed.     Problem/Plan - 5:  ·  Problem: HLD (hyperlipidemia).  Plan: c/w atorvastatin.     Problem/Plan - 6:  Problem: Prophylactic measure. Plan: heparin sc.

## 2019-09-21 NOTE — CONSULT NOTE ADULT - ASSESSMENT
58 yo man on ASA, plavix POD 1 s/p LHC with TOSHIA x 1 OM via LFA access complicated by PSA with 1cm neck.     - PSA may be amenable to thrombin injection, tentatively tomorrow   - Above plan pending imaging review with Vascular Attending    Discussed above with Vascular Fellow, Dr. Kim on behalf of Dr. Casey Blum MD PGY4  [id number] 58 yo man on ASA, plavix POD 1 s/p LHC with TOSHIA x 1 OM via LFA access complicated by PSA with 1cm neck.     - PSA may be amenable to thrombin injection, tentatively tomorrow   no lle ischemia   - Above plan pending imaging review with Vascular Attending    Discussed above with Vascular Fellow, Dr. Kim on behalf of Dr. Casey Blum MD PGY4  [id number]

## 2019-09-21 NOTE — PROGRESS NOTE ADULT - PROBLEM SELECTOR PLAN 2
symptoms now resolved   recent echo EF 50%, mild segmental LV dysfunction   continue asa, statin  s/p cardiac cath 9/20 with TOSHIA on DAPT

## 2019-09-22 DIAGNOSIS — T81.718A COMPLICATION OF OTHER ARTERY FOLLOWING A PROCEDURE, NOT ELSEWHERE CLASSIFIED, INITIAL ENCOUNTER: ICD-10-CM

## 2019-09-22 DIAGNOSIS — I72.4 ANEURYSM OF ARTERY OF LOWER EXTREMITY: ICD-10-CM

## 2019-09-22 LAB
ANION GAP SERPL CALC-SCNC: 12 MMOL/L — SIGNIFICANT CHANGE UP (ref 5–17)
BUN SERPL-MCNC: 12 MG/DL — SIGNIFICANT CHANGE UP (ref 7–23)
CALCIUM SERPL-MCNC: 9.3 MG/DL — SIGNIFICANT CHANGE UP (ref 8.4–10.5)
CHLORIDE SERPL-SCNC: 98 MMOL/L — SIGNIFICANT CHANGE UP (ref 96–108)
CO2 SERPL-SCNC: 25 MMOL/L — SIGNIFICANT CHANGE UP (ref 22–31)
CREAT SERPL-MCNC: 0.98 MG/DL — SIGNIFICANT CHANGE UP (ref 0.5–1.3)
GLUCOSE SERPL-MCNC: 251 MG/DL — HIGH (ref 70–99)
HCT VFR BLD CALC: 44 % — SIGNIFICANT CHANGE UP (ref 39–50)
HGB BLD-MCNC: 13.8 G/DL — SIGNIFICANT CHANGE UP (ref 13–17)
MCHC RBC-ENTMCNC: 27.7 PG — SIGNIFICANT CHANGE UP (ref 27–34)
MCHC RBC-ENTMCNC: 31.4 GM/DL — LOW (ref 32–36)
MCV RBC AUTO: 88.2 FL — SIGNIFICANT CHANGE UP (ref 80–100)
PLATELET # BLD AUTO: 105 K/UL — LOW (ref 150–400)
POTASSIUM SERPL-MCNC: 4.2 MMOL/L — SIGNIFICANT CHANGE UP (ref 3.5–5.3)
POTASSIUM SERPL-SCNC: 4.2 MMOL/L — SIGNIFICANT CHANGE UP (ref 3.5–5.3)
RBC # BLD: 4.99 M/UL — SIGNIFICANT CHANGE UP (ref 4.2–5.8)
RBC # FLD: 13.9 % — SIGNIFICANT CHANGE UP (ref 10.3–14.5)
SODIUM SERPL-SCNC: 135 MMOL/L — SIGNIFICANT CHANGE UP (ref 135–145)
WBC # BLD: 8.46 K/UL — SIGNIFICANT CHANGE UP (ref 3.8–10.5)
WBC # FLD AUTO: 8.46 K/UL — SIGNIFICANT CHANGE UP (ref 3.8–10.5)

## 2019-09-22 PROCEDURE — 99232 SBSQ HOSP IP/OBS MODERATE 35: CPT

## 2019-09-22 RX ADMIN — AZITHROMYCIN 500 MILLIGRAM(S): 500 TABLET, FILM COATED ORAL at 06:05

## 2019-09-22 RX ADMIN — MYCOPHENOLATE MOFETIL 500 MILLIGRAM(S): 250 CAPSULE ORAL at 06:02

## 2019-09-22 RX ADMIN — Medication 1 TABLET(S): at 11:31

## 2019-09-22 RX ADMIN — SIMETHICONE 80 MILLIGRAM(S): 80 TABLET, CHEWABLE ORAL at 17:39

## 2019-09-22 RX ADMIN — Medication 25 MILLIGRAM(S): at 06:03

## 2019-09-22 RX ADMIN — ATORVASTATIN CALCIUM 10 MILLIGRAM(S): 80 TABLET, FILM COATED ORAL at 21:08

## 2019-09-22 RX ADMIN — TACROLIMUS 2 MILLIGRAM(S): 5 CAPSULE ORAL at 17:39

## 2019-09-22 RX ADMIN — Medication 1 GRAM(S): at 17:39

## 2019-09-22 RX ADMIN — Medication 1 GRAM(S): at 06:03

## 2019-09-22 RX ADMIN — Medication 1 GRAM(S): at 11:31

## 2019-09-22 RX ADMIN — Medication 81 MILLIGRAM(S): at 17:37

## 2019-09-22 RX ADMIN — PANTOPRAZOLE SODIUM 40 MILLIGRAM(S): 20 TABLET, DELAYED RELEASE ORAL at 06:02

## 2019-09-22 RX ADMIN — CLOPIDOGREL BISULFATE 75 MILLIGRAM(S): 75 TABLET, FILM COATED ORAL at 17:37

## 2019-09-22 RX ADMIN — PANTOPRAZOLE SODIUM 40 MILLIGRAM(S): 20 TABLET, DELAYED RELEASE ORAL at 17:38

## 2019-09-22 RX ADMIN — TACROLIMUS 2 MILLIGRAM(S): 5 CAPSULE ORAL at 06:02

## 2019-09-22 RX ADMIN — SIMETHICONE 80 MILLIGRAM(S): 80 TABLET, CHEWABLE ORAL at 06:03

## 2019-09-22 RX ADMIN — MYCOPHENOLATE MOFETIL 500 MILLIGRAM(S): 250 CAPSULE ORAL at 17:38

## 2019-09-22 RX ADMIN — MAGNESIUM OXIDE 400 MG ORAL TABLET 400 MILLIGRAM(S): 241.3 TABLET ORAL at 11:31

## 2019-09-22 RX ADMIN — Medication 5 MILLIGRAM(S): at 06:03

## 2019-09-22 NOTE — PROCEDURE NOTE - ADDITIONAL PROCEDURE DETAILS
Patient to lay flat for two hours  Repeat duplex tomorrow to ensure complete thrombosis of pseudoaneurysm

## 2019-09-22 NOTE — PROGRESS NOTE ADULT - ASSESSMENT
59 yr old with pmh of CAD s/p CABG/PCI, HTN, ESRD, s/p kidney transplant, htn, asthma presenting with atypical chest pain.     1. Chest pain, atypical  recent echo EF 50%, mild segmental LV dysfunction   pharm stress abnormal   s/p cath with pci to lcx   post cath groin hematoma, PSA  site appears stable, no further evidence of bleeding, heme stable  vasc sono with psa amenable to injection  vasc f/u appreciated, await injection, decreased palpable lle pulse today, vasc service paged to re-eval     2. HTN  bp stable   c/w metoprolol 25 mg daily, up-titrate if needed     3. Asthma   stable    4. ESRD s/p renal transplant  on tacrolimus      dvt ppx

## 2019-09-22 NOTE — PROGRESS NOTE ADULT - ASSESSMENT
60 yo man on ASA, plavix POD 1 s/p LHC with TOSHIA x 1 OM via LFA access with  PSA with 1cm neck s/p US guided thrombin injection of L groin PSA    Plan:  - Pain control   - Regular diet  - Monitor vital signs  - Monitor groin exam  - OOB as tolerated  -Vascular will follow

## 2019-09-22 NOTE — PROGRESS NOTE ADULT - SUBJECTIVE AND OBJECTIVE BOX
Surgery Progress    Procedure: US guided thrombin injection of PSA  Surgeon: Dr. Peña, Dr. Mercer    Subjective: Pt. seen and examined at the bedside after his US guided thrombin injection into L groin pseudoaneurysm. Pt. has no acute complaints and is anxious to get out of bed and walk.    Vital Signs Last 24 Hrs  T(C): 36.7 (22 Sep 2019 12:58), Max: 36.7 (21 Sep 2019 20:26)  T(F): 98 (22 Sep 2019 12:58), Max: 98.1 (21 Sep 2019 20:26)  HR: 67 (22 Sep 2019 12:58) (61 - 71)  BP: 133/76 (22 Sep 2019 12:58) (126/68 - 138/83)  BP(mean): --  RR: 18 (22 Sep 2019 12:58) (18 - 18)  SpO2: 97% (22 Sep 2019 12:58) (95% - 98%)    Physical Exam:  General: NAD, resting comfortably in bed  Pulmonary: Nonlabored breathing, no respiratory distress  Abdominal: soft, ND, appropriately tender to palpation, no RT, no guarding,  Ext: NELL, no groin wound or swelling, consistent ecchymosis in the L groin from pre-procedure state. Palp L DP and PT    LABS:                        13.8   8.46  )-----------( 105      ( 22 Sep 2019 13:48 )             44.0     09-22    135  |  98  |  12  ----------------------------<  251<H>  4.2   |  25  |  0.98    Ca    9.3      22 Sep 2019 09:09        CAPILLARY BLOOD GLUCOSE          Radiology and Additional Studies:    Assessment:59y Male s/p US guided thrombin injection of L groin PSA    Plan:  - Pain control   - Regular diet  - Monitor vital signs  - Monitor groin exam  - OOB as tolerated  -Vascular will follow- Surgery Progress    Procedure: US guided thrombin injection of PSA  Surgeon: Dr. Peña, Dr. Mercer    Subjective: Pt. seen and examined at the bedside after his US guided thrombin injection into L groin pseudoaneurysm. Pt. has no acute complaints and is anxious to get out of bed and walk.    Vital Signs Last 24 Hrs  T(C): 36.7 (22 Sep 2019 12:58), Max: 36.7 (21 Sep 2019 20:26)  T(F): 98 (22 Sep 2019 12:58), Max: 98.1 (21 Sep 2019 20:26)  HR: 67 (22 Sep 2019 12:58) (61 - 71)  BP: 133/76 (22 Sep 2019 12:58) (126/68 - 138/83)  BP(mean): --  RR: 18 (22 Sep 2019 12:58) (18 - 18)  SpO2: 97% (22 Sep 2019 12:58) (95% - 98%)    Physical Exam:  General: NAD, resting comfortably in bed  Pulmonary: Nonlabored breathing, no respiratory distress  Abdominal: soft, ND, appropriately tender to palpation, no RT, no guarding,  Ext: NELL, no groin wound or swelling, consistent ecchymosis in the L groin from pre-procedure state. Palp L DP and PT  overall stable     LABS:                        13.8   8.46  )-----------( 105      ( 22 Sep 2019 13:48 )             44.0     09-22    135  |  98  |  12  ----------------------------<  251<H>  4.2   |  25  |  0.98    Ca    9.3      22 Sep 2019 09:09        CAPILLARY BLOOD GLUCOSE

## 2019-09-22 NOTE — PROCEDURE NOTE - GENERAL PROCEDURE DETAILS
area prepped and draped in the sterile fashion, 1% lidocaine injected to numb the area. Using ultrasound guidance pseudoaneurysm injected with thrombin slowly until flow no longer visualized in the sac. Patient with palpable distal pulses at the end of the procedure

## 2019-09-22 NOTE — PROGRESS NOTE ADULT - SUBJECTIVE AND OBJECTIVE BOX
CARDIOLOGY FOLLOW UP NOTE - DR. CAICEDO    Subjective:    no chest pain, sob, palpitations  c/o left groin site pain  no back pain  no left le numbness, weakness, pain    PHYSICAL EXAM:  T(C): 36.5 (09-22-19 @ 04:14), Max: 36.7 (09-21-19 @ 13:58)  HR: 61 (09-22-19 @ 04:14) (61 - 75)  BP: 138/83 (09-22-19 @ 04:14) (126/72 - 138/83)  RR: 18 (09-22-19 @ 04:14) (16 - 18)  SpO2: 98% (09-22-19 @ 04:14) (94% - 98%)  Wt(kg): --  I&O's Summary    21 Sep 2019 07:01  -  22 Sep 2019 07:00  --------------------------------------------------------  IN: 480 mL / OUT: 1120 mL / NET: -640 mL      Daily     Daily     Appearance: Normal	  Cardiovascular: Normal S1 S2,RRR, No JVD, No murmurs  Respiratory: Lungs clear to auscultation	  Gastrointestinal:  Soft, Non-tender, + BS	  Extremities: Normal range of motion, No clubbing, cyanosis or edema  left groin, + ecchymoses  tender to palp  lle dp pulse diminished      Home Medications:  aspirin 81 mg oral tablet: 1 tab(s) orally once a day (18 Sep 2019 01:12)  atorvastatin 10 mg oral tablet: 1 tab(s) orally once a day (18 Sep 2019 01:12)  Bactrim 400 mg-80 mg oral tablet: 1 tab(s) orally once a day (18 Sep 2019 01:12)  CellCept 500 mg oral tablet: 1 tab(s) orally 2 times a day (18 Sep 2019 01:12)  Mag-Ox 400 oral tablet: 1 tab(s) orally once a day (18 Sep 2019 01:12)  Metoprolol Tartrate 25 mg oral tablet: 1 tab(s) orally once a day (18 Sep 2019 01:12)  predniSONE 5 mg oral tablet: 1 tab(s) orally once a day   (18 Sep 2019 01:12)  simethicone 80 mg oral tablet: 1 tab(s) orally 2 times a day, As Needed (18 Sep 2019 01:12)  tacrolimus 1 mg oral capsule: 2 cap(s) orally 2 times a day  0600 and 1800 (18 Sep 2019 01:12)  Vitamin D2 50,000 intl units (1.25 mg) oral capsule: 1 cap(s) orally once a week (18 Sep 2019 01:12)      MEDICATIONS  (STANDING):  aspirin enteric coated 81 milliGRAM(s) Oral daily  atorvastatin 10 milliGRAM(s) Oral at bedtime  azithromycin   Tablet 500 milliGRAM(s) Oral daily  clopidogrel Tablet 75 milliGRAM(s) Oral daily  influenza   Vaccine 0.5 milliLiter(s) IntraMuscular once  magnesium oxide 400 milliGRAM(s) Oral daily  metoprolol succinate ER 25 milliGRAM(s) Oral daily  mycophenolate mofetil 500 milliGRAM(s) Oral two times a day  pantoprazole    Tablet 40 milliGRAM(s) Oral two times a day  predniSONE   Tablet 5 milliGRAM(s) Oral daily  simethicone 80 milliGRAM(s) Chew two times a day  sodium chloride 0.9%. 400 milliLiter(s) (100 mL/Hr) IV Continuous <Continuous>  sucralfate 1 Gram(s) Oral every 6 hours  tacrolimus 2 milliGRAM(s) Oral two times a day  trimethoprim   80 mG/sulfamethoxazole 400 mG 1 Tablet(s) Oral daily      TELEMETRY: 	    ECG:  	  RADIOLOGY:   DIAGNOSTIC TESTING:  [ ] Echocardiogram:  [ ] Catheterization:  [ ] Stress Test:    OTHER: 	    LABS:	 	    CARDIAC MARKERS:  Troponin T, High Sensitivity Result: 8 ng/L (09-18 @ 01:22)  Troponin T, High Sensitivity Result: 8 ng/L (09-17 @ 22:36)                                13.3   7.98  )-----------( 93       ( 21 Sep 2019 21:49 )             40.9     09-21    137  |  99  |  13  ----------------------------<  179<H>  4.5   |  25  |  0.96    Ca    9.0      21 Sep 2019 15:54      proBNP:     Lipid Profile:   HgA1c:     Creatinine, Serum: 0.96 mg/dL (09-21-19 @ 15:54)  Creatinine, Serum: 0.97 mg/dL (09-20-19 @ 23:21)  Creatinine, Serum: 0.95 mg/dL (09-20-19 @ 05:39)

## 2019-09-22 NOTE — PROGRESS NOTE ADULT - SUBJECTIVE AND OBJECTIVE BOX
GASTROENTEROLOGY    Patient seen and examined at bedside  Denies epigastric/abdominal pain, nausea/vomiting  Tolerating diet  Having brown stools      MEDICATIONS  (STANDING):  aspirin enteric coated 81 milliGRAM(s) Oral daily  atorvastatin 10 milliGRAM(s) Oral at bedtime  azithromycin   Tablet 500 milliGRAM(s) Oral daily  clopidogrel Tablet 75 milliGRAM(s) Oral daily  influenza   Vaccine 0.5 milliLiter(s) IntraMuscular once  magnesium oxide 400 milliGRAM(s) Oral daily  metoprolol succinate ER 25 milliGRAM(s) Oral daily  mycophenolate mofetil 500 milliGRAM(s) Oral two times a day  pantoprazole    Tablet 40 milliGRAM(s) Oral two times a day  predniSONE   Tablet 5 milliGRAM(s) Oral daily  simethicone 80 milliGRAM(s) Chew two times a day  sodium chloride 0.9%. 400 milliLiter(s) (100 mL/Hr) IV Continuous <Continuous>  sucralfate 1 Gram(s) Oral every 6 hours  tacrolimus 2 milliGRAM(s) Oral two times a day  trimethoprim   80 mG/sulfamethoxazole 400 mG 1 Tablet(s) Oral daily        T(F): 97.7 (09-22-19 @ 04:14), Max: 98.1 (09-21-19 @ 13:58)  HR: 61 (09-22-19 @ 04:14) (61 - 71)  BP: 138/83 (09-22-19 @ 04:14) (126/72 - 138/83)  RR: 18 (09-22-19 @ 04:14) (16 - 18)  SpO2: 98% (09-22-19 @ 04:14) (94% - 98%)  Wt(kg): --    PHYSICAL EXAM  GENERAL:   NAD  HEENT:  NC/AT, no JVD, sclera-anicteric  CHEST:  clear to ascultation bilaterally, respirations nonlabored  HEART:  +S1+S2   ABDOMEN:  Soft, non-tender, non-distended, + bowel sounds, no masses  EXTREMITIES:  no cyanosis, clubbing or edema, left groin post hematoma  NEURO:  Alert, oriented  SKIN:  No rash/warm/dry      LABS:                        13.3   7.98  )-----------( 93<L>    ( 21 Sep 2019 21:49 )             40.9   21 Sep 2019 21:49    09-22    135  |  98  |  12  ----------------------------<  251<H>  4.2   |  25  |  0.98    Ca    9.3      22 Sep 2019 09:09          I&O's Detail    21 Sep 2019 07:01  -  22 Sep 2019 07:00  --------------------------------------------------------  IN:    Oral Fluid: 480 mL  Total IN: 480 mL    OUT:    Voided: 1120 mL  Total OUT: 1120 mL    Total NET: -640 mL      22 Sep 2019 07:01  -  22 Sep 2019 11:53  --------------------------------------------------------  IN:    Oral Fluid: 240 mL  Total IN: 240 mL    OUT:  Total OUT: 0 mL    Total NET: 240 mL          GI PCR Panel, Stool (collected 19 Sep 2019 21:49)  Source: .Stool  Final Report (19 Sep 2019 23:54):    Campylobacter species    DETECTED by PCR    *******Please Note:*******    GI panel PCR evaluates for:    Campylobacter, Plesiomonas shigelloides, Salmonella,    Vibrio, Yersinia enterocolitica, Enteroaggregative    Escherichia coli (EAEC), Enteropathogenic E.coli (EPEC),    Enterotoxigenic E. coli (ETEC) lt/st, Shiga-like    toxin-producing E. coli (STEC) stx1/stx2,    Shigella/ Enteroinvasive E. coli (EIEC), Cryptosporidium,    Cyclospora cayetanensis, Entamoeba histolytica,    Giardia lamblia, Adenovirus F 40/41, Astrovirus,    Norovirus GI/GII, Rotavirus A, Sapovirus

## 2019-09-22 NOTE — PROGRESS NOTE ADULT - SUBJECTIVE AND OBJECTIVE BOX
Patient is a 59y old  Male who presents with a chief complaint of chest pain (22 Sep 2019 11:52)      INTERVAL HPI/OVERNIGHT EVENTS:  T(C): 36.7 (09-22-19 @ 12:58), Max: 36.7 (09-21-19 @ 20:26)  HR: 67 (09-22-19 @ 12:58) (61 - 71)  BP: 133/76 (09-22-19 @ 12:58) (126/68 - 138/83)  RR: 18 (09-22-19 @ 12:58) (18 - 18)  SpO2: 97% (09-22-19 @ 12:58) (95% - 98%)  Wt(kg): --  I&O's Summary    21 Sep 2019 07:01  -  22 Sep 2019 07:00  --------------------------------------------------------  IN: 480 mL / OUT: 1120 mL / NET: -640 mL    22 Sep 2019 07:01  -  22 Sep 2019 17:22  --------------------------------------------------------  IN: 240 mL / OUT: 550 mL / NET: -310 mL        LABS:                        13.8   8.46  )-----------( 105      ( 22 Sep 2019 13:48 )             44.0     09-22    135  |  98  |  12  ----------------------------<  251<H>  4.2   |  25  |  0.98    Ca    9.3      22 Sep 2019 09:09          CAPILLARY BLOOD GLUCOSE                MEDICATIONS  (STANDING):  aspirin enteric coated 81 milliGRAM(s) Oral daily  atorvastatin 10 milliGRAM(s) Oral at bedtime  azithromycin   Tablet 500 milliGRAM(s) Oral daily  clopidogrel Tablet 75 milliGRAM(s) Oral daily  influenza   Vaccine 0.5 milliLiter(s) IntraMuscular once  magnesium oxide 400 milliGRAM(s) Oral daily  metoprolol succinate ER 25 milliGRAM(s) Oral daily  mycophenolate mofetil 500 milliGRAM(s) Oral two times a day  pantoprazole    Tablet 40 milliGRAM(s) Oral two times a day  predniSONE   Tablet 5 milliGRAM(s) Oral daily  simethicone 80 milliGRAM(s) Chew two times a day  sodium chloride 0.9%. 400 milliLiter(s) (100 mL/Hr) IV Continuous <Continuous>  sucralfate 1 Gram(s) Oral every 6 hours  tacrolimus 2 milliGRAM(s) Oral two times a day  trimethoprim   80 mG/sulfamethoxazole 400 mG 1 Tablet(s) Oral daily    MEDICATIONS  (PRN):          PHYSICAL EXAM:  GENERAL: NAD, well-groomed, well-developed  HEAD:  Atraumatic, Normocephalic  CHEST/LUNG: Clear to percussion bilaterally; No rales, rhonchi, wheezing, or rubs  HEART: Regular rate and rhythm; No murmurs, rubs, or gallops  ABDOMEN: Soft, Nontender, Nondistended; Bowel sounds present  EXTREMITIES:  2+ Peripheral Pulses, No clubbing, cyanosis, or edema  LYMPH: No lymphadenopathy noted  SKIN: No rashes or lesions    Care Discussed with Consultants/Other Providers [ ] YES  [ ] NO

## 2019-09-22 NOTE — PROGRESS NOTE ADULT - ASSESSMENT
Problem/Plan - 1:  ·  Problem: Chest pain.  Plan: patient with chest pain, without acute EKG changes  unlikely cardiac  cards fu  ischemia cain as per cards  likely gI etiology  GI eval appreciated   stress test REVIEWED  cath as per cards   R groin swelling, US showed pseudoaneurysm   vascular eval appreciated  trend HGb level   thrombin injection today     Problem/Plan - 2:  ·  Problem: Renal transplant, status post.  Plan: c/w home regimen with cellcept, tacrolimus, prednisone and bactrim  renal fu   Problem/Plan - 3:  ·  Problem: CAD (Coronary Artery Disease).  Plan: c/w aspirin, plavix, atorvastatin, metoprolol.     Problem/Plan - 4:  ·  Problem: HTN (hypertension).  Plan: fluctuating BP as per patient, when blood pressure is elevated, he gets symptoms  c/w home metoprolol for now; adjust meds as needed.     Problem/Plan - 5:  ·  Problem: HLD (hyperlipidemia).  Plan: c/w atorvastatin.     Problem/Plan - 6:  Problem: Prophylactic measure. Plan: heparin sc.

## 2019-09-23 DIAGNOSIS — Z71.89 OTHER SPECIFIED COUNSELING: ICD-10-CM

## 2019-09-23 LAB
ANION GAP SERPL CALC-SCNC: 11 MMOL/L — SIGNIFICANT CHANGE UP (ref 5–17)
BUN SERPL-MCNC: 17 MG/DL — SIGNIFICANT CHANGE UP (ref 7–23)
CALCIUM SERPL-MCNC: 9 MG/DL — SIGNIFICANT CHANGE UP (ref 8.4–10.5)
CHLORIDE SERPL-SCNC: 98 MMOL/L — SIGNIFICANT CHANGE UP (ref 96–108)
CO2 SERPL-SCNC: 27 MMOL/L — SIGNIFICANT CHANGE UP (ref 22–31)
CREAT SERPL-MCNC: 1.15 MG/DL — SIGNIFICANT CHANGE UP (ref 0.5–1.3)
GLUCOSE SERPL-MCNC: 139 MG/DL — HIGH (ref 70–99)
HCT VFR BLD CALC: 38.6 % — LOW (ref 39–50)
HGB BLD-MCNC: 12.3 G/DL — LOW (ref 13–17)
MCHC RBC-ENTMCNC: 27.9 PG — SIGNIFICANT CHANGE UP (ref 27–34)
MCHC RBC-ENTMCNC: 31.9 GM/DL — LOW (ref 32–36)
MCV RBC AUTO: 87.5 FL — SIGNIFICANT CHANGE UP (ref 80–100)
PLATELET # BLD AUTO: 93 K/UL — LOW (ref 150–400)
POTASSIUM SERPL-MCNC: 3.9 MMOL/L — SIGNIFICANT CHANGE UP (ref 3.5–5.3)
POTASSIUM SERPL-SCNC: 3.9 MMOL/L — SIGNIFICANT CHANGE UP (ref 3.5–5.3)
RBC # BLD: 4.41 M/UL — SIGNIFICANT CHANGE UP (ref 4.2–5.8)
RBC # FLD: 13.9 % — SIGNIFICANT CHANGE UP (ref 10.3–14.5)
SODIUM SERPL-SCNC: 136 MMOL/L — SIGNIFICANT CHANGE UP (ref 135–145)
TACROLIMUS SERPL-MCNC: 4 NG/ML — SIGNIFICANT CHANGE UP
WBC # BLD: 7.35 K/UL — SIGNIFICANT CHANGE UP (ref 3.8–10.5)
WBC # FLD AUTO: 7.35 K/UL — SIGNIFICANT CHANGE UP (ref 3.8–10.5)

## 2019-09-23 PROCEDURE — 99232 SBSQ HOSP IP/OBS MODERATE 35: CPT

## 2019-09-23 PROCEDURE — 93926 LOWER EXTREMITY STUDY: CPT | Mod: 26,LT

## 2019-09-23 RX ORDER — FENTANYL CITRATE 50 UG/ML
25 INJECTION INTRAVENOUS ONCE
Refills: 0 | Status: DISCONTINUED | OUTPATIENT
Start: 2019-09-23 | End: 2019-09-23

## 2019-09-23 RX ORDER — ACETAMINOPHEN 500 MG
650 TABLET ORAL ONCE
Refills: 0 | Status: COMPLETED | OUTPATIENT
Start: 2019-09-23 | End: 2019-09-23

## 2019-09-23 RX ADMIN — PANTOPRAZOLE SODIUM 40 MILLIGRAM(S): 20 TABLET, DELAYED RELEASE ORAL at 17:34

## 2019-09-23 RX ADMIN — SIMETHICONE 80 MILLIGRAM(S): 80 TABLET, CHEWABLE ORAL at 05:49

## 2019-09-23 RX ADMIN — Medication 1 GRAM(S): at 23:16

## 2019-09-23 RX ADMIN — Medication 1 TABLET(S): at 12:28

## 2019-09-23 RX ADMIN — Medication 1 GRAM(S): at 12:28

## 2019-09-23 RX ADMIN — FENTANYL CITRATE 25 MICROGRAM(S): 50 INJECTION INTRAVENOUS at 20:42

## 2019-09-23 RX ADMIN — MAGNESIUM OXIDE 400 MG ORAL TABLET 400 MILLIGRAM(S): 241.3 TABLET ORAL at 12:28

## 2019-09-23 RX ADMIN — TACROLIMUS 2 MILLIGRAM(S): 5 CAPSULE ORAL at 17:34

## 2019-09-23 RX ADMIN — MYCOPHENOLATE MOFETIL 500 MILLIGRAM(S): 250 CAPSULE ORAL at 17:35

## 2019-09-23 RX ADMIN — Medication 1 GRAM(S): at 05:50

## 2019-09-23 RX ADMIN — FENTANYL CITRATE 25 MICROGRAM(S): 50 INJECTION INTRAVENOUS at 21:12

## 2019-09-23 RX ADMIN — SIMETHICONE 80 MILLIGRAM(S): 80 TABLET, CHEWABLE ORAL at 17:34

## 2019-09-23 RX ADMIN — ATORVASTATIN CALCIUM 10 MILLIGRAM(S): 80 TABLET, FILM COATED ORAL at 21:31

## 2019-09-23 RX ADMIN — Medication 81 MILLIGRAM(S): at 12:28

## 2019-09-23 RX ADMIN — Medication 1 GRAM(S): at 17:34

## 2019-09-23 RX ADMIN — Medication 650 MILLIGRAM(S): at 13:30

## 2019-09-23 RX ADMIN — Medication 650 MILLIGRAM(S): at 12:40

## 2019-09-23 RX ADMIN — PANTOPRAZOLE SODIUM 40 MILLIGRAM(S): 20 TABLET, DELAYED RELEASE ORAL at 05:50

## 2019-09-23 RX ADMIN — Medication 25 MILLIGRAM(S): at 05:50

## 2019-09-23 RX ADMIN — AZITHROMYCIN 500 MILLIGRAM(S): 500 TABLET, FILM COATED ORAL at 05:50

## 2019-09-23 RX ADMIN — CLOPIDOGREL BISULFATE 75 MILLIGRAM(S): 75 TABLET, FILM COATED ORAL at 12:28

## 2019-09-23 RX ADMIN — Medication 5 MILLIGRAM(S): at 05:50

## 2019-09-23 RX ADMIN — TACROLIMUS 2 MILLIGRAM(S): 5 CAPSULE ORAL at 05:50

## 2019-09-23 RX ADMIN — MYCOPHENOLATE MOFETIL 500 MILLIGRAM(S): 250 CAPSULE ORAL at 05:50

## 2019-09-23 NOTE — PROGRESS NOTE ADULT - SUBJECTIVE AND OBJECTIVE BOX
INTERVAL HPI/OVERNIGHT EVENTS:    Patient seen and examined at bedside  Denies epigastric/abdominal pain, nausea/vomiting  Tolerating diet  Having brown stools    MEDICATIONS  (STANDING):  aspirin enteric coated 81 milliGRAM(s) Oral daily  atorvastatin 10 milliGRAM(s) Oral at bedtime  clopidogrel Tablet 75 milliGRAM(s) Oral daily  influenza   Vaccine 0.5 milliLiter(s) IntraMuscular once  magnesium oxide 400 milliGRAM(s) Oral daily  metoprolol succinate ER 25 milliGRAM(s) Oral daily  mycophenolate mofetil 500 milliGRAM(s) Oral two times a day  pantoprazole    Tablet 40 milliGRAM(s) Oral two times a day  predniSONE   Tablet 5 milliGRAM(s) Oral daily  simethicone 80 milliGRAM(s) Chew two times a day  sodium chloride 0.9%. 400 milliLiter(s) (100 mL/Hr) IV Continuous <Continuous>  sucralfate 1 Gram(s) Oral every 6 hours  tacrolimus 2 milliGRAM(s) Oral two times a day  trimethoprim   80 mG/sulfamethoxazole 400 mG 1 Tablet(s) Oral daily    MEDICATIONS  (PRN):      Allergies    No Known Allergies    Intolerances        Review of Systems:    General:  No wt loss, fevers, chills, night sweats,fatigue,   Eyes:  Good vision, no reported pain  ENT:  No sore throat, pain, runny nose, dysphagia  CV:  No pain, palpitations, hypo/hypertension  Resp:  No dyspnea, cough, tachypnea, wheezing  GI:  No pain, No nausea, No vomiting, No diarrhea, No constipation, No weight loss, No fever, No pruritis, No rectal bleeding, No melena, No dysphagia  :  No pain, bleeding, incontinence, nocturia  Muscle:  No pain, weakness  Neuro:  No weakness, tingling, memory problems  Psych:  No fatigue, insomnia, mood problems, depression  Endocrine:  No polyuria, polydypsia, cold/heat intolerance  Heme:  No petechiae, ecchymosis, easy bruisability  Skin:  No rash, tattoos, scars, edema      Vital Signs Last 24 Hrs  T(C): 36.7 (23 Sep 2019 04:47), Max: 36.7 (22 Sep 2019 12:03)  T(F): 98 (23 Sep 2019 04:47), Max: 98.1 (22 Sep 2019 12:03)  HR: 66 (23 Sep 2019 04:47) (65 - 76)  BP: 122/65 (23 Sep 2019 04:47) (122/65 - 133/76)  BP(mean): --  RR: 18 (23 Sep 2019 04:47) (18 - 18)  SpO2: 97% (23 Sep 2019 04:47) (95% - 97%)    PHYSICAL EXAM:    Constitutional: NAD, well-developed  HEENT: EOMI, throat clear  Neck: No LAD, supple  Respiratory: CTA and P  Cardiovascular: S1 and S2, RRR, no M  Gastrointestinal: BS+, soft, NT/ND, neg HSM,  Extremities: No peripheral edema, neg clubing, cyanosis  Vascular: 2+ peripheral pulses, L groin hematoma   Neurological: A/O x 3, no focal deficits  Psychiatric: Normal mood, normal affect  Skin: No rashes      LABS:                        12.3   7.35  )-----------( 93       ( 23 Sep 2019 08:43 )             38.6     09-23    136  |  98  |  17  ----------------------------<  139<H>  3.9   |  27  |  1.15    Ca    9.0      23 Sep 2019 06:20            RADIOLOGY & ADDITIONAL TESTS:

## 2019-09-23 NOTE — PROGRESS NOTE ADULT - PROBLEM SELECTOR PLAN 1
patient denies taking ppi/carafate as prescribed on prior admission  proton pump inhibitor bid, carafate 1g four times a day  he should be discharged on above  GI PCR with Campylobacter species detected as above  s/p course of zithromax 500mg daily x 3 days  he will follow up with is primary GI, Dr. Chavez

## 2019-09-23 NOTE — PROGRESS NOTE ADULT - ASSESSMENT
59 yr old with pmh of CAD s/p CABG/PCI, HTN, ESRD, s/p kidney transplant, htn, asthma presenting with atypical chest pain.     1. Chest pain, atypical  recent echo EF 50%, mild segmental LV dysfunction   pharm stress abnormal   s/p cath with pci to lcx   post cath groin hematoma, PSA  site appears stable, no further evidence of bleeding, slight drop in heme today   vasc sono with psa amenable to injection, s/p US guided thrombin injection of L groin PSA  vasc f/u     2. HTN  bp stable   c/w metoprolol 25 mg daily, up-titrate if needed     3. Asthma   stable    4. ESRD s/p renal transplant  on tacrolimus      dvt ppx 59 yr old with pmh of CAD s/p CABG/PCI, HTN, ESRD, s/p kidney transplant, htn, asthma presenting with atypical chest pain.     1. Chest pain  recent echo EF 50%, mild segmental LV dysfunction   pharm stress abnormal   s/p cath with pci to lcx   post cath groin hematoma, PSA  site appears stable, no further evidence of bleeding, slight drop in heme today   vasc sono with psa amenable to injection, s/p US guided thrombin injection of L groin PSA  vasc f/u     2. HTN  bp stable   c/w metoprolol 25 mg daily, up-titrate if needed     3. Asthma   stable    4. ESRD s/p renal transplant  on tacrolimus      dvt ppx

## 2019-09-23 NOTE — PROGRESS NOTE ADULT - ASSESSMENT
58 yo man on ASA, plavix POD 1 s/p LHC with TOSHIA x 1 OM via LFA access with  PSA with 1cm neck s/p US guided thrombin injection of L groin PSA    Plan:  - Pain control   - Regular diet  - stable from vasc surg standpoint  will follow

## 2019-09-23 NOTE — PROGRESS NOTE ADULT - SUBJECTIVE AND OBJECTIVE BOX
CARDIOLOGY FOLLOW UP - Dr. Pérez    CC c/o pain and bruising in left groin       PHYSICAL EXAM:  T(C): 36.7 (09-23-19 @ 04:47), Max: 36.7 (09-22-19 @ 12:03)  HR: 66 (09-23-19 @ 04:47) (65 - 76)  BP: 122/65 (09-23-19 @ 04:47) (122/65 - 133/76)  RR: 18 (09-23-19 @ 04:47) (18 - 18)  SpO2: 97% (09-23-19 @ 04:47) (95% - 97%)  Wt(kg): --  I&O's Summary    22 Sep 2019 07:01  -  23 Sep 2019 07:00  --------------------------------------------------------  IN: 720 mL / OUT: 550 mL / NET: 170 mL    23 Sep 2019 07:01  -  23 Sep 2019 11:18  --------------------------------------------------------  IN: 240 mL / OUT: 0 mL / NET: 240 mL        Appearance: Normal	  Cardiovascular: Normal S1 S2,RRR, No JVD, No murmurs  Respiratory: Lungs clear to auscultation	  Gastrointestinal:  Soft, Non-tender, + BS	  Extremities: Normal range of motion, No clubbing, cyanosis or edema, left groin, + ecchymoses  tender to palp        MEDICATIONS  (STANDING):  aspirin enteric coated 81 milliGRAM(s) Oral daily  atorvastatin 10 milliGRAM(s) Oral at bedtime  clopidogrel Tablet 75 milliGRAM(s) Oral daily  influenza   Vaccine 0.5 milliLiter(s) IntraMuscular once  magnesium oxide 400 milliGRAM(s) Oral daily  metoprolol succinate ER 25 milliGRAM(s) Oral daily  mycophenolate mofetil 500 milliGRAM(s) Oral two times a day  pantoprazole    Tablet 40 milliGRAM(s) Oral two times a day  predniSONE   Tablet 5 milliGRAM(s) Oral daily  simethicone 80 milliGRAM(s) Chew two times a day  sodium chloride 0.9%. 400 milliLiter(s) (100 mL/Hr) IV Continuous <Continuous>  sucralfate 1 Gram(s) Oral every 6 hours  tacrolimus 2 milliGRAM(s) Oral two times a day  trimethoprim   80 mG/sulfamethoxazole 400 mG 1 Tablet(s) Oral daily      TELEMETRY: NSR 	    ECG:  	  RADIOLOGY:   DIAGNOSTIC TESTING:  [ ] Echocardiogram:  [ ]  Catheterization:  [ ] Stress Test:    OTHER: 	    LABS:	 	                                12.3   7.35  )-----------( 93       ( 23 Sep 2019 08:43 )             38.6     09-23    136  |  98  |  17  ----------------------------<  139<H>  3.9   |  27  |  1.15    Ca    9.0      23 Sep 2019 06:20

## 2019-09-23 NOTE — PROGRESS NOTE ADULT - PROBLEM SELECTOR PLAN 2
symptoms now resolved   recent echo EF 50%, mild segmental LV dysfunction   continue asa, statin  s/p cardiac cath 9/20 with TOSHIA on DAPT  s/p US guided thrombin injection into L groin pseudoaneurysm, care per vascular appreciated

## 2019-09-23 NOTE — PROGRESS NOTE ADULT - SUBJECTIVE AND OBJECTIVE BOX
Patient is a 59y old  Male who presents with a chief complaint of chest pain (23 Sep 2019 15:57)      Vascular Surgery Attending Progress Note    Interval HPI: pt states lwft groin discomfort overall improving  s/p lt  pseudoann US guided thrombin injection      Medications:  aspirin enteric coated 81 milliGRAM(s) Oral daily  atorvastatin 10 milliGRAM(s) Oral at bedtime  clopidogrel Tablet 75 milliGRAM(s) Oral daily  influenza   Vaccine 0.5 milliLiter(s) IntraMuscular once  magnesium oxide 400 milliGRAM(s) Oral daily  metoprolol succinate ER 25 milliGRAM(s) Oral daily  mycophenolate mofetil 500 milliGRAM(s) Oral two times a day  pantoprazole    Tablet 40 milliGRAM(s) Oral two times a day  predniSONE   Tablet 5 milliGRAM(s) Oral daily  simethicone 80 milliGRAM(s) Chew two times a day  sodium chloride 0.9%. 400 milliLiter(s) IV Continuous <Continuous>  sucralfate 1 Gram(s) Oral every 6 hours  tacrolimus 2 milliGRAM(s) Oral two times a day  trimethoprim   80 mG/sulfamethoxazole 400 mG 1 Tablet(s) Oral daily      Vital Signs Last 24 Hrs  T(C): 36.7 (23 Sep 2019 11:28), Max: 36.7 (23 Sep 2019 04:47)  T(F): 98 (23 Sep 2019 11:28), Max: 98 (23 Sep 2019 04:47)  HR: 63 (23 Sep 2019 11:28) (63 - 76)  BP: 121/70 (23 Sep 2019 11:28) (121/70 - 127/76)  BP(mean): --  RR: 18 (23 Sep 2019 11:28) (18 - 18)  SpO2: 97% (23 Sep 2019 11:28) (97% - 97%)  I&O's Summary    22 Sep 2019 07:01  -  23 Sep 2019 07:00  --------------------------------------------------------  IN: 720 mL / OUT: 550 mL / NET: 170 mL    23 Sep 2019 07:01  -  23 Sep 2019 16:27  --------------------------------------------------------  IN: 420 mL / OUT: 600 mL / NET: -180 mL        Physical Exam:  Neuro  A&Ox3 VSS  Vascular:  stable left groin hematoma   Pulses                DPA          rt   +2        lt  +2                PTA          rt   +2        lt  +2     LABS:                        12.3   7.35  )-----------( 93       ( 23 Sep 2019 08:43 )             38.6     09-23    136  |  98  |  17  ----------------------------<  139<H>  3.9   |  27  |  1.15    Ca    9.0      23 Sep 2019 06:20          MÓNICA AJ MD  [phone]

## 2019-09-23 NOTE — PROGRESS NOTE ADULT - ASSESSMENT
Problem/Plan - 1:  ·  Problem: Chest pain.  Plan: patient with chest pain, without acute EKG changes   stress test REVIEWED  cath as per cards   R groin swelling, US showed pseudoaneurysm   vascular eval appreciated  trend HGb level   sp thrombin injection   check repeat groin us, if -ve and cleared by cards , dc planning     Problem/Plan - 2:  ·  Problem: Renal transplant, status post.  Plan: c/w home regimen with cellcept, tacrolimus, prednisone and bactrim  renal fu     Problem/Plan - 3:  ·  Problem: CAD (Coronary Artery Disease).  Plan: c/w aspirin, plavix, atorvastatin, metoprolol.     Problem/Plan - 4:  ·  Problem: HTN (hypertension).  Plan: fluctuating BP as per patient, when blood pressure is elevated, he gets symptoms  c/w home metoprolol for now; adjust meds as needed.     Problem/Plan - 5:  ·  Problem: HLD (hyperlipidemia).  Plan: c/w atorvastatin.   .

## 2019-09-23 NOTE — PROGRESS NOTE ADULT - SUBJECTIVE AND OBJECTIVE BOX
NEPHROLOGY-[facility] [phone]        Patient seen and examined in bed.  He was ambulating         MEDICATIONS  (STANDING):  aspirin enteric coated 81 milliGRAM(s) Oral daily  atorvastatin 10 milliGRAM(s) Oral at bedtime  clopidogrel Tablet 75 milliGRAM(s) Oral daily  influenza   Vaccine 0.5 milliLiter(s) IntraMuscular once  magnesium oxide 400 milliGRAM(s) Oral daily  metoprolol succinate ER 25 milliGRAM(s) Oral daily  mycophenolate mofetil 500 milliGRAM(s) Oral two times a day  pantoprazole    Tablet 40 milliGRAM(s) Oral two times a day  predniSONE   Tablet 5 milliGRAM(s) Oral daily  simethicone 80 milliGRAM(s) Chew two times a day  sodium chloride 0.9%. 400 milliLiter(s) (100 mL/Hr) IV Continuous <Continuous>  sucralfate 1 Gram(s) Oral every 6 hours  tacrolimus 2 milliGRAM(s) Oral two times a day  trimethoprim   80 mG/sulfamethoxazole 400 mG 1 Tablet(s) Oral daily      VITAL:  T(C): , Max: 36.7 (09-22-19 @ 12:03)  T(F): , Max: 98.1 (09-22-19 @ 12:03)  HR: 66 (09-23-19 @ 04:47)  BP: 122/65 (09-23-19 @ 04:47)  BP(mean): --  RR: 18 (09-23-19 @ 04:47)  SpO2: 97% (09-23-19 @ 04:47)  Wt(kg): --    I and O's:    09-22 @ 07:01  -  09-23 @ 07:00  --------------------------------------------------------  IN: 720 mL / OUT: 550 mL / NET: 170 mL          PHYSICAL EXAM:    Constitutional: NAD  Neck:  No JVD  Respiratory: CTAB/L  Cardiovascular: S1 and S2  Gastrointestinal: BS+, soft, NT/ND  Extremities: No peripheral edema  Neurological: A/O x 3, no focal deficits  Psychiatric: Normal mood, normal affect  : No Jean  Skin: No rashes  Access: Not applicable    LABS:                        12.3   7.35  )-----------( 93       ( 23 Sep 2019 08:43 )             38.6     09-23    136  |  98  |  17  ----------------------------<  139<H>  3.9   |  27  |  1.15    Ca    9.0      23 Sep 2019 06:20            Urine Studies:          RADIOLOGY & ADDITIONAL STUDIES:

## 2019-09-23 NOTE — PROGRESS NOTE ADULT - ASSESSMENT
59 yr old with pmh of CAD s/p CABG/PCI, HTN, ESRD, s/p kidney transplant, htn, asthma presented with chest pain sp cath cb femoral pseudo-aneurysm     (1)Renal - s/p renal transplant, with superb function. At baseline. On Prograf 2mg po bid, Cellcept 500mg po bid, and prednisone 5 mg daily          RECOMMEND:  (1) Ambulate;  Vasc input noted;  Sp injection   (2)Cont Transplant meds as taken at home;  bactrim single strength to cont   (3)Dose new meds for GFR 50ml/min     Outpt follow up in 3 months

## 2019-09-23 NOTE — CHART NOTE - NSCHARTNOTEFT_GEN_A_CORE
HPI: 60 yo man with CAD s/p CABG x 5 (2007), PCI on ASA, Plavix, ESRD 2/2 PCKD s/p DDRT (2018) presenting to the ED with diffuse chest pain. Patient found to have atypical chest pain, but his pharmacologic stress test was abnormal so he underwent a cardiac cath. Patient now s/p LHC on 9/20 with placement of TOSHIA x 1 via LCFA access with 6 [place] femoral sheath. Subsequently, patient developed L groin ecchymosis. US indicated a L CFA partially thrombosed PSA (2.8 x 1.7 x 2.2cm) with a 1cm neck. Patient denies LLE pain. He has not yet ambulated since his LHC.     Interval / 24 Hour Events:  Pt is s/p US guided thrombin injection of L groin PSA on 9/22. This AM pt had arterial duplex US performed to evaluate L CFA pseudoaneurysm s/p thrombin injection, results pending.     Plan:  - C/w current management  - Vascular will followup results of US

## 2019-09-23 NOTE — PROGRESS NOTE ADULT - SUBJECTIVE AND OBJECTIVE BOX
Patient is a 59y old  Male who presents with a chief complaint of chest pain (23 Sep 2019 11:18)      INTERVAL HPI/OVERNIGHT EVENTS:  T(C): 36.7 (09-23-19 @ 11:28), Max: 36.7 (09-23-19 @ 04:47)  HR: 63 (09-23-19 @ 11:28) (63 - 76)  BP: 121/70 (09-23-19 @ 11:28) (121/70 - 127/76)  RR: 18 (09-23-19 @ 11:28) (18 - 18)  SpO2: 97% (09-23-19 @ 11:28) (97% - 97%)  Wt(kg): --  I&O's Summary    22 Sep 2019 07:01  -  23 Sep 2019 07:00  --------------------------------------------------------  IN: 720 mL / OUT: 550 mL / NET: 170 mL    23 Sep 2019 07:01  -  23 Sep 2019 15:57  --------------------------------------------------------  IN: 420 mL / OUT: 600 mL / NET: -180 mL        LABS:                        12.3   7.35  )-----------( 93       ( 23 Sep 2019 08:43 )             38.6     09-23    136  |  98  |  17  ----------------------------<  139<H>  3.9   |  27  |  1.15    Ca    9.0      23 Sep 2019 06:20          CAPILLARY BLOOD GLUCOSE                MEDICATIONS  (STANDING):  aspirin enteric coated 81 milliGRAM(s) Oral daily  atorvastatin 10 milliGRAM(s) Oral at bedtime  clopidogrel Tablet 75 milliGRAM(s) Oral daily  influenza   Vaccine 0.5 milliLiter(s) IntraMuscular once  magnesium oxide 400 milliGRAM(s) Oral daily  metoprolol succinate ER 25 milliGRAM(s) Oral daily  mycophenolate mofetil 500 milliGRAM(s) Oral two times a day  pantoprazole    Tablet 40 milliGRAM(s) Oral two times a day  predniSONE   Tablet 5 milliGRAM(s) Oral daily  simethicone 80 milliGRAM(s) Chew two times a day  sodium chloride 0.9%. 400 milliLiter(s) (100 mL/Hr) IV Continuous <Continuous>  sucralfate 1 Gram(s) Oral every 6 hours  tacrolimus 2 milliGRAM(s) Oral two times a day  trimethoprim   80 mG/sulfamethoxazole 400 mG 1 Tablet(s) Oral daily    MEDICATIONS  (PRN):          PHYSICAL EXAM:  GENERAL: NAD, well-groomed, well-developed  HEAD:  Atraumatic, Normocephalic  CHEST/LUNG: Clear to percussion bilaterally; No rales, rhonchi, wheezing, or rubs  HEART: Regular rate and rhythm; No murmurs, rubs, or gallops  ABDOMEN: Soft, Nontender, Nondistended; Bowel sounds present  EXTREMITIES:  2+ Peripheral Pulses, No clubbing, cyanosis, or edema  LYMPH: No lymphadenopathy noted  SKIN: No rashes or lesions    Care Discussed with Consultants/Other Providers [ ] YES  [ ] NO

## 2019-09-24 VITALS
RESPIRATION RATE: 17 BRPM | SYSTOLIC BLOOD PRESSURE: 145 MMHG | DIASTOLIC BLOOD PRESSURE: 76 MMHG | TEMPERATURE: 98 F | HEART RATE: 68 BPM | OXYGEN SATURATION: 97 %

## 2019-09-24 DIAGNOSIS — Z76.89 PERSONS ENCOUNTERING HEALTH SERVICES IN OTHER SPECIFIED CIRCUMSTANCES: ICD-10-CM

## 2019-09-24 LAB
ANION GAP SERPL CALC-SCNC: 13 MMOL/L — SIGNIFICANT CHANGE UP (ref 5–17)
BUN SERPL-MCNC: 12 MG/DL — SIGNIFICANT CHANGE UP (ref 7–23)
CALCIUM SERPL-MCNC: 8.9 MG/DL — SIGNIFICANT CHANGE UP (ref 8.4–10.5)
CHLORIDE SERPL-SCNC: 99 MMOL/L — SIGNIFICANT CHANGE UP (ref 96–108)
CO2 SERPL-SCNC: 24 MMOL/L — SIGNIFICANT CHANGE UP (ref 22–31)
CREAT SERPL-MCNC: 1.04 MG/DL — SIGNIFICANT CHANGE UP (ref 0.5–1.3)
GLUCOSE SERPL-MCNC: 147 MG/DL — HIGH (ref 70–99)
HCT VFR BLD CALC: 39.4 % — SIGNIFICANT CHANGE UP (ref 39–50)
HGB BLD-MCNC: 12.3 G/DL — LOW (ref 13–17)
MCHC RBC-ENTMCNC: 27.1 PG — SIGNIFICANT CHANGE UP (ref 27–34)
MCHC RBC-ENTMCNC: 31.2 GM/DL — LOW (ref 32–36)
MCV RBC AUTO: 86.8 FL — SIGNIFICANT CHANGE UP (ref 80–100)
PLATELET # BLD AUTO: 107 K/UL — LOW (ref 150–400)
POTASSIUM SERPL-MCNC: 3.9 MMOL/L — SIGNIFICANT CHANGE UP (ref 3.5–5.3)
POTASSIUM SERPL-SCNC: 3.9 MMOL/L — SIGNIFICANT CHANGE UP (ref 3.5–5.3)
RBC # BLD: 4.54 M/UL — SIGNIFICANT CHANGE UP (ref 4.2–5.8)
RBC # FLD: 13.7 % — SIGNIFICANT CHANGE UP (ref 10.3–14.5)
SODIUM SERPL-SCNC: 136 MMOL/L — SIGNIFICANT CHANGE UP (ref 135–145)
TACROLIMUS SERPL-MCNC: 3.6 NG/ML — SIGNIFICANT CHANGE UP
WBC # BLD: 7.03 K/UL — SIGNIFICANT CHANGE UP (ref 3.8–10.5)
WBC # FLD AUTO: 7.03 K/UL — SIGNIFICANT CHANGE UP (ref 3.8–10.5)

## 2019-09-24 PROCEDURE — 99232 SBSQ HOSP IP/OBS MODERATE 35: CPT

## 2019-09-24 PROCEDURE — 99238 HOSP IP/OBS DSCHRG MGMT 30/<: CPT

## 2019-09-24 PROCEDURE — 76870 US EXAM SCROTUM: CPT | Mod: 26

## 2019-09-24 PROCEDURE — 93975 VASCULAR STUDY: CPT | Mod: 26

## 2019-09-24 PROCEDURE — 93926 LOWER EXTREMITY STUDY: CPT | Mod: 26,LT

## 2019-09-24 RX ORDER — ACETAMINOPHEN 500 MG
650 TABLET ORAL ONCE
Refills: 0 | Status: COMPLETED | OUTPATIENT
Start: 2019-09-24 | End: 2019-09-24

## 2019-09-24 RX ORDER — OXYCODONE HYDROCHLORIDE 5 MG/1
1 TABLET ORAL
Qty: 12 | Refills: 0
Start: 2019-09-24 | End: 2019-09-26

## 2019-09-24 RX ADMIN — SIMETHICONE 80 MILLIGRAM(S): 80 TABLET, CHEWABLE ORAL at 05:48

## 2019-09-24 RX ADMIN — PANTOPRAZOLE SODIUM 40 MILLIGRAM(S): 20 TABLET, DELAYED RELEASE ORAL at 05:48

## 2019-09-24 RX ADMIN — SIMETHICONE 80 MILLIGRAM(S): 80 TABLET, CHEWABLE ORAL at 17:11

## 2019-09-24 RX ADMIN — Medication 1 GRAM(S): at 17:11

## 2019-09-24 RX ADMIN — Medication 1 GRAM(S): at 05:48

## 2019-09-24 RX ADMIN — MAGNESIUM OXIDE 400 MG ORAL TABLET 400 MILLIGRAM(S): 241.3 TABLET ORAL at 12:23

## 2019-09-24 RX ADMIN — Medication 81 MILLIGRAM(S): at 12:23

## 2019-09-24 RX ADMIN — CLOPIDOGREL BISULFATE 75 MILLIGRAM(S): 75 TABLET, FILM COATED ORAL at 12:23

## 2019-09-24 RX ADMIN — Medication 650 MILLIGRAM(S): at 08:47

## 2019-09-24 RX ADMIN — Medication 1 GRAM(S): at 12:23

## 2019-09-24 RX ADMIN — Medication 5 MILLIGRAM(S): at 05:48

## 2019-09-24 RX ADMIN — Medication 1 TABLET(S): at 12:23

## 2019-09-24 RX ADMIN — TACROLIMUS 2 MILLIGRAM(S): 5 CAPSULE ORAL at 07:13

## 2019-09-24 RX ADMIN — INFLUENZA VIRUS VACCINE 0.5 MILLILITER(S): 15; 15; 15; 15 SUSPENSION INTRAMUSCULAR at 17:59

## 2019-09-24 RX ADMIN — MYCOPHENOLATE MOFETIL 500 MILLIGRAM(S): 250 CAPSULE ORAL at 17:11

## 2019-09-24 RX ADMIN — Medication 25 MILLIGRAM(S): at 05:48

## 2019-09-24 RX ADMIN — PANTOPRAZOLE SODIUM 40 MILLIGRAM(S): 20 TABLET, DELAYED RELEASE ORAL at 17:11

## 2019-09-24 RX ADMIN — Medication 650 MILLIGRAM(S): at 09:15

## 2019-09-24 RX ADMIN — MYCOPHENOLATE MOFETIL 500 MILLIGRAM(S): 250 CAPSULE ORAL at 05:48

## 2019-09-24 RX ADMIN — TACROLIMUS 2 MILLIGRAM(S): 5 CAPSULE ORAL at 17:10

## 2019-09-24 NOTE — PROGRESS NOTE ADULT - SUBJECTIVE AND OBJECTIVE BOX
CARDIOLOGY FOLLOW UP - Dr. Pérez    CC no cp/sob   events noted , c/o worsening r groin pain at cath site  u/s pending     PHYSICAL EXAM:  T(C): 36.6 (09-24-19 @ 04:43), Max: 36.7 (09-23-19 @ 11:28)  HR: 79 (09-24-19 @ 08:45) (63 - 79)  BP: 126/67 (09-24-19 @ 08:45) (121/70 - 164/89)  RR: 18 (09-24-19 @ 04:43) (18 - 18)  SpO2: 98% (09-24-19 @ 04:43) (97% - 98%)  Wt(kg): --  I&O's Summary    23 Sep 2019 07:01  -  24 Sep 2019 07:00  --------------------------------------------------------  IN: 420 mL / OUT: 1200 mL / NET: -780 mL    24 Sep 2019 07:01  -  24 Sep 2019 11:25  --------------------------------------------------------  IN: 260 mL / OUT: 0 mL / NET: 260 mL        Appearance: Normal	  Cardiovascular: Normal S1 S2,RRR, No JVD, No murmurs  Respiratory: Lungs clear to auscultation	  Gastrointestinal:  Soft, Non-tender, + BS	  Extremities: Normal range of motion, No clubbing, cyanosis or edema        MEDICATIONS  (STANDING):  aspirin enteric coated 81 milliGRAM(s) Oral daily  atorvastatin 10 milliGRAM(s) Oral at bedtime  clopidogrel Tablet 75 milliGRAM(s) Oral daily  influenza   Vaccine 0.5 milliLiter(s) IntraMuscular once  magnesium oxide 400 milliGRAM(s) Oral daily  metoprolol succinate ER 25 milliGRAM(s) Oral daily  mycophenolate mofetil 500 milliGRAM(s) Oral two times a day  pantoprazole    Tablet 40 milliGRAM(s) Oral two times a day  predniSONE   Tablet 5 milliGRAM(s) Oral daily  simethicone 80 milliGRAM(s) Chew two times a day  sodium chloride 0.9%. 400 milliLiter(s) (100 mL/Hr) IV Continuous <Continuous>  sucralfate 1 Gram(s) Oral every 6 hours  tacrolimus 2 milliGRAM(s) Oral two times a day  trimethoprim   80 mG/sulfamethoxazole 400 mG 1 Tablet(s) Oral daily      TELEMETRY: 	  NSR   ECG:  	  RADIOLOGY:   DIAGNOSTIC TESTING:  [ ] Echocardiogram:  [ ]  Catheterization:  [ ] Stress Test:    OTHER: 	    LABS:	 	                                12.3   7.03  )-----------( 107      ( 24 Sep 2019 09:29 )             39.4     09-24    136  |  99  |  12  ----------------------------<  147<H>  3.9   |  24  |  1.04    Ca    8.9      24 Sep 2019 06:55

## 2019-09-24 NOTE — CHART NOTE - NSCHARTNOTEFT_GEN_A_CORE
Spoke with Dr Spence , asked to facilitate patient discharge home. Medication reconciliation reviewed, revised and resolved with Dr Spence who has medically cleared patient for discharge with follow up advised

## 2019-09-24 NOTE — PROGRESS NOTE ADULT - REASON FOR ADMISSION
chest pain

## 2019-09-24 NOTE — PROGRESS NOTE ADULT - PROBLEM SELECTOR PROBLEM 1
Gastroesophageal reflux disease without esophagitis
Femoral artery pseudo-aneurysm, left
Gastroesophageal reflux disease without esophagitis

## 2019-09-24 NOTE — PROGRESS NOTE ADULT - SUBJECTIVE AND OBJECTIVE BOX
INTERVAL HPI/OVERNIGHT EVENTS:    pt seen and examined. He denies abdominal pain, n/v.  + L ground pain at pseudoaneurysm site about the same    MEDICATIONS  (STANDING):  aspirin enteric coated 81 milliGRAM(s) Oral daily  atorvastatin 10 milliGRAM(s) Oral at bedtime  clopidogrel Tablet 75 milliGRAM(s) Oral daily  influenza   Vaccine 0.5 milliLiter(s) IntraMuscular once  magnesium oxide 400 milliGRAM(s) Oral daily  metoprolol succinate ER 25 milliGRAM(s) Oral daily  mycophenolate mofetil 500 milliGRAM(s) Oral two times a day  pantoprazole    Tablet 40 milliGRAM(s) Oral two times a day  predniSONE   Tablet 5 milliGRAM(s) Oral daily  simethicone 80 milliGRAM(s) Chew two times a day  sodium chloride 0.9%. 400 milliLiter(s) (100 mL/Hr) IV Continuous <Continuous>  sucralfate 1 Gram(s) Oral every 6 hours  tacrolimus 2 milliGRAM(s) Oral two times a day  trimethoprim   80 mG/sulfamethoxazole 400 mG 1 Tablet(s) Oral daily    MEDICATIONS  (PRN):      Allergies    No Known Allergies    Intolerances        Review of Systems:    General:  No wt loss, fevers, chills, night sweats,fatigue,   Eyes:  Good vision, no reported pain  ENT:  No sore throat, pain, runny nose, dysphagia  CV:  No pain, palpitations, hypo/hypertension  Resp:  No dyspnea, cough, tachypnea, wheezing  GI:  No pain, No nausea, No vomiting, No diarrhea, No constipation, No weight loss, No fever, No pruritis, No rectal bleeding, No melena, No dysphagia  :  No pain, bleeding, incontinence, nocturia  Muscle:  No pain, weakness  Neuro:  No weakness, tingling, memory problems  Psych:  No fatigue, insomnia, mood problems, depression  Endocrine:  No polyuria, polydypsia, cold/heat intolerance  Heme:  No petechiae, ecchymosis, easy bruisability  Skin:  No rash, tattoos, scars, edema      Vital Signs Last 24 Hrs  T(C): 36.6 (24 Sep 2019 04:43), Max: 36.7 (23 Sep 2019 11:28)  T(F): 97.8 (24 Sep 2019 04:43), Max: 98 (23 Sep 2019 11:28)  HR: 79 (24 Sep 2019 08:45) (63 - 79)  BP: 126/67 (24 Sep 2019 08:45) (121/70 - 164/89)  BP(mean): --  RR: 18 (24 Sep 2019 04:43) (18 - 18)  SpO2: 98% (24 Sep 2019 04:43) (97% - 98%)    PHYSICAL EXAM:    Constitutional: NAD, well-developed  HEENT: EOMI, throat clear  Neck: No LAD, supple  Respiratory: CTA and P  Cardiovascular: S1 and S2, RRR, no M  Gastrointestinal: BS+, soft, NT/ND, neg HSM,  Extremities: No peripheral edema, neg clubing, cyanosis  Vascular: 2+ peripheral pulses, L groin hematoma   Neurological: A/O x 3, no focal deficits  Psychiatric: Normal mood, normal affect  Skin: No rashes    LABS:                        12.3   7.03  )-----------( 107      ( 24 Sep 2019 09:29 )             39.4     09-24    136  |  99  |  12  ----------------------------<  147<H>  3.9   |  24  |  1.04    Ca    8.9      24 Sep 2019 06:55            RADIOLOGY & ADDITIONAL TESTS:

## 2019-09-24 NOTE — PROGRESS NOTE ADULT - SUBJECTIVE AND OBJECTIVE BOX
NEPHROLOGY-[facility] [phone]        Patient seen and examined in bed.  He had less pain then yesterday in the legs         MEDICATIONS  (STANDING):  aspirin enteric coated 81 milliGRAM(s) Oral daily  atorvastatin 10 milliGRAM(s) Oral at bedtime  clopidogrel Tablet 75 milliGRAM(s) Oral daily  influenza   Vaccine 0.5 milliLiter(s) IntraMuscular once  magnesium oxide 400 milliGRAM(s) Oral daily  metoprolol succinate ER 25 milliGRAM(s) Oral daily  mycophenolate mofetil 500 milliGRAM(s) Oral two times a day  pantoprazole    Tablet 40 milliGRAM(s) Oral two times a day  predniSONE   Tablet 5 milliGRAM(s) Oral daily  simethicone 80 milliGRAM(s) Chew two times a day  sodium chloride 0.9%. 400 milliLiter(s) (100 mL/Hr) IV Continuous <Continuous>  sucralfate 1 Gram(s) Oral every 6 hours  tacrolimus 2 milliGRAM(s) Oral two times a day  trimethoprim   80 mG/sulfamethoxazole 400 mG 1 Tablet(s) Oral daily      VITAL:  T(C): , Max: 36.7 (09-23-19 @ 11:28)  T(F): , Max: 98 (09-23-19 @ 11:28)  HR: 79 (09-24-19 @ 08:45)  BP: 126/67 (09-24-19 @ 08:45)  BP(mean): --  RR: 18 (09-24-19 @ 04:43)  SpO2: 98% (09-24-19 @ 04:43)  Wt(kg): --    I and O's:    09-23 @ 07:01  -  09-24 @ 07:00  --------------------------------------------------------  IN: 420 mL / OUT: 1200 mL / NET: -780 mL    09-24 @ 07:01  -  09-24 @ 11:07  --------------------------------------------------------  IN: 260 mL / OUT: 0 mL / NET: 260 mL          PHYSICAL EXAM:    Constitutional: NAD  Neck:  No JVD  Respiratory: CTAB/L  Cardiovascular: S1 and S2  Gastrointestinal: BS+, soft, NT/ND  Extremities: No peripheral edema  Neurological: A/O x 3, no focal deficits  Psychiatric: Normal mood, normal affect  : No Jean  Skin: No rashes  Access: Not applicable    LABS:                        12.3   7.03  )-----------( 107      ( 24 Sep 2019 09:29 )             39.4     09-24    136  |  99  |  12  ----------------------------<  147<H>  3.9   |  24  |  1.04    Ca    8.9      24 Sep 2019 06:55            Urine Studies:          RADIOLOGY & ADDITIONAL STUDIES:

## 2019-09-24 NOTE — PROGRESS NOTE ADULT - PROBLEM SELECTOR PROBLEM 2
Chest pain
Femoral artery pseudoaneurysm complicating cardiac catheterization

## 2019-09-24 NOTE — PROGRESS NOTE ADULT - PROBLEM SELECTOR PLAN 1
I have personally  seen and examined the patient today and have noted the findings and formulated the plan of care. I agree with  the  evaluation, assessment and plan of the surgical house officer

## 2019-09-24 NOTE — PROGRESS NOTE ADULT - SUBJECTIVE AND OBJECTIVE BOX
Vascular Surgery Consult - Daily Progress Note    HPI: 60 yo man with CAD s/p CABG x 5 (2007), PCI on ASA, Plavix, ESRD 2/2 PCKD s/p DDRT (2018) presenting to the ED with diffuse chest pain. Patient found to have atypical chest pain, but his pharmacologic stress test was abnormal so he underwent a cardiac cath. Patient now s/p LHC on 9/20 with placement of TOSHIA x 1 via LCFA access with 6 [place] femoral sheath. Subsequently, patient developed L groin ecchymosis. US indicated a L CFA partially thrombosed PSA (2.8 x 1.7 x 2.2cm) with a 1cm neck. Patient denies LLE pain. He has not yet ambulated since his LHC.     Interval / 24 Hour Events:  Pt is s/p US guided thrombin injection of L groin PSA on 9/22. Pt s/p arterial duplex US performed to evaluate L CFA pseudoaneurysm s/p thrombin injection, showed no further pseudoaneurysm.     ** VITAL SIGNS / I&O's **    T(C): 36.4 (09-24-19 @ 12:30), Max: 36.6 (09-24-19 @ 04:43)  T(F): 97.6 (09-24-19 @ 12:30), Max: 97.8 (09-24-19 @ 04:43)  HR: 68 (09-24-19 @ 12:30) (68 - 79)  BP: 145/76 (09-24-19 @ 12:30) (122/76 - 164/89)  RR: 17 (09-24-19 @ 12:30) (17 - 18)  SpO2: 97% (09-24-19 @ 12:30) (97% - 98%)      23 Sep 2019 07:01  -  24 Sep 2019 07:00  --------------------------------------------------------  IN:    Oral Fluid: 420 mL  Total IN: 420 mL    OUT:    Voided: 1200 mL  Total OUT: 1200 mL    Total NET: -780 mL      24 Sep 2019 07:01  -  24 Sep 2019 18:57  --------------------------------------------------------  IN:    Oral Fluid: 750 mL  Total IN: 750 mL    OUT:  Total OUT: 0 mL    Total NET: 750 mL          ** PHYSICAL EXAM **    General: Well-nourished, well-developed, in no acute distress   Neurologic: GCS 15, AAOx3, No focal Deficits   Respiratory: CTABL, no wheezes, ronchi, or rales. Normal respiratory effort.   CVS: RRR, no M/R/G   Abdomen: Abdomen soft, NT/ND, no rebound or guarding   EXT: Normal ROM all 4 extremities, strength and sensation grossly intact   Vascular: 2+ peripheral pulses bilaterally throughout; no edema appreciated  Skin: Stable Lt groin ecchymosis from pre-procedure. Otherwise warm, dry, intact        ** LABS **                 12.3   7.03   )----------(  107       ( 24 Sep 2019 09:29 )               39.4      136    |  99     |  12     ----------------------------<  147        ( 24 Sep 2019 06:55 )  3.9     |  24     |  1.04     Ca    8.9        ( 24 Sep 2019 06:55 )          CAPILLARY BLOOD GLUCOSE          **RADS** Vascular Surgery Consult - Daily Progress Note    HPI: 60 yo man with CAD s/p CABG x 5 (2007), PCI on ASA, Plavix, ESRD 2/2 PCKD s/p DDRT (2018) presenting to the ED with diffuse chest pain. Patient found to have atypical chest pain, but his pharmacologic stress test was abnormal so he underwent a cardiac cath. Patient now s/p LHC on 9/20 with placement of TOSHIA x 1 via LCFA access with 6 [place] femoral sheath. Subsequently, patient developed L groin ecchymosis. US indicated a L CFA partially thrombosed PSA (2.8 x 1.7 x 2.2cm) with a 1cm neck. Patient denies LLE pain. He has not yet ambulated since his LHC.     Interval / 24 Hour Events:  Pt is s/p US guided thrombin injection of L groin PSA on 9/22. Pt s/p arterial duplex US performed to evaluate L CFA pseudoaneurysm s/p thrombin injection, showed no further pseudoaneurysm.     ** VITAL SIGNS / I&O's **    T(C): 36.4 (09-24-19 @ 12:30), Max: 36.6 (09-24-19 @ 04:43)  T(F): 97.6 (09-24-19 @ 12:30), Max: 97.8 (09-24-19 @ 04:43)  HR: 68 (09-24-19 @ 12:30) (68 - 79)  BP: 145/76 (09-24-19 @ 12:30) (122/76 - 164/89)  RR: 17 (09-24-19 @ 12:30) (17 - 18)  SpO2: 97% (09-24-19 @ 12:30) (97% - 98%)      23 Sep 2019 07:01  -  24 Sep 2019 07:00  --------------------------------------------------------  IN:    Oral Fluid: 420 mL  Total IN: 420 mL    OUT:    Voided: 1200 mL  Total OUT: 1200 mL    Total NET: -780 mL      24 Sep 2019 07:01  -  24 Sep 2019 18:57  --------------------------------------------------------  IN:    Oral Fluid: 750 mL  Total IN: 750 mL    OUT:  Total OUT: 0 mL    Total NET: 750 mL    ** PHYSICAL EXAM **    General: Well-nourished, well-developed, in no acute distress   Neurologic: GCS 15, AAOx3, No focal Deficits   Respiratory: CTABL, no wheezes, ronchi, or rales. Normal respiratory effort.   CVS: RRR, no M/R/G   Abdomen: Abdomen soft, NT/ND, no rebound or guarding   EXT: Normal ROM all 4 extremities, strength and sensation grossly intact   Vascular: 2+ peripheral pulses bilaterally throughout; no edema appreciated  Skin: Stable Lt groin ecchymosis from pre-procedure. Otherwise warm, dry, intact        ** LABS **                 12.3   7.03   )----------(  107       ( 24 Sep 2019 09:29 )               39.4      136    |  99     |  12     ----------------------------<  147        ( 24 Sep 2019 06:55 )  3.9     |  24     |  1.04     Ca    8.9        ( 24 Sep 2019 06:55 )          CAPILLARY BLOOD GLUCOSE          **RADS**

## 2019-09-24 NOTE — PROGRESS NOTE ADULT - ATTENDING COMMENTS
Agree with above NP note.  cv stable  s/p thrombin injection to psa  groin site stable  repeat US pending  trend heme  monitor site  vasc/med f/u  cont dap
agree with above  stress abnormal  plan for cath if pt agrees
Agree with above NP note.  cv stable  s/p thrombin injection to psa  groin site stable  trend heme  monitor site  vasc/med f/u  cont dap
I have personally  seen and examined the patient today and have noted the findings and formulated the plan of care.
I have seen and examined the patient today and agree with  the  evaluation, assessment and plan of the surgical house officer
I agree with  the  evaluation, assessment and plan of the surgical house officer

## 2019-09-24 NOTE — CHART NOTE - NSCHARTNOTEFT_GEN_A_CORE
S: Called by RN for increased left groin, worsening ecchymosis, new LLQ abdominal and scrotal pain. (s/p Thrombin inj to Left groin 9/22/19) Pt reports after exam from Cardiologist last night (around 8pm), pain increased. Denies dysuria or hematuria. Ecchymosis seen extending to inner mid thigh. +slight tenderness to inferior aspect of scrotum. Mass palpated to left groin. +pedal pulses. Spoke with Vascular team and will see patient shortly. VSS. f/u AM CBC. Endorsed to Primary NP.     Per request from the patient. Cardiologist from last night paged.     ~Mallory Polk ANP-C  [id number] S: Called by RN for increased left groin, worsening ecchymosis, new LLQ abdominal and scrotal pain. (s/p Thrombin inj to Left groin 9/22/19) Pt reports after exam from Cardiologist last night (around 8pm), pain increased. Denies dysuria or hematuria. Ecchymosis seen extending to inner mid thigh. +slight tenderness to inferior aspect of scrotum. Mass palpated to left groin. +pedal pulses. Spoke with Vascular team and will see patient shortly. VSS. f/u AM CBC. Urgent Scrotal U/S. Endorsed to Primary NP.     Per request from the patient. Cardiologist from last night paged.     ~Mallory Polk ANP-C  [id number]

## 2019-09-24 NOTE — PROGRESS NOTE ADULT - ASSESSMENT
60 yo male, on ASA, plavix, s/p LHC with TOSHIA x 1 OM via LFA access with PSA with 1cm neck s/p US guided thrombin injection of L groin PSA, now resolved    Plan:  - stable from vasc surg standpoint  - Surgery will sign off, please page with any issues    Rios Merritt, PGY 2  Pager [phone]

## 2019-09-24 NOTE — PROGRESS NOTE ADULT - ASSESSMENT
59 yr old with pmh of CAD s/p CABG/PCI, HTN, ESRD, s/p kidney transplant, htn, asthma presenting with atypical chest pain.     1. Chest pain  recent echo EF 50%, mild segmental LV dysfunction   pharm stress abnormal   s/p cath with pci to lcx   post cath groin hematoma, PSA  site appears stable, no further evidence of bleeding, slight drop in heme today   vasc sono with psa amenable to injection, s/p US guided thrombin injection of L groin PSA  pending repeat U/S  vasc f/u     2. HTN  bp stable   c/w metoprolol 25 mg daily, up-titrate if needed     3. Asthma   stable    4. ESRD s/p renal transplant  on tacrolimus      dvt ppx

## 2019-09-30 ENCOUNTER — INPATIENT (INPATIENT)
Facility: HOSPITAL | Age: 59
LOS: 0 days | Discharge: HOME | DRG: 303 | End: 2019-10-01
Attending: INTERNAL MEDICINE | Admitting: INTERNAL MEDICINE
Payer: MEDICAID

## 2019-09-30 VITALS
HEIGHT: 65 IN | TEMPERATURE: 99 F | OXYGEN SATURATION: 98 % | HEART RATE: 67 BPM | WEIGHT: 187.39 LBS | DIASTOLIC BLOOD PRESSURE: 81 MMHG | RESPIRATION RATE: 18 BRPM | SYSTOLIC BLOOD PRESSURE: 148 MMHG

## 2019-09-30 DIAGNOSIS — I77.0 ARTERIOVENOUS FISTULA, ACQUIRED: Chronic | ICD-10-CM

## 2019-09-30 DIAGNOSIS — R07.9 CHEST PAIN, UNSPECIFIED: ICD-10-CM

## 2019-09-30 DIAGNOSIS — Z29.9 ENCOUNTER FOR PROPHYLACTIC MEASURES, UNSPECIFIED: ICD-10-CM

## 2019-09-30 DIAGNOSIS — I25.119 ATHEROSCLEROTIC HEART DISEASE OF NATIVE CORONARY ARTERY WITH UNSPECIFIED ANGINA PECTORIS: ICD-10-CM

## 2019-09-30 DIAGNOSIS — Z95.1 PRESENCE OF AORTOCORONARY BYPASS GRAFT: Chronic | ICD-10-CM

## 2019-09-30 DIAGNOSIS — E78.00 PURE HYPERCHOLESTEROLEMIA, UNSPECIFIED: ICD-10-CM

## 2019-09-30 DIAGNOSIS — N18.6 END STAGE RENAL DISEASE: ICD-10-CM

## 2019-09-30 DIAGNOSIS — E87.5 HYPERKALEMIA: ICD-10-CM

## 2019-09-30 DIAGNOSIS — E87.1 HYPO-OSMOLALITY AND HYPONATREMIA: ICD-10-CM

## 2019-09-30 DIAGNOSIS — Z94.0 KIDNEY TRANSPLANT STATUS: ICD-10-CM

## 2019-09-30 DIAGNOSIS — Z95.5 PRESENCE OF CORONARY ANGIOPLASTY IMPLANT AND GRAFT: Chronic | ICD-10-CM

## 2019-09-30 DIAGNOSIS — J45.909 UNSPECIFIED ASTHMA, UNCOMPLICATED: ICD-10-CM

## 2019-09-30 DIAGNOSIS — I25.10 ATHEROSCLEROTIC HEART DISEASE OF NATIVE CORONARY ARTERY WITHOUT ANGINA PECTORIS: ICD-10-CM

## 2019-09-30 DIAGNOSIS — S30.1XXA CONTUSION OF ABDOMINAL WALL, INITIAL ENCOUNTER: ICD-10-CM

## 2019-09-30 DIAGNOSIS — I10 ESSENTIAL (PRIMARY) HYPERTENSION: ICD-10-CM

## 2019-09-30 DIAGNOSIS — Z98.890 OTHER SPECIFIED POSTPROCEDURAL STATES: Chronic | ICD-10-CM

## 2019-09-30 LAB
ALBUMIN SERPL ELPH-MCNC: 4.4 G/DL — SIGNIFICANT CHANGE UP (ref 3.3–5)
ALBUMIN SERPL ELPH-MCNC: 4.6 G/DL — SIGNIFICANT CHANGE UP (ref 3.3–5)
ALP SERPL-CCNC: 49 U/L — SIGNIFICANT CHANGE UP (ref 40–120)
ALP SERPL-CCNC: 52 U/L — SIGNIFICANT CHANGE UP (ref 40–120)
ALT FLD-CCNC: 20 U/L — SIGNIFICANT CHANGE UP (ref 10–45)
ALT FLD-CCNC: 24 U/L — SIGNIFICANT CHANGE UP (ref 10–45)
ANION GAP SERPL CALC-SCNC: 10 MMOL/L — SIGNIFICANT CHANGE UP (ref 5–17)
ANION GAP SERPL CALC-SCNC: 11 MMOL/L — SIGNIFICANT CHANGE UP (ref 5–17)
APTT BLD: 28.4 SEC — SIGNIFICANT CHANGE UP (ref 27.5–36.3)
AST SERPL-CCNC: 19 U/L — SIGNIFICANT CHANGE UP (ref 10–40)
AST SERPL-CCNC: 43 U/L — HIGH (ref 10–40)
BASE EXCESS BLDV CALC-SCNC: 0.4 MMOL/L — SIGNIFICANT CHANGE UP (ref -2–2)
BILIRUB SERPL-MCNC: 0.9 MG/DL — SIGNIFICANT CHANGE UP (ref 0.2–1.2)
BILIRUB SERPL-MCNC: 1 MG/DL — SIGNIFICANT CHANGE UP (ref 0.2–1.2)
BUN SERPL-MCNC: 14 MG/DL — SIGNIFICANT CHANGE UP (ref 7–23)
BUN SERPL-MCNC: 15 MG/DL — SIGNIFICANT CHANGE UP (ref 7–23)
CA-I SERPL-SCNC: 1.17 MMOL/L — SIGNIFICANT CHANGE UP (ref 1.12–1.3)
CALCIUM SERPL-MCNC: 9.4 MG/DL — SIGNIFICANT CHANGE UP (ref 8.4–10.5)
CALCIUM SERPL-MCNC: 9.9 MG/DL — SIGNIFICANT CHANGE UP (ref 8.4–10.5)
CHLORIDE BLDV-SCNC: 104 MMOL/L — SIGNIFICANT CHANGE UP (ref 96–108)
CHLORIDE SERPL-SCNC: 97 MMOL/L — SIGNIFICANT CHANGE UP (ref 96–108)
CHLORIDE SERPL-SCNC: 97 MMOL/L — SIGNIFICANT CHANGE UP (ref 96–108)
CO2 BLDV-SCNC: 27 MMOL/L — SIGNIFICANT CHANGE UP (ref 22–30)
CO2 SERPL-SCNC: 22 MMOL/L — SIGNIFICANT CHANGE UP (ref 22–31)
CO2 SERPL-SCNC: 22 MMOL/L — SIGNIFICANT CHANGE UP (ref 22–31)
CREAT ?TM UR-MCNC: 26 MG/DL — SIGNIFICANT CHANGE UP
CREAT SERPL-MCNC: 1.04 MG/DL — SIGNIFICANT CHANGE UP (ref 0.5–1.3)
CREAT SERPL-MCNC: 1.1 MG/DL — SIGNIFICANT CHANGE UP (ref 0.5–1.3)
GAS PNL BLDV: 129 MMOL/L — LOW (ref 135–145)
GAS PNL BLDV: SIGNIFICANT CHANGE UP
GAS PNL BLDV: SIGNIFICANT CHANGE UP
GLUCOSE BLDV-MCNC: 119 MG/DL — HIGH (ref 70–99)
GLUCOSE SERPL-MCNC: 124 MG/DL — HIGH (ref 70–99)
GLUCOSE SERPL-MCNC: 136 MG/DL — HIGH (ref 70–99)
HCO3 BLDV-SCNC: 25 MMOL/L — SIGNIFICANT CHANGE UP (ref 21–29)
HCT VFR BLD CALC: 46.6 % — SIGNIFICANT CHANGE UP (ref 39–50)
HCT VFR BLDA CALC: 46 % — SIGNIFICANT CHANGE UP (ref 39–50)
HGB BLD CALC-MCNC: 14.9 G/DL — SIGNIFICANT CHANGE UP (ref 13–17)
HGB BLD-MCNC: 14.7 G/DL — SIGNIFICANT CHANGE UP (ref 13–17)
INR BLD: 1.12 RATIO — SIGNIFICANT CHANGE UP (ref 0.88–1.16)
LACTATE BLDV-MCNC: 1.5 MMOL/L — SIGNIFICANT CHANGE UP (ref 0.7–2)
LIDOCAIN IGE QN: 66 U/L — HIGH (ref 7–60)
MAGNESIUM SERPL-MCNC: 1.6 MG/DL — SIGNIFICANT CHANGE UP (ref 1.6–2.6)
MAGNESIUM SERPL-MCNC: 1.7 MG/DL — SIGNIFICANT CHANGE UP (ref 1.6–2.6)
MCHC RBC-ENTMCNC: 27.9 PG — SIGNIFICANT CHANGE UP (ref 27–34)
MCHC RBC-ENTMCNC: 31.6 GM/DL — LOW (ref 32–36)
MCV RBC AUTO: 88.3 FL — SIGNIFICANT CHANGE UP (ref 80–100)
NT-PROBNP SERPL-SCNC: 341 PG/ML — HIGH (ref 0–300)
PCO2 BLDV: 44 MMHG — SIGNIFICANT CHANGE UP (ref 35–50)
PH BLDV: 7.38 — SIGNIFICANT CHANGE UP (ref 7.35–7.45)
PHOSPHATE SERPL-MCNC: 3.4 MG/DL — SIGNIFICANT CHANGE UP (ref 2.5–4.5)
PLATELET # BLD AUTO: 180 K/UL — SIGNIFICANT CHANGE UP (ref 150–400)
PO2 BLDV: 42 MMHG — SIGNIFICANT CHANGE UP (ref 25–45)
POTASSIUM BLDV-SCNC: 5.3 MMOL/L — SIGNIFICANT CHANGE UP (ref 3.5–5.3)
POTASSIUM SERPL-MCNC: 4.2 MMOL/L — SIGNIFICANT CHANGE UP (ref 3.5–5.3)
POTASSIUM SERPL-MCNC: 6 MMOL/L — HIGH (ref 3.5–5.3)
POTASSIUM SERPL-SCNC: 4.2 MMOL/L — SIGNIFICANT CHANGE UP (ref 3.5–5.3)
POTASSIUM SERPL-SCNC: 6 MMOL/L — HIGH (ref 3.5–5.3)
PROT SERPL-MCNC: 7.4 G/DL — SIGNIFICANT CHANGE UP (ref 6–8.3)
PROT SERPL-MCNC: 8 G/DL — SIGNIFICANT CHANGE UP (ref 6–8.3)
PROTHROM AB SERPL-ACNC: 12.9 SEC — SIGNIFICANT CHANGE UP (ref 10–12.9)
RBC # BLD: 5.27 M/UL — SIGNIFICANT CHANGE UP (ref 4.2–5.8)
RBC # FLD: 14.7 % — HIGH (ref 10.3–14.5)
SAO2 % BLDV: 72 % — SIGNIFICANT CHANGE UP (ref 67–88)
SODIUM SERPL-SCNC: 129 MMOL/L — LOW (ref 135–145)
SODIUM SERPL-SCNC: 130 MMOL/L — LOW (ref 135–145)
SODIUM UR-SCNC: 36 MMOL/L — SIGNIFICANT CHANGE UP
TROPONIN T, HIGH SENSITIVITY RESULT: 8 NG/L — SIGNIFICANT CHANGE UP (ref 0–51)
TROPONIN T, HIGH SENSITIVITY RESULT: <6 NG/L — SIGNIFICANT CHANGE UP (ref 0–51)
WBC # BLD: 10.8 K/UL — HIGH (ref 3.8–10.5)
WBC # FLD AUTO: 10.8 K/UL — HIGH (ref 3.8–10.5)

## 2019-09-30 PROCEDURE — 99223 1ST HOSP IP/OBS HIGH 75: CPT

## 2019-09-30 PROCEDURE — 93926 LOWER EXTREMITY STUDY: CPT | Mod: 26,LT

## 2019-09-30 PROCEDURE — 71045 X-RAY EXAM CHEST 1 VIEW: CPT | Mod: 26

## 2019-09-30 PROCEDURE — 99285 EMERGENCY DEPT VISIT HI MDM: CPT

## 2019-09-30 PROCEDURE — 93308 TTE F-UP OR LMTD: CPT | Mod: 26

## 2019-09-30 RX ORDER — ASPIRIN/CALCIUM CARB/MAGNESIUM 324 MG
243 TABLET ORAL ONCE
Refills: 0 | Status: DISCONTINUED | OUTPATIENT
Start: 2019-09-30 | End: 2019-10-01

## 2019-09-30 RX ORDER — SIMETHICONE 80 MG/1
1 TABLET, CHEWABLE ORAL
Qty: 0 | Refills: 0 | DISCHARGE

## 2019-09-30 RX ORDER — NITROGLYCERIN 6.5 MG
0.4 CAPSULE, EXTENDED RELEASE ORAL ONCE
Refills: 0 | Status: COMPLETED | OUTPATIENT
Start: 2019-09-30 | End: 2019-09-30

## 2019-09-30 RX ORDER — ATORVASTATIN CALCIUM 80 MG/1
10 TABLET, FILM COATED ORAL AT BEDTIME
Refills: 0 | Status: DISCONTINUED | OUTPATIENT
Start: 2019-09-30 | End: 2019-10-01

## 2019-09-30 RX ORDER — FAMOTIDINE 10 MG/ML
20 INJECTION INTRAVENOUS DAILY
Refills: 0 | Status: DISCONTINUED | OUTPATIENT
Start: 2019-09-30 | End: 2019-10-01

## 2019-09-30 RX ORDER — ACETAMINOPHEN 500 MG
650 TABLET ORAL ONCE
Refills: 0 | Status: COMPLETED | OUTPATIENT
Start: 2019-09-30 | End: 2019-09-30

## 2019-09-30 RX ORDER — CLOPIDOGREL BISULFATE 75 MG/1
75 TABLET, FILM COATED ORAL DAILY
Refills: 0 | Status: DISCONTINUED | OUTPATIENT
Start: 2019-10-01 | End: 2019-10-01

## 2019-09-30 RX ORDER — FAMOTIDINE 10 MG/ML
20 INJECTION INTRAVENOUS ONCE
Refills: 0 | Status: COMPLETED | OUTPATIENT
Start: 2019-09-30 | End: 2019-09-30

## 2019-09-30 RX ORDER — MAGNESIUM OXIDE 400 MG ORAL TABLET 241.3 MG
1 TABLET ORAL
Qty: 0 | Refills: 0 | DISCHARGE

## 2019-09-30 RX ORDER — ASPIRIN/CALCIUM CARB/MAGNESIUM 324 MG
243 TABLET ORAL DAILY
Refills: 0 | Status: DISCONTINUED | OUTPATIENT
Start: 2019-09-30 | End: 2019-09-30

## 2019-09-30 RX ORDER — ASPIRIN/CALCIUM CARB/MAGNESIUM 324 MG
81 TABLET ORAL DAILY
Refills: 0 | Status: DISCONTINUED | OUTPATIENT
Start: 2019-10-01 | End: 2019-10-01

## 2019-09-30 RX ORDER — MYCOPHENOLATE MOFETIL 250 MG/1
500 CAPSULE ORAL
Refills: 0 | Status: DISCONTINUED | OUTPATIENT
Start: 2019-09-30 | End: 2019-10-01

## 2019-09-30 RX ORDER — TACROLIMUS 5 MG/1
2 CAPSULE ORAL
Refills: 0 | Status: DISCONTINUED | OUTPATIENT
Start: 2019-09-30 | End: 2019-10-01

## 2019-09-30 RX ORDER — IPRATROPIUM/ALBUTEROL SULFATE 18-103MCG
3 AEROSOL WITH ADAPTER (GRAM) INHALATION EVERY 6 HOURS
Refills: 0 | Status: DISCONTINUED | OUTPATIENT
Start: 2019-09-30 | End: 2019-10-01

## 2019-09-30 RX ADMIN — Medication 650 MILLIGRAM(S): at 22:25

## 2019-09-30 RX ADMIN — Medication 650 MILLIGRAM(S): at 22:55

## 2019-09-30 RX ADMIN — Medication 0.4 MILLIGRAM(S): at 15:22

## 2019-09-30 RX ADMIN — Medication 243 MILLIGRAM(S): at 15:22

## 2019-09-30 RX ADMIN — ATORVASTATIN CALCIUM 10 MILLIGRAM(S): 80 TABLET, FILM COATED ORAL at 22:28

## 2019-09-30 RX ADMIN — MYCOPHENOLATE MOFETIL 500 MILLIGRAM(S): 250 CAPSULE ORAL at 20:22

## 2019-09-30 RX ADMIN — FAMOTIDINE 20 MILLIGRAM(S): 10 INJECTION INTRAVENOUS at 16:24

## 2019-09-30 RX ADMIN — Medication 1 TABLET(S): at 20:18

## 2019-09-30 RX ADMIN — TACROLIMUS 2 MILLIGRAM(S): 5 CAPSULE ORAL at 20:17

## 2019-09-30 RX ADMIN — Medication 30 MILLILITER(S): at 16:24

## 2019-09-30 NOTE — H&P ADULT - PROBLEM SELECTOR PLAN 7
Not in exacerbation  Duonebs prn. On low dose statin. Should clarify if high intensity was not tolerated in the past.

## 2019-09-30 NOTE — ED PROVIDER NOTE - OBJECTIVE STATEMENT
59yom pmhx CAD s/p CABG/PCI, HTN, ESRD, s/p kidney transplant, htn, asthma; s/p recent cath s/p pci to lcx course complicated by post cath groin hematoma; discharged on 09/24 here with squeezing L sided chest pain with sob and johnson since yesterday. Called cardiologist who was in [facility] so pt decided to wait but persistent pain and worse this am upon exertion so came in. No fever or chills. No nausea or vomiting.

## 2019-09-30 NOTE — H&P ADULT - PROBLEM SELECTOR PLAN 8
Only DAPT for now. Hold other blood thinners given left groin hematoma. Not in exacerbation  Duonebs prn.

## 2019-09-30 NOTE — H&P ADULT - PROBLEM SELECTOR PLAN 5
Hx of CABG and PCI. Last stent placed on 9/20 to OM1.  DAPT  Statin  Rest of plan as above. Pseudohyperkalemia from moderate hemolysis. Pending repeat.

## 2019-09-30 NOTE — H&P ADULT - NSHPPHYSICALEXAM_GEN_ALL_CORE
T(C): 37.1 (09-30-19 @ 14:46), Max: 37.1 (09-30-19 @ 14:46)  T(F): 98.7 (09-30-19 @ 14:46), Max: 98.7 (09-30-19 @ 14:46)  HR: 61 (09-30-19 @ 16:28) (61 - 67)  BP: 122/77 (09-30-19 @ 16:28) (122/77 - 148/81)  ABP: --  ABP(mean): --  RR: 17 (09-30-19 @ 16:28) (17 - 18)  SpO2: 97% (09-30-19 @ 16:28) (97% - 100%)    CONSTITUTIONAL: No acute distress.   HEENT:  Conjunctiva clear B/L. Nasal mucosa normal. Dry oral mucosa. No posterior pharyngeal lesions noted.  Cardiovascular: RRR with no murmurs. No JVD noted. No lower extremity edema B/L. Extremities are warm and well perfused. Radial pulses 2+ B/L. Dorsalis pedis pulses 2+ B/L. Capillary refill <2s  Respiratory: Lungs CTAB. No accessory muscle use.   Gastrointestinal:  Soft, nontender. Non-distended. Non-rigid. No CVA tenderness B/L.  MSK:  No joint swelling. No joint erythema B/L. No midline spinal tenderness.  Neurologic:  Alert and awake. Oriented x3. Moving all extremities. Following commands. Making eye contact. No focal weakness. Strength 4/5 globally. No numbness or tingling.   Skin:  Left groin induration noted which is tender to palpation and dependent ecchymosis left the left thigh noted. No rashes noted. No skin erythema noted.   Psych:  Normal affect. Normal Mood.

## 2019-09-30 NOTE — H&P ADULT - PROBLEM SELECTOR PLAN 6
On low dose statin. Should clarify if high intensity was not tolerated in the past. Hx of CABG and PCI. Last stent placed on 9/20 to OM1.  DAPT  Statin  Rest of plan as above.

## 2019-09-30 NOTE — H&P ADULT - HISTORY OF PRESENT ILLNESS
Dr. Robbie Cervantes, DO  Division of Hospital Medicine  [facility]  Pager:  [phone] Dr. Robbie Cervantes, DO  Division of Hospital Medicine  [facility]  Pager:  [phone]    History obtained from ED and Patient    This is a 60 y/o M PMHx of CABG, PCI with last stent to OM1 (9/20/19) complicated by left groin hematoma, Prior ESRD, kidney transplant, HTN, and asthma who presents with chest tightness. Dr. Robbie Cervantes,   Division of Hospital Medicine  [facility]  Pager:  [phone]    History obtained from ED and Patient    This is a 60 y/o M PMHx of CABG, PCI with last stent to OM1 (9/20/19) complicated by left groin hematoma, Prior ESRD, kidney transplant, HTN, and asthma who presents with chest tightness that started yesterday. The patient reports the pain is pressure like, substernal, nonradiating, and improved with nitroglycerin given by ED. He is unsure if it is related to exertion. Denies shortness of breath, cough, orthopnea, or palpitations. Denies fever, chills, abdominal pain, dysuria, headache, focal weakness, or numbness. He still has pain around the left groin site which is mildly better than before. He reports the swelling has reduced in size. Denies pain in his feet or calf with walking.

## 2019-09-30 NOTE — H&P ADULT - NSHPLABSRESULTS_GEN_ALL_CORE
LABS:                        14.7   10.8  )-----------( 180      ( 30 Sep 2019 15:13 )             46.6     09-30    129<L>  |  97  |  15  ----------------------------<  124<H>  6.0<H>   |  22  |  1.04    Ca    9.9      30 Sep 2019 15:13  Mg     1.7     09-30    TPro  8.0  /  Alb  4.6  /  TBili  1.0  /  DBili  x   /  AST  43<H>  /  ALT  24  /  AlkPhos  49  09-30    PT/INR - ( 30 Sep 2019 15:13 )   PT: 12.9 sec;   INR: 1.12 ratio       PTT - ( 30 Sep 2019 15:13 )  PTT:28.4 sec    ADDITIONAL STUDIES PERSONALLY REVIEWED BY ME:  EKG: NSR; Complete RBBB; Nonspecific st-t changes. Appears unchanged from prior.  CXR: no acute cardiopulmonary process.     RADIOLOGIST REPORT REVIEWED FOR:  -Bedside ECHO.

## 2019-09-30 NOTE — H&P ADULT - PROBLEM SELECTOR PLAN 3
Suspect could be from hypovolemia given dry oral mucosa  Would hold off of IVF for now give possibility of cardiac ischemia.   Monitor bmp   Encourage PO intake. Continue prednisone, tacrolimus, cellcept  Tacrolimus and Cellcept levels pending.   Call transplant nephrology.

## 2019-09-30 NOTE — ED PROVIDER NOTE - PHYSICAL EXAMINATION
Gen: AAO x 3, appears very uncomfortable   Skin: No rashes or lesions  HEENT: NC/AT, PERRLA, EOMI, MMM  Resp: slightly labored breathing, anxious,  CTAB  Cardiac: rrr s1s2, no murmurs, rubs or gallops  GI: ND, +BS, Soft, NT  Ext: trace pedal edema, FROM in all extremities +induration in the L groin with diffuse ecchymosis to the medial L thigh to the knee   Neuro: no focal deficits Gen: AAO x 3, appears very uncomfortable   Skin: No rashes or lesions  HEENT: NC/AT, PERRLA, EOMI, MMM  Resp: slightly labored breathing, anxious,  CTAB  Cardiac: rrr s1s2, no murmurs, rubs or gallops  GI: ND, +BS, Soft, NT  Ext: trace pedal edema, FROM in all extremities +induration in the L groin with diffuse ecchymosis to the medial L thigh to the knee   Neuro: no focal deficits  Attending Celestina Morrow: Gen: NAD, heent: atrauamtic, eomi, perrla, mmm, op pink, uvula midline, neck; nttp, no nuchal rigidity, chest: nttp, no crepitus, cv: rrr, no murmurs, lungs: ctab, abd: soft, nontender, nondistended, no peritoneal signs, +BS, no guarding, ext: wwp, neg homans, skin: ecchymoses to left leg with extention to upper arm, neuro: awake and alert, following commands, speech clear, sensation and strength intact, no focal deficits

## 2019-09-30 NOTE — ED PROVIDER NOTE - NS ED ROS FT
Constitutional: No fever or chills  Eyes: No visual changes, eye pain or redness  HEENT: No throat pain, ear pain, nasal pain. No nose bleeding.  CV: +chest pain +lower extremity edema  Resp: +SOB no cough +Craig  GI: No abd pain. No nausea or vomiting. No diarrhea. No constipation.   : No dysuria, hematuria.   MSK: No musculoskeletal pain  Skin: No rash  Neuro: No headache. No numbness or tingling. No weakness.

## 2019-09-30 NOTE — ED PROVIDER NOTE - CLINICAL SUMMARY MEDICAL DECISION MAKING FREE TEXT BOX
Attending Celestina Morrow: 60 y/o male with multiple medical issues including CAD s/p recent PCI presenting with chest pain. upon arrival pt hemodynamically stable. Cath complicated by pseudoaneurysm and seen by vascular. pt with ecchymoses to left leg. will obtain repeat ultrasound to further evaluate. pt denies any pleuritic pain. pocus shows no large pericardial effusoin. no RUQ ttp to suggest biliary disease. will place on monitor, d/w cardiology and serial troponin and ekg

## 2019-09-30 NOTE — H&P ADULT - PROBLEM SELECTOR PLAN 2
Continue prednisone, tacrolimus, cellcept  Tacrolimus and Cellcept levels pending.   Call transplant nephrology. Duplex with unchanged hematoma. DP pulses 2+ and extremities are warm and well perfused.   Serial groin exams.  Call vascular in AM or if patient clinically worsens.

## 2019-09-30 NOTE — H&P ADULT - ASSESSMENT
This is a 58 y/o M PMHx of CABG, PCI with last stent to OM1 (9/20/19) complicated by left groin hematoma, Prior ESRD, kidney transplant, HTN, and asthma who presents with chest tightness.

## 2019-09-30 NOTE — ED PROVIDER NOTE - NS ED MD DISPO ISOLATION TYPES
"Chief Complaint   Patient presents with     Pre-Op Exam       Initial /80  Pulse 60  Temp 98.8  F (37.1  C) (Tympanic)  Resp 20  Ht 5' 11.5\" (1.816 m)  Wt 312 lb (141.5 kg)  BMI 42.91 kg/m2 Estimated body mass index is 42.91 kg/(m^2) as calculated from the following:    Height as of this encounter: 5' 11.5\" (1.816 m).    Weight as of this encounter: 312 lb (141.5 kg).    Patient presents to the clinic using     Health Maintenance that is potentially due pending provider review:          Is there anyone who you would like to be able to receive your results?   If yes have patient fill out DEREK    "
None

## 2019-09-30 NOTE — ED ADULT NURSE NOTE - OBJECTIVE STATEMENT
60 y/o male PMH CABG, kidney transplant and cardiac stent last week presents to ED reporting CP beginning yesterday. Pt reports CP 8/10, midsternal and SOB, pt reports taking 81mg ASA today and Plavix. On exam, AOx3, speaking in complete sentences. Lung sounds CTA, NAD, O2 sat 100% RA. Abdomen soft, tender LLQ, non-distended, normoactive bowel sounds in all 4 quadrants. Pt denies fever/chills at this time. CM in place NSR HR 76. EKG completed and given to attending MD. Stefania placed, labs sent.

## 2019-09-30 NOTE — H&P ADULT - PROBLEM SELECTOR PLAN 1
Atypical chest pain: substernal and improved with nitro. Case discussed with Dr. Pérez. Given unchanged EKG and lack of initial troponin leak, will continue to monitor of DAPT without heparin. No urgent need for cardiac catheterization unless patient has a clinical change. Continue to monitor troponin. Would opt for low dose BB for anti-anginal given mild nonspecific st-t changes of the inferior leads. Currently patient without chest pain. Statin as below. Cardiology input greatly appreciated. Atypical chest pain: substernal and improved with nitro. Case discussed with Dr. Pérez. Given unchanged EKG and lack of initial troponin leak, will continue to monitor of DAPT without heparin. No urgent need for cardiac catheterization unless patient has a clinical change. Continue to monitor troponin and vitals. Would opt for low dose BB for anti-anginal given mild nonspecific st-t changes of the inferior leads. Currently patient without chest pain. Statin as below. Cardiology input greatly appreciated.

## 2019-09-30 NOTE — H&P ADULT - PROBLEM SELECTOR PLAN 4
Pseudohyperkalemia from moderate hemolysis. Pending repeat. Suspect could be from hypovolemia given dry oral mucosa  Would hold off of IVF for now give possibility of cardiac ischemia.   Monitor bmp   Encourage PO intake.

## 2019-09-30 NOTE — ED ADULT NURSE REASSESSMENT NOTE - NS ED NURSE REASSESS COMMENT FT1
Spoke with  on 2 DSU, 2 DSU RN will call ED RN. ED RN notified  that per policy pt may be transported prior.

## 2019-10-01 ENCOUNTER — TRANSCRIPTION ENCOUNTER (OUTPATIENT)
Age: 59
End: 2019-10-01

## 2019-10-01 VITALS
HEART RATE: 71 BPM | SYSTOLIC BLOOD PRESSURE: 136 MMHG | DIASTOLIC BLOOD PRESSURE: 79 MMHG | RESPIRATION RATE: 18 BRPM | TEMPERATURE: 98 F | OXYGEN SATURATION: 95 %

## 2019-10-01 DIAGNOSIS — K21.9 GASTRO-ESOPHAGEAL REFLUX DISEASE WITHOUT ESOPHAGITIS: ICD-10-CM

## 2019-10-01 LAB
ALBUMIN SERPL ELPH-MCNC: 4.7 G/DL — SIGNIFICANT CHANGE UP (ref 3.3–5)
ALP SERPL-CCNC: 53 U/L — SIGNIFICANT CHANGE UP (ref 40–120)
ALT FLD-CCNC: 21 U/L — SIGNIFICANT CHANGE UP (ref 10–45)
ANION GAP SERPL CALC-SCNC: 11 MMOL/L — SIGNIFICANT CHANGE UP (ref 5–17)
APPEARANCE UR: CLEAR — SIGNIFICANT CHANGE UP
AST SERPL-CCNC: 22 U/L — SIGNIFICANT CHANGE UP (ref 10–40)
BASOPHILS # BLD AUTO: 0.04 K/UL — SIGNIFICANT CHANGE UP (ref 0–0.2)
BASOPHILS NFR BLD AUTO: 0.5 % — SIGNIFICANT CHANGE UP (ref 0–2)
BILIRUB SERPL-MCNC: 1.1 MG/DL — SIGNIFICANT CHANGE UP (ref 0.2–1.2)
BILIRUB UR-MCNC: NEGATIVE — SIGNIFICANT CHANGE UP
BUN SERPL-MCNC: 13 MG/DL — SIGNIFICANT CHANGE UP (ref 7–23)
CALCIUM SERPL-MCNC: 9.4 MG/DL — SIGNIFICANT CHANGE UP (ref 8.4–10.5)
CHLORIDE SERPL-SCNC: 96 MMOL/L — SIGNIFICANT CHANGE UP (ref 96–108)
CO2 SERPL-SCNC: 25 MMOL/L — SIGNIFICANT CHANGE UP (ref 22–31)
COLOR SPEC: SIGNIFICANT CHANGE UP
CREAT SERPL-MCNC: 0.96 MG/DL — SIGNIFICANT CHANGE UP (ref 0.5–1.3)
DIFF PNL FLD: NEGATIVE — SIGNIFICANT CHANGE UP
EOSINOPHIL # BLD AUTO: 0.14 K/UL — SIGNIFICANT CHANGE UP (ref 0–0.5)
EOSINOPHIL NFR BLD AUTO: 1.8 % — SIGNIFICANT CHANGE UP (ref 0–6)
GLUCOSE SERPL-MCNC: 187 MG/DL — HIGH (ref 70–99)
GLUCOSE UR QL: NEGATIVE — SIGNIFICANT CHANGE UP
HCT VFR BLD CALC: 46 % — SIGNIFICANT CHANGE UP (ref 39–50)
HCV AB S/CO SERPL IA: 0.15 S/CO — SIGNIFICANT CHANGE UP (ref 0–0.99)
HCV AB SERPL-IMP: SIGNIFICANT CHANGE UP
HGB BLD-MCNC: 14.6 G/DL — SIGNIFICANT CHANGE UP (ref 13–17)
HIV 1+2 AB+HIV1 P24 AG SERPL QL IA: SIGNIFICANT CHANGE UP
IMM GRANULOCYTES NFR BLD AUTO: 0.5 % — SIGNIFICANT CHANGE UP (ref 0–1.5)
KETONES UR-MCNC: NEGATIVE — SIGNIFICANT CHANGE UP
LEUKOCYTE ESTERASE UR-ACNC: NEGATIVE — SIGNIFICANT CHANGE UP
LYMPHOCYTES # BLD AUTO: 1.08 K/UL — SIGNIFICANT CHANGE UP (ref 1–3.3)
LYMPHOCYTES # BLD AUTO: 14 % — SIGNIFICANT CHANGE UP (ref 13–44)
MAGNESIUM SERPL-MCNC: 1.7 MG/DL — SIGNIFICANT CHANGE UP (ref 1.6–2.6)
MCHC RBC-ENTMCNC: 27.9 PG — SIGNIFICANT CHANGE UP (ref 27–34)
MCHC RBC-ENTMCNC: 31.7 GM/DL — LOW (ref 32–36)
MCV RBC AUTO: 88 FL — SIGNIFICANT CHANGE UP (ref 80–100)
MONOCYTES # BLD AUTO: 0.53 K/UL — SIGNIFICANT CHANGE UP (ref 0–0.9)
MONOCYTES NFR BLD AUTO: 6.9 % — SIGNIFICANT CHANGE UP (ref 2–14)
NEUTROPHILS # BLD AUTO: 5.89 K/UL — SIGNIFICANT CHANGE UP (ref 1.8–7.4)
NEUTROPHILS NFR BLD AUTO: 76.3 % — SIGNIFICANT CHANGE UP (ref 43–77)
NITRITE UR-MCNC: NEGATIVE — SIGNIFICANT CHANGE UP
OSMOLALITY UR: 237 MOSM/KG — SIGNIFICANT CHANGE UP (ref 50–1200)
PH UR: 6 — SIGNIFICANT CHANGE UP (ref 5–8)
PHOSPHATE SERPL-MCNC: 2.9 MG/DL — SIGNIFICANT CHANGE UP (ref 2.5–4.5)
PLATELET # BLD AUTO: 133 K/UL — LOW (ref 150–400)
POTASSIUM SERPL-MCNC: 3.9 MMOL/L — SIGNIFICANT CHANGE UP (ref 3.5–5.3)
POTASSIUM SERPL-SCNC: 3.9 MMOL/L — SIGNIFICANT CHANGE UP (ref 3.5–5.3)
PROT SERPL-MCNC: 7.6 G/DL — SIGNIFICANT CHANGE UP (ref 6–8.3)
PROT UR-MCNC: NEGATIVE — SIGNIFICANT CHANGE UP
RBC # BLD: 5.23 M/UL — SIGNIFICANT CHANGE UP (ref 4.2–5.8)
RBC # FLD: 15.2 % — HIGH (ref 10.3–14.5)
SODIUM SERPL-SCNC: 132 MMOL/L — LOW (ref 135–145)
SP GR SPEC: 1.01 — LOW (ref 1.01–1.02)
UROBILINOGEN FLD QL: SIGNIFICANT CHANGE UP
UUN UR-MCNC: 240 MG/DL — SIGNIFICANT CHANGE UP
WBC # BLD: 7.72 K/UL — SIGNIFICANT CHANGE UP (ref 3.8–10.5)
WBC # FLD AUTO: 7.72 K/UL — SIGNIFICANT CHANGE UP (ref 3.8–10.5)

## 2019-10-01 RX ORDER — METOPROLOL TARTRATE 50 MG
25 TABLET ORAL DAILY
Refills: 0 | Status: DISCONTINUED | OUTPATIENT
Start: 2019-10-01 | End: 2019-10-01

## 2019-10-01 RX ORDER — PANTOPRAZOLE SODIUM 20 MG/1
40 TABLET, DELAYED RELEASE ORAL
Refills: 0 | Status: DISCONTINUED | OUTPATIENT
Start: 2019-10-01 | End: 2019-10-01

## 2019-10-01 RX ORDER — ISOSORBIDE DINITRATE 5 MG/1
1 TABLET ORAL
Qty: 90 | Refills: 0
Start: 2019-10-01 | End: 2019-10-30

## 2019-10-01 RX ORDER — FAMOTIDINE 10 MG/ML
1 INJECTION INTRAVENOUS
Qty: 30 | Refills: 0

## 2019-10-01 RX ORDER — SUCRALFATE 1 G
1 TABLET ORAL
Qty: 120 | Refills: 0
Start: 2019-10-01 | End: 2019-10-30

## 2019-10-01 RX ORDER — ACETAMINOPHEN 500 MG
650 TABLET ORAL EVERY 6 HOURS
Refills: 0 | Status: DISCONTINUED | OUTPATIENT
Start: 2019-10-01 | End: 2019-10-01

## 2019-10-01 RX ORDER — SUCRALFATE 1 G
1 TABLET ORAL
Refills: 0 | Status: DISCONTINUED | OUTPATIENT
Start: 2019-10-01 | End: 2019-10-01

## 2019-10-01 RX ORDER — ASPIRIN/CALCIUM CARB/MAGNESIUM 324 MG
1 TABLET ORAL
Qty: 0 | Refills: 0 | DISCHARGE

## 2019-10-01 RX ORDER — PANTOPRAZOLE SODIUM 20 MG/1
1 TABLET, DELAYED RELEASE ORAL
Qty: 60 | Refills: 0
Start: 2019-10-01 | End: 2019-10-30

## 2019-10-01 RX ORDER — METOPROLOL TARTRATE 50 MG
1 TABLET ORAL
Qty: 0 | Refills: 0 | DISCHARGE

## 2019-10-01 RX ORDER — MAGNESIUM OXIDE 400 MG ORAL TABLET 241.3 MG
400 TABLET ORAL
Refills: 0 | Status: DISCONTINUED | OUTPATIENT
Start: 2019-10-01 | End: 2019-10-01

## 2019-10-01 RX ORDER — ISOSORBIDE DINITRATE 5 MG/1
10 TABLET ORAL THREE TIMES A DAY
Refills: 0 | Status: DISCONTINUED | OUTPATIENT
Start: 2019-10-01 | End: 2019-10-01

## 2019-10-01 RX ORDER — TACROLIMUS 5 MG/1
2 CAPSULE ORAL
Qty: 0 | Refills: 0 | DISCHARGE

## 2019-10-01 RX ORDER — ASPIRIN/CALCIUM CARB/MAGNESIUM 324 MG
1 TABLET ORAL
Qty: 30 | Refills: 0
Start: 2019-10-01 | End: 2019-10-30

## 2019-10-01 RX ADMIN — ISOSORBIDE DINITRATE 10 MILLIGRAM(S): 5 TABLET ORAL at 15:51

## 2019-10-01 RX ADMIN — MYCOPHENOLATE MOFETIL 500 MILLIGRAM(S): 250 CAPSULE ORAL at 09:18

## 2019-10-01 RX ADMIN — Medication 200 MILLIGRAM(S): at 16:53

## 2019-10-01 RX ADMIN — PANTOPRAZOLE SODIUM 40 MILLIGRAM(S): 20 TABLET, DELAYED RELEASE ORAL at 16:52

## 2019-10-01 RX ADMIN — Medication 1 GRAM(S): at 16:52

## 2019-10-01 RX ADMIN — FAMOTIDINE 20 MILLIGRAM(S): 10 INJECTION INTRAVENOUS at 09:21

## 2019-10-01 RX ADMIN — Medication 1 TABLET(S): at 09:20

## 2019-10-01 RX ADMIN — Medication 650 MILLIGRAM(S): at 11:33

## 2019-10-01 RX ADMIN — Medication 650 MILLIGRAM(S): at 11:03

## 2019-10-01 RX ADMIN — Medication 25 MILLIGRAM(S): at 15:51

## 2019-10-01 RX ADMIN — MAGNESIUM OXIDE 400 MG ORAL TABLET 400 MILLIGRAM(S): 241.3 TABLET ORAL at 11:03

## 2019-10-01 RX ADMIN — Medication 5 MILLIGRAM(S): at 06:23

## 2019-10-01 RX ADMIN — CLOPIDOGREL BISULFATE 75 MILLIGRAM(S): 75 TABLET, FILM COATED ORAL at 11:05

## 2019-10-01 RX ADMIN — TACROLIMUS 2 MILLIGRAM(S): 5 CAPSULE ORAL at 09:18

## 2019-10-01 RX ADMIN — Medication 81 MILLIGRAM(S): at 09:21

## 2019-10-01 RX ADMIN — Medication 3 MILLILITER(S): at 01:23

## 2019-10-01 NOTE — CONSULT NOTE ADULT - ASSESSMENT
59 yr old with pmh of CAD s/p CABG/PCI, HTN, ESRD, s/p kidney transplant, htn, asthma presented with chest pain sp cath cb femoral pseudo-aneurysm sp injection     (1)Renal - s/p renal transplant, with superb function. At baseline. On Prograf 2mg po bid, Cellcept 500mg po bid, and prednisone 5 mg daily  (2)Chest pain-Negative cardiac enzymes        RECOMMEND:  (1) Ambulate    (2)Cont Transplant meds as taken at home;  bactrim single strength to cont   (3)Doubt cardiac cause of chest pain     Follow up in 3 months in the office
60 y/o M well known from prior admissions w/  PMHx of CABG, PCI with last stent to OM1 (9/20/19) complicated by left groin hematoma, Prior ESRD, kidney transplant, HTN, and asthma who presents with recurrent chest pain.     1. Recurrent atypical chest pain  cv stable, HST neg, ekg unchanged w/ no evidence of acute ischemia   CXR clear, no evidence of acute ischemia   recent echo EF 50%, mild segmental LV dysfunction   recent cath with pci to lcx   continue asa 81mg daily, plavix  resume metoprolol 25mg daily   consider GI eval to r/o other etiology of discomfort     2.  HTN  bp stable   resume metoprolol 25 mg daily    3. Asthma   stable    4. ESRD s/p renal transplant  on tacrolimus

## 2019-10-01 NOTE — DISCHARGE NOTE NURSING/CASE MANAGEMENT/SOCIAL WORK - NSDCFUADDAPPT_GEN_ALL_CORE_FT
follow up with primary care physician within one week after discharge  follow up with cardiology Dr. Wily Chauhan with appointment later this week on 10/3 - he will evaluate changing Isordil to long acting pill for once daily dosing  follow up with outpatient gastroenterology for GERD management  follow up with [facility] kidney transplant team as directed

## 2019-10-01 NOTE — CONSULT NOTE ADULT - ATTENDING COMMENTS
Patient seen and examined, agree with the above assessment and plan by VICKEY Wang.  58 y/o M well known from prior admissions w/  PMHx of CABG, PCI with last stent to OM1 (9/20/19) complicated by left groin hematoma, Prior ESRD, kidney transplant, HTN, and asthma who presents with recurrent chest pain.   pt with multiple admissions for similar complaints  intensify antianginal tx  consider GI eval  no further ischemic workup planned

## 2019-10-01 NOTE — DISCHARGE NOTE PROVIDER - NSDCFUADDAPPT_GEN_ALL_CORE_FT
follow up with primary care physician within one week after discharge  follow up with cardiology Dr. Wily Chauhan with appointment later this week on 10/3 - he will evaluate changing Isordil to long acting pill for once daily dosing  follow up with outpatient gastroenterology for GERD management  follow up with [facility] kidney transplant team as directed

## 2019-10-01 NOTE — DISCHARGE NOTE PROVIDER - REASON FOR ADMISSION
Chest pain - atypical w/ no acute changes in EKG or cardiac markers, left groin hematoma stable w/ no pseudoaneurysm, kidney transplant 2018 on Prednisone, Cellcept, prograf & Bactrim prophylaxis, on GERD treatment

## 2019-10-01 NOTE — DISCHARGE NOTE PROVIDER - NSDCCPCAREPLAN_GEN_ALL_CORE_FT
PRINCIPAL DISCHARGE DIAGNOSIS  Diagnosis: Chest pain due to coronary artery disease  Assessment and Plan of Treatment: no acute cardiac problems  follow up with cardiology Dr. Wily Chauhan on Thursday 10/3 with appointment  please note that Isordil is new medication for coronary artery disease management  take baby aspirin 81 mg once daily  Coronary artery disease is a condition where the arteries the supply the heart muscle get clogges with fatty deposits & puts you at risk for a heart attack  Call your doctor if you have any new pain, pressure, or discomfort in the center of your chest, pain, tingling or discomfort in arms, back, neck, jaw, or stomach, shortness of breath, nausea, vomiting, burping or heartburn, sweating, cold and clammy skin, racing or abnormal heartbeat for more than 10 minutes or if they keep coming & going.  Call 911 and do not tr to get to hospital by care  You can help yourself with lefestyle changes (quitting smoking if you smoke), eat lots of fruits & vegetables & low fat dairy products, not a lot of meat & fatty foods, walk or some form of physical activity most days of the week, lose weight if you are overweight  Take your cardiac medication as prescribed to lower cholesterol, to lower blood pressure, aspirin to prevent blood clots, and diabetes control  Make sure to keep appointments with doctor for cardiac follow up care        SECONDARY DISCHARGE DIAGNOSES  Diagnosis: Kidney transplanted  Assessment and Plan of Treatment: Kidney transplanted  take immunosuppressants as directed  take Bactrim as directed  follow up with transplant team as directed    Diagnosis: Groin hematoma  Assessment and Plan of Treatment: Groin hematoma after recent cardiac cath  ultrasound with stable hematoma & no pseudoaneurysm    Diagnosis: HTN (hypertension)  Assessment and Plan of Treatment: HTN (hypertension)  Follow up with your medical doctor to establish long term blood pressure treatment goals.      Diagnosis: Gastroesophageal reflux disease without esophagitis  Assessment and Plan of Treatment: Gastroesophageal reflux disease without esophagitis  follow up with outpatient gastroenterology  take Protonix & Carafate as directed

## 2019-10-01 NOTE — CONSULT NOTE ADULT - PROBLEM SELECTOR RECOMMENDATION 9
cont diet as tolerated  no gi symptoms at present  proton pump inhibitor bid  carafate 1g four times a day  outpatient follow up with primary gi  no gi objection to dc

## 2019-10-01 NOTE — DISCHARGE NOTE PROVIDER - HOSPITAL COURSE
Chest pain - atypical w/ no acute changes in EKG or cardiac markers, left groin hematoma stable w/ no pseudoaneurysm, kidney transplant 2018 on Prednisone, Cellcept, prograf & Bactrim prophylaxis,         HPI: 58 y/o M well known from prior admissions w/  PMHx of CABG, PCI with last stent to OM1 (9/20/19) complicated by left groin hematoma, Prior ESRD, kidney transplant, HTN, and asthma who presents with chest tightness that started yesterday. The patient reports the pain is pressure like, substernal, nonradiating, and improved with nitroglycerin given by ED. He is unsure if it is related to exertion. Denies shortness of breath, cough, orthopnea, or palpitations. Denies fever, chills, abdominal pain, dysuria, headache, focal weakness, or numbness. Currently feeling better, cp free.         58 y/o M well known from prior admissions w/  PMHx of CABG, PCI with last stent to OM1 (9/20/19) complicated by left groin hematoma, Prior ESRD, kidney transplant, HTN, and asthma who presents with recurrent chest pain. on GERD treatment        1. Recurrent atypical chest pain    cv stable, HST neg, ekg unchanged w/ no evidence of acute ischemia     CXR clear, no evidence of acute ischemia     recent echo EF 50%, mild segmental LV dysfunction     recent cath with pci to lcx     continue asa 81mg daily, plavix    resume metoprolol 25mg daily     consider GI eval to r/o other etiology of discomfort         2.  HTN    bp stable     resume metoprolol 25 mg daily        3. Asthma     stable        4. ESRD s/p renal transplant    on tacrolimus            Attending Attestation:     Patient seen and examined, agree with the above assessment and plan by VICKEY Wang.    58 y/o M well known from prior admissions w/  PMHx of CABG, PCI with last stent to OM1 (9/20/19) complicated by left groin hematoma, Prior ESRD, kidney transplant, HTN, and asthma who presents with recurrent chest pain.     pt with multiple admissions for similar complaints    intensify antianginal tx    consider GI eval    no further ischemic workup planned .

## 2019-10-01 NOTE — DISCHARGE NOTE NURSING/CASE MANAGEMENT/SOCIAL WORK - PATIENT PORTAL LINK FT
You can access the FollowMyHealth Patient Portal offered by [facility] by registering at the following website: [email or web address]. By joining [employer]s FollowMyHealth portal, you will also be able to view your health information using other applications (apps) compatible with our system.

## 2019-10-01 NOTE — CONSULT NOTE ADULT - SUBJECTIVE AND OBJECTIVE BOX
NEPHROLOGY - NSN    Patient seen and examined.    HPI:    This is a 58 y/o M PMHx of CABG, PCI with last stent to OM1 (19) complicated by left groin hematoma, Prior ESRD, kidney transplant, HTN, and asthma who presents with chest tightness that started yesterday. The patient reports the pain is pressure like, substernal, nonradiating, and improved with nitroglycerin given by ED. He is unsure if it is related to exertion. Denies shortness of breath, cough, orthopnea, or palpitations. Denies fever, chills, abdominal pain, dysuria, headache, focal weakness, or numbness. He still has pain around the left groin site which is mildly better than before. He reports the swelling has reduced in size. Denies pain in his feet or calf with walking. (30 Sep 2019 17:16)    Pt has had renal transplant > 1 yr.  He was on HD about 5 yrs earlier and he did have APCKD.  He has done well since the transplant and no oppurtunistic infections.  His transplant team would like to cont the single strength bactrim as well.  His HTN is well controlled and no CVA.  Echo with preserved EF and 2 troponins are negative and dopplers on legs done and there no new pseudoaneurysm    PAST MEDICAL & SURGICAL HISTORY:  HTN (hypertension)  Coronary artery disease involving coronary bypass graft  ESRD (end stage renal disease) on dialysis  Obesity  Hemorrhoids  Gastroesophageal reflux disease without esophagitis  High cholesterol  ESRD on Dialysis: Tue, Thur &amp; Sat  CAD (Coronary Artery Disease)  PCK (Polycystic Kidney Disease)  HTN - Hypertension  Asthma: last attack , never hospitalized or intubated  AV fistula: b/l MILI GREEN  S/P coronary artery stent placement  S/P CABG x 5  S/P hemorrhoidectomy: and rectal polypectomy -2/3/2017.  AV fistula: right lower extremity 2 yrs  Left upper extrmity with graft 7 years ago  Stented coronary artery: x2 cardiac stents in , one cardiac stent in   CABG (Coronary Artery Bypass Graft):       MEDICATIONS  (STANDING):  aspirin  chewable 243 milliGRAM(s) Oral once  aspirin enteric coated 81 milliGRAM(s) Oral daily  atorvastatin 10 milliGRAM(s) Oral at bedtime  clopidogrel Tablet 75 milliGRAM(s) Oral daily  famotidine    Tablet 20 milliGRAM(s) Oral daily  mycophenolate mofetil 500 milliGRAM(s) Oral two times a day  predniSONE   Tablet 5 milliGRAM(s) Oral daily  tacrolimus 2 milliGRAM(s) Oral two times a day  trimethoprim   80 mG/sulfamethoxazole 400 mG 1 Tablet(s) Oral daily      Allergies    No Known Allergies    Intolerances        SOCIAL HISTORY:  Denies alcohol abuse, drug abuse or tobacco usage.     FAMILY HISTORY:  Family history of polycystic kidney (Sibling)  Family history of adult polycystic kidney disease (Sibling)      VITALS:  T(C): 36.4 (10-01-19 @ 05:01), Max: 37.1 (19 @ 14:46)  HR: 70 (10-01-19 @ 05:01) (60 - 70)  BP: 142/84 (10-01-19 @ 05:01) (122/77 - 148/81)  RR: 18 (10-01-19 @ 05:01) (17 - 18)  SpO2: 95% (10-01-19 @ 05:01) (95% - 100%)    REVIEW OF SYSTEMS:  Denies any nausea, vomiting, diarrhea, fever or chills.   Good oral intake and denies fatigue or weakness. All other pertinent systems are reviewed and are negative.    PHYSICAL EXAM:  Constitutional: NAD  HEENT: EOMI  Neck:  No JVD, supple   Respiratory: CTA B/L  Cardiovascular: S1 and S2, RRR  Gastrointestinal: + BS, soft, NT, ND  Extremities: No peripheral edema, + peripheral pulses  Neurological: A/O x 3, CN2-12 intact  Psychiatric: Normal mood, normal affect  : No Jean  Skin: No rashes, C/D/I  Access: Not applicable    I and O's:    Height (cm): 165.1 ( @ 14:46)  Weight (kg): 85 ( @ 14:46)  BMI (kg/m2): 31.2 ( @ 14:46)  BSA (m2): 1.92 ( @ 14:46)    LABS:                        14.7   10.8  )-----------( 180      ( 30 Sep 2019 15:13 )             46.6         130<L>  |  97  |  14  ----------------------------<  136<H>  4.2   |  22  |  1.10    Ca    9.4      30 Sep 2019 21:57  Phos  3.4       Mg     1.6         TPro  7.4  /  Alb  4.4  /  TBili  0.9  /  DBili  x   /  AST  19  /  ALT  20  /  AlkPhos  52        URINE:  Urinalysis Basic - ( 01 Oct 2019 00:49 )    Color: Light Yellow / Appearance: Clear / S.007 / pH: x  Gluc: x / Ketone: Negative  / Bili: Negative / Urobili: <2 mg/dL   Blood: x / Protein: Negative / Nitrite: Negative   Leuk Esterase: Negative / RBC: x / WBC x   Sq Epi: x / Non Sq Epi: x / Bacteria: x      Osmolality, Random Urine: 237 mosm/Kg ( @ 23:35)  Sodium, Random Urine: 36 mmol/L ( @ 18:32)  Creatinine, Random Urine: 26 mg/dL ( @ 18:32)    RADIOLOGY & ADDITIONAL STUDIES:       < from: US Duplex Arterial Lower Ext Ltd, Left (19 @ 17:22) >    EXAM:  US DPLX LWR EXT ARTS LTD LT                            PROCEDURE DATE:  2019            INTERPRETATION:  Clinical indication: Prior pseudoaneurysm, following   left groin catheterization.     Technique: Targeted sonogram of the left groin.    Comparison: 2019    FINDINGS:     Images of the left groin demonstrate patency of the common femoral artery   and vein. Normal spectral wave forms are obtained within the femoral   vessels. The previously seen pseudoaneurysm is not visualized on today's   exam. There is a persistent stable subcutaneous hematoma which appears   similar when compared to prior exam.    IMPRESSION:    No evidence of recurrent pseudoaneurysm.    Persistent stable subcutaneous hematoma.                RYNE COLLINS MD,RADIOLOGY FELLOW  This document has been electronically signed.  NILO BHAKTA M.D., ATTENDING RADIOLOGIST  This document has been electronically signed. Sep 30 2019  5:08PM                < end of copied text >
CARDIOLOGY CONSULT - Dr. Pérez     CHIEF COMPLAINT:  chest pain     HPI: 60 y/o M well known from prior admissions w/  PMHx of CABG, PCI with last stent to OM1 (19) complicated by left groin hematoma, Prior ESRD, kidney transplant, HTN, and asthma who presents with chest tightness that started yesterday. The patient reports the pain is pressure like, substernal, nonradiating, and improved with nitroglycerin given by ED. He is unsure if it is related to exertion. Denies shortness of breath, cough, orthopnea, or palpitations. Denies fever, chills, abdominal pain, dysuria, headache, focal weakness, or numbness. Currently feeling better, cp free.     PAST MEDICAL & SURGICAL HISTORY:  HTN (hypertension)  Coronary artery disease involving coronary bypass graft  ESRD (end stage renal disease) on dialysis  Obesity  Hemorrhoids  Gastroesophageal reflux disease without esophagitis  High cholesterol  ESRD on Dialysis: [place], Thur &amp; Sat  CAD (Coronary Artery Disease)  PCK (Polycystic Kidney Disease)  HTN - Hypertension  Asthma: last attack , never hospitalized or intubated  AV fistula: b/l MILI GRENE  S/P coronary artery stent placement  S/P CABG x 5  S/P hemorrhoidectomy: and rectal polypectomy -2/3/2017.  AV fistula: right lower extremity 2 yrs  Left upper extrmity with graft 7 years ago  Stented coronary artery: x2 cardiac stents in , one cardiac stent in   CABG (Coronary Artery Bypass Graft):           PREVIOUS DIAGNOSTIC TESTING:    [ ] Echocardiogram:   < from: TTE with Doppler (w/Cont) (19 @ 11:54) >  ------------------------------------------------------------------------  Conclusions:  1. Normal mitral valve. Mild mitral regurgitation.  2. Normal trileaflet aortic valve. No aortic valve  regurgitation seen.  3. Endocardial visualization enhanced with intravenous  injection of Ultrasonic Enhancing Agent (Definity). Mild  segmental left ventricular systolic dysfunction. The mid to  distal inferoseptal and basal inferior segments are  hypokinetic.  4. The right ventricle is not well visualized; grossly  normal right ventricular size and systolic function..    < end of copied text >  [ ]  Catheterization:   < from: Cardiac Cath Lab - Adult (19 @ 17:19) >  [facility]  Department of Cardiology  [address] [place] [id number] [phone]  Cath Lab Report -- Comprehensive Report  Patient: FLOYD NEVES  Study date: 2019  Account number: [id number]  MR number: [id number]  : 1960  Gender: Male  Race:   Case Physician(s):  CATIE Noonan M.D. Marinos Charalambous, M.D.  Adren Llanos M.D.  Demetrio Calderon M.D.  Fellow:  Stephanie Meraz M.D.  Referring Physician:  Wily Chauhan M.D.  INDICATIONS: Unstable angina - CCS3.  HISTORY: The patient has a history of coronary artery disease. The patient  has hypertension.    < end of copied text >  [ ] Stress Test:  < from: Nuclear Stress Test-Pharmacologic (19 @ 11:12) >  IMPRESSIONS:Abnormal Study  * Chest Pain: No chest pain with administration of  Regadenoson.  * Symptom: No Symptom.  * HR Response: Appropriate.  * BP Response: Appropriate.  * Heart Rhythm: Sinus Rhythm - 65 BPM.  * Conduction defects: right bundle branch block.  * Q Waves: II , III, aVF.  * Baseline ECG: T wave abnormality in aVL.  * ECG Changes: ST Elevation:  in leads V1 started at 02:00  min of infusion at HR of 96 and persisted 07:00 min into  post infusion.  * Arrhythmia: None.  * The left ventricle was enlarged. There are large-sized,  moderate to severe defects in the lateral and mid to basal  inferior/inferolateral that are fixed on prone imaging,  consistent with infarcts.  There is a large-sized, severe  defects in the inferopical and apicolateral walls that are  partially fixed but demonstrate reversibility, suggestive  of infarct with at least moderate mckay-infarct ischemia.  Notably there is right ventricular uptake.  * Post-stress gated wall motion analysis was performed  (LVEF = 43 %;LVEDV = 155 ml.), revealing moderate to  severe  hypokinesis in apical, inferolateral, lateral  wall(s).  Conclusion:  The left ventricle was enlarged. There are large-sized,  moderate to severe defects in the lateral and mid to basal  inferior/inferolateral that are fixed on prone imaging,  consistent with infarcts.  There is a large-sized, severe  defects in the inferopical and apicolateral walls that are  partially fixed but demonstrate reversibility, suggestive  of infarct with at least moderate mckay-infarct ischemia.  Notably, there is right ventricular uptake.    < end of copied text >  	     MEDICATIONS:  MEDICATIONS  (STANDING):  aspirin  chewable 243 milliGRAM(s) Oral once  aspirin enteric coated 81 milliGRAM(s) Oral daily  atorvastatin 10 milliGRAM(s) Oral at bedtime  clopidogrel Tablet 75 milliGRAM(s) Oral daily  famotidine    Tablet 20 milliGRAM(s) Oral daily  magnesium oxide 400 milliGRAM(s) Oral two times a day with meals  mycophenolate mofetil 500 milliGRAM(s) Oral two times a day  predniSONE   Tablet 5 milliGRAM(s) Oral daily  tacrolimus 2 milliGRAM(s) Oral two times a day  trimethoprim   80 mG/sulfamethoxazole 400 mG 1 Tablet(s) Oral daily      FAMILY HISTORY:  Family history of polycystic kidney (Sibling)  Family history of adult polycystic kidney disease (Sibling)      SOCIAL HISTORY:    [x ] Non-smoker  [ ] Smoker  [ ] Alcohol    Allergies    No Known Allergies    Intolerances    	    REVIEW OF SYSTEMS:  CONSTITUTIONAL: No fever, weight loss, or fatigue  EYES: No eye pain, visual disturbances, or discharge  ENMT:  No difficulty hearing, tinnitus, vertigo; No sinus or throat pain  NECK: No pain or stiffness  RESPIRATORY: No cough, wheezing, chills or hemoptysis; No Shortness of Breath  CARDIOVASCULAR: +chest pain, palpitations, passing out, dizziness, or leg swelling  GASTROINTESTINAL: No abdominal or epigastric pain. No nausea, vomiting, or hematemesis; No diarrhea or constipation. No melena or hematochezia.  GENITOURINARY: No dysuria, frequency, hematuria, or incontinence  NEUROLOGICAL: No headaches, memory loss, loss of strength, numbness, or tremors  SKIN: No itching, burning, rashes, or lesions   	    [ ] All others negative	  [ ] Unable to obtain    PHYSICAL EXAM:  T(C): 36.4 (10-01-19 @ 05:01), Max: 37.1 (19 @ 14:46)  HR: 70 (10-01-19 @ 05:01) (60 - 70)  BP: 142/84 (10-01-19 @ 05:01) (122/77 - 148/81)  RR: 18 (10-01-19 @ 05:01) (17 - 18)  SpO2: 95% (10-01-19 @ 05:01) (95% - 100%)  Wt(kg): --  I&O's Summary    Appearance: Normal	  Psychiatry: A & O x 3, Mood & affect appropriate  HEENT:   Normal oral mucosa, PERRL, EOMI	  Lymphatic: No lymphadenopathy  Cardiovascular: Normal S1 S2,RRR, No JVD, No murmurs  Respiratory: Lungs clear to auscultation	  Gastrointestinal:  Soft, Non-tender, + BS	  Skin: No rashes, No ecchymoses, No cyanosis	  Neurologic: Non-focal  Extremities: Normal range of motion, No clubbing, cyanosis or edema  Vascular: Peripheral pulses palpable 2+ bilaterally    TELEMETRY: NSR 	    ECG:  nsr, rbbb, no evidence of acute ischemia, unchanged from prior EKG 	  RADIOLOGY:   < from: Xray Chest 1 View AP/PA (19 @ 15:21) >  IMPRESSION:  Clear lungs.    < end of copied text >  OTHER: 	  	  LABS:	 	    CARDIAC MARKERS:                                  14.7   10.8  )-----------( 180      ( 30 Sep 2019 15:13 )             46.6     10    132<L>  |  96  |  13  ----------------------------<  187<H>  3.9   |  25  |  0.96    Ca    9.4      01 Oct 2019 09:39  Phos  2.9     10-  Mg     1.7     10-    TPro  7.6  /  Alb  4.7  /  TBili  1.1  /  DBili  x   /  AST  22  /  ALT  21  /  AlkPhos  53  10-01    PT/INR - ( 30 Sep 2019 15:13 )   PT: 12.9 sec;   INR: 1.12 ratio         PTT - ( 30 Sep 2019 15:13 )  PTT:28.4 sec  proBNP: Serum Pro-Brain Natriuretic Peptide: 341 pg/mL ( @ 15:13)    Lipid Profile:   HgA1c:   TSH:   \
[facility]  Isaías Carmichael PA-C  [address] [place] [address]  [phone]      Chief Complaint:  Patient is a 59y old  Male who presents with a chief complaint of Chest pain (01 Oct 2019 12:33)      HPI: This is a 60 y/o M PMHx of CABG, PCI with last stent to OM1 (19) complicated by left groin hematoma, Prior ESRD, kidney transplant, HTN, and asthma who presents with chest tightness that started yesterday. The patient reports the pain is pressure like, substernal, nonradiating, and improved with nitroglycerin given by ED. He is unsure if it is related to exertion. Denies shortness of breath, cough, orthopnea, or palpitations. Denies fever, chills, abdominal pain, dysuria, headache, focal weakness, or numbness. He still has pain around the left groin site which is mildly better than before. He reports the swelling has reduced in size. Denies pain in his feet or calf with walking.     Allergies:  No Known Allergies      Medications:  acetaminophen   Tablet .. 650 milliGRAM(s) Oral every 6 hours PRN  ALBUTerol/ipratropium for Nebulization 3 milliLiter(s) Nebulizer every 6 hours PRN  aspirin  chewable 243 milliGRAM(s) Oral once  aspirin enteric coated 81 milliGRAM(s) Oral daily  atorvastatin 10 milliGRAM(s) Oral at bedtime  clopidogrel Tablet 75 milliGRAM(s) Oral daily  famotidine    Tablet 20 milliGRAM(s) Oral daily  magnesium oxide 400 milliGRAM(s) Oral two times a day with meals  mycophenolate mofetil 500 milliGRAM(s) Oral two times a day  predniSONE   Tablet 5 milliGRAM(s) Oral daily  tacrolimus 2 milliGRAM(s) Oral two times a day  trimethoprim   80 mG/sulfamethoxazole 400 mG 1 Tablet(s) Oral daily      PMHX/PSHX:  HTN (hypertension)  Coronary artery disease involving coronary bypass graft  ESRD (end stage renal disease) on dialysis  Obesity  Hemorrhoids  Gastroesophageal reflux disease without esophagitis  High cholesterol  ESRD on Dialysis  CAD (Coronary Artery Disease)  HTN (Hypertension)  CAD (Coronary Atherosclerotic Disease)  PCK (Polycystic Kidney Disease)  HTN - Hypertension  Asthma  AV fistula  S/P coronary artery stent placement  S/P CABG x 5  S/P CABG x 7  S/P hemorrhoidectomy  AV fistula  Stented coronary artery  CABG (Coronary Artery Bypass Graft)  S/P CABG      Family history:  Family history of polycystic kidney (Sibling)  Family history of adult polycystic kidney disease (Sibling)  No pertinent family history in first degree relatives      Social History:     ROS:     General:  No wt loss, fevers, chills, night sweats, fatigue,   Eyes:  Good vision, no reported pain  ENT:  No sore throat, pain, runny nose, dysphagia  CV:  No pain, palpitations, hypo/hypertension  Resp:  No dyspnea, cough, tachypnea, wheezing  GI:  No pain, No nausea, No vomiting, No diarrhea, No constipation, No weight loss, No fever, No pruritis, No rectal bleeding, No tarry stools, No dysphagia,  :  No pain, bleeding, incontinence, nocturia  Muscle:  No pain, weakness  Neuro:  No weakness, tingling, memory problems  Psych:  No fatigue, insomnia, mood problems, depression  Endocrine:  No polyuria, polydipsia, cold/heat intolerance  Heme:  No petechiae, ecchymosis, easy bruisability  Skin:  No rash, tattoos, scars, edema      PHYSICAL EXAM:   Vital Signs:  Vital Signs Last 24 Hrs  T(C): 36.7 (01 Oct 2019 13:31), Max: 37.1 (30 Sep 2019 14:46)  T(F): 98 (01 Oct 2019 13:31), Max: 98.7 (30 Sep 2019 14:46)  HR: 71 (01 Oct 2019 13:31) (60 - 71)  BP: 136/79 (01 Oct 2019 13:31) (122/77 - 148/81)  BP(mean): --  RR: 18 (01 Oct 2019 13:31) (17 - 18)  SpO2: 95% (01 Oct 2019 13:31) (95% - 100%)  Daily Height in cm: 165.1 (30 Sep 2019 14:46)    Daily Weight in k.8 (01 Oct 2019 09:02)    GENERAL:  Appears stated age, well-groomed, well-nourished, no distress  HEENT:  NC/AT,  conjunctivae clear and pink, no thyromegaly, nodules, adenopathy, no JVD, sclera -anicteric  CHEST:  Full & symmetric excursion, no increased effort, breath sounds clear  HEART:  Regular rhythm, S1, S2, no murmur/rub/S3/S4, no abdominal bruit, no edema  ABDOMEN:  Soft, non-tender, non-distended, normoactive bowel sounds,  no masses ,no hepato-splenomegaly, no signs of chronic liver disease  EXTEREMITIES:  no cyanosis,clubbing or edema  SKIN:  No rash/erythema/ecchymoses/petechiae/wounds/abscess/warm/dry  NEURO:  Alert, oriented, no asterixis, no tremor, no encephalopathy    LABS:                        14.6   7.72  )-----------( 133      ( 01 Oct 2019 12:26 )             46.0     10    132<L>  |  96  |  13  ----------------------------<  187<H>  3.9   |  25  |  0.96    Ca    9.4      01 Oct 2019 09:39  Phos  2.9     10-  Mg     1.7     10-    TPro  7.6  /  Alb  4.7  /  TBili  1.1  /  DBili  x   /  AST  22  /  ALT  21  /  AlkPhos  53  10    LIVER FUNCTIONS - ( 01 Oct 2019 09:39 )  Alb: 4.7 g/dL / Pro: 7.6 g/dL / ALK PHOS: 53 U/L / ALT: 21 U/L / AST: 22 U/L / GGT: x           PT/INR - ( 30 Sep 2019 15:13 )   PT: 12.9 sec;   INR: 1.12 ratio         PTT - ( 30 Sep 2019 15:13 )  PTT:28.4 sec  Urinalysis Basic - ( 01 Oct 2019 00:49 )    Color: Light Yellow / Appearance: Clear / S.007 / pH: x  Gluc: x / Ketone: Negative  / Bili: Negative / Urobili: <2 mg/dL   Blood: x / Protein: Negative / Nitrite: Negative   Leuk Esterase: Negative / RBC: x / WBC x   Sq Epi: x / Non Sq Epi: x / Bacteria: x      Amylase Serum--      Lipase serum66       Ammonia--      Imaging:

## 2019-10-01 NOTE — DISCHARGE NOTE PROVIDER - CARE PROVIDER_API CALL
Wily Chauhan)  Cardiovascular Disease; Internal Medicine; Interventional Cardiology  [address] [place] [address], [place] [address]  Phone: [phone]  Fax: [phone]  Follow Up Time:     [facility] [employer])  [facility] Medicine; Internal Medicine  [address] [place] [address]  Phone: [phone]  Fax: [phone]  Follow Up Time:

## 2019-10-03 LAB
AMPHET UR-MCNC: NEGATIVE — SIGNIFICANT CHANGE UP
BARBITURATES, URINE.: NEGATIVE — SIGNIFICANT CHANGE UP
BENZODIAZ UR-MCNC: NEGATIVE — SIGNIFICANT CHANGE UP
COCAINE METAB.OTHER UR-MCNC: NEGATIVE — SIGNIFICANT CHANGE UP
CREATININE, URINE THERAPEUTIC: 29.3 MG/DL — SIGNIFICANT CHANGE UP
METHADONE UR-MCNC: NEGATIVE — SIGNIFICANT CHANGE UP
METHAQUALONE UR QL: NEGATIVE — SIGNIFICANT CHANGE UP
METHAQUALONE UR-MCNC: NEGATIVE — SIGNIFICANT CHANGE UP
OPIATES UR-MCNC: NEGATIVE — SIGNIFICANT CHANGE UP
PCP UR-MCNC: NEGATIVE — SIGNIFICANT CHANGE UP
PROPOXYPH UR QL: NEGATIVE — SIGNIFICANT CHANGE UP
THC UR QL: NEGATIVE — SIGNIFICANT CHANGE UP

## 2019-10-31 PROCEDURE — 71045 X-RAY EXAM CHEST 1 VIEW: CPT

## 2019-10-31 PROCEDURE — 82962 GLUCOSE BLOOD TEST: CPT

## 2019-10-31 PROCEDURE — 86803 HEPATITIS C AB TEST: CPT

## 2019-10-31 PROCEDURE — 37252 INTRVASC US NONCORONARY 1ST: CPT

## 2019-10-31 PROCEDURE — 85730 THROMBOPLASTIN TIME PARTIAL: CPT

## 2019-10-31 PROCEDURE — 80053 COMPREHEN METABOLIC PANEL: CPT

## 2019-10-31 PROCEDURE — 84100 ASSAY OF PHOSPHORUS: CPT

## 2019-10-31 PROCEDURE — 96374 THER/PROPH/DIAG INJ IV PUSH: CPT

## 2019-10-31 PROCEDURE — 84540 ASSAY OF URINE/UREA-N: CPT

## 2019-10-31 PROCEDURE — 84300 ASSAY OF URINE SODIUM: CPT

## 2019-10-31 PROCEDURE — C1887: CPT

## 2019-10-31 PROCEDURE — C1725: CPT

## 2019-10-31 PROCEDURE — 90686 IIV4 VACC NO PRSV 0.5 ML IM: CPT

## 2019-10-31 PROCEDURE — 83690 ASSAY OF LIPASE: CPT

## 2019-10-31 PROCEDURE — 76870 US EXAM SCROTUM: CPT

## 2019-10-31 PROCEDURE — 83735 ASSAY OF MAGNESIUM: CPT

## 2019-10-31 PROCEDURE — 93308 TTE F-UP OR LMTD: CPT

## 2019-10-31 PROCEDURE — 86901 BLOOD TYPING SEROLOGIC RH(D): CPT

## 2019-10-31 PROCEDURE — 93005 ELECTROCARDIOGRAM TRACING: CPT

## 2019-10-31 PROCEDURE — 82570 ASSAY OF URINE CREATININE: CPT

## 2019-10-31 PROCEDURE — 83880 ASSAY OF NATRIURETIC PEPTIDE: CPT

## 2019-10-31 PROCEDURE — 84484 ASSAY OF TROPONIN QUANT: CPT

## 2019-10-31 PROCEDURE — 80180 DRUG SCRN QUAN MYCOPHENOLATE: CPT

## 2019-10-31 PROCEDURE — 99285 EMERGENCY DEPT VISIT HI MDM: CPT | Mod: 25

## 2019-10-31 PROCEDURE — 84132 ASSAY OF SERUM POTASSIUM: CPT

## 2019-10-31 PROCEDURE — 82947 ASSAY GLUCOSE BLOOD QUANT: CPT

## 2019-10-31 PROCEDURE — 80307 DRUG TEST PRSMV CHEM ANLYZR: CPT

## 2019-10-31 PROCEDURE — 93459 L HRT ART/GRFT ANGIO: CPT | Mod: 59

## 2019-10-31 PROCEDURE — 99152 MOD SED SAME PHYS/QHP 5/>YRS: CPT

## 2019-10-31 PROCEDURE — 82803 BLOOD GASES ANY COMBINATION: CPT

## 2019-10-31 PROCEDURE — 85610 PROTHROMBIN TIME: CPT

## 2019-10-31 PROCEDURE — 82330 ASSAY OF CALCIUM: CPT

## 2019-10-31 PROCEDURE — 78452 HT MUSCLE IMAGE SPECT MULT: CPT

## 2019-10-31 PROCEDURE — C9600: CPT | Mod: LC

## 2019-10-31 PROCEDURE — 82435 ASSAY OF BLOOD CHLORIDE: CPT

## 2019-10-31 PROCEDURE — 84295 ASSAY OF SERUM SODIUM: CPT

## 2019-10-31 PROCEDURE — 86900 BLOOD TYPING SEROLOGIC ABO: CPT

## 2019-10-31 PROCEDURE — 86850 RBC ANTIBODY SCREEN: CPT

## 2019-10-31 PROCEDURE — 83605 ASSAY OF LACTIC ACID: CPT

## 2019-10-31 PROCEDURE — 94640 AIRWAY INHALATION TREATMENT: CPT

## 2019-10-31 PROCEDURE — 93926 LOWER EXTREMITY STUDY: CPT

## 2019-10-31 PROCEDURE — A9500: CPT

## 2019-10-31 PROCEDURE — 80048 BASIC METABOLIC PNL TOTAL CA: CPT

## 2019-10-31 PROCEDURE — 80197 ASSAY OF TACROLIMUS: CPT

## 2019-10-31 PROCEDURE — 99153 MOD SED SAME PHYS/QHP EA: CPT

## 2019-10-31 PROCEDURE — 93975 VASCULAR STUDY: CPT

## 2019-10-31 PROCEDURE — C1874: CPT

## 2019-10-31 PROCEDURE — 87507 IADNA-DNA/RNA PROBE TQ 12-25: CPT

## 2019-10-31 PROCEDURE — 93017 CV STRESS TEST TRACING ONLY: CPT

## 2019-10-31 PROCEDURE — 85027 COMPLETE CBC AUTOMATED: CPT

## 2019-10-31 PROCEDURE — 81003 URINALYSIS AUTO W/O SCOPE: CPT

## 2019-10-31 PROCEDURE — 87389 HIV-1 AG W/HIV-1&-2 AB AG IA: CPT

## 2019-10-31 PROCEDURE — C1769: CPT

## 2019-10-31 PROCEDURE — C1894: CPT

## 2019-10-31 PROCEDURE — 83935 ASSAY OF URINE OSMOLALITY: CPT

## 2019-10-31 PROCEDURE — 85014 HEMATOCRIT: CPT

## 2019-11-06 ENCOUNTER — APPOINTMENT (OUTPATIENT)
Dept: CARDIOLOGY | Facility: CLINIC | Age: 59
End: 2019-11-06
Payer: MEDICAID

## 2019-11-06 ENCOUNTER — NON-APPOINTMENT (OUTPATIENT)
Age: 59
End: 2019-11-06

## 2019-11-06 VITALS
OXYGEN SATURATION: 95 % | HEIGHT: 62 IN | BODY MASS INDEX: 35.7 KG/M2 | HEART RATE: 64 BPM | WEIGHT: 194 LBS | SYSTOLIC BLOOD PRESSURE: 119 MMHG | DIASTOLIC BLOOD PRESSURE: 67 MMHG

## 2019-11-06 DIAGNOSIS — Z76.82 AWAITING ORGAN TRANSPLANT STATUS: ICD-10-CM

## 2019-11-06 PROCEDURE — 93000 ELECTROCARDIOGRAM COMPLETE: CPT

## 2019-11-06 PROCEDURE — 99214 OFFICE O/P EST MOD 30 MIN: CPT

## 2019-11-12 NOTE — DISCUSSION/SUMMARY
[Cardiomyopathy] : cardiomyopathy [Coronary Artery Disease] : coronary artery disease [Hypertension] : hypertension [Stable] : stable [FreeTextEntry1] : \par Currently stable from a cardiovascular standpoint. Normotensive. History of ischemic cardiomyopathy. Currently euvolemic. Stable CAD (recent OM2 stent). Asymptomatic. Patient with renal transplant on immunosuppressive therapy. Continue current medications including aspirin and clopidogrel. ECG completed today and reviewed (findings as noted above). Follow up in 2 months.

## 2019-11-12 NOTE — PHYSICAL EXAM
[General Appearance - Well Developed] : well developed [Normal Appearance] : normal appearance [Well Groomed] : well groomed [General Appearance - Well Nourished] : well nourished [No Deformities] : no deformities [General Appearance - In No Acute Distress] : no acute distress [Normal Conjunctiva] : the conjunctiva exhibited no abnormalities [Eyelids - No Xanthelasma] : the eyelids demonstrated no xanthelasmas [Normal Oral Mucosa] : normal oral mucosa [No Oral Pallor] : no oral pallor [No Oral Cyanosis] : no oral cyanosis [Respiration, Rhythm And Depth] : normal respiratory rhythm and effort [Exaggerated Use Of Accessory Muscles For Inspiration] : no accessory muscle use [Auscultation Breath Sounds / Voice Sounds] : lungs were clear to auscultation bilaterally [Heart Rate And Rhythm] : heart rate and rhythm were normal [Heart Sounds] : normal S1 and S2 [Murmurs] : no murmurs present [Edema] : no peripheral edema present [Abdomen Soft] : soft [Abdomen Tenderness] : non-tender [Abnormal Walk] : normal gait [Gait - Sufficient For Exercise Testing] : the gait was sufficient for exercise testing [Cyanosis, Localized] : no localized cyanosis [Skin Color & Pigmentation] : normal skin color and pigmentation [] : no rash [Oriented To Time, Place, And Person] : oriented to person, place, and time [Affect] : the affect was normal [Mood] : the mood was normal [No Anxiety] : not feeling anxious [FreeTextEntry1] : right forearm fistula

## 2019-11-12 NOTE — HISTORY OF PRESENT ILLNESS
[FreeTextEntry1] : Doing okay. Denies chest pain, shortness of breath or palpitations. Walks regularly without issues.

## 2020-01-21 RX ORDER — ASPIRIN 81 MG/1
81 TABLET, DELAYED RELEASE ORAL DAILY
Qty: 30 | Refills: 11 | Status: ACTIVE | COMMUNITY
Start: 2018-05-29 | End: 1900-01-01

## 2020-01-31 ENCOUNTER — RX RENEWAL (OUTPATIENT)
Age: 60
End: 2020-01-31

## 2020-02-06 ENCOUNTER — APPOINTMENT (OUTPATIENT)
Dept: CARDIOLOGY | Facility: CLINIC | Age: 60
End: 2020-02-06
Payer: MEDICAID

## 2020-02-06 ENCOUNTER — NON-APPOINTMENT (OUTPATIENT)
Age: 60
End: 2020-02-06

## 2020-02-06 VITALS
HEIGHT: 62 IN | WEIGHT: 184 LBS | OXYGEN SATURATION: 95 % | DIASTOLIC BLOOD PRESSURE: 74 MMHG | SYSTOLIC BLOOD PRESSURE: 122 MMHG | BODY MASS INDEX: 33.86 KG/M2 | HEART RATE: 58 BPM

## 2020-02-06 PROCEDURE — 93000 ELECTROCARDIOGRAM COMPLETE: CPT

## 2020-02-06 PROCEDURE — 99214 OFFICE O/P EST MOD 30 MIN: CPT

## 2020-02-06 RX ORDER — CLOPIDOGREL BISULFATE 75 MG/1
75 TABLET, FILM COATED ORAL DAILY
Qty: 90 | Refills: 3 | Status: ACTIVE | COMMUNITY
Start: 2018-07-18 | End: 1900-01-01

## 2020-02-09 NOTE — DISCUSSION/SUMMARY
[Cardiomyopathy] : cardiomyopathy [Coronary Artery Disease] : coronary artery disease [Hypertension] : hypertension [Stable] : stable [FreeTextEntry1] : \par Currently stable from a cardiovascular standpoint. Normotensive. History of ischemic cardiomyopathy. Currently euvolemic. Stable CAD (s/p CABG, s/p Cx and OM stents). Asymptomatic. Patient with renal transplant on immunosuppressive therapy. Continue current medications. ECG completed today and reviewed. Follow up in 6 months.

## 2020-02-09 NOTE — PHYSICAL EXAM
[General Appearance - Well Developed] : well developed [Normal Appearance] : normal appearance [Well Groomed] : well groomed [No Deformities] : no deformities [General Appearance - Well Nourished] : well nourished [General Appearance - In No Acute Distress] : no acute distress [Normal Conjunctiva] : the conjunctiva exhibited no abnormalities [Eyelids - No Xanthelasma] : the eyelids demonstrated no xanthelasmas [Normal Oral Mucosa] : normal oral mucosa [No Oral Pallor] : no oral pallor [No Oral Cyanosis] : no oral cyanosis [Respiration, Rhythm And Depth] : normal respiratory rhythm and effort [Auscultation Breath Sounds / Voice Sounds] : lungs were clear to auscultation bilaterally [Exaggerated Use Of Accessory Muscles For Inspiration] : no accessory muscle use [Heart Rate And Rhythm] : heart rate and rhythm were normal [Murmurs] : no murmurs present [Heart Sounds] : normal S1 and S2 [Abdomen Soft] : soft [Edema] : no peripheral edema present [Abnormal Walk] : normal gait [Abdomen Tenderness] : non-tender [Gait - Sufficient For Exercise Testing] : the gait was sufficient for exercise testing [Cyanosis, Localized] : no localized cyanosis [Skin Color & Pigmentation] : normal skin color and pigmentation [Oriented To Time, Place, And Person] : oriented to person, place, and time [] : no rash [Affect] : the affect was normal [No Anxiety] : not feeling anxious [Mood] : the mood was normal [FreeTextEntry1] : right forearm fistula (non-working), LUE fistula

## 2020-08-06 ENCOUNTER — APPOINTMENT (OUTPATIENT)
Dept: CARDIOLOGY | Facility: CLINIC | Age: 60
End: 2020-08-06
Payer: MEDICAID

## 2020-08-06 ENCOUNTER — NON-APPOINTMENT (OUTPATIENT)
Age: 60
End: 2020-08-06

## 2020-08-06 VITALS
WEIGHT: 182 LBS | TEMPERATURE: 97.2 F | RESPIRATION RATE: 14 BRPM | SYSTOLIC BLOOD PRESSURE: 138 MMHG | BODY MASS INDEX: 33.49 KG/M2 | OXYGEN SATURATION: 96 % | HEIGHT: 62 IN | HEART RATE: 60 BPM | DIASTOLIC BLOOD PRESSURE: 73 MMHG

## 2020-08-06 PROCEDURE — 93000 ELECTROCARDIOGRAM COMPLETE: CPT

## 2020-08-06 PROCEDURE — 99214 OFFICE O/P EST MOD 30 MIN: CPT

## 2020-08-06 RX ORDER — ATORVASTATIN CALCIUM 20 MG/1
20 TABLET, FILM COATED ORAL DAILY
Qty: 30 | Refills: 5 | Status: ACTIVE | COMMUNITY

## 2020-08-06 RX ORDER — ATORVASTATIN CALCIUM 10 MG/1
10 TABLET, FILM COATED ORAL DAILY
Qty: 90 | Refills: 3 | Status: DISCONTINUED | COMMUNITY
Start: 2017-04-12 | End: 2020-08-06

## 2020-08-06 RX ORDER — CALCIUM CARBONATE/VITAMIN D3 500-10/5ML
400 LIQUID (ML) ORAL
Qty: 180 | Refills: 1 | Status: DISCONTINUED | COMMUNITY
Start: 2018-07-06 | End: 2020-08-06

## 2020-08-06 NOTE — HISTORY OF PRESENT ILLNESS
[FreeTextEntry1] : Doing okay. Denies shortness of breath or palpitations. Occasional pinching in his upper chest unrelated to exertion. Walks every other day for exercise.

## 2020-08-06 NOTE — PHYSICAL EXAM
[Normal Appearance] : normal appearance [General Appearance - Well Developed] : well developed [Well Groomed] : well groomed [General Appearance - Well Nourished] : well nourished [No Deformities] : no deformities [General Appearance - In No Acute Distress] : no acute distress [Normal Conjunctiva] : the conjunctiva exhibited no abnormalities [Eyelids - No Xanthelasma] : the eyelids demonstrated no xanthelasmas [No Oral Pallor] : no oral pallor [Normal Oral Mucosa] : normal oral mucosa [No Oral Cyanosis] : no oral cyanosis [Respiration, Rhythm And Depth] : normal respiratory rhythm and effort [Auscultation Breath Sounds / Voice Sounds] : lungs were clear to auscultation bilaterally [Exaggerated Use Of Accessory Muscles For Inspiration] : no accessory muscle use [Heart Rate And Rhythm] : heart rate and rhythm were normal [Heart Sounds] : normal S1 and S2 [Murmurs] : no murmurs present [Edema] : no peripheral edema present [Abdomen Tenderness] : non-tender [Abdomen Soft] : soft [Gait - Sufficient For Exercise Testing] : the gait was sufficient for exercise testing [Cyanosis, Localized] : no localized cyanosis [Abnormal Walk] : normal gait [Skin Color & Pigmentation] : normal skin color and pigmentation [Mood] : the mood was normal [] : no rash [Oriented To Time, Place, And Person] : oriented to person, place, and time [Affect] : the affect was normal [No Anxiety] : not feeling anxious [FreeTextEntry1] : right forearm fistula (non-working), LUE fistula

## 2020-08-06 NOTE — DISCUSSION/SUMMARY
[Cardiomyopathy] : cardiomyopathy [Coronary Artery Disease] : coronary artery disease [Hypertension] : hypertension [Hyperlipidemia] : hyperlipidemia [FreeTextEntry1] : \par Currently stable from cardiovascular standpoint. Normotensive. History of ischemic cardiomyopathy. Currently appears euvolemic. Stable CAD (s/p CABG, prox Cx and OM2 stents). Atypical chest pains likely noncardiac in origin. Lipid profile from 6/8/20 reviewed (chol 159, LDL 87, HDL 50, trig 109). Lipids acceptable at this time. Continue current medications. ECG completed today and reviewed. Follow up in 3-4 months. [Stable] : stable

## 2020-08-06 NOTE — REVIEW OF SYSTEMS
[Chest Pain] : chest pain [Easy Bruising] : a tendency for easy bruising [Negative] : Psychiatric [Shortness Of Breath] : no shortness of breath [Dyspnea on exertion] : not dyspnea during exertion [Lower Ext Edema] : no extremity edema [Leg Claudication] : no intermittent leg claudication [Palpitations] : no palpitations

## 2020-08-26 NOTE — H&P ADULT - NEGATIVE SKIN SYMPTOMS
History & Physical    Name: Mily Aj MRN: [id number]  SSN: [id number]    YOB: 1997  Age: 23 y.o. Sex: female      Subjective:     Reason for Admission:  Pregnancy and gastroenteritis with bloody diarrhea, vomiting and dehydration with low potassium    History of Present Illness: Ms. Angelo Velázquez is a 23 y.o.  female with an estimated gestational age of 25w3d with Estimated Date of Delivery: 21. pt is approximately 19 weeks. Patient complains of moderate abdominal pain and moderate pelvic pressure for 3 days. Pregnancy has been uncomplicated until this weekend when pt reports a low grade fever and then nausea and pelvic cramping. Low grade fever resolved in one day. Nausea and diarrhea has increased. She is not keeping even liquids down. She started on  with diarrhea which has progressed to bloody yellow liquid. She has fecal urgency with pelvic cramping. She is unsure if she is having contractions or vaginal bleeding, but she thinks blood is coming from rectum. Denies sick contacts. OB History    Para Term  AB Living   2 1 1     1   SAB TAB Ectopic Molar Multiple Live Births           0 1      # Outcome Date GA Lbr Sundeep/2nd Weight Sex Delivery Anes PTL Lv   2 Current            1 Term 19 39w0d  3.29 kg F Vag-Spont EPIDURAL AN N SHAD     Past Medical History:   Diagnosis Date    Primary oligomenorrhea 2013    PUPP (pruritic urticarial papules and plaques of pregnancy) 2019     Past Surgical History:   Procedure Laterality Date    HX OTHER SURGICAL      frenulum release    HX OTHER SURGICAL      arthroscopic knee     Social History     Occupational History    Not on file   Tobacco Use    Smoking status: Never Smoker    Smokeless tobacco: Never Used   Substance and Sexual Activity    Alcohol use: No     Frequency: Never    Drug use: No    Sexual activity: Yes     Partners: Male      No family history on file.     No Known Allergies  Prior to Admission medications    Medication Sig Start Date End Date Taking? Authorizing Provider   HYDROcodone-acetaminophen (NORCO) 5-325 mg per tablet Take 1-2 Tabs by mouth every six (6) hours as needed. Max Daily Amount: 8 Tabs. 2/13/19   Lino Huang MD   prenatal vit-iron fumarate-fa 27 mg iron- 0.8 mg tab tablet Take 1 Tab by mouth daily. Provider, Historical   progesterone (PROMETRIUM) 100 mg capsule 1 po q hs prn if no period for 8-12 weeks 11/20/13   Mat Villegas MD        Review of Systems:  A comprehensive review of systems was negative except for that written in the History of Present Illness. A 12 point review of systems negative.      Objective:     Vitals:      Physical Exam:  Patient without distress except appears pale and having frequent trips to bathroom  Heart: Regular rate and rhythm  Lung: clear to auscultation throughout lung fields, no wheezes, no rales, no rhonchi and normal respiratory effort  Back: costovertebral angle tenderness absent  Abdomen: soft, nontender  Fundus: soft and non tender  Perineum: blood absent, amniotic fluid absent  Cervical Exam: Closed/Thick/High  Lower Extremities:  - Edema No   - Patellar Reflexes: 2+ bilaterally  Skin - no rashes or lesions  Rectal exam- no obvious hemorrhoids or masses, tolerated exam without pain     Pt having watery yellow fluid stool with blood present  Membranes:  Intact  Uterine Activity:  None  Fetal Heart Rate:  +  Brief bedside ultrasound- vertex, subjectively normal fluid, good movement, grade 1 posterior placenta       Lab/Data Review:  Recent Results (from the past 24 hour(s))   CBC WITH AUTOMATED DIFF    Collection Time: 08/26/20  4:08 AM   Result Value Ref Range    WBC 10.4 4.3 - 11.1 K/uL    RBC 4.43 4.05 - 5.2 M/uL    HGB 13.4 11.7 - 15.4 g/dL    HCT 38.5 35.8 - 46.3 %    MCV 86.9 79.6 - 97.8 FL    MCH 30.2 26.1 - 32.9 PG    MCHC 34.8 31.4 - 35.0 g/dL    RDW 13.3 11.9 - 14.6 %    PLATELET 069 525 - 667 K/uL    MPV 9.9 9.4 - 12.3 FL    ABSOLUTE NRBC 0.00 0.0 - 0.2 K/uL    DF AUTOMATED      NEUTROPHILS 85 (H) 43 - 78 %    LYMPHOCYTES 9 (L) 13 - 44 %    MONOCYTES 5 4.0 - 12.0 %    EOSINOPHILS 0 (L) 0.5 - 7.8 %    BASOPHILS 0 0.0 - 2.0 %    IMMATURE GRANULOCYTES 1 0.0 - 5.0 %    ABS. NEUTROPHILS 8.8 (H) 1.7 - 8.2 K/UL    ABS. LYMPHOCYTES 1.0 0.5 - 4.6 K/UL    ABS. MONOCYTES 0.5 0.1 - 1.3 K/UL    ABS. EOSINOPHILS 0.0 0.0 - 0.8 K/UL    ABS. BASOPHILS 0.0 0.0 - 0.2 K/UL    ABS. IMM. GRANS. 0.1 0.0 - 0.5 K/UL   METABOLIC PANEL, COMPREHENSIVE    Collection Time: 20  4:08 AM   Result Value Ref Range    Sodium 135 (L) 136 - 145 mmol/L    Potassium 3.3 (L) 3.5 - 5.1 mmol/L    Chloride 103 98 - 107 mmol/L    CO2 20 (L) 21 - 32 mmol/L    Anion gap 12 7 - 16 mmol/L    Glucose 109 (H) 65 - 100 mg/dL    BUN 6 6 - 23 MG/DL    Creatinine 0.38 (L) 0.6 - 1.0 MG/DL    GFR est AA >60 >60 ml/min/1.73m2    GFR est non-AA >60 >60 ml/min/1.73m2    Calcium 8.4 8.3 - 10.4 MG/DL    Bilirubin, total 0.4 0.2 - 1.1 MG/DL    ALT (SGPT) 74 (H) 12 - 65 U/L    AST (SGOT) 29 15 - 37 U/L    Alk. phosphatase 119 50 - 136 U/L    Protein, total 7.1 6.3 - 8.2 g/dL    Albumin 2.7 (L) 3.5 - 5.0 g/dL    Globulin 4.4 (H) 2.3 - 3.5 g/dL    A-G Ratio 0.6 (L) 1.2 - 3.5         Assessment and Plan:      Active Problems:    Diarrhea (2020)      Rectal bleeding (2020)      19 weeks gestation of pregnancy (2020)      Pelvic pain in antepartum period in second trimester (2020)      Gastroenteritis (2020)      Antepartum dehydration (2020)     hypokalemia    Will iv hydrate , treat slightly low potassium, zofran, lomotil, stool for cdiff, culture, o&p  GI consult  Advance diet as tolerated  No evidence of  contractions/ reassuring fetal status no dryness/no itching/no rash

## 2020-09-03 ENCOUNTER — LABORATORY RESULT (OUTPATIENT)
Age: 60
End: 2020-09-03

## 2020-09-03 ENCOUNTER — APPOINTMENT (OUTPATIENT)
Dept: TRANSPLANT | Facility: CLINIC | Age: 60
End: 2020-09-03

## 2020-09-04 LAB
ALBUMIN SERPL ELPH-MCNC: 4.5 G/DL
ALP BLD-CCNC: 48 U/L
ALT SERPL-CCNC: 17 U/L
ANION GAP SERPL CALC-SCNC: 11 MMOL/L
APPEARANCE: CLEAR
AST SERPL-CCNC: 16 U/L
BACTERIA: NEGATIVE
BASOPHILS # BLD AUTO: 0.03 K/UL
BASOPHILS NFR BLD AUTO: 0.5 %
BILIRUB SERPL-MCNC: 0.7 MG/DL
BILIRUBIN URINE: NEGATIVE
BLOOD URINE: NORMAL
BUN SERPL-MCNC: 13 MG/DL
CALCIUM SERPL-MCNC: 9.4 MG/DL
CALCIUM SERPL-MCNC: 9.4 MG/DL
CHLORIDE SERPL-SCNC: 97 MMOL/L
CO2 SERPL-SCNC: 25 MMOL/L
COLOR: NORMAL
CREAT SERPL-MCNC: 0.92 MG/DL
CREAT SPEC-SCNC: 92 MG/DL
CREAT/PROT UR: 0.1 RATIO
EOSINOPHIL # BLD AUTO: 0.05 K/UL
EOSINOPHIL NFR BLD AUTO: 0.9 %
GLUCOSE QUALITATIVE U: NEGATIVE
GLUCOSE SERPL-MCNC: 137 MG/DL
HCT VFR BLD CALC: 52 %
HGB BLD-MCNC: 16.6 G/DL
HYALINE CASTS: 0 /LPF
IMM GRANULOCYTES NFR BLD AUTO: 0.2 %
KETONES URINE: NEGATIVE
LDH SERPL-CCNC: 166 U/L
LEUKOCYTE ESTERASE URINE: NEGATIVE
LYMPHOCYTES # BLD AUTO: 1.41 K/UL
LYMPHOCYTES NFR BLD AUTO: 24.3 %
MAGNESIUM SERPL-MCNC: 1.5 MG/DL
MAN DIFF?: NORMAL
MCHC RBC-ENTMCNC: 27.2 PG
MCHC RBC-ENTMCNC: 31.9 GM/DL
MCV RBC AUTO: 85.2 FL
MICROSCOPIC-UA: NORMAL
MONOCYTES # BLD AUTO: 0.54 K/UL
MONOCYTES NFR BLD AUTO: 9.3 %
NEUTROPHILS # BLD AUTO: 3.77 K/UL
NEUTROPHILS NFR BLD AUTO: 64.8 %
NITRITE URINE: NEGATIVE
PARATHYROID HORMONE INTACT: 71 PG/ML
PH URINE: 6.5
PHOSPHATE SERPL-MCNC: 3.4 MG/DL
PLATELET # BLD AUTO: 107 K/UL
POTASSIUM SERPL-SCNC: 4.1 MMOL/L
PROT SERPL-MCNC: 6.5 G/DL
PROT UR-MCNC: 13 MG/DL
PROTEIN URINE: NEGATIVE
RBC # BLD: 6.1 M/UL
RBC # FLD: 13.8 %
RED BLOOD CELLS URINE: 0 /HPF
SODIUM SERPL-SCNC: 132 MMOL/L
SPECIFIC GRAVITY URINE: 1.01
SQUAMOUS EPITHELIAL CELLS: 0 /HPF
TACROLIMUS SERPL-MCNC: 7.9 NG/ML
URATE SERPL-MCNC: 5.5 MG/DL
UROBILINOGEN URINE: NORMAL
WBC # FLD AUTO: 5.81 K/UL
WHITE BLOOD CELLS URINE: 0 /HPF

## 2020-09-05 LAB
BKV DNA SPEC QL NAA+PROBE: NOT DETECTED COPIES/ML
CMV DNA SPEC QL NAA+PROBE: NOT DETECTED IU/ML

## 2020-09-09 ENCOUNTER — OUTPATIENT (OUTPATIENT)
Dept: OUTPATIENT SERVICES | Facility: HOSPITAL | Age: 60
LOS: 1 days | End: 2020-09-09
Payer: MEDICAID

## 2020-09-09 ENCOUNTER — APPOINTMENT (OUTPATIENT)
Dept: CV DIAGNOSTICS | Facility: HOSPITAL | Age: 60
End: 2020-09-09

## 2020-09-09 DIAGNOSIS — I77.0 ARTERIOVENOUS FISTULA, ACQUIRED: Chronic | ICD-10-CM

## 2020-09-09 DIAGNOSIS — I25.10 ATHEROSCLEROTIC HEART DISEASE OF NATIVE CORONARY ARTERY WITHOUT ANGINA PECTORIS: ICD-10-CM

## 2020-09-09 DIAGNOSIS — Z95.5 PRESENCE OF CORONARY ANGIOPLASTY IMPLANT AND GRAFT: Chronic | ICD-10-CM

## 2020-09-09 DIAGNOSIS — R07.89 OTHER CHEST PAIN: ICD-10-CM

## 2020-09-09 DIAGNOSIS — Z98.890 OTHER SPECIFIED POSTPROCEDURAL STATES: Chronic | ICD-10-CM

## 2020-09-09 DIAGNOSIS — Z95.1 PRESENCE OF AORTOCORONARY BYPASS GRAFT: Chronic | ICD-10-CM

## 2020-09-09 PROCEDURE — 93018 CV STRESS TEST I&R ONLY: CPT | Mod: GC

## 2020-09-09 PROCEDURE — 78452 HT MUSCLE IMAGE SPECT MULT: CPT | Mod: 26

## 2020-09-09 PROCEDURE — 93016 CV STRESS TEST SUPVJ ONLY: CPT | Mod: GC

## 2020-09-24 ENCOUNTER — OUTPATIENT (OUTPATIENT)
Dept: OUTPATIENT SERVICES | Facility: HOSPITAL | Age: 60
LOS: 1 days | Discharge: HOME | End: 2020-09-24

## 2020-09-24 DIAGNOSIS — Z95.5 PRESENCE OF CORONARY ANGIOPLASTY IMPLANT AND GRAFT: Chronic | ICD-10-CM

## 2020-09-24 DIAGNOSIS — D64.9 ANEMIA, UNSPECIFIED: ICD-10-CM

## 2020-09-24 DIAGNOSIS — Z95.1 PRESENCE OF AORTOCORONARY BYPASS GRAFT: Chronic | ICD-10-CM

## 2020-09-24 DIAGNOSIS — I77.0 ARTERIOVENOUS FISTULA, ACQUIRED: Chronic | ICD-10-CM

## 2020-09-24 DIAGNOSIS — Z98.890 OTHER SPECIFIED POSTPROCEDURAL STATES: Chronic | ICD-10-CM

## 2020-09-25 ENCOUNTER — APPOINTMENT (OUTPATIENT)
Dept: UROLOGY | Facility: CLINIC | Age: 60
End: 2020-09-25
Payer: MEDICAID

## 2020-09-25 VITALS
BODY MASS INDEX: 30.43 KG/M2 | DIASTOLIC BLOOD PRESSURE: 80 MMHG | SYSTOLIC BLOOD PRESSURE: 153 MMHG | HEIGHT: 62 IN | RESPIRATION RATE: 14 BRPM | WEIGHT: 165.35 LBS | HEART RATE: 58 BPM

## 2020-09-25 DIAGNOSIS — Z12.5 ENCOUNTER FOR SCREENING FOR MALIGNANT NEOPLASM OF PROSTATE: ICD-10-CM

## 2020-09-25 DIAGNOSIS — Q61.3 POLYCYSTIC KIDNEY, UNSPECIFIED: ICD-10-CM

## 2020-09-25 PROCEDURE — 99203 OFFICE O/P NEW LOW 30 MIN: CPT

## 2020-09-25 RX ORDER — VALGANCICLOVIR HYDROCHLORIDE 450 MG/1
450 TABLET ORAL
Qty: 30 | Refills: 5 | Status: COMPLETED | COMMUNITY
Start: 2018-07-18 | End: 2020-09-25

## 2020-09-25 RX ORDER — SODIUM BICARBONATE 650 MG/1
650 TABLET ORAL
Qty: 90 | Refills: 3 | Status: COMPLETED | COMMUNITY
Start: 2018-07-27 | End: 2020-09-25

## 2020-09-25 NOTE — HISTORY OF PRESENT ILLNESS
[FreeTextEntry1] : Celio Smith presents to the office today.  He is a 60-year-old man referred by Dr. Cortes from the transplant center.  The patient is status post a  donor renal transplant performed in May 2018.  He has autosomal dominant polycystic kidney disease with multiple family members affected and he and his sister both of undergone renal transplant surgery.  He had noticed an episode of gross hematuria which occurred about 2 months ago.  It has not reoccurred since that time.  He reports a baseline normal urinary stream without any dysuria.  He is a non-smoker and has never been a smoker in the past.  He has been an  and [place] but has no known environmental exposures.\par \par He has not had any recent cross-sectional imaging of the kidneys.\par

## 2020-09-25 NOTE — ASSESSMENT
[FreeTextEntry1] : This is a 60-year-old man with an episode of gross hematuria a few months ago and no recent cross-sectional imaging.  He has known polycystic kidney disease which may be a risk factor for the hematuria as he could have blood into a renal cyst causing the hematuria to be present.  He also may have an issue of benign prostatic hypertrophy given his nocturia and he is on anticoagulation at baseline which would make bleeding from the prostate also a potential cause.  I did explain to him that I would recommend additional work-up to help rule out malignancy as a potential cause of the hematuria.  A noncontrast CT scan will be obtained given his status as a renal transplant recipient and I would also recommend a cystoscopy to assess the bladder for any signs of bladder malignancy.  He is in agreement with these exams.  I will arrange both of these and see him back to discuss the results on that same day.

## 2020-09-28 LAB
APPEARANCE: CLEAR
BACTERIA UR CULT: NORMAL
BACTERIA: NEGATIVE
BILIRUBIN URINE: NEGATIVE
BLOOD URINE: NEGATIVE
COLOR: NORMAL
GLUCOSE QUALITATIVE U: NEGATIVE
HYALINE CASTS: 0 /LPF
KETONES URINE: NEGATIVE
LEUKOCYTE ESTERASE URINE: NEGATIVE
MICROSCOPIC-UA: NORMAL
NITRITE URINE: NEGATIVE
PH URINE: 6.5
PROTEIN URINE: NEGATIVE
PSA FREE FLD-MCNC: 25 %
PSA FREE SERPL-MCNC: 0.14 NG/ML
PSA SERPL-MCNC: 0.55 NG/ML
RED BLOOD CELLS URINE: 0 /HPF
SPECIFIC GRAVITY URINE: 1
SQUAMOUS EPITHELIAL CELLS: 0 /HPF
UROBILINOGEN URINE: NORMAL
WHITE BLOOD CELLS URINE: 0 /HPF

## 2020-10-01 ENCOUNTER — APPOINTMENT (OUTPATIENT)
Dept: HEMATOLOGY ONCOLOGY | Facility: CLINIC | Age: 60
End: 2020-10-01

## 2020-10-13 ENCOUNTER — APPOINTMENT (OUTPATIENT)
Dept: CT IMAGING | Facility: IMAGING CENTER | Age: 60
End: 2020-10-13

## 2020-10-13 ENCOUNTER — APPOINTMENT (OUTPATIENT)
Dept: UROLOGY | Facility: CLINIC | Age: 60
End: 2020-10-13
Payer: MEDICAID

## 2020-10-13 ENCOUNTER — OUTPATIENT (OUTPATIENT)
Dept: OUTPATIENT SERVICES | Facility: HOSPITAL | Age: 60
LOS: 1 days | End: 2020-10-13
Payer: MEDICAID

## 2020-10-13 VITALS — TEMPERATURE: 98.6 F

## 2020-10-13 VITALS — HEART RATE: 61 BPM | DIASTOLIC BLOOD PRESSURE: 66 MMHG | SYSTOLIC BLOOD PRESSURE: 145 MMHG

## 2020-10-13 DIAGNOSIS — I77.0 ARTERIOVENOUS FISTULA, ACQUIRED: Chronic | ICD-10-CM

## 2020-10-13 DIAGNOSIS — Z98.890 OTHER SPECIFIED POSTPROCEDURAL STATES: Chronic | ICD-10-CM

## 2020-10-13 DIAGNOSIS — Z95.5 PRESENCE OF CORONARY ANGIOPLASTY IMPLANT AND GRAFT: Chronic | ICD-10-CM

## 2020-10-13 DIAGNOSIS — R35.0 FREQUENCY OF MICTURITION: ICD-10-CM

## 2020-10-13 DIAGNOSIS — Z95.1 PRESENCE OF AORTOCORONARY BYPASS GRAFT: Chronic | ICD-10-CM

## 2020-10-13 PROCEDURE — 99213 OFFICE O/P EST LOW 20 MIN: CPT | Mod: 25

## 2020-10-13 PROCEDURE — 52000 CYSTOURETHROSCOPY: CPT

## 2020-10-19 DIAGNOSIS — R31.0 GROSS HEMATURIA: ICD-10-CM

## 2021-02-24 NOTE — H&P PST ADULT - MS EXT PE MLT D E PC
PAST SURGICAL HISTORY:  2002 left leg stent     Acute myocardial infarction as per pt in 2001, no coronary stent    Bladder mass cystoscopy, TURBT, transurethral incision of bladder neck- 06/2018    Cataract surgery 30 years ago     Exposure to radiation for prostate cancer (seed implant)    FH: carotid endarterectomy     H/O vascular surgery left suprficial femoral artery stent- 2019 November    History of bladder surgery     History of colon surgery 2007    History of vascular access device 05/2020  insertion in left upper arm ( PICC) , removal after 2 weeks of abx    right wrist surgery with hardware 27 years ago     S/P cardiac catheterization 2016 with 1 FEMI stent    S/P carotid endarterectomy right 01/2019    S/P TURP (status post transurethral resection of prostate)     
no cyanosis/no pedal edema

## 2021-03-01 PROCEDURE — G9005: CPT

## 2021-04-22 ENCOUNTER — APPOINTMENT (OUTPATIENT)
Dept: CARDIOLOGY | Facility: CLINIC | Age: 61
End: 2021-04-22
Payer: MEDICAID

## 2021-04-22 ENCOUNTER — NON-APPOINTMENT (OUTPATIENT)
Age: 61
End: 2021-04-22

## 2021-04-22 VITALS
TEMPERATURE: 97.4 F | HEART RATE: 56 BPM | WEIGHT: 164 LBS | DIASTOLIC BLOOD PRESSURE: 81 MMHG | OXYGEN SATURATION: 97 % | RESPIRATION RATE: 14 BRPM | SYSTOLIC BLOOD PRESSURE: 147 MMHG | BODY MASS INDEX: 30.18 KG/M2 | HEIGHT: 62 IN

## 2021-04-22 VITALS — SYSTOLIC BLOOD PRESSURE: 134 MMHG | DIASTOLIC BLOOD PRESSURE: 72 MMHG

## 2021-04-22 DIAGNOSIS — Z87.448 PERSONAL HISTORY OF OTHER DISEASES OF URINARY SYSTEM: ICD-10-CM

## 2021-04-22 PROCEDURE — 99214 OFFICE O/P EST MOD 30 MIN: CPT

## 2021-04-22 PROCEDURE — 93000 ELECTROCARDIOGRAM COMPLETE: CPT

## 2021-04-22 NOTE — DISCUSSION/SUMMARY
[Coronary Artery Disease] : coronary artery disease [Hypertension] : hypertension [Stable] : stable [FreeTextEntry1] : \par Currently stable from a cardiovascular standpoint. Normotensive. History of ischemic cardiomyopathy (LVEF 50%). Currently euvolemic. Stable CAD (s/p prox Cx and OM2 stents). Asymptomatic. Patient with renal transplant on immunosuppressive therapy. Continue current medications. ECG completed today and reviewed. Follow up 4-6 months.

## 2021-04-22 NOTE — PHYSICAL EXAM
[General Appearance - Well Developed] : well developed [Normal Appearance] : normal appearance [Well Groomed] : well groomed [General Appearance - Well Nourished] : well nourished [No Deformities] : no deformities [General Appearance - In No Acute Distress] : no acute distress [Normal Conjunctiva] : the conjunctiva exhibited no abnormalities [Eyelids - No Xanthelasma] : the eyelids demonstrated no xanthelasmas [Normal Oral Mucosa] : normal oral mucosa [No Oral Pallor] : no oral pallor [No Oral Cyanosis] : no oral cyanosis [Respiration, Rhythm And Depth] : normal respiratory rhythm and effort [Exaggerated Use Of Accessory Muscles For Inspiration] : no accessory muscle use [Auscultation Breath Sounds / Voice Sounds] : lungs were clear to auscultation bilaterally [Heart Rate And Rhythm] : heart rate and rhythm were normal [Heart Sounds] : normal S1 and S2 [Murmurs] : no murmurs present [Edema] : no peripheral edema present [Abdomen Soft] : soft [Abdomen Tenderness] : non-tender [Abnormal Walk] : normal gait [Gait - Sufficient For Exercise Testing] : the gait was sufficient for exercise testing [Cyanosis, Localized] : no localized cyanosis [FreeTextEntry1] : right forearm fistula (non-working), LUE fistula [Skin Color & Pigmentation] : normal skin color and pigmentation [] : no rash [Oriented To Time, Place, And Person] : oriented to person, place, and time [Affect] : the affect was normal [Mood] : the mood was normal [No Anxiety] : not feeling anxious

## 2021-04-29 ENCOUNTER — APPOINTMENT (OUTPATIENT)
Dept: TRANSPLANT | Facility: CLINIC | Age: 61
End: 2021-04-29

## 2021-04-30 LAB
COVID-19 SPIKE DOMAIN ANTIBODY INTERPRETATION: NEGATIVE
SARS-COV-2 AB SERPL IA-ACNC: 0.4 U/ML

## 2021-07-05 NOTE — PATIENT PROFILE ADULT. - NS TRANSFER DISPOSITION PATIENT BELONGINGS
Problem: Respiratory Impairment - Respiratory Therapy 253  Intervention: Inhaled medication delivery  Note: Intervention Status  Done  Intervention: Inhaled gas administration  Note: Intervention Status  Done  Intervention: Respiratory support devices  Note: Intervention Status  Not Done      with patient

## 2021-08-05 NOTE — ED ADULT NURSE REASSESSMENT NOTE - NS ED NURSE REASSESS RESPONSE TO CARE
"Chief Complaint   Patient presents with     Physical       Initial /78   Pulse 92   Temp 97.4  F (36.3  C) (Tympanic)   Resp 18   Ht 1.562 m (5' 1.5\")   Wt 68 kg (150 lb)   LMP 04/24/2021 (Approximate)   SpO2 96%   BMI 27.88 kg/m   Estimated body mass index is 27.88 kg/m  as calculated from the following:    Height as of this encounter: 1.562 m (5' 1.5\").    Weight as of this encounter: 68 kg (150 lb).  Medication Reconciliation: complete    Codi Nicolas LPN  "
feeling better
feeling better

## 2021-09-10 NOTE — DISCHARGE NOTE ADULT - MEDICATION SUMMARY - MEDICATIONS TO CHANGE
[FreeTextEntry1] : This is a 67 year year old female here today for follow up cardiac evaluation. \par She has a past medical history significant for  non-insulin-dependent diabetes mellitus, hypertension, hyperlipidemia, status post bariatric surgery, gastric sleeve in 2018, status post spinal fusion surgery, status post cholecystectomy, nephrolithiasis, status post cataract surgery.\par \par CHIEF COMPLAINT:\par Today she is feeling generally well and does not have any complaints at this time. She is here for BP check and also for cardiac clearance for spine surgery which is scheduled for 9/30/2021 by Dr. Joseph at [place]. She would also like a copy sent to her PCP Dr. Freed.\par \par The patient is clear from a cardiac standpoint for her spinal surgery. Please avoid overhydration. The patient should be allowed to take their usual medications in the perioperative period. Maintain proper DVT prophylaxis. The patient should have an incentive spirometer in the perioperative period. \par \par -Her cardiac risk factors include hypertension, hyperlipidemia, psoriasis, history of smoking approximately 2 packs per week, she stopped 20 years ago), and diabetes mellitus. The patient had a history of non-insulin-dependent diabetes mellitus, which improved after bariatric gastric sleeve surgery. She has no history of rheumatic fever and does not drink excessive caffeine or alcohol.\par \par BLOOD PRESSURE:\par -BP is better controlled on today's exam and she is only on Metoprolol ER 25mg PO DAILY and on Telmisartan HCTZ 80/12.5mg PO DAILY.\par \par -I have discussed the importance of maintaining good BP control and reviewed the newest guidelines with the patient while re-enforcing dietary sodium restrictions to no more than 2-3 g daily, DASH diet, life style modifications as well as the goal of maintaining ideal body weight with the patient today. I have advised the patient to avoid the use of over-the-counter medications/ supplements especially NSAIDS.\par \par I have reviewed with Ms. PEACE that serious health consequences can occur when blood pressure is not well controlled and the need for strict compliance with medication and that optimal control can significantly reduce the risk of cardiovascular disease stroke, heart attack and other organ damage. They verbally expressed understanding of the fore mentioned serious health consequences to me today.\par \par BLOOD WORK:\par -New blood work was done 08/03/2021 which demonstrated cholesterol of 162, LDL 74. We had increased her Atorvastatin from 20mg to 40mg PO DAILY due to her CAD and DM history and I explained that LDL goal should be 50 or less.  \par \par CHOLESTEROL CONTROL:\par -Patient will continue the advised  TLC diet and to continue follow-up for treatment of hyperlipidemia and repeat blood testing with diet and exercise. I have discussed different exercises and the importance of maintenance of optimal body weight. The importance of staying within guidelines and recommendations was stressed to the patient today and they acknowledged that they understand this to me verbally.\par \par  -Ms. PEACE was educated and advised that failure to follow-up with my medical recommendations to lower cholesterol can result in severe health consequences therefore, they will continuing a low saturated and low fat diet and to avoid excessive carbohydrates to help reduce triglycerides and that lowering LDL levels is associated with a significant decrease in serious cardiac events including but not limited to heart attack stroke and overall death. We will continue lipid lowering agents as advised based on blood test results and the patient understands to call if they develop severe muscle discomfort or if they have a reddish tinted discoloration in their urine.\par \par DIABETIC CONTROL:\par The patient's blood glucose consistent with their diabetes. A1C is reported to be 6.4%.\par I will advise the patient to keep try to stay on a low carbohydrate diet lose weight and exercise to reduce spikes in their blood sugar.  The importance of monitoring blood sugar regularly and HgBA1c level were reviewed. I have also discussed annual eye exams to prevent blindness,  monitoring urine microalbumin regularly to check for kidney damage and the importance of proper foot care and regularly checking feet to prevent sores and possibly loss of limbs was reviewed. \par \par TESTING/REPORTS:\par -EKG done Sept 10,, 2021 which demonstrated regular sinus rhythm with nonspecific ST-T wave changes, BPM of 81.\par \par -The patient had an echocardiogram done on 7/15/2021 demonstrated mild mitral, aortic and tricuspid regurgitation, mild left atrial enlargement with normal left ventricular systolic function. \par -EF on echo reported to be 65%\par \par -Pharm Nuc stress test done on 7/19/2021 demonstrated evidence for medium sized, mild to moderate defects in the anterior and anteroseptal walls that are partially reversible consistent with infarction with moderate [place]-infarct ischemia.\par \par -She proceeded to have a left heart cardiac cath done on 8/2/2021 which demonstrated evidence for mild CAD. \par \par PLAN:\par -She will continue with her usual medications and will contact the office if she is having any complaints between now and their next follow up appointment.\par -She will follow up with her PCP.\par \par I have discussed the plan of care with Ms. DAVID PEACE. She is compliant with all of her medications.\par \par The patient understands that aerobic exercises must be increased to minutes 4 times/week and a detailed discussion of lifestyle modification was done today. \par The patient has a good understanding of the diagnosis, treatment plan and lifestyle modification. \par She will contact me at the office for any questions with their care or any changes in their health status.\par \par The patient was seen together with supervision physician Dr. Yuriy Yoder and the plan of care was discussed with the patient and will be carried out as noted above.\par \par Christian RICHARDS 
I will SWITCH the dose or number of times a day I take the medications listed below when I get home from the hospital:    tacrolimus 1 mg oral capsule  -- 6 cap(s) by mouth every 12 hours

## 2021-09-23 ENCOUNTER — NON-APPOINTMENT (OUTPATIENT)
Age: 61
End: 2021-09-23

## 2021-09-23 ENCOUNTER — APPOINTMENT (OUTPATIENT)
Dept: CARDIOLOGY | Facility: CLINIC | Age: 61
End: 2021-09-23
Payer: MEDICAID

## 2021-09-23 VITALS
WEIGHT: 175 LBS | OXYGEN SATURATION: 97 % | DIASTOLIC BLOOD PRESSURE: 74 MMHG | HEART RATE: 55 BPM | HEIGHT: 62 IN | SYSTOLIC BLOOD PRESSURE: 154 MMHG | BODY MASS INDEX: 32.2 KG/M2

## 2021-09-23 PROCEDURE — 99214 OFFICE O/P EST MOD 30 MIN: CPT

## 2021-09-23 PROCEDURE — 93000 ELECTROCARDIOGRAM COMPLETE: CPT

## 2021-09-27 NOTE — DISCUSSION/SUMMARY
[Coronary Artery Disease] : coronary artery disease [Stable] : stable [Hypertension] : hypertension [FreeTextEntry1] : \par Currently stable from a cardiovascular standpoint. Hypertensive today (usually controlled). History of ischemic cardiomyopathy. Currently euvolemic. Stable CAD (s/p CABG, s/p prox Cx and OM stents). No ischemic or CHF symptoms. Patient with renal transplant on immunosuppressive therapy. Continue current medications. ECG completed today and reviewed (findings as noted above). Follow up in 6 months. Will reassess BP on next visit.

## 2021-09-27 NOTE — CARDIOLOGY SUMMARY
[de-identified] : \par 09/23/21 - sinus bradycardia, 55 bpm, RBBB, LAE, 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\par  [de-identified] : \par 09/20/19 (regadenoson MIBI) - large, moderate to severe defects n lateral and mid to basal inferior/inferolateral walls that are fixed, consistent with infarct; large, severe defects in inferoapical and apical lateral walls that are partially fixed, suggestive of infarct with at least moderate mckay-infarct ischemia, LVEF 43%\par  [de-identified] : \par 09/09/19 - mild MR, normal LA, mild segmental LV systolic dysfunction, mild LVE, grossly normal RV size and function, LVEF 50%\par  [de-identified] : \par 09/20/19 (PCI) - SYNERGY stent to OM2 80% ISR\par 09/20/19 (DIAG) - dLM 30%, pLAD 100%, OM1 100%, OM2 80% ISR, mRCA 100%, patent LIMA-LAD, patent [id number]\par 06/01/16 (PCI) - RESOLUTE stents to pCx 70% and mOM2 80%\par  [de-identified] : \par 2007 (CABG) - [facility]

## 2022-02-02 ENCOUNTER — APPOINTMENT (OUTPATIENT)
Dept: NEPHROLOGY | Facility: CLINIC | Age: 62
End: 2022-02-02
Payer: MEDICAID

## 2022-02-02 VITALS — SYSTOLIC BLOOD PRESSURE: 130 MMHG | DIASTOLIC BLOOD PRESSURE: 60 MMHG

## 2022-02-02 PROCEDURE — 99214 OFFICE O/P EST MOD 30 MIN: CPT

## 2022-02-02 NOTE — ASSESSMENT
[FreeTextEntry1] : Renal Transplant recipient: No dysuria/hematuria. No fever/chills. Tolerating medications.\par Immunosuppression: Reviewed;  on tac/MMF/prednisone, last Tac level noted; will recheck. Currently on 3 mg twice daily.; 500 mg/dose MMF and prednisone at 5 mg daily\par DM: Not taking insulin, On Metformin 500 mg, currently taking Half tablet daily\par Will continue to monitor and adjust treatment\par Hep C:Completed Epcluza. September 23, 2018 \par Hypertension: controlled; Continue metoprolol.\par Hyperlipidemia: On statin, continue the same.\par Cardiovascular risk reduction discussed. \par Infection prophylaxis: on Bactrim, Valcyte. Discussed discontinuing valcyte since he completed 6 months.\par Discussed ambulation, optimal glucose and blood pressure readings, adherence with medications and follow ups, follow up clinic visit schedule, avoiding dehydration, mosquito bites; prevention of DVT as well as food safety.\par \par Discussed Dermatology and eye check up.\par \par Advised to follow up with Primary MD every 6-8 weeks for labs and f/u at Transplant center every 6 months.

## 2022-02-02 NOTE — PHYSICAL EXAM
[General Appearance - Alert] : alert [General Appearance - In No Acute Distress] : in no acute distress [Sclera] : the sclera and conjunctiva were normal [Outer Ear] : the ears and nose were normal in appearance [Jugular Venous Distention Increased] : there was no jugular-venous distention [Auscultation Breath Sounds / Voice Sounds] : lungs were clear to auscultation bilaterally [Heart Sounds Gallop] : no gallops [Heart Sounds Pericardial Friction Rub] : no pericardial rub [Bowel Sounds] : normal bowel sounds [Abdomen Soft] : soft [Abdomen Tenderness] : non-tender [Abdomen Mass (___ Cm)] : no abdominal mass palpated [Cervical Lymph Nodes Enlarged Posterior Bilaterally] : posterior cervical [Cervical Lymph Nodes Enlarged Anterior Bilaterally] : anterior cervical [Supraclavicular Lymph Nodes Enlarged Bilaterally] : supraclavicular [Axillary Lymph Nodes Enlarged Bilaterally] : axillary [Inguinal Lymph Nodes Enlarged Bilaterally] : inguinal [Involuntary Movements] : no involuntary movements were seen [___ (cm) Fistula] : [unfilled] (cm) fistula [] : no rash [No Focal Deficits] : no focal deficits [Oriented To Time, Place, And Person] : oriented to person, place, and time [Impaired Insight] : insight and judgment were intact [Affect] : the affect was normal [FreeTextEntry1] : has left arm and right forearm AVF

## 2022-02-02 NOTE — HISTORY OF PRESENT ILLNESS
[FreeTextEntry1] : Received DDRT in 2018. Currently no acute symptoms. \par No fever/No urinary symptoms.\par No pain.\par He is trying to lose weight by exercising.\par He has moved to a new house. He now lives in [place].\par \par His last labs reviewed from Dec 2021: creatinine 0.9 \par He reports his last Tac level is 6\par HbA1C: 6.5\par \par Current Meds:\par \par Prograf 3 mg BID\par Cellcept 500 BID\par Prednisone 5 mg/d\par Metoprolol 25 mg 0.5 tab twice daily\par Magnesium 500 /d\par Atorvastatin 20/d\par Clopidogrel 75 mg/d\par Metformin 500 mg daily\par \par He saw his PMD Dr Zeferino Arguello in December 2021. Primary nephrologist Dr. Caleb Mckeon, he reports last follow up  5 months previously.

## 2022-02-07 LAB
25(OH)D3 SERPL-MCNC: 30.3 NG/ML
ALBUMIN SERPL ELPH-MCNC: 4.6 G/DL
ALP BLD-CCNC: 43 U/L
ALT SERPL-CCNC: 13 U/L
ANION GAP SERPL CALC-SCNC: 13 MMOL/L
APPEARANCE: CLEAR
AST SERPL-CCNC: 19 U/L
BACTERIA: NEGATIVE
BASOPHILS # BLD AUTO: 0.04 K/UL
BASOPHILS NFR BLD AUTO: 0.6 %
BILIRUB SERPL-MCNC: 0.5 MG/DL
BILIRUBIN URINE: NEGATIVE
BKV DNA SPEC QL NAA+PROBE: NOT DETECTED COPIES/ML
BLOOD URINE: NORMAL
BUN SERPL-MCNC: 16 MG/DL
CALCIUM SERPL-MCNC: 9.5 MG/DL
CALCIUM SERPL-MCNC: 9.5 MG/DL
CHLORIDE SERPL-SCNC: 96 MMOL/L
CHOLEST SERPL-MCNC: 160 MG/DL
CMV DNA SPEC QL NAA+PROBE: NOT DETECTED IU/ML
CO2 SERPL-SCNC: 23 MMOL/L
COLOR: COLORLESS
COVID-19 SPIKE DOMAIN ANTIBODY INTERPRETATION: POSITIVE
CREAT SERPL-MCNC: 0.94 MG/DL
CREAT SPEC-SCNC: 18 MG/DL
CREAT/PROT UR: 0.6 RATIO
EOSINOPHIL # BLD AUTO: 0.05 K/UL
EOSINOPHIL NFR BLD AUTO: 0.7 %
ESTIMATED AVERAGE GLUCOSE: 143 MG/DL
GLUCOSE QUALITATIVE U: NEGATIVE
GLUCOSE SERPL-MCNC: 179 MG/DL
HBA1C MFR BLD HPLC: 6.6 %
HCT VFR BLD CALC: 49 %
HDLC SERPL-MCNC: 53 MG/DL
HGB BLD-MCNC: 16.1 G/DL
HYALINE CASTS: 0 /LPF
IMM GRANULOCYTES NFR BLD AUTO: 0.7 %
KETONES URINE: NEGATIVE
LDLC SERPL CALC-MCNC: 35 MG/DL
LEUKOCYTE ESTERASE URINE: NEGATIVE
LYMPHOCYTES # BLD AUTO: 0.84 K/UL
LYMPHOCYTES NFR BLD AUTO: 12.2 %
MAGNESIUM SERPL-MCNC: 1.3 MG/DL
MAN DIFF?: NORMAL
MCHC RBC-ENTMCNC: 29.1 PG
MCHC RBC-ENTMCNC: 32.9 GM/DL
MCV RBC AUTO: 88.6 FL
MICROSCOPIC-UA: NORMAL
MONOCYTES # BLD AUTO: 0.34 K/UL
MONOCYTES NFR BLD AUTO: 4.9 %
NEUTROPHILS # BLD AUTO: 5.59 K/UL
NEUTROPHILS NFR BLD AUTO: 80.9 %
NITRITE URINE: NEGATIVE
NONHDLC SERPL-MCNC: 107 MG/DL
PARATHYROID HORMONE INTACT: 68 PG/ML
PH URINE: 5.5
PHOSPHATE SERPL-MCNC: 3.4 MG/DL
PLATELET # BLD AUTO: 126 K/UL
POTASSIUM SERPL-SCNC: 4.6 MMOL/L
PROT SERPL-MCNC: 7.1 G/DL
PROT UR-MCNC: 10 MG/DL
PROTEIN URINE: NEGATIVE
RBC # BLD: 5.53 M/UL
RBC # FLD: 12.7 %
RED BLOOD CELLS URINE: 1 /HPF
SARS-COV-2 AB SERPL IA-ACNC: >250 U/ML
SODIUM SERPL-SCNC: 132 MMOL/L
SPECIFIC GRAVITY URINE: 1
SQUAMOUS EPITHELIAL CELLS: 0 /HPF
TACROLIMUS SERPL-MCNC: 10.7 NG/ML
TRIGL SERPL-MCNC: 361 MG/DL
URATE SERPL-MCNC: 5.1 MG/DL
UROBILINOGEN URINE: NORMAL
WBC # FLD AUTO: 6.91 K/UL
WHITE BLOOD CELLS URINE: 0 /HPF

## 2022-03-03 NOTE — DISCHARGE NOTE ADULT - NS MD DC PLAN IMMU FLU REF OTH
Refused V-Y Flap Text: The defect edges were debeveled with a #15 scalpel blade.  Given the location of the defect, shape of the defect and the proximity to free margins a V-Y flap was deemed most appropriate.  Using a sterile surgical marker, an appropriate advancement flap was drawn incorporating the defect and placing the expected incisions within the relaxed skin tension lines where possible.    The area thus outlined was incised deep to adipose tissue with a #15 scalpel blade.  The skin margins were undermined to an appropriate distance in all directions utilizing iris scissors.

## 2022-03-15 ENCOUNTER — INPATIENT (INPATIENT)
Facility: HOSPITAL | Age: 62
LOS: 1 days | Discharge: HOME | DRG: 313 | End: 2022-03-17
Attending: INTERNAL MEDICINE | Admitting: HOSPITALIST
Payer: MEDICAID

## 2022-03-15 VITALS
WEIGHT: 175.05 LBS | HEART RATE: 52 BPM | TEMPERATURE: 97 F | RESPIRATION RATE: 18 BRPM | OXYGEN SATURATION: 99 % | HEIGHT: 65 IN | SYSTOLIC BLOOD PRESSURE: 133 MMHG | DIASTOLIC BLOOD PRESSURE: 70 MMHG

## 2022-03-15 DIAGNOSIS — Z95.1 PRESENCE OF AORTOCORONARY BYPASS GRAFT: Chronic | ICD-10-CM

## 2022-03-15 DIAGNOSIS — Z98.890 OTHER SPECIFIED POSTPROCEDURAL STATES: Chronic | ICD-10-CM

## 2022-03-15 DIAGNOSIS — Z95.5 PRESENCE OF CORONARY ANGIOPLASTY IMPLANT AND GRAFT: Chronic | ICD-10-CM

## 2022-03-15 DIAGNOSIS — I77.0 ARTERIOVENOUS FISTULA, ACQUIRED: Chronic | ICD-10-CM

## 2022-03-15 PROCEDURE — 99285 EMERGENCY DEPT VISIT HI MDM: CPT

## 2022-03-15 PROCEDURE — 93010 ELECTROCARDIOGRAM REPORT: CPT

## 2022-03-16 DIAGNOSIS — Z94.0 KIDNEY TRANSPLANT STATUS: ICD-10-CM

## 2022-03-16 DIAGNOSIS — R07.89 OTHER CHEST PAIN: ICD-10-CM

## 2022-03-16 DIAGNOSIS — D69.6 THROMBOCYTOPENIA, UNSPECIFIED: ICD-10-CM

## 2022-03-16 DIAGNOSIS — Z29.9 ENCOUNTER FOR PROPHYLACTIC MEASURES, UNSPECIFIED: ICD-10-CM

## 2022-03-16 DIAGNOSIS — R07.9 CHEST PAIN, UNSPECIFIED: ICD-10-CM

## 2022-03-16 DIAGNOSIS — I25.10 ATHEROSCLEROTIC HEART DISEASE OF NATIVE CORONARY ARTERY WITHOUT ANGINA PECTORIS: ICD-10-CM

## 2022-03-16 LAB
A1C WITH ESTIMATED AVERAGE GLUCOSE RESULT: 5.9 % — HIGH (ref 4–5.6)
ALBUMIN SERPL ELPH-MCNC: 4.1 G/DL — SIGNIFICANT CHANGE UP (ref 3.3–5)
ALP SERPL-CCNC: 25 U/L — LOW (ref 40–120)
ALT FLD-CCNC: 16 U/L — SIGNIFICANT CHANGE UP (ref 10–45)
ANION GAP SERPL CALC-SCNC: 12 MMOL/L — SIGNIFICANT CHANGE UP (ref 5–17)
ANION GAP SERPL CALC-SCNC: 15 MMOL/L — SIGNIFICANT CHANGE UP (ref 5–17)
AST SERPL-CCNC: 34 U/L — SIGNIFICANT CHANGE UP (ref 10–40)
BASOPHILS # BLD AUTO: 0.02 K/UL — SIGNIFICANT CHANGE UP (ref 0–0.2)
BASOPHILS NFR BLD AUTO: 0.3 % — SIGNIFICANT CHANGE UP (ref 0–2)
BILIRUB SERPL-MCNC: 0.5 MG/DL — SIGNIFICANT CHANGE UP (ref 0.2–1.2)
BUN SERPL-MCNC: 13 MG/DL — SIGNIFICANT CHANGE UP (ref 7–23)
BUN SERPL-MCNC: 15 MG/DL — SIGNIFICANT CHANGE UP (ref 7–23)
CALCIUM SERPL-MCNC: 8.8 MG/DL — SIGNIFICANT CHANGE UP (ref 8.4–10.5)
CALCIUM SERPL-MCNC: 8.9 MG/DL — SIGNIFICANT CHANGE UP (ref 8.4–10.5)
CHLORIDE SERPL-SCNC: 97 MMOL/L — SIGNIFICANT CHANGE UP (ref 96–108)
CHLORIDE SERPL-SCNC: 97 MMOL/L — SIGNIFICANT CHANGE UP (ref 96–108)
CHOLEST SERPL-MCNC: 144 MG/DL — SIGNIFICANT CHANGE UP
CO2 SERPL-SCNC: 19 MMOL/L — LOW (ref 22–31)
CO2 SERPL-SCNC: 23 MMOL/L — SIGNIFICANT CHANGE UP (ref 22–31)
CREAT SERPL-MCNC: 0.85 MG/DL — SIGNIFICANT CHANGE UP (ref 0.5–1.3)
CREAT SERPL-MCNC: 0.86 MG/DL — SIGNIFICANT CHANGE UP (ref 0.5–1.3)
EGFR: 97 ML/MIN/1.73M2 — SIGNIFICANT CHANGE UP
EGFR: 97 ML/MIN/1.73M2 — SIGNIFICANT CHANGE UP
EOSINOPHIL # BLD AUTO: 0.12 K/UL — SIGNIFICANT CHANGE UP (ref 0–0.5)
EOSINOPHIL NFR BLD AUTO: 1.9 % — SIGNIFICANT CHANGE UP (ref 0–6)
ESTIMATED AVERAGE GLUCOSE: 123 MG/DL — HIGH (ref 68–114)
GLUCOSE SERPL-MCNC: 114 MG/DL — HIGH (ref 70–99)
GLUCOSE SERPL-MCNC: 128 MG/DL — HIGH (ref 70–99)
HAV IGM SER-ACNC: SIGNIFICANT CHANGE UP
HBV CORE IGM SER-ACNC: SIGNIFICANT CHANGE UP
HBV SURFACE AG SER-ACNC: SIGNIFICANT CHANGE UP
HCT VFR BLD CALC: 47.5 % — SIGNIFICANT CHANGE UP (ref 39–50)
HCT VFR BLD CALC: 50.4 % — HIGH (ref 39–50)
HCV AB S/CO SERPL IA: 0.11 S/CO — SIGNIFICANT CHANGE UP (ref 0–0.99)
HCV AB SERPL-IMP: SIGNIFICANT CHANGE UP
HDLC SERPL-MCNC: 54 MG/DL — SIGNIFICANT CHANGE UP
HGB BLD-MCNC: 15.2 G/DL — SIGNIFICANT CHANGE UP (ref 13–17)
HGB BLD-MCNC: 15.9 G/DL — SIGNIFICANT CHANGE UP (ref 13–17)
HIV 1+2 AB+HIV1 P24 AG SERPL QL IA: SIGNIFICANT CHANGE UP
IMM GRANULOCYTES NFR BLD AUTO: 0.5 % — SIGNIFICANT CHANGE UP (ref 0–1.5)
LIPID PNL WITH DIRECT LDL SERPL: 66 MG/DL — SIGNIFICANT CHANGE UP
LYMPHOCYTES # BLD AUTO: 1.4 K/UL — SIGNIFICANT CHANGE UP (ref 1–3.3)
LYMPHOCYTES # BLD AUTO: 22.5 % — SIGNIFICANT CHANGE UP (ref 13–44)
MAGNESIUM SERPL-MCNC: 1.5 MG/DL — LOW (ref 1.6–2.6)
MAGNESIUM SERPL-MCNC: 1.6 MG/DL — SIGNIFICANT CHANGE UP (ref 1.6–2.6)
MCHC RBC-ENTMCNC: 28.8 PG — SIGNIFICANT CHANGE UP (ref 27–34)
MCHC RBC-ENTMCNC: 28.9 PG — SIGNIFICANT CHANGE UP (ref 27–34)
MCHC RBC-ENTMCNC: 31.5 GM/DL — LOW (ref 32–36)
MCHC RBC-ENTMCNC: 32 GM/DL — SIGNIFICANT CHANGE UP (ref 32–36)
MCV RBC AUTO: 90.3 FL — SIGNIFICANT CHANGE UP (ref 80–100)
MCV RBC AUTO: 91.3 FL — SIGNIFICANT CHANGE UP (ref 80–100)
MONOCYTES # BLD AUTO: 0.64 K/UL — SIGNIFICANT CHANGE UP (ref 0–0.9)
MONOCYTES NFR BLD AUTO: 10.3 % — SIGNIFICANT CHANGE UP (ref 2–14)
NEUTROPHILS # BLD AUTO: 4 K/UL — SIGNIFICANT CHANGE UP (ref 1.8–7.4)
NEUTROPHILS NFR BLD AUTO: 64.5 % — SIGNIFICANT CHANGE UP (ref 43–77)
NON HDL CHOLESTEROL: 90 MG/DL — SIGNIFICANT CHANGE UP
NRBC # BLD: 0 /100 WBCS — SIGNIFICANT CHANGE UP (ref 0–0)
NRBC # BLD: 0 /100 WBCS — SIGNIFICANT CHANGE UP (ref 0–0)
NT-PROBNP SERPL-SCNC: 705 PG/ML — HIGH (ref 0–300)
PHOSPHATE SERPL-MCNC: 3.4 MG/DL — SIGNIFICANT CHANGE UP (ref 2.5–4.5)
PLATELET # BLD AUTO: 111 K/UL — LOW (ref 150–400)
PLATELET # BLD AUTO: 80 K/UL — LOW (ref 150–400)
POTASSIUM SERPL-MCNC: 4.1 MMOL/L — SIGNIFICANT CHANGE UP (ref 3.5–5.3)
POTASSIUM SERPL-MCNC: 5.1 MMOL/L — SIGNIFICANT CHANGE UP (ref 3.5–5.3)
POTASSIUM SERPL-SCNC: 4.1 MMOL/L — SIGNIFICANT CHANGE UP (ref 3.5–5.3)
POTASSIUM SERPL-SCNC: 5.1 MMOL/L — SIGNIFICANT CHANGE UP (ref 3.5–5.3)
PROT SERPL-MCNC: 6.7 G/DL — SIGNIFICANT CHANGE UP (ref 6–8.3)
RBC # BLD: 5.26 M/UL — SIGNIFICANT CHANGE UP (ref 4.2–5.8)
RBC # BLD: 5.52 M/UL — SIGNIFICANT CHANGE UP (ref 4.2–5.8)
RBC # FLD: 12.8 % — SIGNIFICANT CHANGE UP (ref 10.3–14.5)
RBC # FLD: 13 % — SIGNIFICANT CHANGE UP (ref 10.3–14.5)
SARS-COV-2 RNA SPEC QL NAA+PROBE: SIGNIFICANT CHANGE UP
SODIUM SERPL-SCNC: 131 MMOL/L — LOW (ref 135–145)
SODIUM SERPL-SCNC: 132 MMOL/L — LOW (ref 135–145)
TACROLIMUS SERPL-MCNC: 6.6 NG/ML — SIGNIFICANT CHANGE UP
TRIGL SERPL-MCNC: 118 MG/DL — SIGNIFICANT CHANGE UP
TROPONIN T, HIGH SENSITIVITY RESULT: 10 NG/L — SIGNIFICANT CHANGE UP (ref 0–51)
TROPONIN T, HIGH SENSITIVITY RESULT: 11 NG/L — SIGNIFICANT CHANGE UP (ref 0–51)
TSH SERPL-MCNC: 1.95 UIU/ML — SIGNIFICANT CHANGE UP (ref 0.27–4.2)
WBC # BLD: 6.04 K/UL — SIGNIFICANT CHANGE UP (ref 3.8–10.5)
WBC # BLD: 6.21 K/UL — SIGNIFICANT CHANGE UP (ref 3.8–10.5)
WBC # FLD AUTO: 6.04 K/UL — SIGNIFICANT CHANGE UP (ref 3.8–10.5)
WBC # FLD AUTO: 6.21 K/UL — SIGNIFICANT CHANGE UP (ref 3.8–10.5)

## 2022-03-16 PROCEDURE — 71045 X-RAY EXAM CHEST 1 VIEW: CPT | Mod: 26

## 2022-03-16 PROCEDURE — 99223 1ST HOSP IP/OBS HIGH 75: CPT

## 2022-03-16 PROCEDURE — 93306 TTE W/DOPPLER COMPLETE: CPT | Mod: 26

## 2022-03-16 RX ORDER — ATORVASTATIN CALCIUM 80 MG/1
10 TABLET, FILM COATED ORAL AT BEDTIME
Refills: 0 | Status: DISCONTINUED | OUTPATIENT
Start: 2022-03-16 | End: 2022-03-17

## 2022-03-16 RX ORDER — ACETAMINOPHEN 500 MG
650 TABLET ORAL EVERY 6 HOURS
Refills: 0 | Status: DISCONTINUED | OUTPATIENT
Start: 2022-03-16 | End: 2022-03-17

## 2022-03-16 RX ORDER — LANOLIN ALCOHOL/MO/W.PET/CERES
3 CREAM (GRAM) TOPICAL AT BEDTIME
Refills: 0 | Status: DISCONTINUED | OUTPATIENT
Start: 2022-03-16 | End: 2022-03-17

## 2022-03-16 RX ORDER — MAGNESIUM SULFATE 500 MG/ML
2 VIAL (ML) INJECTION ONCE
Refills: 0 | Status: COMPLETED | OUTPATIENT
Start: 2022-03-16 | End: 2022-03-16

## 2022-03-16 RX ORDER — HEPARIN SODIUM 5000 [USP'U]/ML
5000 INJECTION INTRAVENOUS; SUBCUTANEOUS EVERY 12 HOURS
Refills: 0 | Status: DISCONTINUED | OUTPATIENT
Start: 2022-03-16 | End: 2022-03-17

## 2022-03-16 RX ORDER — MYCOPHENOLATE MOFETIL 250 MG/1
500 CAPSULE ORAL
Refills: 0 | Status: DISCONTINUED | OUTPATIENT
Start: 2022-03-16 | End: 2022-03-17

## 2022-03-16 RX ORDER — HEPARIN SODIUM 5000 [USP'U]/ML
5000 INJECTION INTRAVENOUS; SUBCUTANEOUS EVERY 8 HOURS
Refills: 0 | Status: DISCONTINUED | OUTPATIENT
Start: 2022-03-16 | End: 2022-03-16

## 2022-03-16 RX ORDER — ERGOCALCIFEROL 1.25 MG/1
1 CAPSULE ORAL
Qty: 0 | Refills: 0 | DISCHARGE

## 2022-03-16 RX ORDER — ACETAMINOPHEN 500 MG
650 TABLET ORAL ONCE
Refills: 0 | Status: COMPLETED | OUTPATIENT
Start: 2022-03-16 | End: 2022-03-16

## 2022-03-16 RX ORDER — TACROLIMUS 5 MG/1
2 CAPSULE ORAL
Qty: 0 | Refills: 0 | DISCHARGE

## 2022-03-16 RX ORDER — METOPROLOL TARTRATE 50 MG
25 TABLET ORAL DAILY
Refills: 0 | Status: DISCONTINUED | OUTPATIENT
Start: 2022-03-16 | End: 2022-03-17

## 2022-03-16 RX ORDER — TACROLIMUS 5 MG/1
3 CAPSULE ORAL
Refills: 0 | Status: DISCONTINUED | OUTPATIENT
Start: 2022-03-16 | End: 2022-03-17

## 2022-03-16 RX ORDER — CLOPIDOGREL BISULFATE 75 MG/1
75 TABLET, FILM COATED ORAL DAILY
Refills: 0 | Status: DISCONTINUED | OUTPATIENT
Start: 2022-03-16 | End: 2022-03-17

## 2022-03-16 RX ORDER — ACETAMINOPHEN 500 MG
2 TABLET ORAL
Qty: 0 | Refills: 0 | DISCHARGE

## 2022-03-16 RX ORDER — ASPIRIN/CALCIUM CARB/MAGNESIUM 324 MG
81 TABLET ORAL DAILY
Refills: 0 | Status: DISCONTINUED | OUTPATIENT
Start: 2022-03-16 | End: 2022-03-17

## 2022-03-16 RX ORDER — ONDANSETRON 8 MG/1
4 TABLET, FILM COATED ORAL EVERY 8 HOURS
Refills: 0 | Status: DISCONTINUED | OUTPATIENT
Start: 2022-03-16 | End: 2022-03-17

## 2022-03-16 RX ADMIN — TACROLIMUS 3 MILLIGRAM(S): 5 CAPSULE ORAL at 17:53

## 2022-03-16 RX ADMIN — TACROLIMUS 3 MILLIGRAM(S): 5 CAPSULE ORAL at 08:27

## 2022-03-16 RX ADMIN — ATORVASTATIN CALCIUM 10 MILLIGRAM(S): 80 TABLET, FILM COATED ORAL at 21:21

## 2022-03-16 RX ADMIN — Medication 650 MILLIGRAM(S): at 01:56

## 2022-03-16 RX ADMIN — Medication 650 MILLIGRAM(S): at 04:41

## 2022-03-16 RX ADMIN — Medication 1 TABLET(S): at 13:03

## 2022-03-16 RX ADMIN — HEPARIN SODIUM 5000 UNIT(S): 5000 INJECTION INTRAVENOUS; SUBCUTANEOUS at 17:53

## 2022-03-16 RX ADMIN — Medication 81 MILLIGRAM(S): at 12:24

## 2022-03-16 RX ADMIN — Medication 25 GRAM(S): at 12:24

## 2022-03-16 RX ADMIN — CLOPIDOGREL BISULFATE 75 MILLIGRAM(S): 75 TABLET, FILM COATED ORAL at 12:24

## 2022-03-16 RX ADMIN — Medication 5 MILLIGRAM(S): at 08:25

## 2022-03-16 RX ADMIN — MYCOPHENOLATE MOFETIL 500 MILLIGRAM(S): 250 CAPSULE ORAL at 17:53

## 2022-03-16 RX ADMIN — MYCOPHENOLATE MOFETIL 500 MILLIGRAM(S): 250 CAPSULE ORAL at 08:26

## 2022-03-16 NOTE — H&P ADULT - NSHPPHYSICALEXAM_GEN_ALL_CORE
Vital Signs Last 24 Hrs  T(C): 36.7 (16 Mar 2022 03:11), Max: 36.7 (16 Mar 2022 03:11)  T(F): 98 (16 Mar 2022 03:11), Max: 98 (16 Mar 2022 03:11)  HR: 50 (16 Mar 2022 03:11) (50 - 52)  BP: 117/61 (16 Mar 2022 03:11) (117/61 - 141/73)  BP(mean): 78 (16 Mar 2022 03:11) (78 - 93)  RR: 16 (16 Mar 2022 03:11) (16 - 20)  SpO2: 100% (16 Mar 2022 03:11) (99% - 100%)

## 2022-03-16 NOTE — H&P ADULT - PROBLEM SELECTOR PLAN 4
- continue home prograf 3mg BID  - continue home cellcept 500mg BID  - continue home prednisone 5mg daily  - continue home bactrim for prophylaxis

## 2022-03-16 NOTE — ED PROVIDER NOTE - NS ED ROS FT
Gen: Denies fevers  CV: + chest pain  Resp: Denies SOB, cough  GI: Denies nausea, vomiting  : Denies dysuria  Neuro: Denies neuro changes  all other ROS negative unless indicated in HPI

## 2022-03-16 NOTE — CONSULT NOTE ADULT - SUBJECTIVE AND OBJECTIVE BOX
NEPHROLOGY - NSN    Patient seen and examined.    HPI:  62 year old male with a PMHx of CAD (s/p CABG in 2007 and PCI in 2015 and 2016) and polycystic kidney disease s/p renal transplant in 2018 who presents with chest tightness. Symptoms started two days prior to admission. He had two episodes, both times while supine and asleep. This is described as a chest tightness in the epigastric / midsternal area without radiation and associated with diaphoresis and hot flashes. There is no association with exertion.     Vitals: afebrile, HR 50, /61, SpO2 100% on room air.  Labs: thrombocytopenia to 80. Mild hyponatremia. High sensitivity troponin T 10-> 11. Pro-.  CXR: clear lung       ED interventions: acetaminophen. (16 Mar 2022 05:19)      PAST MEDICAL & SURGICAL HISTORY:  Asthma  last attack 2007, never hospitalized or intubated    HTN - Hypertension    PCK (Polycystic Kidney Disease)    CAD (Coronary Artery Disease)    High cholesterol    Gastroesophageal reflux disease without esophagitis    Hemorrhoids    Obesity    Coronary artery disease involving coronary bypass graft  Bypass procedure in 2007    HTN (hypertension)    Renal transplant recipient  In 2018    CABG (Coronary Artery Bypass Graft)  2007    Stented coronary artery  x2 cardiac stents in 2016, one cardiac stent in 2015    AV fistula    S/P hemorrhoidectomy  and rectal polypectomy -2/3/2017.    S/P CABG x 5    S/P coronary artery stent placement    AV fistula        MEDICATIONS  (STANDING):  aspirin enteric coated 81 milliGRAM(s) Oral daily  atorvastatin 10 milliGRAM(s) Oral at bedtime  clopidogrel Tablet 75 milliGRAM(s) Oral daily  heparin   Injectable 5000 Unit(s) SubCutaneous every 12 hours  metoprolol succinate ER 25 milliGRAM(s) Oral daily  mycophenolate mofetil 500 milliGRAM(s) Oral two times a day  predniSONE   Tablet 5 milliGRAM(s) Oral daily  tacrolimus 3 milliGRAM(s) Oral two times a day  trimethoprim   80 mG/sulfamethoxazole 400 mG 1 Tablet(s) Oral daily      Allergies    No Known Allergies    Intolerances        SOCIAL HISTORY:  Denies alcohol abuse, drug abuse or tobacco usage.     FAMILY HISTORY:  Family history of adult polycystic kidney disease (Sibling)    Family history of polycystic kidney (Sibling)        VITALS:  T(C): 36.4 (03-16-22 @ 05:45), Max: 36.7 (03-16-22 @ 03:11)  HR: 48 (03-16-22 @ 05:45) (48 - 52)  BP: 139/75 (03-16-22 @ 05:45) (117/61 - 141/73)  RR: 16 (03-16-22 @ 05:45) (16 - 20)  SpO2: 96% (03-16-22 @ 05:45) (96% - 100%)    REVIEW OF SYSTEMS:  Denies any nausea, vomiting, diarrhea, fever or chills. Denies chest pain, SOB, focal weakness, hematuria or dysuria. Good oral intake and denies fatigue or weakness. All other pertinent systems are reviewed and are negative.    PHYSICAL EXAM:  Constitutional: NAD  HEENT: EOMI  Neck:  No JVD, supple   Respiratory: CTA B/L  Cardiovascular: S1 and S2, RRR  Gastrointestinal: + BS, soft, NT, ND  Extremities: No peripheral edema, + peripheral pulses  Neurological: A/O x 3, CN2-12 intact  Psychiatric: Normal mood, normal affect  : No Jean  Skin: No rashes, C/D/I  Access: Not applicable    I and O's:    Height (cm): 165.1 (03-15 @ 22:48)  Weight (kg): 79.4 (03-15 @ 22:48)  BMI (kg/m2): 29.1 (03-15 @ 22:48)  BSA (m2): 1.87 (03-15 @ 22:48)    LABS:                        15.2   6.21  )-----------( 80       ( 16 Mar 2022 01:40 )             47.5     03-16    131<L>  |  97  |  15  ----------------------------<  128<H>  5.1   |  19<L>  |  0.86    Ca    8.9      16 Mar 2022 01:39  Mg     1.6     03-16    TPro  6.7  /  Alb  4.1  /  TBili  0.5  /  DBili  x   /  AST  34  /  ALT  16  /  AlkPhos  25<L>  03-16      URINE:      RADIOLOGY & ADDITIONAL STUDIES:     NEPHROLOGY - NSN    Patient seen and examined.    HPI:  62 year old male with a PMHx of CAD (s/p CABG in 2007 and PCI in 2015 and 2016) and polycystic kidney disease s/p renal transplant in 2018 who presents with chest tightness. Symptoms started two days prior to admission. He had two episodes, both times while supine and asleep. This is described as a chest tightness in the epigastric / midsternal area without radiation and associated with diaphoresis and hot flashes. There is no association with exertion.     Vitals: afebrile, HR 50, /61, SpO2 100% on room air.  Labs: thrombocytopenia to 80. Mild hyponatremia. High sensitivity troponin T 10-> 11. Pro-.  CXR: clear lung  He is able walk 5 miles and no chest pain.  Recently had 2 weeks of diarrhea  There is no hematuria or bubbles in the urine.  No history of NSAIDS or nephrolithisis.  The patient urinates once or twice in the night and there is no incontinence.  No family hx or renal disease or back pain.    No recent abx use.  No alleviating or aggravating factors with respect to the kidneys.          ED interventions: acetaminophen. (16 Mar 2022 05:19)      PAST MEDICAL & SURGICAL HISTORY:  Asthma  last attack 2007, never hospitalized or intubated    HTN - Hypertension    PCK (Polycystic Kidney Disease)    CAD (Coronary Artery Disease)    High cholesterol    Gastroesophageal reflux disease without esophagitis    Hemorrhoids    Obesity    Coronary artery disease involving coronary bypass graft  Bypass procedure in 2007    HTN (hypertension)    Renal transplant recipient  In 2018    CABG (Coronary Artery Bypass Graft)  2007    Stented coronary artery  x2 cardiac stents in 2016, one cardiac stent in 2015    AV fistula    S/P hemorrhoidectomy  and rectal polypectomy -2/3/2017.    S/P CABG x 5    S/P coronary artery stent placement    AV fistula        MEDICATIONS  (STANDING):  aspirin enteric coated 81 milliGRAM(s) Oral daily  atorvastatin 10 milliGRAM(s) Oral at bedtime  clopidogrel Tablet 75 milliGRAM(s) Oral daily  heparin   Injectable 5000 Unit(s) SubCutaneous every 12 hours  metoprolol succinate ER 25 milliGRAM(s) Oral daily  mycophenolate mofetil 500 milliGRAM(s) Oral two times a day  predniSONE   Tablet 5 milliGRAM(s) Oral daily  tacrolimus 3 milliGRAM(s) Oral two times a day  trimethoprim   80 mG/sulfamethoxazole 400 mG 1 Tablet(s) Oral daily      Allergies    No Known Allergies    Intolerances        SOCIAL HISTORY:  Denies alcohol abuse, drug abuse or tobacco usage.     FAMILY HISTORY:  Family history of adult polycystic kidney disease (Sibling)    Family history of polycystic kidney (Sibling)        VITALS:  T(C): 36.4 (03-16-22 @ 05:45), Max: 36.7 (03-16-22 @ 03:11)  HR: 48 (03-16-22 @ 05:45) (48 - 52)  BP: 139/75 (03-16-22 @ 05:45) (117/61 - 141/73)  RR: 16 (03-16-22 @ 05:45) (16 - 20)  SpO2: 96% (03-16-22 @ 05:45) (96% - 100%)    REVIEW OF SYSTEMS:    Denies chest pain, SOB, focal weakness, hematuria or dysuria. Good oral intake and denies fatigue or weakness. All other pertinent systems are reviewed and are negative.    PHYSICAL EXAM:  Constitutional: NAD  HEENT: EOMI  Neck:  No JVD, supple   Respiratory: CTA B/L  Cardiovascular: S1 and S2, RRR  Gastrointestinal: + BS, soft, NT, ND  Extremities: No peripheral edema, + peripheral pulses  Neurological: A/O x 3, CN2-12 intact  Psychiatric: Normal mood, normal affect  : No Jean  Skin: No rashes, C/D/I  Access: left avf     I and O's:    Height (cm): 165.1 (03-15 @ 22:48)  Weight (kg): 79.4 (03-15 @ 22:48)  BMI (kg/m2): 29.1 (03-15 @ 22:48)  BSA (m2): 1.87 (03-15 @ 22:48)    LABS:                        15.2   6.21  )-----------( 80       ( 16 Mar 2022 01:40 )             47.5     03-16    131<L>  |  97  |  15  ----------------------------<  128<H>  5.1   |  19<L>  |  0.86    Ca    8.9      16 Mar 2022 01:39  Mg     1.6     03-16    TPro  6.7  /  Alb  4.1  /  TBili  0.5  /  DBili  x   /  AST  34  /  ALT  16  /  AlkPhos  25<L>  03-16      URINE:      RADIOLOGY & ADDITIONAL STUDIES:    < from: Xray Chest 1 View- PORTABLE-Urgent (03.16.22 @ 01:40) >    ACC: [id number] EXAM:  XR CHEST PORTABLE URGENT 1V                          PROCEDURE DATE:  03/16/2022          INTERPRETATION:  CLINICAL INFORMATION: Chest pain    TECHNIQUE: Frontal radiograph of the chest    COMPARISON: Chest radiograph  9/30/2019    FINDINGS:    Clear lungs. No pleural effusion. No pneumothorax. Cardiac silhouette   size cannot be accurately assessed on this projection. Status post   sternotomy and CABG. No acute osseous findings.    IMPRESSION:    Clear lungs    --- End of Report ---          VAUGHN SAN MD; Resident Radiologist  This document has been electronically signed.  MONIKA VALENTIN MD; Attending Radiologist  This document has been electronically signed. Mar 16 2022 10:02AM    < end of copied text >

## 2022-03-16 NOTE — H&P ADULT - PROBLEM SELECTOR PLAN 3
- without signs of bleeding  - obtain HIV screening test, viral hepatitis profile  - no bleeding on exam; transfuse platelets if <50

## 2022-03-16 NOTE — H&P ADULT - PROBLEM SELECTOR PLAN 1
- will evaluate for ACS given the patient's hx  - TTE  - telemetry  - f/u hgb a1c, lipid profile, TSH

## 2022-03-16 NOTE — PATIENT PROFILE ADULT - FALL HARM RISK - UNIVERSAL INTERVENTIONS
Bed in lowest position, wheels locked, appropriate side rails in place/Call bell, personal items and telephone in reach/Instruct patient to call for assistance before getting out of bed or chair/Non-slip footwear when patient is out of bed/[place] to call system/Physically safe environment - no spills, clutter or unnecessary equipment/Purposeful Proactive Rounding/Room/bathroom lighting operational, light cord in reach

## 2022-03-16 NOTE — CONSULT NOTE ADULT - ASSESSMENT
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  - Had Stress Test 09/20/19 (regadenoson MIBI) - large, moderate to severe defects n lateral and mid to basal inferior/inferolateral walls that are fixed, consistent with infarct; large, severe defects in inferoapical and apical lateral walls that are partially fixed, suggestive of infarct with at least moderate mckay-infarct ischemia, LVEF 43%  - Most recent Echo 9/09/19 showing mild MR, normal LA, mild segmental LV systolic dysfunction, mild LVE, grossly normal RV size and function, LVEF 50%  - Monitor on Tele    #CAD  - Continue DAPT and Atorvastatin       **Recommendations not final until discussed with attending**   62 year old male with a PMHx of CAD (s/p CABG in 2007 and PCI in 2015 and 2016) and polycystic kidney disease s/p renal transplant in 2018 who presents with atypical chest pain without elevated troponin     #Atypical Chest Pain  - Hx of CAD S/P CABG in 2007 and stent to OM2 80% ISR and (DIAG) - dLM 30%, pLAD 100%, OM1 100%, OM2 80% ISR, mRCA 100%, patent LIMA-LAD, patent SVG-OM1 IN 2019  - S/P stents to pCx 70% and mOM2 80% IN 2016  - ECG 09/23/21 - sinus bradycardia, 55 bpm, RBBB, LAE, 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  - Had Stress Test 09/20/19 (regadenoson MIBI) showing large, moderate to severe defects n lateral and mid to basal inferior/inferolateral walls that are fixed, consistent with infarct; large, severe defects in inferoapical and apical lateral walls that are partially fixed, suggestive of infarct with at least moderate mckay-infarct ischemia, LVEF 43%  - Most recent Echo 9/09/19 showing mild MR, normal LA, mild segmental LV systolic dysfunction, mild LVE, grossly normal RV size and function, LVEF 50%  - F/u TTE  - Monitor on Tele  - Continue DAPT and Atorvastatin  - Continue Metoprolol    - Stress test appropriate       **Recommendations not final until discussed with attending**   62 year old male with a PMHx of CAD (s/p CABG in 2007 and PCI in 2015 and 2016) and polycystic kidney disease s/p renal transplant in 2018 who presents with atypical chest pain without elevated troponin     #Atypical Chest Pain  - Hx of CAD S/P CABG in 2007 and stent to OM2 80% ISR and (DIAG) - dLM 30%, pLAD 100%, OM1 100%, OM2 80% ISR, mRCA 100%, patent LIMA-LAD, patent SVG-OM1 IN 2019  - S/P stents to pCx 70% and mOM2 80% IN 2016  - ECG 09/23/21 - sinus bradycardia, 55 bpm, RBBB, LAE, 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  - Had Stress Test 09/20/19 (regadenoson MIBI) showing large, moderate to severe defects n lateral and mid to basal inferior/inferolateral walls that are fixed, consistent with infarct; large, severe defects in inferoapical and apical lateral walls that are partially fixed, suggestive of infarct with at least moderate mckay-infarct ischemia, LVEF 43%  - Most recent Echo 9/09/19 showing mild MR, normal LA, mild segmental LV systolic dysfunction, mild LVE, grossly normal RV size and function, LVEF 50%  - F/u TTE  - Monitor on Tele  - Continue DAPT and Atorvastatin  - Continue Metoprolol    - Stress test appropriate    #Sinus Bradycardia  - Most recent outpatient ECG 09/23/21 with sinus bradycardia, 55 bpm, RBBB, LAE, 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  - Continue ot monitor on Tele  - Have Pacer pads       **Recommendations not final until discussed with attending**   62 year old male with a PMHx of CAD (s/p CABG in 2007 and PCI in 2015 and 2016) and polycystic kidney disease s/p renal transplant in 2018 who presents with atypical chest pain without elevated troponin     #Atypical Chest Pain  - Hx of CAD S/P CABG in 2007 and stent to OM2 80% ISR and (DIAG) - dLM 30%, pLAD 100%, OM1 100%, OM2 80% ISR, mRCA 100%, patent LIMA-LAD, patent SVG-OM1 IN 2019  - S/P stents to pCx 70% and mOM2 80% IN 2016  - ECG 09/23/21 - sinus bradycardia, 55 bpm, RBBB, LAE, 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  - Had Stress Test 09/20/19 (regadenoson MIBI) showing large, moderate to severe defects n lateral and mid to basal inferior/inferolateral walls that are fixed, consistent with infarct; large, severe defects in inferoapical and apical lateral walls that are partially fixed, suggestive of infarct with at least moderate mckay-infarct ischemia, LVEF 43%  - Most recent Echo 9/09/19 showing mild MR, normal LA, mild segmental LV systolic dysfunction, mild LVE, grossly normal RV size and function, LVEF 50%  - Monitor on Tele  - Continue DAPT and Atorvastatin  - Continue Metoprolol    -no further cardiology recommendations at this time. Ensure follow up with outpatient cardiology prior to discharge.    #Sinus Bradycardia  - Most recent outpatient ECG 09/23/21 with sinus bradycardia, 55 bpm, RBBB, LAE, 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  - Continue ot monitor on Tele            62 year old male with a PMHx of CAD (s/p CABG in 2007 and PCI in 2015 and 2016) and polycystic kidney disease s/p renal transplant in 2018 who presents with atypical chest pain without elevated troponin     #Atypical Chest Pain  - Hx of CAD S/P CABG in 2007 and stent to multivessel in 2016 and 2019  - ECG 09/23/21 - sinus bradycardia, 55 bpm, RBBB, LAE, 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  - Most recent Echo 9/09/19 showing mild MR, normal LA, mild segmental LV systolic dysfunction, mild LVE, grossly normal RV size and function, LVEF 50%  - Low suspicion for ACS. Most recent exercise stress test normal in 2020 and negative troponin this admission and no longer having CP.    - Monitor on Tele  - Continue DAPT and Atorvastatin  - Continue Metoprolol    - No further cardiology recommendations at this time. Ensure follow up with outpatient cardiology prior to discharge.    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

## 2022-03-16 NOTE — H&P ADULT - NSICDXPASTMEDICALHX_GEN_ALL_CORE_FT
PAST MEDICAL HISTORY:  Asthma last attack 2007, never hospitalized or intubated    CAD (Coronary Artery Disease)     Coronary artery disease involving coronary bypass graft     Gastroesophageal reflux disease without esophagitis     Hemorrhoids     High cholesterol     HTN (hypertension)     HTN - Hypertension     Obesity     PCK (Polycystic Kidney Disease)     Renal transplant recipient      PAST MEDICAL HISTORY:  Asthma last attack 2007, never hospitalized or intubated    CAD (Coronary Artery Disease)     Coronary artery disease involving coronary bypass graft Bypass procedure in 2007    Gastroesophageal reflux disease without esophagitis     Hemorrhoids     High cholesterol     HTN (hypertension)     HTN - Hypertension     Obesity     PCK (Polycystic Kidney Disease)     Renal transplant recipient In 2018

## 2022-03-16 NOTE — CONSULT NOTE ADULT - SUBJECTIVE AND OBJECTIVE BOX
All Cardiology service information can be found 24/7 on [email or web address], password: [email or web address]    Patient seen and evaluated at bedside    Chief Complaint:    HPI:  62 year old male with a PMHx of CAD (s/p CABG in 2007 and PCI in 2015 and 2016) and polycystic kidney disease s/p renal transplant in 2018 who presents with chest tightness. Symptoms started two days prior to admission. He had two episodes, both times while supine and asleep. This is described as a chest tightness in the epigastric / midsternal area without radiation and associated with diaphoresis and hot flashes. There is no association with exertion.     Vitals: afebrile, HR 50, /61, SpO2 100% on room air.  Labs: thrombocytopenia to 80. Mild hyponatremia. High sensitivity troponin T 10-> 11. Pro-.  CXR: clear lung    ED interventions: acetaminophen. (16 Mar 2022 05:19)      PMHx:   Asthma    HTN - Hypertension    PCK (Polycystic Kidney Disease)    CAD (Coronary Atherosclerotic Disease)    HTN (Hypertension)    CAD (Coronary Artery Disease)    ESRD on Dialysis    High cholesterol    Gastroesophageal reflux disease without esophagitis    Hemorrhoids    Obesity    ESRD (end stage renal disease) on dialysis    Coronary artery disease involving coronary bypass graft    HTN (hypertension)    Renal transplant recipient        PSHx:   S/P CABG    CABG (Coronary Artery Bypass Graft)    Stented coronary artery    AV fistula    S/P hemorrhoidectomy    S/P CABG x 7    S/P CABG x 5    S/P coronary artery stent placement    AV fistula        Allergies:  No Known Allergies      Home Meds:    Current Medications:   acetaminophen     Tablet .. 650 milliGRAM(s) Oral every 6 hours PRN  aluminum hydroxide/magnesium hydroxide/simethicone Suspension 30 milliLiter(s) Oral every 4 hours PRN  aspirin enteric coated 81 milliGRAM(s) Oral daily  atorvastatin 10 milliGRAM(s) Oral at bedtime  clopidogrel Tablet 75 milliGRAM(s) Oral daily  heparin   Injectable 5000 Unit(s) SubCutaneous every 12 hours  magnesium sulfate  IVPB 2 Gram(s) IV Intermittent once  melatonin 3 milliGRAM(s) Oral at bedtime PRN  metoprolol succinate ER 25 milliGRAM(s) Oral daily  mycophenolate mofetil 500 milliGRAM(s) Oral two times a day  ondansetron Injectable 4 milliGRAM(s) IV Push every 8 hours PRN  predniSONE   Tablet 5 milliGRAM(s) Oral daily  tacrolimus 3 milliGRAM(s) Oral two times a day  trimethoprim   80 mG/sulfamethoxazole 400 mG 1 Tablet(s) Oral daily      FAMILY HISTORY:  Family history of adult polycystic kidney disease (Sibling)    Family history of polycystic kidney (Sibling)        Social History:  Smoking History:  Alcohol Use:  Drug Use:    REVIEW OF SYSTEMS:  CONSTITUTIONAL: No weakness, fevers or chills  EYES/ENT: No visual changes;  No dysphagia  NECK: No pain or stiffness  RESPIRATORY: No cough, wheezing, hemoptysis; No shortness of breath  CARDIOVASCULAR: No chest pain or palpitations; No lower extremity edema  GASTROINTESTINAL: No abdominal or epigastric pain. No nausea, vomiting, or hematemesis; No diarrhea or constipation. No melena or hematochezia.  BACK: No back pain  GENITOURINARY: No dysuria, frequency or hematuria  NEUROLOGICAL: No numbness or weakness  SKIN: No itching, burning, rashes, or lesions   All other review of systems is negative unless indicated above.    Physical Exam:  T(F): 97.7 (03-16), Max: 98 (03-16)  HR: 55 (03-16) (48 - 57)  BP: 118/72 (03-16) (117/61 - 141/73)  RR: 18 (03-16)  SpO2: 97% (03-16)  GENERAL: No acute distress, well-developed  HEAD:  Atraumatic, Normocephalic  ENT: EOMI, PERRLA, conjunctiva and sclera clear, Neck supple, No JVD, moist mucosa  CHEST/LUNG: Clear to auscultation bilaterally; No wheeze, equal breath sounds bilaterally   BACK: No spinal tenderness  HEART: Regular rate and rhythm; No murmurs, rubs, or gallops  ABDOMEN: Soft, Nontender, Nondistended; Bowel sounds present  EXTREMITIES:  No clubbing, cyanosis, or edema  PSYCH: Nl behavior, nl affect  NEUROLOGY: AAOx3, non-focal, cranial nerves intact  SKIN: Normal color, No rashes or lesions  LINES:    Cardiovascular Diagnostic Testing:    ECG: Personally reviewed:    Echo: Personally reviewed:    Stress Testing:    Cath:    Imaging:    CXR: Personally reviewed    Labs: Personally reviewed                        15.9   6.04  )-----------( 111      ( 16 Mar 2022 07:42 )             50.4     03-16    132<L>  |  97  |  13  ----------------------------<  114<H>  4.1   |  23  |  0.85    Ca    8.8      16 Mar 2022 07:42  Phos  3.4     03-16  Mg     1.5     03-16    TPro  6.7  /  Alb  4.1  /  TBili  0.5  /  DBili  x   /  AST  34  /  ALT  16  /  AlkPhos  25<L>  03-16        CARDIAC MARKERS ( 16 Mar 2022 03:07 )  11 ng/L / x     / x     / x     / x     / x      CARDIAC MARKERS ( 16 Mar 2022 01:39 )  10 ng/L / x     / x     / x     / x     / x            Serum Pro-Brain Natriuretic Peptide: 705 pg/mL (03-16 @ 01:39)        Thyroid Stimulating Hormone, Serum: 1.95 uIU/mL (03-16 @ 10:58)       All Cardiology service information can be found 24/7 on [email or web address], password: [email or web address]    Patient seen and evaluated at bedside    Chief Complaint:    HPI:  62 year old male with a PMHx of CAD (s/p CABG in 2007 and PCI in 2015 and 2016) and polycystic kidney disease s/p renal transplant in 2018 who presents with chest tightness. Symptoms started two days prior to admission. He had two episodes, both times while supine and asleep. This is described as a chest tightness in the epigastric / midsternal area without radiation and associated with diaphoresis and hot flashes. There is no association with exertion.     Cardiac history significant for Hx of CAD S/P CABG in 2007 and stent to OM2 80% ISR and (DIAG) - dLM 30%, pLAD 100%, OM1 100%, OM2 80% ISR, mRCA 100%, patent LIMA-LAD, patent SVG-OM1 IN 2019. S/P stents to pCx 70% and mOM2 80% IN 2016. ECG 09/23/21 - sinus bradycardia, 55 bpm, RBBB, LAE, 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. Had Stress Test 09/20/19 (regadenoson MIBI) showing large, moderate to severe defects n lateral and mid to basal inferior/inferolateral walls that are fixed, consistent with infarct; large, severe defects in inferoapical and apical lateral walls that are partially fixed, suggestive of infarct with at least moderate mckay-infarct ischemia, LVEF 43%. Most recent Echo 9/09/19 showing mild MR, normal LA, mild segmental LV systolic dysfunction, mild LVE, grossly normal RV size and function, LVEF 50%. In ED Vitals: afebrile, HR 50, /61, SpO2 100% on room air. Labs significant for thrombocytopenia to 80. Mild hyponatremia. High sensitivity troponin T 10-> 11. Pro-. CXR: clear lung      PMHx:   Asthma    HTN - Hypertension    PCK (Polycystic Kidney Disease)    CAD (Coronary Atherosclerotic Disease)    HTN (Hypertension)    CAD (Coronary Artery Disease)    ESRD on Dialysis    High cholesterol    Gastroesophageal reflux disease without esophagitis    Hemorrhoids    Obesity    ESRD (end stage renal disease) on dialysis    Coronary artery disease involving coronary bypass graft    HTN (hypertension)    Renal transplant recipient        PSHx:   S/P CABG    CABG (Coronary Artery Bypass Graft)    Stented coronary artery    AV fistula    S/P hemorrhoidectomy    S/P CABG x 7    S/P CABG x 5    S/P coronary artery stent placement    AV fistula        Allergies:  No Known Allergies      Home Meds:    Current Medications:   acetaminophen     Tablet .. 650 milliGRAM(s) Oral every 6 hours PRN  aluminum hydroxide/magnesium hydroxide/simethicone Suspension 30 milliLiter(s) Oral every 4 hours PRN  aspirin enteric coated 81 milliGRAM(s) Oral daily  atorvastatin 10 milliGRAM(s) Oral at bedtime  clopidogrel Tablet 75 milliGRAM(s) Oral daily  heparin   Injectable 5000 Unit(s) SubCutaneous every 12 hours  magnesium sulfate  IVPB 2 Gram(s) IV Intermittent once  melatonin 3 milliGRAM(s) Oral at bedtime PRN  metoprolol succinate ER 25 milliGRAM(s) Oral daily  mycophenolate mofetil 500 milliGRAM(s) Oral two times a day  ondansetron Injectable 4 milliGRAM(s) IV Push every 8 hours PRN  predniSONE   Tablet 5 milliGRAM(s) Oral daily  tacrolimus 3 milliGRAM(s) Oral two times a day  trimethoprim   80 mG/sulfamethoxazole 400 mG 1 Tablet(s) Oral daily      FAMILY HISTORY:  Family history of adult polycystic kidney disease (Sibling)    Family history of polycystic kidney (Sibling)        Social History:  Smoking History:  Alcohol Use:  Drug Use:    REVIEW OF SYSTEMS:  CONSTITUTIONAL: No weakness, fevers or chills  EYES/ENT: No visual changes;  No dysphagia  NECK: No pain or stiffness  RESPIRATORY: No cough, wheezing, hemoptysis; No shortness of breath  CARDIOVASCULAR: +chest pain or palpitations; No lower extremity edema  GASTROINTESTINAL: No abdominal or epigastric pain. No nausea, vomiting, or hematemesis; No diarrhea or constipation. No melena or hematochezia.  BACK: No back pain  GENITOURINARY: No dysuria, frequency or hematuria  NEUROLOGICAL: No numbness or weakness  SKIN: No itching, burning, rashes, or lesions   All other review of systems is negative unless indicated above.    Physical Exam:  T(F): 97.7 (03-16), Max: 98 (03-16)  HR: 55 (03-16) (48 - 57)  BP: 118/72 (03-16) (117/61 - 141/73)  RR: 18 (03-16)  SpO2: 97% (03-16)    GENERAL: No acute distress, well-developed  HEAD:  Atraumatic, Normocephalic  ENT: EOMI, PERRLA, conjunctiva and sclera clear, Neck supple, No JVD, moist mucosa  CHEST/LUNG: Clear to auscultation bilaterally; No wheeze, equal breath sounds bilaterally   BACK: No spinal tenderness  HEART: Regular rate and rhythm; No murmurs, rubs, or gallops. Good capillary refill.   ABDOMEN: Soft, Nontender, Nondistended; Bowel sounds present  EXTREMITIES:  No clubbing, cyanosis, or edema  PSYCH: Nl behavior, nl affect  NEUROLOGY: AAOx3, non-focal, cranial nerves intact  SKIN: Normal color, No rashes or lesions  LINES:    Cardiovascular Diagnostic Testing:    ECG: Personally reviewed:    Echo: Personally reviewed:    Stress Testing:    Cath:    Imaging:    CXR: Personally reviewed    Labs: Personally reviewed                        15.9   6.04  )-----------( 111      ( 16 Mar 2022 07:42 )             50.4     03-16    132<L>  |  97  |  13  ----------------------------<  114<H>  4.1   |  23  |  0.85    Ca    8.8      16 Mar 2022 07:42  Phos  3.4     03-16  Mg     1.5     03-16    TPro  6.7  /  Alb  4.1  /  TBili  0.5  /  DBili  x   /  AST  34  /  ALT  16  /  AlkPhos  25<L>  03-16        CARDIAC MARKERS ( 16 Mar 2022 03:07 )  11 ng/L / x     / x     / x     / x     / x      CARDIAC MARKERS ( 16 Mar 2022 01:39 )  10 ng/L / x     / x     / x     / x     / x            Serum Pro-Brain Natriuretic Peptide: 705 pg/mL (03-16 @ 01:39)        Thyroid Stimulating Hormone, Serum: 1.95 uIU/mL (03-16 @ 10:58)

## 2022-03-16 NOTE — ED PROVIDER NOTE - PHYSICAL EXAMINATION
Gen: WDWN, NAD  HEENT: EOMI, no nasal discharge, mucous membranes moist  CV: bradycardic, 2+ radial pulses b/l  Resp:  no accessory muscle use, no increased work of breathing  GI: Abdomen soft non-distended, NTTP  MSK: No open wounds, no bruising, no LE edema  Neuro: A&Ox4, following commands, moving all four extremities spontaneously  Psych: appropriate mood

## 2022-03-16 NOTE — H&P ADULT - HISTORY OF PRESENT ILLNESS
62 year old male with a PMHx of CAD (s/p CABG and PCI with last stent to OM1 (9/20/19)) and polycystic kidney disease s/p renal transplant in 2018 who presents with        Vitals: afebrile, HR 50, /61, SpO2 100% on room air.  Labs: thrombocytopenia to 80. Mild hyponatremia. High sensitivity troponin T 10-> 11. Pro-.  CXR: clear lung    ED interventions: acetaminophen. 62 year old male with a PMHx of CAD (s/p CABG in 2007 and PCI in 2015 and 2016) and polycystic kidney disease s/p renal transplant in 2018 who presents with chest tightness. Symptoms started two days prior to admission. He had two episodes, both times while supine and asleep. This is described as a chest tightness in the epigastric / midsternal area without radiation and associated with diaphoresis and hot flashes. There is no association with exertion.     Vitals: afebrile, HR 50, /61, SpO2 100% on room air.  Labs: thrombocytopenia to 80. Mild hyponatremia. High sensitivity troponin T 10-> 11. Pro-.  CXR: clear lung    ED interventions: acetaminophen.

## 2022-03-16 NOTE — ED PROVIDER NOTE - ATTENDING CONTRIBUTION TO CARE
MD Meeks:  patient seen and evaluated personally.   I agree with the History & Physical,  Impression & Plan other than what was detailed in my note.  MD Meeks  61 y/o hx of cabg, stent, on plavix, renal transplant on mycophenolate, presenting to ed w/ cc of chest pain going to r arm, this does not feel like prior mi, no pain raditating to back, no associated sob ,no recent illnesses,   afebrile vitals stable    EKG as read by me sinus daniel t wave inveersion in 1 avl, MD Meeks:  patient seen and evaluated personally.   I agree with the History & Physical,  Impression & Plan other than what was detailed in my note.  MD Meeks  61 y/o hx of cabg, stent, on plavix, renal transplant on mycophenolate, presenting to ed w/ cc of chest pain described as tightness, has not had before, also had some , this does not feel like prior mi, no pain raditating to back, no associated sob ,no recent illnesses,   afebrile vitals stable    EKG as read by me sinus daniel t wave inveersion in 1 avl,

## 2022-03-16 NOTE — ED ADULT NURSE NOTE - OBJECTIVE STATEMENT
62 y.o. M A&Ox4 PMHx CABG, kidney transplant, presenting to ED via walk-in for chest pain. Pt states that for 2-3 days at night he has been experiencing chest tightness with back pain and r sided neck pain. Pt states that during these episodes of chest tightness his BP increases, he feels sweaty and his blood sugar is low. Pt denies any current dizziness, lightheadedness, abd pain, N/V/D, fever/chills, chest pain, SOB. Upon assessment, pt is grossly neurologically intact, no increased work of breathing, skin dry and unremarkable, two AV fistula sites noted to right arm with position thrill. ED Resident Kendrick at bedside for eval. Pt placed on cardiac monitor with sinus daniel. Pt son-in-law at bedside. Pt resting in stretcher in position of comfort with stretcher locked and lowered and call bell within reach.

## 2022-03-16 NOTE — ED PROVIDER NOTE - OBJECTIVE STATEMENT
61YO M hx CABG, kidney transplant, p/w CP x1d, most recently at 9pm, nonexertional. substernal, radiating to R shoulder. intermittent. last episode onset 9pm, still endorses pain. does not feel like prior MI. denies sob, nausea. last stress/echo 1y prior, normal.

## 2022-03-16 NOTE — ED PROVIDER NOTE - CLINICAL SUMMARY MEDICAL DECISION MAKING FREE TEXT BOX
Ashli Marks MD, PGY-2: 61YO M hx CABG, kidney transplant, p/w CP x1d. vs: hr 52, stable bp. concern for acs given significant cardiac hx, low concern for dissection given no neuro changes or pain radiating to the back. plan for basic cardiac w/u, admission for stress/echo.

## 2022-03-16 NOTE — H&P ADULT - PROBLEM SELECTOR PLAN 2
s/p CABG in 2007 and PCI in 2015 and 2016  - continue home ASA 81mg daily with plavix 75mg daily  - continue home metoprolol succinate 25mg daily  - continue home lipitor 10mg daily

## 2022-03-16 NOTE — H&P ADULT - ASSESSMENT
62 year old male with a PMHx of CAD (s/p CABG in 2007 and PCI in 2015 and 2016) and polycystic kidney disease s/p renal transplant in 2018 who presents with atypical chest pain.

## 2022-03-16 NOTE — H&P ADULT - NSHPLABSRESULTS_GEN_ALL_CORE
LABS:                         15.2   6.21  )-----------( 80       ( 16 Mar 2022 01:40 )             47.5     03-16    131<L>  |  97  |  15  ----------------------------<  128<H>  5.1   |  19<L>  |  0.86    Ca    8.9      16 Mar 2022 01:39  Mg     1.6     03-16    TPro  6.7  /  Alb  4.1  /  TBili  0.5  /  DBili  x   /  AST  34  /  ALT  16  /  AlkPhos  25<L>  03-16            Serum Pro-Brain Natriuretic Peptide: 705 pg/mL (03-16 @ 01:39)      Records reviewed from prior hospitalization.  Labs reviewed remarkable for - thrombocytopenia to 80. Mild hyponatremia. High sensitivity troponin T 10-> 11. Pro-.  EKG personally reviewed - NSR with RBBB, presents on prior EKG. There are Q waves in leads II, III, AVF.

## 2022-03-16 NOTE — CONSULT NOTE ADULT - ASSESSMENT
60 yo c hx CABG PCI APCKD HTN and asthma  sp renal xplant who presents for follow up       1 Transplant- Prednisone 5mg/Prograf 3.0am /2.5mg / and Cellcept 500mg po bid;       2 CVS-      3 Renal-      4. ID His xplant team wants to cont the Bactrim    5 Vasc- AVG(left) working  62 yo c hx CABG PCI APCKD HTN and asthma  sp renal xplant who presents for follow up       1 Transplant- Prednisone 5mg/Prograf 3.0am /2.5mg / and Cellcept 500mg po bid;     Reduce the PM dose to 2.5mg po qpm     2 CVS-  Atypical chest pain at present and cards eval is pending;  I suspect a stress test may be appropriate     3 Renal-  Stable renal function;  No need for renal sono    4. ID His xplant team wants to cont the Bactrim for life     5 Vasc- AVG(left) working.  May have a conversation for ligation as outpt     Sayed [facility]   [phone]

## 2022-03-16 NOTE — H&P ADULT - NSICDXPASTSURGICALHX_GEN_ALL_CORE_FT
PAST SURGICAL HISTORY:  AV fistula     AV fistula b/l MILI GREEN    CABG (Coronary Artery Bypass Graft) 2007    S/P CABG x 5     S/P coronary artery stent placement     S/P hemorrhoidectomy and rectal polypectomy -2/3/2017.    Stented coronary artery x2 cardiac stents in 2016, one cardiac stent in 2015

## 2022-03-17 ENCOUNTER — TRANSCRIPTION ENCOUNTER (OUTPATIENT)
Age: 62
End: 2022-03-17

## 2022-03-17 VITALS
TEMPERATURE: 98 F | RESPIRATION RATE: 18 BRPM | SYSTOLIC BLOOD PRESSURE: 137 MMHG | HEART RATE: 68 BPM | OXYGEN SATURATION: 99 % | DIASTOLIC BLOOD PRESSURE: 74 MMHG

## 2022-03-17 LAB
ANION GAP SERPL CALC-SCNC: 11 MMOL/L — SIGNIFICANT CHANGE UP (ref 5–17)
BUN SERPL-MCNC: 16 MG/DL — SIGNIFICANT CHANGE UP (ref 7–23)
CALCIUM SERPL-MCNC: 8.3 MG/DL — LOW (ref 8.4–10.5)
CHLORIDE SERPL-SCNC: 100 MMOL/L — SIGNIFICANT CHANGE UP (ref 96–108)
CO2 SERPL-SCNC: 22 MMOL/L — SIGNIFICANT CHANGE UP (ref 22–31)
CREAT SERPL-MCNC: 0.95 MG/DL — SIGNIFICANT CHANGE UP (ref 0.5–1.3)
EGFR: 89 ML/MIN/1.73M2 — SIGNIFICANT CHANGE UP
GLUCOSE SERPL-MCNC: 113 MG/DL — HIGH (ref 70–99)
HCT VFR BLD CALC: 44.3 % — SIGNIFICANT CHANGE UP (ref 39–50)
HGB BLD-MCNC: 14.5 G/DL — SIGNIFICANT CHANGE UP (ref 13–17)
MAGNESIUM SERPL-MCNC: 1.7 MG/DL — SIGNIFICANT CHANGE UP (ref 1.6–2.6)
MCHC RBC-ENTMCNC: 28.7 PG — SIGNIFICANT CHANGE UP (ref 27–34)
MCHC RBC-ENTMCNC: 32.7 GM/DL — SIGNIFICANT CHANGE UP (ref 32–36)
MCV RBC AUTO: 87.7 FL — SIGNIFICANT CHANGE UP (ref 80–100)
NRBC # BLD: 0 /100 WBCS — SIGNIFICANT CHANGE UP (ref 0–0)
PLATELET # BLD AUTO: 106 K/UL — LOW (ref 150–400)
POTASSIUM SERPL-MCNC: 4.1 MMOL/L — SIGNIFICANT CHANGE UP (ref 3.5–5.3)
POTASSIUM SERPL-SCNC: 4.1 MMOL/L — SIGNIFICANT CHANGE UP (ref 3.5–5.3)
RBC # BLD: 5.05 M/UL — SIGNIFICANT CHANGE UP (ref 4.2–5.8)
RBC # FLD: 12.8 % — SIGNIFICANT CHANGE UP (ref 10.3–14.5)
SODIUM SERPL-SCNC: 133 MMOL/L — LOW (ref 135–145)
TACROLIMUS SERPL-MCNC: 5.9 NG/ML — SIGNIFICANT CHANGE UP
WBC # BLD: 5.1 K/UL — SIGNIFICANT CHANGE UP (ref 3.8–10.5)
WBC # FLD AUTO: 5.1 K/UL — SIGNIFICANT CHANGE UP (ref 3.8–10.5)

## 2022-03-17 PROCEDURE — 87635 SARS-COV-2 COVID-19 AMP PRB: CPT

## 2022-03-17 PROCEDURE — 80053 COMPREHEN METABOLIC PANEL: CPT

## 2022-03-17 PROCEDURE — 83735 ASSAY OF MAGNESIUM: CPT

## 2022-03-17 PROCEDURE — 93306 TTE W/DOPPLER COMPLETE: CPT

## 2022-03-17 PROCEDURE — 87389 HIV-1 AG W/HIV-1&-2 AB AG IA: CPT

## 2022-03-17 PROCEDURE — 84484 ASSAY OF TROPONIN QUANT: CPT

## 2022-03-17 PROCEDURE — 80048 BASIC METABOLIC PNL TOTAL CA: CPT

## 2022-03-17 PROCEDURE — 80197 ASSAY OF TACROLIMUS: CPT

## 2022-03-17 PROCEDURE — 83036 HEMOGLOBIN GLYCOSYLATED A1C: CPT

## 2022-03-17 PROCEDURE — 85027 COMPLETE CBC AUTOMATED: CPT

## 2022-03-17 PROCEDURE — 80061 LIPID PANEL: CPT

## 2022-03-17 PROCEDURE — 85025 COMPLETE CBC W/AUTO DIFF WBC: CPT

## 2022-03-17 PROCEDURE — 93005 ELECTROCARDIOGRAM TRACING: CPT

## 2022-03-17 PROCEDURE — 84443 ASSAY THYROID STIM HORMONE: CPT

## 2022-03-17 PROCEDURE — 84100 ASSAY OF PHOSPHORUS: CPT

## 2022-03-17 PROCEDURE — 71045 X-RAY EXAM CHEST 1 VIEW: CPT

## 2022-03-17 PROCEDURE — 99285 EMERGENCY DEPT VISIT HI MDM: CPT | Mod: 25

## 2022-03-17 PROCEDURE — 80074 ACUTE HEPATITIS PANEL: CPT

## 2022-03-17 PROCEDURE — 83880 ASSAY OF NATRIURETIC PEPTIDE: CPT

## 2022-03-17 RX ORDER — TACROLIMUS 5 MG/1
3 CAPSULE ORAL
Qty: 0 | Refills: 0 | DISCHARGE

## 2022-03-17 RX ORDER — METOPROLOL TARTRATE 50 MG
1 TABLET ORAL
Qty: 0 | Refills: 0 | DISCHARGE
Start: 2022-03-17

## 2022-03-17 RX ORDER — METOPROLOL TARTRATE 50 MG
1 TABLET ORAL
Qty: 0 | Refills: 0 | DISCHARGE

## 2022-03-17 RX ORDER — TACROLIMUS 5 MG/1
3 CAPSULE ORAL
Qty: 0 | Refills: 0 | DISCHARGE
Start: 2022-03-17

## 2022-03-17 RX ORDER — LANOLIN ALCOHOL/MO/W.PET/CERES
1 CREAM (GRAM) TOPICAL
Qty: 0 | Refills: 0 | DISCHARGE
Start: 2022-03-17

## 2022-03-17 RX ORDER — CLOPIDOGREL BISULFATE 75 MG/1
1 TABLET, FILM COATED ORAL
Qty: 30 | Refills: 11

## 2022-03-17 RX ORDER — ATORVASTATIN CALCIUM 80 MG/1
1 TABLET, FILM COATED ORAL
Qty: 0 | Refills: 0 | DISCHARGE

## 2022-03-17 RX ORDER — ACETAMINOPHEN 500 MG
2 TABLET ORAL
Qty: 0 | Refills: 0 | DISCHARGE
Start: 2022-03-17

## 2022-03-17 RX ORDER — ATORVASTATIN CALCIUM 80 MG/1
1 TABLET, FILM COATED ORAL
Qty: 0 | Refills: 0 | DISCHARGE
Start: 2022-03-17

## 2022-03-17 RX ORDER — CLOPIDOGREL BISULFATE 75 MG/1
1 TABLET, FILM COATED ORAL
Qty: 0 | Refills: 0 | DISCHARGE
Start: 2022-03-17

## 2022-03-17 RX ORDER — ASPIRIN/CALCIUM CARB/MAGNESIUM 324 MG
1 TABLET ORAL
Qty: 0 | Refills: 0 | DISCHARGE
Start: 2022-03-17

## 2022-03-17 RX ADMIN — MYCOPHENOLATE MOFETIL 500 MILLIGRAM(S): 250 CAPSULE ORAL at 05:30

## 2022-03-17 RX ADMIN — HEPARIN SODIUM 5000 UNIT(S): 5000 INJECTION INTRAVENOUS; SUBCUTANEOUS at 05:30

## 2022-03-17 RX ADMIN — CLOPIDOGREL BISULFATE 75 MILLIGRAM(S): 75 TABLET, FILM COATED ORAL at 12:04

## 2022-03-17 RX ADMIN — MYCOPHENOLATE MOFETIL 500 MILLIGRAM(S): 250 CAPSULE ORAL at 18:31

## 2022-03-17 RX ADMIN — Medication 5 MILLIGRAM(S): at 05:30

## 2022-03-17 RX ADMIN — Medication 1 TABLET(S): at 12:04

## 2022-03-17 RX ADMIN — TACROLIMUS 3 MILLIGRAM(S): 5 CAPSULE ORAL at 18:31

## 2022-03-17 RX ADMIN — HEPARIN SODIUM 5000 UNIT(S): 5000 INJECTION INTRAVENOUS; SUBCUTANEOUS at 18:31

## 2022-03-17 RX ADMIN — TACROLIMUS 3 MILLIGRAM(S): 5 CAPSULE ORAL at 05:27

## 2022-03-17 RX ADMIN — Medication 81 MILLIGRAM(S): at 12:04

## 2022-03-17 NOTE — DISCHARGE NOTE PROVIDER - HOSPITAL COURSE
60 yo c hx CABG PCI APCKD HTN and asthma  sp renal xplant who presents for follow up  Hyponatremia      1 Transplant- Prednisone 5mg/Prograf 3.0am /2.5mg / and Cellcept 500mg po bid;     Reduce the PM dose to 2.5mg po qpm     2 CVS-  Atypical chest pain at present and outpt follow up with Dr Jeffrey Chauhan 3/23    3 Renal-  Stable renal function;  No need for renal sono  He will stop abusing liquids without calories     4. ID His xplant team wants to cont the Bactrim for life     5 Vasc- AVG(left) working.  May have a conversation for ligation as outpt

## 2022-03-17 NOTE — PROGRESS NOTE ADULT - SUBJECTIVE AND OBJECTIVE BOX
NEPHROLOGY-[facility] [phone]        Patient seen and examined in bed.   He had no chest pain         MEDICATIONS  (STANDING):  aspirin enteric coated 81 milliGRAM(s) Oral daily  atorvastatin 10 milliGRAM(s) Oral at bedtime  clopidogrel Tablet 75 milliGRAM(s) Oral daily  heparin   Injectable 5000 Unit(s) SubCutaneous every 12 hours  metoprolol succinate ER 25 milliGRAM(s) Oral daily  mycophenolate mofetil 500 milliGRAM(s) Oral two times a day  predniSONE   Tablet 5 milliGRAM(s) Oral daily  tacrolimus 3 milliGRAM(s) Oral two times a day  trimethoprim   80 mG/sulfamethoxazole 400 mG 1 Tablet(s) Oral daily      VITAL:  T(C): , Max: 36.9 (03-16-22 @ 21:09)  T(F): , Max: 98.4 (03-16-22 @ 21:09)  HR: 59 (03-17-22 @ 10:35)  BP: 133/74 (03-17-22 @ 10:35)  BP(mean): --  RR: 18 (03-17-22 @ 10:35)  SpO2: 98% (03-17-22 @ 10:35)  Wt(kg): --    I and O's:    03-16 @ 07:01  -  03-17 @ 07:00  --------------------------------------------------------  IN: 480 mL / OUT: 2500 mL / NET: -2020 mL    03-17 @ 07:01  -  03-17 @ 11:34  --------------------------------------------------------  IN: 360 mL / OUT: 0 mL / NET: 360 mL          PHYSICAL EXAM:    Constitutional: NAD  Neck:  No JVD  Respiratory: CTAB/L  Cardiovascular: S1 and S2  Gastrointestinal: BS+, soft, NT/ND  Extremities: No peripheral edema  Neurological: A/O x 3, no focal deficits  Psychiatric: Normal mood, normal affect  : No Jean  Skin: No rashes  Access: Not applicable    LABS:                        14.5   5.10  )-----------( 106      ( 17 Mar 2022 05:49 )             44.3     03-17    133<L>  |  100  |  16  ----------------------------<  113<H>  4.1   |  22  |  0.95    Ca    8.3<L>      17 Mar 2022 05:49  Phos  3.4     03-16  Mg     1.7     03-17    TPro  6.7  /  Alb  4.1  /  TBili  0.5  /  DBili  x   /  AST  34  /  ALT  16  /  AlkPhos  25<L>  03-16          Urine Studies:          RADIOLOGY & ADDITIONAL STUDIES:

## 2022-03-17 NOTE — DISCHARGE NOTE PROVIDER - PROVIDER TOKENS
PROVIDER:[TOKEN:[[id number]],FOLLOWUP:[2 weeks]],PROVIDER:[TOKEN:[[id number]],FOLLOWUP:[1 week]]

## 2022-03-17 NOTE — DISCHARGE NOTE PROVIDER - CARE PROVIDERS DIRECT ADDRESSES
[email or web address]

## 2022-03-17 NOTE — DISCHARGE NOTE PROVIDER - NSDCMRMEDTOKEN_GEN_ALL_CORE_FT
aspirin 81 mg oral delayed release tablet: 1 tab(s) orally once a day  atorvastatin 10 mg oral tablet: 1 tab(s) orally once a day  Bactrim 400 mg-80 mg oral tablet: 1 tab(s) orally once a day  CellCept 500 mg oral tablet: 1 tab(s) orally 2 times a day  clopidogrel 75 mg oral tablet: 1 tab(s) orally once a day  Mag-Ox 400 oral tablet: 1 tab(s) orally 2 times a day  metoprolol succinate 25 mg oral tablet, extended release: 1 tab(s) orally once a day  predniSONE 5 mg oral tablet: 1 tab(s) orally once a day w/ food  tacrolimus 1 mg oral capsule: 3 cap(s) orally 2 times a day   acetaminophen 325 mg oral tablet: 2 tab(s) orally every 6 hours, As needed, Temp greater or equal to 38C (100.4F), Mild Pain (1 - 3)  aspirin 81 mg oral delayed release tablet: 1 tab(s) orally once a day  atorvastatin 10 mg oral tablet: 1 tab(s) orally once a day (at bedtime)  CellCept 500 mg oral tablet: 1 tab(s) orally 2 times a day  clopidogrel 75 mg oral tablet: 1 tab(s) orally once a day  Mag-Ox 400 oral tablet: 1 tab(s) orally 2 times a day  melatonin 3 mg oral tablet: 1 tab(s) orally once a day (at bedtime), As needed, Insomnia  metoprolol succinate 25 mg oral tablet, extended release: 1 tab(s) orally once a day  predniSONE 5 mg oral tablet: 1 tab(s) orally once a day  sulfamethoxazole-trimethoprim 400 mg-80 mg oral tablet: 1 tab(s) orally once a day  tacrolimus 1 mg oral capsule: 3 cap(s) orally 2 times a day

## 2022-03-17 NOTE — DISCHARGE NOTE PROVIDER - NSDCCPCAREPLAN_GEN_ALL_CORE_FT
PRINCIPAL DISCHARGE DIAGNOSIS  Diagnosis: Chest pain  Assessment and Plan of Treatment: LHC via RFA performed; non obstructive disease  Dx atypical chest pain  followup cardiolology and obtain echocardiogram outpt       PRINCIPAL DISCHARGE DIAGNOSIS  Diagnosis: Chest pain  Assessment and Plan of Treatment: evaluated by cardiology  Dx atypical chest pain  followup cardiolology and obtain echocardiogram outpt      SECONDARY DISCHARGE DIAGNOSES  Diagnosis: Renal transplant recipient  Assessment and Plan of Treatment: continue transplant meds; followup transplant team  followup renal Dr. DENIS Mckeon

## 2022-03-17 NOTE — PROGRESS NOTE ADULT - ASSESSMENT
62 yo c hx CABG PCI APCKD HTN and asthma  sp renal xplant who presents for follow up  Hyponatremia      1 Transplant- Prednisone 5mg/Prograf 3.0am /2.5mg / and Cellcept 500mg po bid;     Reduce the PM dose to 2.5mg po qpm     2 CVS-  Atypical chest pain at present and outpt follow up with Dr Jeffrey Chauhan 3/23    3 Renal-  Stable renal function;  No need for renal sono  He will stop abusing liquids without calories     4. ID His xplant team wants to cont the Bactrim for life     5 Vasc- AVG(left) working.  May have a conversation for ligation as outpt     Sayed [facility]   [employer] [facility]   [phone]
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
## 2022-03-17 NOTE — DISCHARGE NOTE NURSING/CASE MANAGEMENT/SOCIAL WORK - NSDCPEFALRISK_GEN_ALL_CORE
For information on Fall & Injury Prevention, visit: [email or web address] OR  [email or web address] OR  https://www.cdc.gov/steadi/patient.html

## 2022-03-17 NOTE — DISCHARGE NOTE NURSING/CASE MANAGEMENT/SOCIAL WORK - PATIENT PORTAL LINK FT
You can access the FollowMyHealth Patient Portal offered by [facility] by registering at the following website: [email or web address]. By joining [email or web address]’s FollowMyHealth portal, you will also be able to view your health information using other applications (apps) compatible with our system.

## 2022-03-17 NOTE — DISCHARGE NOTE PROVIDER - CARE PROVIDER_API CALL
Norm Zheng (MD; EVANGELINA; MPH)  Cardiovascular Disease; Internal Medicine  [address], [address] [place] [address]  Phone: [phone]  Fax: [phone]  Follow Up Time: 2 weeks    Sung Mckeon)  Internal Medicine; Nephrology  [address] [facility], [address] [place] [id number]  Phone: [phone]  Fax: [phone]  Follow Up Time: 1 week

## 2022-03-24 ENCOUNTER — NON-APPOINTMENT (OUTPATIENT)
Age: 62
End: 2022-03-24

## 2022-03-24 ENCOUNTER — APPOINTMENT (OUTPATIENT)
Dept: CARDIOLOGY | Facility: CLINIC | Age: 62
End: 2022-03-24
Payer: MEDICAID

## 2022-03-24 VITALS — DIASTOLIC BLOOD PRESSURE: 70 MMHG | SYSTOLIC BLOOD PRESSURE: 118 MMHG

## 2022-03-24 VITALS
HEART RATE: 62 BPM | DIASTOLIC BLOOD PRESSURE: 82 MMHG | TEMPERATURE: 97.8 F | BODY MASS INDEX: 33.47 KG/M2 | HEIGHT: 62 IN | OXYGEN SATURATION: 98 % | SYSTOLIC BLOOD PRESSURE: 148 MMHG

## 2022-03-24 VITALS — BODY MASS INDEX: 33.47 KG/M2 | WEIGHT: 183 LBS

## 2022-03-24 PROBLEM — I25.810 ATHEROSCLEROSIS OF CORONARY ARTERY BYPASS GRAFT(S) WITHOUT ANGINA PECTORIS: Chronic | Status: ACTIVE | Noted: 2017-11-21

## 2022-03-24 PROBLEM — Z94.0 KIDNEY TRANSPLANT STATUS: Chronic | Status: ACTIVE | Noted: 2022-03-16

## 2022-03-24 PROCEDURE — 93000 ELECTROCARDIOGRAM COMPLETE: CPT

## 2022-03-24 PROCEDURE — 99214 OFFICE O/P EST MOD 30 MIN: CPT

## 2022-03-24 NOTE — CARDIOLOGY SUMMARY
[de-identified] : \par 03/24/22 - sinus rhythm, occasional PVC, RBBB, 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, nonspecific ST abnormality\par  [de-identified] : \par 09/20/19 (regadenoson MIBI) - large, moderate to severe defects n lateral and mid to basal inferior/inferolateral walls that are fixed, consistent with infarct; large, severe defects in inferoapical and apical lateral walls that are partially fixed, suggestive of infarct with at least moderate mckay-infarct ischemia, LVEF 43%\par  [de-identified] : \par 09/09/19 - mild MR, normal LA, mild segmental LV systolic dysfunction, mild LVE, grossly normal RV size and function, LVEF 50%\par  [de-identified] : \par 09/20/19 (PCI) - SYNERGY stent to OM2 80% ISR\par 09/20/19 (CATH) - dLM 30%, pLAD 100%, OM1 100%, OM2 80% ISR, mRCA 100%, patent LIMA-LAD, patent SVG-OM1\par 06/01/16 (PCI) - RESOLUTE stents to pCx 70% and mOM2 80%\par  [de-identified] : \par 2007 (CABG) - [facility]

## 2022-03-24 NOTE — DISCUSSION/SUMMARY
[Cardiomyopathy] : cardiomyopathy [Coronary Artery Disease] : coronary artery disease [Hyperlipidemia] : hyperlipidemia [Hypertension] : hypertension [Stable] : stable [FreeTextEntry1] : \par Currently stable from a cardiovascular standpoint. Normotensive. History of ischemic cardiomyopathy. Currently euvolemic. Stable CAD (s/p CABG, s/p prox Cx and OM stents). No ischemic or CHF symptoms. Patient with renal transplant on immunosuppressive therapy (creatinine 0.8). Recent labs reviewed. Lipids optimized. Continue current medications. ECG completed today and reviewed (findings as noted above). Follow up in 6 months.

## 2022-03-24 NOTE — HISTORY OF PRESENT ILLNESS
[FreeTextEntry1] : Doing okay. Denies chest pain, shortness of breath or palpitations. Recently admitted at [facility] for issues with diarrhea.

## 2022-03-24 NOTE — PHYSICAL EXAM
[Well Developed] : well developed [Well Nourished] : well nourished [No Acute Distress] : no acute distress [Normal Conjunctiva] : normal conjunctiva [Normal Venous Pressure] : normal venous pressure [No Carotid Bruit] : no carotid bruit [Normal S1, S2] : normal S1, S2 [No Murmur] : no murmur [No Rub] : no rub [No Gallop] : no gallop [Clear Lung Fields] : clear lung fields [Good Air Entry] : good air entry [No Respiratory Distress] : no respiratory distress  [Soft] : abdomen soft [Non Tender] : non-tender [Normal Gait] : normal gait [No Cyanosis] : no cyanosis [No Edema] : no edema [No Rash] : no rash [No Skin Lesions] : no skin lesions [Moves all extremities] : moves all extremities [No Focal Deficits] : no focal deficits [Normal Speech] : normal speech [Alert and Oriented] : alert and oriented

## 2022-03-31 ENCOUNTER — RX RENEWAL (OUTPATIENT)
Age: 62
End: 2022-03-31

## 2022-03-31 RX ORDER — ERGOCALCIFEROL 1.25 MG/1
1.25 MG CAPSULE, LIQUID FILLED ORAL
Qty: 13 | Refills: 3 | Status: ACTIVE | COMMUNITY
Start: 2022-03-31 | End: 1900-01-01

## 2022-07-05 ENCOUNTER — NON-APPOINTMENT (OUTPATIENT)
Age: 62
End: 2022-07-05

## 2022-07-05 ENCOUNTER — APPOINTMENT (OUTPATIENT)
Dept: TRANSPLANT | Facility: CLINIC | Age: 62
End: 2022-07-05

## 2022-07-05 LAB
ALBUMIN SERPL ELPH-MCNC: 4.2 G/DL
ALP BLD-CCNC: 44 U/L
ALT SERPL-CCNC: 15 U/L
ANION GAP SERPL CALC-SCNC: 12 MMOL/L
APPEARANCE: CLEAR
AST SERPL-CCNC: 15 U/L
BACTERIA: NEGATIVE
BASOPHILS # BLD AUTO: 0.04 K/UL
BASOPHILS NFR BLD AUTO: 0.7 %
BILIRUB SERPL-MCNC: 0.5 MG/DL
BILIRUBIN URINE: NEGATIVE
BLOOD URINE: NORMAL
BUN SERPL-MCNC: 17 MG/DL
CALCIUM SERPL-MCNC: 8.8 MG/DL
CHLORIDE SERPL-SCNC: 101 MMOL/L
CMV DNA SPEC QL NAA+PROBE: NOT DETECTED IU/ML
CMVPCR LOG: NOT DETECTED LOG10IU/ML
CO2 SERPL-SCNC: 24 MMOL/L
COLOR: NORMAL
CREAT SERPL-MCNC: 1.06 MG/DL
CREAT SPEC-SCNC: 99 MG/DL
CREAT/PROT UR: 0.3 RATIO
EGFR: 78 ML/MIN/1.73M2
EOSINOPHIL # BLD AUTO: 0.09 K/UL
EOSINOPHIL NFR BLD AUTO: 1.7 %
GLUCOSE QUALITATIVE U: NEGATIVE
GLUCOSE SERPL-MCNC: 130 MG/DL
HCT VFR BLD CALC: 48.2 %
HGB BLD-MCNC: 15.4 G/DL
HYALINE CASTS: 1 /LPF
IMM GRANULOCYTES NFR BLD AUTO: 0.2 %
KETONES URINE: NEGATIVE
LEUKOCYTE ESTERASE URINE: NEGATIVE
LYMPHOCYTES # BLD AUTO: 1.22 K/UL
LYMPHOCYTES NFR BLD AUTO: 22.8 %
MAGNESIUM SERPL-MCNC: 1.6 MG/DL
MAN DIFF?: NORMAL
MCHC RBC-ENTMCNC: 28.3 PG
MCHC RBC-ENTMCNC: 32 GM/DL
MCV RBC AUTO: 88.6 FL
MICROSCOPIC-UA: NORMAL
MONOCYTES # BLD AUTO: 0.53 K/UL
MONOCYTES NFR BLD AUTO: 9.9 %
NEUTROPHILS # BLD AUTO: 3.45 K/UL
NEUTROPHILS NFR BLD AUTO: 64.7 %
NITRITE URINE: NEGATIVE
PH URINE: 6
PHOSPHATE SERPL-MCNC: 3.4 MG/DL
PLATELET # BLD AUTO: 123 K/UL
POTASSIUM SERPL-SCNC: 4.3 MMOL/L
PROT SERPL-MCNC: 6.3 G/DL
PROT UR-MCNC: 26 MG/DL
PROTEIN URINE: ABNORMAL
RBC # BLD: 5.44 M/UL
RBC # FLD: 13.5 %
RED BLOOD CELLS URINE: 0 /HPF
SODIUM SERPL-SCNC: 137 MMOL/L
SPECIFIC GRAVITY URINE: 1.02
SQUAMOUS EPITHELIAL CELLS: 0 /HPF
TACROLIMUS SERPL-MCNC: 5.3 NG/ML
URATE SERPL-MCNC: 5.8 MG/DL
UROBILINOGEN URINE: NORMAL
WBC # FLD AUTO: 5.34 K/UL
WHITE BLOOD CELLS URINE: 0 /HPF

## 2022-07-06 RX ORDER — TACROLIMUS 0.5 MG/1
0.5 CAPSULE ORAL
Qty: 180 | Refills: 3 | Status: DISCONTINUED | COMMUNITY
Start: 2019-05-24 | End: 2022-07-06

## 2022-07-07 LAB — BKV DNA SPEC QL NAA+PROBE: NOT DETECTED IU/ML

## 2022-08-02 ENCOUNTER — APPOINTMENT (OUTPATIENT)
Dept: NEPHROLOGY | Facility: CLINIC | Age: 62
End: 2022-08-02

## 2022-08-02 VITALS
DIASTOLIC BLOOD PRESSURE: 88 MMHG | TEMPERATURE: 97.6 F | OXYGEN SATURATION: 97 % | WEIGHT: 167.55 LBS | HEIGHT: 62 IN | BODY MASS INDEX: 30.83 KG/M2 | RESPIRATION RATE: 16 BRPM | SYSTOLIC BLOOD PRESSURE: 156 MMHG | HEART RATE: 73 BPM

## 2022-08-02 VITALS — DIASTOLIC BLOOD PRESSURE: 64 MMHG | SYSTOLIC BLOOD PRESSURE: 126 MMHG

## 2022-08-02 PROCEDURE — 99215 OFFICE O/P EST HI 40 MIN: CPT

## 2022-08-02 RX ORDER — METFORMIN HYDROCHLORIDE 500 MG/1
500 TABLET, COATED ORAL
Qty: 90 | Refills: 0 | Status: DISCONTINUED | COMMUNITY
Start: 2022-06-25

## 2022-08-02 RX ORDER — ERGOCALCIFEROL 1.25 MG/1
1.25 MG CAPSULE ORAL
Qty: 4 | Refills: 0 | Status: DISCONTINUED | COMMUNITY
Start: 2022-02-10 | End: 2022-08-02

## 2022-08-02 RX ORDER — FAMOTIDINE 20 MG/1
20 TABLET, FILM COATED ORAL
Qty: 30 | Refills: 11 | Status: DISCONTINUED | COMMUNITY
Start: 2018-05-29 | End: 2022-08-02

## 2022-08-02 NOTE — HISTORY OF PRESENT ILLNESS
[FreeTextEntry1] : Received DDRT in 2018. Currently no acute symptoms. \par No fever/No urinary symptoms.\par No pain.\par He is trying to lose weight by exercising.\par \par His last labs reviewed from July 2022: creatinine 1 mg/dl \par His last Tac level is therapeutic\par \par Current Meds:\par \par Prograf 3 mg BID\par Cellcept 500 BID\par Prednisone 5 mg/d\par Metoprolol 25 mg 0.5 tab twice daily\par Magnesium 500 /d\par Atorvastatin 20/d\par Clopidogrel 75 mg/d\par Metformin 500 mg daily- not taking now\par \par He saw his PMD Dr Zeferino Arguello in July 2022. Primary nephrologist Dr. Caleb Mckeon, he reports last follow up  more than 6 months previously.

## 2022-08-02 NOTE — ASSESSMENT
[FreeTextEntry1] : Renal Transplant recipient: No dysuria/hematuria. No fever/chills. Tolerating medications.\par Immunosuppression: Reviewed;  on tac/MMF/prednisone, last Tac level noted; will recheck. Currently on 3 mg twice daily.; 500 mg/dose MMF and prednisone at 5 mg daily\par DM: Not taking insulin or Metformin 500 mg, currently on diet control, being followed by Dr. Zeferino Arguello\par Hep C: Completed Epcluza. September 23, 2018 \par Hypertension: controlled; Continue metoprolol.\par Hyperlipidemia: On statin, continue the same. Controlled from last labs\par Cardiovascular risk reduction discussed. He is on Aspirin, beta blocker and Statin, being followed by Dr. Chauhan\par Infection prophylaxis: on Bactrim. \par Discussed ambulation, optimal glucose and blood pressure readings, adherence with medications and follow ups, follow up clinic visit schedule, avoiding dehydration, mosquito bites; prevention of exposure to COVID\par \par Discussed Dermatology and eye check up yearly and vaccinations.\par \par Advised to follow up with Primary MD every 6-8 weeks for labs and f/u at Transplant center every 4 months.

## 2022-08-10 RX ORDER — SULFAMETHOXAZOLE AND TRIMETHOPRIM 400; 80 MG/1; MG/1
400-80 TABLET ORAL DAILY
Qty: 90 | Refills: 1 | Status: DISCONTINUED | COMMUNITY
Start: 2018-05-29 | End: 2022-08-10

## 2022-08-24 ENCOUNTER — RX RENEWAL (OUTPATIENT)
Age: 62
End: 2022-08-24

## 2022-09-22 ENCOUNTER — NON-APPOINTMENT (OUTPATIENT)
Age: 62
End: 2022-09-22

## 2022-09-22 ENCOUNTER — APPOINTMENT (OUTPATIENT)
Dept: CARDIOLOGY | Facility: CLINIC | Age: 62
End: 2022-09-22

## 2022-09-22 VITALS — WEIGHT: 182.98 LBS | BODY MASS INDEX: 33.47 KG/M2

## 2022-09-22 VITALS
OXYGEN SATURATION: 100 % | TEMPERATURE: 98.6 F | SYSTOLIC BLOOD PRESSURE: 136 MMHG | HEIGHT: 62 IN | HEART RATE: 64 BPM | BODY MASS INDEX: 33.47 KG/M2 | DIASTOLIC BLOOD PRESSURE: 76 MMHG

## 2022-09-22 PROCEDURE — 93000 ELECTROCARDIOGRAM COMPLETE: CPT

## 2022-09-22 PROCEDURE — 99214 OFFICE O/P EST MOD 30 MIN: CPT

## 2022-09-22 NOTE — DISCUSSION/SUMMARY
[Cardiomyopathy] : cardiomyopathy [Coronary Artery Disease] : coronary artery disease [Hypertension] : hypertension [Stable] : stable [EKG obtained to assist in diagnosis and management of assessed problem(s)] : EKG obtained to assist in diagnosis and management of assessed problem(s) [FreeTextEntry1] : \par Currently stable from a cardiovascular standpoint. Normotensive. History of ischemic cardiomyopathy (LVEF 50%). Currently euvolemic. Stable CAD (s/p CABG, s/p prox Cx and OM stents). No ischemic or CHF symptoms. Patient with renal transplant on immunosuppressive therapy. Continue current medications including aspirin, clopidogrel, and statin. ECG completed today and reviewed (findings as noted above). Follow up in 6 months.

## 2022-09-22 NOTE — CARDIOLOGY SUMMARY
[de-identified] : \par 09/22/22 - sinus rhythm, occasional PVC, RBBB, 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, nonspecific ST abnormality\par  [de-identified] : \par 09/09/20 (exercise ) -5 METS, 95% MPHR, 05:21 min, Duke score 5, large, moderate defects in the inferior and inferolateral, inferoapical, and apical lateral walls that are unchanged from prior, suggestive of infarct, LVEF 43%\par 09/20/19 (regadenoson MIBI) - large, moderate to severe defects n lateral and mid to basal inferior/inferolateral walls that are fixed, consistent with infarct; large, severe defects in inferoapical and apical lateral walls that are partially fixed, suggestive of infarct with at least moderate mckay-infarct ischemia, LVEF 43%\par  [de-identified] : \par 03/16/22 - MAC, mild-mod MR, AV gradient (peak 9 mmHg, mean 5 mmHg), mild LAE, mild segmental LV systolic dysfunction, moderate diastolic dysfunction, mild LVE, normal RV size and function, RVSP 19 mmHg, LVEF 50%\par 09/09/19 - mild MR, normal LA, mild segmental LV systolic dysfunction, mild LVE, grossly normal RV size and function, LVEF 50%\par  [de-identified] : \par 09/20/19 (PCI) - SYNERGY stent to OM2 80% ISR\par 09/20/19 (CATH) - dLM 30%, pLAD 100%, OM1 100%, OM2 80% ISR, mRCA 100%, patent LIMA-LAD, patent SVG-OM1\par 06/01/16 (PCI) - RESOLUTE stents to pCx 70% and mOM2 80%\par  [de-identified] : \par 2007 (CABG) - [facility]

## 2022-09-22 NOTE — HISTORY OF PRESENT ILLNESS
[FreeTextEntry1] : Currently doing well. Denies chest pain, shortness of breath or palpitations. Walks regularly for exercise. When weather gets cold, he sometimes gets some shortness of breath.

## 2022-10-14 NOTE — CONSULT NOTE ADULT - CONSULT REQUESTED DATE/TIME
06-Jan-2019 14:09
07-Jan-2019 13:56
07-Jan-2019 10:00
07-Jan-2019 08:50
PAST SURGICAL HISTORY:  No significant past surgical history

## 2022-10-31 ENCOUNTER — LABORATORY RESULT (OUTPATIENT)
Age: 62
End: 2022-10-31

## 2022-11-01 LAB
ALBUMIN SERPL ELPH-MCNC: 4.4 G/DL
ALP BLD-CCNC: 40 U/L
ALT SERPL-CCNC: 13 U/L
ANION GAP SERPL CALC-SCNC: 11 MMOL/L
APPEARANCE: CLEAR
AST SERPL-CCNC: 14 U/L
BASOPHILS # BLD AUTO: 0.03 K/UL
BASOPHILS NFR BLD AUTO: 0.6 %
BILIRUB SERPL-MCNC: 0.7 MG/DL
BILIRUBIN URINE: NEGATIVE
BLOOD URINE: ABNORMAL
BUN SERPL-MCNC: 18 MG/DL
CALCIUM SERPL-MCNC: 8.9 MG/DL
CHLORIDE SERPL-SCNC: 96 MMOL/L
CHOLEST SERPL-MCNC: 131 MG/DL
CMV DNA SPEC QL NAA+PROBE: NOT DETECTED IU/ML
CMVPCR LOG: NOT DETECTED LOG10IU/ML
CO2 SERPL-SCNC: 23 MMOL/L
COLOR: NORMAL
CREAT SERPL-MCNC: 0.96 MG/DL
CREAT SPEC-SCNC: 65 MG/DL
EGFR: 88 ML/MIN/1.73M2
EOSINOPHIL # BLD AUTO: 0.07 K/UL
EOSINOPHIL NFR BLD AUTO: 1.3 %
ESTIMATED AVERAGE GLUCOSE: 157 MG/DL
GLUCOSE QUALITATIVE U: NEGATIVE
GLUCOSE SERPL-MCNC: 140 MG/DL
HBA1C MFR BLD HPLC: 7.1 %
HCT VFR BLD CALC: 48.4 %
HDLC SERPL-MCNC: 49 MG/DL
HGB BLD-MCNC: 15.8 G/DL
IMM GRANULOCYTES NFR BLD AUTO: 0.2 %
KETONES URINE: NEGATIVE
LDH SERPL-CCNC: 207 U/L
LDLC SERPL CALC-MCNC: 59 MG/DL
LEUKOCYTE ESTERASE URINE: NEGATIVE
LYMPHOCYTES # BLD AUTO: 1.16 K/UL
LYMPHOCYTES NFR BLD AUTO: 22 %
MAGNESIUM SERPL-MCNC: 1.3 MG/DL
MAN DIFF?: NORMAL
MCHC RBC-ENTMCNC: 28.2 PG
MCHC RBC-ENTMCNC: 32.6 GM/DL
MCV RBC AUTO: 86.3 FL
MICROALBUMIN 24H UR DL<=1MG/L-MCNC: 37.5 MG/DL
MICROALBUMIN/CREAT 24H UR-RTO: 579 MG/G
MONOCYTES # BLD AUTO: 0.53 K/UL
MONOCYTES NFR BLD AUTO: 10 %
NEUTROPHILS # BLD AUTO: 3.48 K/UL
NEUTROPHILS NFR BLD AUTO: 65.9 %
NITRITE URINE: NEGATIVE
NONHDLC SERPL-MCNC: 82 MG/DL
PH URINE: 6.5
PHOSPHATE SERPL-MCNC: 3.3 MG/DL
PLATELET # BLD AUTO: 124 K/UL
POTASSIUM SERPL-SCNC: 4.2 MMOL/L
PROT SERPL-MCNC: 6.5 G/DL
PROTEIN URINE: ABNORMAL
RBC # BLD: 5.61 M/UL
RBC # FLD: 12.8 %
SODIUM SERPL-SCNC: 130 MMOL/L
SPECIFIC GRAVITY URINE: 1.01
TACROLIMUS SERPL-MCNC: 7.6 NG/ML
TRIGL SERPL-MCNC: 116 MG/DL
URATE SERPL-MCNC: 6.1 MG/DL
UROBILINOGEN URINE: NORMAL
WBC # FLD AUTO: 5.28 K/UL

## 2022-11-01 NOTE — DISCHARGE NOTE NURSING/CASE MANAGEMENT/SOCIAL WORK - INFLUENZA IMMUNIZATION DATE (APPROXIMATE):
Problem: GASTROINTESTINAL - ADULT  Goal: Minimal or absence of nausea and/or vomiting  Description: INTERVENTIONS:  - Administer IV fluids if ordered to ensure adequate hydration  - Maintain NPO status until nausea and vomiting are resolved  - Nasogastric tube if ordered  - Administer ordered antiemetic medications as needed  - Provide nonpharmacologic comfort measures as appropriate  - Advance diet as tolerated, if ordered  - Consider nutrition services referral to assist patient with adequate nutrition and appropriate food choices  Outcome: Progressing     Problem: MOBILITY - ADULT  Goal: Maintain or return to baseline ADL function  Description: INTERVENTIONS:  -  Assess patient's ability to carry out ADLs; assess patient's baseline for ADL function and identify physical deficits which impact ability to perform ADLs (bathing, care of mouth/teeth, toileting, grooming, dressing, etc )  - Assess/evaluate cause of self-care deficits   - Assess range of motion  - Assess patient's mobility; develop plan if impaired  - Assess patient's need for assistive devices and provide as appropriate  - Encourage maximum independence but intervene and supervise when necessary  - Involve family in performance of ADLs  - Assess for home care needs following discharge   - Consider OT consult to assist with ADL evaluation and planning for discharge  - Provide patient education as appropriate  Outcome: Progressing  Goal: Maintains/Returns to pre admission functional level  Description: INTERVENTIONS:  - Perform BMAT or MOVE assessment daily    - Set and communicate daily mobility goal to care team and patient/family/caregiver  - Collaborate with rehabilitation services on mobility goals if consulted  - Perform Range of Motion 4 times a day  - Reposition patient every 2 hours    - Dangle patient 3 times a day  - Stand patient 3 times a day  - Ambulate patient 3 times a day  - Out of bed to chair 3 times a day   - Out of bed for meals 3 times a day  - Out of bed for toileting  - Record patient progress and toleration of activity level   Outcome: Progressing     Problem: Potential for Falls  Goal: Patient will remain free of falls  Description: INTERVENTIONS:  - Educate patient/family on patient safety including physical limitations  - Instruct patient to call for assistance with activity   - Consult OT/PT to assist with strengthening/mobility   - Keep Call bell within reach  - Keep bed low and locked with side rails adjusted as appropriate  - Keep care items and personal belongings within reach  - Initiate and maintain comfort rounds  - Make Fall Risk Sign visible to staff  - Offer Toileting every 2 Hours, in advance of need  - Initiate/Maintain bed alarm  - Obtain necessary fall risk management equipment: bed alarm  - Apply yellow socks and bracelet for high fall risk patients  - Consider moving patient to room near nurses station  Outcome: Progressing     Problem: METABOLIC, FLUID AND ELECTROLYTES - ADULT  Goal: Electrolytes maintained within normal limits  Description: INTERVENTIONS:  - Monitor labs and assess patient for signs and symptoms of electrolyte imbalances  - Administer electrolyte replacement as ordered  - Monitor response to electrolyte replacements, including repeat lab results as appropriate  - Instruct patient on fluid and nutrition as appropriate  Outcome: Progressing  Goal: Fluid balance maintained  Description: INTERVENTIONS:  - Monitor labs   - Monitor I/O and WT  - Instruct patient on fluid and nutrition as appropriate  - Assess for signs & symptoms of volume excess or deficit  Outcome: Progressing  Goal: Glucose maintained within target range  Description: INTERVENTIONS:  - Monitor Blood Glucose as ordered  - Assess for signs and symptoms of hyperglycemia and hypoglycemia  - Administer ordered medications to maintain glucose within target range  - Assess nutritional intake and initiate nutrition service referral as 01-Dec-2022 needed  Outcome: Progressing

## 2022-11-03 ENCOUNTER — APPOINTMENT (OUTPATIENT)
Dept: NEPHROLOGY | Facility: CLINIC | Age: 62
End: 2022-11-03

## 2022-11-03 VITALS
TEMPERATURE: 98.1 F | DIASTOLIC BLOOD PRESSURE: 70 MMHG | OXYGEN SATURATION: 96 % | BODY MASS INDEX: 34.96 KG/M2 | WEIGHT: 190 LBS | RESPIRATION RATE: 18 BRPM | HEIGHT: 62 IN | SYSTOLIC BLOOD PRESSURE: 153 MMHG | HEART RATE: 60 BPM

## 2022-11-03 PROCEDURE — 99215 OFFICE O/P EST HI 40 MIN: CPT

## 2022-11-03 RX ORDER — METOPROLOL SUCCINATE 25 MG/1
25 TABLET, EXTENDED RELEASE ORAL DAILY
Qty: 90 | Refills: 3 | Status: ACTIVE | COMMUNITY
Start: 2018-07-26 | End: 1900-01-01

## 2022-11-03 NOTE — ASSESSMENT
[FreeTextEntry1] : Renal Transplant recipient: No dysuria/hematuria. No fever/chills. Tolerating medications.\par Immunosuppression: Reviewed;  on tac/MMF/prednisone, last Tac level noted; will recheck. Currently on 3 mg twice daily.; 500 mg/dose MMF and prednisone at 5 mg daily\par DM: Not taking insulin. Currently on Metformin 500 mg/d, also on diet control, being followed by Dr. Zeferino Arguello.\par Ok to start SGLT2 inhibitor\par Hypertension: controlled; Continue metoprolol.\par Hyperlipidemia: On statin, continue the same. Controlled from last labs\par Cardiovascular risk reduction discussed. He is on Aspirin, beta blocker and Statin, being followed by Dr. Chauhan\par Infection prophylaxis: on Bactrim. \par Discussed ambulation, optimal glucose and blood pressure readings, adherence with medications and follow ups, follow up clinic visit schedule, avoiding dehydration, mosquito bites; prevention of exposure to COVID\par Discussed Dermatology and eye check up yearly and vaccinations.\par Had 4th booster COVID, Advised flu and pneumonia vaccine from primary MD.\par Advised to follow up with Primary MD every 12 weeks  and f/u at Transplant center every 6 months.

## 2022-11-03 NOTE — HISTORY OF PRESENT ILLNESS
[FreeTextEntry1] : Received DDRT in 2018. Currently no acute symptoms. \par No pain.\par He is trying to lose weight by exercising.\par \par His last labs reviewed from October 2022: creatinine 0.9 mg/dl \par His last Tac level is therapeutic\par \par Current Meds:\par \par Prograf 3 mg BID\par Cellcept 1000 BID\par Prednisone 5 mg/d\par Metoprolol 25 mg 0.5 tab twice daily\par Magnesium 400 /d\par Atorvastatin 20/d\par Clopidogrel 75 mg/d\par Metformin 500 mg daily-  taking now\par \par He saw his PMD Dr Zeferino Arguello  \par Primary nephrologist Dr. Caleb Mckeon, he reports no recent  follow up.

## 2022-11-08 LAB — BKV DNA SPEC QL NAA+PROBE: NOT DETECTED IU/ML

## 2022-11-28 NOTE — ED ADULT NURSE NOTE - CHIEF COMPLAINT
LAB VISIT COMPLETED TODAY FOR UTI.   SEE RESULTS BELOW:    11/28/2022  2:23 PM - Vivi Prather MA    Component   Color, UA   Clarity, UA   Glucose, UA POC   NEG    Bilirubin, UA   NEG    Ketones, UA   NEG    Spec Grav, UA   >1.030    Blood, UA POC   SMALL    pH, UA   6.5    Protein, UA POC   30    Urobilinogen, UA   0.2    Leukocytes, UA   SMALL    Nitrite, UA   NEG      PT'S PHARMACY IS:    [facility] [phone] The patient is a 58y Male complaining of

## 2022-12-28 ENCOUNTER — RX RENEWAL (OUTPATIENT)
Age: 62
End: 2022-12-28

## 2023-01-03 NOTE — PROGRESS NOTE ADULT - PROBLEM SELECTOR PLAN 1
MEDICATION REFILL REQUEST      Name of Medication:  Amlodipine  Dose:  10 mg  Frequency:  1 a day  Quantity:  90  Days' supply:  90      Pharmacy Name/Location:  pt did not leave a pharmacy ,Please call pt patient denies taking ppi/carafate as prescribed on prior admission  if stress test negative, no objection to dc  proton pump inhibitor bid, carafate 1g four times a day  he should be discharged on above  GI PCR with Campylobacter species detected as above  agree with zithromax 500mg daily x 3 days  he will follow up with is primary GI, Dr. Chavez

## 2023-02-06 LAB
ALBUMIN SERPL ELPH-MCNC: 4.4 G/DL
ALP BLD-CCNC: 46 U/L
ALT SERPL-CCNC: 13 U/L
ANION GAP SERPL CALC-SCNC: 13 MMOL/L
APPEARANCE: CLEAR
AST SERPL-CCNC: 14 U/L
BACTERIA: NEGATIVE
BASOPHILS # BLD AUTO: 0.02 K/UL
BASOPHILS NFR BLD AUTO: 0.4 %
BILIRUB SERPL-MCNC: 0.6 MG/DL
BILIRUBIN URINE: NEGATIVE
BLOOD URINE: ABNORMAL
BUN SERPL-MCNC: 19 MG/DL
CALCIUM SERPL-MCNC: 9.7 MG/DL
CALCIUM SERPL-MCNC: 9.7 MG/DL
CHLORIDE SERPL-SCNC: 94 MMOL/L
CHOLEST SERPL-MCNC: 151 MG/DL
CO2 SERPL-SCNC: 24 MMOL/L
COLOR: COLORLESS
CREAT SERPL-MCNC: 0.88 MG/DL
CREAT SPEC-SCNC: 21 MG/DL
CREAT/PROT UR: 1 RATIO
EGFR: 95 ML/MIN/1.73M2
EOSINOPHIL # BLD AUTO: 0.1 K/UL
EOSINOPHIL NFR BLD AUTO: 1.8 %
ESTIMATED AVERAGE GLUCOSE: 154 MG/DL
GLUCOSE QUALITATIVE U: NEGATIVE
GLUCOSE SERPL-MCNC: 140 MG/DL
HBA1C MFR BLD HPLC: 7 %
HCT VFR BLD CALC: 46.9 %
HDLC SERPL-MCNC: 49 MG/DL
HGB BLD-MCNC: 15.5 G/DL
HYALINE CASTS: 0 /LPF
IMM GRANULOCYTES NFR BLD AUTO: 0.4 %
KETONES URINE: NEGATIVE
LDH SERPL-CCNC: 185 U/L
LDLC SERPL CALC-MCNC: 76 MG/DL
LEUKOCYTE ESTERASE URINE: NEGATIVE
LYMPHOCYTES # BLD AUTO: 1.15 K/UL
LYMPHOCYTES NFR BLD AUTO: 20.3 %
MAGNESIUM SERPL-MCNC: 1.3 MG/DL
MAN DIFF?: NORMAL
MCHC RBC-ENTMCNC: 28.9 PG
MCHC RBC-ENTMCNC: 33 GM/DL
MCV RBC AUTO: 87.5 FL
MICROSCOPIC-UA: NORMAL
MONOCYTES # BLD AUTO: 0.53 K/UL
MONOCYTES NFR BLD AUTO: 9.4 %
NEUTROPHILS # BLD AUTO: 3.84 K/UL
NEUTROPHILS NFR BLD AUTO: 67.7 %
NITRITE URINE: NEGATIVE
NONHDLC SERPL-MCNC: 101 MG/DL
PARATHYROID HORMONE INTACT: 63 PG/ML
PH URINE: 6.5
PHOSPHATE SERPL-MCNC: 4 MG/DL
PLATELET # BLD AUTO: 131 K/UL
POTASSIUM SERPL-SCNC: 4.2 MMOL/L
PROT SERPL-MCNC: 7 G/DL
PROT UR-MCNC: 20 MG/DL
PROTEIN URINE: NORMAL
RBC # BLD: 5.36 M/UL
RBC # FLD: 12.9 %
RED BLOOD CELLS URINE: 0 /HPF
SODIUM SERPL-SCNC: 131 MMOL/L
SPECIFIC GRAVITY URINE: 1.01
SQUAMOUS EPITHELIAL CELLS: 0 /HPF
TACROLIMUS SERPL-MCNC: 7.2 NG/ML
TRIGL SERPL-MCNC: 125 MG/DL
UROBILINOGEN URINE: NORMAL
WBC # FLD AUTO: 5.66 K/UL
WHITE BLOOD CELLS URINE: 0 /HPF

## 2023-02-07 LAB
BKV DNA SPEC QL NAA+PROBE: NOT DETECTED IU/ML
CMV DNA SPEC QL NAA+PROBE: NOT DETECTED IU/ML
CMVPCR LOG: NOT DETECTED LOG10IU/ML

## 2023-02-07 RX ORDER — CHLORHEXIDINE GLUCONATE 4 %
400 (240 MG) LIQUID (ML) TOPICAL
Qty: 180 | Refills: 3 | Status: ACTIVE | COMMUNITY
Start: 2019-05-23

## 2023-02-09 ENCOUNTER — APPOINTMENT (OUTPATIENT)
Dept: NEPHROLOGY | Facility: CLINIC | Age: 63
End: 2023-02-09

## 2023-03-20 ENCOUNTER — RX RENEWAL (OUTPATIENT)
Age: 63
End: 2023-03-20

## 2023-04-06 ENCOUNTER — NON-APPOINTMENT (OUTPATIENT)
Age: 63
End: 2023-04-06

## 2023-04-06 ENCOUNTER — APPOINTMENT (OUTPATIENT)
Dept: CARDIOLOGY | Facility: CLINIC | Age: 63
End: 2023-04-06
Payer: MEDICARE

## 2023-04-06 VITALS
DIASTOLIC BLOOD PRESSURE: 78 MMHG | WEIGHT: 199 LBS | HEART RATE: 80 BPM | HEIGHT: 62 IN | OXYGEN SATURATION: 94 % | BODY MASS INDEX: 36.62 KG/M2 | SYSTOLIC BLOOD PRESSURE: 155 MMHG

## 2023-04-06 DIAGNOSIS — Z92.29 PERSONAL HISTORY OF OTHER DRUG THERAPY: ICD-10-CM

## 2023-04-06 PROCEDURE — 99214 OFFICE O/P EST MOD 30 MIN: CPT

## 2023-04-06 PROCEDURE — 99072 ADDL SUPL MATRL&STAF TM PHE: CPT

## 2023-04-06 PROCEDURE — 93000 ELECTROCARDIOGRAM COMPLETE: CPT

## 2023-04-06 RX ORDER — PREDNISONE 5 MG/1
5 TABLET ORAL
Qty: 90 | Refills: 1 | Status: DISCONTINUED | COMMUNITY
Start: 2018-05-29 | End: 2023-04-06

## 2023-04-06 NOTE — DISCUSSION/SUMMARY
[Cardiomyopathy] : cardiomyopathy [Coronary Artery Disease] : coronary artery disease [Hypertension] : hypertension [Stable] : stable [FreeTextEntry1] : \par Currently stable from a cardiovascular standpoint. Hypertensive this morning. History of ischemic cardiomyopathy (LVEF 50%). Currently euvolemic. Stable CAD (s/p CABG, s/p prox Cx and OM stents). No ischemic or CHF symptoms. Patient with renal transplant on immunosuppressive therapy (creatinine 0.88). Most recent labs reviewed. Lipids optimized (chol 151, LDL 76, HDL 49, trig 125). Continue current medications including aspirin, clopidogrel, and statin. ECG completed today and reviewed (findings as noted above). Follow up in 6 months. [EKG obtained to assist in diagnosis and management of assessed problem(s)] : EKG obtained to assist in diagnosis and management of assessed problem(s)

## 2023-04-06 NOTE — CARDIOLOGY SUMMARY
[de-identified] : \par 04/06/23 - normal sinus rhythm, RBBB, 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, nonspecific ST abnormality\par  [de-identified] : \par 09/09/20 (exercise ) -5 METS, 95% MPHR, 05:21 min, Duke score 5, large, moderate defects in the inferior and inferolateral, inferoapical, and apical lateral walls that are unchanged from prior, suggestive of infarct, LVEF 43%\par 09/20/19 (regadenoson MIBI) - large, moderate to severe defects n lateral and mid to basal inferior/inferolateral walls that are fixed, consistent with infarct; large, severe defects in inferoapical and apical lateral walls that are partially fixed, suggestive of infarct with at least moderate mckay-infarct ischemia, LVEF 43%\par  [de-identified] : \par 03/16/22 - MAC, mild-mod MR, AV gradient (peak 9 mmHg, mean 5 mmHg), mild LAE, mild segmental LV systolic dysfunction, moderate diastolic dysfunction, mild LVE, normal RV size and function, RVSP 19 mmHg, LVEF 50%\par 09/09/19 - mild MR, normal LA, mild segmental LV systolic dysfunction, mild LVE, grossly normal RV size and function, LVEF 50%\par  [de-identified] : \par 09/20/19 (PCI) - SYNERGY stent to OM2 80% ISR\par 09/20/19 (CATH) - dLM 30%, pLAD 100%, OM1 100%, OM2 80% ISR, mRCA 100%, patent LIMA-LAD, patent SVG-OM1\par 06/01/16 (PCI) - RESOLUTE stents to pCx 70% and mOM2 80%\par  [de-identified] : \par 2007 (CABG) - [facility]

## 2023-04-06 NOTE — HISTORY OF PRESENT ILLNESS
[FreeTextEntry1] : Currently doing well. Denies chest pain, shortness of breath or palpitations. Did not take BP medication this morning.

## 2023-04-13 NOTE — PATIENT PROFILE ADULT - NSPROGENBLOODRESTRICT_GEN_A_NUR
[Today's Date] : [unfilled] [FreeTextEntry1] : Normal Sinus Rhythm\par Normal Axis\par QTc 438-447  ms\par  [de-identified] : 7/15/2020 [FreeTextEntry2] : Summary:\par 1. Normal study.\par 2. Normal left ventricular size, morphology and systolic function.\par 3. Trivial tricuspid valve regurgitation, peak systolic instantaneous gradient 11.0 mmHg.\par 4. Trivial pulmonary valve regurgitation.\par 5. No pericardial effusion [de-identified] : 07/15/2020 [de-identified] : I reviewed the blood tests with the parent. this included a CBC, complete metabolic profile, lipid profile,  and thyroid function tests. There was a low WBC count and high MCV. These may be normal for her.  I asked mom to discuss with you. \par  none

## 2023-05-11 ENCOUNTER — RX RENEWAL (OUTPATIENT)
Age: 63
End: 2023-05-11

## 2023-05-16 ENCOUNTER — APPOINTMENT (OUTPATIENT)
Dept: NEPHROLOGY | Facility: CLINIC | Age: 63
End: 2023-05-16
Payer: MEDICARE

## 2023-05-16 VITALS
HEIGHT: 62 IN | SYSTOLIC BLOOD PRESSURE: 124 MMHG | OXYGEN SATURATION: 97 % | WEIGHT: 192 LBS | HEART RATE: 58 BPM | TEMPERATURE: 98 F | RESPIRATION RATE: 14 BRPM | DIASTOLIC BLOOD PRESSURE: 65 MMHG | BODY MASS INDEX: 35.33 KG/M2

## 2023-05-16 PROCEDURE — 99214 OFFICE O/P EST MOD 30 MIN: CPT

## 2023-05-16 RX ORDER — TACROLIMUS 1 MG/1
1 CAPSULE ORAL
Qty: 360 | Refills: 3 | Status: ACTIVE | COMMUNITY
Start: 2018-05-29 | End: 1900-01-01

## 2023-05-16 NOTE — HISTORY OF PRESENT ILLNESS
[FreeTextEntry1] : Received DDRT in 2018. Currently no acute symptoms. Has seen ophthalmologist. \par He is trying to lose weight by exercising.\par \par Current Meds:\par \par Prograf 3 mg BID\par Cellcept 1000 BID- He is taking only 500 BID at present\par Prednisone 5 mg/d\par Metoprolol 25 mg 0.5 tab twice daily\par Magnesium 400 /d\par Atorvastatin 20/d\par Clopidogrel 75 mg/d\par Metformin 500 mg daily-  taking once daily now\par \par He saw his PMD Dr Zeferino Arguello for managing hyperlipidemia and DM \par Primary nephrologist is Dr. Caleb Mckeon, he reports no recent  follow up.

## 2023-05-16 NOTE — ASSESSMENT
[FreeTextEntry1] : Renal Transplant recipient: No dysuria/hematuria. No fever/chills. Tolerating medications.\par Immunosuppression: Reviewed;  on tac/MMF/prednisone, last Tac level noted; will recheck. Currently on 3 mg twice daily.; 500 mg/dose MMF and prednisone at 5 mg daily\par DM: Not taking insulin. Currently on Metformin 500 mg/d, also on diet control, being followed by Dr. Zeferino Arguello.\par Hypertension: controlled; Continue metoprolol.\par Hyperlipidemia: On statin, continue the same. Controlled from last labs\par Cardiovascular risk reduction discussed. He is on Aspirin, beta blocker and Statin, being followed by Dr. Chauhan\par Infection prophylaxis: on Bactrim. \par Discussed ambulation, optimal glucose and blood pressure readings, adherence with medications and follow ups, follow up clinic visit schedule, avoiding dehydration, mosquito bites; prevention of exposure to COVID\par Discussed Dermatology and eye check up yearly and vaccinations.\par Had 4th booster COVID, Advised flu and pneumonia vaccine from primary MD.\par Advised to follow up with Primary MD every 12 weeks  and f/u at Transplant center every 6 months.\par Discussed Eye and Derm check at least yearly.
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
## 2023-07-03 NOTE — PROGRESS NOTE ADULT - ASSESSMENT
Patient is a 59y old  Male who presents with a chief complaint of chest pain (20 Sep 2019 18:00) now s/p C TOSHIA x 1 OM via LFA access. t tolerated the procedure well, site benign. Overnight remained uneventful. Post-procedure discharge instruction discussed and questions addressed Patient is a 59y old  Male who presents with a chief complaint of chest pain (20 Sep 2019 18:00) now s/p LHC TOSHIA x 1 OM via LFA access. Pt arrived in [facility] with right femoral sheath in place with  evolving hematoma.  Throughout the night  persistent right groin hematoma requiring several sessions for compressions x 5, ecchymotic but now soft.  Discharge pending Vaccine status unknown

## 2023-07-26 NOTE — H&P PST ADULT - CONSTITUTIONAL DETAILS
Subjective   Angie Miramontes is a 28 y.o. Annual gynecological exam    History of Present Illness  LMP: 07/25/2023  Pap: ASCUS, negative hrHPV- 2019  BC: [facility]     Pt presents for annual gynecological exam with no complaints.    Gynecologic Exam  The patient's pertinent negatives include no genital itching, genital lesions, genital odor, genital rash, pelvic pain, vaginal bleeding or vaginal discharge. The patient is experiencing no pain. Pertinent negatives include no abdominal pain, chills, constipation, diarrhea, dysuria, fever, flank pain, frequency, headaches, hematuria, rash or urgency. She uses oral contraceptives for contraception. Her menstrual history has been irregular. Her past medical history is significant for ovarian cysts.     The following portions of the patient's history were reviewed and updated as appropriate: allergies, current medications, past family history, past medical history, past social history, past surgical history, and problem list.    Review of Systems   Constitutional:  Negative for chills, diaphoresis, fatigue, fever and unexpected weight change.   Respiratory:  Negative for apnea, chest tightness and shortness of breath.    Cardiovascular:  Negative for chest pain and palpitations.   Gastrointestinal:  Negative for abdominal distention, abdominal pain, constipation and diarrhea.   Genitourinary:  Negative for decreased urine volume, difficulty urinating, dysuria, enuresis, flank pain, frequency, genital sores, hematuria, pelvic pain, urgency, vaginal bleeding, vaginal discharge and vaginal pain.   Skin:  Negative for rash.   Neurological:  Negative for headaches.   Psychiatric/Behavioral:  Negative for sleep disturbance and suicidal ideas.        Objective   Physical Exam  Vitals and nursing note reviewed. Exam conducted with a chaperone present.   Constitutional:       General: She is awake. She is not in acute distress.     Appearance: Normal appearance. She is  well-developed and well-groomed. She is not ill-appearing, toxic-appearing or diaphoretic.   Neck:      Thyroid: No thyroid mass, thyromegaly or thyroid tenderness.   Cardiovascular:      Rate and Rhythm: Normal rate and regular rhythm.      Heart sounds: Normal heart sounds.   Pulmonary:      Effort: Pulmonary effort is normal.      Breath sounds: Normal breath sounds.   Chest:   Breasts:     Yo Score is 5.      Breasts are symmetrical.      Right: Normal. No swelling, bleeding, inverted nipple, mass, nipple discharge, skin change or tenderness.      Left: Normal. No swelling, bleeding, inverted nipple, mass, nipple discharge, skin change or tenderness.   Abdominal:      General: Bowel sounds are normal. There is no distension.      Palpations: Abdomen is soft.      Tenderness: There is no abdominal tenderness.   Genitourinary:     General: Normal vulva.      Exam position: Lithotomy position.      Yo stage (genital): 5.      Labia:         Right: No rash, tenderness, lesion or injury.         Left: No rash, tenderness, lesion or injury.       Urethra: No prolapse, urethral pain, urethral swelling or urethral lesion.      Vagina: Normal.      Cervix: Normal.      Comments: Moderate amount of menstrual bleeding within posterior vagina.  Pap smear obtained.  Uterus and bilateral adnexa non-palpable.    Lymphadenopathy:      Upper Body:      Right upper body: No supraclavicular, axillary or pectoral adenopathy.      Left upper body: No supraclavicular, axillary or pectoral adenopathy.      Lower Body: No right inguinal adenopathy. No left inguinal adenopathy.   Skin:     General: Skin is warm and dry.   Neurological:      Mental Status: She is alert and oriented to person, place, and time.      Gait: Gait is intact.   Psychiatric:         Attention and Perception: Attention and perception normal.         Mood and Affect: Mood and affect normal.         Speech: Speech normal.         Behavior: Behavior normal.  Behavior is cooperative.         Assessment & Plan   Diagnoses and all orders for this visit:    1. Women's annual routine gynecological examination (Primary)  -     LIQUID-BASED PAP SMEAR WITH HPV GENOTYPING REGARDLESS OF INTERPRETATION (ALICE,[facility]AD); Future  -     LIQUID-BASED PAP SMEAR WITH HPV GENOTYPING REGARDLESS OF INTERPRETATION ([facility]AD)    Patient educated and encouraged to do monthly self breast exam. If pap smear is normal patient will receive a letter in the mail in about two weeks.  If pap smear is abnormal we will call patient and follow up with plan.  Next pap due 5 years for adequate NIL/negative hrHPV pap result today per ASCCP guidelines.                no distress/well-groomed/well-developed no distress/well-groomed/obese

## 2023-08-11 ENCOUNTER — APPOINTMENT (OUTPATIENT)
Dept: DERMATOLOGY | Facility: CLINIC | Age: 63
End: 2023-08-11
Payer: MEDICARE

## 2023-08-11 DIAGNOSIS — D18.01 HEMANGIOMA OF SKIN AND SUBCUTANEOUS TISSUE: ICD-10-CM

## 2023-08-11 DIAGNOSIS — D22.9 MELANOCYTIC NEVI, UNSPECIFIED: ICD-10-CM

## 2023-08-11 PROCEDURE — 99203 OFFICE O/P NEW LOW 30 MIN: CPT

## 2023-08-12 PROBLEM — D22.9 ACQUIRED MELANOCYTIC NEVUS: Status: ACTIVE | Noted: 2023-08-12

## 2023-08-12 PROBLEM — D18.01 CHERRY ANGIOMA: Status: ACTIVE | Noted: 2023-08-12

## 2023-08-12 NOTE — ASSESSMENT
[FreeTextEntry1] : 1) Nevi/cherry angiomas - Counseled about clinically benign lesions - Discussed regular OTC sunscreen use, SPF 30 or higher  2) Skin cancer Screening - No lesions clinically concerning for malignancy today - s/p kidney transplant - Discussed regular self skin checks and ABCDEs of skin cancer screening - Discussed regular sunscreen use - Pt instructed to report any new or changing lesions  RTC 1 yr for FBSE or sooner if any concerns

## 2023-08-12 NOTE — HISTORY OF PRESENT ILLNESS
[FreeTextEntry1] : moles [de-identified] : 62 yo M, hx of kidney transplant 5 years ago (on prednisone 5mg once daily, cellcept 500mg BID, and Prograft 3mg BID) presenting for:   1. FBSE: Denies personal or family hx of skin cancers. Denies sunscreen use. Worked for [employer] serving in [place]. No lesions of concern.   No personal or FH of skin cancer.

## 2023-08-12 NOTE — PHYSICAL EXAM
[Alert] : alert [Oriented x 3] : ~L oriented x 3 [Well Nourished] : well nourished [No Bromhidrosis] : no bromhidrosis [No Chromhidrosis] : no chromhidrosis [Full Body Skin Exam Performed] : performed [FreeTextEntry3] : Fairly uniform and regular brown macules and papules on the trunk and extremities  Several scattered red partially blanching papules on the trunk and extremities.

## 2023-08-13 ENCOUNTER — RX RENEWAL (OUTPATIENT)
Age: 63
End: 2023-08-13

## 2023-08-13 RX ORDER — SULFAMETHOXAZOLE AND TRIMETHOPRIM 400; 80 MG/1; MG/1
400-80 TABLET ORAL
Qty: 30 | Refills: 11 | Status: ACTIVE | COMMUNITY
Start: 2022-08-10 | End: 1900-01-01

## 2023-08-16 NOTE — ASU PREOP CHECKLIST - ISOLATION PRECAUTIONS
none 27 y/o female c/o having right upper quadrant pain, Found to have gallstones now scheduled for surgery of Laparoscopic Cholecystectomy with Intra-Operative Cholangiogram possible open on 8/22/23 with Dr. Bartlett.     Stop Bang 1

## 2023-08-21 ENCOUNTER — APPOINTMENT (OUTPATIENT)
Dept: TRANSPLANT | Facility: CLINIC | Age: 63
End: 2023-08-21

## 2023-08-21 PROBLEM — Z00.00 ENCOUNTER FOR PREVENTIVE HEALTH EXAMINATION: Status: ACTIVE | Noted: 2023-08-21

## 2023-08-23 ENCOUNTER — APPOINTMENT (OUTPATIENT)
Dept: NEPHROLOGY | Facility: CLINIC | Age: 63
End: 2023-08-23
Payer: MEDICARE

## 2023-08-23 VITALS
TEMPERATURE: 96 F | SYSTOLIC BLOOD PRESSURE: 152 MMHG | WEIGHT: 190 LBS | OXYGEN SATURATION: 96 % | DIASTOLIC BLOOD PRESSURE: 76 MMHG | HEART RATE: 69 BPM | HEIGHT: 62 IN | BODY MASS INDEX: 34.96 KG/M2

## 2023-08-23 VITALS — SYSTOLIC BLOOD PRESSURE: 138 MMHG | DIASTOLIC BLOOD PRESSURE: 80 MMHG

## 2023-08-23 PROCEDURE — 99214 OFFICE O/P EST MOD 30 MIN: CPT

## 2023-08-23 RX ORDER — METFORMIN HYDROCHLORIDE 500 MG/1
500 TABLET, COATED ORAL TWICE DAILY
Qty: 180 | Refills: 3 | Status: ACTIVE | COMMUNITY
Start: 2023-05-16

## 2023-08-23 NOTE — ASSESSMENT
[FreeTextEntry1] : Renal Transplant recipient: No dysuria/hematuria. No fever/chills. Tolerating medications. Immunosuppression: Reviewed;  on tac/MMF/prednisone, last Tac level noted; will recheck. Currently on 3 mg twice daily.; 500 mg/dose MMF and prednisone at 5 mg daily DM: Not taking insulin. Currently on Metformin 500 mg/d, also on diet control, being followed by Dr. Zeferino Arguello. Discussed elevated A1C, advised to increase metformin to twice daily. Low Mg level: Advised to increase MgO, currently only taking once daily, advised to increase to twice daily. Hypertension: controlled; Continue metoprolol. Hyperlipidemia: On statin, continue the same. Controlled from last labs Cardiovascular risk reduction discussed. He is on Aspirin, beta blocker and Statin, being followed by Dr. Chauhan Infection prophylaxis: on Bactrim.  Discussed ambulation, optimal glucose and blood pressure readings, adherence with medications and follow ups, follow up clinic visit schedule, avoiding dehydration, mosquito bites; prevention of exposure to COVID Discussed Dermatology and eye check up yearly and vaccinations. Advised to follow up with Primary MD every 12 weeks  and f/u at Transplant center every 6 months.

## 2023-08-23 NOTE — HISTORY OF PRESENT ILLNESS
[FreeTextEntry1] : Received DDRT in 2018. Currently no acute symptoms. Has seen ophthalmologist.  He is trying to lose weight by exercising.  Current Meds:  Prograf 3 mg BID Cellcept 1000 BID- He is taking only 500 BID at present Prednisone 5 mg/d Metoprolol 25 mg 0.5 tab twice daily Magnesium 400 /d taking once daily Atorvastatin 20/d Clopidogrel 75 mg/d Metformin 500 mg daily-  taking once daily now  He saw his PMD Dr Zeferino Arguello for managing hyperlipidemia and DM  Primary nephrologist is Dr. Caelb Mckeon, he reports no recent  follow up.

## 2023-08-24 LAB
25(OH)D3 SERPL-MCNC: 48.8 NG/ML
ALBUMIN SERPL ELPH-MCNC: 4.3 G/DL
ALP BLD-CCNC: 44 U/L
ALT SERPL-CCNC: 11 U/L
ANION GAP SERPL CALC-SCNC: 12 MMOL/L
APPEARANCE: CLEAR
AST SERPL-CCNC: 14 U/L
BACTERIA: NEGATIVE /HPF
BILIRUB SERPL-MCNC: 0.8 MG/DL
BILIRUBIN URINE: NEGATIVE
BKV DNA SPEC QL NAA+PROBE: NOT DETECTED IU/ML
BLOOD URINE: ABNORMAL
BUN SERPL-MCNC: 13 MG/DL
CALCIUM SERPL-MCNC: 8.6 MG/DL
CALCIUM SERPL-MCNC: 8.6 MG/DL
CAST: 0 /LPF
CHLORIDE SERPL-SCNC: 97 MMOL/L
CHOLEST SERPL-MCNC: 138 MG/DL
CMV DNA SPEC QL NAA+PROBE: ABNORMAL IU/ML
CMVPCR LOG: ABNORMAL LOG10IU/ML
CO2 SERPL-SCNC: 23 MMOL/L
COLOR: YELLOW
CREAT SERPL-MCNC: 0.84 MG/DL
CREAT SPEC-SCNC: 49 MG/DL
CREAT/PROT UR: 0.9 RATIO
EGFR: 97 ML/MIN/1.73M2
EPITHELIAL CELLS: 0 /HPF
ESTIMATED AVERAGE GLUCOSE: 169 MG/DL
GLUCOSE QUALITATIVE U: NEGATIVE MG/DL
GLUCOSE SERPL-MCNC: 162 MG/DL
HBA1C MFR BLD HPLC: 7.5 %
HDLC SERPL-MCNC: 42 MG/DL
KETONES URINE: NEGATIVE MG/DL
LDH SERPL-CCNC: 186 U/L
LDLC SERPL CALC-MCNC: 72 MG/DL
LEUKOCYTE ESTERASE URINE: NEGATIVE
MAGNESIUM SERPL-MCNC: 1.2 MG/DL
MICROSCOPIC-UA: NORMAL
NITRITE URINE: NEGATIVE
NONHDLC SERPL-MCNC: 96 MG/DL
PARATHYROID HORMONE INTACT: 83 PG/ML
PH URINE: 6.5
PHOSPHATE SERPL-MCNC: 3.1 MG/DL
POTASSIUM SERPL-SCNC: 4.1 MMOL/L
PROT SERPL-MCNC: 6.5 G/DL
PROT UR-MCNC: 44 MG/DL
PROTEIN URINE: 30 MG/DL
RED BLOOD CELLS URINE: 0 /HPF
SODIUM SERPL-SCNC: 132 MMOL/L
SPECIFIC GRAVITY URINE: 1.01
TACROLIMUS SERPL-MCNC: 7.2 NG/ML
TRIGL SERPL-MCNC: 139 MG/DL
URATE SERPL-MCNC: 4.9 MG/DL
UROBILINOGEN URINE: 0.2 MG/DL
WHITE BLOOD CELLS URINE: 0 /HPF

## 2023-08-24 NOTE — H&P ADULT - GASTROINTESTINAL DETAILS
-- DO NOT REPLY / DO NOT REPLY ALL --  -- Message is from Engagement Center Operations ([facility]) --    Requesting COPY OF THE physical VISIT to be completed on 8/16/2023    Patient requesting to  today please along with son's CPX visit . Sent in separate message    Appointment History  Date of last physical appointment: 8/16/2023    Provider: EUGENIO BUTT    Preferred Delivery Method   Call when ready for pickup - phone number to notify: [phone]    Caller Information       Type Contact Phone/Fax    08/24/2023 09:39 AM CDT Phone (Incoming) Sherri Mendoza (Self) [phone] (M)          Alternative phone number: No    Can a detailed message be left? Yes    Please give this turnaround time to the caller:   \"This message will be sent to [state Provider's full name]. The clinical team will review your request as soon as they have received your message.   
Patient aware 8/16/23 office note at  for .  
What   form ?     please   f/u  with   her   PCP  
soft/nontender

## 2023-10-02 NOTE — H&P PST ADULT - VENOUS THROMBOEMBOLISM BMI
31-40 (obesity) Area L Indication Text: Tumors in this location are included in Area L (trunk and extremities).  Mohs surgery is indicated for larger tumors, or tumors with aggressive histologic features, in these anatomic locations.  It also meets appropriate use criteria (AUC) for Mohs surgery.

## 2023-10-05 ENCOUNTER — NON-APPOINTMENT (OUTPATIENT)
Age: 63
End: 2023-10-05

## 2023-10-05 ENCOUNTER — APPOINTMENT (OUTPATIENT)
Dept: CARDIOLOGY | Facility: CLINIC | Age: 63
End: 2023-10-05
Payer: MEDICARE

## 2023-10-05 VITALS
SYSTOLIC BLOOD PRESSURE: 131 MMHG | OXYGEN SATURATION: 97 % | BODY MASS INDEX: 34.6 KG/M2 | WEIGHT: 188 LBS | DIASTOLIC BLOOD PRESSURE: 80 MMHG | HEART RATE: 93 BPM | HEIGHT: 62 IN

## 2023-10-05 PROCEDURE — 93000 ELECTROCARDIOGRAM COMPLETE: CPT

## 2023-10-05 PROCEDURE — 99214 OFFICE O/P EST MOD 30 MIN: CPT | Mod: 25

## 2023-10-13 NOTE — DISCHARGE NOTE ADULT - PROVIDER TOKENS
TOKEN:'[id number]',TOKEN:'[id number] TOKEN:'[id number]:'[id number]' TOKEN:'[id number]:'[id number]:'[id number]' 1-2x/week

## 2023-10-27 ENCOUNTER — RX RENEWAL (OUTPATIENT)
Age: 63
End: 2023-10-27

## 2023-10-27 RX ORDER — PREDNISONE 5 MG/1
5 TABLET ORAL
Qty: 30 | Refills: 11 | Status: ACTIVE | COMMUNITY
Start: 2022-08-10 | End: 1900-01-01

## 2023-11-30 ENCOUNTER — APPOINTMENT (OUTPATIENT)
Dept: NEPHROLOGY | Facility: CLINIC | Age: 63
End: 2023-11-30
Payer: MEDICARE

## 2023-11-30 VITALS
HEIGHT: 62 IN | SYSTOLIC BLOOD PRESSURE: 135 MMHG | HEART RATE: 69 BPM | OXYGEN SATURATION: 96 % | TEMPERATURE: 98 F | RESPIRATION RATE: 14 BRPM | DIASTOLIC BLOOD PRESSURE: 72 MMHG

## 2023-11-30 VITALS — BODY MASS INDEX: 33.07 KG/M2 | WEIGHT: 180.78 LBS

## 2023-11-30 DIAGNOSIS — K13.79 OTHER LESIONS OF ORAL MUCOSA: ICD-10-CM

## 2023-11-30 PROCEDURE — 99215 OFFICE O/P EST HI 40 MIN: CPT

## 2024-01-24 RX ORDER — MYCOPHENOLATE MOFETIL 500 MG/1
500 TABLET ORAL
Qty: 60 | Refills: 11 | Status: ACTIVE | COMMUNITY
Start: 2018-05-29 | End: 1900-01-01

## 2024-03-07 ENCOUNTER — APPOINTMENT (OUTPATIENT)
Dept: CARDIOLOGY | Facility: CLINIC | Age: 64
End: 2024-03-07
Payer: MEDICARE

## 2024-03-07 ENCOUNTER — NON-APPOINTMENT (OUTPATIENT)
Age: 64
End: 2024-03-07

## 2024-03-07 VITALS
DIASTOLIC BLOOD PRESSURE: 68 MMHG | WEIGHT: 180 LBS | OXYGEN SATURATION: 98 % | BODY MASS INDEX: 33.13 KG/M2 | HEIGHT: 62 IN | HEART RATE: 63 BPM | SYSTOLIC BLOOD PRESSURE: 111 MMHG

## 2024-03-07 DIAGNOSIS — I25.10 ATHEROSCLEROTIC HEART DISEASE OF NATIVE CORONARY ARTERY W/OUT ANGINA PECTORIS: ICD-10-CM

## 2024-03-07 DIAGNOSIS — I25.5 ISCHEMIC CARDIOMYOPATHY: ICD-10-CM

## 2024-03-07 DIAGNOSIS — I10 ESSENTIAL (PRIMARY) HYPERTENSION: ICD-10-CM

## 2024-03-07 PROCEDURE — G2211 COMPLEX E/M VISIT ADD ON: CPT | Mod: NC,1L

## 2024-03-07 PROCEDURE — 99214 OFFICE O/P EST MOD 30 MIN: CPT | Mod: 25

## 2024-03-07 PROCEDURE — 93000 ELECTROCARDIOGRAM COMPLETE: CPT

## 2024-03-07 NOTE — HISTORY OF PRESENT ILLNESS
[FreeTextEntry1] : Currently doing well. Denies chest pain, shortness of breath or palpitations. Walks regularly for exercise without issues.

## 2024-03-07 NOTE — PHYSICAL EXAM
[Well Developed] : well developed [Well Nourished] : well nourished [No Acute Distress] : no acute distress [Normal Conjunctiva] : normal conjunctiva [Normal Venous Pressure] : normal venous pressure [No Carotid Bruit] : no carotid bruit [Normal S1, S2] : normal S1, S2 [No Murmur] : no murmur [No Rub] : no rub [No Gallop] : no gallop [Clear Lung Fields] : clear lung fields [Good Air Entry] : good air entry [Soft] : abdomen soft [No Respiratory Distress] : no respiratory distress  [Normal Gait] : normal gait [No Edema] : no edema [Non Tender] : non-tender [No Rash] : no rash [No Cyanosis] : no cyanosis [No Skin Lesions] : no skin lesions [No Focal Deficits] : no focal deficits [Moves all extremities] : moves all extremities [Normal Speech] : normal speech [Alert and Oriented] : alert and oriented

## 2024-03-07 NOTE — DISCUSSION/SUMMARY
[Cardiomyopathy] : cardiomyopathy [Coronary Artery Disease] : coronary artery disease [Hypertension] : hypertension [Stable] : stable [FreeTextEntry1] : Currently stable from a cardiovascular standpoint. Normotensive. History of ischemic cardiomyopathy (LVEF 50%). Currently euvolemic. Stable CAD (s/p CABG, s/p prox Cx and OM stents). No ischemic or CHF symptoms. Patient with renal transplant on immunosuppressive therapy (creatinine 0.84, eGFR 97). Continue current medications including aspirin, clopidogrel, and statin. ECG completed today and reviewed (findings as noted above). Will schedule an echo to reassess his cardiac structures and function. Follow up in 6 months. [EKG obtained to assist in diagnosis and management of assessed problem(s)] : EKG obtained to assist in diagnosis and management of assessed problem(s)

## 2024-03-07 NOTE — CARDIOLOGY SUMMARY
[de-identified] : 03/07/24 - normal sinus rhythm, frequent PVCs, RBBB, 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 [de-identified] : 09/09/20 (exercise ) -5 METS, 95% MPHR, 05:21 min, Duke score 5, large, moderate defects in the inferior and inferolateral, inferoapical, and apical lateral walls that are unchanged from prior, suggestive of infarct, LVEF 43% 09/20/19 (regadenoson MIBI) - large, moderate to severe defects n lateral and mid to basal inferior/inferolateral walls that are fixed, consistent with infarct; large, severe defects in inferoapical and apical lateral walls that are partially fixed, suggestive of infarct with at least moderate mckay-infarct ischemia, LVEF 43% [de-identified] : 09/20/19 (PCI) - SYNERGY stent to OM2 80% ISR 09/20/19 (CATH) - dLM 30%, pLAD 100%, OM1 100%, OM2 80% ISR, mRCA 100%, patent LIMA-LAD, patent [id number] 06/01/16 (PCI) - RESOLUTE stents to pCx 70% and mOM2 80% [de-identified] : 03/16/22 - MAC, mild-mod MR, AV gradient (peak 9 mmHg, mean 5 mmHg), mild LAE, mild segmental LV systolic dysfunction, moderate diastolic dysfunction, mild LVE, normal RV size and function, RVSP 19 mmHg, LVEF 50% 09/09/19 - mild MR, normal LA, mild segmental LV systolic dysfunction, mild LVE, grossly normal RV size and function, LVEF 50% [de-identified] : 2007 (CABG) - [facility]

## 2024-03-21 LAB
25(OH)D3 SERPL-MCNC: 49.2 NG/ML
ALBUMIN SERPL ELPH-MCNC: 4.8 G/DL
ALP BLD-CCNC: 46 U/L
ALT SERPL-CCNC: 14 U/L
ANION GAP SERPL CALC-SCNC: 14 MMOL/L
APPEARANCE: CLEAR
AST SERPL-CCNC: 14 U/L
BACTERIA: NEGATIVE /HPF
BASOPHILS # BLD AUTO: 0.02 K/UL
BASOPHILS NFR BLD AUTO: 0.4 %
BILIRUB SERPL-MCNC: 0.7 MG/DL
BILIRUBIN URINE: NEGATIVE
BLOOD URINE: ABNORMAL
BUN SERPL-MCNC: 17 MG/DL
CALCIUM SERPL-MCNC: 9.3 MG/DL
CALCIUM SERPL-MCNC: 9.3 MG/DL
CAST: 0 /LPF
CHLORIDE SERPL-SCNC: 96 MMOL/L
CHOLEST SERPL-MCNC: 149 MG/DL
CO2 SERPL-SCNC: 25 MMOL/L
COLOR: YELLOW
CREAT SERPL-MCNC: 0.9 MG/DL
CREAT SPEC-SCNC: 88 MG/DL
CREAT/PROT UR: 0.7 RATIO
EGFR: 94 ML/MIN/1.73M2
EOSINOPHIL # BLD AUTO: 0.07 K/UL
EOSINOPHIL NFR BLD AUTO: 1.4 %
EPITHELIAL CELLS: 0 /HPF
ESTIMATED AVERAGE GLUCOSE: 134 MG/DL
GLUCOSE QUALITATIVE U: NEGATIVE MG/DL
GLUCOSE SERPL-MCNC: 119 MG/DL
HBA1C MFR BLD HPLC: 6.3 %
HCT VFR BLD CALC: 51.9 %
HDLC SERPL-MCNC: 55 MG/DL
HGB BLD-MCNC: 16.4 G/DL
IMM GRANULOCYTES NFR BLD AUTO: 0.2 %
KETONES URINE: NEGATIVE MG/DL
LDH SERPL-CCNC: 208 U/L
LDLC SERPL CALC-MCNC: 73 MG/DL
LEUKOCYTE ESTERASE URINE: NEGATIVE
LYMPHOCYTES # BLD AUTO: 1.24 K/UL
LYMPHOCYTES NFR BLD AUTO: 24.2 %
MAGNESIUM SERPL-MCNC: 1.7 MG/DL
MAN DIFF?: NORMAL
MCHC RBC-ENTMCNC: 28.5 PG
MCHC RBC-ENTMCNC: 31.6 GM/DL
MCV RBC AUTO: 90.1 FL
MICROSCOPIC-UA: NORMAL
MONOCYTES # BLD AUTO: 0.56 K/UL
MONOCYTES NFR BLD AUTO: 10.9 %
NEUTROPHILS # BLD AUTO: 3.23 K/UL
NEUTROPHILS NFR BLD AUTO: 62.9 %
NITRITE URINE: NEGATIVE
NONHDLC SERPL-MCNC: 94 MG/DL
PARATHYROID HORMONE INTACT: 115 PG/ML
PH URINE: 6.5
PHOSPHATE SERPL-MCNC: 3.3 MG/DL
PLATELET # BLD AUTO: 130 K/UL
POTASSIUM SERPL-SCNC: 4.1 MMOL/L
PROT SERPL-MCNC: 7.3 G/DL
PROT UR-MCNC: 63 MG/DL
PROTEIN URINE: 100 MG/DL
RBC # BLD: 5.76 M/UL
RBC # FLD: 13.2 %
RED BLOOD CELLS URINE: 1 /HPF
SODIUM SERPL-SCNC: 135 MMOL/L
SPECIFIC GRAVITY URINE: 1.01
TACROLIMUS SERPL-MCNC: 6.7 NG/ML
TRIGL SERPL-MCNC: 118 MG/DL
URATE SERPL-MCNC: 5.6 MG/DL
UROBILINOGEN URINE: 0.2 MG/DL
WBC # FLD AUTO: 5.13 K/UL
WHITE BLOOD CELLS URINE: 0 /HPF

## 2024-03-22 LAB
CMV DNA SPEC QL NAA+PROBE: NOT DETECTED IU/ML
CMVPCR LOG: NOT DETECTED LOG10IU/ML

## 2024-03-24 LAB — BKV DNA SPEC QL NAA+PROBE: NOT DETECTED IU/ML

## 2024-03-25 ENCOUNTER — APPOINTMENT (OUTPATIENT)
Dept: NEPHROLOGY | Facility: CLINIC | Age: 64
End: 2024-03-25
Payer: MEDICARE

## 2024-03-25 VITALS — BODY MASS INDEX: 32.66 KG/M2 | WEIGHT: 178.57 LBS

## 2024-03-25 VITALS
WEIGHT: 178.57 LBS | HEIGHT: 62 IN | TEMPERATURE: 97.5 F | OXYGEN SATURATION: 97 % | DIASTOLIC BLOOD PRESSURE: 75 MMHG | SYSTOLIC BLOOD PRESSURE: 129 MMHG | HEART RATE: 59 BPM | BODY MASS INDEX: 32.86 KG/M2 | RESPIRATION RATE: 14 BRPM

## 2024-03-25 DIAGNOSIS — E11.9 TYPE 2 DIABETES MELLITUS W/OUT COMPLICATIONS: ICD-10-CM

## 2024-03-25 DIAGNOSIS — Z94.0 KIDNEY TRANSPLANT STATUS: ICD-10-CM

## 2024-03-25 DIAGNOSIS — E78.5 HYPERLIPIDEMIA, UNSPECIFIED: ICD-10-CM

## 2024-03-25 DIAGNOSIS — Z79.899 OTHER LONG TERM (CURRENT) DRUG THERAPY: ICD-10-CM

## 2024-03-25 DIAGNOSIS — Z94.0 OTHER LONG TERM (CURRENT) DRUG THERAPY: ICD-10-CM

## 2024-03-25 PROCEDURE — 99214 OFFICE O/P EST MOD 30 MIN: CPT

## 2024-03-25 NOTE — PHYSICAL EXAM
[General Appearance - Alert] : alert [General Appearance - In No Acute Distress] : in no acute distress [Sclera] : the sclera and conjunctiva were normal [Outer Ear] : the ears and nose were normal in appearance [Jugular Venous Distention Increased] : there was no jugular-venous distention [Auscultation Breath Sounds / Voice Sounds] : lungs were clear to auscultation bilaterally [Heart Sounds Gallop] : no gallops [Heart Sounds Pericardial Friction Rub] : no pericardial rub [Bowel Sounds] : normal bowel sounds [Abdomen Tenderness] : non-tender [Abdomen Soft] : soft [Abdomen Mass (___ Cm)] : no abdominal mass palpated [Cervical Lymph Nodes Enlarged Posterior Bilaterally] : posterior cervical [Cervical Lymph Nodes Enlarged Anterior Bilaterally] : anterior cervical [Supraclavicular Lymph Nodes Enlarged Bilaterally] : supraclavicular [Axillary Lymph Nodes Enlarged Bilaterally] : axillary [Inguinal Lymph Nodes Enlarged Bilaterally] : inguinal [Involuntary Movements] : no involuntary movements were seen [___ (cm) Fistula] : [unfilled] (cm) fistula [FreeTextEntry1] : has left arm and right forearm AVF [] : no rash [No Focal Deficits] : no focal deficits [Oriented To Time, Place, And Person] : oriented to person, place, and time [Affect] : the affect was normal [Impaired Insight] : insight and judgment were intact

## 2024-03-25 NOTE — ASSESSMENT
[FreeTextEntry1] : Renal Transplant recipient: Noted allograft function, creatinine is stable/improved. No proteinuria. Tolerating medications. Tac 3/3  Lab data from last visit reviewed including allograft function, urinalysis, any viremia and trough level of medication. Immunosuppression: reviewed; Noted current regimen, maintenance regimen and reviewed target trough level. DM: Current regimen reviewed. Optimal target glucose levels reviewed for fasting and post prandial measurements. Noted A1C.Improved Will continue to monitor and adjust treatment; reviewed lifestyle modifications for glycemia control. Hypertension: controlled; Reviewed medications.  Reviewed target for blood pressure control. Hyperlipidemia: Reviewed medication regimen/lifestyle modification for maintaining target lipid levels. Cardiovascular risk reduction, primary/secondary prevention measures were discussed as appropriate.   Prophylaxis: Reviewed precautions to prevent infections. Discussed Renal Preservation strategies including achieving optimal weight through calory restriction and regular exercise, daily exercise, sleep hygiene, optimized control of glucose, blood pressure, lipids, avoidance of NSAIDs, Low Na and Low animal protein diet and medication adherence. Discussed ophthalmology and dermatology checks at least yearly and vaccinations. Flu vaccine yearly and Pneumonia vaccine every 5 years. Already had Flu vaccine tis year. Copy of office visit and lab reports are being made available to primary physician and referring nephrologist.

## 2024-03-25 NOTE — HISTORY OF PRESENT ILLNESS
[FreeTextEntry1] : Received DDRT in 2018. Currently no acute symptoms. Has seen ophthalmologist. Had follow up with cardiologist.  Current Meds:  Prograf 3 mg BID Cellcept  500 BID at present Prednisone 5 mg/d Metoprolol 25 mg 0.5 tab twice daily Magnesium 400 /d taking once daily Atorvastatin 20/d Clopidogrel 75 mg/d Metformin 500 mg BID Also   He saw his PMD Dr Zeferino Arguello for managing hyperlipidemia and DM  Primary nephrologist is Dr. Caleb Mckeon, he reports no recent  follow up.  Urine (11/1/23) urine protein 30 blood: negative glucose: none CBC: Hb 16.3  creatinine: 0.92 Hb a1C: 6.4 cholesterol: 133  urine alb/creat 28.4/83

## 2024-04-24 ENCOUNTER — APPOINTMENT (OUTPATIENT)
Dept: DERMATOLOGY | Facility: CLINIC | Age: 64
End: 2024-04-24
Payer: MEDICARE

## 2024-04-24 DIAGNOSIS — L81.4 OTHER MELANIN HYPERPIGMENTATION: ICD-10-CM

## 2024-04-24 DIAGNOSIS — Z12.83 ENCOUNTER FOR SCREENING FOR MALIGNANT NEOPLASM OF SKIN: ICD-10-CM

## 2024-04-24 DIAGNOSIS — L30.4 ERYTHEMA INTERTRIGO: ICD-10-CM

## 2024-04-24 DIAGNOSIS — D22.9 MELANOCYTIC NEVI, UNSPECIFIED: ICD-10-CM

## 2024-04-24 PROCEDURE — 99214 OFFICE O/P EST MOD 30 MIN: CPT

## 2024-04-24 NOTE — PHYSICAL EXAM
[FreeTextEntry3] : AAOx3, pleasant, NAD, no visual lymphadenopathy hair, scalp, face, nose, eyelids, ears, lips, oropharynx, neck, chest, abdomen, back, right arm, left arm, nails, and hands examined with all normal findings, pertinent findings include:  multiple benign nevi and lentigines erythema on neck

## 2024-04-24 NOTE — ASSESSMENT
[FreeTextEntry1] : 1) benign findings as above- education  2) intertrigo mix triamcinolone 0.1% cream with ketoconazole cream BID x2 weeks; SED

## 2024-04-25 RX ORDER — KETOCONAZOLE 20 MG/G
2 CREAM TOPICAL
Qty: 1 | Refills: 2 | Status: ACTIVE | COMMUNITY
Start: 2024-04-24 | End: 1900-01-01

## 2024-04-25 RX ORDER — TRIAMCINOLONE ACETONIDE 1 MG/G
0.1 CREAM TOPICAL
Qty: 1 | Refills: 1 | Status: ACTIVE | COMMUNITY
Start: 2024-04-24 | End: 1900-01-01

## 2024-05-09 ENCOUNTER — INPATIENT (INPATIENT)
Facility: HOSPITAL | Age: 64
LOS: 2 days | Discharge: HOME | DRG: 179 | End: 2024-05-12
Attending: INTERNAL MEDICINE | Admitting: INTERNAL MEDICINE
Payer: COMMERCIAL

## 2024-05-09 VITALS
DIASTOLIC BLOOD PRESSURE: 82 MMHG | OXYGEN SATURATION: 94 % | RESPIRATION RATE: 16 BRPM | SYSTOLIC BLOOD PRESSURE: 160 MMHG | TEMPERATURE: 99 F | WEIGHT: 180.78 LBS | HEART RATE: 75 BPM | HEIGHT: 65 IN

## 2024-05-09 DIAGNOSIS — Z95.5 PRESENCE OF CORONARY ANGIOPLASTY IMPLANT AND GRAFT: Chronic | ICD-10-CM

## 2024-05-09 DIAGNOSIS — Z95.1 PRESENCE OF AORTOCORONARY BYPASS GRAFT: Chronic | ICD-10-CM

## 2024-05-09 DIAGNOSIS — I77.0 ARTERIOVENOUS FISTULA, ACQUIRED: Chronic | ICD-10-CM

## 2024-05-09 DIAGNOSIS — Z98.890 OTHER SPECIFIED POSTPROCEDURAL STATES: Chronic | ICD-10-CM

## 2024-05-09 PROCEDURE — 99285 EMERGENCY DEPT VISIT HI MDM: CPT

## 2024-05-10 DIAGNOSIS — U07.1 COVID-19: ICD-10-CM

## 2024-05-10 DIAGNOSIS — Z94.0 KIDNEY TRANSPLANT STATUS: ICD-10-CM

## 2024-05-10 DIAGNOSIS — D84.9 IMMUNODEFICIENCY, UNSPECIFIED: ICD-10-CM

## 2024-05-10 LAB
ALBUMIN SERPL ELPH-MCNC: 3.8 G/DL — SIGNIFICANT CHANGE UP (ref 3.3–5)
ALBUMIN SERPL ELPH-MCNC: 3.8 G/DL — SIGNIFICANT CHANGE UP (ref 3.3–5)
ALBUMIN SERPL ELPH-MCNC: 4.1 G/DL — SIGNIFICANT CHANGE UP (ref 3.3–5)
ALP SERPL-CCNC: 42 U/L — SIGNIFICANT CHANGE UP (ref 40–120)
ALP SERPL-CCNC: 42 U/L — SIGNIFICANT CHANGE UP (ref 40–120)
ALP SERPL-CCNC: 43 U/L — SIGNIFICANT CHANGE UP (ref 40–120)
ALT FLD-CCNC: 11 U/L — SIGNIFICANT CHANGE UP (ref 10–45)
ANION GAP SERPL CALC-SCNC: 11 MMOL/L — SIGNIFICANT CHANGE UP (ref 5–17)
ANION GAP SERPL CALC-SCNC: 13 MMOL/L — SIGNIFICANT CHANGE UP (ref 5–17)
ANION GAP SERPL CALC-SCNC: 14 MMOL/L — SIGNIFICANT CHANGE UP (ref 5–17)
APPEARANCE UR: CLEAR — SIGNIFICANT CHANGE UP
APTT BLD: 29 SEC — SIGNIFICANT CHANGE UP (ref 24.5–35.6)
AST SERPL-CCNC: 12 U/L — SIGNIFICANT CHANGE UP (ref 10–40)
AST SERPL-CCNC: 13 U/L — SIGNIFICANT CHANGE UP (ref 10–40)
AST SERPL-CCNC: 14 U/L — SIGNIFICANT CHANGE UP (ref 10–40)
BACTERIA # UR AUTO: NEGATIVE /HPF — SIGNIFICANT CHANGE UP
BASE EXCESS BLDV CALC-SCNC: 0 MMOL/L — SIGNIFICANT CHANGE UP (ref -2–3)
BASOPHILS # BLD AUTO: 0 K/UL — SIGNIFICANT CHANGE UP (ref 0–0.2)
BASOPHILS NFR BLD AUTO: 0 % — SIGNIFICANT CHANGE UP (ref 0–2)
BILIRUB DIRECT SERPL-MCNC: 0.1 MG/DL — SIGNIFICANT CHANGE UP (ref 0–0.3)
BILIRUB INDIRECT FLD-MCNC: 0.4 MG/DL — SIGNIFICANT CHANGE UP (ref 0.2–1)
BILIRUB SERPL-MCNC: 0.5 MG/DL — SIGNIFICANT CHANGE UP (ref 0.2–1.2)
BILIRUB SERPL-MCNC: 0.5 MG/DL — SIGNIFICANT CHANGE UP (ref 0.2–1.2)
BILIRUB SERPL-MCNC: 0.7 MG/DL — SIGNIFICANT CHANGE UP (ref 0.2–1.2)
BILIRUB UR-MCNC: NEGATIVE — SIGNIFICANT CHANGE UP
BUN SERPL-MCNC: 11 MG/DL — SIGNIFICANT CHANGE UP (ref 7–23)
BUN SERPL-MCNC: 12 MG/DL — SIGNIFICANT CHANGE UP (ref 7–23)
BUN SERPL-MCNC: 14 MG/DL — SIGNIFICANT CHANGE UP (ref 7–23)
CA-I SERPL-SCNC: 1.11 MMOL/L — LOW (ref 1.15–1.33)
CALCIUM SERPL-MCNC: 8.8 MG/DL — SIGNIFICANT CHANGE UP (ref 8.4–10.5)
CALCIUM SERPL-MCNC: 8.8 MG/DL — SIGNIFICANT CHANGE UP (ref 8.4–10.5)
CALCIUM SERPL-MCNC: 8.9 MG/DL — SIGNIFICANT CHANGE UP (ref 8.4–10.5)
CAST: 0 /LPF — SIGNIFICANT CHANGE UP (ref 0–4)
CHLORIDE BLDV-SCNC: 91 MMOL/L — LOW (ref 96–108)
CHLORIDE SERPL-SCNC: 92 MMOL/L — LOW (ref 96–108)
CHLORIDE SERPL-SCNC: 92 MMOL/L — LOW (ref 96–108)
CHLORIDE SERPL-SCNC: 94 MMOL/L — LOW (ref 96–108)
CO2 BLDV-SCNC: 26 MMOL/L — SIGNIFICANT CHANGE UP (ref 22–26)
CO2 SERPL-SCNC: 23 MMOL/L — SIGNIFICANT CHANGE UP (ref 22–31)
CO2 SERPL-SCNC: 24 MMOL/L — SIGNIFICANT CHANGE UP (ref 22–31)
CO2 SERPL-SCNC: 24 MMOL/L — SIGNIFICANT CHANGE UP (ref 22–31)
COLOR SPEC: YELLOW — SIGNIFICANT CHANGE UP
CREAT SERPL-MCNC: 0.89 MG/DL — SIGNIFICANT CHANGE UP (ref 0.5–1.3)
CREAT SERPL-MCNC: 1.13 MG/DL — SIGNIFICANT CHANGE UP (ref 0.5–1.3)
CREAT SERPL-MCNC: 1.14 MG/DL — SIGNIFICANT CHANGE UP (ref 0.5–1.3)
CREAT SERPL-MCNC: 1.16 MG/DL — SIGNIFICANT CHANGE UP (ref 0.5–1.3)
DIFF PNL FLD: ABNORMAL
EGFR: 69 ML/MIN/1.73M2 — SIGNIFICANT CHANGE UP
EGFR: 71 ML/MIN/1.73M2 — SIGNIFICANT CHANGE UP
EGFR: 72 ML/MIN/1.73M2 — SIGNIFICANT CHANGE UP
EGFR: 95 ML/MIN/1.73M2 — SIGNIFICANT CHANGE UP
EOSINOPHIL # BLD AUTO: 0 K/UL — SIGNIFICANT CHANGE UP (ref 0–0.5)
EOSINOPHIL NFR BLD AUTO: 0 % — SIGNIFICANT CHANGE UP (ref 0–6)
FLUAV AG NPH QL: SIGNIFICANT CHANGE UP
FLUBV AG NPH QL: SIGNIFICANT CHANGE UP
GAS PNL BLDV: 121 MMOL/L — LOW (ref 136–145)
GAS PNL BLDV: SIGNIFICANT CHANGE UP
GAS PNL BLDV: SIGNIFICANT CHANGE UP
GIANT PLATELETS BLD QL SMEAR: PRESENT — SIGNIFICANT CHANGE UP
GLUCOSE BLDV-MCNC: 121 MG/DL — HIGH (ref 70–99)
GLUCOSE SERPL-MCNC: 115 MG/DL — HIGH (ref 70–99)
GLUCOSE SERPL-MCNC: 131 MG/DL — HIGH (ref 70–99)
GLUCOSE SERPL-MCNC: 132 MG/DL — HIGH (ref 70–99)
GLUCOSE UR QL: NEGATIVE MG/DL — SIGNIFICANT CHANGE UP
HCO3 BLDV-SCNC: 25 MMOL/L — SIGNIFICANT CHANGE UP (ref 22–29)
HCT VFR BLD CALC: 42.8 % — SIGNIFICANT CHANGE UP (ref 39–50)
HCT VFR BLD CALC: 45.4 % — SIGNIFICANT CHANGE UP (ref 39–50)
HCT VFR BLDA CALC: 44 % — SIGNIFICANT CHANGE UP (ref 39–51)
HGB BLD CALC-MCNC: 14.6 G/DL — SIGNIFICANT CHANGE UP (ref 12.6–17.4)
HGB BLD-MCNC: 14 G/DL — SIGNIFICANT CHANGE UP (ref 13–17)
HGB BLD-MCNC: 14.8 G/DL — SIGNIFICANT CHANGE UP (ref 13–17)
INR BLD: 1.18 RATIO — SIGNIFICANT CHANGE UP (ref 0.85–1.18)
INR BLD: 1.19 RATIO — HIGH (ref 0.85–1.18)
KETONES UR-MCNC: NEGATIVE MG/DL — SIGNIFICANT CHANGE UP
LACTATE BLDV-MCNC: 1.2 MMOL/L — SIGNIFICANT CHANGE UP (ref 0.5–2)
LEUKOCYTE ESTERASE UR-ACNC: NEGATIVE — SIGNIFICANT CHANGE UP
LYMPHOCYTES # BLD AUTO: 0.14 K/UL — LOW (ref 1–3.3)
LYMPHOCYTES # BLD AUTO: 2.7 % — LOW (ref 13–44)
MANUAL SMEAR VERIFICATION: SIGNIFICANT CHANGE UP
MCHC RBC-ENTMCNC: 28.3 PG — SIGNIFICANT CHANGE UP (ref 27–34)
MCHC RBC-ENTMCNC: 28.4 PG — SIGNIFICANT CHANGE UP (ref 27–34)
MCHC RBC-ENTMCNC: 32.6 GM/DL — SIGNIFICANT CHANGE UP (ref 32–36)
MCHC RBC-ENTMCNC: 32.7 GM/DL — SIGNIFICANT CHANGE UP (ref 32–36)
MCV RBC AUTO: 86.6 FL — SIGNIFICANT CHANGE UP (ref 80–100)
MCV RBC AUTO: 87 FL — SIGNIFICANT CHANGE UP (ref 80–100)
MONOCYTES # BLD AUTO: 0.6 K/UL — SIGNIFICANT CHANGE UP (ref 0–0.9)
MONOCYTES NFR BLD AUTO: 11.5 % — SIGNIFICANT CHANGE UP (ref 2–14)
NEUTROPHILS # BLD AUTO: 4.5 K/UL — SIGNIFICANT CHANGE UP (ref 1.8–7.4)
NEUTROPHILS NFR BLD AUTO: 85.8 % — HIGH (ref 43–77)
NITRITE UR-MCNC: NEGATIVE — SIGNIFICANT CHANGE UP
NRBC # BLD: 0 /100 WBCS — SIGNIFICANT CHANGE UP (ref 0–0)
NRBC # BLD: 1 /100 WBCS — HIGH (ref 0–0)
PCO2 BLDV: 40 MMHG — LOW (ref 42–55)
PH BLDV: 7.4 — SIGNIFICANT CHANGE UP (ref 7.32–7.43)
PH UR: 6.5 — SIGNIFICANT CHANGE UP (ref 5–8)
PLAT MORPH BLD: NORMAL — SIGNIFICANT CHANGE UP
PLATELET # BLD AUTO: 112 K/UL — LOW (ref 150–400)
PLATELET # BLD AUTO: 114 K/UL — LOW (ref 150–400)
PO2 BLDV: 55 MMHG — HIGH (ref 25–45)
POTASSIUM BLDV-SCNC: 5.4 MMOL/L — HIGH (ref 3.5–5.1)
POTASSIUM SERPL-MCNC: 3.9 MMOL/L — SIGNIFICANT CHANGE UP (ref 3.5–5.3)
POTASSIUM SERPL-MCNC: 4 MMOL/L — SIGNIFICANT CHANGE UP (ref 3.5–5.3)
POTASSIUM SERPL-MCNC: 4 MMOL/L — SIGNIFICANT CHANGE UP (ref 3.5–5.3)
POTASSIUM SERPL-SCNC: 3.9 MMOL/L — SIGNIFICANT CHANGE UP (ref 3.5–5.3)
POTASSIUM SERPL-SCNC: 4 MMOL/L — SIGNIFICANT CHANGE UP (ref 3.5–5.3)
POTASSIUM SERPL-SCNC: 4 MMOL/L — SIGNIFICANT CHANGE UP (ref 3.5–5.3)
PROCALCITONIN SERPL-MCNC: 0.1 NG/ML — SIGNIFICANT CHANGE UP (ref 0.02–0.1)
PROT SERPL-MCNC: 6.6 G/DL — SIGNIFICANT CHANGE UP (ref 6–8.3)
PROT SERPL-MCNC: 6.7 G/DL — SIGNIFICANT CHANGE UP (ref 6–8.3)
PROT SERPL-MCNC: 7 G/DL — SIGNIFICANT CHANGE UP (ref 6–8.3)
PROT UR-MCNC: 30 MG/DL
PROTHROM AB SERPL-ACNC: 12.4 SEC — SIGNIFICANT CHANGE UP (ref 9.5–13)
PROTHROM AB SERPL-ACNC: 12.9 SEC — SIGNIFICANT CHANGE UP (ref 9.5–13)
RBC # BLD: 4.94 M/UL — SIGNIFICANT CHANGE UP (ref 4.2–5.8)
RBC # BLD: 5.22 M/UL — SIGNIFICANT CHANGE UP (ref 4.2–5.8)
RBC # FLD: 12.6 % — SIGNIFICANT CHANGE UP (ref 10.3–14.5)
RBC # FLD: 12.7 % — SIGNIFICANT CHANGE UP (ref 10.3–14.5)
RBC BLD AUTO: NORMAL — SIGNIFICANT CHANGE UP
RBC CASTS # UR COMP ASSIST: 5 /HPF — HIGH (ref 0–4)
REVIEW: SIGNIFICANT CHANGE UP
RSV RNA NPH QL NAA+NON-PROBE: SIGNIFICANT CHANGE UP
SAO2 % BLDV: 84.8 % — SIGNIFICANT CHANGE UP (ref 67–88)
SARS-COV-2 RNA SPEC QL NAA+PROBE: DETECTED
SODIUM SERPL-SCNC: 127 MMOL/L — LOW (ref 135–145)
SODIUM SERPL-SCNC: 128 MMOL/L — LOW (ref 135–145)
SODIUM SERPL-SCNC: 132 MMOL/L — LOW (ref 135–145)
SP GR SPEC: 1.01 — SIGNIFICANT CHANGE UP (ref 1–1.03)
SQUAMOUS # UR AUTO: 0 /HPF — SIGNIFICANT CHANGE UP (ref 0–5)
UROBILINOGEN FLD QL: 0.2 MG/DL — SIGNIFICANT CHANGE UP (ref 0.2–1)
WBC # BLD: 3.69 K/UL — LOW (ref 3.8–10.5)
WBC # BLD: 5.24 K/UL — SIGNIFICANT CHANGE UP (ref 3.8–10.5)
WBC # FLD AUTO: 3.69 K/UL — LOW (ref 3.8–10.5)
WBC # FLD AUTO: 5.24 K/UL — SIGNIFICANT CHANGE UP (ref 3.8–10.5)
WBC UR QL: 0 /HPF — SIGNIFICANT CHANGE UP (ref 0–5)

## 2024-05-10 PROCEDURE — 71250 CT THORAX DX C-: CPT | Mod: 26,MC

## 2024-05-10 PROCEDURE — 74176 CT ABD & PELVIS W/O CONTRAST: CPT | Mod: 26,MC

## 2024-05-10 PROCEDURE — 71045 X-RAY EXAM CHEST 1 VIEW: CPT | Mod: 26

## 2024-05-10 PROCEDURE — 99223 1ST HOSP IP/OBS HIGH 75: CPT

## 2024-05-10 PROCEDURE — 99222 1ST HOSP IP/OBS MODERATE 55: CPT

## 2024-05-10 RX ORDER — REMDESIVIR 5 MG/ML
200 INJECTION INTRAVENOUS EVERY 24 HOURS
Refills: 0 | Status: COMPLETED | OUTPATIENT
Start: 2024-05-10 | End: 2024-05-10

## 2024-05-10 RX ORDER — METOPROLOL TARTRATE 50 MG
25 TABLET ORAL DAILY
Refills: 0 | Status: DISCONTINUED | OUTPATIENT
Start: 2024-05-10 | End: 2024-05-12

## 2024-05-10 RX ORDER — LANOLIN ALCOHOL/MO/W.PET/CERES
3 CREAM (GRAM) TOPICAL AT BEDTIME
Refills: 0 | Status: DISCONTINUED | OUTPATIENT
Start: 2024-05-10 | End: 2024-05-12

## 2024-05-10 RX ORDER — SODIUM CHLORIDE 9 MG/ML
1000 INJECTION, SOLUTION INTRAVENOUS ONCE
Refills: 0 | Status: COMPLETED | OUTPATIENT
Start: 2024-05-10 | End: 2024-05-10

## 2024-05-10 RX ORDER — REMDESIVIR 5 MG/ML
INJECTION INTRAVENOUS
Refills: 0 | Status: DISCONTINUED | OUTPATIENT
Start: 2024-05-10 | End: 2024-05-10

## 2024-05-10 RX ORDER — ACETAMINOPHEN 500 MG
650 TABLET ORAL EVERY 6 HOURS
Refills: 0 | Status: DISCONTINUED | OUTPATIENT
Start: 2024-05-10 | End: 2024-05-12

## 2024-05-10 RX ORDER — ATORVASTATIN CALCIUM 80 MG/1
1 TABLET, FILM COATED ORAL
Refills: 0 | DISCHARGE

## 2024-05-10 RX ORDER — MYCOPHENOLATE MOFETIL 250 MG/1
1000 CAPSULE ORAL
Refills: 0 | Status: DISCONTINUED | OUTPATIENT
Start: 2024-05-10 | End: 2024-05-12

## 2024-05-10 RX ORDER — ATORVASTATIN CALCIUM 80 MG/1
10 TABLET, FILM COATED ORAL AT BEDTIME
Refills: 0 | Status: DISCONTINUED | OUTPATIENT
Start: 2024-05-10 | End: 2024-05-12

## 2024-05-10 RX ORDER — TACROLIMUS 5 MG/1
3 CAPSULE ORAL
Refills: 0 | Status: DISCONTINUED | OUTPATIENT
Start: 2024-05-10 | End: 2024-05-12

## 2024-05-10 RX ORDER — ASPIRIN/CALCIUM CARB/MAGNESIUM 324 MG
81 TABLET ORAL DAILY
Refills: 0 | Status: DISCONTINUED | OUTPATIENT
Start: 2024-05-10 | End: 2024-05-12

## 2024-05-10 RX ORDER — MYCOPHENOLATE MOFETIL 250 MG/1
1 CAPSULE ORAL
Qty: 0 | Refills: 0 | DISCHARGE

## 2024-05-10 RX ORDER — REMDESIVIR 5 MG/ML
100 INJECTION INTRAVENOUS EVERY 24 HOURS
Refills: 0 | Status: COMPLETED | OUTPATIENT
Start: 2024-05-11 | End: 2024-05-12

## 2024-05-10 RX ORDER — MAGNESIUM OXIDE 400 MG ORAL TABLET 241.3 MG
1 TABLET ORAL
Qty: 0 | Refills: 0 | DISCHARGE

## 2024-05-10 RX ORDER — CHOLECALCIFEROL (VITAMIN D3) 125 MCG
1 CAPSULE ORAL
Refills: 0 | DISCHARGE

## 2024-05-10 RX ORDER — SEMAGLUTIDE 0.68 MG/ML
0.25 INJECTION, SOLUTION SUBCUTANEOUS
Refills: 0 | DISCHARGE

## 2024-05-10 RX ORDER — HEPARIN SODIUM 5000 [USP'U]/ML
5000 INJECTION INTRAVENOUS; SUBCUTANEOUS EVERY 8 HOURS
Refills: 0 | Status: DISCONTINUED | OUTPATIENT
Start: 2024-05-10 | End: 2024-05-12

## 2024-05-10 RX ORDER — AMLODIPINE BESYLATE 2.5 MG/1
1 TABLET ORAL
Refills: 0 | DISCHARGE

## 2024-05-10 RX ORDER — METFORMIN HYDROCHLORIDE 850 MG/1
1 TABLET ORAL
Refills: 0 | DISCHARGE

## 2024-05-10 RX ORDER — CLOPIDOGREL BISULFATE 75 MG/1
75 TABLET, FILM COATED ORAL DAILY
Refills: 0 | Status: DISCONTINUED | OUTPATIENT
Start: 2024-05-10 | End: 2024-05-12

## 2024-05-10 RX ORDER — REMDESIVIR 5 MG/ML
INJECTION INTRAVENOUS
Refills: 0 | Status: COMPLETED | OUTPATIENT
Start: 2024-05-10 | End: 2024-05-12

## 2024-05-10 RX ADMIN — HEPARIN SODIUM 5000 UNIT(S): 5000 INJECTION INTRAVENOUS; SUBCUTANEOUS at 21:34

## 2024-05-10 RX ADMIN — SODIUM CHLORIDE 1000 MILLILITER(S): 9 INJECTION, SOLUTION INTRAVENOUS at 04:35

## 2024-05-10 RX ADMIN — Medication 81 MILLIGRAM(S): at 11:54

## 2024-05-10 RX ADMIN — Medication 1 TABLET(S): at 11:51

## 2024-05-10 RX ADMIN — ATORVASTATIN CALCIUM 10 MILLIGRAM(S): 80 TABLET, FILM COATED ORAL at 21:34

## 2024-05-10 RX ADMIN — MYCOPHENOLATE MOFETIL 1000 MILLIGRAM(S): 250 CAPSULE ORAL at 20:12

## 2024-05-10 RX ADMIN — CLOPIDOGREL BISULFATE 75 MILLIGRAM(S): 75 TABLET, FILM COATED ORAL at 11:51

## 2024-05-10 RX ADMIN — TACROLIMUS 3 MILLIGRAM(S): 5 CAPSULE ORAL at 20:12

## 2024-05-10 RX ADMIN — REMDESIVIR 200 MILLIGRAM(S): 5 INJECTION INTRAVENOUS at 18:26

## 2024-05-10 NOTE — ED PROVIDER NOTE - NSICDXPASTSURGICALHX_GEN_ALL_CORE_FT
PAST SURGICAL HISTORY:  AV fistula     AV fistula     CABG (Coronary Artery Bypass Graft) 2007    S/P CABG x 5     S/P coronary artery stent placement     S/P hemorrhoidectomy and rectal polypectomy -2/3/2017.    Stented coronary artery x2 cardiac stents in 2016, one cardiac stent in 2015

## 2024-05-10 NOTE — ED ADULT NURSE REASSESSMENT NOTE - NS ED NURSE REASSESS COMMENT FT1
Received report from night MELANY Carcamo. Upon assessment, A&Ox4, denies chest pain, SOB, n/v. Vitals WNL. Provided with breakfast.

## 2024-05-10 NOTE — PATIENT PROFILE ADULT - TRANSPORTATION
Continue current medications, refer to Ghislaine Whittaker for genetic consult due to breast and ovarian cancer in family. Mammogram order given.      Recheck in 3 months, with well exam. no

## 2024-05-10 NOTE — ED PROVIDER NOTE - NSICDXPASTMEDICALHX_GEN_ALL_CORE_FT
PAST MEDICAL HISTORY:  Asthma last attack 2007, never hospitalized or intubated    CAD (Coronary Artery Disease)     Coronary artery disease involving coronary bypass graft Bypass procedure in 2007    Gastroesophageal reflux disease without esophagitis     Hemorrhoids     High cholesterol     HTN (hypertension)     HTN - Hypertension     Obesity     PCK (Polycystic Kidney Disease)     Renal transplant recipient In 2018

## 2024-05-10 NOTE — ED PROVIDER NOTE - ATTENDING CONTRIBUTION TO CARE
Attending (Prateek Laird D.O.):  I have personally seen and examined this patient. I have performed a substantive portion of the visit including all aspects of the medical decision making. Resident, fellow, student, and/or ACP note reviewed. I agree on the plan of care except where noted.    see mdm

## 2024-05-10 NOTE — CONSULT NOTE ADULT - TIME BILLING
Kidney Transplant recipient with functioning renal allograft  Noted creatinine  Admitted with COVID infection  Comorbidities reviewed, CAD, HTN, PKD, HLD  Reviewed home immunosuppression, Tacrolimus, cellcept , prednisone  Suggestions:  Tacrolimus trough level target 4-6 ng/ml  Transplant ID evaluation  Continue prednisone  Once clinically improved can restart Cellcept    Monitor respiratory status  Cardiology follow up  Team Will follow  I was present during and reviewed clinical and lab data as well as assessment and plan as documented by the house staff as noted. Please contact if any additional questions with any change in clinical condition or on availability of any additional information or reports.

## 2024-05-10 NOTE — H&P ADULT - ASSESSMENT
64M here for few days of subjective fevers, chills, loose nonbloody stools which have resolved, and nonproductive cough. Denies trauma, travel. hx of renal transplant on pred, mycophenolate, tacro, reports med compliance. Denies urinary sxs. Still tolerating PO. Patient states he discussed his sxs with his PCP Dr. Zeferino Arguello rec patient to go to the ED for eval. Patient states that his pcp will see him here.      64M here for few days of subjective fevers, chills, loose nonbloody stools which have resolved, and nonproductive cough. Denies trauma, travel. hx of renal transplant on pred, mycophenolate, tacro, reports med compliance. Denies urinary sxs. Still tolerating PO. Patient states he discussed his sxs with his PCP Dr. Zeferino Arguello rec patient to go to the ED for eval. Patient states that his pcp will see him here.     1 Fever sec to COVID19  - started remdesivir  - transplant ID following  - will monitor     2 Diarrhea  - likely sec to COVID   - check GI PCR  - will monitor     3 Renal transplant recipient  - renal fu   - cw immunosuppressant   - monitor cr  - check tacro level     4 CAD  - cw asa, plavix  - outpt cards fu

## 2024-05-10 NOTE — CONSULT NOTE ADULT - ASSESSMENT
Patient to be seen  64M with MHx of CAD (s/p CABG in 2007 and PCI in 2015 and 2016) and polycystic kidney disease s/p renal transplant in 2018 here for few days of subjective fevers, chills, loose nonbloody stools which have resolved, and nonproductive cough that started on Tuesday. He was in his usual state of health until then. Patient states he discussed his sxs with his PCP Dr. Zeferino Arguello rec patient to go to the ED for eval. His diarrhea has mostly resolved and cough is better. Does not feel SOB, has some lethargy, ROS otherwise negative. He has continued to take batrim ppx as per advice from his provider. This is the first time he has been tested positive for COVID. He had 3 doses of vaccine, 2 years back.    ED; afebrile, hemodynamically stable. Saturations maintained on room a    RVP Positive for COVID  UA 0 WBC,  5 RBC    CT C/A/P  No acute pathology in the chest, abdomen, or pelvis.      # COVID 19 Infection  # s/p Renal transplant on [place], [facility]    - Symptoms onset on Tuesday, first episode, Immunized with 3 doses ( last dose 2 yrs back)  - Mild symptoms; with risk for progression  - Will as for CT value  - Would treat with short course Remdesivir  - Would check CMV PCR  - Would send GI PCR if loose stools        All recommendations are tentative pending Attending Attestation.    Stanley Deutsch MD, PGY-4  ID Fellow  [employer] Teams Preferred  After 5pm/weekends call [phone]     64M with MHx of CAD (s/p CABG in 2007 and PCI in 2015 and 2016) and polycystic kidney disease s/p renal transplant in 2018 here for few days of subjective fevers, chills, loose nonbloody stools which have resolved, and nonproductive cough that started on Tuesday. He was in his usual state of health until then. Patient states he discussed his sxs with his PCP Dr. Zeferino Arguello rec patient to go to the ED for eval. His diarrhea has mostly resolved and cough is better. Does not feel SOB, has some lethargy, ROS otherwise negative. He has continued to take batrim ppx as per advice from his provider. This is the first time he has been tested positive for COVID. He had 3 doses of vaccine, 2 years back.    ED; afebrile, hemodynamically stable. Saturations maintained on room a    RVP Positive for COVID  UA 0 WBC,  5 RBC    CT C/A/P  No acute pathology in the chest, abdomen, or pelvis.      # COVID 19 Infection  # s/p Renal transplant on [place], [facility]    - Symptoms onset on Tuesday, first episode of COVID, Immunized with 3 doses ( last dose 2 yrs back)  - Mild symptoms; with risk for progression  - Would treat with 3 days course of Remdesivir  - Would check CMV PCR  - Would send GI PCR if loose stools  - Continue to monitor Clinically      Discussed with attending    Stanlye Deutsch MD, PGY-4  ID Fellow  [employer] Teams Preferred  After 5pm/weekends call [phone]     64M with MHx of CAD (s/p CABG in 2007 and PCI in 2015 and 2016) and polycystic kidney disease s/p renal transplant in 2018 here for few days of subjective fevers, chills, loose nonbloody stools which have resolved, and nonproductive cough that started on Tuesday. He was in his usual state of health until then. Patient states he discussed his sxs with his PCP Dr. Zeferino Arguello rec patient to go to the ED for eval. His diarrhea has mostly resolved and cough is better. Does not feel SOB, has some lethargy, ROS otherwise negative. He has continued to take batrim ppx as per advice from his provider. This is the first time he has been tested positive for COVID. He had 3 doses of vaccine, 2 years back.    ED; afebrile, hemodynamically stable. Saturations maintained on room a    RVP Positive for COVID  UA 0 WBC,  5 RBC    CT C/A/P  No acute pathology in the chest, abdomen, or pelvis.      # COVID 19 Infection  # s/p Renal transplant on [place], [facility]    - Symptoms onset on Tuesday, first episode of COVID, Immunized with 3 doses ( last dose 2 yrs back)  - Mild symptoms; with risk for progression  - Would treat with 3 days course of Remdesivir  - Would check CMV PCR  - Would send GI PCR if loose stools  - Continue to monitor Clinically  - Continue bactrim ppx      Discussed with attending    Stanley Deutsch MD, PGY-4  ID Fellow  [employer] Teams Preferred  After 5pm/weekends call [phone]     64M with MHx of CAD (s/p CABG in 2007 and PCI in 2015 and 2016) and polycystic kidney disease s/p renal transplant in 2018 here for few days of subjective fevers, chills, loose nonbloody stools which have resolved, and nonproductive cough that started on Tuesday. He was in his usual state of health until then. Patient states he discussed his sxs with his PCP Dr. Zeferino Arguello rec patient to go to the ED for eval. His diarrhea has mostly resolved and cough is better. Does not feel SOB, has some lethargy, ROS otherwise negative. He has continued to take batrim ppx as per advice from his provider. This is the first time he has been tested positive for COVID. He had 3 doses of vaccine, 2 years back.    ED; afebrile, hemodynamically stable. Saturations maintained on room a    RVP Positive for COVID  UA 0 WBC,  5 RBC    CT C/A/P  No acute pathology in the chest, abdomen, or pelvis.      # COVID 19 Infection  # Leukopenia  # s/p Renal transplant on [place], [facility]    - Symptoms onset on Tuesday, first episode of COVID, Immunized with 3 doses ( last dose 2 yrs back)  - Mild symptoms; with risk for progression  - Would treat with 3 days course of Remdesivir  - Would check CMV PCR  - Would send GI PCR if loose stools  - Continue to monitor Clinically  - Continue bactrim ppx    Discussed with attending    Stanley Deutsch MD, PGY-4  ID Fellow  [employer] Teams Preferred  After 5pm/weekends call [phone]

## 2024-05-10 NOTE — ED PROVIDER NOTE - CLINICAL SUMMARY MEDICAL DECISION MAKING FREE TEXT BOX
Attending Prateek Laird:  64M here for few days of subjective fevers, chills, loose nonbloody stools which have resolved, and nonproductive cough. Denies trauma, travel. hx of renal transplant on pred, mycophenolate, tacro, reports med compliance. Denies urinary sxs. Still tolerating PO. Patient states he discussed his sxs with his nephro Dr. avila who rec patient to go to the ED for eval.     Hemodynam stable, NAD, aaox3, not meningeal, clear lungs, RRR, + slight blowing murmur. Benign abd, no peritoneal signs. No rash. No jaundice. No e/o fluid overload.    Hx and exam warrants further w/u given immunocompromised state for infectious nidus. Plan for labs, cxs, and CTs if prelim w/u nonactionable. Attending Prateek Laird:  64M here for few days of subjective fevers, chills, loose nonbloody stools which have resolved, and nonproductive cough. Denies trauma, travel. hx of renal transplant on pred, mycophenolate, tacro, reports med compliance. Denies urinary sxs. Still tolerating PO. Patient states he discussed his sxs with his PCP Dr. Zeferino Arguello rec patient to go to the ED for eval. Patient states that his pcp will see him here.     Hemodynam stable, NAD, aaox3, not meningeal, clear lungs, RRR, + slight blowing murmur. Benign abd, no peritoneal signs. No rash. No jaundice. No e/o fluid overload.    Hx and exam warrants further w/u given immunocompromised state for infectious nidus. Plan for labs, cxs, and CTs if prelim w/u nonactionable.

## 2024-05-10 NOTE — CONSULT NOTE ADULT - SUBJECTIVE AND OBJECTIVE BOX
[facility]    Eddie Kailyn Joy NP    [address] [place] [address] [phone]      Chief Complaint:  Patient is a 64y old  Male who presents with a chief complaint of Cough (10 May 2024 14:07)      HPI:64M here for few days of subjective fevers, chills, loose nonbloody stools which have resolved, and nonproductive cough. Denies trauma, travel. hx of renal transplant on pred, mycophenolate, tacro, reports med compliance. Denies urinary sxs. Still tolerating PO. Patient states he discussed his sxs with his PCP Dr. Zeferino Arguello rec patient to go to the ED for eval. Patient states that his pcp will see him here.     Allergies:  No Known Allergies      Medications:  acetaminophen     Tablet .. 650 milliGRAM(s) Oral every 6 hours PRN  aspirin enteric coated 81 milliGRAM(s) Oral daily  atorvastatin 10 milliGRAM(s) Oral at bedtime  clopidogrel Tablet 75 milliGRAM(s) Oral daily  heparin   Injectable 5000 Unit(s) SubCutaneous every 8 hours  melatonin 3 milliGRAM(s) Oral at bedtime PRN  metoprolol succinate ER 25 milliGRAM(s) Oral daily  mycophenolate mofetil 1000 milliGRAM(s) Oral two times a day  predniSONE   Tablet 5 milliGRAM(s) Oral daily  remdesivir  IVPB   IV Intermittent   remdesivir  IVPB 200 milliGRAM(s) IV Intermittent every 24 hours  tacrolimus 3 milliGRAM(s) Oral two times a day  trimethoprim   80 mG/sulfamethoxazole 400 mG 1 Tablet(s) Oral daily      PMHX/PSHX:  Asthma    HTN - Hypertension    PCK (Polycystic Kidney Disease)    CAD (Coronary Atherosclerotic Disease)    HTN (Hypertension)    CAD (Coronary Artery Disease)    ESRD on Dialysis    High cholesterol    Gastroesophageal reflux disease without esophagitis    Hemorrhoids    Obesity    ESRD (end stage renal disease) on dialysis    Coronary artery disease involving coronary bypass graft    HTN (hypertension)    Renal transplant recipient    S/P CABG    CABG (Coronary Artery Bypass Graft)    Stented coronary artery    AV fistula    S/P hemorrhoidectomy    S/P CABG x 7    S/P CABG x 5    S/P coronary artery stent placement    AV fistula        Family history:  No pertinent family history in first degree relatives    Family history of adult polycystic kidney disease (Sibling)    Family history of polycystic kidney (Sibling)        Social History:     ROS:     General:  No wt loss, fevers, chills, night sweats, fatigue,   Eyes:  Good vision, no reported pain  ENT:  No sore throat, pain, runny nose, dysphagia  CV:  No pain, palpitations, hypo/hypertension  Resp:  No dyspnea, cough, tachypnea, wheezing  GI:  No pain, No nausea, No vomiting, No diarrhea, No constipation, No weight loss, No fever, No pruritis, No rectal bleeding, No tarry stools, No dysphagia,  :  No pain, bleeding, incontinence, nocturia  Muscle:  No pain, weakness  Neuro:  No weakness, tingling, memory problems  Psych:  No fatigue, insomnia, mood problems, depression  Endocrine:  No polyuria, polydipsia, cold/heat intolerance  Heme:  No petechiae, ecchymosis, easy bruisability  Skin:  No rash, tattoos, scars, edema      PHYSICAL EXAM:   Vital Signs:  Vital Signs Last 24 Hrs  T(C): 36.7 (10 May 2024 11:32), Max: 37.1 (10 May 2024 02:27)  T(F): 98 (10 May 2024 11:32), Max: 98.7 (10 May 2024 02:27)  HR: 88 (10 May 2024 11:32) (66 - 88)  BP: 142/86 (10 May 2024 11:32) (113/65 - 160/82)  BP(mean): 81 (10 May 2024 06:20) (80 - 100)  RR: 18 (10 May 2024 11:32) (16 - 18)  SpO2: 98% (10 May 2024 11:32) (94% - 98%)    Parameters below as of 10 May 2024 11:32  Patient On (Oxygen Delivery Method): room air      Daily Height in cm: 165.1 (09 May 2024 20:42)    Daily     GENERAL:  Appears stated age, well-groomed, well-nourished, no distress  HEENT:  NC/AT,  conjunctivae clear and pink, no thyromegaly, nodules, adenopathy, no JVD, sclera -anicteric  CHEST:  Full & symmetric excursion, no increased effort, breath sounds clear  HEART:  Regular rhythm, S1, S2, no murmur/rub/S3/S4, no abdominal bruit, no edema  ABDOMEN:  Soft, non-tender, non-distended, normoactive bowel sounds,  no masses ,no hepato-splenomegaly, no signs of chronic liver disease  EXTEREMITIES:  no cyanosis,clubbing or edema  SKIN:  No rash/erythema/ecchymoses/petechiae/wounds/abscess/warm/dry  NEURO:  Alert, oriented, no asterixis, no tremor, no encephalopathy    LABS:                        14.8   5.24  )-----------( 114      ( 10 May 2024 00:25 )             45.4     05-10    128<L>  |  92<L>  |  11  ----------------------------<  115<H>  4.0   |  23  |  0.89    Ca    8.9      10 May 2024 00:25    TPro  7.0  /  Alb  4.1  /  TBili  0.7  /  DBili  x   /  AST  13  /  ALT  11  /  AlkPhos  43  05-10    LIVER FUNCTIONS - ( 10 May 2024 00:25 )  Alb: 4.1 g/dL / Pro: 7.0 g/dL / ALK PHOS: 43 U/L / ALT: 11 U/L / AST: 13 U/L / GGT: x           PT/INR - ( 10 May 2024 00:25 )   PT: 12.9 sec;   INR: 1.18 ratio         PTT - ( 10 May 2024 00:25 )  PTT:29.0 sec  Urinalysis Basic - ( 10 May 2024 06:23 )    Color: Yellow / Appearance: Clear / S.013 / pH: x  Gluc: x / Ketone: Negative mg/dL  / Bili: Negative / Urobili: 0.2 mg/dL   Blood: x / Protein: 30 mg/dL / Nitrite: Negative   Leuk Esterase: Negative / RBC: 5 /HPF / WBC 0 /HPF   Sq Epi: x / Non Sq Epi: 0 /HPF / Bacteria: Negative /HPF          Imaging:

## 2024-05-10 NOTE — H&P ADULT - NSTOBACCOSCREENHP_GEN_A_NCS
Just take 50 mg once a day it's a 24-hour drug and give it a couple weeks call if the BPs are consistently over 140/90   No

## 2024-05-10 NOTE — CONSULT NOTE ADULT - SUBJECTIVE AND OBJECTIVE BOX
Patient is a 64y old  Male who presents with a chief complaint of Cough (10 May 2024 09:20)    HPI:  64M with MHx of CAD (s/p CABG in  and PCI in  and ) and polycystic kidney disease s/p renal transplant in 2018 here for few days of subjective fevers, chills, loose nonbloody stools which have resolved, and nonproductive cough. Denies trauma, travel. hx of renal transplant on pred, mycophenolate, tacro, reports med compliance. Denies urinary sxs. Still tolerating PO. Patient states he discussed his sxs with his PCP Dr. Zeferino Arguello rec patient to go to the ED for eval.    ED; afebrile, hemodynamically stable.     RVP Positive for COVID  UA 0 WBC,  5 RBC       prior hospital charts reviewed [ x ]  primary team notes reviewed [ x ]  other consultant notes reviewed [x  ]    PAST MEDICAL & SURGICAL HISTORY:  Asthma  last attack , never hospitalized or intubated      HTN - Hypertension      PCK (Polycystic Kidney Disease)      CAD (Coronary Artery Disease)      High cholesterol      Gastroesophageal reflux disease without esophagitis      Hemorrhoids      Obesity      Coronary artery disease involving coronary bypass graft  Bypass procedure in       HTN (hypertension)      Renal transplant recipient  In 2018      CABG (Coronary Artery Bypass Graft)  2007      Stented coronary artery  x2 cardiac stents in , one cardiac stent in       AV fistula      S/P hemorrhoidectomy  and rectal polypectomy -2/3/2017.      S/P CABG x 5      S/P coronary artery stent placement      AV fistula          Allergies  No Known Allergies    ANTIMICROBIALS (past 90 days)  MEDICATIONS  (STANDING):        trimethoprim   80 mG/sulfamethoxazole 400 mG 1 daily    MEDICATIONS  (STANDING):  acetaminophen     Tablet .. 650 every 6 hours PRN  aspirin enteric coated 81 daily  atorvastatin 10 at bedtime  clopidogrel Tablet 75 daily  melatonin 3 at bedtime PRN  metoprolol succinate ER 25 daily  mycophenolate mofetil 1000 two times a day  predniSONE   Tablet 5 daily  tacrolimus 3 two times a day    SOCIAL HISTORY:       FAMILY HISTORY:  Family history of adult polycystic kidney disease (Sibling)    Family history of polycystic kidney (Sibling)      REVIEW OF SYSTEMS  [  ] ROS unobtainable because:    [  ] All other systems negative except as noted below:	    Constitutional:  [ ] fever [ ] chills  [ ] weight loss  [ ] weakness  Skin:  [ ] rash [ ] phlebitis	  Eyes: [ ] icterus [ ] pain  [ ] discharge	  ENMT: [ ] sore throat  [ ] thrush [ ] ulcers [ ] exudates  Respiratory: [ ] dyspnea [ ] hemoptysis [ ] cough [ ] sputum	  Cardiovascular:  [ ] chest pain [ ] palpitations [ ] edema	  Gastrointestinal:  [ ] nausea [ ] vomiting [ ] diarrhea [ ] constipation [ ] pain	  Genitourinary:  [ ] dysuria [ ] frequency [ ] hematuria [ ] discharge [ ] flank pain  [ ] incontinence  Musculoskeletal:  [ ] myalgias [ ] arthralgias [ ] arthritis  [ ] back pain  Neurological:  [ ] headache [ ] seizures  [ ] confusion/altered mental status  Psychiatric:  [ ] anxiety [ ] depression	  Hematology/Lymphatics:  [ ] lymphadenopathy  Endocrine:  [ ] adrenal [ ] thyroid  Allergic/Immunologic:	 [ ] transplant [ ] seasonal    Vital Signs Last 24 Hrs  T(F): 98.4 (05-10-24 @ 10:00), Max: 98.7 (05-10-24 @ 02:27)  Vital Signs Last 24 Hrs  HR: 79 (05-10-24 @ 10:00) (66 - 85)  BP: 135/64 (05-10-24 @ 10:00) (113/65 - 160/82)  RR: 16 (05-10-24 @ 10:00)  SpO2: 97% (05-10-24 @ 10:00) (94% - 98%)  Wt(kg): --    PHYSICAL EXAM:                              14.8   5.24  )-----------( 114      ( 10 May 2024 00:25 )             45.4   05-10    128<L>  |  92<L>  |  11  ----------------------------<  115<H>  4.0   |  23  |  0.89    Ca    8.9      10 May 2024 00:25    TPro  7.0  /  Alb  4.1  /  TBili  0.7  /  DBili  x   /  AST  13  /  ALT  11  /  AlkPhos  43  05-10    Urinalysis Basic - ( 10 May 2024 06:23 )    Color: Yellow / Appearance: Clear / S.013 / pH: x  Gluc: x / Ketone: Negative mg/dL  / Bili: Negative / Urobili: 0.2 mg/dL   Blood: x / Protein: 30 mg/dL / Nitrite: Negative   Leuk Esterase: Negative / RBC: 5 /HPF / WBC 0 /HPF   Sq Epi: x / Non Sq Epi: 0 /HPF / Bacteria: Negative /HPF    MICROBIOLOGY:              RADIOLOGY:  imaging below personally reviewed and agree with findings    < from: CT Abdomen and Pelvis No Cont (05.10.24 @ 03:47) >    ACC: [id number] EXAM:  CT CHEST   ORDERED BY:  CL MO     ACC: [id number] EXAM:  CT ABDOMEN AND PELVIS   ORDERED BY:  CL MO     PROCEDURE DATE:  05/10/2024          INTERPRETATION:  CLINICAL INFORMATION: Diarrhea, fevers, decreased   left-sided breath sounds, history of polycystic kidney disease with renal   transplant.    COMPARISON: CT abdomen and pelvis 2017    CONTRAST/COMPLICATIONS:  IV Contrast: NONE  Oral Contrast: NONE  Complications: None reported at time of study completion    PROCEDURE:  CT of the Chest, Abdomen and Pelvis was performed.  Sagittal and coronal reformats were performed.    FINDINGS:  CHEST:  LUNGS AND LARGE AIRWAYS: Patent central airways. No parenchymal   consolidation.  PLEURA: No pleural effusion.  VESSELS: Left axillary vein stent. Atherosclerotic calcifications of the   aorta.  HEART: Heart size is normal. Status post CABG. No pericardial effusion.  MEDIASTINUM AND AYLA: No lymphadenopathy.  CHEST WALL AND LOWER NECK: Within normal limits.    ABDOMEN AND PELVIS:  LIVER: Subcentimeter hypodense foci scattered throughout the liver, too   small to characterize..  BILE DUCTS: Normal caliber.  GALLBLADDER: Within normal limits.  SPLEEN: Within normal limits.  PANCREAS: Within normal limits.  ADRENALS: Within normal limits.  KIDNEYS/URETERS: Bilateral native kidneys are enlarged with innumerable   cysts. Stable 3.4 cm hyperdense lesion in the interpolar right kidney   with peripheral calcification. Right lower quadrant transplant kidney is   without hydronephrosis. No nephrolithiasis.    BLADDER: Within normal limits.  REPRODUCTIVE ORGANS: Prostate within normal limits.    BOWEL: No bowel obstruction. Appendix is normal. Diverticulosis coli.  PERITONEUM: No ascites.  VESSELS: Atherosclerotic changes.  RETROPERITONEUM/LYMPH NODES: No lymphadenopathy.  ABDOMINAL WALL: Small fat-containing left inguinal hernia.  BONES: Mild degenerative changes. Median sternotomy.    IMPRESSION:  No acute pathology in the chest, abdomen, or pelvis.    Enlarged polycystic kidneys.    --- End of Report ---      < end of copied text >   Patient is a 64y old  Male who presents with a chief complaint of Cough (10 May 2024 09:20)    HPI:    64M with MHx of CAD (s/p CABG in  and PCI in  and ) and polycystic kidney disease s/p renal transplant in 2018 here for few days of subjective fevers, chills, loose nonbloody stools which have resolved, and nonproductive cough that started on Tuesday. He was in his usual state of health until then. Patient states he discussed his sxs with his PCP Dr. Zeferino Arguello rec patient to go to the ED for eval. His diarrhea has mostly resolved and cough is better. Does not feel SOB, has some lethargy, ROS otherwise negative. He has continued to take batrim ppx as per advice from his provider. This is the first time he has been tested positive for COVID. He had 3 doses of vaccine, 2 years back.    ED; afebrile, hemodynamically stable. Saturations maintained on room a    RVP Positive for COVID  UA 0 WBC,  5 RBC       prior hospital charts reviewed [ x ]  primary team notes reviewed [ x ]  other consultant notes reviewed [x  ]    PAST MEDICAL & SURGICAL HISTORY:  Asthma  last attack , never hospitalized or intubated      HTN - Hypertension      PCK (Polycystic Kidney Disease)      CAD (Coronary Artery Disease)      High cholesterol      Gastroesophageal reflux disease without esophagitis      Hemorrhoids      Obesity      Coronary artery disease involving coronary bypass graft  Bypass procedure in       HTN (hypertension)      Renal transplant recipient  In 2018      CABG (Coronary Artery Bypass Graft)  2007      Stented coronary artery  x2 cardiac stents in , one cardiac stent in       AV fistula      S/P hemorrhoidectomy  and rectal polypectomy -2/3/2017.      S/P CABG x 5      S/P coronary artery stent placement      AV fistula          Allergies  No Known Allergies    ANTIMICROBIALS (past 90 days)  MEDICATIONS  (STANDING):        trimethoprim   80 mG/sulfamethoxazole 400 mG 1 daily    MEDICATIONS  (STANDING):  acetaminophen     Tablet .. 650 every 6 hours PRN  aspirin enteric coated 81 daily  atorvastatin 10 at bedtime  clopidogrel Tablet 75 daily  melatonin 3 at bedtime PRN  metoprolol succinate ER 25 daily  mycophenolate mofetil 1000 two times a day  predniSONE   Tablet 5 daily  tacrolimus 3 two times a day    SOCIAL HISTORY:  Lives with wife no tobacco, drug, alcohol use, no pets, no recent travels, no sick contacts    FAMILY HISTORY:  Family history of adult polycystic kidney disease (Sibling)    Family history of polycystic kidney (Sibling)      REVIEW OF SYSTEMS  [  ] ROS unobtainable because:    [ x ] All other systems negative except as noted below:	    Constitutional:  [x ] fever [x ] chills  [ ] weight loss  [ ] weakness  Skin:  [ ] rash [ ] phlebitis	  Eyes: [ ] icterus [ ] pain  [ ] discharge	  ENMT: [ ] sore throat  [ ] thrush [ ] ulcers [ ] exudates  Respiratory: [ ] dyspnea [ ] hemoptysis [x ] cough [ ] sputum	  Cardiovascular:  [ ] chest pain [ ] palpitations [ ] edema	  Gastrointestinal:  [ ] nausea [ ] vomiting [ x] diarrhea [ ] constipation [ ] pain	  Genitourinary:  [ ] dysuria [ ] frequency [ ] hematuria [ ] discharge [ ] flank pain  [ ] incontinence  Musculoskeletal:  [ ] myalgias [ ] arthralgias [ ] arthritis  [ ] back pain  Neurological:  [ ] headache [ ] seizures  [ ] confusion/altered mental status  Psychiatric:  [ ] anxiety [ ] depression	  Hematology/Lymphatics:  [ ] lymphadenopathy  Endocrine:  [ ] adrenal [ ] thyroid  Allergic/Immunologic:	 [ ] transplant [ ] seasonal    Vital Signs Last 24 Hrs  T(F): 98.4 (05-10-24 @ 10:00), Max: 98.7 (05-10-24 @ 02:27)  Vital Signs Last 24 Hrs  HR: 79 (05-10-24 @ 10:00) (66 - 85)  BP: 135/64 (05-10-24 @ 10:00) (113/65 - 160/82)  RR: 16 (05-10-24 @ 10:00)  SpO2: 97% (05-10-24 @ 10:00) (94% - 98%)  Wt(kg): --    PHYSICAL EXAM:    General: Patient in NAD  HEENT: NCAT, EOMI, PERRL, no oral lesions  CV: S1+S2, no m/r/g appreciated   Lungs: No respiratory distress, CTAB  Abd: Soft, nontender, no guarding, no rebound tenderness, + bowel sounds   : No suprapubic tenderness  Neuro: Alert and oriented to time, place and person. Moves all extremities against gravity.  Ext: No cyanosis, no edema  Skin: No rash, no phlebitis                            14.8   5.24  )-----------( 114      ( 10 May 2024 00:25 )             45.4   05-10    128<L>  |  92<L>  |  11  ----------------------------<  115<H>  4.0   |  23  |  0.89    Ca    8.9      10 May 2024 00:25    TPro  7.0  /  Alb  4.1  /  TBili  0.7  /  DBili  x   /  AST  13  /  ALT  11  /  AlkPhos  43  05-10    Urinalysis Basic - ( 10 May 2024 06:23 )    Color: Yellow / Appearance: Clear / S.013 / pH: x  Gluc: x / Ketone: Negative mg/dL  / Bili: Negative / Urobili: 0.2 mg/dL   Blood: x / Protein: 30 mg/dL / Nitrite: Negative   Leuk Esterase: Negative / RBC: 5 /HPF / WBC 0 /HPF   Sq Epi: x / Non Sq Epi: 0 /HPF / Bacteria: Negative /HPF    MICROBIOLOGY:              RADIOLOGY:  imaging below personally reviewed and agree with findings    < from: CT Abdomen and Pelvis No Cont (05.10.24 @ 03:47) >    ACC: [id number] EXAM:  CT CHEST   ORDERED BY:  CL MO     ACC: [id number] EXAM:  CT ABDOMEN AND PELVIS   ORDERED BY:  CL MO     PROCEDURE DATE:  05/10/2024          INTERPRETATION:  CLINICAL INFORMATION: Diarrhea, fevers, decreased   left-sided breath sounds, history of polycystic kidney disease with renal   transplant.    COMPARISON: CT abdomen and pelvis 2017    CONTRAST/COMPLICATIONS:  IV Contrast: NONE  Oral Contrast: NONE  Complications: None reported at time of study completion    PROCEDURE:  CT of the Chest, Abdomen and Pelvis was performed.  Sagittal and coronal reformats were performed.    FINDINGS:  CHEST:  LUNGS AND LARGE AIRWAYS: Patent central airways. No parenchymal   consolidation.  PLEURA: No pleural effusion.  VESSELS: Left axillary vein stent. Atherosclerotic calcifications of the   aorta.  HEART: Heart size is normal. Status post CABG. No pericardial effusion.  MEDIASTINUM AND AYLA: No lymphadenopathy.  CHEST WALL AND LOWER NECK: Within normal limits.    ABDOMEN AND PELVIS:  LIVER: Subcentimeter hypodense foci scattered throughout the liver, too   small to characterize..  BILE DUCTS: Normal caliber.  GALLBLADDER: Within normal limits.  SPLEEN: Within normal limits.  PANCREAS: Within normal limits.  ADRENALS: Within normal limits.  KIDNEYS/URETERS: Bilateral native kidneys are enlarged with innumerable   cysts. Stable 3.4 cm hyperdense lesion in the interpolar right kidney   with peripheral calcification. Right lower quadrant transplant kidney is   without hydronephrosis. No nephrolithiasis.    BLADDER: Within normal limits.  REPRODUCTIVE ORGANS: Prostate within normal limits.    BOWEL: No bowel obstruction. Appendix is normal. Diverticulosis coli.  PERITONEUM: No ascites.  VESSELS: Atherosclerotic changes.  RETROPERITONEUM/LYMPH NODES: No lymphadenopathy.  ABDOMINAL WALL: Small fat-containing left inguinal hernia.  BONES: Mild degenerative changes. Median sternotomy.    IMPRESSION:  No acute pathology in the chest, abdomen, or pelvis.    Enlarged polycystic kidneys.       Patient is a 64y old  Male who presents with a chief complaint of Cough (10 May 2024 09:20)    HPI:    64M with MHx of CAD (s/p CABG in  and PCI in  and ) and polycystic kidney disease s/p renal transplant in 2018 here for few days of subjective fevers, chills, loose nonbloody stools which have resolved, and nonproductive cough that started on Tuesday. He was in his usual state of health until then. Patient states he discussed his sxs with his PCP Dr. Zeferino Arguello rec patient to go to the ED for eval. His diarrhea has mostly resolved and cough is better. Does not feel SOB, has some lethargy, ROS otherwise negative. He has continued to take batrim ppx as per advice from his provider. This is the first time he has been tested positive for COVID. He had 3 doses of vaccine, 2 years back.    ED; afebrile, hemodynamically stable. Saturations maintained on room air    RVP Positive for COVID19  UA 0 WBC,  5 RBC       prior hospital charts reviewed [ x ]  primary team notes reviewed [ x ]  other consultant notes reviewed [x  ]    PAST MEDICAL & SURGICAL HISTORY:  Asthma  last attack , never hospitalized or intubated      HTN - Hypertension      PCK (Polycystic Kidney Disease)      CAD (Coronary Artery Disease)      High cholesterol      Gastroesophageal reflux disease without esophagitis      Hemorrhoids      Obesity      Coronary artery disease involving coronary bypass graft  Bypass procedure in       HTN (hypertension)      Renal transplant recipient  In 2018      CABG (Coronary Artery Bypass Graft)  2007      Stented coronary artery  x2 cardiac stents in , one cardiac stent in       AV fistula      S/P hemorrhoidectomy  and rectal polypectomy -2/3/2017.      S/P CABG x 5      S/P coronary artery stent placement      AV fistula          Allergies  No Known Allergies    ANTIMICROBIALS (past 90 days)  MEDICATIONS  (STANDING):        trimethoprim   80 mG/sulfamethoxazole 400 mG 1 daily    MEDICATIONS  (STANDING):  acetaminophen     Tablet .. 650 every 6 hours PRN  aspirin enteric coated 81 daily  atorvastatin 10 at bedtime  clopidogrel Tablet 75 daily  melatonin 3 at bedtime PRN  metoprolol succinate ER 25 daily  mycophenolate mofetil 1000 two times a day  predniSONE   Tablet 5 daily  tacrolimus 3 two times a day    SOCIAL HISTORY:  Lives with wife no tobacco, drug, alcohol use, no pets, no recent travels, no sick contacts    FAMILY HISTORY:  Family history of adult polycystic kidney disease (Sibling)    Family history of polycystic kidney (Sibling)      REVIEW OF SYSTEMS  [  ] ROS unobtainable because:    [ x ] All other systems negative except as noted below:	    Constitutional:  [x ] fever [x ] chills  [ ] weight loss  [ ] weakness  Skin:  [ ] rash [ ] phlebitis	  Eyes: [ ] icterus [ ] pain  [ ] discharge	  ENMT: [ ] sore throat  [ ] thrush [ ] ulcers [ ] exudates  Respiratory: [ ] dyspnea [ ] hemoptysis [x ] cough [ ] sputum	  Cardiovascular:  [ ] chest pain [ ] palpitations [ ] edema	  Gastrointestinal:  [ ] nausea [ ] vomiting [ x] diarrhea [ ] constipation [ ] pain	  Genitourinary:  [ ] dysuria [ ] frequency [ ] hematuria [ ] discharge [ ] flank pain  [ ] incontinence  Musculoskeletal:  [ ] myalgias [ ] arthralgias [ ] arthritis  [ ] back pain  Neurological:  [ ] headache [ ] seizures  [ ] confusion/altered mental status  Psychiatric:  [ ] anxiety [ ] depression	  Hematology/Lymphatics:  [ ] lymphadenopathy  Endocrine:  [ ] adrenal [ ] thyroid  Allergic/Immunologic:	 [ ] transplant [ ] seasonal    Vital Signs Last 24 Hrs  T(F): 98.4 (05-10-24 @ 10:00), Max: 98.7 (05-10-24 @ 02:27)  Vital Signs Last 24 Hrs  HR: 79 (05-10-24 @ 10:00) (66 - 85)  BP: 135/64 (05-10-24 @ 10:00) (113/65 - 160/82)  RR: 16 (05-10-24 @ 10:00)  SpO2: 97% (05-10-24 @ 10:00) (94% - 98%)  Wt(kg): --    PHYSICAL EXAM:    General: Patient in NAD  HEENT: NCAT, EOMI, PERRL, no oral lesions  CV: S1+S2, no m/r/g appreciated   Lungs: No respiratory distress, CTAB  Abd: Soft, nontender, no guarding, no rebound tenderness, + bowel sounds   : No suprapubic tenderness  Neuro: Alert and oriented to time, place and person. Moves all extremities against gravity.  Ext: No cyanosis, no edema  Skin: No rash, no phlebitis                            14.8   5.24  )-----------( 114      ( 10 May 2024 00:25 )             45.4   05-10    128<L>  |  92<L>  |  11  ----------------------------<  115<H>  4.0   |  23  |  0.89    Ca    8.9      10 May 2024 00:25    TPro  7.0  /  Alb  4.1  /  TBili  0.7  /  DBili  x   /  AST  13  /  ALT  11  /  AlkPhos  43  05-10    Urinalysis Basic - ( 10 May 2024 06:23 )    Color: Yellow / Appearance: Clear / S.013 / pH: x  Gluc: x / Ketone: Negative mg/dL  / Bili: Negative / Urobili: 0.2 mg/dL   Blood: x / Protein: 30 mg/dL / Nitrite: Negative   Leuk Esterase: Negative / RBC: 5 /HPF / WBC 0 /HPF   Sq Epi: x / Non Sq Epi: 0 /HPF / Bacteria: Negative /HPF    MICROBIOLOGY:              RADIOLOGY:  imaging below personally reviewed and agree with findings    < from: CT Abdomen and Pelvis No Cont (05.10.24 @ 03:47) >    ACC: [id number] EXAM:  CT CHEST   ORDERED BY:  CL MO     ACC: [id number] EXAM:  CT ABDOMEN AND PELVIS   ORDERED BY:  CL MO     PROCEDURE DATE:  05/10/2024          INTERPRETATION:  CLINICAL INFORMATION: Diarrhea, fevers, decreased   left-sided breath sounds, history of polycystic kidney disease with renal   transplant.    COMPARISON: CT abdomen and pelvis 2017    CONTRAST/COMPLICATIONS:  IV Contrast: NONE  Oral Contrast: NONE  Complications: None reported at time of study completion    PROCEDURE:  CT of the Chest, Abdomen and Pelvis was performed.  Sagittal and coronal reformats were performed.    FINDINGS:  CHEST:  LUNGS AND LARGE AIRWAYS: Patent central airways. No parenchymal   consolidation.  PLEURA: No pleural effusion.  VESSELS: Left axillary vein stent. Atherosclerotic calcifications of the   aorta.  HEART: Heart size is normal. Status post CABG. No pericardial effusion.  MEDIASTINUM AND AYLA: No lymphadenopathy.  CHEST WALL AND LOWER NECK: Within normal limits.    ABDOMEN AND PELVIS:  LIVER: Subcentimeter hypodense foci scattered throughout the liver, too   small to characterize..  BILE DUCTS: Normal caliber.  GALLBLADDER: Within normal limits.  SPLEEN: Within normal limits.  PANCREAS: Within normal limits.  ADRENALS: Within normal limits.  KIDNEYS/URETERS: Bilateral native kidneys are enlarged with innumerable   cysts. Stable 3.4 cm hyperdense lesion in the interpolar right kidney   with peripheral calcification. Right lower quadrant transplant kidney is   without hydronephrosis. No nephrolithiasis.    BLADDER: Within normal limits.  REPRODUCTIVE ORGANS: Prostate within normal limits.    BOWEL: No bowel obstruction. Appendix is normal. Diverticulosis coli.  PERITONEUM: No ascites.  VESSELS: Atherosclerotic changes.  RETROPERITONEUM/LYMPH NODES: No lymphadenopathy.  ABDOMINAL WALL: Small fat-containing left inguinal hernia.  BONES: Mild degenerative changes. Median sternotomy.    IMPRESSION:  No acute pathology in the chest, abdomen, or pelvis.    Enlarged polycystic kidneys.       Patient is a 64y old  Male who presents with a chief complaint of Cough (10 May 2024 09:20)    HPI:    64M with MHx of CAD (s/p CABG in  and PCI in  and ) and polycystic kidney disease s/p renal transplant in 2018 here for few days of subjective fevers, chills, loose nonbloody stools which have resolved, and nonproductive cough that started on Tuesday. He was in his usual state of health until then. Patient states he discussed his sxs with his PCP Dr. Zeferino Arguello rec patient to go to the ED for eval. His diarrhea has mostly resolved and cough is better. Does not feel SOB, has some lethargy, ROS otherwise negative. He has continued to take batrim ppx as per advice from his provider. This is the first time he has been tested positive for COVID. He had 3 doses of vaccine, 2 years back.    ED; afebrile, hemodynamically stable. Saturations maintained on room air    RVP Positive for COVID19  UA 0 WBC,  5 RBC       prior hospital charts reviewed [ x ]  primary team notes reviewed [ x ]  other consultant notes reviewed [x  ]    PAST MEDICAL & SURGICAL HISTORY:  Asthma  last attack , never hospitalized or intubated      HTN - Hypertension      PCK (Polycystic Kidney Disease)      CAD (Coronary Artery Disease)      High cholesterol      Gastroesophageal reflux disease without esophagitis      Hemorrhoids      Obesity      Coronary artery disease involving coronary bypass graft  Bypass procedure in       HTN (hypertension)      Renal transplant recipient  In 2018      CABG (Coronary Artery Bypass Graft)  2007      Stented coronary artery  x2 cardiac stents in , one cardiac stent in       AV fistula      S/P hemorrhoidectomy  and rectal polypectomy -2/3/2017.      S/P CABG x 5      S/P coronary artery stent placement      AV fistula          Allergies  No Known Allergies    ANTIMICROBIALS (past 90 days)  MEDICATIONS  (STANDING):        trimethoprim   80 mG/sulfamethoxazole 400 mG 1 daily    MEDICATIONS  (STANDING):  acetaminophen     Tablet .. 650 every 6 hours PRN  aspirin enteric coated 81 daily  atorvastatin 10 at bedtime  clopidogrel Tablet 75 daily  melatonin 3 at bedtime PRN  metoprolol succinate ER 25 daily  mycophenolate mofetil 1000 two times a day  predniSONE   Tablet 5 daily  tacrolimus 3 two times a day    SOCIAL HISTORY:  Lives with wife no tobacco, drug, alcohol use, no pets, no recent travels, no sick contacts    FAMILY HISTORY:  Family history of adult polycystic kidney disease (Sibling)    Family history of polycystic kidney (Sibling)      REVIEW OF SYSTEMS  [  ] ROS unobtainable because:    [ x ] All other systems negative except as noted below:	    Constitutional:  [x ] fever [x ] chills  [ ] weight loss  [ ] weakness  Skin:  [ ] rash [ ] phlebitis	  Eyes: [ ] icterus [ ] pain  [ ] discharge	  ENMT: [ ] sore throat  [ ] thrush [ ] ulcers [ ] exudates  Respiratory: [ ] dyspnea [ ] hemoptysis [x ] cough [ ] sputum	  Cardiovascular:  [ ] chest pain [ ] palpitations [ ] edema	  Gastrointestinal:  [ ] nausea [ ] vomiting [ x] diarrhea [ ] constipation [ ] pain	  Genitourinary:  [ ] dysuria [ ] frequency [ ] hematuria [ ] discharge [ ] flank pain  [ ] incontinence  Musculoskeletal:  [ ] myalgias [ ] arthralgias [ ] arthritis  [ ] back pain  Neurological:  [ ] headache [ ] seizures  [ ] confusion/altered mental status  Psychiatric:  [ ] anxiety [ ] depression	  Hematology/Lymphatics:  [ ] lymphadenopathy  Endocrine:  [ ] adrenal [ ] thyroid  Allergic/Immunologic:	 [ ] transplant [ ] seasonal    Vital Signs Last 24 Hrs  T(F): 98.4 (05-10-24 @ 10:00), Max: 98.7 (05-10-24 @ 02:27)  Vital Signs Last 24 Hrs  HR: 79 (05-10-24 @ 10:00) (66 - 85)  BP: 135/64 (05-10-24 @ 10:00) (113/65 - 160/82)  RR: 16 (05-10-24 @ 10:00)  SpO2: 97% (05-10-24 @ 10:00) (94% - 98%)  Wt(kg): --    PHYSICAL EXAM:    General: Patient in NAD  HEENT: NCAT, EOMI, PERRL, no oral lesions  CV: S1+S2, no m/r/g appreciated   Lungs: No respiratory distress, CTAB  Abd: Soft, nontender, no guarding, no rebound tenderness, + bowel sounds   : No suprapubic tenderness  Neuro: Alert and oriented to time, place and person. Moves all extremities against gravity.  Ext: No cyanosis, no edema  Skin: No rash, no phlebitis                            14.8   5.24  )-----------( 114      ( 10 May 2024 00:25 )             45.4   05-10    128<L>  |  92<L>  |  11  ----------------------------<  115<H>  4.0   |  23  |  0.89    Ca    8.9      10 May 2024 00:25    TPro  7.0  /  Alb  4.1  /  TBili  0.7  /  DBili  x   /  AST  13  /  ALT  11  /  AlkPhos  43  05-10    Urinalysis Basic - ( 10 May 2024 06:23 )    Color: Yellow / Appearance: Clear / S.013 / pH: x  Gluc: x / Ketone: Negative mg/dL  / Bili: Negative / Urobili: 0.2 mg/dL   Blood: x / Protein: 30 mg/dL / Nitrite: Negative   Leuk Esterase: Negative / RBC: 5 /HPF / WBC 0 /HPF   Sq Epi: x / Non Sq Epi: 0 /HPF / Bacteria: Negative /HPF      RADIOLOGY:    imaging below personally reviewed and agree with findings    < from: CT Abdomen and Pelvis No Cont (05.10.24 @ 03:47) >    ACC: [id number] EXAM:  CT CHEST   ORDERED BY:  CL MO     ACC: [id number] EXAM:  CT ABDOMEN AND PELVIS   ORDERED BY:  CL MO     PROCEDURE DATE:  05/10/2024          INTERPRETATION:  CLINICAL INFORMATION: Diarrhea, fevers, decreased   left-sided breath sounds, history of polycystic kidney disease with renal   transplant.    COMPARISON: CT abdomen and pelvis 2017    CONTRAST/COMPLICATIONS:  IV Contrast: NONE  Oral Contrast: NONE  Complications: None reported at time of study completion    PROCEDURE:  CT of the Chest, Abdomen and Pelvis was performed.  Sagittal and coronal reformats were performed.    FINDINGS:  CHEST:  LUNGS AND LARGE AIRWAYS: Patent central airways. No parenchymal   consolidation.  PLEURA: No pleural effusion.  VESSELS: Left axillary vein stent. Atherosclerotic calcifications of the   aorta.  HEART: Heart size is normal. Status post CABG. No pericardial effusion.  MEDIASTINUM AND AYLA: No lymphadenopathy.  CHEST WALL AND LOWER NECK: Within normal limits.    ABDOMEN AND PELVIS:  LIVER: Subcentimeter hypodense foci scattered throughout the liver, too   small to characterize..  BILE DUCTS: Normal caliber.  GALLBLADDER: Within normal limits.  SPLEEN: Within normal limits.  PANCREAS: Within normal limits.  ADRENALS: Within normal limits.  KIDNEYS/URETERS: Bilateral native kidneys are enlarged with innumerable   cysts. Stable 3.4 cm hyperdense lesion in the interpolar right kidney   with peripheral calcification. Right lower quadrant transplant kidney is   without hydronephrosis. No nephrolithiasis.    BLADDER: Within normal limits.  REPRODUCTIVE ORGANS: Prostate within normal limits.    BOWEL: No bowel obstruction. Appendix is normal. Diverticulosis coli.  PERITONEUM: No ascites.  VESSELS: Atherosclerotic changes.  RETROPERITONEUM/LYMPH NODES: No lymphadenopathy.  ABDOMINAL WALL: Small fat-containing left inguinal hernia.  BONES: Mild degenerative changes. Median sternotomy.    IMPRESSION:  No acute pathology in the chest, abdomen, or pelvis.    Enlarged polycystic kidneys.

## 2024-05-10 NOTE — H&P ADULT - NSHPLABSRESULTS_GEN_ALL_CORE
Lab Results:  CBC  CBC Full  -  ( 10 May 2024 00:25 )  WBC Count : 5.24 K/uL  RBC Count : 5.22 M/uL  Hemoglobin : 14.8 g/dL  Hematocrit : 45.4 %  Platelet Count - Automated : 114 K/uL  Mean Cell Volume : 87.0 fl  Mean Cell Hemoglobin : 28.4 pg  Mean Cell Hemoglobin Concentration : 32.6 gm/dL  Auto Neutrophil # : 4.50 K/uL  Auto Lymphocyte # : 0.14 K/uL  Auto Monocyte # : 0.60 K/uL  Auto Eosinophil # : 0.00 K/uL  Auto Basophil # : 0.00 K/uL  Auto Neutrophil % : 85.8 %  Auto Lymphocyte % : 2.7 %  Auto Monocyte % : 11.5 %  Auto Eosinophil % : 0.0 %  Auto Basophil % : 0.0 %    .		Differential:	[] Automated		[] Manual  Chemistry                        14.8   5.24  )-----------( 114      ( 10 May 2024 00:25 )             45.4     05-10    128<L>  |  92<L>  |  11  ----------------------------<  115<H>  4.0   |  23  |  0.89    Ca    8.9      10 May 2024 00:25    TPro  7.0  /  Alb  4.1  /  TBili  0.7  /  DBili  x   /  AST  13  /  ALT  11  /  AlkPhos  43  05-10    LIVER FUNCTIONS - ( 10 May 2024 00:25 )  Alb: 4.1 g/dL / Pro: 7.0 g/dL / ALK PHOS: 43 U/L / ALT: 11 U/L / AST: 13 U/L / GGT: x           PT/INR - ( 10 May 2024 00:25 )   PT: 12.9 sec;   INR: 1.18 ratio         PTT - ( 10 May 2024 00:25 )  PTT:29.0 sec  Urinalysis Basic - ( 10 May 2024 06:23 )    Color: Yellow / Appearance: Clear / S.013 / pH: x  Gluc: x / Ketone: Negative mg/dL  / Bili: Negative / Urobili: 0.2 mg/dL   Blood: x / Protein: 30 mg/dL / Nitrite: Negative   Leuk Esterase: Negative / RBC: 5 /HPF / WBC 0 /HPF   Sq Epi: x / Non Sq Epi: 0 /HPF / Bacteria: Negative /HPF            MICROBIOLOGY/CULTURES:      RADIOLOGY RESULTS: reviewed

## 2024-05-10 NOTE — ED PROVIDER NOTE - PROGRESS NOTE DETAILS
Attending Prateek Laird:  Labs with elevated n/l ratio but otherwise labs nonactionable. Pending UA. SG spoke with dr. Cortes who wanted transplant ID involved, [email or web address] with Dr. Proctor who will follow patient. advised them patient has covid stable on room air. not a paxlovid candidate due to interaction with immunosuppressants.

## 2024-05-10 NOTE — ED ADULT NURSE NOTE - CHPI ED NUR SYMPTOMS NEG
No
no chills/no decreased eating/drinking/no dizziness/no fever/no nausea/no pain/no tingling/no vomiting/no weakness

## 2024-05-10 NOTE — H&P ADULT - HISTORY OF PRESENT ILLNESS
64M here for few days of subjective fevers, chills, loose nonbloody stools which have resolved, and nonproductive cough. Denies trauma, travel. hx of renal transplant on pred, mycophenolate, tacro, reports med compliance. Denies urinary sxs. Still tolerating PO. Patient states he discussed his sxs with his PCP Dr. Zeferino Arguello rec patient to go to the ED for eval. Patient states that his pcp will see him here.

## 2024-05-10 NOTE — CONSULT NOTE ADULT - SUBJECTIVE AND OBJECTIVE BOX
[facility] DIVISION OF KIDNEY DISEASES AND HYPERTENSION -- INITIAL CONSULT NOTE  --------------------------------------------------------------------------------    HPI: 63yo M w/ PMH of ESRD 2/2 PCKD now s/p DDRT from Hep C positive donor on  2018 course complicated by DGF requiring HD, CAD (s/p CABG and PCI) admitted for COVID infection. Transplant nephrology consulted for renal transplant/ immunosuppression management. Pt seen and examined this morning. Pt reports fevers, cough and loose BM for few days. Denies N/V, HA, CP, SOB, abd pain and LE swelling.     PAST HISTORY  --------------------------------------------------------------------------------  PAST MEDICAL & SURGICAL HISTORY:  Asthma  last attack 2007, never hospitalized or intubated  HTN - Hypertension  PCK (Polycystic Kidney Disease)  CAD (Coronary Artery Disease)  High cholesterol  Gastroesophageal reflux disease without esophagitis  Hemorrhoids  Obesity  Coronary artery disease involving coronary bypass graft  Bypass procedure in 2007  HTN (hypertension)  Renal transplant recipient  In 2018  CABG (Coronary Artery Bypass Graft)  2007  Stented coronary artery  x2 cardiac stents in 2016, one cardiac stent in 2015  AV fistula  S/P hemorrhoidectomy  and rectal polypectomy -2/3/2017.  S/P CABG x 5  S/P coronary artery stent placement  AV fistula    FAMILY HISTORY:  Family history of adult polycystic kidney disease (Sibling)    Family history of polycystic kidney (Sibling)    Social History:  neg (10 May 2024 09:20)    ALLERGIES & MEDICATIONS  --------------------------------------------------------------------------------  Allergies    No Known Allergies    Intolerances    Standing Inpatient Medications  aspirin enteric coated 81 milliGRAM(s) Oral daily  atorvastatin 10 milliGRAM(s) Oral at bedtime  clopidogrel Tablet 75 milliGRAM(s) Oral daily  metoprolol succinate ER 25 milliGRAM(s) Oral daily  mycophenolate mofetil 1000 milliGRAM(s) Oral two times a day  predniSONE   Tablet 5 milliGRAM(s) Oral daily  remdesivir  IVPB   IV Intermittent   tacrolimus 3 milliGRAM(s) Oral two times a day  trimethoprim   80 mG/sulfamethoxazole 400 mG 1 Tablet(s) Oral daily    PRN Inpatient Medications  acetaminophen     Tablet .. 650 milliGRAM(s) Oral every 6 hours PRN  melatonin 3 milliGRAM(s) Oral at bedtime PRN    REVIEW OF SYSTEMS  --------------------------------------------------------------------------------  Gen: +fevers/chills  Skin: No rashes  Head/Eyes/Ears/Mouth: No headache  Respiratory: No dyspnea, +cough  CV: No chest pain  GI: No abdominal pain, no n/v  : No increased frequency  MSK: No joint pain/swelling; no edema  Neuro: No dizziness/lightheadedness    All other systems were reviewed and are negative, except as noted.    VITALS/PHYSICAL EXAM  --------------------------------------------------------------------------------  T(C): 36.7 (05-10-24 @ 11:32), Max: 37.1 (05-10-24 @ 02:27)  HR: 88 (05-10-24 @ 11:32) (66 - 88)  BP: 142/86 (05-10-24 @ 11:32) (113/65 - 160/82)  RR: 18 (05-10-24 @ 11:32) (16 - 18)  SpO2: 98% (05-10-24 @ 11:32) (94% - 98%)  Wt(kg): --  Height (cm): 165.1 (05-09-24 @ 20:42)  Weight (kg): 82 (05-09-24 @ 20:42)  BMI (kg/m2): 30.1 (05-09-24 @ 20:42)  BSA (m2): 1.89 (05-09-24 @ 20:42)    Physical Exam:  	Gen: Not in distress, well-appearing  	HEENT: PERRL, Anicteric   	Pulm: normal respiratory effort, lungs clear to auscultation bilaterally   	CV: regular rate and rhythm, S1 and S2 normal, no murmur   	Abd: +BS, soft, NTND                   Transplant non tender, no bruit  	: No suprapubic tenderness              Extremities: No edema. Distal pulses 2+ bilaterally               Skin: warm and dry  	Neuro: Alert and oriented to person, place and time. Normal speech. Normal affect.     LABS/STUDIES  --------------------------------------------------------------------------------              14.8   5.24  >-----------<  114      [05-10-24 @ 00:25]              45.4     128  |  92  |  11  ----------------------------<  115      [05-10-24 @ 00:25]  4.0   |  23  |  0.89        Ca     8.9     [05-10-24 @ 00:25]    TPro  7.0  /  Alb  4.1  /  TBili  0.7  /  DBili  x   /  AST  13  /  ALT  11  /  AlkPhos  43  [05-10-24 @ 00:25]    PT/INR: PT 12.9 , INR 1.18       [05-10-24 @ 00:25]  PTT: 29.0       [05-10-24 @ 00:25]    Creatinine Trend:  SCr 0.89 [05-10 @ 00:25]    Urinalysis - [05-10-24 @ 06:23]      Color Yellow / Appearance Clear / SG 1.013 / pH 6.5      Gluc Negative / Ketone Negative  / Bili Negative / Urobili 0.2       Blood Small / Protein 30 / Leuk Est Negative / Nitrite Negative      RBC 5 / WBC 0 / Hyaline  / Gran  / Sq Epi  / Non Sq Epi 0 / Bacteria Negative  HBsAg Nonreact      [03-16-22 @ 10:55]  HCV 0.11, Nonreact      [03-16-22 @ 10:55]  HIV Nonreact      [03-16-22 @ 10:52]    Tacrolimus  Cyclosporine  Sirolimus  Mycophenolate  BK PCR  CMV PCR  Parvo PCR  EBV PCR

## 2024-05-10 NOTE — PATIENT PROFILE ADULT - FALL HARM RISK - UNIVERSAL INTERVENTIONS
Bed in lowest position, wheels locked, appropriate side rails in place/Call bell, personal items and telephone in reach/Instruct patient to call for assistance before getting out of bed or chair/Non-slip footwear when patient is out of bed/[place] to call system/Physically safe environment - no spills, clutter or unnecessary equipment/Purposeful Proactive Rounding/Room/bathroom lighting operational, light cord in reach

## 2024-05-10 NOTE — CONSULT NOTE ADULT - ASSESSMENT
h/o renal transplant  covid  diarrhea    suspect diarrhea related to covid  check GI pcr  check c diff pcr  diet as tolerated  ID involved  will follow

## 2024-05-10 NOTE — ED ADULT NURSE NOTE - OBJECTIVE STATEMENT
63 yo male A&OX4 PMH kidney transplant 2018 L fistula, HTN, HLD, asthma presenting to the ED from home complaining of cough and generalized pruritus. Pt endorses x3 days of generalized pruritus, cough, and diarrhea. Pt states diarrhea has resolved, states he has not had a BM since this morning. Denies allergies, denies recent allergen exposure. Pt denies fever/chills, SOB, chest pain, abd pain, n/v, urinary symptoms. 20G placed in R hand. Call bell within reach, side rails up, bed in lowest position.

## 2024-05-10 NOTE — CONSULT NOTE ADULT - ATTENDING COMMENTS
Renal transplant recipient presenting with COVID-19  CT chest without any worrisome infiltrates  Patient is also without hypoxia or shortness of breath  Given renal transplant status would favor 3 days of therapy with remdesivir  No indication for dexamethasone as patient is not hypoxic    Dr. Sharon Zarate will be covering the patient starting from tomorrow. Please reach out to her for further questions and follow up.     Curry Proctor M.D.  [facility] Division of Infectious Disease  8AM-5PM Monday - Friday: Available on [employer] Teams  After Hours and Holidays (or if no response on [employer] Teams): Please contact the Infectious Diseases Office at [phone]     The above assessment and plan were discussed with medicine NP

## 2024-05-10 NOTE — CONSULT NOTE ADULT - ASSESSMENT
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 Transplant nephrology consulted for renal transplant/ immunosuppression management.     1.  s/p DDRT 2018: Allograft function stable. CT without hydronephrosis. UA w/ 30 protein, small bld w/ 5 RBC, otherise bland.    2. IS:   -Continue Tacro 3mg BID, monitor daily trough, collected 30min prior to AM dose. Goal 4-6  -Continue Prednisone 5mg qd  -Hold cellcept iso of COVID infection   -PPx: Bactrim     3. COVID infxn   -Management as per transplant ID     4. Asymptomatic euvolemic Hyponatremia   On admission SNa 128. Pt euvolemic and asymptomatic.   -rpt SNa later this evening.  -obtain Urine sodium and osm.   -will continue to monitor.

## 2024-05-11 LAB
ALBUMIN SERPL ELPH-MCNC: 3.7 G/DL — SIGNIFICANT CHANGE UP (ref 3.3–5)
ALP SERPL-CCNC: 40 U/L — SIGNIFICANT CHANGE UP (ref 40–120)
ALT FLD-CCNC: 9 U/L — LOW (ref 10–45)
ANION GAP SERPL CALC-SCNC: 15 MMOL/L — SIGNIFICANT CHANGE UP (ref 5–17)
AST SERPL-CCNC: 14 U/L — SIGNIFICANT CHANGE UP (ref 10–40)
BASOPHILS # BLD AUTO: 0.01 K/UL — SIGNIFICANT CHANGE UP (ref 0–0.2)
BASOPHILS NFR BLD AUTO: 0.2 % — SIGNIFICANT CHANGE UP (ref 0–2)
BILIRUB DIRECT SERPL-MCNC: <0.1 MG/DL — SIGNIFICANT CHANGE UP (ref 0–0.3)
BILIRUB INDIRECT FLD-MCNC: >0.3 MG/DL — SIGNIFICANT CHANGE UP (ref 0.2–1)
BILIRUB SERPL-MCNC: 0.4 MG/DL — SIGNIFICANT CHANGE UP (ref 0.2–1.2)
BUN SERPL-MCNC: 12 MG/DL — SIGNIFICANT CHANGE UP (ref 7–23)
CALCIUM SERPL-MCNC: 8.7 MG/DL — SIGNIFICANT CHANGE UP (ref 8.4–10.5)
CHLORIDE SERPL-SCNC: 93 MMOL/L — LOW (ref 96–108)
CO2 SERPL-SCNC: 20 MMOL/L — LOW (ref 22–31)
CREAT SERPL-MCNC: 0.96 MG/DL — SIGNIFICANT CHANGE UP (ref 0.5–1.3)
CREAT SERPL-MCNC: 0.98 MG/DL — SIGNIFICANT CHANGE UP (ref 0.5–1.3)
CULTURE RESULTS: SIGNIFICANT CHANGE UP
EGFR: 85 ML/MIN/1.73M2 — SIGNIFICANT CHANGE UP
EGFR: 87 ML/MIN/1.73M2 — SIGNIFICANT CHANGE UP
EOSINOPHIL # BLD AUTO: 0.09 K/UL — SIGNIFICANT CHANGE UP (ref 0–0.5)
EOSINOPHIL NFR BLD AUTO: 2.2 % — SIGNIFICANT CHANGE UP (ref 0–6)
GI PCR PANEL: SIGNIFICANT CHANGE UP
GLUCOSE SERPL-MCNC: 104 MG/DL — HIGH (ref 70–99)
HCT VFR BLD CALC: 42.3 % — SIGNIFICANT CHANGE UP (ref 39–50)
HGB BLD-MCNC: 13.8 G/DL — SIGNIFICANT CHANGE UP (ref 13–17)
IMM GRANULOCYTES NFR BLD AUTO: 0.5 % — SIGNIFICANT CHANGE UP (ref 0–0.9)
INR BLD: 1.1 RATIO — SIGNIFICANT CHANGE UP (ref 0.85–1.18)
LYMPHOCYTES # BLD AUTO: 0.58 K/UL — LOW (ref 1–3.3)
LYMPHOCYTES # BLD AUTO: 14 % — SIGNIFICANT CHANGE UP (ref 13–44)
MCHC RBC-ENTMCNC: 28.4 PG — SIGNIFICANT CHANGE UP (ref 27–34)
MCHC RBC-ENTMCNC: 32.6 GM/DL — SIGNIFICANT CHANGE UP (ref 32–36)
MCV RBC AUTO: 87 FL — SIGNIFICANT CHANGE UP (ref 80–100)
MONOCYTES # BLD AUTO: 0.62 K/UL — SIGNIFICANT CHANGE UP (ref 0–0.9)
MONOCYTES NFR BLD AUTO: 15 % — HIGH (ref 2–14)
NEUTROPHILS # BLD AUTO: 2.82 K/UL — SIGNIFICANT CHANGE UP (ref 1.8–7.4)
NEUTROPHILS NFR BLD AUTO: 68.1 % — SIGNIFICANT CHANGE UP (ref 43–77)
NRBC # BLD: 0 /100 WBCS — SIGNIFICANT CHANGE UP (ref 0–0)
OSMOLALITY UR: 196 MOS/KG — LOW (ref 300–900)
PLATELET # BLD AUTO: 115 K/UL — LOW (ref 150–400)
POTASSIUM SERPL-MCNC: 4.1 MMOL/L — SIGNIFICANT CHANGE UP (ref 3.5–5.3)
POTASSIUM SERPL-SCNC: 4.1 MMOL/L — SIGNIFICANT CHANGE UP (ref 3.5–5.3)
PROT SERPL-MCNC: 6.8 G/DL — SIGNIFICANT CHANGE UP (ref 6–8.3)
PROTHROM AB SERPL-ACNC: 11.5 SEC — SIGNIFICANT CHANGE UP (ref 9.5–13)
RBC # BLD: 4.86 M/UL — SIGNIFICANT CHANGE UP (ref 4.2–5.8)
RBC # FLD: 12.8 % — SIGNIFICANT CHANGE UP (ref 10.3–14.5)
SODIUM SERPL-SCNC: 128 MMOL/L — LOW (ref 135–145)
SPECIMEN SOURCE: SIGNIFICANT CHANGE UP
TACROLIMUS SERPL-MCNC: 4.6 NG/ML — SIGNIFICANT CHANGE UP
TACROLIMUS SERPL-MCNC: 5.5 NG/ML — SIGNIFICANT CHANGE UP
WBC # BLD: 4.14 K/UL — SIGNIFICANT CHANGE UP (ref 3.8–10.5)
WBC # FLD AUTO: 4.14 K/UL — SIGNIFICANT CHANGE UP (ref 3.8–10.5)

## 2024-05-11 RX ADMIN — Medication 81 MILLIGRAM(S): at 11:05

## 2024-05-11 RX ADMIN — HEPARIN SODIUM 5000 UNIT(S): 5000 INJECTION INTRAVENOUS; SUBCUTANEOUS at 13:51

## 2024-05-11 RX ADMIN — TACROLIMUS 3 MILLIGRAM(S): 5 CAPSULE ORAL at 20:02

## 2024-05-11 RX ADMIN — Medication 5 MILLIGRAM(S): at 05:10

## 2024-05-11 RX ADMIN — Medication 25 MILLIGRAM(S): at 05:10

## 2024-05-11 RX ADMIN — Medication 1 TABLET(S): at 11:05

## 2024-05-11 RX ADMIN — REMDESIVIR 200 MILLIGRAM(S): 5 INJECTION INTRAVENOUS at 18:00

## 2024-05-11 RX ADMIN — TACROLIMUS 3 MILLIGRAM(S): 5 CAPSULE ORAL at 08:23

## 2024-05-11 RX ADMIN — CLOPIDOGREL BISULFATE 75 MILLIGRAM(S): 75 TABLET, FILM COATED ORAL at 11:05

## 2024-05-11 RX ADMIN — MYCOPHENOLATE MOFETIL 1000 MILLIGRAM(S): 250 CAPSULE ORAL at 08:23

## 2024-05-11 RX ADMIN — ATORVASTATIN CALCIUM 10 MILLIGRAM(S): 80 TABLET, FILM COATED ORAL at 21:27

## 2024-05-11 RX ADMIN — HEPARIN SODIUM 5000 UNIT(S): 5000 INJECTION INTRAVENOUS; SUBCUTANEOUS at 05:10

## 2024-05-11 RX ADMIN — MYCOPHENOLATE MOFETIL 1000 MILLIGRAM(S): 250 CAPSULE ORAL at 20:02

## 2024-05-11 NOTE — PROGRESS NOTE ADULT - SUBJECTIVE AND OBJECTIVE BOX
Patient is a 64y old  Male who presents with a chief complaint of Cough (10 May 2024 14:58)    Date of servie : 05-11-24 @ 11:13  INTERVAL HPI/OVERNIGHT EVENTS:  T(C): 36.7 (05-11-24 @ 04:08), Max: 36.9 (05-10-24 @ 20:51)  HR: 66 (05-11-24 @ 04:08) (66 - 88)  BP: 122/70 (05-11-24 @ 04:08) (122/70 - 142/86)  RR: 18 (05-11-24 @ 04:08) (18 - 18)  SpO2: 95% (05-11-24 @ 04:08) (95% - 98%)  Wt(kg): --  I&O's Summary    10 May 2024 07:01  -  11 May 2024 07:00  --------------------------------------------------------  IN: 900 mL / OUT: 1100 mL / NET: -200 mL        LABS:                        13.8   4.14  )-----------( 115      ( 11 May 2024 08:42 )             42.3     05-11    128<L>  |  93<L>  |  12  ----------------------------<  104<H>  4.1   |  20<L>  |  0.96    Ca    8.7      11 May 2024 07:34    TPro  6.8  /  Alb  3.7  /  TBili  0.4  /  DBili  <0.1  /  AST  14  /  ALT  9<L>  /  AlkPhos  40  05-11    PT/INR - ( 11 May 2024 07:31 )   PT: 11.5 sec;   INR: 1.10 ratio         PTT - ( 10 May 2024 00:25 )  PTT:29.0 sec  Urinalysis Basic - ( 11 May 2024 07:34 )    Color: x / Appearance: x / SG: x / pH: x  Gluc: 104 mg/dL / Ketone: x  / Bili: x / Urobili: x   Blood: x / Protein: x / Nitrite: x   Leuk Esterase: x / RBC: x / WBC x   Sq Epi: x / Non Sq Epi: x / Bacteria: x      CAPILLARY BLOOD GLUCOSE            Urinalysis Basic - ( 11 May 2024 07:34 )    Color: x / Appearance: x / SG: x / pH: x  Gluc: 104 mg/dL / Ketone: x  / Bili: x / Urobili: x   Blood: x / Protein: x / Nitrite: x   Leuk Esterase: x / RBC: x / WBC x   Sq Epi: x / Non Sq Epi: x / Bacteria: x        MEDICATIONS  (STANDING):  aspirin enteric coated 81 milliGRAM(s) Oral daily  atorvastatin 10 milliGRAM(s) Oral at bedtime  clopidogrel Tablet 75 milliGRAM(s) Oral daily  heparin   Injectable 5000 Unit(s) SubCutaneous every 8 hours  metoprolol succinate ER 25 milliGRAM(s) Oral daily  mycophenolate mofetil 1000 milliGRAM(s) Oral two times a day  predniSONE   Tablet 5 milliGRAM(s) Oral daily  remdesivir  IVPB   IV Intermittent   remdesivir  IVPB 100 milliGRAM(s) IV Intermittent every 24 hours  tacrolimus 3 milliGRAM(s) Oral two times a day  trimethoprim   80 mG/sulfamethoxazole 400 mG 1 Tablet(s) Oral daily    MEDICATIONS  (PRN):  acetaminophen     Tablet .. 650 milliGRAM(s) Oral every 6 hours PRN Temp greater or equal to 38C (100.4F), Mild Pain (1 - 3)  melatonin 3 milliGRAM(s) Oral at bedtime PRN Insomnia          PHYSICAL EXAM:  GENERAL: NAD, well-groomed, well-developed  HEAD:  Atraumatic, Normocephalic  CHEST/LUNG: Clear to percussion bilaterally; No rales, rhonchi, wheezing, or rubs  HEART: Regular rate and rhythm; No murmurs, rubs, or gallops  ABDOMEN: Soft, Nontender, Nondistended; Bowel sounds present  EXTREMITIES:  2+ Peripheral Pulses, No clubbing, cyanosis, or edema  LYMPH: No lymphadenopathy noted  SKIN: No rashes or lesions    Care Discussed with Consultants/Other Providers [ ] YES  [ ] NO

## 2024-05-11 NOTE — PROGRESS NOTE ADULT - ASSESSMENT
64M here for few days of subjective fevers, chills, loose nonbloody stools which have resolved, and nonproductive cough. Denies trauma, travel. hx of renal transplant on pred, mycophenolate, tacro, reports med compliance. Denies urinary sxs. Still tolerating PO. Patient states he discussed his sxs with his PCP Dr. Zeferino Arguello rec patient to go to the ED for eval. Patient states that his pcp will see him here.     1 Fever sec to COVID19  - cw remdesivir  - transplant ID following  - will monitor     2 Diarrhea  - likely sec to COVID   - check GI PCR  - will monitor     3 Renal transplant recipient  - renal fu   - cw immunosuppressant   - monitor cr  - check tacro level     4 CAD  - cw asa, plavix  - outpt cards fu

## 2024-05-12 ENCOUNTER — TRANSCRIPTION ENCOUNTER (OUTPATIENT)
Age: 64
End: 2024-05-12

## 2024-05-12 VITALS
RESPIRATION RATE: 18 BRPM | SYSTOLIC BLOOD PRESSURE: 132 MMHG | DIASTOLIC BLOOD PRESSURE: 67 MMHG | HEART RATE: 55 BPM | TEMPERATURE: 98 F | OXYGEN SATURATION: 94 %

## 2024-05-12 LAB
ALBUMIN SERPL ELPH-MCNC: 3.9 G/DL — SIGNIFICANT CHANGE UP (ref 3.3–5)
ALBUMIN SERPL ELPH-MCNC: 3.9 G/DL — SIGNIFICANT CHANGE UP (ref 3.3–5)
ALP SERPL-CCNC: 39 U/L — LOW (ref 40–120)
ALP SERPL-CCNC: 39 U/L — LOW (ref 40–120)
ALT FLD-CCNC: 10 U/L — SIGNIFICANT CHANGE UP (ref 10–45)
ALT FLD-CCNC: 12 U/L — SIGNIFICANT CHANGE UP (ref 10–45)
ANION GAP SERPL CALC-SCNC: 12 MMOL/L — SIGNIFICANT CHANGE UP (ref 5–17)
AST SERPL-CCNC: 13 U/L — SIGNIFICANT CHANGE UP (ref 10–40)
AST SERPL-CCNC: 13 U/L — SIGNIFICANT CHANGE UP (ref 10–40)
BASOPHILS # BLD AUTO: 0.01 K/UL — SIGNIFICANT CHANGE UP (ref 0–0.2)
BASOPHILS NFR BLD AUTO: 0.3 % — SIGNIFICANT CHANGE UP (ref 0–2)
BILIRUB DIRECT SERPL-MCNC: 0.1 MG/DL — SIGNIFICANT CHANGE UP (ref 0–0.3)
BILIRUB INDIRECT FLD-MCNC: 0.3 MG/DL — SIGNIFICANT CHANGE UP (ref 0.2–1)
BILIRUB SERPL-MCNC: 0.4 MG/DL — SIGNIFICANT CHANGE UP (ref 0.2–1.2)
BILIRUB SERPL-MCNC: 0.4 MG/DL — SIGNIFICANT CHANGE UP (ref 0.2–1.2)
BUN SERPL-MCNC: 18 MG/DL — SIGNIFICANT CHANGE UP (ref 7–23)
CALCIUM SERPL-MCNC: 8.9 MG/DL — SIGNIFICANT CHANGE UP (ref 8.4–10.5)
CHLORIDE SERPL-SCNC: 94 MMOL/L — LOW (ref 96–108)
CO2 SERPL-SCNC: 24 MMOL/L — SIGNIFICANT CHANGE UP (ref 22–31)
CREAT SERPL-MCNC: 0.91 MG/DL — SIGNIFICANT CHANGE UP (ref 0.5–1.3)
CREAT SERPL-MCNC: 0.91 MG/DL — SIGNIFICANT CHANGE UP (ref 0.5–1.3)
EGFR: 93 ML/MIN/1.73M2 — SIGNIFICANT CHANGE UP
EGFR: 93 ML/MIN/1.73M2 — SIGNIFICANT CHANGE UP
EOSINOPHIL # BLD AUTO: 0.14 K/UL — SIGNIFICANT CHANGE UP (ref 0–0.5)
EOSINOPHIL NFR BLD AUTO: 3.8 % — SIGNIFICANT CHANGE UP (ref 0–6)
GLUCOSE BLDC GLUCOMTR-MCNC: 127 MG/DL — HIGH (ref 70–99)
GLUCOSE SERPL-MCNC: 135 MG/DL — HIGH (ref 70–99)
HCT VFR BLD CALC: 44 % — SIGNIFICANT CHANGE UP (ref 39–50)
HGB BLD-MCNC: 14.3 G/DL — SIGNIFICANT CHANGE UP (ref 13–17)
IMM GRANULOCYTES NFR BLD AUTO: 0.3 % — SIGNIFICANT CHANGE UP (ref 0–0.9)
INR BLD: 1.17 RATIO — SIGNIFICANT CHANGE UP (ref 0.85–1.18)
LYMPHOCYTES # BLD AUTO: 0.64 K/UL — LOW (ref 1–3.3)
LYMPHOCYTES # BLD AUTO: 17.3 % — SIGNIFICANT CHANGE UP (ref 13–44)
MAGNESIUM SERPL-MCNC: 1.4 MG/DL — LOW (ref 1.6–2.6)
MCHC RBC-ENTMCNC: 28.2 PG — SIGNIFICANT CHANGE UP (ref 27–34)
MCHC RBC-ENTMCNC: 32.5 GM/DL — SIGNIFICANT CHANGE UP (ref 32–36)
MCV RBC AUTO: 86.8 FL — SIGNIFICANT CHANGE UP (ref 80–100)
MONOCYTES # BLD AUTO: 0.42 K/UL — SIGNIFICANT CHANGE UP (ref 0–0.9)
MONOCYTES NFR BLD AUTO: 11.3 % — SIGNIFICANT CHANGE UP (ref 2–14)
NEUTROPHILS # BLD AUTO: 2.49 K/UL — SIGNIFICANT CHANGE UP (ref 1.8–7.4)
NEUTROPHILS NFR BLD AUTO: 67 % — SIGNIFICANT CHANGE UP (ref 43–77)
NRBC # BLD: 0 /100 WBCS — SIGNIFICANT CHANGE UP (ref 0–0)
PHOSPHATE SERPL-MCNC: 3.7 MG/DL — SIGNIFICANT CHANGE UP (ref 2.5–4.5)
PLATELET # BLD AUTO: 126 K/UL — LOW (ref 150–400)
POTASSIUM SERPL-MCNC: 4.3 MMOL/L — SIGNIFICANT CHANGE UP (ref 3.5–5.3)
POTASSIUM SERPL-SCNC: 4.3 MMOL/L — SIGNIFICANT CHANGE UP (ref 3.5–5.3)
PROT SERPL-MCNC: 6.9 G/DL — SIGNIFICANT CHANGE UP (ref 6–8.3)
PROT SERPL-MCNC: 6.9 G/DL — SIGNIFICANT CHANGE UP (ref 6–8.3)
PROTHROM AB SERPL-ACNC: 12.2 SEC — SIGNIFICANT CHANGE UP (ref 9.5–13)
RBC # BLD: 5.07 M/UL — SIGNIFICANT CHANGE UP (ref 4.2–5.8)
RBC # FLD: 12.8 % — SIGNIFICANT CHANGE UP (ref 10.3–14.5)
SODIUM SERPL-SCNC: 130 MMOL/L — LOW (ref 135–145)
TACROLIMUS SERPL-MCNC: 7.6 NG/ML — SIGNIFICANT CHANGE UP
WBC # BLD: 3.71 K/UL — LOW (ref 3.8–10.5)
WBC # FLD AUTO: 3.71 K/UL — LOW (ref 3.8–10.5)

## 2024-05-12 PROCEDURE — 83605 ASSAY OF LACTIC ACID: CPT

## 2024-05-12 PROCEDURE — 80076 HEPATIC FUNCTION PANEL: CPT

## 2024-05-12 PROCEDURE — 84145 PROCALCITONIN (PCT): CPT

## 2024-05-12 PROCEDURE — 81001 URINALYSIS AUTO W/SCOPE: CPT

## 2024-05-12 PROCEDURE — 83935 ASSAY OF URINE OSMOLALITY: CPT

## 2024-05-12 PROCEDURE — 99285 EMERGENCY DEPT VISIT HI MDM: CPT | Mod: 25

## 2024-05-12 PROCEDURE — 80053 COMPREHEN METABOLIC PANEL: CPT

## 2024-05-12 PROCEDURE — 85610 PROTHROMBIN TIME: CPT

## 2024-05-12 PROCEDURE — 82435 ASSAY OF BLOOD CHLORIDE: CPT

## 2024-05-12 PROCEDURE — 84100 ASSAY OF PHOSPHORUS: CPT

## 2024-05-12 PROCEDURE — 71045 X-RAY EXAM CHEST 1 VIEW: CPT

## 2024-05-12 PROCEDURE — 82565 ASSAY OF CREATININE: CPT

## 2024-05-12 PROCEDURE — 82947 ASSAY GLUCOSE BLOOD QUANT: CPT

## 2024-05-12 PROCEDURE — 82962 GLUCOSE BLOOD TEST: CPT

## 2024-05-12 PROCEDURE — 80197 ASSAY OF TACROLIMUS: CPT

## 2024-05-12 PROCEDURE — 84132 ASSAY OF SERUM POTASSIUM: CPT

## 2024-05-12 PROCEDURE — 87040 BLOOD CULTURE FOR BACTERIA: CPT

## 2024-05-12 PROCEDURE — 82330 ASSAY OF CALCIUM: CPT

## 2024-05-12 PROCEDURE — 83735 ASSAY OF MAGNESIUM: CPT

## 2024-05-12 PROCEDURE — 87086 URINE CULTURE/COLONY COUNT: CPT

## 2024-05-12 PROCEDURE — 71250 CT THORAX DX C-: CPT | Mod: MC

## 2024-05-12 PROCEDURE — 85730 THROMBOPLASTIN TIME PARTIAL: CPT

## 2024-05-12 PROCEDURE — 87637 SARSCOV2&INF A&B&RSV AMP PRB: CPT

## 2024-05-12 PROCEDURE — 80048 BASIC METABOLIC PNL TOTAL CA: CPT

## 2024-05-12 PROCEDURE — 84295 ASSAY OF SERUM SODIUM: CPT

## 2024-05-12 PROCEDURE — 85027 COMPLETE CBC AUTOMATED: CPT

## 2024-05-12 PROCEDURE — 87507 IADNA-DNA/RNA PROBE TQ 12-25: CPT

## 2024-05-12 PROCEDURE — 85025 COMPLETE CBC W/AUTO DIFF WBC: CPT

## 2024-05-12 PROCEDURE — 85014 HEMATOCRIT: CPT

## 2024-05-12 PROCEDURE — 85018 HEMOGLOBIN: CPT

## 2024-05-12 PROCEDURE — 82803 BLOOD GASES ANY COMBINATION: CPT

## 2024-05-12 PROCEDURE — 74176 CT ABD & PELVIS W/O CONTRAST: CPT | Mod: MC

## 2024-05-12 RX ADMIN — Medication 5 MILLIGRAM(S): at 05:03

## 2024-05-12 RX ADMIN — TACROLIMUS 3 MILLIGRAM(S): 5 CAPSULE ORAL at 08:23

## 2024-05-12 RX ADMIN — MYCOPHENOLATE MOFETIL 1000 MILLIGRAM(S): 250 CAPSULE ORAL at 08:23

## 2024-05-12 RX ADMIN — Medication 81 MILLIGRAM(S): at 12:17

## 2024-05-12 RX ADMIN — Medication 25 MILLIGRAM(S): at 05:03

## 2024-05-12 RX ADMIN — HEPARIN SODIUM 5000 UNIT(S): 5000 INJECTION INTRAVENOUS; SUBCUTANEOUS at 14:29

## 2024-05-12 RX ADMIN — Medication 1 TABLET(S): at 12:18

## 2024-05-12 RX ADMIN — REMDESIVIR 200 MILLIGRAM(S): 5 INJECTION INTRAVENOUS at 12:12

## 2024-05-12 RX ADMIN — HEPARIN SODIUM 5000 UNIT(S): 5000 INJECTION INTRAVENOUS; SUBCUTANEOUS at 05:02

## 2024-05-12 RX ADMIN — CLOPIDOGREL BISULFATE 75 MILLIGRAM(S): 75 TABLET, FILM COATED ORAL at 12:16

## 2024-05-12 NOTE — DISCHARGE NOTE NURSING/CASE MANAGEMENT/SOCIAL WORK - NSDCVIVACCINE_GEN_ALL_CORE_FT
influenza, injectable, quadrivalent, preservative free; 24-Sep-2019 17:59; Deb Gilliland (MELANY); Talko; [id number] (Exp. Date: 30-Jun-2020); IntraMuscular; Deltoid Right.; 0.5 milliLiter(s); VIS (VIS Published: 15-Aug-2019, VIS Presented: 24-Sep-2019);

## 2024-05-12 NOTE — DISCHARGE NOTE PROVIDER - NSDCMRMEDTOKEN_GEN_ALL_CORE_FT
aspirin 81 mg oral delayed release tablet: 1 tab(s) orally once a day  CellCept 500 mg oral tablet: 1 tab(s) orally 2 times a day  cholecalciferol 1250 mcg (50,000 intl units) oral capsule: 1 cap(s) orally once a week  clopidogrel 75 mg oral tablet: 1 tab(s) orally once a day  Lipitor 20 mg oral tablet: 1 tab(s) orally once a day  Mag-Ox 400 oral tablet: 1 tab(s) orally 2 times a day  melatonin 3 mg oral tablet: 1 tab(s) orally once a day (at bedtime), As needed, Insomnia  metFORMIN 500 mg oral tablet: 1 tab(s) orally 3 times a day  metoprolol succinate 25 mg oral tablet, extended release: 1 tab(s) orally once a day  Norvasc 2.5 mg oral tablet: 1 tab(s) orally once a day  Ozempic 2 mg/1.5 mL (0.25 mg or 0.5 mg dose) subcutaneous solution: 0.25 milligram(s) subcutaneously once a week  predniSONE 5 mg oral tablet: 1 tab(s) orally once a day  sulfamethoxazole-trimethoprim 400 mg-80 mg oral tablet: 1 tab(s) orally once a day  tacrolimus 1 mg oral capsule: 3 cap(s) orally 2 times a day

## 2024-05-12 NOTE — DISCHARGE NOTE PROVIDER - HOSPITAL COURSE
HPI:  64M here for few days of subjective fevers, chills, loose nonbloody stools which have resolved, and nonproductive cough. Denies trauma, travel. hx of renal transplant on pred, mycophenolate, tacro, reports med compliance. Denies urinary sxs. Still tolerating PO. Patient states he discussed his sxs with his PCP Dr. Zeferino Arguello rec patient to go to the ED for eval. Patient states that his pcp will see him here.  (10 May 2024 09:20)    Hospital Course:  Admitted for COVID19. Transplant ID followed. Treated with RDV x 3 days. Noted with diarrhea likely 2/2 COVID, GI PCR neg. H/o renal transplant recipient, transplant renal followed. Continued tacro, prednisone, cellcept held i/s/o COVID. Medically cleared to be dc'ed as per attending Dr. Spence.     Important Medication Changes and Reason:  N/A  Active or Pending Issues Requiring Follow-up:  F/u PCP  Advanced Directives:   [ ] Full code  [ ] DNR  [ ] Hospice    Discharge Diagnoses:  COVID

## 2024-05-12 NOTE — DISCHARGE NOTE PROVIDER - CARE PROVIDER_API CALL
Zeferino Arguello.  Internal Medicine  [address] [place] [address]  Phone: [phone]  Fax: [phone]  Follow Up Time: 1 week

## 2024-05-12 NOTE — DISCHARGE NOTE NURSING/CASE MANAGEMENT/SOCIAL WORK - PATIENT PORTAL LINK FT
You can access the FollowMyHealth Patient Portal offered by [facility] by registering at the following website: [email or web address]. By joining [facility]’s FollowMyHealth portal, you will also be able to view your health information using other applications (apps) compatible with our system.

## 2024-05-12 NOTE — DISCHARGE NOTE PROVIDER - NSDCFUADDAPPT_GEN_ALL_CORE_FT
APPTS ARE READY TO BE MADE: [X ] YES    Best Family or Patient Contact (if needed):    Additional Information about above appointments (if needed):    1:   2:   3:     Other comments or requests:    APPTS ARE READY TO BE MADE: [X ] YES    Best Family or Patient Contact (if needed):    Additional Information about above appointments (if needed):    1:   2:   3:     Other comments or requests:   Patient informed us they already have secured a follow up appointment which is not visible on [facility].

## 2024-05-12 NOTE — DISCHARGE NOTE PROVIDER - NSDCCPCAREPLAN_GEN_ALL_CORE_FT
PRINCIPAL DISCHARGE DIAGNOSIS  Diagnosis: COVID  Assessment and Plan of Treatment: You tested positive for COVID 19.  You no longer require hospitalization.  Complete abx as recommended if prescribed for home. Quarantine yourself for 14 days  Please follow up with your PCP in 1-2 weeks -Call your Provider before hand to make then aware of your hospitalization   Take Tylenol for Fevers every 6 hours as needed- Do not exceed 4gm of Tylenol in a 24 hour period  Stay hydrated   WEAR A FACE MASK   Cover your cough and sneezes   Clean your hands often   Avoid sharing personal house hold items   Clean all high touch surfaces- everyday items like table tops , door knobs, cell phones etc   You should restrict activities outside your home except for getting medical care   Avoid using public transportation  Do not go to work, school, or Public areas   Monitor your oxygen saturation   Call [place] department of health at [phone]

## 2024-05-13 LAB
CMV DNA CSF QL NAA+PROBE: SIGNIFICANT CHANGE UP IU/ML
CMV DNA SPEC NAA+PROBE-LOG#: SIGNIFICANT CHANGE UP LOG10IU/ML

## 2024-05-30 ENCOUNTER — APPOINTMENT (OUTPATIENT)
Dept: DERMATOLOGY | Facility: CLINIC | Age: 64
End: 2024-05-30

## 2024-07-15 LAB
ALBUMIN SERPL ELPH-MCNC: 4.3 G/DL
ALP BLD-CCNC: 41 U/L
ALT SERPL-CCNC: 12 U/L
ANION GAP SERPL CALC-SCNC: 13 MMOL/L
APPEARANCE: CLEAR
AST SERPL-CCNC: 13 U/L
BACTERIA: NEGATIVE /HPF
BASOPHILS # BLD AUTO: 0.02 K/UL
BASOPHILS NFR BLD AUTO: 0.3 %
BILIRUB SERPL-MCNC: 0.5 MG/DL
BILIRUBIN URINE: NEGATIVE
BLOOD URINE: NEGATIVE
BUN SERPL-MCNC: 15 MG/DL
CALCIUM SERPL-MCNC: 9 MG/DL
CAST: 0 /LPF
CHLORIDE SERPL-SCNC: 98 MMOL/L
CO2 SERPL-SCNC: 25 MMOL/L
COLOR: YELLOW
CREAT SERPL-MCNC: 0.92 MG/DL
CREAT SPEC-SCNC: 20 MG/DL
CREAT/PROT UR: 0.3 RATIO
EGFR: 92 ML/MIN/1.73M2
EOSINOPHIL # BLD AUTO: 0.06 K/UL
EOSINOPHIL NFR BLD AUTO: 1 %
EPITHELIAL CELLS: 0 /HPF
ESTIMATED AVERAGE GLUCOSE: 143 MG/DL
GLUCOSE QUALITATIVE U: NEGATIVE MG/DL
GLUCOSE SERPL-MCNC: 111 MG/DL
HBA1C MFR BLD HPLC: 6.6 %
HCT VFR BLD CALC: 45.8 %
HGB BLD-MCNC: 15.2 G/DL
IMM GRANULOCYTES NFR BLD AUTO: 0.3 %
KETONES URINE: NEGATIVE MG/DL
LEUKOCYTE ESTERASE URINE: NEGATIVE
LYMPHOCYTES # BLD AUTO: 0.65 K/UL
LYMPHOCYTES NFR BLD AUTO: 10.7 %
MAGNESIUM SERPL-MCNC: 1.6 MG/DL
MAN DIFF?: NORMAL
MCHC RBC-ENTMCNC: 28.7 PG
MCHC RBC-ENTMCNC: 33.2 GM/DL
MCV RBC AUTO: 86.4 FL
MICROSCOPIC-UA: NORMAL
MONOCYTES # BLD AUTO: 0.56 K/UL
MONOCYTES NFR BLD AUTO: 9.2 %
NEUTROPHILS # BLD AUTO: 4.79 K/UL
NEUTROPHILS NFR BLD AUTO: 78.5 %
NITRITE URINE: NEGATIVE
PH URINE: 6.5
PHOSPHATE SERPL-MCNC: 2.9 MG/DL
PLATELET # BLD AUTO: 152 K/UL
POTASSIUM SERPL-SCNC: 4 MMOL/L
PROT SERPL-MCNC: 6.4 G/DL
PROT UR-MCNC: 6 MG/DL
PROTEIN URINE: NEGATIVE MG/DL
RBC # BLD: 5.3 M/UL
RBC # FLD: 13.4 %
RED BLOOD CELLS URINE: 0 /HPF
SODIUM SERPL-SCNC: 136 MMOL/L
SPECIFIC GRAVITY URINE: 1.01
TACROLIMUS SERPL-MCNC: 6.6 NG/ML
URATE SERPL-MCNC: 5.5 MG/DL
UROBILINOGEN URINE: 0.2 MG/DL
WBC # FLD AUTO: 6.1 K/UL
WHITE BLOOD CELLS URINE: 0 /HPF

## 2024-07-17 ENCOUNTER — APPOINTMENT (OUTPATIENT)
Dept: NEPHROLOGY | Facility: CLINIC | Age: 64
End: 2024-07-17
Payer: MEDICARE

## 2024-07-17 VITALS
WEIGHT: 174.16 LBS | DIASTOLIC BLOOD PRESSURE: 81 MMHG | SYSTOLIC BLOOD PRESSURE: 153 MMHG | RESPIRATION RATE: 16 BRPM | HEIGHT: 62 IN | BODY MASS INDEX: 32.05 KG/M2 | HEART RATE: 64 BPM | OXYGEN SATURATION: 98 %

## 2024-07-17 VITALS — SYSTOLIC BLOOD PRESSURE: 120 MMHG | DIASTOLIC BLOOD PRESSURE: 70 MMHG

## 2024-07-17 DIAGNOSIS — Z94.0 KIDNEY TRANSPLANT STATUS: ICD-10-CM

## 2024-07-17 DIAGNOSIS — I10 ESSENTIAL (PRIMARY) HYPERTENSION: ICD-10-CM

## 2024-07-17 DIAGNOSIS — Z79.899 OTHER LONG TERM (CURRENT) DRUG THERAPY: ICD-10-CM

## 2024-07-17 DIAGNOSIS — E11.9 TYPE 2 DIABETES MELLITUS W/OUT COMPLICATIONS: ICD-10-CM

## 2024-07-17 DIAGNOSIS — Z94.0 OTHER LONG TERM (CURRENT) DRUG THERAPY: ICD-10-CM

## 2024-07-17 PROCEDURE — 99214 OFFICE O/P EST MOD 30 MIN: CPT

## 2024-08-19 DIAGNOSIS — Z94.0 KIDNEY TRANSPLANT STATUS: ICD-10-CM

## 2024-08-27 ENCOUNTER — OUTPATIENT (OUTPATIENT)
Dept: OUTPATIENT SERVICES | Facility: HOSPITAL | Age: 64
LOS: 1 days | End: 2024-08-27
Payer: MEDICARE

## 2024-08-27 ENCOUNTER — APPOINTMENT (OUTPATIENT)
Dept: CV DIAGNOSITCS | Facility: HOSPITAL | Age: 64
End: 2024-08-27

## 2024-08-27 ENCOUNTER — NON-APPOINTMENT (OUTPATIENT)
Age: 64
End: 2024-08-27

## 2024-08-27 ENCOUNTER — RESULT REVIEW (OUTPATIENT)
Age: 64
End: 2024-08-27

## 2024-08-27 DIAGNOSIS — I77.0 ARTERIOVENOUS FISTULA, ACQUIRED: Chronic | ICD-10-CM

## 2024-08-27 DIAGNOSIS — Z98.890 OTHER SPECIFIED POSTPROCEDURAL STATES: Chronic | ICD-10-CM

## 2024-08-27 DIAGNOSIS — Z95.5 PRESENCE OF CORONARY ANGIOPLASTY IMPLANT AND GRAFT: Chronic | ICD-10-CM

## 2024-08-27 DIAGNOSIS — Z95.1 PRESENCE OF AORTOCORONARY BYPASS GRAFT: Chronic | ICD-10-CM

## 2024-08-27 DIAGNOSIS — I25.5 ISCHEMIC CARDIOMYOPATHY: ICD-10-CM

## 2024-08-27 PROCEDURE — 93306 TTE W/DOPPLER COMPLETE: CPT | Mod: 26

## 2024-08-27 PROCEDURE — 93356 MYOCRD STRAIN IMG SPCKL TRCK: CPT

## 2024-08-27 PROCEDURE — 76376 3D RENDER W/INTRP POSTPROCES: CPT | Mod: 26

## 2024-09-05 ENCOUNTER — APPOINTMENT (OUTPATIENT)
Dept: CARDIOLOGY | Facility: CLINIC | Age: 64
End: 2024-09-05
Payer: MEDICARE

## 2024-09-05 ENCOUNTER — NON-APPOINTMENT (OUTPATIENT)
Age: 64
End: 2024-09-05

## 2024-09-05 VITALS
DIASTOLIC BLOOD PRESSURE: 78 MMHG | OXYGEN SATURATION: 95 % | SYSTOLIC BLOOD PRESSURE: 160 MMHG | HEART RATE: 60 BPM | HEIGHT: 62 IN | WEIGHT: 180 LBS | BODY MASS INDEX: 33.13 KG/M2

## 2024-09-05 VITALS — DIASTOLIC BLOOD PRESSURE: 74 MMHG | SYSTOLIC BLOOD PRESSURE: 134 MMHG

## 2024-09-05 DIAGNOSIS — I25.5 ISCHEMIC CARDIOMYOPATHY: ICD-10-CM

## 2024-09-05 DIAGNOSIS — I10 ESSENTIAL (PRIMARY) HYPERTENSION: ICD-10-CM

## 2024-09-05 DIAGNOSIS — I25.10 ATHEROSCLEROTIC HEART DISEASE OF NATIVE CORONARY ARTERY W/OUT ANGINA PECTORIS: ICD-10-CM

## 2024-09-05 PROCEDURE — 99214 OFFICE O/P EST MOD 30 MIN: CPT | Mod: 25

## 2024-09-05 PROCEDURE — 93000 ELECTROCARDIOGRAM COMPLETE: CPT

## 2024-09-05 PROCEDURE — G2211 COMPLEX E/M VISIT ADD ON: CPT | Mod: NC

## 2024-09-05 NOTE — DISCUSSION/SUMMARY
[Cardiomyopathy] : cardiomyopathy [Coronary Artery Disease] : coronary artery disease [Hypertension] : hypertension [Stable] : stable [FreeTextEntry1] : Currently stable from a cardiovascular standpoint. Normotensive. History of ischemic cardiomyopathy (LVEF 50%). Currently euvolemic. Stable CAD (s/p CABG, s/p prox Cx and OM stents). No ischemic or CHF symptoms. Patient with renal transplant on immunosuppressive therapy (creatinine 0.92, eGFR 92). Continue current medications including aspirin, clopidogrel, and statin. ECG completed today and reviewed (findings as noted above). Follow up in 6 months. [EKG obtained to assist in diagnosis and management of assessed problem(s)] : EKG obtained to assist in diagnosis and management of assessed problem(s)

## 2024-09-05 NOTE — CARDIOLOGY SUMMARY
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 [de-identified] : 09/09/20 (exercise ) -5 METS, 95% MPHR, 05:21 min, Duke score 5, large, moderate defects in the inferior and inferolateral, inferoapical, and apical lateral walls that are unchanged from prior, suggestive of infarct, LVEF 43% 09/20/19 (regadenoson MIBI) - large, moderate to severe defects n lateral and mid to basal inferior/inferolateral walls that are fixed, consistent with infarct; large, severe defects in inferoapical and apical lateral walls that are partially fixed, suggestive of infarct with at least moderate mckay-infarct ischemia, LVEF 43% [de-identified] : 08/27/24 - mild MR, mild AR, normal LA, mild diastolic dysfunction, normal RV size and function, LVEF 50-55% 03/16/22 - MAC, mild-mod MR, AV gradient (peak 9 mmHg, mean 5 mmHg), mild LAE, mild segmental LV systolic dysfunction, moderate diastolic dysfunction, mild LVE, normal RV size and function, RVSP 19 mmHg, LVEF 50% 09/09/19 - mild MR, normal LA, mild segmental LV systolic dysfunction, mild LVE, grossly normal RV size and function, LVEF 50% [de-identified] : 09/20/19 (PCI) - SYNERGY stent to OM2 80% ISR 09/20/19 (CATH) - dLM 30%, pLAD 100%, OM1 100%, OM2 80% ISR, mRCA 100%, patent LIMA-LAD, patent [id number] 06/01/16 (PCI) - RESOLUTE stents to pCx 70% and mOM2 80% [de-identified] : 2007 (CABG) - [facility]

## 2024-09-15 NOTE — DISCHARGE NOTE ADULT - HOSPITAL COURSE
To be done. no 58 yr old with pmh of  CABG and kidney transplant presenting with 2 days of fever, productive cough, and crushing chest pain(worse with cough). On asa, plavix, proraf, cellcept, bactrim. Satting 91 on RA, 100% with supplemental O2. CP constant, nonexertional. Also notes congestion. No chills, abdominal pain, dysuria.  Pt was seen by Pulmonary and Infectious Disease.  He was started on Tamiflu x 5 days and Prednisone 20 mg x 5 days.   Pt was also seen by Cardiology for complaints of chest pain.  Troponins were negative x 2.  Pt has been medically cleared for discharge to home.  He will follow up with his PMD, Dr. Daly in 1 week. Pt verbalize understanding of discharge plan and medication reconciliation. He is hemodynamically stable for discharge to home today.

## 2024-09-19 ENCOUNTER — RX RENEWAL (OUTPATIENT)
Age: 64
End: 2024-09-19

## 2024-10-09 ENCOUNTER — APPOINTMENT (OUTPATIENT)
Dept: NEPHROLOGY | Facility: CLINIC | Age: 64
End: 2024-10-09
Payer: MEDICARE

## 2024-10-09 VITALS
HEART RATE: 61 BPM | RESPIRATION RATE: 14 BRPM | SYSTOLIC BLOOD PRESSURE: 150 MMHG | WEIGHT: 174.17 LBS | DIASTOLIC BLOOD PRESSURE: 80 MMHG | OXYGEN SATURATION: 98 % | BODY MASS INDEX: 31.85 KG/M2

## 2024-10-09 DIAGNOSIS — Z94.0 OTHER LONG TERM (CURRENT) DRUG THERAPY: ICD-10-CM

## 2024-10-09 DIAGNOSIS — E11.9 TYPE 2 DIABETES MELLITUS W/OUT COMPLICATIONS: ICD-10-CM

## 2024-10-09 DIAGNOSIS — Z79.899 OTHER LONG TERM (CURRENT) DRUG THERAPY: ICD-10-CM

## 2024-10-09 DIAGNOSIS — Z94.0 KIDNEY TRANSPLANT STATUS: ICD-10-CM

## 2024-10-09 DIAGNOSIS — I10 ESSENTIAL (PRIMARY) HYPERTENSION: ICD-10-CM

## 2024-10-09 LAB
ALBUMIN SERPL ELPH-MCNC: 4.5 G/DL
ALP BLD-CCNC: 45 U/L
ALT SERPL-CCNC: 11 U/L
ANION GAP SERPL CALC-SCNC: 16 MMOL/L
APPEARANCE: CLEAR
AST SERPL-CCNC: 15 U/L
BACTERIA: NEGATIVE /HPF
BILIRUB SERPL-MCNC: 0.6 MG/DL
BILIRUBIN URINE: NEGATIVE
BLOOD URINE: NEGATIVE
BUN SERPL-MCNC: 14 MG/DL
CALCIUM SERPL-MCNC: 9.5 MG/DL
CAST: 0 /LPF
CHLORIDE SERPL-SCNC: 93 MMOL/L
CMV DNA SPEC QL NAA+PROBE: NOT DETECTED IU/ML
CMVPCR LOG: NOT DETECTED LOG10IU/ML
CO2 SERPL-SCNC: 24 MMOL/L
COLOR: YELLOW
CREAT SERPL-MCNC: 0.88 MG/DL
CREAT SPEC-SCNC: 16 MG/DL
CREAT/PROT UR: 1.3 RATIO
EGFR: 95 ML/MIN/1.73M2
EPITHELIAL CELLS: 0 /HPF
GLUCOSE QUALITATIVE U: NEGATIVE MG/DL
GLUCOSE SERPL-MCNC: 164 MG/DL
HCT VFR BLD CALC: 49.7 %
HGB BLD-MCNC: 16.3 G/DL
KETONES URINE: NEGATIVE MG/DL
LDH SERPL-CCNC: 202 U/L
LEUKOCYTE ESTERASE URINE: NEGATIVE
MAGNESIUM SERPL-MCNC: 1.4 MG/DL
MCHC RBC-ENTMCNC: 28.2 PG
MCHC RBC-ENTMCNC: 32.8 GM/DL
MCV RBC AUTO: 86.1 FL
MICROSCOPIC-UA: NORMAL
NITRITE URINE: NEGATIVE
PH URINE: 6.5
PHOSPHATE SERPL-MCNC: 3.9 MG/DL
PLATELET # BLD AUTO: 168 K/UL
POTASSIUM SERPL-SCNC: 4.2 MMOL/L
PROT SERPL-MCNC: 7.1 G/DL
PROT UR-MCNC: 22 MG/DL
PROTEIN URINE: 30 MG/DL
RBC # BLD: 5.77 M/UL
RBC # FLD: 13.2 %
RED BLOOD CELLS URINE: 0 /HPF
SODIUM SERPL-SCNC: 133 MMOL/L
SPECIFIC GRAVITY URINE: 1.01
TACROLIMUS SERPL-MCNC: 6.7 NG/ML
URATE SERPL-MCNC: 5 MG/DL
UROBILINOGEN URINE: 0.2 MG/DL
WBC # FLD AUTO: 5.54 K/UL
WHITE BLOOD CELLS URINE: 0 /HPF

## 2024-10-09 PROCEDURE — 99214 OFFICE O/P EST MOD 30 MIN: CPT

## 2024-10-10 LAB — BKV DNA SPEC QL NAA+PROBE: NOT DETECTED IU/ML

## 2024-10-18 LAB
CREAT SPEC-SCNC: 54 MG/DL
CREAT SPEC-SCNC: 54 MG/DL
CREAT/PROT UR: 0.3 RATIO
MICROALBUMIN 24H UR DL<=1MG/L-MCNC: 8.2 MG/DL
MICROALBUMIN/CREAT 24H UR-RTO: 152 MG/G
PROT UR-MCNC: 16 MG/DL

## 2024-10-31 NOTE — ASU PATIENT PROFILE, ADULT - ATTEMPT TO OOB
Complex Case Management    Order received for Complex Case Management. Patient's chart has been reviewed.     Complex case management following? yes    Yes: Case assigned to Salma Palm     NOTE: There may be cases that are NOT assigned to a CCM but will be followed for progression of care by leaders of the Complex Discharge Committee.      no

## 2025-01-09 NOTE — DISCHARGE NOTE PROVIDER - NSDCCPCAREPLAN_GEN_ALL_CORE_FT
Render In Strict Bullet Format?: No
Detail Level: Zone
Continue Regimen: .\\n\\n-doxycycline hyclate 100 mg capsule BID\\n-metformin 500 mg tablet QD\\n-Cosentyx 300mg SC every 4 weeks\\n.
PRINCIPAL DISCHARGE DIAGNOSIS  Diagnosis: CAD in native artery  Assessment and Plan of Treatment: Low salt, low fat diet.   Weight management.   Take medications as prescribed.    No smoking.  Follow up appointments with your doctor(s)  as instruced.        SECONDARY DISCHARGE DIAGNOSES  Diagnosis: Renal transplant, status post  Assessment and Plan of Treatment: Please f/u with your renal doctor. Continue with your Transplant medication      Diagnosis: HLD (hyperlipidemia)  Assessment and Plan of Treatment: Goal is to keep LDL<70. Continue with your cholesterol medications as prescribed. Eat a heart healthy diet that is low in saturated fats and salt, and includes whole grains, fruits, vegetables and lean protein; exercise regularly (consult with your physician or cardiologist first); maintain a heart healthy weight; if you smoke - quit (A resource to help you stop smoking is the [facility] [employer] – phone number [phone].). Continue to follow with your primary physician or cardiologist.      Diagnosis: Benign essential HTN  Assessment and Plan of Treatment: Continue with your blood pressure medications; eat a heart healthy diet with low salt diet; exercise regularly (consult with your physician or cardiologist first); maintain a heart healthy weight; if you smoke - quit (A resource to help you stop smoking is the [facility] [employer] – phone number [phone].); include healthy ways to manage stress. Continue to follow with your primary care physician or cardiologist.
Initiate Treatment: .\\n\\n-ketoconazole 2 % shampoo QD\\n-nystatin 100,000 unit/gram topical powder BID\\n\\n.

## 2025-01-13 ENCOUNTER — RX RENEWAL (OUTPATIENT)
Age: 65
End: 2025-01-13

## 2025-01-21 ENCOUNTER — APPOINTMENT (OUTPATIENT)
Dept: NEPHROLOGY | Facility: CLINIC | Age: 65
End: 2025-01-21
Payer: MEDICARE

## 2025-01-21 VITALS
TEMPERATURE: 98.3 F | BODY MASS INDEX: 32.05 KG/M2 | RESPIRATION RATE: 14 BRPM | DIASTOLIC BLOOD PRESSURE: 62 MMHG | OXYGEN SATURATION: 97 % | WEIGHT: 174.17 LBS | HEIGHT: 62 IN | SYSTOLIC BLOOD PRESSURE: 126 MMHG | HEART RATE: 61 BPM

## 2025-01-21 DIAGNOSIS — Z79.899 OTHER LONG TERM (CURRENT) DRUG THERAPY: ICD-10-CM

## 2025-01-21 DIAGNOSIS — Z94.0 OTHER LONG TERM (CURRENT) DRUG THERAPY: ICD-10-CM

## 2025-01-21 DIAGNOSIS — E11.9 TYPE 2 DIABETES MELLITUS W/OUT COMPLICATIONS: ICD-10-CM

## 2025-01-21 DIAGNOSIS — Z94.0 KIDNEY TRANSPLANT STATUS: ICD-10-CM

## 2025-01-21 LAB
25(OH)D3 SERPL-MCNC: 42.5 NG/ML
ALBUMIN SERPL ELPH-MCNC: 4.3 G/DL
ALP BLD-CCNC: 48 U/L
ALT SERPL-CCNC: 13 U/L
ANION GAP SERPL CALC-SCNC: 14 MMOL/L
APPEARANCE: CLEAR
AST SERPL-CCNC: 12 U/L
BACTERIA: NEGATIVE /HPF
BILIRUB SERPL-MCNC: 0.5 MG/DL
BILIRUBIN URINE: NEGATIVE
BKV DNA SPEC QL NAA+PROBE: NOT DETECTED IU/ML
BLOOD URINE: ABNORMAL
BUN SERPL-MCNC: 16 MG/DL
CALCIUM SERPL-MCNC: 9.4 MG/DL
CALCIUM SERPL-MCNC: 9.4 MG/DL
CAST: 0 /LPF
CHLORIDE SERPL-SCNC: 95 MMOL/L
CHOLEST SERPL-MCNC: 150 MG/DL
CMV DNA SPEC QL NAA+PROBE: NOT DETECTED IU/ML
CMVPCR LOG: NOT DETECTED LOG10IU/ML
CO2 SERPL-SCNC: 24 MMOL/L
COLOR: YELLOW
CREAT SERPL-MCNC: 1.04 MG/DL
CREAT SPEC-SCNC: 31 MG/DL
CREAT/PROT UR: 1 RATIO
EGFR: 79 ML/MIN/1.73M2
EPITHELIAL CELLS: 0 /HPF
ESTIMATED AVERAGE GLUCOSE: 151 MG/DL
GLUCOSE QUALITATIVE U: NEGATIVE MG/DL
GLUCOSE SERPL-MCNC: 144 MG/DL
HBA1C MFR BLD HPLC: 6.9 %
HCT VFR BLD CALC: 48.2 %
HDLC SERPL-MCNC: 48 MG/DL
HGB BLD-MCNC: 16.1 G/DL
KETONES URINE: NEGATIVE MG/DL
LDLC SERPL CALC-MCNC: 63 MG/DL
LEUKOCYTE ESTERASE URINE: NEGATIVE
MAGNESIUM SERPL-MCNC: 1.5 MG/DL
MCHC RBC-ENTMCNC: 28.5 PG
MCHC RBC-ENTMCNC: 33.4 G/DL
MCV RBC AUTO: 85.5 FL
MICROSCOPIC-UA: NORMAL
NITRITE URINE: NEGATIVE
NONHDLC SERPL-MCNC: 103 MG/DL
PARATHYROID HORMONE INTACT: 77 PG/ML
PH URINE: 7
PHOSPHATE SERPL-MCNC: 3.2 MG/DL
PLATELET # BLD AUTO: 171 K/UL
POTASSIUM SERPL-SCNC: 4.3 MMOL/L
PROT SERPL-MCNC: 6.9 G/DL
PROT UR-MCNC: 30 MG/DL
PROTEIN URINE: 30 MG/DL
RBC # BLD: 5.64 M/UL
RBC # FLD: 13.1 %
RED BLOOD CELLS URINE: 0 /HPF
SODIUM SERPL-SCNC: 133 MMOL/L
SPECIFIC GRAVITY URINE: 1.01
TACROLIMUS SERPL-MCNC: 11.2 NG/ML
TRIGL SERPL-MCNC: 246 MG/DL
URATE SERPL-MCNC: 5.9 MG/DL
UROBILINOGEN URINE: 0.2 MG/DL
WBC # FLD AUTO: 5.61 K/UL
WHITE BLOOD CELLS URINE: 0 /HPF

## 2025-01-21 PROCEDURE — 99214 OFFICE O/P EST MOD 30 MIN: CPT

## 2025-03-05 NOTE — PATIENT PROFILE ADULT - HEALTH LITERACY
What Type Of Note Output Would You Prefer (Optional)?: Standard Output How Severe Is Your Skin Lesion?: mild Is This A New Presentation, Or A Follow-Up?: Skin Lesions no

## 2025-03-06 ENCOUNTER — NON-APPOINTMENT (OUTPATIENT)
Age: 65
End: 2025-03-06

## 2025-03-06 ENCOUNTER — APPOINTMENT (OUTPATIENT)
Dept: CARDIOLOGY | Facility: CLINIC | Age: 65
End: 2025-03-06
Payer: MEDICARE

## 2025-03-06 VITALS
HEART RATE: 74 BPM | WEIGHT: 174.17 LBS | SYSTOLIC BLOOD PRESSURE: 108 MMHG | DIASTOLIC BLOOD PRESSURE: 66 MMHG | BODY MASS INDEX: 31.85 KG/M2 | TEMPERATURE: 97.8 F | OXYGEN SATURATION: 96 % | RESPIRATION RATE: 16 BRPM

## 2025-03-06 DIAGNOSIS — I25.10 ATHEROSCLEROTIC HEART DISEASE OF NATIVE CORONARY ARTERY W/OUT ANGINA PECTORIS: ICD-10-CM

## 2025-03-06 DIAGNOSIS — I10 ESSENTIAL (PRIMARY) HYPERTENSION: ICD-10-CM

## 2025-03-06 DIAGNOSIS — I25.5 ISCHEMIC CARDIOMYOPATHY: ICD-10-CM

## 2025-03-06 PROCEDURE — 93000 ELECTROCARDIOGRAM COMPLETE: CPT

## 2025-03-06 PROCEDURE — 99214 OFFICE O/P EST MOD 30 MIN: CPT | Mod: 25

## 2025-04-04 LAB
25(OH)D3 SERPL-MCNC: 48.3 NG/ML
ALBUMIN SERPL ELPH-MCNC: 4.6 G/DL
ALP BLD-CCNC: 53 U/L
ALT SERPL-CCNC: 12 U/L
ANION GAP SERPL CALC-SCNC: 15 MMOL/L
APPEARANCE: CLEAR
AST SERPL-CCNC: 16 U/L
BACTERIA: NEGATIVE /HPF
BILIRUB SERPL-MCNC: 0.4 MG/DL
BILIRUBIN URINE: NEGATIVE
BLOOD URINE: NEGATIVE
BUN SERPL-MCNC: 14 MG/DL
CALCIUM SERPL-MCNC: 9.5 MG/DL
CALCIUM SERPL-MCNC: 9.5 MG/DL
CAST: 0 /LPF
CHLORIDE SERPL-SCNC: 98 MMOL/L
CHOLEST SERPL-MCNC: 158 MG/DL
CO2 SERPL-SCNC: 24 MMOL/L
COLOR: YELLOW
CREAT SERPL-MCNC: 0.86 MG/DL
CREAT SPEC-SCNC: 13 MG/DL
CREAT/PROT UR: 0.6 RATIO
EGFRCR SERPLBLD CKD-EPI 2021: 95 ML/MIN/1.73M2
EPITHELIAL CELLS: 0 /HPF
ESTIMATED AVERAGE GLUCOSE: 131 MG/DL
GLUCOSE QUALITATIVE U: >=1000 MG/DL
GLUCOSE SERPL-MCNC: 119 MG/DL
HBA1C MFR BLD HPLC: 6.2 %
HCT VFR BLD CALC: 50.6 %
HDLC SERPL-MCNC: 46 MG/DL
HGB BLD-MCNC: 16.6 G/DL
KETONES URINE: NEGATIVE MG/DL
LDLC SERPL-MCNC: 60 MG/DL
LEUKOCYTE ESTERASE URINE: NEGATIVE
MAGNESIUM SERPL-MCNC: 1.7 MG/DL
MCHC RBC-ENTMCNC: 28.4 PG
MCHC RBC-ENTMCNC: 32.8 G/DL
MCV RBC AUTO: 86.5 FL
MICROSCOPIC-UA: NORMAL
NITRITE URINE: NEGATIVE
NONHDLC SERPL-MCNC: 113 MG/DL
PARATHYROID HORMONE INTACT: 80 PG/ML
PH URINE: 6
PHOSPHATE SERPL-MCNC: 2.9 MG/DL
PLATELET # BLD AUTO: 159 K/UL
POTASSIUM SERPL-SCNC: 4.1 MMOL/L
PROT SERPL-MCNC: 7.3 G/DL
PROT UR-MCNC: 8 MG/DL
PROTEIN URINE: NEGATIVE MG/DL
RBC # BLD: 5.85 M/UL
RBC # FLD: 13.2 %
RED BLOOD CELLS URINE: 0 /HPF
SODIUM SERPL-SCNC: 137 MMOL/L
SPECIFIC GRAVITY URINE: 1.01
TACROLIMUS SERPL-MCNC: 15.6 NG/ML
TRIGL SERPL-MCNC: 345 MG/DL
URATE SERPL-MCNC: 4.8 MG/DL
UROBILINOGEN URINE: 0.2 MG/DL
WBC # FLD AUTO: 6.15 K/UL
WHITE BLOOD CELLS URINE: 0 /HPF

## 2025-04-08 ENCOUNTER — APPOINTMENT (OUTPATIENT)
Dept: NEPHROLOGY | Facility: CLINIC | Age: 65
End: 2025-04-08
Payer: MEDICARE

## 2025-04-08 VITALS — SYSTOLIC BLOOD PRESSURE: 130 MMHG | DIASTOLIC BLOOD PRESSURE: 78 MMHG

## 2025-04-08 VITALS
BODY MASS INDEX: 32.05 KG/M2 | TEMPERATURE: 97.8 F | HEIGHT: 62 IN | WEIGHT: 174.17 LBS | DIASTOLIC BLOOD PRESSURE: 68 MMHG | OXYGEN SATURATION: 95 % | RESPIRATION RATE: 16 BRPM | HEART RATE: 67 BPM | SYSTOLIC BLOOD PRESSURE: 135 MMHG

## 2025-04-08 DIAGNOSIS — Z94.0 OTHER LONG TERM (CURRENT) DRUG THERAPY: ICD-10-CM

## 2025-04-08 DIAGNOSIS — I10 ESSENTIAL (PRIMARY) HYPERTENSION: ICD-10-CM

## 2025-04-08 DIAGNOSIS — E11.9 TYPE 2 DIABETES MELLITUS W/OUT COMPLICATIONS: ICD-10-CM

## 2025-04-08 DIAGNOSIS — Z94.0 KIDNEY TRANSPLANT STATUS: ICD-10-CM

## 2025-04-08 DIAGNOSIS — Z79.899 OTHER LONG TERM (CURRENT) DRUG THERAPY: ICD-10-CM

## 2025-04-08 PROCEDURE — 99214 OFFICE O/P EST MOD 30 MIN: CPT

## 2025-04-09 LAB — TACROLIMUS SERPL-MCNC: 5.6 NG/ML

## 2025-06-04 NOTE — PATIENT PROFILE ADULT - INSURANCE HELP
[facility]en [facility].  Cardiac Electrophysiology  [address] [place] [id number]  Phone: [phone]  Fax: [phone]  Follow Up Time:   
no

## 2025-08-25 ENCOUNTER — RX RENEWAL (OUTPATIENT)
Age: 65
End: 2025-08-25

## 2025-08-28 ENCOUNTER — RX RENEWAL (OUTPATIENT)
Age: 65
End: 2025-08-28

## 2025-09-04 ENCOUNTER — APPOINTMENT (OUTPATIENT)
Dept: CARDIOLOGY | Facility: CLINIC | Age: 65
End: 2025-09-04
Payer: MEDICARE

## 2025-09-04 ENCOUNTER — NON-APPOINTMENT (OUTPATIENT)
Age: 65
End: 2025-09-04

## 2025-09-04 VITALS
RESPIRATION RATE: 16 BRPM | SYSTOLIC BLOOD PRESSURE: 135 MMHG | BODY MASS INDEX: 32.56 KG/M2 | OXYGEN SATURATION: 94 % | DIASTOLIC BLOOD PRESSURE: 71 MMHG | WEIGHT: 178 LBS | TEMPERATURE: 97.8 F | HEART RATE: 72 BPM

## 2025-09-04 DIAGNOSIS — I25.10 ATHEROSCLEROTIC HEART DISEASE OF NATIVE CORONARY ARTERY W/OUT ANGINA PECTORIS: ICD-10-CM

## 2025-09-04 DIAGNOSIS — I10 ESSENTIAL (PRIMARY) HYPERTENSION: ICD-10-CM

## 2025-09-04 PROCEDURE — 99204 OFFICE O/P NEW MOD 45 MIN: CPT | Mod: 25

## 2025-09-04 PROCEDURE — 93000 ELECTROCARDIOGRAM COMPLETE: CPT
